# Patient Record
Sex: FEMALE | Race: WHITE | Employment: OTHER | URBAN - METROPOLITAN AREA
[De-identification: names, ages, dates, MRNs, and addresses within clinical notes are randomized per-mention and may not be internally consistent; named-entity substitution may affect disease eponyms.]

---

## 2017-03-20 ENCOUNTER — HOSPITAL ENCOUNTER (EMERGENCY)
Age: 52
Discharge: HOME | End: 2017-03-20
Attending: EMERGENCY MEDICINE
Payer: COMMERCIAL

## 2017-03-20 ENCOUNTER — APPOINTMENT (OUTPATIENT)
Dept: CT IMAGING | Age: 52
End: 2017-03-20
Attending: EMERGENCY MEDICINE
Payer: COMMERCIAL

## 2017-03-20 ENCOUNTER — APPOINTMENT (OUTPATIENT)
Dept: GENERAL RADIOLOGY | Age: 52
End: 2017-03-20
Attending: EMERGENCY MEDICINE
Payer: COMMERCIAL

## 2017-03-20 VITALS
RESPIRATION RATE: 18 BRPM | WEIGHT: 250 LBS | TEMPERATURE: 97 F | DIASTOLIC BLOOD PRESSURE: 70 MMHG | HEIGHT: 67 IN | HEART RATE: 93 BPM | OXYGEN SATURATION: 95 % | SYSTOLIC BLOOD PRESSURE: 121 MMHG | BODY MASS INDEX: 39.24 KG/M2

## 2017-03-20 DIAGNOSIS — J40 BRONCHITIS: ICD-10-CM

## 2017-03-20 DIAGNOSIS — J01.90 ACUTE SINUSITIS, RECURRENCE NOT SPECIFIED, UNSPECIFIED LOCATION: Primary | ICD-10-CM

## 2017-03-20 LAB
ALBUMIN SERPL-MCNC: 4 G/DL (ref 3.5–4.8)
ALP LIVER SERPL-CCNC: 132 U/L (ref 39–100)
ALT SERPL-CCNC: 74 U/L (ref 14–54)
AST SERPL-CCNC: 51 U/L (ref 15–41)
BASOPHILS # BLD AUTO: 0.18 X10(3) UL (ref 0–0.1)
BASOPHILS NFR BLD AUTO: 1.3 %
BILIRUB SERPL-MCNC: 0.4 MG/DL (ref 0.1–2)
BUN BLD-MCNC: 10 MG/DL (ref 8–20)
CALCIUM BLD-MCNC: 10.3 MG/DL (ref 8.3–10.3)
CHLORIDE: 101 MMOL/L (ref 101–111)
CO2: 26 MMOL/L (ref 22–32)
CREAT BLD-MCNC: 1.02 MG/DL (ref 0.55–1.02)
EOSINOPHIL # BLD AUTO: 0.75 X10(3) UL (ref 0–0.3)
EOSINOPHIL NFR BLD AUTO: 5.4 %
ERYTHROCYTE [DISTWIDTH] IN BLOOD BY AUTOMATED COUNT: 16.5 % (ref 11.5–16)
GLUCOSE BLD-MCNC: 234 MG/DL (ref 70–99)
HCT VFR BLD AUTO: 49.9 % (ref 34–50)
HGB BLD-MCNC: 15.5 G/DL (ref 12–16)
IMMATURE GRANULOCYTE COUNT: 0.05 X10(3) UL (ref 0–1)
IMMATURE GRANULOCYTE RATIO %: 0.4 %
LYMPHOCYTES # BLD AUTO: 5 X10(3) UL (ref 0.9–4)
LYMPHOCYTES NFR BLD AUTO: 35.8 %
M PROTEIN MFR SERPL ELPH: 8.1 G/DL (ref 6.1–8.3)
MCH RBC QN AUTO: 24.5 PG (ref 27–33.2)
MCHC RBC AUTO-ENTMCNC: 31.1 G/DL (ref 31–37)
MCV RBC AUTO: 78.8 FL (ref 81–100)
MONOCYTES # BLD AUTO: 0.58 X10(3) UL (ref 0.1–0.6)
MONOCYTES NFR BLD AUTO: 4.2 %
NEUTROPHIL ABS PRELIM: 7.41 X10 (3) UL (ref 1.3–6.7)
NEUTROPHILS # BLD AUTO: 7.41 X10(3) UL (ref 1.3–6.7)
NEUTROPHILS NFR BLD AUTO: 52.9 %
PLATELET # BLD AUTO: 406 10(3)UL (ref 150–450)
POTASSIUM SERPL-SCNC: 4 MMOL/L (ref 3.6–5.1)
RBC # BLD AUTO: 6.33 X10(6)UL (ref 3.8–5.1)
RED CELL DISTRIBUTION WIDTH-SD: 44.8 FL (ref 35.1–46.3)
SODIUM SERPL-SCNC: 138 MMOL/L (ref 136–144)
WBC # BLD AUTO: 14 X10(3) UL (ref 4–13)

## 2017-03-20 PROCEDURE — 99284 EMERGENCY DEPT VISIT MOD MDM: CPT

## 2017-03-20 PROCEDURE — 96375 TX/PRO/DX INJ NEW DRUG ADDON: CPT

## 2017-03-20 PROCEDURE — 94640 AIRWAY INHALATION TREATMENT: CPT

## 2017-03-20 PROCEDURE — 80053 COMPREHEN METABOLIC PANEL: CPT | Performed by: EMERGENCY MEDICINE

## 2017-03-20 PROCEDURE — 70450 CT HEAD/BRAIN W/O DYE: CPT

## 2017-03-20 PROCEDURE — 85025 COMPLETE CBC W/AUTO DIFF WBC: CPT | Performed by: EMERGENCY MEDICINE

## 2017-03-20 PROCEDURE — 71020 XR CHEST PA + LAT CHEST (CPT=71020): CPT

## 2017-03-20 PROCEDURE — 96361 HYDRATE IV INFUSION ADD-ON: CPT

## 2017-03-20 PROCEDURE — 96374 THER/PROPH/DIAG INJ IV PUSH: CPT

## 2017-03-20 RX ORDER — IPRATROPIUM BROMIDE AND ALBUTEROL SULFATE 2.5; .5 MG/3ML; MG/3ML
3 SOLUTION RESPIRATORY (INHALATION) ONCE
Status: COMPLETED | OUTPATIENT
Start: 2017-03-20 | End: 2017-03-20

## 2017-03-20 RX ORDER — SODIUM CHLORIDE 9 MG/ML
INJECTION, SOLUTION INTRAVENOUS ONCE
Status: COMPLETED | OUTPATIENT
Start: 2017-03-20 | End: 2017-03-20

## 2017-03-20 RX ORDER — MORPHINE SULFATE 2 MG/ML
2 INJECTION, SOLUTION INTRAMUSCULAR; INTRAVENOUS ONCE
Status: COMPLETED | OUTPATIENT
Start: 2017-03-20 | End: 2017-03-20

## 2017-03-20 RX ORDER — METOCLOPRAMIDE HYDROCHLORIDE 5 MG/ML
5 INJECTION INTRAMUSCULAR; INTRAVENOUS ONCE
Status: COMPLETED | OUTPATIENT
Start: 2017-03-20 | End: 2017-03-20

## 2017-03-20 RX ORDER — DIPHENHYDRAMINE HYDROCHLORIDE 50 MG/ML
25 INJECTION INTRAMUSCULAR; INTRAVENOUS ONCE
Status: COMPLETED | OUTPATIENT
Start: 2017-03-20 | End: 2017-03-20

## 2017-03-20 RX ORDER — ONDANSETRON 2 MG/ML
4 INJECTION INTRAMUSCULAR; INTRAVENOUS ONCE
Status: COMPLETED | OUTPATIENT
Start: 2017-03-20 | End: 2017-03-20

## 2017-03-20 RX ORDER — HYDROMORPHONE HYDROCHLORIDE 1 MG/ML
0.5 INJECTION, SOLUTION INTRAMUSCULAR; INTRAVENOUS; SUBCUTANEOUS ONCE
Status: DISCONTINUED | OUTPATIENT
Start: 2017-03-20 | End: 2017-03-20

## 2017-03-20 RX ORDER — CLARITHROMYCIN 500 MG/1
500 TABLET, COATED ORAL 2 TIMES DAILY
Qty: 20 TABLET | Refills: 0 | Status: SHIPPED | OUTPATIENT
Start: 2017-03-20 | End: 2017-03-30

## 2017-03-20 NOTE — ED PROVIDER NOTES
Patient Seen in: [facility] Emergency Department In [place]    History   Patient presents with:  Sinus Problem    Stated Complaint: sinus problems    HPI    Patient is a 32-year-old female who presents emergency room with a history of sinus pressure and h mention of complication, not stated as uncontrolled ([facility].)    • PONV (postoperative nausea and vomiting)    • Vertigo    • Asthma    • Esophageal reflux    • Pneumonia, organism unspecified 2014   • Migraines      perfumes   • Back problem    • Blood disord tablet by mouth every 6 (six) hours as needed. ergocalciferol 93604 UNITS Oral Cap,  Take 50,000 Units by mouth twice a week. escitalopram 20 MG Oral Tab,  Take 20 mg by mouth every morning.      SITagliptin Phosphate 100 MG Oral Tab,  Take 100 mg b HPI.    Physical Exam       ED Triage Vitals   BP 03/20/17 1604 150/87 mmHg   Pulse 03/20/17 1604 94   Resp 03/20/17 1604 22   Temp 03/20/17 1604 97.4 °F (36.3 °C)   Temp src 03/20/17 1604 Temporal   SpO2 03/20/17 1604 97 %   O2 Device 03/20/17 1604 None ( DIFFERENTIAL - Abnormal; Notable for the following:     WBC 14.0 (*)     RBC 6.33 (*)     MCV 78.8 (*)     MCH 24.5 (*)     RDW 16.5 (*)     Neutrophil Absolute Prelim 7.41 (*)     Neutrophil Absolute 7.41 (*)     Lymphocyte Absolute 5.00 (*)     Eosinophi sinusitis this time. Patient will be asked to encourage fluids and return to the ER if any other problems arise. Patient was discharged home with improvement in symptoms and no further new complaints.         Disposition and Plan     Clinical Impression:

## 2017-03-20 NOTE — ED INITIAL ASSESSMENT (HPI)
Pt c/o sinus pressure and headache for one month worse in the past 2 wks with tightness in chest/wheezing.

## 2017-05-01 RX ORDER — RIVAROXABAN 20 MG/1
TABLET, FILM COATED ORAL
Qty: 30 TABLET | Refills: 0 | Status: SHIPPED | OUTPATIENT
Start: 2017-05-01 | End: 2017-10-01

## 2017-07-09 ENCOUNTER — APPOINTMENT (OUTPATIENT)
Dept: GENERAL RADIOLOGY | Age: 52
End: 2017-07-09
Attending: EMERGENCY MEDICINE
Payer: COMMERCIAL

## 2017-07-09 ENCOUNTER — HOSPITAL ENCOUNTER (EMERGENCY)
Age: 52
Discharge: HOME | End: 2017-07-09
Attending: EMERGENCY MEDICINE
Payer: COMMERCIAL

## 2017-07-09 VITALS
TEMPERATURE: 98 F | HEIGHT: 67 IN | OXYGEN SATURATION: 94 % | DIASTOLIC BLOOD PRESSURE: 55 MMHG | HEART RATE: 84 BPM | RESPIRATION RATE: 20 BRPM | WEIGHT: 248 LBS | BODY MASS INDEX: 38.92 KG/M2 | SYSTOLIC BLOOD PRESSURE: 142 MMHG

## 2017-07-09 DIAGNOSIS — N39.0 URINARY TRACT INFECTION WITHOUT HEMATURIA, SITE UNSPECIFIED: ICD-10-CM

## 2017-07-09 DIAGNOSIS — J45.901 ASTHMA EXACERBATION: Primary | ICD-10-CM

## 2017-07-09 LAB
ALBUMIN SERPL-MCNC: 3.5 G/DL (ref 3.5–4.8)
ALP LIVER SERPL-CCNC: 103 U/L (ref 39–100)
ALT SERPL-CCNC: 64 U/L (ref 14–54)
AST SERPL-CCNC: 48 U/L (ref 15–41)
ATRIAL RATE: 80 BPM
BASOPHILS # BLD AUTO: 0.12 X10(3) UL (ref 0–0.1)
BASOPHILS NFR BLD AUTO: 1.2 %
BILIRUB SERPL-MCNC: 0.4 MG/DL (ref 0.1–2)
BILIRUB UR QL STRIP.AUTO: NEGATIVE
BUN BLD-MCNC: 9 MG/DL (ref 8–20)
CALCIUM BLD-MCNC: 9.2 MG/DL (ref 8.3–10.3)
CHLORIDE: 104 MMOL/L (ref 101–111)
CO2: 24 MMOL/L (ref 22–32)
COLOR UR AUTO: YELLOW
CREAT BLD-MCNC: 0.75 MG/DL (ref 0.55–1.02)
D-DIMER: 0.49 UG/ML FEU (ref 0–0.49)
EOSINOPHIL # BLD AUTO: 0.45 X10(3) UL (ref 0–0.3)
EOSINOPHIL NFR BLD AUTO: 4.5 %
ERYTHROCYTE [DISTWIDTH] IN BLOOD BY AUTOMATED COUNT: 15.6 % (ref 11.5–16)
GLUCOSE BLD-MCNC: 229 MG/DL (ref 70–99)
GLUCOSE UR STRIP.AUTO-MCNC: NEGATIVE MG/DL
HCT VFR BLD AUTO: 44.2 % (ref 34–50)
HGB BLD-MCNC: 14 G/DL (ref 12–16)
IMMATURE GRANULOCYTE COUNT: 0.06 X10(3) UL (ref 0–1)
IMMATURE GRANULOCYTE RATIO %: 0.6 %
KETONES UR STRIP.AUTO-MCNC: NEGATIVE MG/DL
LYMPHOCYTES # BLD AUTO: 3.7 X10(3) UL (ref 0.9–4)
LYMPHOCYTES NFR BLD AUTO: 37 %
M PROTEIN MFR SERPL ELPH: 7.2 G/DL (ref 6.1–8.3)
MCH RBC QN AUTO: 25.1 PG (ref 27–33.2)
MCHC RBC AUTO-ENTMCNC: 31.7 G/DL (ref 31–37)
MCV RBC AUTO: 79.4 FL (ref 81–100)
MONOCYTES # BLD AUTO: 0.4 X10(3) UL (ref 0.1–0.6)
MONOCYTES NFR BLD AUTO: 4 %
NEUTROPHIL ABS PRELIM: 5.26 X10 (3) UL (ref 1.3–6.7)
NEUTROPHILS # BLD AUTO: 5.26 X10(3) UL (ref 1.3–6.7)
NEUTROPHILS NFR BLD AUTO: 52.7 %
NITRITE UR QL STRIP.AUTO: NEGATIVE
P AXIS: 20 DEGREES
P-R INTERVAL: 140 MS
PH UR STRIP.AUTO: 5.5 [PH] (ref 4.5–8)
PLATELET # BLD AUTO: 355 10(3)UL (ref 150–450)
POTASSIUM SERPL-SCNC: 4.2 MMOL/L (ref 3.6–5.1)
PROT UR STRIP.AUTO-MCNC: NEGATIVE MG/DL
Q-T INTERVAL: 378 MS
QRS DURATION: 92 MS
QTC CALCULATION (BEZET): 435 MS
R AXIS: 45 DEGREES
RBC # BLD AUTO: 5.57 X10(6)UL (ref 3.8–5.1)
RED CELL DISTRIBUTION WIDTH-SD: 44.5 FL (ref 35.1–46.3)
SODIUM SERPL-SCNC: 135 MMOL/L (ref 136–144)
SP GR UR STRIP.AUTO: 1.02 (ref 1–1.03)
T AXIS: 34 DEGREES
TROPONIN: <0.046 NG/ML (ref ?–0.05)
TSI SER-ACNC: 0.79 MIU/ML (ref 0.35–5.5)
UROBILINOGEN UR STRIP.AUTO-MCNC: 0.2 MG/DL
VENTRICULAR RATE: 80 BPM
WBC # BLD AUTO: 10 X10(3) UL (ref 4–13)

## 2017-07-09 PROCEDURE — 96361 HYDRATE IV INFUSION ADD-ON: CPT

## 2017-07-09 PROCEDURE — 85378 FIBRIN DEGRADE SEMIQUANT: CPT | Performed by: EMERGENCY MEDICINE

## 2017-07-09 PROCEDURE — 80053 COMPREHEN METABOLIC PANEL: CPT | Performed by: EMERGENCY MEDICINE

## 2017-07-09 PROCEDURE — 85025 COMPLETE CBC W/AUTO DIFF WBC: CPT | Performed by: EMERGENCY MEDICINE

## 2017-07-09 PROCEDURE — 93005 ELECTROCARDIOGRAM TRACING: CPT

## 2017-07-09 PROCEDURE — 87086 URINE CULTURE/COLONY COUNT: CPT | Performed by: EMERGENCY MEDICINE

## 2017-07-09 PROCEDURE — 94640 AIRWAY INHALATION TREATMENT: CPT

## 2017-07-09 PROCEDURE — 93010 ELECTROCARDIOGRAM REPORT: CPT

## 2017-07-09 PROCEDURE — 99285 EMERGENCY DEPT VISIT HI MDM: CPT

## 2017-07-09 PROCEDURE — 71010 XR CHEST AP PORTABLE  (CPT=71010): CPT | Performed by: EMERGENCY MEDICINE

## 2017-07-09 PROCEDURE — 84484 ASSAY OF TROPONIN QUANT: CPT | Performed by: EMERGENCY MEDICINE

## 2017-07-09 PROCEDURE — 81001 URINALYSIS AUTO W/SCOPE: CPT | Performed by: EMERGENCY MEDICINE

## 2017-07-09 PROCEDURE — 96360 HYDRATION IV INFUSION INIT: CPT

## 2017-07-09 PROCEDURE — 84443 ASSAY THYROID STIM HORMONE: CPT | Performed by: EMERGENCY MEDICINE

## 2017-07-09 RX ORDER — IPRATROPIUM BROMIDE AND ALBUTEROL SULFATE 2.5; .5 MG/3ML; MG/3ML
3 SOLUTION RESPIRATORY (INHALATION) ONCE
Status: COMPLETED | OUTPATIENT
Start: 2017-07-09 | End: 2017-07-09

## 2017-07-09 RX ORDER — SULFAMETHOXAZOLE AND TRIMETHOPRIM 800; 160 MG/1; MG/1
1 TABLET ORAL 2 TIMES DAILY
Qty: 14 TABLET | Refills: 0 | Status: SHIPPED | OUTPATIENT
Start: 2017-07-09 | End: 2017-07-16

## 2017-07-09 NOTE — ED PROVIDER NOTES
Patient Seen in: [facility] Emergency Department In [place]    History   Patient presents with:  Fatigue (constitutional, neurologic)    Stated Complaint: fatigue, rib pain, weakness 2wks    HPI    This is a 30-year-old female complaining of fatigue and t PROCEDURE UNLISTED      Comment: cyst removed  04/10/2011: CHOLECYSTECTOMY      Comment: PERFORMED BY DR KRAFT-LAPAROSCOPIC  2012: COLONOSCOPY      Comment: polyps  2/23/2016: COLONOSCOPY N/A      Comment: Procedure: COLONOSCOPY;  Surgeon: Alicja Nayak, (TRULICITY) 4.75 HK/0.0YE Subcutaneous Solution Pen-injector,  Inject 0.75 mg into the skin every 7 days. Fluticasone Propionate 50 MCG/ACT Nasal Suspension,  2 sprays by Each Nare route 2 (two) times daily.    ergocalciferol 79445 UNITS Oral Cap,  Take 5 no acute distress HEENT exam within normal limits neck there is no lymphadenopathy JVD lungs there is diminished breath sounds with mild expiratory wheezes throughout cardiovascular exam shows regular rate and rhythm without murmurs abdomen soft nontender exclusively to validate the 95% negative predictive value  for venous thromboembolism when the D-Dimer is greater than 0.50 ug/mL (FEU). Proceed with caution from clinical presentation. CBC WITH DIFFERENTIAL WITH PLATELET    Narrative:      The follow 800-160 MG Oral Tab per tablet  Take 1 tablet by mouth 2 (two) times daily.   Qty: 14 tablet Refills: 0

## 2017-07-27 PROCEDURE — 82746 ASSAY OF FOLIC ACID SERUM: CPT | Performed by: OTHER

## 2017-07-27 PROCEDURE — 82607 VITAMIN B-12: CPT | Performed by: OTHER

## 2017-09-14 DIAGNOSIS — Z86.718 HISTORY OF DVT (DEEP VEIN THROMBOSIS): ICD-10-CM

## 2017-09-14 RX ORDER — RIVAROXABAN 20 MG/1
TABLET, FILM COATED ORAL
Qty: 30 TABLET | Refills: 0 | Status: SHIPPED | OUTPATIENT
Start: 2017-09-14 | End: 2017-10-01

## 2017-09-14 NOTE — TELEPHONE ENCOUNTER
Left message on patient voicemail for patient to call to schedule appointment with Dr. Dheeraj Knowles. Was due for appointment 8/2017. Will refill Xarelto for one month.

## 2017-09-28 ENCOUNTER — APPOINTMENT (OUTPATIENT)
Dept: GENERAL RADIOLOGY | Age: 52
End: 2017-09-28
Attending: EMERGENCY MEDICINE
Payer: COMMERCIAL

## 2017-09-28 ENCOUNTER — HOSPITAL ENCOUNTER (EMERGENCY)
Age: 52
Discharge: HOME | End: 2017-09-28
Attending: EMERGENCY MEDICINE
Payer: COMMERCIAL

## 2017-09-28 VITALS
HEIGHT: 68 IN | BODY MASS INDEX: 37.89 KG/M2 | TEMPERATURE: 98 F | WEIGHT: 250 LBS | RESPIRATION RATE: 16 BRPM | SYSTOLIC BLOOD PRESSURE: 147 MMHG | DIASTOLIC BLOOD PRESSURE: 66 MMHG | OXYGEN SATURATION: 94 % | HEART RATE: 94 BPM

## 2017-09-28 DIAGNOSIS — J45.901 ASTHMA EXACERBATION: Primary | ICD-10-CM

## 2017-09-28 DIAGNOSIS — J20.9 ACUTE BRONCHITIS, UNSPECIFIED ORGANISM: ICD-10-CM

## 2017-09-28 LAB
ALBUMIN SERPL-MCNC: 3.5 G/DL (ref 3.5–4.8)
ALP LIVER SERPL-CCNC: 113 U/L (ref 39–100)
ALT SERPL-CCNC: 76 U/L (ref 14–54)
AST SERPL-CCNC: 64 U/L (ref 15–41)
ATRIAL RATE: 89 BPM
BASOPHIL % MANUAL: 0 %
BASOPHIL ABSOLUTE MANUAL: 0 X10(3) UL (ref 0–0.1)
BILIRUB SERPL-MCNC: 0.4 MG/DL (ref 0.1–2)
BUN BLD-MCNC: 6 MG/DL (ref 8–20)
CALCIUM BLD-MCNC: 9.7 MG/DL (ref 8.3–10.3)
CHLORIDE: 104 MMOL/L (ref 101–111)
CO2: 23 MMOL/L (ref 22–32)
CREAT BLD-MCNC: 0.89 MG/DL (ref 0.55–1.02)
EOSINOPHIL % MANUAL: 8 %
EOSINOPHIL ABSOLUTE MANUAL: 0.86 X10(3) UL (ref 0–0.3)
ERYTHROCYTE [DISTWIDTH] IN BLOOD BY AUTOMATED COUNT: 14.6 % (ref 11.5–16)
GLUCOSE BLD-MCNC: 345 MG/DL (ref 70–99)
HCT VFR BLD AUTO: 44.5 % (ref 34–50)
HGB BLD-MCNC: 14.4 G/DL (ref 12–16)
LYMPHOCYTE % MANUAL: 51 %
LYMPHOCYTE ABSOLUTE MANUAL: 5.51 X10(3) UL (ref 0.9–4)
M PROTEIN MFR SERPL ELPH: 7.5 G/DL (ref 6.1–8.3)
MCH RBC QN AUTO: 25.8 PG (ref 27–33.2)
MCHC RBC AUTO-ENTMCNC: 32.4 G/DL (ref 31–37)
MCV RBC AUTO: 79.7 FL (ref 81–100)
MONOCYTE % MANUAL: 3 %
MONOCYTE ABSOLUTE MANUAL: 0.32 X10(3) UL (ref 0.1–0.6)
MORPHOLOGY: NORMAL
NEUTROPHIL ABS PRELIM: 4.35 X10 (3) UL (ref 1.3–6.7)
NEUTROPHIL ABSOLUTE MANUAL: 4.1 X10(3) UL (ref 1.3–6.7)
NEUTROPHILS % MANUAL: 38 %
P AXIS: 80 DEGREES
P-R INTERVAL: 134 MS
PLATELET # BLD AUTO: 335 10(3)UL (ref 150–450)
PLATELET MORPHOLOGY: NORMAL
POTASSIUM SERPL-SCNC: 4.3 MMOL/L (ref 3.6–5.1)
Q-T INTERVAL: 390 MS
QRS DURATION: 92 MS
QTC CALCULATION (BEZET): 474 MS
R AXIS: 36 DEGREES
RBC # BLD AUTO: 5.58 X10(6)UL (ref 3.8–5.1)
RED CELL DISTRIBUTION WIDTH-SD: 42.4 FL (ref 35.1–46.3)
SODIUM SERPL-SCNC: 135 MMOL/L (ref 136–144)
T AXIS: 26 DEGREES
TOTAL CELLS COUNTED: 100
VENTRICULAR RATE: 89 BPM
WBC # BLD AUTO: 10.8 X10(3) UL (ref 4–13)

## 2017-09-28 PROCEDURE — 85025 COMPLETE CBC W/AUTO DIFF WBC: CPT | Performed by: EMERGENCY MEDICINE

## 2017-09-28 PROCEDURE — 36415 COLL VENOUS BLD VENIPUNCTURE: CPT

## 2017-09-28 PROCEDURE — 85007 BL SMEAR W/DIFF WBC COUNT: CPT | Performed by: EMERGENCY MEDICINE

## 2017-09-28 PROCEDURE — 93005 ELECTROCARDIOGRAM TRACING: CPT

## 2017-09-28 PROCEDURE — 87081 CULTURE SCREEN ONLY: CPT | Performed by: EMERGENCY MEDICINE

## 2017-09-28 PROCEDURE — 93010 ELECTROCARDIOGRAM REPORT: CPT

## 2017-09-28 PROCEDURE — 96372 THER/PROPH/DIAG INJ SC/IM: CPT

## 2017-09-28 PROCEDURE — 99285 EMERGENCY DEPT VISIT HI MDM: CPT

## 2017-09-28 PROCEDURE — 71020 XR CHEST PA + LAT CHEST (CPT=71020): CPT | Performed by: EMERGENCY MEDICINE

## 2017-09-28 PROCEDURE — 85027 COMPLETE CBC AUTOMATED: CPT | Performed by: EMERGENCY MEDICINE

## 2017-09-28 PROCEDURE — 87430 STREP A AG IA: CPT | Performed by: EMERGENCY MEDICINE

## 2017-09-28 PROCEDURE — 94640 AIRWAY INHALATION TREATMENT: CPT

## 2017-09-28 PROCEDURE — 80053 COMPREHEN METABOLIC PANEL: CPT | Performed by: EMERGENCY MEDICINE

## 2017-09-28 RX ORDER — AZITHROMYCIN 250 MG/1
TABLET, FILM COATED ORAL
Qty: 1 PACKAGE | Refills: 0 | Status: SHIPPED | OUTPATIENT
Start: 2017-09-28 | End: 2017-10-01

## 2017-09-28 RX ORDER — IPRATROPIUM BROMIDE AND ALBUTEROL SULFATE 2.5; .5 MG/3ML; MG/3ML
3 SOLUTION RESPIRATORY (INHALATION) ONCE
Status: COMPLETED | OUTPATIENT
Start: 2017-09-28 | End: 2017-09-28

## 2017-09-28 RX ORDER — INSULIN ASPART 100 [IU]/ML
0.15 INJECTION, SOLUTION INTRAVENOUS; SUBCUTANEOUS ONCE
Status: COMPLETED | OUTPATIENT
Start: 2017-09-28 | End: 2017-09-28

## 2017-09-28 RX ORDER — PREDNISONE 20 MG/1
60 TABLET ORAL DAILY
Qty: 15 TABLET | Refills: 0 | Status: SHIPPED | OUTPATIENT
Start: 2017-09-28 | End: 2017-10-01

## 2017-09-28 NOTE — ED PROVIDER NOTES
Patient Seen in: THE [facility] Emergency Department In [place]    History   Patient presents with:  Dyspnea DEN SOB (respiratory)    Stated Complaint: asthma    HPI  Patient is a 28-year-old female who has a history of chronic asthma.   Patient states she is unspecified type diabetes mellitus without mention of complication, not stated as uncontrolled    • Unspecified essential hypertension    • Vertigo        Past Surgical History:  No date: BREAST SURGERY PROCEDURE UNLISTED      Comment: cyst removed  04/10/ Physical Exam  GENERAL: Patient resting comfortably on the cart in no acute distress. HEENT: Extraocular muscles intact, pupils equal round reactive to light and accommodation. Mouth mild erythema, neck supple, no meningismus.   LUNGS: Lungs mild EKG    Rate, intervals and axes as noted on EKG Report.   Rate: 89  Rhythm: Sinus Rhythm  Reading: Normal sinus rhythm, no acute changes  \  Chest x-ray no pneumonia          ============================================================  ED Course  -------

## 2017-09-29 ENCOUNTER — APPOINTMENT (OUTPATIENT)
Dept: GENERAL RADIOLOGY | Facility: HOSPITAL | Age: 52
DRG: 189 | End: 2017-09-29
Attending: EMERGENCY MEDICINE
Payer: COMMERCIAL

## 2017-09-29 ENCOUNTER — HOSPITAL ENCOUNTER (INPATIENT)
Facility: HOSPITAL | Age: 52
LOS: 2 days | Discharge: HOME | DRG: 189 | End: 2017-10-01
Attending: EMERGENCY MEDICINE | Admitting: HOSPITALIST
Payer: COMMERCIAL

## 2017-09-29 DIAGNOSIS — R09.02 HYPOXIA: ICD-10-CM

## 2017-09-29 DIAGNOSIS — R06.00 DYSPNEA, UNSPECIFIED TYPE: ICD-10-CM

## 2017-09-29 DIAGNOSIS — J45.901 ASTHMA EXACERBATION: Primary | ICD-10-CM

## 2017-09-29 PROBLEM — E87.20 METABOLIC ACIDOSIS: Status: ACTIVE | Noted: 2017-09-29

## 2017-09-29 PROBLEM — E87.2 METABOLIC ACIDOSIS: Status: ACTIVE | Noted: 2017-09-29

## 2017-09-29 PROBLEM — R73.9 HYPERGLYCEMIA: Status: ACTIVE | Noted: 2017-09-29

## 2017-09-29 PROCEDURE — 99223 1ST HOSP IP/OBS HIGH 75: CPT | Performed by: HOSPITALIST

## 2017-09-29 PROCEDURE — 71020 XR CHEST PA + LAT CHEST (CPT=71020): CPT | Performed by: EMERGENCY MEDICINE

## 2017-09-29 RX ORDER — METHYLPREDNISOLONE SODIUM SUCCINATE 125 MG/2ML
125 INJECTION, POWDER, LYOPHILIZED, FOR SOLUTION INTRAMUSCULAR; INTRAVENOUS ONCE
Status: COMPLETED | OUTPATIENT
Start: 2017-09-29 | End: 2017-09-29

## 2017-09-29 RX ORDER — LEVOFLOXACIN 5 MG/ML
750 INJECTION, SOLUTION INTRAVENOUS ONCE
Status: DISCONTINUED | OUTPATIENT
Start: 2017-09-29 | End: 2017-09-29

## 2017-09-29 RX ORDER — DEXTROSE MONOHYDRATE 25 G/50ML
50 INJECTION, SOLUTION INTRAVENOUS
Status: DISCONTINUED | OUTPATIENT
Start: 2017-09-29 | End: 2017-10-01

## 2017-09-29 RX ORDER — ONDANSETRON 2 MG/ML
8 INJECTION INTRAMUSCULAR; INTRAVENOUS EVERY 6 HOURS PRN
Status: DISCONTINUED | OUTPATIENT
Start: 2017-09-29 | End: 2017-10-01

## 2017-09-29 RX ORDER — ALBUTEROL SULFATE 2.5 MG/3ML
2.5 SOLUTION RESPIRATORY (INHALATION) EVERY 4 HOURS PRN
Status: DISCONTINUED | OUTPATIENT
Start: 2017-09-29 | End: 2017-10-01

## 2017-09-29 RX ORDER — TRAZODONE HYDROCHLORIDE 50 MG/1
50 TABLET ORAL NIGHTLY PRN
Status: DISCONTINUED | OUTPATIENT
Start: 2017-09-29 | End: 2017-10-01

## 2017-09-29 RX ORDER — PANTOPRAZOLE SODIUM 40 MG/1
40 TABLET, DELAYED RELEASE ORAL
Status: DISCONTINUED | OUTPATIENT
Start: 2017-09-29 | End: 2017-10-01

## 2017-09-29 RX ORDER — IPRATROPIUM BROMIDE AND ALBUTEROL SULFATE 2.5; .5 MG/3ML; MG/3ML
3 SOLUTION RESPIRATORY (INHALATION) ONCE
Status: COMPLETED | OUTPATIENT
Start: 2017-09-29 | End: 2017-09-29

## 2017-09-29 RX ORDER — ALPRAZOLAM 0.5 MG/1
0.5 TABLET ORAL 2 TIMES DAILY PRN
Status: DISCONTINUED | OUTPATIENT
Start: 2017-09-29 | End: 2017-10-01

## 2017-09-29 RX ORDER — LEVOFLOXACIN 5 MG/ML
750 INJECTION, SOLUTION INTRAVENOUS EVERY 24 HOURS
Status: DISCONTINUED | OUTPATIENT
Start: 2017-09-29 | End: 2017-09-30

## 2017-09-29 RX ORDER — ESCITALOPRAM OXALATE 20 MG/1
20 TABLET ORAL EVERY MORNING
Status: DISCONTINUED | OUTPATIENT
Start: 2017-09-29 | End: 2017-10-01

## 2017-09-29 RX ORDER — METHYLPREDNISOLONE SODIUM SUCCINATE 125 MG/2ML
60 INJECTION, POWDER, LYOPHILIZED, FOR SOLUTION INTRAMUSCULAR; INTRAVENOUS EVERY 8 HOURS
Status: DISCONTINUED | OUTPATIENT
Start: 2017-09-30 | End: 2017-09-30

## 2017-09-29 RX ORDER — ZONISAMIDE 100 MG/1
200 CAPSULE ORAL NIGHTLY
Status: DISCONTINUED | OUTPATIENT
Start: 2017-09-29 | End: 2017-10-01

## 2017-09-29 RX ORDER — SODIUM CHLORIDE 9 MG/ML
INJECTION, SOLUTION INTRAVENOUS CONTINUOUS
Status: ACTIVE | OUTPATIENT
Start: 2017-09-29 | End: 2017-09-29

## 2017-09-29 RX ORDER — SODIUM CHLORIDE 9 MG/ML
1000 INJECTION, SOLUTION INTRAVENOUS ONCE
Status: COMPLETED | OUTPATIENT
Start: 2017-09-29 | End: 2017-09-29

## 2017-09-29 RX ORDER — ALBUTEROL SULFATE 2.5 MG/3ML
2.5 SOLUTION RESPIRATORY (INHALATION)
Status: DISCONTINUED | OUTPATIENT
Start: 2017-09-29 | End: 2017-10-01

## 2017-09-29 RX ORDER — ONDANSETRON 2 MG/ML
4 INJECTION INTRAMUSCULAR; INTRAVENOUS EVERY 4 HOURS PRN
Status: DISCONTINUED | OUTPATIENT
Start: 2017-09-29 | End: 2017-09-29

## 2017-09-29 RX ORDER — ACETAMINOPHEN 325 MG/1
650 TABLET ORAL EVERY 6 HOURS PRN
Status: DISCONTINUED | OUTPATIENT
Start: 2017-09-29 | End: 2017-10-01

## 2017-09-29 RX ORDER — BUTALBITAL, ACETAMINOPHEN AND CAFFEINE 50; 325; 40 MG/1; MG/1; MG/1
1 TABLET ORAL EVERY 6 HOURS PRN
Status: DISCONTINUED | OUTPATIENT
Start: 2017-09-29 | End: 2017-10-01

## 2017-09-29 RX ORDER — MONTELUKAST SODIUM 10 MG/1
10 TABLET ORAL NIGHTLY
Status: DISCONTINUED | OUTPATIENT
Start: 2017-09-29 | End: 2017-10-01

## 2017-09-29 RX ORDER — ALBUTEROL SULFATE 2.5 MG/3ML
2.5 SOLUTION RESPIRATORY (INHALATION) EVERY 4 HOURS PRN
Status: DISCONTINUED | OUTPATIENT
Start: 2017-09-29 | End: 2017-09-29

## 2017-09-29 RX ORDER — CETIRIZINE HYDROCHLORIDE 10 MG/1
10 TABLET ORAL DAILY
Status: DISCONTINUED | OUTPATIENT
Start: 2017-09-29 | End: 2017-10-01

## 2017-09-29 NOTE — H&P
[facility]  History and Physical     Hilary Cedillo Patient Status:  Emergency    3/25/1965 MRN [id number]   Location [facility] [facility] Attending Vita Batista MD   Hosp Day # 0 PCP Mayo Pemberton MD     Chief Complaint Mook Covarrubias MD;  Location: [address] [place] [address] ENDOSCOPY  2-2011: COLPOSCOPY, CERVIX W/UPPER ADJACENT VAGINA; W/*      Comment: WNL  No date: OTHER SURGICAL HISTORY      Comment: sinus surgery  No date: OTHER SURGICAL HISTORY      Comment: CYST REMOVED ON LEFT WRIST 17 YRS AG 3 by mouth every morning for 5 days 2 by mouth every morning for 5 days 1 by mouth every morning for 5 days Disp: 65 tablet Rfl: 0   Dulaglutide (ULICITY) 3.46 QB/6.1LP Subcutaneous Solution Pen-injector Inject 0.75 mg into the skin every 7 days.  Disp: HPI.    Physical Exam:    /85   Pulse 83   Temp (!) 97.1 °F (36.2 °C) (Temporal)   Resp 20   Ht 5' 7\" (1.702 m)   Wt 250 lb (113.4 kg)   LMP 09/23/2004   SpO2 96%   BMI 39.16 kg/m² was 86-88% on room air; now 95% on 3L oxygen  General: No acute dist consult  2. DMII-insulin ordered-monitor accuchecks  3. Hx PE-resume xarelto  4. Asthma-resume inhalers  5. Metabolic acidosis-monitor  6.  transaminitis-likely fatty liver-monitor    Quality:  · DVT Prophylaxis: xarelto  · CODE status: full  · [place]: no

## 2017-09-29 NOTE — ED PROVIDER NOTES
Patient Seen in: [facility] Emergency Department    History   Patient presents with:  Dyspnea DEN SOB (respiratory)    Stated Complaint: asthma attack    HPI    Patient is a 42-year-old female with medical history as noted below including a history Esophageal reflux    • GERD    • HEADACHES    • History of Holter monitoring 11/4/2014    comletd monitoring   • HYPERTENSION    • HYPERTHYROIDISM    • IBS    • LGSIL (low grade squamous intraepithelial dysplasia)    • Migraines     perfumes   • OBE except as noted above. PSFH elements reviewed from today and agreed except as otherwise stated in HPI.     Physical Exam   ED Triage Vitals [09/29/17 1422]  BP: 159/98  Pulse: 80  Resp: 30  Temp: (!) 97.1 °F (36.2 °C)  Temp src: Temporal  SpO2: 92 %  O2 limits   PROTHROMBIN TIME (PT) - Abnormal; Notable for the following:     PT 14.9 (*)     INR 1.16 (*)     All other components within normal limits   CBC W/ DIFFERENTIAL - Abnormal; Notable for the following:     RBC 5.94 (*)     MCV 79.6 (*)     MCH 25. 6 mild improvement in symptoms but persistent wheeze here in the ER. Patient was given IV dose of steroids here in the emergency room. Patient has had a cough which is objective of some thick yellow sputum here in the ER.   Patient has persistent wheezing a

## 2017-09-29 NOTE — ED INITIAL ASSESSMENT (HPI)
Patient seen yesterday at [place] ED for asthma attack. Patient has been using home nebs without relief, difficulty speaking in full sentences. 91% on room air, unable to complete ADLs due to SOB.

## 2017-09-29 NOTE — PLAN OF CARE
NURSING ADMISSION NOTE      Patient admitted via stretcher  Oriented to room. Safety precautions initiated. Bed in low position. Call light in reach. Call to Dr Paola Gil for consult and orders.

## 2017-09-30 PROCEDURE — 99232 SBSQ HOSP IP/OBS MODERATE 35: CPT | Performed by: HOSPITALIST

## 2017-09-30 RX ORDER — METHYLPREDNISOLONE SODIUM SUCCINATE 125 MG/2ML
60 INJECTION, POWDER, LYOPHILIZED, FOR SOLUTION INTRAMUSCULAR; INTRAVENOUS EVERY 12 HOURS
Status: DISCONTINUED | OUTPATIENT
Start: 2017-09-30 | End: 2017-09-30

## 2017-09-30 RX ORDER — ECHINACEA PURPUREA EXTRACT 125 MG
1 TABLET ORAL
Status: DISCONTINUED | OUTPATIENT
Start: 2017-09-30 | End: 2017-10-01

## 2017-09-30 RX ORDER — METHYLPREDNISOLONE SODIUM SUCCINATE 40 MG/ML
40 INJECTION, POWDER, LYOPHILIZED, FOR SOLUTION INTRAMUSCULAR; INTRAVENOUS EVERY 12 HOURS
Status: DISCONTINUED | OUTPATIENT
Start: 2017-09-30 | End: 2017-10-01

## 2017-09-30 RX ORDER — FLUTICASONE PROPIONATE 50 MCG
1 SPRAY, SUSPENSION (ML) NASAL DAILY
Status: DISCONTINUED | OUTPATIENT
Start: 2017-09-30 | End: 2017-10-01

## 2017-09-30 RX ORDER — FAMOTIDINE 20 MG/1
20 TABLET ORAL 2 TIMES DAILY
Status: DISCONTINUED | OUTPATIENT
Start: 2017-09-30 | End: 2017-10-01

## 2017-09-30 NOTE — PLAN OF CARE
Received patient a/ox4. C/o of headache. O2 sats on RA dropped to 88% while sleeping. She was placed on 2L and o2 sats 94%. She was given fiorcet and and ice pack for her headache and reports relief.  She was updated on plan of care and verbalizes [place]

## 2017-09-30 NOTE — PROGRESS NOTES
[facility]  Progress Note     Guernsey Callum Patient Status:  Inpatient    3/25/1965 MRN [id number]   [facility] 5NW-A Attending Stella Geiger MD   Hosp Day # 1 PCP Dasha Herrera MD     Chief Complaint: SOB    S: Patient feel QAM   • cetirizine  10 mg Oral Daily   • MethylPREDNISolone Sodium Succ  60 mg Intravenous Q8H   • Insulin Aspart Pen  1-68 Units Subcutaneous TID CC   • Insulin Aspart Pen  4-20 Units Subcutaneous TID CC and HS   • levofloxacin  750 mg Intravenous Q24H

## 2017-09-30 NOTE — CONSULTS
[facility]  Report of Consultation    Ashishdelma Chamberlain Patient Status:  Inpatient    3/25/1965 MRN [id number]   [facility] 5NW-A Attending Kathy Radford MD   Hosp Day # 1 PCP Leandra Mcbride MD     Reason for Consultation:  [place] • DIABETES    • Diverticulosis of large intestine    • Esophageal reflux    • GERD    • HEADACHES    • History of Holter monitoring 11/4/2014    comletd monitoring   • HYPERTENSION    • HYPERTHYROIDISM    • IBS    • LGSIL (low grade squamous intraepithel tablet Rfl: 0    zonisamide 100 MG Oral Cap 1 po qhs x 2 weeks then 2 po after that Disp: 60 capsule Rfl: 6    Insulin Degludec (TRESIBA FLEXTOUCH SC) Inject into the skin.  Disp:  Rfl:  Taking   XARELTO 20 MG Oral Tab TAKE ONE TABLET BY MOUTH ONCE DAILY WI Sodium (SINGULAIR) 10 MG Oral Tab Take 10 mg by mouth nightly. Disp:  Rfl:  Taking   Pantoprazole Sodium (PROTONIX) 40 MG Oral Tab EC Take 40 mg by mouth 2 (two) times daily.  Disp:  Rfl:  Taking   Ranitidine HCl (ZANTAC) 150 MG Oral Tab Take 150 mg by mout noted.   Neck: Soft, supple neck; trachea midline, no adenopathy, no thyromegaly. No JVD. Lungs: Rare sense of rhonchi no focal rales   Chest wall: No tenderness or deformity.    Heart: Regular rate and rhythm with no murmurs noted   Abdomen: soft, non-te ([facility]     Asthma with status asthmaticus, unspecified asthma severity     History of pulmonary embolus (PE)     Type 2 diabetes mellitus without complication, without long-term current use of insulin (HCC)     Pulmonary congestion     Metabolic acidosis

## 2017-10-01 VITALS
OXYGEN SATURATION: 96 % | HEART RATE: 80 BPM | RESPIRATION RATE: 18 BRPM | HEIGHT: 67 IN | BODY MASS INDEX: 39.24 KG/M2 | TEMPERATURE: 98 F | WEIGHT: 250 LBS | SYSTOLIC BLOOD PRESSURE: 106 MMHG | DIASTOLIC BLOOD PRESSURE: 57 MMHG

## 2017-10-01 PROCEDURE — 99239 HOSP IP/OBS DSCHRG MGMT >30: CPT | Performed by: HOSPITALIST

## 2017-10-01 RX ORDER — MONTELUKAST SODIUM 10 MG/1
10 TABLET ORAL NIGHTLY
Qty: 90 TABLET | Refills: 1 | Status: SHIPPED | OUTPATIENT
Start: 2017-10-01 | End: 2017-10-23

## 2017-10-01 NOTE — PROGRESS NOTES
[facility]  Progress Note    Hilary Cedillo Patient Status:  Inpatient    3/25/1965 MRN [id number]   [facility] 5NW-A Attending Mingo James MD   Hosp Day # 2 PCP Mayo Pemberton MD     Subjective:  Hilary Cedillo is a(n) 50 Obesity     Cholelithiases     Lung nodule     Pneumonia     Status asthmaticus     Type 2 diabetes mellitus (HCC)     DVT (deep venous thrombosis) (HCC)     Bilateral knee pain     Patella-femoral syndrome, bilateral knee     Thrombosed external hemorrhoi

## 2017-10-01 NOTE — PROGRESS NOTES
NURSING DISCHARGE NOTE    Discharged Home via Wheelchair. Accompanied by Support staff  Belongings Taken by patient/family. VSS. Iv discontinued. Patient alert and oriented. Discharge instructions and prescriptions given to patient.  She verbalized

## 2017-10-01 NOTE — PROGRESS NOTES
[facility]  Progress Note     Leigh Ann Oas Patient Status:  Inpatient    3/25/1965 MRN [id number]   [facility] 5NW-A Attending Levy Mendez MD   Hosp Day # 2 PCP Erna Miller MD     Chief Complaint: SOB    S: Patient has Daily with dinner   • Pantoprazole Sodium  40 mg Oral BID AC   • Montelukast Sodium  10 mg Oral Nightly   • Fluticasone Furoate-Vilanterol  1 puff Inhalation Daily   • escitalopram  20 mg Oral QAM   • cetirizine  10 mg Oral Daily   • Insulin Aspart Pen  1-

## 2017-10-01 NOTE — PLAN OF CARE
Received patient a/ox4. No new complaints. O2 sats on RA maintained >92% while sleeping. States she feels much better. She was updated on plan of care and verbalizes understanding.     METABOLIC/FLUID AND ELECTROLYTES - ADULT    • Glucose maintained within

## 2017-10-02 NOTE — DISCHARGE SUMMARY
[facility]  DISCHARGE SUMMARY     Nitin Fung Patient Status:  Inpatient    3/25/1965 MRN [id number]   [facility] 5NW-A Attending No att. providers found   Hosp Day # 2 PCP Linda Lynch MD     Date of Admission: 2017 was discharged on increased dose of her home medications with instructions on adjusting dose for hyperglycemia.     Procedures during hospitalization:   • none    Incidental or significant findings and recommendations (brief descriptions):  • none    Lab/Te Take 1 tablet by mouth every 6 (six) hours as needed. Refills:  0     cetirizine 10 MG Tabs  Commonly known as:  ZYRTEC      Take 10 mg by mouth daily.    Refills:  0     ergocalciferol 33198 units Caps  Commonly known as:  DRISDOL/VITAMIN D2      Take 50 Follow-up appointment:   MD [address] [address] [place] [address] [phone]    In 1 week        Vital signs:  Temp:  [98.1 °F (36.7 °C)] 98.1 °F (36.7 °C)  Pulse:  [80] 80  Resp:  [18] 18  BP: (106)/(57) 106/57    Physical

## 2017-10-03 NOTE — PAYOR COMM NOTE
--------------  ADMISSION REVIEW     Payor: Felix Wisdom #:  [id number]  Authorization Number: [id number]    Admit date: 9/29/17  Admit time: 0481       Admitting Physician: Donna Bustos MD  Attending Physician:  No att. providers found  P yellow sputum. Patient is on Xarelto because of a history of blood clots in the past.[JZ.1]  Patient states she has been compliant with her Xarelto and has not missed any doses.   P[JZ.4]atient denies history of any other somatic complaints or discomfort a • Hypertension Mother    • Lipids Mother    • Abdul Sanford Mother    • Stroke Mother    • Abdul Sanford Sister      stroke,pe   • Other[other] [OTHER] Brother      kidney stones   • Colon Cancer Maternal Grandfather    • Colon Cancer Mater strength is 5 over 5 in all 4 extremities. There are no gross motor or sensory deficits appreciated. Cranial nerves II through XII are intact. Patient answering all questions appropriately.     ED Course[JZ.1]     Labs Reviewed   COMP METABOLIC PANEL (14) identical to when she has had to be admitted previously. Patient will be admitted to the hospital for further care at this time. Dr. Frankie Persaud notified from the emergency room. Patient admitted with no further new complaints. [JZ.2]    16:28  Dr. Fabiola Londono No current facility-administered medications on file prior to encounter.    Current Outpatient Prescriptions on File Prior to Encounter:  azithromycin (ZITHROMAX Z-ANNY) 250 MG Oral Tab 500 mg once followed by 250 mg daily x 4 days Disp: 1 Package Rfl: 0 every 4 (four) hours as needed for Wheezing or Shortness of Breath. Disp: 30 ampule Rfl: 0   Albuterol Sulfate HFA (PROAIR HFA) 108 (90 BASE) MCG/ACT Inhalation Aero Soln Inhale 2 puffs into the lungs every 6 (six) hours as needed for Wheezing.  Disp: 1 Inh Lab  09/28/17   1602  09/29/17   1434   GLU  345*  358*   BUN  6*  6*   CREATSERUM  0.89  0.84   CA  9.7  9.7   ALB  3.5  3.6   NA  135*  138   K  4.3  4.4   CL  104  105   CO2  23.0  21.0*   ALKPHO  113*  119*   AST  64*  53*   ALT  76*  69*   BILT  0.4 day for 4 days then 3 by mouth every morning for 5 days 2 by mouth every morning for 5 days 1 by mouth every morning for 5 days Disp: 65 tablet Rfl: 0   Dulaglutide (TRULICITY) 2.76 HI/8.9HV Subcutaneous Solution Pen-injector Inject 0.75 mg into the skin e

## 2017-10-03 NOTE — PAYOR COMM NOTE
--------------  DISCHARGE REVIEW    Payor: Patti Quiñones #:  [id number]  Authorization Number: [id number]    Admit date: 9/29/17  Admit time:  5527  Discharge Date: 10/1/2017  6:38 PM     Admitting Physician: Travis Mack MD  Attending Phys days, sob, wheeizng, productive \"play dough looking\" productive cough, chest tightness, and dyspnea.   Says she has asthma and has been on 60mg prednisone lately.       Brief Synopsis: Patient is a 61-year-old female admitted with acute hypoxic respirator nightly. Quantity:  90 tablet  Refills:  1     Rivaroxaban 20 MG Tabs  Commonly known as:  Karina Annelise  What changed:  Another medication with the same name was removed. Continue taking this medication, and follow the directions you see here.       Take 1 tab predniSONE 20 MG Tabs  Commonly known as:  DELTASONE        TRULICITY 9.73 SL/0.9CM Sopn  Generic drug:  Dulaglutide              Where to Get Your Medications      These medications were sent to [facility] [address], [address]

## 2017-10-23 ENCOUNTER — OFFICE VISIT (OUTPATIENT)
Dept: FAMILY MEDICINE CLINIC | Facility: CLINIC | Age: 52
End: 2017-10-23

## 2017-10-23 VITALS
RESPIRATION RATE: 12 BRPM | BODY MASS INDEX: 39.71 KG/M2 | HEART RATE: 93 BPM | SYSTOLIC BLOOD PRESSURE: 120 MMHG | HEIGHT: 67.25 IN | DIASTOLIC BLOOD PRESSURE: 70 MMHG | TEMPERATURE: 98 F | WEIGHT: 256 LBS | OXYGEN SATURATION: 100 %

## 2017-10-23 DIAGNOSIS — Z12.39 SCREENING FOR MALIGNANT NEOPLASM OF BREAST: ICD-10-CM

## 2017-10-23 DIAGNOSIS — Z13.89 SCREENING FOR DIABETIC PERIPHERAL NEUROPATHY: ICD-10-CM

## 2017-10-23 DIAGNOSIS — Z79.4 TYPE 2 DIABETES MELLITUS WITH COMPLICATION, WITH LONG-TERM CURRENT USE OF INSULIN (HCC): Primary | ICD-10-CM

## 2017-10-23 DIAGNOSIS — E11.8 TYPE 2 DIABETES MELLITUS WITH COMPLICATION, WITH LONG-TERM CURRENT USE OF INSULIN (HCC): Primary | ICD-10-CM

## 2017-10-23 PROCEDURE — 99214 OFFICE O/P EST MOD 30 MIN: CPT | Performed by: FAMILY MEDICINE

## 2017-10-23 RX ORDER — INSULIN DEGLUDEC 200 U/ML
40 INJECTION, SOLUTION SUBCUTANEOUS NIGHTLY
COMMUNITY
Start: 2017-10-10 | End: 2018-01-09

## 2017-10-23 NOTE — PROGRESS NOTES
HPI:   Tyson Vargas is a 50year old female that presents to establish care. She has hx of poorly controlled Dm2. She has tried several medications with previous PCP and glucose still in 200-300s range most mornings.   A1c 9.1 one week ago per pa 0.083% Inhalation Nebu Soln, Take 3 mL (2.5 mg total) by nebulization every 4 (four) hours as needed for Wheezing or Shortness of Breath., Disp: 30 ampule, Rfl: 0  •  alprazolam (XANAX) 0.5 MG Oral Tab, Take 0.5 mg by mouth 2 (two) times daily as needed. edema, no cyanosis, 2+ radial pulses bilaterally.   Bilateral barefoot skin diabetic exam is normal, visualized feet and the appearance is normal.  Bilateral monofilament/sensation of both feet is normal.  Pulsation pedal pulse exam of both lower legs/feet

## 2017-10-24 ENCOUNTER — TELEPHONE (OUTPATIENT)
Dept: FAMILY MEDICINE CLINIC | Facility: CLINIC | Age: 52
End: 2017-10-24

## 2017-11-01 ENCOUNTER — HOSPITAL ENCOUNTER (INPATIENT)
Facility: HOSPITAL | Age: 52
LOS: 2 days | Discharge: HOME | DRG: 247 | End: 2017-11-04
Attending: EMERGENCY MEDICINE | Admitting: STUDENT IN AN ORGANIZED HEALTH CARE EDUCATION/TRAINING PROGRAM
Payer: COMMERCIAL

## 2017-11-01 ENCOUNTER — TELEPHONE (OUTPATIENT)
Dept: HEMATOLOGY/ONCOLOGY | Facility: HOSPITAL | Age: 52
End: 2017-11-01

## 2017-11-01 ENCOUNTER — APPOINTMENT (OUTPATIENT)
Dept: CT IMAGING | Age: 52
DRG: 247 | End: 2017-11-01
Attending: EMERGENCY MEDICINE
Payer: COMMERCIAL

## 2017-11-01 DIAGNOSIS — I82.509 CHRONIC DEEP VEIN THROMBOSIS (DVT) OF LOWER EXTREMITY, UNSPECIFIED LATERALITY, UNSPECIFIED VEIN (HCC): ICD-10-CM

## 2017-11-01 DIAGNOSIS — R77.8 ELEVATED TROPONIN: ICD-10-CM

## 2017-11-01 DIAGNOSIS — R07.9 ACUTE CHEST PAIN: Primary | ICD-10-CM

## 2017-11-01 DIAGNOSIS — K92.0 HEMATEMESIS WITH NAUSEA: ICD-10-CM

## 2017-11-01 PROCEDURE — 74177 CT ABD & PELVIS W/CONTRAST: CPT | Performed by: EMERGENCY MEDICINE

## 2017-11-01 PROCEDURE — 71275 CT ANGIOGRAPHY CHEST: CPT | Performed by: EMERGENCY MEDICINE

## 2017-11-01 RX ORDER — MAGNESIUM HYDROXIDE/ALUMINUM HYDROXICE/SIMETHICONE 120; 1200; 1200 MG/30ML; MG/30ML; MG/30ML
30 SUSPENSION ORAL ONCE
Status: COMPLETED | OUTPATIENT
Start: 2017-11-01 | End: 2017-11-01

## 2017-11-01 RX ORDER — ONDANSETRON 2 MG/ML
4 INJECTION INTRAMUSCULAR; INTRAVENOUS ONCE
Status: COMPLETED | OUTPATIENT
Start: 2017-11-01 | End: 2017-11-01

## 2017-11-01 RX ORDER — HYDROMORPHONE HYDROCHLORIDE 1 MG/ML
1 INJECTION, SOLUTION INTRAMUSCULAR; INTRAVENOUS; SUBCUTANEOUS ONCE
Status: COMPLETED | OUTPATIENT
Start: 2017-11-01 | End: 2017-11-01

## 2017-11-01 RX ORDER — ASPIRIN 81 MG/1
162 TABLET, CHEWABLE ORAL ONCE
Status: COMPLETED | OUTPATIENT
Start: 2017-11-01 | End: 2017-11-01

## 2017-11-02 ENCOUNTER — APPOINTMENT (OUTPATIENT)
Dept: INTERVENTIONAL RADIOLOGY/VASCULAR | Facility: HOSPITAL | Age: 52
DRG: 247 | End: 2017-11-02
Attending: NURSE PRACTITIONER
Payer: COMMERCIAL

## 2017-11-02 ENCOUNTER — APPOINTMENT (OUTPATIENT)
Dept: ULTRASOUND IMAGING | Facility: HOSPITAL | Age: 52
DRG: 247 | End: 2017-11-02
Attending: STUDENT IN AN ORGANIZED HEALTH CARE EDUCATION/TRAINING PROGRAM
Payer: COMMERCIAL

## 2017-11-02 ENCOUNTER — APPOINTMENT (OUTPATIENT)
Dept: CV DIAGNOSTICS | Facility: HOSPITAL | Age: 52
DRG: 247 | End: 2017-11-02
Attending: STUDENT IN AN ORGANIZED HEALTH CARE EDUCATION/TRAINING PROGRAM
Payer: COMMERCIAL

## 2017-11-02 PROBLEM — I20.9 CARDIAC ANGINA: Status: ACTIVE | Noted: 2017-11-02

## 2017-11-02 PROBLEM — R77.8 ELEVATED TROPONIN: Status: ACTIVE | Noted: 2017-11-02

## 2017-11-02 PROBLEM — I20.9 CARDIAC ANGINA (HCC): Status: ACTIVE | Noted: 2017-11-02

## 2017-11-02 PROBLEM — K92.0 HEMATEMESIS WITH NAUSEA: Status: ACTIVE | Noted: 2017-11-02

## 2017-11-02 PROBLEM — R79.89 ELEVATED TROPONIN: Status: ACTIVE | Noted: 2017-11-02

## 2017-11-02 PROBLEM — R07.9 ACUTE CHEST PAIN: Status: ACTIVE | Noted: 2017-11-02

## 2017-11-02 PROCEDURE — 76700 US EXAM ABDOM COMPLETE: CPT | Performed by: STUDENT IN AN ORGANIZED HEALTH CARE EDUCATION/TRAINING PROGRAM

## 2017-11-02 PROCEDURE — 4A023N7 MEASUREMENT OF CARDIAC SAMPLING AND PRESSURE, LEFT HEART, PERCUTANEOUS APPROACH: ICD-10-PCS | Performed by: INTERNAL MEDICINE

## 2017-11-02 PROCEDURE — 99255 IP/OBS CONSLTJ NEW/EST HI 80: CPT | Performed by: SPECIALIST

## 2017-11-02 PROCEDURE — 93306 TTE W/DOPPLER COMPLETE: CPT | Performed by: STUDENT IN AN ORGANIZED HEALTH CARE EDUCATION/TRAINING PROGRAM

## 2017-11-02 PROCEDURE — B2111ZZ FLUOROSCOPY OF MULTIPLE CORONARY ARTERIES USING LOW OSMOLAR CONTRAST: ICD-10-PCS | Performed by: INTERNAL MEDICINE

## 2017-11-02 PROCEDURE — B240ZZ3 ULTRASONOGRAPHY OF SINGLE CORONARY ARTERY, INTRAVASCULAR: ICD-10-PCS | Performed by: INTERNAL MEDICINE

## 2017-11-02 PROCEDURE — 027034Z DILATION OF CORONARY ARTERY, ONE ARTERY WITH DRUG-ELUTING INTRALUMINAL DEVICE, PERCUTANEOUS APPROACH: ICD-10-PCS | Performed by: INTERNAL MEDICINE

## 2017-11-02 PROCEDURE — 99222 1ST HOSP IP/OBS MODERATE 55: CPT | Performed by: STUDENT IN AN ORGANIZED HEALTH CARE EDUCATION/TRAINING PROGRAM

## 2017-11-02 RX ORDER — ALPRAZOLAM 0.5 MG/1
0.5 TABLET ORAL 2 TIMES DAILY PRN
Status: DISCONTINUED | OUTPATIENT
Start: 2017-11-02 | End: 2017-11-04

## 2017-11-02 RX ORDER — KETOROLAC TROMETHAMINE 30 MG/ML
30 INJECTION, SOLUTION INTRAMUSCULAR; INTRAVENOUS EVERY 6 HOURS PRN
Status: DISCONTINUED | OUTPATIENT
Start: 2017-11-02 | End: 2017-11-02

## 2017-11-02 RX ORDER — HYDROMORPHONE HYDROCHLORIDE 1 MG/ML
1 INJECTION, SOLUTION INTRAMUSCULAR; INTRAVENOUS; SUBCUTANEOUS
Status: DISCONTINUED | OUTPATIENT
Start: 2017-11-02 | End: 2017-11-02

## 2017-11-02 RX ORDER — HYDROMORPHONE HYDROCHLORIDE 1 MG/ML
0.5 INJECTION, SOLUTION INTRAMUSCULAR; INTRAVENOUS; SUBCUTANEOUS
Status: DISCONTINUED | OUTPATIENT
Start: 2017-11-02 | End: 2017-11-02

## 2017-11-02 RX ORDER — ATORVASTATIN CALCIUM 80 MG/1
80 TABLET, FILM COATED ORAL NIGHTLY
Status: DISCONTINUED | OUTPATIENT
Start: 2017-11-02 | End: 2017-11-04

## 2017-11-02 RX ORDER — CETIRIZINE HYDROCHLORIDE 10 MG/1
10 TABLET ORAL DAILY
Status: DISCONTINUED | OUTPATIENT
Start: 2017-11-02 | End: 2017-11-04

## 2017-11-02 RX ORDER — METOCLOPRAMIDE HYDROCHLORIDE 5 MG/ML
10 INJECTION INTRAMUSCULAR; INTRAVENOUS EVERY 6 HOURS PRN
Status: DISCONTINUED | OUTPATIENT
Start: 2017-11-02 | End: 2017-11-03

## 2017-11-02 RX ORDER — ONDANSETRON 2 MG/ML
4 INJECTION INTRAMUSCULAR; INTRAVENOUS EVERY 6 HOURS PRN
Status: DISCONTINUED | OUTPATIENT
Start: 2017-11-02 | End: 2017-11-02

## 2017-11-02 RX ORDER — HEPARIN SODIUM 5000 [USP'U]/ML
INJECTION, SOLUTION INTRAVENOUS; SUBCUTANEOUS
Status: COMPLETED
Start: 2017-11-02 | End: 2017-11-02

## 2017-11-02 RX ORDER — METOCLOPRAMIDE 10 MG/1
10 TABLET ORAL EVERY 6 HOURS PRN
Status: DISCONTINUED | OUTPATIENT
Start: 2017-11-02 | End: 2017-11-03

## 2017-11-02 RX ORDER — SODIUM CHLORIDE 9 MG/ML
INJECTION, SOLUTION INTRAVENOUS CONTINUOUS
Status: DISCONTINUED | OUTPATIENT
Start: 2017-11-02 | End: 2017-11-04

## 2017-11-02 RX ORDER — MORPHINE SULFATE 4 MG/ML
2 INJECTION, SOLUTION INTRAMUSCULAR; INTRAVENOUS
Status: DISCONTINUED | OUTPATIENT
Start: 2017-11-02 | End: 2017-11-04

## 2017-11-02 RX ORDER — SUCRALFATE ORAL 1 G/10ML
1 SUSPENSION ORAL ONCE
Status: COMPLETED | OUTPATIENT
Start: 2017-11-02 | End: 2017-11-02

## 2017-11-02 RX ORDER — ZONISAMIDE 100 MG/1
200 CAPSULE ORAL NIGHTLY
Status: DISCONTINUED | OUTPATIENT
Start: 2017-11-02 | End: 2017-11-04

## 2017-11-02 RX ORDER — CARVEDILOL 6.25 MG/1
6.25 TABLET ORAL 2 TIMES DAILY WITH MEALS
Status: DISCONTINUED | OUTPATIENT
Start: 2017-11-02 | End: 2017-11-04

## 2017-11-02 RX ORDER — NITROGLYCERIN 0.4 MG/1
0.4 TABLET SUBLINGUAL EVERY 5 MIN PRN
Status: DISCONTINUED | OUTPATIENT
Start: 2017-11-02 | End: 2017-11-04

## 2017-11-02 RX ORDER — IBUPROFEN 200 MG
200 TABLET ORAL EVERY 4 HOURS PRN
Status: CANCELLED | OUTPATIENT
Start: 2017-11-02

## 2017-11-02 RX ORDER — FAMOTIDINE 10 MG/ML
20 INJECTION, SOLUTION INTRAVENOUS 2 TIMES DAILY
Status: DISCONTINUED | OUTPATIENT
Start: 2017-11-02 | End: 2017-11-04

## 2017-11-02 RX ORDER — ESCITALOPRAM OXALATE 20 MG/1
20 TABLET ORAL EVERY MORNING
Status: DISCONTINUED | OUTPATIENT
Start: 2017-11-02 | End: 2017-11-04

## 2017-11-02 RX ORDER — LIDOCAINE HYDROCHLORIDE 10 MG/ML
INJECTION, SOLUTION INFILTRATION; PERINEURAL
Status: COMPLETED
Start: 2017-11-02 | End: 2017-11-02

## 2017-11-02 RX ORDER — ASPIRIN 81 MG/1
81 TABLET, CHEWABLE ORAL DAILY
Status: DISCONTINUED | OUTPATIENT
Start: 2017-11-02 | End: 2017-11-02

## 2017-11-02 RX ORDER — LORAZEPAM 2 MG/ML
0.5 INJECTION INTRAMUSCULAR EVERY 6 HOURS PRN
Status: DISCONTINUED | OUTPATIENT
Start: 2017-11-02 | End: 2017-11-04

## 2017-11-02 RX ORDER — IBUPROFEN 400 MG/1
400 TABLET ORAL EVERY 4 HOURS PRN
Status: CANCELLED | OUTPATIENT
Start: 2017-11-02

## 2017-11-02 RX ORDER — ONDANSETRON 2 MG/ML
4 INJECTION INTRAMUSCULAR; INTRAVENOUS EVERY 6 HOURS PRN
Status: DISCONTINUED | OUTPATIENT
Start: 2017-11-02 | End: 2017-11-04

## 2017-11-02 RX ORDER — ONDANSETRON 2 MG/ML
4 INJECTION INTRAMUSCULAR; INTRAVENOUS ONCE
Status: COMPLETED | OUTPATIENT
Start: 2017-11-02 | End: 2017-11-02

## 2017-11-02 RX ORDER — ALBUTEROL SULFATE 2.5 MG/3ML
2.5 SOLUTION RESPIRATORY (INHALATION) EVERY 4 HOURS PRN
Status: DISCONTINUED | OUTPATIENT
Start: 2017-11-02 | End: 2017-11-04

## 2017-11-02 RX ORDER — MAGNESIUM HYDROXIDE/ALUMINUM HYDROXICE/SIMETHICONE 120; 1200; 1200 MG/30ML; MG/30ML; MG/30ML
30 SUSPENSION ORAL 4 TIMES DAILY PRN
Status: DISCONTINUED | OUTPATIENT
Start: 2017-11-02 | End: 2017-11-02

## 2017-11-02 RX ORDER — DEXTROSE AND SODIUM CHLORIDE 5; .45 G/100ML; G/100ML
INJECTION, SOLUTION INTRAVENOUS CONTINUOUS
Status: DISCONTINUED | OUTPATIENT
Start: 2017-11-02 | End: 2017-11-02

## 2017-11-02 RX ORDER — FLUTICASONE PROPIONATE 50 MCG
2 SPRAY, SUSPENSION (ML) NASAL 2 TIMES DAILY
Status: DISCONTINUED | OUTPATIENT
Start: 2017-11-02 | End: 2017-11-04

## 2017-11-02 RX ORDER — MORPHINE SULFATE 4 MG/ML
1 INJECTION, SOLUTION INTRAMUSCULAR; INTRAVENOUS
Status: DISCONTINUED | OUTPATIENT
Start: 2017-11-02 | End: 2017-11-04

## 2017-11-02 RX ORDER — SODIUM CHLORIDE 9 MG/ML
INJECTION, SOLUTION INTRAVENOUS CONTINUOUS
Status: ACTIVE | OUTPATIENT
Start: 2017-11-02 | End: 2017-11-02

## 2017-11-02 RX ORDER — BUTALBITAL, ACETAMINOPHEN AND CAFFEINE 50; 325; 40 MG/1; MG/1; MG/1
1 TABLET ORAL EVERY 6 HOURS PRN
Status: DISCONTINUED | OUTPATIENT
Start: 2017-11-02 | End: 2017-11-04

## 2017-11-02 RX ORDER — ASPIRIN 81 MG/1
81 TABLET ORAL DAILY
Status: DISCONTINUED | OUTPATIENT
Start: 2017-11-02 | End: 2017-11-02

## 2017-11-02 RX ORDER — NITROGLYCERIN 0.4 MG/1
0.4 TABLET SUBLINGUAL ONCE
Status: COMPLETED | OUTPATIENT
Start: 2017-11-02 | End: 2017-11-02

## 2017-11-02 RX ORDER — PANTOPRAZOLE SODIUM 40 MG/1
40 TABLET, DELAYED RELEASE ORAL
Status: CANCELLED | OUTPATIENT
Start: 2017-11-02

## 2017-11-02 RX ORDER — METOCLOPRAMIDE HYDROCHLORIDE 5 MG/ML
10 INJECTION INTRAMUSCULAR; INTRAVENOUS EVERY 6 HOURS PRN
Status: DISCONTINUED | OUTPATIENT
Start: 2017-11-02 | End: 2017-11-02

## 2017-11-02 RX ORDER — IBUPROFEN 600 MG/1
600 TABLET ORAL EVERY 4 HOURS PRN
Status: CANCELLED | OUTPATIENT
Start: 2017-11-02

## 2017-11-02 RX ORDER — ASPIRIN 81 MG/1
81 TABLET ORAL DAILY
Status: DISCONTINUED | OUTPATIENT
Start: 2017-11-02 | End: 2017-11-03

## 2017-11-02 RX ORDER — PANTOPRAZOLE SODIUM 40 MG/1
40 TABLET, DELAYED RELEASE ORAL 2 TIMES DAILY
Status: DISCONTINUED | OUTPATIENT
Start: 2017-11-02 | End: 2017-11-02

## 2017-11-02 RX ORDER — ACETAMINOPHEN 325 MG/1
650 TABLET ORAL EVERY 6 HOURS PRN
Status: DISCONTINUED | OUTPATIENT
Start: 2017-11-02 | End: 2017-11-04

## 2017-11-02 RX ORDER — MONTELUKAST SODIUM 10 MG/1
10 TABLET ORAL NIGHTLY
Status: DISCONTINUED | OUTPATIENT
Start: 2017-11-02 | End: 2017-11-04

## 2017-11-02 RX ORDER — MIDAZOLAM HYDROCHLORIDE 1 MG/ML
INJECTION INTRAMUSCULAR; INTRAVENOUS
Status: COMPLETED
Start: 2017-11-02 | End: 2017-11-02

## 2017-11-02 NOTE — ED PROVIDER NOTES
Patient Seen in: [facility] 8ne-a    History   Patient presents with:  Chest Pain Angina (cardiovascular)    Stated Complaint: cp    HPI    Patient presents with sharp midsternal chest pain waxing and waning throughout the day.   She notes the pain b PROCEDURE UNLISTED      Comment: cyst removed  04/10/2011: CHOLECYSTECTOMY      Comment: PERFORMED BY DR KRAFT-LAPAROSCOPIC  2012: COLONOSCOPY      Comment: polyps  2/23/2016: COLONOSCOPY N/A      Comment: Procedure: COLONOSCOPY;  Surgeon: Navi Iqbal, No scleral icterus. Oropharynx moist.  Neck: Supple without adenopathy. Carotid arteries 2+ and equal bilaterally without bruits  Lungs: Minimal expiratory wheeze.   No crepitations  Chest: Nontender  Heart: Apical pulse 88 and regular without murmur rub PANEL (8)   CBC WITH DIFFERENTIAL WITH PLATELET   SED RATE, WESTERGREN (AUTOMATED)   LIPID PANEL   TROPONIN I   TROPONIN I   RAINBOW DRAW BLUE   RAINBOW DRAW LAVENDER   RAINBOW DRAW LIGHT GREEN   RAINBOW DRAW GOLD     Initial EKG: Normal sinus rhythm with treatment. Disposition and Plan     Clinical Impression:  Acute chest pain  (primary encounter diagnosis)  Elevated troponin  Hematemesis with nausea    Disposition:  Admit    Follow-up:  No follow-up provider specified.     Medications Prescribed:

## 2017-11-02 NOTE — PLAN OF CARE
Diabetes/Glucose Control    • Glucose maintained within prescribed range Progressing        Patient/Family Goals    • Patient/Family Long Term Goal Progressing    • Patient/Family Short Term Goal Progressing              NURSING ADMISSION NOTE      Patient

## 2017-11-02 NOTE — CONSULTS
THE [facility] Report of Consultation      Patient Name: Gareth Govea   YOB: 1965  Medical Record Number: [id number]  Consulting Physician: Bhupendra Santillan M.D.    Referring Physician: Richard Barry MD    Date of Cons Past Surgical History:  No date: BREAST SURGERY PROCEDURE UNLISTED      Comment: cyst removed  04/10/2011: CHOLECYSTECTOMY      Comment: PERFORMED BY DR KRAFT-LAPAROSCOPIC  2012: COLONOSCOPY      Comment: polyps  2/23/2016: COLONOSCOPY N/A      Comment: Pr (BREO ELLIPTA) 200-25 MCG/INH inhaler 1 puff 1 puff Inhalation Daily   Fluticasone Propionate (FLONASE) 50 MCG/ACT nasal spray 2 spray 2 spray Each Nare BID   Montelukast Sodium (SINGULAIR) tab 10 mg 10 mg Oral Nightly   zonisamide (ZONEGRAN) cap 200 mg 20 dyspnea, cough, hemoptysis, pleuritic chest pain. Cardiovascular No anginal chest pain, palpitations, edema, orthopnea. Gastrointestinal As per HPI. Genitourinary No hematuria, dysuria, increased frequency, urgency, hesitancy, incontinence.   Musculoskel 42 degrees   -RAINBOW DRAW LAVENDER   Collection Time: 11/01/17 10:18 PM   Result Value Ref Range   Hold Lavender Auto Resulted    -RAINBOW DRAW LIGHT GREEN   Collection Time: 11/01/17 10:18 PM   Result Value Ref Range   Hold Lt Green Auto Resulted    -COM % 42.4 %   Monocyte % 4.8 %   Eosinophil % 3.9 %   Basophil % 1.1 %   Immature Granulocyte % 0.7 %   -SCAN SLIDE   Collection Time: 11/01/17 10:51 PM   Result Value Ref Range   Slide Review Slide reviewed,normal RBC and platelet morphology.     -PTT, ACTIVA 16.0 g/dL   HCT 44.1 34.0 - 50.0 %   .0 150.0 - 450.0 10(3)uL   MCV 78.2 (L) 81.0 - 100.0 fL   MCH 25.0 (L) 27.0 - 33.2 pg   MCHC 32.0 31.0 - 37.0 g/dL   RDW 14.6 11.5 - 16.0 %   RDW-SD 41.5 35.1 - 46.3 fL   Neutrophil Absolute Prelim 8.90 (H) 1.30 mm left lower lobe nodule image #90. PLEURA:  Normal.  MEDIASTINUM/FATUMA:  Hiatal hernia. CARDIAC:  Normal.  CHEST WALL:  Normal     ABDOMEN/PELVIS:  LIVER:  Normal in shape and contour. Hepatic steatosis. BILIARY:  Cholecystectomy clips are.   SPLEEN:  N Hematology/Oncology, [facility]

## 2017-11-02 NOTE — PROGRESS NOTES
[facility]  Progress Note     Zabrina Parsons Patient Status:  Inpatient    3/25/1965 MRN [id number]   [facility] 8NE-A Attending Minor Harden MD   [facility] Day # 0 PCP Ellis Plata DO     Chief Complaint: chest pain, coffee grou Montelukast Sodium  10 mg Oral Nightly   • zonisamide  200 mg Oral Nightly   • famoTIDine  20 mg Intravenous BID   • insulin detemir  20 Units Subcutaneous Nightly   • aspirin  81 mg Oral Daily       ASSESSMENT / PLAN:     1.  Chest pain with elevated tropo

## 2017-11-02 NOTE — CONSULTS
[facility]  [facility] Cardiology Consult Note    Leighann Aviles Patient Status:  Emergency    3/25/1965 MRN [id number]   Location [address] [place] [address] Attending Garrison Manuel MD   Hosp Day # 0 PCP Lady Plan, DO thrombosis ([facility]    • DEPRESSION    • Depression    • DIABETES    • Diverticulosis of large intestine    • Esophageal reflux    • GERD    • HEADACHES    • History of Holter monitoring 11/4/2014    comletd monitoring   • HYPERTENSION    • HYPERTHYROIDISM 0169    Physical Exam:     General: Alert and oriented x 3. No apparent distress. No respiratory or constitutional distress. HEENT: Normocephalic, anicteric sclera, neck supple. Neck: No JVD, carotids 2+, no bruits. Cardiac: Regular rate and rhythm.  S1,

## 2017-11-02 NOTE — CONSULTS
[facility]                       Gastroenterology [address] [facility] Gastroenterology    Adama Easton Patient Status:  Inpatient    3/25/1965 MRN [id number]   [facility] 8NE-A Attending Davis Lopez MD   [address] [facility] Day # 0 PCP Alie Mancini internal hemorrhoids. CTA C/CT a/p IV suggested no PE and no acute GI process; labs reveal elevated troponin levels and she is undergoing a cardiac evaluation.  She continues to have intermittent pain which improves with IV narcotics and has not experienced Sublingual Once   [COMPLETED] ondansetron HCl (ZOFRAN) injection 4 mg 4 mg Intravenous Once   acetaminophen (TYLENOL) tab 650 mg 650 mg Oral Q6H PRN   ondansetron HCl (ZOFRAN) injection 4 mg 4 mg Intravenous Q6H PRN   nitroGLYCERIN (NITROSTAT) SL tab 0.4 m [COMPLETED] HYDROmorphone HCl PF (DILAUDID) 1 MG/ML injection 1 mg 1 mg Intravenous Once   [COMPLETED] ondansetron HCl (ZOFRAN) injection 4 mg 4 mg Intravenous Once     Allergies:   Peanuts [Peanut Oil]    Hives    Comment:Hives, asthma attack  [facility] without wheezes; rubs, rhonchi, or rales  Abdomen: Obese, soft, diffuse tenderness most pronounced in the epigastrium with moderate palpation, non-distended with the presence of bowel sounds; No hepatosplenomegaly; no rebound or guarding;  No ascites is cli images were created to optimize visualization of vascular anatomy. Dose reduction   techniques were used.  Dose information is transmitted to the [facility] [employer] of Radiology) [address]) which includes the Dose Index Darcy Given stability, these are likely benign      Dictated by: Hannah Rene MD on 11/02/2017 at 0:01       Approved by: Hannah Rene MD      _____________________________________________________________    COLONOSCOPY with snare polypectomy and biopsy: exam, the pt has diffuse abdominal pain which is most pronounced at the epigastrium. Labs reveal stable Hgb of 14.1, BUN 17 with + trop levels 0.885. She is currently undergoing a cardiac evaluation with a possible cath this afternoon.       Recommendations some likely Bhaskar's erosions in the hiatus hernia, which may have induced the coffee ground emesis. Her hgb is stable with a normal BUN. She has had no further emesis here, and has been on high dose PPI.   Given the ongoing symptoms that she has even wi

## 2017-11-02 NOTE — PROGRESS NOTES
[facility]  Progress Note    Geovanna Ferguson Patient Status:  Inpatient    3/25/1965 MRN [id number]   [facility] 8NE-A Attending Janice Adamson MD   [address] Day # 0 PCP Dennys Robertson DO       Assessment:    · NSTEMI secondary to ulcer 11/01/2017   INR 0.98 11/01/2017    11/01/2017   ESRML 9 11/02/2017   CRP 1.65 11/02/2017   TROP 2.200 11/02/2017       Medications:    • cetirizine  10 mg Oral Daily   • escitalopram  20 mg Oral QAM   • Fluticasone Furoate-Vilanterol  1 puff Inhala

## 2017-11-02 NOTE — PLAN OF CARE
GASTROINTESTINAL - ADULT    • Minimal or absence of nausea and vomiting Not Progressing          CARDIOVASCULAR - ADULT    • Maintains optimal cardiac output and hemodynamic stability Progressing    • Absence of cardiac arrhythmias or at baseline Progressi

## 2017-11-02 NOTE — PROCEDURES
[address] [place]    PATIENT'S NAME: Tree Cabrales   ATTENDING PHYSICIAN: Mirtha Cox MD   OPERATING PHYSICIAN: Zainab Clark MD   PATIENT ACCOUNT#:   [de-identified]    LOCATION:  [address] [place] [address]  MEDICAL RECORD #:   [id number]       DATE OF BIRTH:  [birth year]2 ultrasound was performed. Intravascular ultrasound showed mild plaquing in the mid LAD. The proximal LAD had an ulcerated plaque with a heterogeneous core and evidence of thrombus.   IVUS catheter was removed, and the LAD was stented with a 3.5 x 18 [place]

## 2017-11-02 NOTE — DIETARY NOTE
Nutrition Short Note    Dietitian consult received per cardiac rehab/CHF protocol. Pt to be educated by cardiac rehab staff and encouraged to attend outpatient classes taught by MARGARET. MARGARET available PRN.     Jory Mei RD, LDN  Pager #[phone]

## 2017-11-02 NOTE — H&P
[facility]  History and Physical     Saima Manual Patient Status:  Emergency    3/25/1965 MRN [id number]   Location [address] [facility] Attending Eh Ramirez MD   Hosp Day # 0 PCP Stefano Todd, [facility] Comment: PERFORMED BY DR Mark Diaz  2012: COLONOSCOPY      Comment: polyps  2/23/2016: COLONOSCOPY N/A      Comment: Procedure: COLONOSCOPY;  Surgeon: Demetrio Ernst MD;  Location: [facility] ENDOSCOPY  2-2011: Jinny SU/JEFFREY Inhale 1 puff into the lungs daily. Disp: 1 each Rfl: 0   Fluticasone Propionate 50 MCG/ACT Nasal Suspension 2 sprays by Each Nare route 2 (two) times daily.  Disp:  Rfl:    Butalbital-APAP-Caffeine -40 MG Oral Tab Take 1 tablet by mouth every 6 (six) deformity. Abdomen: Soft, nontender, nondistended. Positive bowel sounds. No rebound, guarding or organomegaly. Neurologic: No focal neurological deficits. CNII-XII grossly intact. Musculoskeletal: Moves all extremities.   Extremities: No edema or cyano

## 2017-11-02 NOTE — ED INITIAL ASSESSMENT (HPI)
STS MID STERNAL CP SINCE THIS AM. STS PAIN RADIATES THROUGH TO HER BACK. STS SOB WITH ONSET. STS N/V X 2 DAYS.  STS NON COMPLIANT WITH XARELTO X 2 DAYS WITH HISTORY OF PE.

## 2017-11-02 NOTE — HISTORICAL OFFICE NOTE
Lona Reynoso  : 1965  ACCOUNT:  [id number]  [phone]  PCP: Dr. Drea Bernardo     TODAY'S DATE: 2015  DICTATED BY:  Lorina Feil Frommelt, A.P.NMauro]    CHIEF COMPLAINT: [Cardiac Clearance.]    HPI:  [On 2015, Jameson Felt after age 74 (Female)    SOCIAL HISTORY: SMOKING: Never used tobacco. denies smoking. CAFFEINE: 1 beverages daily. ALCOHOL: drinks socially. EXERCISE: no regular exercise. DIET: no special diet. MARITAL STATUS: . OCCUPATION: disabled.      ALLERGIES: PEx4 on Xarelto    PLAN:    [1.  Continue current medications. 2.  May proceed with EGD. 3.  Hold Xarelto as per Dr. Ulloa Favorite for 2 days prior to procedure. 4.  Call for any new or worsening symptoms.   5.  Return to see Dr. Keon Arriola in 3-6 months.]    PRE

## 2017-11-02 NOTE — ED NOTES
Report given to [employer].  Awaiting for [facility] to arrive to transfer patient to [facility].

## 2017-11-03 PROCEDURE — 99232 SBSQ HOSP IP/OBS MODERATE 35: CPT | Performed by: SPECIALIST

## 2017-11-03 PROCEDURE — 99232 SBSQ HOSP IP/OBS MODERATE 35: CPT | Performed by: INTERNAL MEDICINE

## 2017-11-03 RX ORDER — PANTOPRAZOLE SODIUM 40 MG/1
40 TABLET, DELAYED RELEASE ORAL
Status: DISCONTINUED | OUTPATIENT
Start: 2017-11-03 | End: 2017-11-04

## 2017-11-03 RX ORDER — LISINOPRIL 2.5 MG/1
2.5 TABLET ORAL DAILY
Status: DISCONTINUED | OUTPATIENT
Start: 2017-11-03 | End: 2017-11-04

## 2017-11-03 RX ORDER — METOCLOPRAMIDE HYDROCHLORIDE 5 MG/ML
10 INJECTION INTRAMUSCULAR; INTRAVENOUS
Status: DISCONTINUED | OUTPATIENT
Start: 2017-11-03 | End: 2017-11-03

## 2017-11-03 RX ORDER — METOCLOPRAMIDE 10 MG/1
10 TABLET ORAL
Status: DISCONTINUED | OUTPATIENT
Start: 2017-11-03 | End: 2017-11-04

## 2017-11-03 RX ORDER — METOCLOPRAMIDE HYDROCHLORIDE 5 MG/ML
10 INJECTION INTRAMUSCULAR; INTRAVENOUS EVERY 6 HOURS PRN
Status: DISCONTINUED | OUTPATIENT
Start: 2017-11-03 | End: 2017-11-04

## 2017-11-03 RX ORDER — POTASSIUM CHLORIDE 20 MEQ/1
40 TABLET, EXTENDED RELEASE ORAL ONCE
Status: COMPLETED | OUTPATIENT
Start: 2017-11-03 | End: 2017-11-03

## 2017-11-03 NOTE — PROGRESS NOTES
THE [facility] Hematology Oncology Group Progress Note      Patient Name: Esthela Zaman   YOB: 1965  Medical Record Number: [id number]  Attending Physician: Maria Luz Simmons.  Petr Spencer M.D.       SUBJECTIVE:  Vielka Liang is a(n) 50year old female w Rheumatoid Factor <10 <15 IU/mL   -POCT GLUCOSE   Collection Time: 11/02/17  9:08 PM   Result Value Ref Range   POC Glucose 220 (H) 65 - 99 mg/dL   -CBC, PLATELET; NO DIFFERENTIAL   Collection Time: 11/03/17  5:00 AM   Result Value Ref Range   WBC 13.0 4 Butalbital-APAP-Caffeine (FIORICET, ESGIC) per tab 1 tablet 1 tablet Oral Q6H PRN   escitalopram (LEXAPRO) tab 20 mg 20 mg Oral QAM   Fluticasone Furoate-Vilanterol (BREO ELLIPTA) 200-25 MCG/INH inhaler 1 puff 1 puff Inhalation Daily   Fluticasone Propio meals   atorvastatin (LIPITOR) tab 80 mg 80 mg Oral Nightly   ondansetron HCl (ZOFRAN) injection 4 mg 4 mg Intravenous Q6H PRN   LORazepam (ATIVAN) injection 0.5 mg 0.5 mg Intravenous Q6H PRN   [COMPLETED] Lidocaine Viscous (XYLOCAINE) 2 % mouth solution 1

## 2017-11-03 NOTE — PLAN OF CARE
Assumed care of patient around 1930, alert and oriented X4, no c/o CP/SOB, SpO2 97% on RA  Rhythm/HR: NSR 80-90s  Still with lot of nausea- vomiting.   0.9%NaCl gtt at 83ml/hr  NPO at midnight for possible EGD.     Will continue to monitor      CARDIOVASCUL

## 2017-11-03 NOTE — PAYOR COMM NOTE
--------------  ADMISSION REVIEW     Payor: Felipe Malloy #:  [id number]  Authorization Number: [id number]    Admit date: 11/2/17  Admit time: 6262       Admitting Physician: Nimco Mcgraw MD  Attending Physician:  Lisa Solitario MD  Primary Care P monitoring   • HYPERTENSION    • HYPERTHYROIDISM    • IBS    • LGSIL (low grade squamous intraepithelial dysplasia)    • Migraines     perfumes   • Osteoarthritis    • Pneumonia, organism unspecified(486) 2014   • PONV (postoperative nausea and vomi °F (36.5 °C) (Oral)   Resp 16   Ht 170.2 cm (5' 7\")   Wt 115.9 kg   LMP 09/23/2004   SpO2 94%   BMI 40.03 kg/m²      Physical Exam  Alert and cooperative, afebrile and nontoxic in appearance. Does appear to be uncomfortable.   Skin: Warm and dry without c sinus rhythm with ventricular rate 79 bpm.  Intervals and axes as noted on computer reading. No significant change from initial EKG. [BH.1]    Cta Chest + Ct Abd (w) + Ct Pel (w) Sh(cpt=71275/15346)  Result Date: 11/2/2017  CONCLUSION:  #1. No acute pulmo Physician    Filed:  11/2/2017  3:54 AM Date of Service:  11/2/2017  1:51 AM Status:  Addendum    :  Anil Dunn MD (Physician)    Related Notes:  Original Note by Anil Dunn MD (Physician) filed at 11/2/2017  3:49 AM         [address] Disp:  Rfl:    Fluticasone Furoate-Vilanterol 200-25 MCG/INH Inhalation Aerosol Powder, Breath Activated Inhale 1 puff into the lungs daily.  Disp: 1 each Rfl: 0   Fluticasone Propionate 50 MCG/ACT Nasal Suspension 2 sprays by Each Nare route 2 (two) times guarding or organomegaly. Neurologic: No focal neurological deficits. CNII-XII grossly intact. Musculoskeletal: Moves all extremities. Extremities: No edema or cyanosis. Integument: No rashes or lesions. Psychiatric: Appropriate mood and affect.     D Oral Joshua Juarez RN      famoTIDine (PEPCID) injection 20 mg     Date Action Dose Route User    11/3/2017 1055 Given 20 mg Intravenous Dianne Guadalupe RN    11/2/2017 2112 Given 20 mg Intravenous Randi Dior RN      Fluticasone Furoate-Vilanterol (B 11/3/2017 1041 Given 40 mEq Oral Feliciano Juarez, JHOAN      Rivaroxaban (XARELTO) tab 20 mg     Date Action Dose Route User    11/3/2017 1055 Given 20 mg Oral Feliciano Juarez RN      0.9%  NaCl infusion    83cc/h    Date Action Dose Route User    11/3/2017 12

## 2017-11-03 NOTE — PLAN OF CARE
Claims she feels better able to tolerate full liquids  States nausea is relieved   C/o slight headache, keeps lights off

## 2017-11-03 NOTE — PLAN OF CARE
Very nauseated , spat small amount of saliva noted  Seen by Dr Lena Goldman, may start liquid diet  Requested ativan IV for nausea

## 2017-11-03 NOTE — PROGRESS NOTES
[facility]  Progress Note     Vielka Liang Patient Status:  Inpatient    3/25/1965 MRN [id number]   [facility] 8NE-A Attending Humphrey Chamberlain MD   [facility] Day # 1 PCP Armen , DO     Chief Complaint: chest pain, coffee grou Oral QAM AC   • Potassium Chloride ER  40 mEq Oral Once   • cetirizine  10 mg Oral Daily   • escitalopram  20 mg Oral QAM   • Fluticasone Furoate-Vilanterol  1 puff Inhalation Daily   • Fluticasone Propionate  2 spray Each Nare BID   • Montelukast Sodium

## 2017-11-03 NOTE — CM/SW NOTE
Patient started on brilinta. Call placed to patient's [facility] [phone] to check availability and cost of medication.   After free 30 day supply with savings card, cost per month $25.oo with insurance

## 2017-11-03 NOTE — PROGRESS NOTES
[facility] Cardiology Progress Note    Subjective: very nauseated overnight, but now gradually improving. Some dry heaving this am. She thinks it is related to all the narcotics she required yesterday.      Objective:  /74 (BP Location: Right arm)   Puls

## 2017-11-03 NOTE — PROGRESS NOTES
Gastroenterology Progress Note  Patient Name: Evaristo Marquez  Chief Complaint: Chest pain, nausea/vomiting  S: The patient underwent cardiac cath yesterday, with PTCA and stenting of the LAD.   Since then, she reports that her chest pain resolved complet Argelia or Dr. Reeder Seeds in 4-6 weeks             D/C when ok with other services. We will sign-off. Thank you.

## 2017-11-04 VITALS
HEIGHT: 67 IN | DIASTOLIC BLOOD PRESSURE: 61 MMHG | SYSTOLIC BLOOD PRESSURE: 109 MMHG | WEIGHT: 255.63 LBS | OXYGEN SATURATION: 98 % | HEART RATE: 82 BPM | RESPIRATION RATE: 18 BRPM | BODY MASS INDEX: 40.12 KG/M2 | TEMPERATURE: 98 F

## 2017-11-04 PROCEDURE — 99239 HOSP IP/OBS DSCHRG MGMT >30: CPT | Performed by: INTERNAL MEDICINE

## 2017-11-04 RX ORDER — LISINOPRIL 2.5 MG/1
2.5 TABLET ORAL DAILY
Qty: 30 TABLET | Refills: 3 | Status: ON HOLD | OUTPATIENT
Start: 2017-11-05 | End: 2018-04-09 | Stop reason: CLARIF

## 2017-11-04 RX ORDER — POTASSIUM CHLORIDE 20 MEQ/1
40 TABLET, EXTENDED RELEASE ORAL ONCE
Status: COMPLETED | OUTPATIENT
Start: 2017-11-04 | End: 2017-11-04

## 2017-11-04 RX ORDER — CARVEDILOL 6.25 MG/1
6.25 TABLET ORAL 2 TIMES DAILY WITH MEALS
Qty: 60 TABLET | Refills: 3 | Status: ON HOLD | OUTPATIENT
Start: 2017-11-04 | End: 2017-12-28 | Stop reason: DRUGHIGH

## 2017-11-04 RX ORDER — ATORVASTATIN CALCIUM 80 MG/1
80 TABLET, FILM COATED ORAL NIGHTLY
Qty: 60 TABLET | Refills: 0 | Status: SHIPPED | OUTPATIENT
Start: 2017-11-04 | End: 2017-12-28

## 2017-11-04 RX ORDER — METOCLOPRAMIDE 10 MG/1
10 TABLET ORAL EVERY 6 HOURS PRN
Qty: 30 TABLET | Refills: 0 | Status: SHIPPED | OUTPATIENT
Start: 2017-11-04 | End: 2017-12-27

## 2017-11-04 RX ORDER — PANTOPRAZOLE SODIUM 40 MG/1
40 TABLET, DELAYED RELEASE ORAL
Qty: 30 TABLET | Refills: 0 | Status: ON HOLD | OUTPATIENT
Start: 2017-11-05 | End: 2017-11-10

## 2017-11-04 NOTE — DISCHARGE SUMMARY
[facility] [place] HOSPITALIST  DISCHARGE SUMMARY     Starr Gilbert Patient Status:  Inpatient    3/25/1965 MRN [id number]   [facility] 8NE-A Attending Diana Becerra MD   [facility] Day # 2 PCP Vivienne Mata DO     Date of Admission: 2017  Date o will be discharged from the medication reconciliation. She will have close outpatient follow-up with cardiology primary care doctor should see endocrine. Adam Billy  She was seen by hematology she was on Xarelto for chronic venous thromboembolism and will be continu as needed.    Quantity:  30 tablet  Refills:  0        CHANGE how you take these medications      Instructions Prescription details   Pantoprazole Sodium 40 MG Tbec  Commonly known as:  PROTONIX  Start taking on:  11/5/2017  What changed:  when to take this tablet (20 mg total) by mouth daily with food. Quantity:  30 tablet  Refills:  6     SITagliptin Phosphate 100 MG Tabs  Commonly known as:  JANUVIA      Take 100 mg by mouth daily.    Refills:  0     TRESIBA FLEXTOUCH SC      Inject 40 Units into the skin DISCHARGE INSTRUCTIONS: See electronic chart    Makayla Lyn MD 11/4/2017    Time spent:  > 30 minutes

## 2017-11-04 NOTE — PROGRESS NOTES
[facility]  Progress Note     Michaela Sesay Patient Status:  Inpatient    3/25/1965 MRN [id number]   [facility] 8NE-A Attending Dianne Hook MD   Psychiatric Day # 2 PCP Patrick Rose DO     Chief Complaint: chest pain, coffee grou Imaging: Imaging data reviewed in Epic.     Medications:   • Potassium Chloride ER  40 mEq Oral Once   • Pantoprazole Sodium  40 mg Oral QAM AC   • Metoclopramide HCl  10 mg Oral TID AC   • insulin detemir  30 Units Subcutaneous Nightly   • rivaroxaban

## 2017-11-04 NOTE — PROGRESS NOTES
[facility] Card[facility] Progress Note    Subjective:  Feeling much better.      Objective:  BP 95/46 (BP Location: Right arm)   Pulse 86   Temp 98.4 °F (36.9 °C) (Oral)   Resp 18   Ht 170.2 cm (5' 7\")   Wt 255 lb 9.6 oz (115.9 kg)   LMP 09/23/2004   SpO2 99%

## 2017-11-07 ENCOUNTER — PATIENT OUTREACH (OUTPATIENT)
Dept: CASE MANAGEMENT | Age: 52
End: 2017-11-07

## 2017-11-07 DIAGNOSIS — I21.4 NSTEMI (NON-ST ELEVATED MYOCARDIAL INFARCTION) (HCC): ICD-10-CM

## 2017-11-07 DIAGNOSIS — Z95.820 S/P ANGIOPLASTY WITH STENT: ICD-10-CM

## 2017-11-08 ENCOUNTER — APPOINTMENT (OUTPATIENT)
Dept: HEMATOLOGY/ONCOLOGY | Age: 52
End: 2017-11-08
Payer: MEDICARE

## 2017-11-09 ENCOUNTER — HOSPITAL ENCOUNTER (OUTPATIENT)
Facility: HOSPITAL | Age: 52
Setting detail: OBSERVATION
Discharge: HOME | End: 2017-11-10
Attending: EMERGENCY MEDICINE | Admitting: HOSPITALIST
Payer: COMMERCIAL

## 2017-11-09 ENCOUNTER — TELEPHONE (OUTPATIENT)
Dept: FAMILY MEDICINE CLINIC | Facility: CLINIC | Age: 52
End: 2017-11-09

## 2017-11-09 ENCOUNTER — APPOINTMENT (OUTPATIENT)
Dept: GENERAL RADIOLOGY | Facility: HOSPITAL | Age: 52
End: 2017-11-09
Attending: EMERGENCY MEDICINE
Payer: COMMERCIAL

## 2017-11-09 DIAGNOSIS — R07.9 ACUTE CHEST PAIN: Primary | ICD-10-CM

## 2017-11-09 PROCEDURE — 99220 INITIAL OBSERVATION CARE,LEVL III: CPT | Performed by: HOSPITALIST

## 2017-11-09 PROCEDURE — 71010 XR CHEST AP PORTABLE  (CPT=71010): CPT | Performed by: EMERGENCY MEDICINE

## 2017-11-09 RX ORDER — ACETAMINOPHEN 325 MG/1
650 TABLET ORAL EVERY 6 HOURS PRN
Status: DISCONTINUED | OUTPATIENT
Start: 2017-11-09 | End: 2017-11-10

## 2017-11-09 RX ORDER — CARVEDILOL 6.25 MG/1
6.25 TABLET ORAL 2 TIMES DAILY WITH MEALS
Status: DISCONTINUED | OUTPATIENT
Start: 2017-11-09 | End: 2017-11-10

## 2017-11-09 RX ORDER — PANTOPRAZOLE SODIUM 40 MG/1
40 TABLET, DELAYED RELEASE ORAL
Status: DISCONTINUED | OUTPATIENT
Start: 2017-11-10 | End: 2017-11-10

## 2017-11-09 RX ORDER — CETIRIZINE HYDROCHLORIDE 10 MG/1
10 TABLET ORAL DAILY
Status: DISCONTINUED | OUTPATIENT
Start: 2017-11-10 | End: 2017-11-10

## 2017-11-09 RX ORDER — ATORVASTATIN CALCIUM 80 MG/1
80 TABLET, FILM COATED ORAL NIGHTLY
Status: DISCONTINUED | OUTPATIENT
Start: 2017-11-09 | End: 2017-11-10

## 2017-11-09 RX ORDER — LISINOPRIL 2.5 MG/1
2.5 TABLET ORAL DAILY
Status: DISCONTINUED | OUTPATIENT
Start: 2017-11-10 | End: 2017-11-10

## 2017-11-09 RX ORDER — SODIUM CHLORIDE 9 MG/ML
125 INJECTION, SOLUTION INTRAVENOUS CONTINUOUS
Status: DISCONTINUED | OUTPATIENT
Start: 2017-11-09 | End: 2017-11-09

## 2017-11-09 RX ORDER — ALBUTEROL SULFATE 2.5 MG/3ML
2.5 SOLUTION RESPIRATORY (INHALATION) EVERY 4 HOURS PRN
Status: DISCONTINUED | OUTPATIENT
Start: 2017-11-09 | End: 2017-11-10

## 2017-11-09 RX ORDER — ALPRAZOLAM 0.5 MG/1
0.5 TABLET ORAL 2 TIMES DAILY PRN
Status: DISCONTINUED | OUTPATIENT
Start: 2017-11-09 | End: 2017-11-10

## 2017-11-09 RX ORDER — ASPIRIN 81 MG/1
324 TABLET, CHEWABLE ORAL ONCE
Status: COMPLETED | OUTPATIENT
Start: 2017-11-09 | End: 2017-11-09

## 2017-11-09 RX ORDER — ZONISAMIDE 100 MG/1
200 CAPSULE ORAL NIGHTLY
Status: DISCONTINUED | OUTPATIENT
Start: 2017-11-09 | End: 2017-11-10

## 2017-11-09 RX ORDER — DEXTROSE MONOHYDRATE 25 G/50ML
50 INJECTION, SOLUTION INTRAVENOUS
Status: DISCONTINUED | OUTPATIENT
Start: 2017-11-09 | End: 2017-11-10

## 2017-11-09 RX ORDER — ALBUTEROL SULFATE 90 UG/1
2 AEROSOL, METERED RESPIRATORY (INHALATION) EVERY 6 HOURS PRN
Status: DISCONTINUED | OUTPATIENT
Start: 2017-11-09 | End: 2017-11-10

## 2017-11-09 RX ORDER — ZONISAMIDE 100 MG/1
200 CAPSULE ORAL NIGHTLY
COMMUNITY
End: 2019-10-08

## 2017-11-09 RX ORDER — NITROGLYCERIN 0.4 MG/1
0.4 TABLET SUBLINGUAL EVERY 5 MIN PRN
Status: DISCONTINUED | OUTPATIENT
Start: 2017-11-09 | End: 2017-11-10

## 2017-11-09 RX ORDER — FLUTICASONE PROPIONATE 50 MCG
2 SPRAY, SUSPENSION (ML) NASAL 2 TIMES DAILY
Status: DISCONTINUED | OUTPATIENT
Start: 2017-11-09 | End: 2017-11-10

## 2017-11-09 RX ORDER — MONTELUKAST SODIUM 10 MG/1
10 TABLET ORAL NIGHTLY
Status: DISCONTINUED | OUTPATIENT
Start: 2017-11-09 | End: 2017-11-10

## 2017-11-09 RX ORDER — PANTOPRAZOLE SODIUM 40 MG/1
40 TABLET, DELAYED RELEASE ORAL
Status: DISCONTINUED | OUTPATIENT
Start: 2017-11-10 | End: 2017-11-09

## 2017-11-09 RX ORDER — NITROGLYCERIN 20 MG/100ML
5 INJECTION INTRAVENOUS CONTINUOUS
Status: DISCONTINUED | OUTPATIENT
Start: 2017-11-09 | End: 2017-11-09

## 2017-11-09 RX ORDER — BUTALBITAL, ACETAMINOPHEN AND CAFFEINE 50; 325; 40 MG/1; MG/1; MG/1
1 TABLET ORAL EVERY 4 HOURS PRN
COMMUNITY
End: 2017-12-27

## 2017-11-09 RX ORDER — METOCLOPRAMIDE 10 MG/1
10 TABLET ORAL EVERY 6 HOURS PRN
Status: DISCONTINUED | OUTPATIENT
Start: 2017-11-09 | End: 2017-11-10

## 2017-11-09 RX ORDER — FAMOTIDINE 20 MG/1
20 TABLET ORAL 2 TIMES DAILY
Status: DISCONTINUED | OUTPATIENT
Start: 2017-11-09 | End: 2017-11-10

## 2017-11-09 RX ORDER — RANITIDINE 150 MG/1
150 CAPSULE ORAL 2 TIMES DAILY PRN
Status: ON HOLD | COMMUNITY
End: 2017-11-10

## 2017-11-09 RX ORDER — ESCITALOPRAM OXALATE 20 MG/1
20 TABLET ORAL EVERY MORNING
Status: DISCONTINUED | OUTPATIENT
Start: 2017-11-10 | End: 2017-11-10

## 2017-11-09 NOTE — ED INITIAL ASSESSMENT (HPI)
Pt present to ed from home with c/o chest pressure and pain , pt sx present today, pt had recent stent to heart procedure done, PMD advised to visit ed for eval, pt also c/o odd feeling to l arm.

## 2017-11-09 NOTE — PROGRESS NOTES
Initial Post Discharge Follow Up   Discharge Date: 11/4/17  Contact Date: 11/7/2017    Consent Verification:  Assessment Completed With: Patient  HIPAA Verified?   Yes    Discharge Dx: AMI, S/P Percutaneous transluminal coronary angioplsty with drug-elut (40 mg total) by mouth every morning before breakfast. Disp: 30 tablet Rfl: 0   lisinopril 2.5 MG Oral Tab Take 1 tablet (2.5 mg total) by mouth daily.  Disp: 30 tablet Rfl: 3   carvedilol 6.25 MG Oral Tab Take 1 tablet (6.25 mg total) by mouth 2 (two) time • When you were leaving the hospital were any medication changes discussed with you? yes  • If you were prescribed a new medication:   o Was the new medication’s purpose explained? yes  o Was the new medication’s side effects discussed?  yes  o Do you h PUMP INDVIDUALIZED ASSESSMENT This is a one-on-one appointment with a certified diabetes educator.   An assessment will be performed and will identify the pump candidate's ability to: Perform label reading Identify portion sizes Use insulin to carbohydrate [facility] ([place] [address])    Check with Insurance for coverage                                      First visit 1 1/2 hours                                                                   Dress comforta breaking out in hives and a history of allergies/asthma, and comorbidities NCM instructed patient to go to the ER to be evaluated.  Message sent to MD's office    [x]  Discharge Summary, Discharge medications reviewed/discussed/and reconciled with patient,

## 2017-11-09 NOTE — TELEPHONE ENCOUNTER
Condition update: while on TCM call with patient who was discharged from [facility] on 11/4/17 S/P Percutaneous transluminal coronary angioplasty with drug-eluting stent x1 of the left anterior descending   Pt with complaints of still being  tired, ve

## 2017-11-09 NOTE — CONSULTS
[facility]  Report of Consultation    Kaci Anglin Patient Status:  Emergency    3/25/1965 MRN [id number]   Location [address] Attending [place] Malik, [address] [facility] Day # 0 PCP Fatoumata Woodard DO     Reason for [facility] relaxation     and increased filling pressure - grade 2 diastolic dysfunction.   2. Regional wall motion abnormality: Severe hypokinesis of the apical     anterior, apical inferior, apical septal, apical lateral, and apical     myocardium; hypokinesis of th CHOLECYSTECTOMY      Comment: PERFORMED BY DR Maria Alejandra Petersen  2012: COLONOSCOPY      Comment: polyps  2/23/2016: COLONOSCOPY N/A      Comment: Procedure: COLONOSCOPY;  Surgeon: Kip Mondragon MD;  Location: [facility] ENDOSCOPY  2-2011: Vi Alcazar Alert and oriented in no apparent distress. HEENT: No focal deficits. Neck: No JVD, carotids 2+ no bruits. Cardiac: Regular rate and rhythm, S1, S2 normal, no pathological murmur, rub or gallop. Lungs: Clear without wheezes, rales, rhonchi or dullness. 23:06.  EDWARD , XR CHEST PA + LAT CHEST (CPT=71020), 9/29/2017, 15:40. INDICATIONS:  CP  PATIENT STATED HISTORY: (As transcribed by Technologist)  Chest pain that started today. Shortness of breath. FINDINGS:  Patient is rotated.  Patient's chin obscur MEDIASTINUM/FATUMA:  Hiatal hernia. CARDIAC:  Normal. CHEST WALL:  Normal  ABDOMEN/PELVIS: LIVER:  Normal in shape and contour. Hepatic steatosis. BILIARY:  Cholecystectomy clips are. SPLEEN:  Normal.  No enlargement or focal lesion.  PANCREAS:  No elsa-pancr triggered by URTI a week ago - LAD was stented with a 3.5 x 18 [place] drug-eluting stent on 11/02/2017 - on Brilinta - I reviewed angio film and other coronaries were clean angiographically last week. LAD stent was successful with excellent result.   3. Rachel now.     Re-d/w pt, her daughter and ER  Pratibha Wallace / S

## 2017-11-09 NOTE — ED PROVIDER NOTES
Patient Seen in: [facility] Emergency Department    History   Patient presents with:  Chest Pain Angina (cardiovascular)    Stated Complaint: CP    HPI    This is a very pleasant 30-year-old female with past medical history of recent MI, obesity, di PULMONARY EMBOLISM    • Shortness of breath    • SINUSITIS     chronic   • Type II or unspecified type diabetes mellitus without mention of complication, not stated as uncontrolled    • Unspecified essential hypertension    • Vertigo    • Visual impairment reactive to light. Conjuctiva clear. Oropharynx is clear and moist.   Lungs: Clear to auscultation bilaterally with no rales, no retractions, and no wheezing. HEART:  Regular rate and rhythm. S1 and S2. No murmurs, no rubs or gallops.    ABDOMEN: Soft, no TROPONIN I   COMP METABOLIC PANEL (14)   CBC WITH DIFFERENTIAL WITH PLATELET   RAINBOW DRAW BLUE   RAINBOW DRAW LAVENDER   RAINBOW DRAW LIGHT GREEN   RAINBOW DRAW GOLD     EKG    Rate, intervals and axes as noted on EKG Report.   Rate:76  Rhythm: Sinus Rh came to bedside to evaluate patient. He requested a nitroglycerin drip be started which was initiated. He did not feel her symptoms were cardiac. May be GI in nature. GI prophylaxis was initiated.   She was admitted to cardiac telemetry for further obse

## 2017-11-10 VITALS
HEART RATE: 74 BPM | HEIGHT: 68 IN | DIASTOLIC BLOOD PRESSURE: 62 MMHG | BODY MASS INDEX: 38.29 KG/M2 | RESPIRATION RATE: 14 BRPM | OXYGEN SATURATION: 99 % | SYSTOLIC BLOOD PRESSURE: 132 MMHG | WEIGHT: 252.63 LBS | TEMPERATURE: 99 F

## 2017-11-10 PROBLEM — R07.9 ACUTE CHEST PAIN: Status: RESOLVED | Noted: 2017-11-02 | Resolved: 2017-11-10

## 2017-11-10 PROCEDURE — 99217 OBSERVATION CARE DISCHARGE: CPT | Performed by: HOSPITALIST

## 2017-11-10 RX ORDER — POTASSIUM CHLORIDE 20 MEQ/1
40 TABLET, EXTENDED RELEASE ORAL EVERY 4 HOURS
Status: COMPLETED | OUTPATIENT
Start: 2017-11-10 | End: 2017-11-10

## 2017-11-10 RX ORDER — RANITIDINE 150 MG/1
150 CAPSULE ORAL EVERY EVENING
Qty: 30 CAPSULE | Refills: 0 | Status: ON HOLD | OUTPATIENT
Start: 2017-11-10 | End: 2018-04-09 | Stop reason: CLARIF

## 2017-11-10 RX ORDER — PANTOPRAZOLE SODIUM 40 MG/1
40 TABLET, DELAYED RELEASE ORAL
Qty: 30 TABLET | Refills: 0 | Status: SHIPPED | OUTPATIENT
Start: 2017-11-10 | End: 2018-03-20

## 2017-11-10 NOTE — PROGRESS NOTES
[facility] Card[facility] Progress Note    Subjective:  Feels fine. Did not think this was her heart.      Objective:  /45 (BP Location: Left arm)   Pulse 66   Temp 98.7 °F (37.1 °C) (Oral)   Resp 18   Ht 172.7 cm (5' 8\")   Wt 252 lb 9.6 oz (114.6 kg)   LMP

## 2017-11-10 NOTE — PLAN OF CARE
NURSING ADMISSION NOTE      Patient admitted via Cart  Oriented to room. Safety precautions initiated. Bed in low position. Call light in reach. Pt received from ER 11/9 at 2000. Pt complaining of chest pressure x2 days.  Troponin 0.676  S/P LAD s

## 2017-11-10 NOTE — ED NOTES
Pt condition improving, pt denies pain or discomfort, pt alert and talking, cardiologist into eval and talk with pt.

## 2017-11-10 NOTE — PROGRESS NOTES
11/09/17 2001 11/09/17 2004 11/09/17 2006   Vital Signs   /68 141/75 135/83   BP Location Right arm Right arm Right arm   BP Method Automatic Automatic Automatic   Patient Position Lying Sitting Standing   Ortho BP Negative

## 2017-11-10 NOTE — H&P
[facility]  History and Physical     Elizabeth Velazquez Patient Status:  Emergency    3/25/1965 MRN [id number]   Location [address] [facility] Attending Leti Cook, [address] [facility] Day # 0 PCP Paige Pop DO     Chief Compl Comment: Procedure: COLONOSCOPY;  Surgeon: Kenneth Wilder MD;  Location: [facility] ENDOSCOPY  2-2011: COLPOSCOPY, CERVIX W/UPPER ADJACENT VAGINA; W/*      Comment: WNL  No date: OTHER SURGICAL HISTORY      Comment: sinus surgery  No date: OTHER mouth daily with food. Disp: 30 tablet Rfl: 6   TRESIBA FLEXTOUCH 200 UNIT/ML Subcutaneous Solution Pen-injector Inject 40 Units into the vein nightly.    Disp:  Rfl:    PROAIR  (90 Base) MCG/ACT Inhalation Aero Soln Inhale 2 puffs into the lungs justus sounds. No rebound, guarding or organomegaly. Musculoskeletal: Moves all extremities. Extremities: No edema or cyanosis. Integument: No rashes or lesions. Psychiatric: Appropriate mood and affect.       Diagnostic Data:      Labs:  Recent Labs   Lab  1

## 2017-11-10 NOTE — PAYOR COMM NOTE
--------------  ADMISSION REVIEW     Payor: [employer] #:  [id number]  Authorization Number: N/A    Admit date: N/A  Admit time: N/A       Admitting Physician: Tayo Hernandez MD  Attending Physician:  Tayo Hernandez MD  Primary Care Physic thrombosis ([facility] [address]    • DIABETES    • Disorder of liver     fatty liver   • Disorder of thyroid    • Diverticulosis of large intestine    • Esophageal reflux    • GERD    • HEADACHES    • Heart attack     11/1/2017   • High cholesterol    • History of Holter [11/09/17 1806][NS.2]    Current:[NS.1]/45 (BP Location: Right arm)   Pulse 66   Temp 98.4 °F (36.9 °C) (Oral)   Resp 16   Ht 172.7 cm (5' 8\")   Wt 113.4 kg   LMP 09/23/2004   SpO2 92%   BMI 38.01 kg/m²[NS.2]     Physical Exam[NS.1]  GENERAL: Awake, DIFFERENTIAL WITH PLATELET     EKG    Rate, intervals and axes as noted on EKG Report. Rate:[NS.1]76[NS.3]  Rhythm:[NS.1] Sinus Rhythm[NS.3]  Reading:[NS.1] T-wave inversion in the lateral leads new from previous EKG 11/2/17.   Also T-wave changes in anter initiated. He did not feel her symptoms were cardiac. May be GI in nature. GI prophylaxis was initiated. She was admitted to cardiac telemetry for further observation. Patient aware of plan for admission.   Patient case discussed with Yaakov Gaviria medications on file prior to encounter. Current Outpatient Prescriptions on File Prior to Encounter:  atorvastatin 80 MG Oral Tab Take 1 tablet (80 mg total) by mouth nightly.  Disp: 60 tablet Rfl: 0   Metoclopramide HCl 10 MG Oral Tab Take 1 tablet (10 m mg by mouth nightly. Disp:  Rfl:        Review of Systems:   A comprehensive 14 point review of systems was completed. Pertinent positives and negatives noted in the HPI.     Physical Exam:    /75   Pulse 73   Temp (!) 97 °F (36.1 °C) (Temporal) Levemir  Correctional scale  A1c added[PT.2]    Quality:  · DVT Prophylaxis:[PT.1] Xarelto[PT. 2]    Plan of care discussed with[PT.1] patient, family and ER team.[PT.2]    Aretha Velasquez MD  11/9/2017[PT. 1]    Reason for Consultation: Cardiology  Chest socorro Given 2 spray Each Nare Susann Carrel, RN      insulin detemir (LEVEMIR) 100 UNIT/ML flextouch 40 Units     Date Action Dose Route User    11/9/2017 2058 Given 40 Units Subcutaneous (Right Upper Arm) Susann Carrel, RN      lisinopril (PRINIVIL,ZESTRIL) t

## 2017-11-10 NOTE — PROGRESS NOTES
Feeling better this Am. No further symptoms. Labs improved. If able to ambulate w/o symptoms and cleared by cardiology ok for discharge today.

## 2017-11-13 ENCOUNTER — PATIENT OUTREACH (OUTPATIENT)
Dept: CASE MANAGEMENT | Age: 52
End: 2017-11-13

## 2017-11-13 DIAGNOSIS — I20.9 CARDIAC ANGINA (HCC): ICD-10-CM

## 2017-11-13 NOTE — PROGRESS NOTES
Initial Post Discharge Follow Up   Discharge Date: 11/10/17  Contact Date: 11/13/2017    Consent Verification:  Assessment Completed With: Patient  HIPAA Verified?   Yes    Discharge Dx:    Acute chest pain    General:   • How have you been since your [facility] every 6 (six) hours as needed. Disp: 30 tablet Rfl: 0   lisinopril 2.5 MG Oral Tab Take 1 tablet (2.5 mg total) by mouth daily. Disp: 30 tablet Rfl: 3   carvedilol 6.25 MG Oral Tab Take 1 tablet (6.25 mg total) by mouth 2 (two) times daily with meals.  Disp yes  o Was the new medication’s side effects discussed? yes  o Do you have any questions about your new medication?  No  • Did you  your discharge medications when you left the hospital? Yes  • May I go over your medications with you to make sure we This is a one-on-one appointment with a certified diabetes educator.   An assessment will be performed and will identify the pump candidate's ability to: Perform label reading Identify portion sizes Use insulin to carbohydrate ratio/correction factor [facility])    Dec 05, 2017  2:30 PM CST CARDIAC REHAB PHASE II INITIAL with [id number] [place] [facility] ([facility])    Check with Insurance for coverage reason as to why you cannot make your appointments? Yes, today she does,see above.      NOHEMI Reviewed upcoming Specialist Appt with patient     Yes        Interventions by NOHEMI: NOHEMI reviewed medications, discharge instructions, HF guidelines, S&S of infecti

## 2017-11-14 ENCOUNTER — MYAURORA ACCOUNT LINK (OUTPATIENT)
Dept: OTHER | Age: 52
End: 2017-11-14

## 2017-11-14 ENCOUNTER — PRIOR ORIGINAL RECORDS (OUTPATIENT)
Dept: OTHER | Age: 52
End: 2017-11-14

## 2017-11-14 NOTE — DISCHARGE PLANNING
[facility] HOSPITALIST  DISCHARGE SUMMARY     Codi Jalloh Patient Status:  Observation    3/25/1965 MRN [id number]   [facility] 8NE-A Attending No att. providers found   Hosp Day # 0 PCP Ronnie Lai DO     Date of Admission:  results pending at Discharge:   · none    Consultants:  • Cardiology     Discharge Medication List:     Discharge Medications      CHANGE how you take these medications      Instructions Prescription details   Pantoprazole Sodium 40 MG Tbec  Commonly known Thursday      Take 50,000 Units by mouth twice a week. Refills:  0     escitalopram 20 MG Tabs  Commonly known as:  LEXAPRO      Take 20 mg by mouth every morning.    Refills:  0     Fluticasone Furoate-Vilanterol 200-25 MCG/INH Aepb  Commonly known as: 02270  [phone]      keep your appointment next Tuesday with the nurse practitioner      Vital signs:       Physical Exam:    General: No acute distress. Respiratory: Clear to auscultation bilaterally. No wheezes. No rhonchi.   Cardiovascular: S1, S2.

## 2017-11-15 ENCOUNTER — NURSE ONLY (OUTPATIENT)
Dept: ENDOCRINOLOGY CLINIC | Facility: CLINIC | Age: 52
End: 2017-11-15

## 2017-11-15 ENCOUNTER — PRIOR ORIGINAL RECORDS (OUTPATIENT)
Dept: OTHER | Age: 52
End: 2017-11-15

## 2017-11-15 VITALS
SYSTOLIC BLOOD PRESSURE: 123 MMHG | WEIGHT: 250 LBS | BODY MASS INDEX: 37.89 KG/M2 | DIASTOLIC BLOOD PRESSURE: 74 MMHG | HEIGHT: 68 IN | HEART RATE: 77 BPM

## 2017-11-15 DIAGNOSIS — Z79.4 TYPE 2 DIABETES MELLITUS WITH HYPERGLYCEMIA, WITH LONG-TERM CURRENT USE OF INSULIN (HCC): Primary | ICD-10-CM

## 2017-11-15 DIAGNOSIS — E11.65 TYPE 2 DIABETES MELLITUS WITH HYPERGLYCEMIA, WITH LONG-TERM CURRENT USE OF INSULIN (HCC): Primary | ICD-10-CM

## 2017-11-15 DIAGNOSIS — Z86.718 HISTORY OF DVT (DEEP VEIN THROMBOSIS): ICD-10-CM

## 2017-11-15 PROCEDURE — G0108 DIAB MANAGE TRN  PER INDIV: HCPCS

## 2017-11-15 NOTE — PROGRESS NOTES
Aravind Eisenberg  : 3/25/1965 attended Step 1 Diabetic Education:    Date: 11/15/2017       /74   Pulse 77   Ht 68\"   Wt 250 lb   LMP 2004   BMI 38.01 kg/m²       HEMOGLOBIN A1C (%)   Date Value   2012 5.9 (A)   ----------  Hemoglo

## 2017-11-15 NOTE — DISCHARGE SUMMARY
[facility] HOSPITALIST  DISCHARGE SUMMARY            Alanna Zamora Patient Status:  Observation    3/25/1965 MRN [id number]   [facility] 8NE-A Attending No att. providers found   Hosp Day # 0 PCP Mckay Martins,       Date of Admission 10.1      Lab/Test results pending at Discharge:   · none     Consultants:  · Cardiology      Discharge Medication List:            Discharge Medications             CHANGE how you take these medications      Instructions Prescription details   Pantoprazol Take 50,000 Units by mouth twice a week. Refills:  0   escitalopram 20 MG Tabs  Commonly known as:  LEXAPRO    Take 20 mg by mouth every morning.  Refills:  0   Fluticasone Furoate-Vilanterol 200-25 MCG/INH Aepb  Commonly known as:  BREO ELLIPTA    Inhale 1 practitioner        Vital signs:     Physical Exam:    General: No acute distress. Respiratory: Clear to auscultation bilaterally. No wheezes. No rhonchi. Cardiovascular: S1, S2. Regular rate and rhythm. No murmurs, rubs or gallops.    Abdomen: Soft, non

## 2017-11-16 NOTE — TELEPHONE ENCOUNTER
SITagliptin Phosphate 100 MG Oral Tab  Take 1 tablet (100 mg total) by mouth daily.        Disp: Not specified Refills: 0    Class: Normal Start: 11/16/2017   For: History of DVT (deep vein thrombosis)  Documented:1 year ago  Diabetic Medication Protocol Fa

## 2017-11-17 ENCOUNTER — OFFICE VISIT (OUTPATIENT)
Dept: FAMILY MEDICINE CLINIC | Facility: CLINIC | Age: 52
End: 2017-11-17

## 2017-11-17 VITALS
WEIGHT: 248.13 LBS | DIASTOLIC BLOOD PRESSURE: 70 MMHG | HEART RATE: 72 BPM | HEIGHT: 68 IN | RESPIRATION RATE: 16 BRPM | BODY MASS INDEX: 37.61 KG/M2 | SYSTOLIC BLOOD PRESSURE: 112 MMHG

## 2017-11-17 DIAGNOSIS — I21.4 NSTEMI (NON-ST ELEVATED MYOCARDIAL INFARCTION) (HCC): ICD-10-CM

## 2017-11-17 DIAGNOSIS — E66.01 CLASS 2 SEVERE OBESITY DUE TO EXCESS CALORIES WITH SERIOUS COMORBIDITY AND BODY MASS INDEX (BMI) OF 37.0 TO 37.9 IN ADULT (HCC): ICD-10-CM

## 2017-11-17 DIAGNOSIS — Z79.4 TYPE 2 DIABETES MELLITUS WITH OTHER CIRCULATORY COMPLICATION, WITH LONG-TERM CURRENT USE OF INSULIN (HCC): ICD-10-CM

## 2017-11-17 DIAGNOSIS — E11.59 TYPE 2 DIABETES MELLITUS WITH OTHER CIRCULATORY COMPLICATION, WITH LONG-TERM CURRENT USE OF INSULIN (HCC): ICD-10-CM

## 2017-11-17 DIAGNOSIS — Z09 HOSPITAL DISCHARGE FOLLOW-UP: Primary | ICD-10-CM

## 2017-11-17 PROCEDURE — 99214 OFFICE O/P EST MOD 30 MIN: CPT | Performed by: FAMILY MEDICINE

## 2017-11-17 NOTE — PAYOR COMM NOTE
--------------  DISCHARGE REVIEW    Payor: Danielle Shama #:  [id number]  Authorization Number: [id number]    Admit date: 11/2/17  Admit time:  0157  Discharge Date: 11/4/2017  3:19 PM     Admitting Physician: David Hazel MD  Attending Physician: to her back. She denies any abdominal pain. Pt is having some nausea, vomiting - notes she had coffee ground emesis 2 days ago. Pt reports feeling overall very weak, fatigued. No melena, no FBPR.   Pt is on lifelong AG due to h/o VTE- missed 2 days due to i Percutaneous transluminal coronary angioplasty with drug-eluting stent x1 of the left anterior descending. [MP.3]    Incidental or significant findings and recommendations (brief descriptions):[MP.1]  • See above[MP.3]    Lab/Test results pending at [place] Take 50,000 Units by mouth twice a week. Refills:  0     Fluticasone Furoate-Vilanterol 200-25 MCG/INH Aepb  Commonly known as:  BREO ELLIPTA      Inhale 1 puff into the lungs daily.    Quantity:  1 each  Refills:  0     Fluticasone Propionate 50 MCG/A [78-89] 86  Resp:  [18] 18  BP: ()/(42-75) 95/46[MP.2]    Physical Exam:    General: No acute distress. Respiratory: Clear to auscultation bilaterally. No wheezes. No rhonchi. Cardiovascular: S1, S2. Regular rate and rhythm.  No murmurs, rubs or ga

## 2017-11-17 NOTE — PROGRESS NOTES
HPI:   Aravind Eisenberg is a 50year old female that presents for hospital follow after NSTEMI with FELTON placed in LAD. She was already on xarelto lifelong for recurrent DVT/PE. She was also placed on ticagrelor after her Mi.   She is on high intensity Rfl: 6  •  TRESIBA FLEXTOUCH 200 UNIT/ML Subcutaneous Solution Pen-injector, Inject 40 Units into the vein nightly.  , Disp: , Rfl:   •  PROAIR  (90 Base) MCG/ACT Inhalation Aero Soln, Inhale 2 puffs into the lungs every 6 (six) hours as needed for icterus, conjunctivae clear bilaterally, no eye discharge    Ears: External normal. TMs normal without erythema or effusion   Nose: patent, no nasal discharge    Throat:  No tonsillar erythema or exudate.      Mouth:  No oral lesions or ulcerations, good de

## 2017-11-20 ENCOUNTER — PRIOR ORIGINAL RECORDS (OUTPATIENT)
Dept: OTHER | Age: 52
End: 2017-11-20

## 2017-11-20 ENCOUNTER — TELEPHONE (OUTPATIENT)
Dept: FAMILY MEDICINE CLINIC | Facility: CLINIC | Age: 52
End: 2017-11-20

## 2017-11-20 NOTE — TELEPHONE ENCOUNTER
Patient has been notified ppw will be ready tomorrow and once done, I will fax it to the number provided and will notify patient. Patient verbalized understanding.

## 2017-11-28 ENCOUNTER — MYAURORA ACCOUNT LINK (OUTPATIENT)
Dept: OTHER | Age: 52
End: 2017-11-28

## 2017-11-28 ENCOUNTER — PRIOR ORIGINAL RECORDS (OUTPATIENT)
Dept: OTHER | Age: 52
End: 2017-11-28

## 2017-12-01 ENCOUNTER — HOSPITAL ENCOUNTER (OUTPATIENT)
Dept: CV DIAGNOSTICS | Facility: HOSPITAL | Age: 52
Discharge: HOME | End: 2017-12-01
Attending: INTERNAL MEDICINE
Payer: COMMERCIAL

## 2017-12-01 ENCOUNTER — TELEPHONE (OUTPATIENT)
Dept: FAMILY MEDICINE CLINIC | Facility: CLINIC | Age: 52
End: 2017-12-01

## 2017-12-01 DIAGNOSIS — Z51.89 ENCOUNTER FOR CARDIAC REHABILITATION: ICD-10-CM

## 2017-12-01 DIAGNOSIS — Z95.5 STENTED CORONARY ARTERY: ICD-10-CM

## 2017-12-01 PROCEDURE — 93017 CV STRESS TEST TRACING ONLY: CPT | Performed by: INTERNAL MEDICINE

## 2017-12-01 PROCEDURE — 93018 CV STRESS TEST I&R ONLY: CPT | Performed by: INTERNAL MEDICINE

## 2017-12-04 ENCOUNTER — TELEPHONE (OUTPATIENT)
Dept: FAMILY MEDICINE CLINIC | Facility: CLINIC | Age: 52
End: 2017-12-04

## 2017-12-05 ENCOUNTER — CARDPULM VISIT (OUTPATIENT)
Dept: CARDIAC REHAB | Facility: HOSPITAL | Age: 52
End: 2017-12-05
Attending: INTERNAL MEDICINE
Payer: COMMERCIAL

## 2017-12-05 VITALS
OXYGEN SATURATION: 97 % | WEIGHT: 242.5 LBS | HEART RATE: 76 BPM | HEIGHT: 67.72 IN | SYSTOLIC BLOOD PRESSURE: 106 MMHG | DIASTOLIC BLOOD PRESSURE: 70 MMHG | BODY MASS INDEX: 37.18 KG/M2

## 2017-12-05 PROCEDURE — 93798 PHYS/QHP OP CAR RHAB W/ECG: CPT

## 2017-12-08 ENCOUNTER — APPOINTMENT (OUTPATIENT)
Dept: CARDIAC REHAB | Facility: HOSPITAL | Age: 52
End: 2017-12-08
Attending: INTERNAL MEDICINE
Payer: COMMERCIAL

## 2017-12-13 ENCOUNTER — CARDPULM VISIT (OUTPATIENT)
Dept: CARDIAC REHAB | Facility: HOSPITAL | Age: 52
End: 2017-12-13
Attending: INTERNAL MEDICINE
Payer: COMMERCIAL

## 2017-12-13 PROCEDURE — 93798 PHYS/QHP OP CAR RHAB W/ECG: CPT

## 2017-12-15 ENCOUNTER — PRIOR ORIGINAL RECORDS (OUTPATIENT)
Dept: OTHER | Age: 52
End: 2017-12-15

## 2017-12-15 ENCOUNTER — CARDPULM VISIT (OUTPATIENT)
Dept: CARDIAC REHAB | Facility: HOSPITAL | Age: 52
End: 2017-12-15
Attending: INTERNAL MEDICINE
Payer: COMMERCIAL

## 2017-12-15 PROCEDURE — 93798 PHYS/QHP OP CAR RHAB W/ECG: CPT

## 2017-12-19 ENCOUNTER — CARDPULM VISIT (OUTPATIENT)
Dept: CARDIAC REHAB | Facility: HOSPITAL | Age: 52
End: 2017-12-19
Attending: INTERNAL MEDICINE
Payer: COMMERCIAL

## 2017-12-19 PROCEDURE — 93798 PHYS/QHP OP CAR RHAB W/ECG: CPT

## 2017-12-20 ENCOUNTER — CARDPULM VISIT (OUTPATIENT)
Dept: CARDIAC REHAB | Facility: HOSPITAL | Age: 52
End: 2017-12-20
Attending: INTERNAL MEDICINE
Payer: COMMERCIAL

## 2017-12-20 PROCEDURE — 93798 PHYS/QHP OP CAR RHAB W/ECG: CPT

## 2017-12-25 ENCOUNTER — APPOINTMENT (OUTPATIENT)
Dept: CARDIAC REHAB | Facility: HOSPITAL | Age: 52
End: 2017-12-25
Attending: INTERNAL MEDICINE
Payer: COMMERCIAL

## 2017-12-27 ENCOUNTER — PRIOR ORIGINAL RECORDS (OUTPATIENT)
Dept: OTHER | Age: 52
End: 2017-12-27

## 2017-12-27 ENCOUNTER — APPOINTMENT (OUTPATIENT)
Dept: CT IMAGING | Facility: HOSPITAL | Age: 52
End: 2017-12-27
Attending: EMERGENCY MEDICINE
Payer: COMMERCIAL

## 2017-12-27 ENCOUNTER — HOSPITAL ENCOUNTER (OUTPATIENT)
Facility: HOSPITAL | Age: 52
Setting detail: OBSERVATION
Discharge: HOME | End: 2017-12-28
Attending: EMERGENCY MEDICINE | Admitting: HOSPITALIST
Payer: COMMERCIAL

## 2017-12-27 ENCOUNTER — APPOINTMENT (OUTPATIENT)
Dept: GENERAL RADIOLOGY | Facility: HOSPITAL | Age: 52
End: 2017-12-27
Attending: EMERGENCY MEDICINE
Payer: COMMERCIAL

## 2017-12-27 DIAGNOSIS — R07.9 ACUTE CHEST PAIN: Primary | ICD-10-CM

## 2017-12-27 PROCEDURE — 99220 INITIAL OBSERVATION CARE,LEVL III: CPT | Performed by: HOSPITALIST

## 2017-12-27 PROCEDURE — 71275 CT ANGIOGRAPHY CHEST: CPT | Performed by: EMERGENCY MEDICINE

## 2017-12-27 PROCEDURE — 71020 XR CHEST PA + LAT CHEST (CPT=71020): CPT | Performed by: EMERGENCY MEDICINE

## 2017-12-27 RX ORDER — ATORVASTATIN CALCIUM 80 MG/1
80 TABLET, FILM COATED ORAL NIGHTLY
Status: DISCONTINUED | OUTPATIENT
Start: 2017-12-27 | End: 2017-12-28

## 2017-12-27 RX ORDER — LISINOPRIL 2.5 MG/1
2.5 TABLET ORAL DAILY
Status: DISCONTINUED | OUTPATIENT
Start: 2017-12-28 | End: 2017-12-28

## 2017-12-27 RX ORDER — FAMOTIDINE 20 MG/1
20 TABLET ORAL DAILY
Status: DISCONTINUED | OUTPATIENT
Start: 2017-12-27 | End: 2017-12-28

## 2017-12-27 RX ORDER — DEXTROSE MONOHYDRATE 25 G/50ML
50 INJECTION, SOLUTION INTRAVENOUS
Status: DISCONTINUED | OUTPATIENT
Start: 2017-12-27 | End: 2017-12-28

## 2017-12-27 RX ORDER — ASPIRIN 325 MG
325 TABLET ORAL DAILY
Status: DISCONTINUED | OUTPATIENT
Start: 2017-12-28 | End: 2017-12-28

## 2017-12-27 RX ORDER — MONTELUKAST SODIUM 10 MG/1
10 TABLET ORAL NIGHTLY
Status: DISCONTINUED | OUTPATIENT
Start: 2017-12-27 | End: 2017-12-28

## 2017-12-27 RX ORDER — ESCITALOPRAM OXALATE 20 MG/1
20 TABLET ORAL EVERY MORNING
Status: DISCONTINUED | OUTPATIENT
Start: 2017-12-28 | End: 2017-12-28

## 2017-12-27 RX ORDER — KETOROLAC TROMETHAMINE 30 MG/ML
15 INJECTION, SOLUTION INTRAMUSCULAR; INTRAVENOUS ONCE
Status: COMPLETED | OUTPATIENT
Start: 2017-12-27 | End: 2017-12-27

## 2017-12-27 RX ORDER — ALPRAZOLAM 0.5 MG/1
0.5 TABLET ORAL 2 TIMES DAILY PRN
Status: DISCONTINUED | OUTPATIENT
Start: 2017-12-27 | End: 2017-12-28

## 2017-12-27 RX ORDER — CETIRIZINE HYDROCHLORIDE 10 MG/1
10 TABLET ORAL DAILY
Status: DISCONTINUED | OUTPATIENT
Start: 2017-12-28 | End: 2017-12-28

## 2017-12-27 RX ORDER — ALBUTEROL SULFATE 2.5 MG/3ML
2.5 SOLUTION RESPIRATORY (INHALATION)
Status: DISCONTINUED | OUTPATIENT
Start: 2017-12-27 | End: 2017-12-28

## 2017-12-27 RX ORDER — NITROGLYCERIN 0.4 MG/1
0.4 TABLET SUBLINGUAL EVERY 5 MIN PRN
Status: DISCONTINUED | OUTPATIENT
Start: 2017-12-27 | End: 2017-12-28

## 2017-12-27 RX ORDER — FLUTICASONE PROPIONATE 50 MCG
2 SPRAY, SUSPENSION (ML) NASAL 2 TIMES DAILY
Status: DISCONTINUED | OUTPATIENT
Start: 2017-12-27 | End: 2017-12-28

## 2017-12-27 RX ORDER — ASPIRIN 81 MG/1
324 TABLET, CHEWABLE ORAL ONCE
Status: COMPLETED | OUTPATIENT
Start: 2017-12-27 | End: 2017-12-27

## 2017-12-27 RX ORDER — ACETAMINOPHEN 325 MG/1
650 TABLET ORAL EVERY 6 HOURS PRN
Status: DISCONTINUED | OUTPATIENT
Start: 2017-12-27 | End: 2017-12-28

## 2017-12-27 RX ORDER — ZONISAMIDE 100 MG/1
200 CAPSULE ORAL NIGHTLY
Status: DISCONTINUED | OUTPATIENT
Start: 2017-12-27 | End: 2017-12-28

## 2017-12-27 RX ORDER — CARVEDILOL 6.25 MG/1
6.25 TABLET ORAL 2 TIMES DAILY WITH MEALS
Status: DISCONTINUED | OUTPATIENT
Start: 2017-12-27 | End: 2017-12-28

## 2017-12-27 RX ORDER — PANTOPRAZOLE SODIUM 40 MG/1
40 TABLET, DELAYED RELEASE ORAL
Status: DISCONTINUED | OUTPATIENT
Start: 2017-12-28 | End: 2017-12-28

## 2017-12-27 RX ORDER — IPRATROPIUM BROMIDE AND ALBUTEROL SULFATE 2.5; .5 MG/3ML; MG/3ML
3 SOLUTION RESPIRATORY (INHALATION) ONCE
Status: COMPLETED | OUTPATIENT
Start: 2017-12-27 | End: 2017-12-27

## 2017-12-27 RX ORDER — METOCLOPRAMIDE 10 MG/1
10 TABLET ORAL EVERY 6 HOURS PRN
Status: DISCONTINUED | OUTPATIENT
Start: 2017-12-27 | End: 2017-12-28

## 2017-12-27 NOTE — ED PROVIDER NOTES
Patient Seen in: [facility] Emergency Department    History   Patient presents with:  Chest Pain Angina (cardiovascular)    Stated Complaint: chest pain    HPI    This is a 73-year-old female complaint of chest pain this patient has a history of ast (low grade squamous intraepithelial dysplasia)    • Migraines     perfumes   • OBESITY    • Obesity    • Osteoarthritis    • Pneumonia, organism unspecified(486) 2014   • PONV (postoperative nausea and vomiting)    • PULMONARY EMBOLISM    • Tatiana 09/23/2004   SpO2 98%   BMI 37.54 kg/m²         Physical Exam    The patient is alert oriented ×3 in no acute distress HEENT exam within normal limits neck is no lymphadenopathy JVD lungs slight expiratory wheeze in the upper fields somewhat diminished.   C GREEN   RAINBOW DRAW BLUE   RAINBOW DRAW GOLD   RAINBOW DRAW BLUE   RAINBOW DRAW GOLD   RAINBOW DRAW LAVENDER   RAINBOW DRAW LIGHT GREEN   RESPIRATORY PANEL FLU EXPANDED   RESPIRATORY PANEL FLU EXPANDED     EKG    Rate, intervals and axes as noted on EKG R

## 2017-12-27 NOTE — HISTORICAL OFFICE NOTE
[facility]  : 1965  ACCOUNT:  [id number]  [id number]  PCP:    TODAY'S DATE: 2017  DICTATED BY:  PREETHI Vidal]      CHIEF COMPLAINT: [Followup of Cardiomyopathy, ischemic, Followup of Pulmonary HTN and unspec.]    HPI:    [On hemoptysis. GI: denies melena, hematochezia. : no hematuria. INTEG: no new rashes, lesions. MS: no limiting arthritis. NEURO: no localized deficits. HEM/LYMPH: denies easy bruising. ALL: no new food or environmental allergies.       PAST HISTORY: asthma, failure medications as much as possible. The plan is to up titrate medications and repeat an echocardiogram in 3 months. 2.  Hyperlipidemia. She is found to have a LDL of 157 and discharged on atorvastatin 80 mg.   We will check a lipid panel in 2-3 mo 25MG      as directed                              11/24/14 ProAir HFA            108 (90   as directed                              11/24/14 Ranitidine HCl        150MG     1 po twice daily                         11/24/14 Singulair             10MG the stool. The patient denies any groin pain. No pain in her lower extremity and no lower back pain. Groin site is soft and nontender. No bruising or hematoma. No bruit. DECISION MAKIN. MI, non-ST elevation, with drug-eluting stent to the LAD. Tatiana Aguiar M.D. CARDIOLOGIST: Mack Rucker M.D.   Hospital: [facility]  Date: 11/04/2017    DISCHARGE SUMMARY    DISCHARGE DIAGNOSES:  1.  Non-ST elevation MI (troponin 2.2) status post PTCA/drug-eluting stent LAD at site of ulcerated plaque wit

## 2017-12-27 NOTE — H&P
[facility] H[facility]PITAL[facility]  History and Physical     [facility]:  [facility]    3/25/1965 MRN [id number]   Location [facility] [facility] Attending Virginia Jiang MD   Hosp Day # 0 PCP Jenny Olivarez DO     Chief Compl Past Surgical History: Past Surgical History:  No date: BREAST SURGERY PROCEDURE UNLISTED      Comment: cyst removed  No date: CATH DRUG ELUTING STENT  04/10/2011: CHOLECYSTECTOMY      Comment: PERFORMED BY DR Savannah Lee  2012: COLONOSCOPY every 4 (four) hours as needed for Pain or Headaches. Disp:  Rfl:    zonisamide 100 MG Oral Cap Take 200 mg by mouth nightly. Disp:  Rfl:    atorvastatin 80 MG Oral Tab Take 1 tablet (80 mg total) by mouth nightly.  Disp: 60 tablet Rfl: 0   Metoclopramide H Take 10 mg by mouth nightly. Disp:  Rfl:        Review of Systems:   A comprehensive 14 point review of systems was completed. Pertinent positives and negatives noted in the HPI.     Physical Exam:    /68   Pulse 74   Temp 98 °F (36.7 °C)   Resp 16 scale  3. Accuchecks  4. A1c  9. Laguerre's esophagus   1. PPI  10. Depression  1.  Celexa    Quality:  · DVT Prophylaxis: Xarelto    Plan of care discussed with patient, ER and cardiology team.    Tracy Osgood, MD  12/27/2017

## 2017-12-27 NOTE — CONSULTS
[facility]  Cardiology Consultation    Alanna Hadley Patient Status:  Emergency    3/25/1965 MRN [id number]   Location [address] [facility] Attending Josseline Mcmillan MD   [facility] Day # 0 PCP Mckay Martins DO     Reason for Con nausea and vomiting)    • PULMONARY EMBOLISM    • Shortness of breath    • SINUSITIS     chronic   • Type II or unspecified type diabetes mellitus without mention of complication, not stated as uncontrolled    • Unspecified essential hypertension    • Vert *Lisinopril           2.5MG     1 TABLET DAILY AS DIRECTED.              11/14/17 Escitalopram Oxalate  20MG      1 po daily                               11/14/17 Pantoprazole Sodium   40MG      1 po twice daily                         11/14/17 Xarelto murmur  Lungs: Clear without wheezes, rales, rhonchi or dullness. No wheezing or coughing after hyperventilation  Abdomen: Soft, non-tender. BS normal. No masses felt. Extremities: Without clubbing, cyanosis or edema. No cords. Homans neg bilat.  Pulses inpt.     Thank you for allowing me to participate in the care of your patient.     Severa Galla, md  [facility] cardiology

## 2017-12-28 ENCOUNTER — APPOINTMENT (OUTPATIENT)
Dept: CV DIAGNOSTICS | Facility: HOSPITAL | Age: 52
End: 2017-12-28
Attending: INTERNAL MEDICINE
Payer: COMMERCIAL

## 2017-12-28 VITALS
TEMPERATURE: 98 F | HEART RATE: 76 BPM | BODY MASS INDEX: 37.43 KG/M2 | SYSTOLIC BLOOD PRESSURE: 124 MMHG | OXYGEN SATURATION: 99 % | HEIGHT: 68 IN | DIASTOLIC BLOOD PRESSURE: 75 MMHG | RESPIRATION RATE: 18 BRPM | WEIGHT: 246.94 LBS

## 2017-12-28 PROCEDURE — 93306 TTE W/DOPPLER COMPLETE: CPT | Performed by: INTERNAL MEDICINE

## 2017-12-28 PROCEDURE — 99217 OBSERVATION CARE DISCHARGE: CPT | Performed by: HOSPITALIST

## 2017-12-28 RX ORDER — CARVEDILOL 12.5 MG/1
12.5 TABLET ORAL 2 TIMES DAILY WITH MEALS
COMMUNITY
End: 2018-03-20

## 2017-12-28 RX ORDER — AZITHROMYCIN 250 MG/1
250 TABLET, FILM COATED ORAL DAILY
Qty: 1 PACKAGE | Refills: 0 | Status: SHIPPED | OUTPATIENT
Start: 2017-12-28 | End: 2018-01-09

## 2017-12-28 RX ORDER — CARVEDILOL 12.5 MG/1
12.5 TABLET ORAL 2 TIMES DAILY WITH MEALS
Status: DISCONTINUED | OUTPATIENT
Start: 2017-12-28 | End: 2017-12-28

## 2017-12-28 RX ORDER — ALBUTEROL SULFATE 2.5 MG/3ML
2.5 SOLUTION RESPIRATORY (INHALATION) EVERY 6 HOURS PRN
Status: DISCONTINUED | OUTPATIENT
Start: 2017-12-28 | End: 2017-12-28

## 2017-12-28 RX ORDER — ALBUTEROL SULFATE 2.5 MG/3ML
SOLUTION RESPIRATORY (INHALATION)
Status: DISCONTINUED
Start: 2017-12-28 | End: 2017-12-28

## 2017-12-28 NOTE — PROGRESS NOTES
[facility] HOSPITALIST  Progress Note     Gela Garrett Patient Status:  Inpatient    3/25/1965 MRN [id number]   [facility] 8NE-A Attending Aaron Kinney MD   [facility] Day # 1 PCP Vivienne Mata DO     Chief Complaint: Chest pain    S: Faye Ruiz Daily   • Montelukast Sodium  10 mg Oral Nightly   • Pantoprazole Sodium  40 mg Oral BID AC   • famoTIDine  20 mg Oral Daily   • Rivaroxaban  20 mg Oral Daily with food   • ticagrelor  90 mg Oral BID   • insulin detemir  40 Units Subcutaneous Nightly   • z

## 2017-12-28 NOTE — PLAN OF CARE
Comments: Pt is A&OX4, VSS on RA, and maintaining NSR on telemetry. Admitted w/left chest pain radiating to left shoulder; recent stent to LAD in November (on Brilinta).   Troponin's negative, EKG's have no change per Dr. Rina Leonardo, 2D echo done this AM (aw range  - Assess barriers to adequate nutritional intake and initiate nutrition consult as needed  - Instruct patient on self management of diabetes   Outcome: Adequate for Discharge      Problem: Patient/Family Goals  Goal: Patient/Family Long Term Goal  P appropriate and evaluate response  - Consider cultural and social influences on pain and pain management  - Manage/alleviate anxiety  - Utilize distraction and/or relaxation techniques  - Monitor for opioid side effects  - Notify MD/LIP if interventions un

## 2017-12-28 NOTE — PROGRESS NOTES
[facility] Progress Note    Subjective:  Still with some mild pain, she thinks from bronchitis.  Has diffuse muscle achiness with statin, which has been a problem for her in the past.     Objective:  /57 (BP Location: Left arm)   Pulse 71   Te another as outpatient, or consider PSK9 inhibitor or both. · LE arterial doppler as outpatient  · Continue on xarelto managed by Dr. Johny Daley  · Continue coreg, Corrinne Farrier. · Has APN visit for tomorrow, follows with Dr. Shree Miranda long term.

## 2017-12-28 NOTE — PROGRESS NOTES
Tele & iv site removed (catheter intact). Discharge paperwork, discharge education, follow-up appointments needed, discharge med rec, new med (z-ariela), and all mediation side effects reviewed in-depth with pt. All questions answered.   Refused W/C escort t

## 2017-12-28 NOTE — PAYOR COMM NOTE
--------------  ADMISSION REVIEW     Payor: Raisa Dove #:  [id number]  Authorization Number: [id number]    Admit date: 12/27/17  Admit time: [address]: Tanya Armstrong DO  Attending Physician:  Anahy Hill MD swollen. [RH.3]           Review of Systems    Positive for stated complaint: chest pain  Other systems are as noted in HPI. Constitutional and vital signs reviewed. All other systems reviewed and negative except as noted above.     Physical Exam[RH.1] Eosinophil Absolute 0.52 (*)     Basophil Absolute 0.11 (*)     All other components within normal limits   TROPONIN I - Normal   CK CREATINE KINASE (NOT CREATININE) - Normal     EKG    Rate, intervals and axes as noted on EKG Report.   Rate:[RH.1] 84[RH.3] pain    History of Present Illness: Parrish Boland is a 50year old female with a history of CAD sp PCI, Essential hypertension, Dyslipidemia, Asthma, VTE on Xarelto, Diabetes mellitus, Laguerre's esophagus, Depression who presents with chest pain. [PT. Psychiatric: Appropriate mood and affect.     Diagnostic Data:      Labs:  Recent Labs   Lab  12/27/17   1543   WBC  10.6   HGB  14.1   MCV  78.4*   PLT  417.0       Recent Labs   Lab  12/27/17   1543   GLU  139*   BUN  9   CREATSERUM  0.70   CA  9.4   AL chewable tab 324 mg     Date Action Dose Route User    12/27/2017 1611 Given 324 mg Oral Avis Maharaj RN            carvedilol (COREG) tab 12.5 mg     Date Action Dose Route User    12/28/2017 1021 Given 12.5 mg Oral Ivania Cardozo RN      ca 12/28/2017 1021 Given 90 mg Oral Elex JHOAN Teran    12/27/2017 2105 Given 90 mg Oral EstonactocNimco RN      zonisamide [facility]) cap 200 mg     Date Action Dose Route User    12/27/2017 2105 Given 200 mg Oral Nilsa Hatchet, RN

## 2017-12-29 ENCOUNTER — PRIOR ORIGINAL RECORDS (OUTPATIENT)
Dept: OTHER | Age: 52
End: 2017-12-29

## 2017-12-29 ENCOUNTER — PATIENT OUTREACH (OUTPATIENT)
Dept: CASE MANAGEMENT | Age: 52
End: 2017-12-29

## 2017-12-29 ENCOUNTER — MYAURORA ACCOUNT LINK (OUTPATIENT)
Dept: OTHER | Age: 52
End: 2017-12-29

## 2017-12-29 ENCOUNTER — APPOINTMENT (OUTPATIENT)
Dept: CARDIAC REHAB | Facility: HOSPITAL | Age: 52
End: 2017-12-29
Attending: INTERNAL MEDICINE
Payer: COMMERCIAL

## 2017-12-29 DIAGNOSIS — R07.9 ACUTE CHEST PAIN: ICD-10-CM

## 2017-12-29 NOTE — PROGRESS NOTES
Initial Post Discharge Follow Up   Discharge Date: 12/28/17  Contact Date: 12/29/2017    Consent Verification:  Assessment Completed With: Patient  HIPAA Verified?   Yes    Discharge Dx:    Acute chest pain, Per patient she reports she was told she has b mg total) by mouth daily. Disp: 30 tablet Rfl: 3   Rivaroxaban (XARELTO) 20 MG Oral Tab Take 1 tablet (20 mg total) by mouth daily with food.  Disp: 30 tablet Rfl: 6   TRESIBA FLEXTOUCH 200 UNIT/ML Subcutaneous Solution Pen-injector Inject 40 Units into the from taking your medication as prescribed? No  Are you having any concerns with constipation? A little, but doing ok. Patient reports she is using her maintenance medications  and inhalers daily as directed.     Referrals/orders at D/C:  Home Health order [facility] Cardiopulmonary Rehabilitation [facility])    Jan 17, 2018  3:30 PM CST CARDIAC REHAB PHASE II with [address] THE [facility] Card[facility]ulmonary Rehabilitation [facility])    Irena Pollock REHAB PHASE II with [id number] [place] Cardiop[facility])    Feb 12, 2018  3:30 PM CST CARDIAC REHAB PHASE II with [id number] [place] [facility] Cardiopulmonary Rehabilitation  \[facility] [place] [facility]\""  [address]  [place] [address]  [address], [address] [facility], [address]

## 2018-01-01 ENCOUNTER — APPOINTMENT (OUTPATIENT)
Dept: CARDIAC REHAB | Facility: HOSPITAL | Age: 53
End: 2018-01-01
Attending: INTERNAL MEDICINE
Payer: COMMERCIAL

## 2018-01-03 ENCOUNTER — APPOINTMENT (OUTPATIENT)
Dept: CARDIAC REHAB | Facility: HOSPITAL | Age: 53
End: 2018-01-03
Attending: INTERNAL MEDICINE
Payer: COMMERCIAL

## 2018-01-04 NOTE — DISCHARGE SUMMARY
[facility] HOSPITALIST  DISCHARGE SUMMARY     Magdi Boudreaux Patient Status:  Observation    3/25/1965 MRN [id number]   [facility] 8NE-A Attending Susie Earing [address] Day # 0 PCP Alan Davis DO     Date of Admission:  was recommended. She was cleared for discharge per cardiology and started on empiric antibiotics. All questions/concerns were addressed and patient was discharged in stable condition.     Procedures during hospitalization:   • none    Incidental or signif Caps  Commonly known as:  DRISDOL/VITAMIN D2      Take 50,000 Units by mouth twice a week. Tuesdays, Thursdays   Refills:  0     escitalopram 20 MG Tabs  Commonly known as:  LEXAPRO      Take 20 mg by mouth every morning.    Refills:  0     Fluticasone Prop Jennifer Guzman [id number]  [phone]      has APN visit tomorrow, to see Dr. Migdalia tSeel in 2-3 weeks. Vital signs:       Physical Exam:    General: No acute distress. Respiratory: Clear to auscultation bilaterally. No wheezes. No rhonchi.   Cardiovascular: S1, S2. R

## 2018-01-05 ENCOUNTER — APPOINTMENT (OUTPATIENT)
Dept: CARDIAC REHAB | Facility: HOSPITAL | Age: 53
End: 2018-01-05
Attending: INTERNAL MEDICINE
Payer: COMMERCIAL

## 2018-01-08 ENCOUNTER — APPOINTMENT (OUTPATIENT)
Dept: CARDIAC REHAB | Facility: HOSPITAL | Age: 53
End: 2018-01-08
Attending: INTERNAL MEDICINE
Payer: COMMERCIAL

## 2018-01-09 ENCOUNTER — OFFICE VISIT (OUTPATIENT)
Dept: FAMILY MEDICINE CLINIC | Facility: CLINIC | Age: 53
End: 2018-01-09

## 2018-01-09 VITALS
RESPIRATION RATE: 18 BRPM | BODY MASS INDEX: 38.04 KG/M2 | SYSTOLIC BLOOD PRESSURE: 122 MMHG | TEMPERATURE: 98 F | DIASTOLIC BLOOD PRESSURE: 70 MMHG | WEIGHT: 251 LBS | HEART RATE: 100 BPM | OXYGEN SATURATION: 99 % | HEIGHT: 68 IN

## 2018-01-09 DIAGNOSIS — Z79.4 TYPE 2 DIABETES MELLITUS WITH COMPLICATION, WITH LONG-TERM CURRENT USE OF INSULIN (HCC): ICD-10-CM

## 2018-01-09 DIAGNOSIS — E11.8 TYPE 2 DIABETES MELLITUS WITH COMPLICATION, WITH LONG-TERM CURRENT USE OF INSULIN (HCC): ICD-10-CM

## 2018-01-09 DIAGNOSIS — I73.9 PAD (PERIPHERAL ARTERY DISEASE) (HCC): ICD-10-CM

## 2018-01-09 DIAGNOSIS — Z09 HOSPITAL DISCHARGE FOLLOW-UP: Primary | ICD-10-CM

## 2018-01-09 DIAGNOSIS — F41.9 ANXIETY: ICD-10-CM

## 2018-01-09 PROCEDURE — 99214 OFFICE O/P EST MOD 30 MIN: CPT | Performed by: FAMILY MEDICINE

## 2018-01-09 RX ORDER — INSULIN DEGLUDEC 200 U/ML
40 INJECTION, SOLUTION SUBCUTANEOUS NIGHTLY
Qty: 18 ML | Refills: 5 | Status: SHIPPED | OUTPATIENT
Start: 2018-01-09 | End: 2018-04-25

## 2018-01-09 RX ORDER — ALPRAZOLAM 0.5 MG/1
0.5 TABLET ORAL 2 TIMES DAILY PRN
Qty: 30 TABLET | Refills: 0 | Status: SHIPPED | OUTPATIENT
Start: 2018-01-09 | End: 2018-02-13

## 2018-01-09 RX ORDER — AZITHROMYCIN 250 MG/1
250 TABLET, FILM COATED ORAL DAILY
Qty: 1 PACKAGE | Refills: 0 | Status: SHIPPED | OUTPATIENT
Start: 2018-01-09 | End: 2018-03-20

## 2018-01-09 NOTE — PROGRESS NOTES
HPI:   Samuel Turner is a 50year old female that presents for hospital follow up. She was admitted 12/27-12/28 with left sided chest pain. She has history of CAD status post stent placement earlier this year.   She was already on anticoagulation fo (two) times daily before meals. , Disp: 30 tablet, Rfl: 0  •  RaNITidine HCl 150 MG Oral Cap, Take 1 capsule (150 mg total) by mouth every evening., Disp: 30 capsule, Rfl: 0  •  zonisamide 100 MG Oral Cap, Take 200 mg by mouth nightly., Disp: , Rfl:   •  li Vital signs reviewed. Appears stated age, well groomed. Physical Exam:  GEN:  Patient is alert, awake and oriented, well developed, well nourished, no apparent distress.   HEENT:     Head:  Normocephalic, atraumatic    Eyes: EOMI, PERRLA, no scleral icter PM

## 2018-01-10 ENCOUNTER — TELEPHONE (OUTPATIENT)
Dept: FAMILY MEDICINE CLINIC | Facility: CLINIC | Age: 53
End: 2018-01-10

## 2018-01-10 ENCOUNTER — APPOINTMENT (OUTPATIENT)
Dept: CARDIAC REHAB | Facility: HOSPITAL | Age: 53
End: 2018-01-10
Attending: INTERNAL MEDICINE
Payer: COMMERCIAL

## 2018-01-10 NOTE — TELEPHONE ENCOUNTER
Pharmacy needs to verify rx    azithromycin (ZITHROMAX Z-ANNY) 250 MG Oral Tab 1 Package 0 1/9/2018     Sig - Route:  Take 1 tablet (250 mg total) by mouth daily. - Oral    E-Prescribing Status: Receipt confirmed by pharmacy (1/9/2018  1:35 PM CST)    [employer]

## 2018-01-10 NOTE — TELEPHONE ENCOUNTER
I called [facility] to verify what is needed. The zithromax quantity says 1 and directions of 1 daily. I advised it is one package and follow package directions    .  Hospital discharge follow-up  - will give Milly Goods patient was supposed to fill at discharge but

## 2018-01-12 ENCOUNTER — APPOINTMENT (OUTPATIENT)
Dept: CARDIAC REHAB | Facility: HOSPITAL | Age: 53
End: 2018-01-12
Attending: INTERNAL MEDICINE
Payer: COMMERCIAL

## 2018-01-15 LAB
ALBUMIN: 3.3 G/DL
ALKALINE PHOSPHATATE(ALK PHOS): 147 IU/L
BILIRUBIN TOTAL: 0.5 MG/DL
BUN: 9 MG/DL
CALCIUM: 9.4 MG/DL
CHLORIDE: 106 MEQ/L
CHOLESTEROL, TOTAL: 209 MG/DL
CREATININE KINASE: 100 U/L
CREATININE, SERUM: 0.7 MG/DL
GLUCOSE: 139 MG/DL
HDL CHOLESTEROL: 31 MG/DL
HEMATOCRIT: 43.9 %
HEMOGLOBIN: 14.1 G/DL
LDL CHOLESTEROL: 119 MG/DL
PLATELETS: 417 K/UL
POTASSIUM, SERUM: 4.3 MEQ/L
PROBNP: 254 PG/ML
PROTEIN, TOTAL: 7.7 G/DL
RED BLOOD COUNT: 5.6 X 10-6/U
SGOT (AST): 37 IU/L
SGPT (ALT): 43 IU/L
SODIUM: 137 MEQ/L
TRIGLYCERIDES: 296 MG/DL
WHITE BLOOD COUNT: 10.6 X 10-3/U

## 2018-01-20 ENCOUNTER — APPOINTMENT (OUTPATIENT)
Dept: GENERAL RADIOLOGY | Facility: HOSPITAL | Age: 53
End: 2018-01-20
Attending: EMERGENCY MEDICINE
Payer: COMMERCIAL

## 2018-01-20 ENCOUNTER — HOSPITAL ENCOUNTER (EMERGENCY)
Facility: HOSPITAL | Age: 53
Discharge: HOME | End: 2018-01-20
Attending: EMERGENCY MEDICINE
Payer: COMMERCIAL

## 2018-01-20 ENCOUNTER — PRIOR ORIGINAL RECORDS (OUTPATIENT)
Dept: OTHER | Age: 53
End: 2018-01-20

## 2018-01-20 VITALS
HEART RATE: 82 BPM | HEIGHT: 67 IN | TEMPERATURE: 98 F | DIASTOLIC BLOOD PRESSURE: 86 MMHG | WEIGHT: 245 LBS | SYSTOLIC BLOOD PRESSURE: 144 MMHG | OXYGEN SATURATION: 94 % | BODY MASS INDEX: 38.45 KG/M2 | RESPIRATION RATE: 15 BRPM

## 2018-01-20 DIAGNOSIS — J45.41 MODERATE PERSISTENT ASTHMA WITH ACUTE EXACERBATION: Primary | ICD-10-CM

## 2018-01-20 LAB
BASOPHILS # BLD AUTO: 0.06 X10(3) UL (ref 0–0.1)
BASOPHILS NFR BLD AUTO: 0.6 %
BUN BLD-MCNC: 10 MG/DL (ref 8–20)
CALCIUM BLD-MCNC: 9 MG/DL (ref 8.3–10.3)
CHLORIDE: 106 MMOL/L (ref 101–111)
CO2: 21 MMOL/L (ref 22–32)
CREAT BLD-MCNC: 0.97 MG/DL (ref 0.55–1.02)
EOSINOPHIL # BLD AUTO: 0 X10(3) UL (ref 0–0.3)
EOSINOPHIL NFR BLD AUTO: 0 %
ERYTHROCYTE [DISTWIDTH] IN BLOOD BY AUTOMATED COUNT: 14.6 % (ref 11.5–16)
GLUCOSE BLD-MCNC: 364 MG/DL (ref 70–99)
HCT VFR BLD AUTO: 41.8 % (ref 34–50)
HGB BLD-MCNC: 13.6 G/DL (ref 12–16)
IMMATURE GRANULOCYTE COUNT: 0.15 X10(3) UL (ref 0–1)
IMMATURE GRANULOCYTE RATIO %: 1.4 %
LYMPHOCYTES # BLD AUTO: 1.9 X10(3) UL (ref 0.9–4)
LYMPHOCYTES NFR BLD AUTO: 17.7 %
MCH RBC QN AUTO: 25.3 PG (ref 27–33.2)
MCHC RBC AUTO-ENTMCNC: 32.5 G/DL (ref 31–37)
MCV RBC AUTO: 77.8 FL (ref 81–100)
MONOCYTES # BLD AUTO: 0.12 X10(3) UL (ref 0.1–0.6)
MONOCYTES NFR BLD AUTO: 1.1 %
NEUTROPHIL ABS PRELIM: 8.49 X10 (3) UL (ref 1.3–6.7)
NEUTROPHILS # BLD AUTO: 8.49 X10(3) UL (ref 1.3–6.7)
NEUTROPHILS NFR BLD AUTO: 79.2 %
PLATELET # BLD AUTO: 405 10(3)UL (ref 150–450)
POTASSIUM SERPL-SCNC: 4.2 MMOL/L (ref 3.6–5.1)
RBC # BLD AUTO: 5.37 X10(6)UL (ref 3.8–5.1)
RED CELL DISTRIBUTION WIDTH-SD: 40.4 FL (ref 35.1–46.3)
SODIUM SERPL-SCNC: 137 MMOL/L (ref 136–144)
TROPONIN: <0.046 NG/ML (ref ?–0.05)
WBC # BLD AUTO: 10.7 X10(3) UL (ref 4–13)

## 2018-01-20 PROCEDURE — 93005 ELECTROCARDIOGRAM TRACING: CPT

## 2018-01-20 PROCEDURE — 80048 BASIC METABOLIC PNL TOTAL CA: CPT | Performed by: EMERGENCY MEDICINE

## 2018-01-20 PROCEDURE — 85025 COMPLETE CBC W/AUTO DIFF WBC: CPT | Performed by: EMERGENCY MEDICINE

## 2018-01-20 PROCEDURE — 96374 THER/PROPH/DIAG INJ IV PUSH: CPT

## 2018-01-20 PROCEDURE — 94640 AIRWAY INHALATION TREATMENT: CPT

## 2018-01-20 PROCEDURE — 99285 EMERGENCY DEPT VISIT HI MDM: CPT

## 2018-01-20 PROCEDURE — 71046 X-RAY EXAM CHEST 2 VIEWS: CPT | Performed by: EMERGENCY MEDICINE

## 2018-01-20 PROCEDURE — 94645 CONT INHLJ TX EACH ADDL HOUR: CPT

## 2018-01-20 PROCEDURE — 93010 ELECTROCARDIOGRAM REPORT: CPT

## 2018-01-20 PROCEDURE — 84484 ASSAY OF TROPONIN QUANT: CPT | Performed by: EMERGENCY MEDICINE

## 2018-01-20 RX ORDER — METHYLPREDNISOLONE SODIUM SUCCINATE 125 MG/2ML
125 INJECTION, POWDER, LYOPHILIZED, FOR SOLUTION INTRAMUSCULAR; INTRAVENOUS ONCE
Status: COMPLETED | OUTPATIENT
Start: 2018-01-20 | End: 2018-01-20

## 2018-01-20 RX ORDER — PREDNISONE 20 MG/1
40 TABLET ORAL DAILY
Qty: 10 TABLET | Refills: 0 | Status: SHIPPED | OUTPATIENT
Start: 2018-01-20 | End: 2018-01-25

## 2018-01-20 RX ORDER — IPRATROPIUM BROMIDE AND ALBUTEROL SULFATE 2.5; .5 MG/3ML; MG/3ML
3 SOLUTION RESPIRATORY (INHALATION) ONCE
Status: COMPLETED | OUTPATIENT
Start: 2018-01-20 | End: 2018-01-20

## 2018-01-20 RX ORDER — ALBUTEROL SULFATE 2.5 MG/3ML
2.5 SOLUTION RESPIRATORY (INHALATION) EVERY 4 HOURS PRN
Qty: 30 AMPULE | Refills: 0 | Status: SHIPPED | OUTPATIENT
Start: 2018-01-20 | End: 2018-02-19

## 2018-01-20 RX ORDER — AZITHROMYCIN 250 MG/1
TABLET, FILM COATED ORAL
Qty: 1 PACKAGE | Refills: 0 | Status: SHIPPED | OUTPATIENT
Start: 2018-01-20 | End: 2018-01-25

## 2018-01-20 NOTE — ED INITIAL ASSESSMENT (HPI)
Pt presents to ER with SOB. Pt has a hs of asthma. Pt has given her self 3 treatments at home and steroids prior to arrival with no relief. Pt states that she has been unable to do her cardiac rehab this week due to not feeling well. No fever.  Noted bloody

## 2018-01-21 LAB
ATRIAL RATE: 85 BPM
P AXIS: 12 DEGREES
P-R INTERVAL: 152 MS
Q-T INTERVAL: 378 MS
QRS DURATION: 92 MS
QTC CALCULATION (BEZET): 449 MS
R AXIS: 28 DEGREES
T AXIS: 50 DEGREES
VENTRICULAR RATE: 85 BPM

## 2018-01-21 NOTE — ED PROVIDER NOTES
Patient Seen in: [facility] Emergency Department    History   Patient presents with:  Dyspnea DEN SOB (respiratory)    Stated Complaint: asthma attack    HPI    72-year-old female here concerned that she is having an asthma attack.   She has a long h PERFORMED BY DR Savannah Lee  2012: COLONOSCOPY      Comment: polyps  2/23/2016: COLONOSCOPY N/A      Comment: Procedure: COLONOSCOPY;  Surgeon: Sweetie Adorno MD;  Location: [facility] ENDOSCOPY  2-2011: COLPOSCOPY, CERVIX W/UPPER ADJACEN affect.  Thought content normal.       ED Course     Labs Reviewed   BASIC METABOLIC PANEL (8) - Abnormal; Notable for the following:        Result Value    Glucose 364 (*)     CO2 21.0 (*)     All other components within normal limits   CBC W/ DIFFERENTIAL well.    Disposition and Plan     Clinical Impression:  Moderate persistent asthma with acute exacerbation  (primary encounter diagnosis)    Disposition:  Discharge  1/20/2018  8:58 pm    Follow-up:  Tal Yarbrough DO  [address]

## 2018-01-31 ENCOUNTER — LAB ENCOUNTER (OUTPATIENT)
Dept: LAB | Facility: HOSPITAL | Age: 53
End: 2018-01-31
Attending: INTERNAL MEDICINE
Payer: COMMERCIAL

## 2018-01-31 DIAGNOSIS — IMO0002 ASTHMA, PERSISTENT: Primary | ICD-10-CM

## 2018-01-31 LAB
IMMUNOGLOBULIN A: 220 MG/DL (ref 70–312)
IMMUNOGLOBULIN E: 29.3 IU/ML (ref 3.6–114)
IMMUNOGLOBULIN G: 659 MG/DL (ref 791–1643)

## 2018-01-31 PROCEDURE — 82784 ASSAY IGA/IGD/IGG/IGM EACH: CPT

## 2018-01-31 PROCEDURE — 36415 COLL VENOUS BLD VENIPUNCTURE: CPT

## 2018-01-31 PROCEDURE — 82785 ASSAY OF IGE: CPT

## 2018-02-07 ENCOUNTER — OFFICE VISIT (OUTPATIENT)
Dept: HEMATOLOGY/ONCOLOGY | Age: 53
End: 2018-02-07
Attending: SPECIALIST
Payer: COMMERCIAL

## 2018-02-09 ENCOUNTER — APPOINTMENT (OUTPATIENT)
Dept: CARDIAC REHAB | Facility: HOSPITAL | Age: 53
End: 2018-02-09
Attending: INTERNAL MEDICINE
Payer: COMMERCIAL

## 2018-02-12 ENCOUNTER — APPOINTMENT (OUTPATIENT)
Dept: CARDIAC REHAB | Facility: HOSPITAL | Age: 53
End: 2018-02-12
Attending: INTERNAL MEDICINE
Payer: COMMERCIAL

## 2018-02-13 DIAGNOSIS — F41.9 ANXIETY: ICD-10-CM

## 2018-02-13 NOTE — TELEPHONE ENCOUNTER
Requesting alprazolam 0.5 mg  LOV: 01/09/18  RTC: 3 mos  Last Labs: n.a  Filled: 01/09/18#30 with 0 refills    No future appointments.      Dispensed Written Strength Form Quantity Refills Days Supply Provider Pharmacy   ALPRAZOLAM 02/01/2018 01/09/2018 0.5

## 2018-02-14 ENCOUNTER — APPOINTMENT (OUTPATIENT)
Dept: CARDIAC REHAB | Facility: HOSPITAL | Age: 53
End: 2018-02-14
Attending: INTERNAL MEDICINE
Payer: COMMERCIAL

## 2018-02-14 RX ORDER — ALPRAZOLAM 0.5 MG/1
TABLET ORAL
Qty: 30 TABLET | Refills: 0 | Status: SHIPPED
Start: 2018-02-14 | End: 2018-03-20

## 2018-02-14 NOTE — TELEPHONE ENCOUNTER
Will give refill of xanax, but perhaps we should discuss changing or increasing SSRI preventative meds if she is taking xanax BID consistently as this suggests her anxiety is not well controlled.    Also and patient should make appt for her ED follow up for

## 2018-02-15 ENCOUNTER — PRIOR ORIGINAL RECORDS (OUTPATIENT)
Dept: OTHER | Age: 53
End: 2018-02-15

## 2018-02-16 ENCOUNTER — APPOINTMENT (OUTPATIENT)
Dept: CARDIAC REHAB | Facility: HOSPITAL | Age: 53
End: 2018-02-16
Attending: INTERNAL MEDICINE
Payer: COMMERCIAL

## 2018-02-19 ENCOUNTER — APPOINTMENT (OUTPATIENT)
Dept: CARDIAC REHAB | Facility: HOSPITAL | Age: 53
End: 2018-02-19
Attending: INTERNAL MEDICINE
Payer: COMMERCIAL

## 2018-02-19 LAB
BUN: 10 MG/DL
CALCIUM: 9 MG/DL
CHLORIDE: 106 MEQ/L
CREATININE, SERUM: 0.97 MG/DL
GLUCOSE: 364 MG/DL
HEMATOCRIT: 41.8 %
HEMOGLOBIN: 13.6 G/DL
PLATELETS: 405 K/UL
POTASSIUM, SERUM: 4.2 MEQ/L
RED BLOOD COUNT: 5.37 X 10-6/U
SODIUM: 137 MEQ/L
WHITE BLOOD COUNT: 10.7 X 10-3/U

## 2018-02-21 ENCOUNTER — APPOINTMENT (OUTPATIENT)
Dept: CARDIAC REHAB | Facility: HOSPITAL | Age: 53
End: 2018-02-21
Attending: INTERNAL MEDICINE
Payer: COMMERCIAL

## 2018-02-22 RX ORDER — ATORVASTATIN CALCIUM 80 MG/1
80 TABLET, FILM COATED ORAL NIGHTLY
COMMUNITY
End: 2018-03-20 | Stop reason: SINTOL

## 2018-02-22 RX ORDER — ROSUVASTATIN CALCIUM 5 MG/1
5 TABLET, COATED ORAL
COMMUNITY

## 2018-02-22 RX ORDER — VALSARTAN 80 MG/1
80 TABLET ORAL 2 TIMES DAILY
COMMUNITY
End: 2018-08-20

## 2018-02-22 RX ORDER — EZETIMIBE 10 MG/1
10 TABLET ORAL NIGHTLY
COMMUNITY

## 2018-02-22 RX ORDER — TORSEMIDE 20 MG/1
20 TABLET ORAL EVERY MORNING
COMMUNITY

## 2018-02-22 RX ORDER — POTASSIUM CHLORIDE 750 MG/1
10 TABLET, EXTENDED RELEASE ORAL DAILY
COMMUNITY
End: 2019-10-08

## 2018-02-22 NOTE — PROGRESS NOTES
Received note from Dr. Julián Ramirez    Per Dr. Edilma Roca abstract new medication. -med list updated.

## 2018-02-23 ENCOUNTER — APPOINTMENT (OUTPATIENT)
Dept: CARDIAC REHAB | Facility: HOSPITAL | Age: 53
End: 2018-02-23
Attending: INTERNAL MEDICINE
Payer: COMMERCIAL

## 2018-02-26 ENCOUNTER — APPOINTMENT (OUTPATIENT)
Dept: CARDIAC REHAB | Facility: HOSPITAL | Age: 53
End: 2018-02-26
Attending: INTERNAL MEDICINE
Payer: COMMERCIAL

## 2018-02-26 ENCOUNTER — HOSPITAL ENCOUNTER (OUTPATIENT)
Dept: CARDIOLOGY CLINIC | Facility: HOSPITAL | Age: 53
Discharge: HOME | End: 2018-02-26
Attending: INTERNAL MEDICINE

## 2018-02-26 DIAGNOSIS — M79.606 LEG PAIN, CENTRAL, UNSPECIFIED LATERALITY: ICD-10-CM

## 2018-02-26 DIAGNOSIS — I73.9 PAD (PERIPHERAL ARTERY DISEASE) (HCC): ICD-10-CM

## 2018-02-28 ENCOUNTER — APPOINTMENT (OUTPATIENT)
Dept: CARDIAC REHAB | Facility: HOSPITAL | Age: 53
End: 2018-02-28
Attending: INTERNAL MEDICINE
Payer: COMMERCIAL

## 2018-03-02 ENCOUNTER — APPOINTMENT (OUTPATIENT)
Dept: CARDIAC REHAB | Facility: HOSPITAL | Age: 53
End: 2018-03-02
Attending: INTERNAL MEDICINE
Payer: COMMERCIAL

## 2018-03-05 ENCOUNTER — APPOINTMENT (OUTPATIENT)
Dept: CARDIAC REHAB | Facility: HOSPITAL | Age: 53
End: 2018-03-05
Attending: INTERNAL MEDICINE
Payer: COMMERCIAL

## 2018-03-07 ENCOUNTER — PRIOR ORIGINAL RECORDS (OUTPATIENT)
Dept: OTHER | Age: 53
End: 2018-03-07

## 2018-03-09 ENCOUNTER — PRIOR ORIGINAL RECORDS (OUTPATIENT)
Dept: OTHER | Age: 53
End: 2018-03-09

## 2018-03-13 LAB
BUN: 16 MG/DL
CALCIUM: 9 MG/DL
CHLORIDE: 98 MEQ/L
CREATININE, SERUM: 0.8 MG/DL
GLUCOSE: 323 MG/DL
POTASSIUM, SERUM: 4.5 MEQ/L
SGOT (AST): 19 IU/L
SGPT (ALT): 25 IU/L
SODIUM: 135 MEQ/L

## 2018-03-20 ENCOUNTER — OFFICE VISIT (OUTPATIENT)
Dept: FAMILY MEDICINE CLINIC | Facility: CLINIC | Age: 53
End: 2018-03-20

## 2018-03-20 VITALS
SYSTOLIC BLOOD PRESSURE: 110 MMHG | TEMPERATURE: 98 F | HEIGHT: 68 IN | BODY MASS INDEX: 38.57 KG/M2 | DIASTOLIC BLOOD PRESSURE: 70 MMHG | RESPIRATION RATE: 16 BRPM | WEIGHT: 254.5 LBS | HEART RATE: 80 BPM

## 2018-03-20 DIAGNOSIS — G43.709 CHRONIC MIGRAINE WITHOUT AURA WITHOUT STATUS MIGRAINOSUS, NOT INTRACTABLE: ICD-10-CM

## 2018-03-20 DIAGNOSIS — I50.20 SYSTOLIC HEART FAILURE, UNSPECIFIED HF CHRONICITY (HCC): ICD-10-CM

## 2018-03-20 DIAGNOSIS — J45.40 MODERATE PERSISTENT ASTHMA WITHOUT COMPLICATION: ICD-10-CM

## 2018-03-20 DIAGNOSIS — F41.9 ANXIETY: ICD-10-CM

## 2018-03-20 DIAGNOSIS — E11.9 TYPE 2 DIABETES MELLITUS WITHOUT COMPLICATION, WITH LONG-TERM CURRENT USE OF INSULIN (HCC): Primary | ICD-10-CM

## 2018-03-20 DIAGNOSIS — F33.1 MODERATE EPISODE OF RECURRENT MAJOR DEPRESSIVE DISORDER (HCC): ICD-10-CM

## 2018-03-20 DIAGNOSIS — K21.9 GASTROESOPHAGEAL REFLUX DISEASE, ESOPHAGITIS PRESENCE NOT SPECIFIED: ICD-10-CM

## 2018-03-20 DIAGNOSIS — B37.3 VAGINAL YEAST INFECTION: ICD-10-CM

## 2018-03-20 DIAGNOSIS — I21.4 NSTEMI (NON-ST ELEVATED MYOCARDIAL INFARCTION) (HCC): ICD-10-CM

## 2018-03-20 DIAGNOSIS — Z79.4 TYPE 2 DIABETES MELLITUS WITHOUT COMPLICATION, WITH LONG-TERM CURRENT USE OF INSULIN (HCC): Primary | ICD-10-CM

## 2018-03-20 PROBLEM — J45.901 ASTHMA EXACERBATION: Status: RESOLVED | Noted: 2017-09-29 | Resolved: 2018-03-20

## 2018-03-20 PROBLEM — R09.02 HYPOXIA: Status: RESOLVED | Noted: 2017-09-29 | Resolved: 2018-03-20

## 2018-03-20 PROBLEM — R07.9 ACUTE CHEST PAIN: Status: RESOLVED | Noted: 2017-12-27 | Resolved: 2018-03-20

## 2018-03-20 PROBLEM — R73.9 HYPERGLYCEMIA: Status: RESOLVED | Noted: 2017-09-29 | Resolved: 2018-03-20

## 2018-03-20 PROBLEM — R79.89 ELEVATED TROPONIN: Status: RESOLVED | Noted: 2017-11-02 | Resolved: 2018-03-20

## 2018-03-20 PROBLEM — J45.901 ASTHMA EXACERBATION (HCC): Status: RESOLVED | Noted: 2017-09-29 | Resolved: 2018-03-20

## 2018-03-20 PROBLEM — E87.2 METABOLIC ACIDOSIS: Status: RESOLVED | Noted: 2017-09-29 | Resolved: 2018-03-20

## 2018-03-20 PROBLEM — K22.70 BARRETT ESOPHAGUS: Status: ACTIVE | Noted: 2018-03-20

## 2018-03-20 PROBLEM — K92.0 HEMATEMESIS WITH NAUSEA: Status: RESOLVED | Noted: 2017-11-02 | Resolved: 2018-03-20

## 2018-03-20 PROBLEM — R06.00 DYSPNEA, UNSPECIFIED TYPE: Status: RESOLVED | Noted: 2017-09-29 | Resolved: 2018-03-20

## 2018-03-20 PROBLEM — E87.20 METABOLIC ACIDOSIS: Status: RESOLVED | Noted: 2017-09-29 | Resolved: 2018-03-20

## 2018-03-20 PROBLEM — R77.8 ELEVATED TROPONIN: Status: RESOLVED | Noted: 2017-11-02 | Resolved: 2018-03-20

## 2018-03-20 LAB
CARTRIDGE LOT#: 799 NUMERIC
HEMOGLOBIN A1C: 9.5 % (ref 4.3–5.6)

## 2018-03-20 PROCEDURE — 99215 OFFICE O/P EST HI 40 MIN: CPT | Performed by: FAMILY MEDICINE

## 2018-03-20 PROCEDURE — 83036 HEMOGLOBIN GLYCOSYLATED A1C: CPT | Performed by: FAMILY MEDICINE

## 2018-03-20 RX ORDER — ALPRAZOLAM 0.5 MG/1
0.5 TABLET ORAL 2 TIMES DAILY PRN
Qty: 45 TABLET | Refills: 2 | Status: SHIPPED | OUTPATIENT
Start: 2018-03-20 | End: 2018-06-01

## 2018-03-20 RX ORDER — SITAGLIPTIN 100 MG/1
1 TABLET, FILM COATED ORAL DAILY
COMMUNITY
Start: 2018-03-08 | End: 2018-04-30

## 2018-03-20 RX ORDER — FLUCONAZOLE 150 MG/1
150 TABLET ORAL ONCE
Qty: 2 TABLET | Refills: 0 | Status: SHIPPED | OUTPATIENT
Start: 2018-03-20 | End: 2019-01-03

## 2018-03-20 RX ORDER — PANTOPRAZOLE SODIUM 40 MG/1
40 TABLET, DELAYED RELEASE ORAL
Qty: 180 TABLET | Refills: 1 | Status: SHIPPED | OUTPATIENT
Start: 2018-03-20 | End: 2018-09-16

## 2018-03-20 RX ORDER — ESCITALOPRAM OXALATE 20 MG/1
20 TABLET ORAL EVERY MORNING
Qty: 90 TABLET | Refills: 1 | Status: SHIPPED | OUTPATIENT
Start: 2018-03-20 | End: 2018-05-15

## 2018-03-20 RX ORDER — CARVEDILOL 6.25 MG/1
6.25 TABLET ORAL 2 TIMES DAILY WITH MEALS
COMMUNITY
End: 2018-10-23

## 2018-03-20 RX ORDER — BUTALBITAL, ACETAMINOPHEN AND CAFFEINE 50; 325; 40 MG/1; MG/1; MG/1
1 TABLET ORAL EVERY 4 HOURS PRN
Qty: 30 TABLET | Refills: 1 | Status: SHIPPED | OUTPATIENT
Start: 2018-03-20 | End: 2018-11-04

## 2018-03-20 RX ORDER — BUTALBITAL, ACETAMINOPHEN AND CAFFEINE 50; 325; 40 MG/1; MG/1; MG/1
1 TABLET ORAL EVERY 4 HOURS PRN
COMMUNITY
End: 2018-03-20

## 2018-03-20 NOTE — PATIENT INSTRUCTIONS
Diabetes  Increase tresiba from 40 units to 50 units daily  Follow-up at diabetes clinic  Diflucan for vaginal yeast infection

## 2018-03-20 NOTE — PROGRESS NOTES
HPI:   Magdi Boudreaux is a 50year old female that presents for follow up. Patient has history of poorly controlled diabetes. A1c 8.1-->9.5 today. Fasting sugars in 200s at home. Currently on tresiba 40 units and januvia.   Per patient did not tole chronic sinusitis     Moderate episode of recurrent major depressive disorder (HCC)     Anxiety     Class 3 obesity with body mass index (BMI) of 40.0 to 44.9 in adult [facility])     History of recurrent deep vein thrombosis (DVT)     Patella-femoral syndrome, mg by mouth every morning.  , Disp: , Rfl:   •  valsartan 80 MG Oral Tab, Take 80 mg by mouth 2 (two) times daily. , Disp: , Rfl:   •  TRESIBA FLEXTOUCH 200 UNIT/ML Subcutaneous Solution Pen-injector, Inject 40 Units into the vein nightly.  (Patient taking d Comprehensive ROS negative unless noted in HPI    PHYSICAL EXAM:   /70   Pulse 80   Temp 98.2 °F (36.8 °C) (Oral)   Resp 16   Ht 68\"   Wt 254 lb 8 oz   LMP 09/23/2004   BMI 38.70 kg/m²  Estimated body mass index is 38.7 kg/m² as calculated from tablet; Refill: 0    3. Moderate episode of recurrent major depressive disorder (HCC)  - continue escitalopram 20 mg qd, xanax BID PRN  - consider increasing SSRI if xanax BID consistently     4. Anxiety  - ALPRAZolam 0.5 MG Oral Tab;  Take 1 tablet (0.5 mg current treatment plan discussed in detail. Questions and concerns addressed. Red flags to RTC or ED reviewed. Patient (or parent) agrees to plan.       Return in about 3 months (around 6/20/2018) for annual exam.    Patrick Rose,   3/20/2018  9:19 A

## 2018-03-23 ENCOUNTER — PRIOR ORIGINAL RECORDS (OUTPATIENT)
Dept: OTHER | Age: 53
End: 2018-03-23

## 2018-03-28 ENCOUNTER — LAB ENCOUNTER (OUTPATIENT)
Dept: LAB | Age: 53
End: 2018-03-28
Attending: INTERNAL MEDICINE
Payer: COMMERCIAL

## 2018-03-28 DIAGNOSIS — J45.41 MODERATE PERSISTENT ASTHMA WITH ACUTE EXACERBATION: ICD-10-CM

## 2018-03-28 LAB
ADENOVIRUS PCR:: NEGATIVE
B PERT DNA SPEC QL NAA+PROBE: NEGATIVE
C PNEUM DNA SPEC QL NAA+PROBE: NEGATIVE
CORONAVIRUS 229E PCR:: NEGATIVE
CORONAVIRUS HKU1 PCR:: NEGATIVE
CORONAVIRUS NL63 PCR:: NEGATIVE
CORONAVIRUS OC43 PCR:: NEGATIVE
FLUAV RNA SPEC QL NAA+PROBE: NEGATIVE
FLUBV RNA SPEC QL NAA+PROBE: NEGATIVE
METAPNEUMOVIRUS PCR:: NEGATIVE
MYCOPLASMA PNEUMONIA PCR:: NEGATIVE
PARAINFLUENZA 1 PCR:: NEGATIVE
PARAINFLUENZA 2 PCR:: NEGATIVE
PARAINFLUENZA 3 PCR:: NEGATIVE
PARAINFLUENZA 4 PCR:: NEGATIVE
RHINOVIRUS/ENTERO PCR:: NEGATIVE
RSV RNA SPEC QL NAA+PROBE: NEGATIVE

## 2018-03-28 PROCEDURE — 87486 CHLMYD PNEUM DNA AMP PROBE: CPT

## 2018-03-28 PROCEDURE — 87633 RESP VIRUS 12-25 TARGETS: CPT

## 2018-03-28 PROCEDURE — 87798 DETECT AGENT NOS DNA AMP: CPT

## 2018-03-28 PROCEDURE — 87999 UNLISTED MICROBIOLOGY PX: CPT

## 2018-03-28 PROCEDURE — 87581 M.PNEUMON DNA AMP PROBE: CPT

## 2018-04-09 ENCOUNTER — APPOINTMENT (OUTPATIENT)
Dept: CT IMAGING | Age: 53
DRG: 287 | End: 2018-04-09
Attending: EMERGENCY MEDICINE
Payer: COMMERCIAL

## 2018-04-09 ENCOUNTER — HOSPITAL ENCOUNTER (INPATIENT)
Facility: HOSPITAL | Age: 53
LOS: 3 days | Discharge: HOME HEALTH | DRG: 287 | End: 2018-04-12
Attending: EMERGENCY MEDICINE | Admitting: HOSPITALIST
Payer: COMMERCIAL

## 2018-04-09 ENCOUNTER — APPOINTMENT (OUTPATIENT)
Dept: GENERAL RADIOLOGY | Age: 53
DRG: 287 | End: 2018-04-09
Attending: EMERGENCY MEDICINE
Payer: COMMERCIAL

## 2018-04-09 DIAGNOSIS — R77.8 ELEVATED TROPONIN: ICD-10-CM

## 2018-04-09 DIAGNOSIS — E11.9 TYPE 2 DIABETES MELLITUS WITHOUT COMPLICATION, WITH LONG-TERM CURRENT USE OF INSULIN (HCC): ICD-10-CM

## 2018-04-09 DIAGNOSIS — R51.9 HEADACHE IN FRONT OF HEAD: ICD-10-CM

## 2018-04-09 DIAGNOSIS — Z79.4 TYPE 2 DIABETES MELLITUS WITHOUT COMPLICATION, WITH LONG-TERM CURRENT USE OF INSULIN (HCC): ICD-10-CM

## 2018-04-09 DIAGNOSIS — R07.9 ACUTE CHEST PAIN: Primary | ICD-10-CM

## 2018-04-09 PROBLEM — R79.89 ELEVATED TROPONIN: Status: ACTIVE | Noted: 2018-04-09

## 2018-04-09 PROCEDURE — 99223 1ST HOSP IP/OBS HIGH 75: CPT | Performed by: INTERNAL MEDICINE

## 2018-04-09 PROCEDURE — 71045 X-RAY EXAM CHEST 1 VIEW: CPT | Performed by: EMERGENCY MEDICINE

## 2018-04-09 PROCEDURE — 70450 CT HEAD/BRAIN W/O DYE: CPT | Performed by: EMERGENCY MEDICINE

## 2018-04-09 RX ORDER — CETIRIZINE HYDROCHLORIDE 10 MG/1
10 TABLET ORAL DAILY
Status: DISCONTINUED | OUTPATIENT
Start: 2018-04-10 | End: 2018-04-12

## 2018-04-09 RX ORDER — FAMOTIDINE 20 MG/1
20 TABLET ORAL 2 TIMES DAILY
Status: DISCONTINUED | OUTPATIENT
Start: 2018-04-09 | End: 2018-04-12

## 2018-04-09 RX ORDER — HEPARIN SODIUM AND DEXTROSE 10000; 5 [USP'U]/100ML; G/100ML
1000 INJECTION INTRAVENOUS ONCE
Status: COMPLETED | OUTPATIENT
Start: 2018-04-09 | End: 2018-04-10

## 2018-04-09 RX ORDER — ALBUTEROL SULFATE 2.5 MG/3ML
2.5 SOLUTION RESPIRATORY (INHALATION) EVERY 4 HOURS PRN
Status: DISCONTINUED | OUTPATIENT
Start: 2018-04-09 | End: 2018-04-12

## 2018-04-09 RX ORDER — HEPARIN SODIUM AND DEXTROSE 10000; 5 [USP'U]/100ML; G/100ML
INJECTION INTRAVENOUS CONTINUOUS
Status: DISCONTINUED | OUTPATIENT
Start: 2018-04-10 | End: 2018-04-10

## 2018-04-09 RX ORDER — SODIUM CHLORIDE 9 MG/ML
125 INJECTION, SOLUTION INTRAVENOUS CONTINUOUS
Status: DISCONTINUED | OUTPATIENT
Start: 2018-04-09 | End: 2018-04-09

## 2018-04-09 RX ORDER — ECHINACEA PURPUREA EXTRACT 125 MG
1 TABLET ORAL
Status: DISCONTINUED | OUTPATIENT
Start: 2018-04-09 | End: 2018-04-12

## 2018-04-09 RX ORDER — ASPIRIN 325 MG
325 TABLET ORAL DAILY
Status: DISCONTINUED | OUTPATIENT
Start: 2018-04-10 | End: 2018-04-10

## 2018-04-09 RX ORDER — ONDANSETRON 2 MG/ML
4 INJECTION INTRAMUSCULAR; INTRAVENOUS EVERY 6 HOURS PRN
Status: DISCONTINUED | OUTPATIENT
Start: 2018-04-09 | End: 2018-04-12

## 2018-04-09 RX ORDER — POTASSIUM CHLORIDE 750 MG/1
10 TABLET, EXTENDED RELEASE ORAL DAILY
Status: DISCONTINUED | OUTPATIENT
Start: 2018-04-09 | End: 2018-04-12

## 2018-04-09 RX ORDER — ALBUTEROL SULFATE 90 UG/1
2 AEROSOL, METERED RESPIRATORY (INHALATION) EVERY 6 HOURS PRN
Status: DISCONTINUED | OUTPATIENT
Start: 2018-04-09 | End: 2018-04-12

## 2018-04-09 RX ORDER — ESCITALOPRAM OXALATE 20 MG/1
20 TABLET ORAL EVERY MORNING
Status: DISCONTINUED | OUTPATIENT
Start: 2018-04-10 | End: 2018-04-12

## 2018-04-09 RX ORDER — RANITIDINE 150 MG/1
150 TABLET ORAL 2 TIMES DAILY
COMMUNITY
End: 2020-05-19

## 2018-04-09 RX ORDER — ASPIRIN 81 MG/1
324 TABLET, CHEWABLE ORAL ONCE
Status: COMPLETED | OUTPATIENT
Start: 2018-04-09 | End: 2018-04-09

## 2018-04-09 RX ORDER — HEPARIN SODIUM 5000 [USP'U]/ML
5000 INJECTION INTRAVENOUS; SUBCUTANEOUS ONCE
Status: COMPLETED | OUTPATIENT
Start: 2018-04-09 | End: 2018-04-09

## 2018-04-09 RX ORDER — METOCLOPRAMIDE HYDROCHLORIDE 5 MG/ML
5 INJECTION INTRAMUSCULAR; INTRAVENOUS ONCE
Status: COMPLETED | OUTPATIENT
Start: 2018-04-09 | End: 2018-04-09

## 2018-04-09 RX ORDER — MONTELUKAST SODIUM 10 MG/1
10 TABLET ORAL NIGHTLY
Status: DISCONTINUED | OUTPATIENT
Start: 2018-04-09 | End: 2018-04-12

## 2018-04-09 RX ORDER — TORSEMIDE 20 MG/1
20 TABLET ORAL EVERY MORNING
Status: DISCONTINUED | OUTPATIENT
Start: 2018-04-10 | End: 2018-04-12

## 2018-04-09 RX ORDER — EZETIMIBE 10 MG/1
10 TABLET ORAL NIGHTLY
Status: DISCONTINUED | OUTPATIENT
Start: 2018-04-09 | End: 2018-04-12

## 2018-04-09 RX ORDER — ROSUVASTATIN CALCIUM 5 MG/1
5 TABLET, COATED ORAL NIGHTLY
Status: DISCONTINUED | OUTPATIENT
Start: 2018-04-09 | End: 2018-04-12

## 2018-04-09 RX ORDER — FLUTICASONE PROPIONATE 50 MCG
2 SPRAY, SUSPENSION (ML) NASAL 2 TIMES DAILY
Status: DISCONTINUED | OUTPATIENT
Start: 2018-04-09 | End: 2018-04-12

## 2018-04-09 RX ORDER — LOSARTAN POTASSIUM 50 MG/1
50 TABLET ORAL DAILY
Status: DISCONTINUED | OUTPATIENT
Start: 2018-04-09 | End: 2018-04-12

## 2018-04-09 RX ORDER — AZITHROMYCIN 250 MG/1
500 TABLET, FILM COATED ORAL
Status: DISCONTINUED | OUTPATIENT
Start: 2018-04-10 | End: 2018-04-10

## 2018-04-09 RX ORDER — DIPHENHYDRAMINE HYDROCHLORIDE 50 MG/ML
50 INJECTION INTRAMUSCULAR; INTRAVENOUS ONCE
Status: COMPLETED | OUTPATIENT
Start: 2018-04-09 | End: 2018-04-09

## 2018-04-09 RX ORDER — PANTOPRAZOLE SODIUM 40 MG/1
40 TABLET, DELAYED RELEASE ORAL
Status: DISCONTINUED | OUTPATIENT
Start: 2018-04-10 | End: 2018-04-12

## 2018-04-09 RX ORDER — ZONISAMIDE 100 MG/1
200 CAPSULE ORAL NIGHTLY
Status: DISCONTINUED | OUTPATIENT
Start: 2018-04-09 | End: 2018-04-12

## 2018-04-09 RX ORDER — DEXTROSE MONOHYDRATE 25 G/50ML
50 INJECTION, SOLUTION INTRAVENOUS
Status: DISCONTINUED | OUTPATIENT
Start: 2018-04-09 | End: 2018-04-12

## 2018-04-09 RX ORDER — CARVEDILOL 6.25 MG/1
6.25 TABLET ORAL 2 TIMES DAILY WITH MEALS
Status: DISCONTINUED | OUTPATIENT
Start: 2018-04-10 | End: 2018-04-12

## 2018-04-09 RX ORDER — NITROGLYCERIN 0.4 MG/1
0.4 TABLET SUBLINGUAL EVERY 5 MIN PRN
Status: DISCONTINUED | OUTPATIENT
Start: 2018-04-09 | End: 2018-04-12

## 2018-04-09 RX ORDER — ALPRAZOLAM 0.5 MG/1
0.5 TABLET ORAL 2 TIMES DAILY PRN
Status: DISCONTINUED | OUTPATIENT
Start: 2018-04-09 | End: 2018-04-12

## 2018-04-09 NOTE — IMAGING NOTE
LOWER EXTREMITY ARTERIAL DUPLEX ULTRASOUND    Patient: Tabatha Myers          :                  ACCOUNT: [id number]  Patient Phone: [phone]              Date: 2018   Ordering Physician: Rolf Sellers M.D.         PCP:  Dr. Rox Castro

## 2018-04-09 NOTE — ED PROVIDER NOTES
Patient Seen in: [facility] In [place]    History   Patient presents with:  Chest Pain Angina (cardiovascular)  Nausea/Vomiting/Diarrhea (gastrointestinal)  Dyspnea DEN SOB (respiratory)    Stated Complaint: CHEST PAIN SOB VISION ZAPATA crampy upper abdominal and lower chest discomforts that have been constant and persistent since yesterday. Patient reports that she is persistently nauseous. She had vomiting last night without coffee ground or bloody emesis.   She has not had much to eat HISTORY      Comment: sinus surgery  No date: OTHER SURGICAL HISTORY      Comment: CYST REMOVED ON LEFT WRIST 17 YRS AGO  No date: SINUS SURGERY    No date: UPPER GI ENDOSCOPY,EXAM        Smoking status: Never Smoker (14) - Abnormal; Notable for the following:        Result Value    Glucose 224 (*)     Alkaline Phosphatase 144 (*)     All other components within normal limits   TROPONIN I - Abnormal; Notable for the following:     Troponin 2.600 (*)     All other compo Xarelto and intracranial bleed must be excluded although this this would seem unlikely. Patient's chest pain has been constant since yesterday, bilateral, and crampy across the lower chest bilaterally. This is atypical of acute coronary syndrome.   Finn Jordan encounter diagnosis)  Elevated troponin  Headache in front of head    Disposition:  Admit  4/9/2018  7:43 pm    Follow-up:  No follow-up provider specified.       Medications Prescribed:  Current Discharge Medication List        Present on Admission  Date R

## 2018-04-09 NOTE — ED INITIAL ASSESSMENT (HPI)
Pt states she is having vertigo like symptoms with nausea and vomiting as well as ear pain. Symptoms have been getting worse for days and today she decided to come in for evaluation.

## 2018-04-09 NOTE — HISTORICAL OFFICE NOTE
Julia Jaclyn  : 1965  ACCOUNT:  [id number]  [phone]  PCP:  dr Terrie beltran  pulmonologist: dr Octavia Johnston  TODAY'S DATE: 02/15/2018  DICTATED BY:  [Dr. Tiffany Croft      CHIEF COMPLAINT: [Followup of Cardiomyopathy, ischemic, Followup o OF SYSTEMS:    CONS: fatigue. EYES: denies significant visual changes. ENMT: denies difficulties with hearing, otherwise negative. CV: Denies chest pain, dizziness, palpitations. RESP: dyspnea on exertion. GI: denies melena, hematochezia. : no hematuria. is wheezing today on physical exam but is actually moving a reasonable amount of air with breathing.   While she does not have edema I thought that given her LV dysfunction we should give her a trial of torsemide to see if with losing some water weight may daily            11/14/17 *Atorvastatin Calcium 80MG      1 TABLET AT BEDTIME.                     11/14/17 *Brilinta             90MG      1 TABLET TWICE DAILY. 11/14/17 *Carvedilol           12. 5MG    1 TABLET TWICE DAILY AS DIRECTED.

## 2018-04-10 ENCOUNTER — APPOINTMENT (OUTPATIENT)
Dept: INTERVENTIONAL RADIOLOGY/VASCULAR | Facility: HOSPITAL | Age: 53
DRG: 287 | End: 2018-04-10
Attending: INTERNAL MEDICINE
Payer: COMMERCIAL

## 2018-04-10 ENCOUNTER — PRIOR ORIGINAL RECORDS (OUTPATIENT)
Dept: OTHER | Age: 53
End: 2018-04-10

## 2018-04-10 PROCEDURE — 99221 1ST HOSP IP/OBS SF/LOW 40: CPT | Performed by: CLINICAL NURSE SPECIALIST

## 2018-04-10 PROCEDURE — B2111ZZ FLUOROSCOPY OF MULTIPLE CORONARY ARTERIES USING LOW OSMOLAR CONTRAST: ICD-10-PCS | Performed by: INTERNAL MEDICINE

## 2018-04-10 PROCEDURE — 99233 SBSQ HOSP IP/OBS HIGH 50: CPT | Performed by: STUDENT IN AN ORGANIZED HEALTH CARE EDUCATION/TRAINING PROGRAM

## 2018-04-10 PROCEDURE — 4A023N7 MEASUREMENT OF CARDIAC SAMPLING AND PRESSURE, LEFT HEART, PERCUTANEOUS APPROACH: ICD-10-PCS | Performed by: INTERNAL MEDICINE

## 2018-04-10 RX ORDER — VERAPAMIL HYDROCHLORIDE 2.5 MG/ML
INJECTION, SOLUTION INTRAVENOUS
Status: COMPLETED
Start: 2018-04-10 | End: 2018-04-10

## 2018-04-10 RX ORDER — BUTALBITAL, ACETAMINOPHEN AND CAFFEINE 50; 325; 40 MG/1; MG/1; MG/1
2 TABLET ORAL EVERY 6 HOURS PRN
Status: DISCONTINUED | OUTPATIENT
Start: 2018-04-10 | End: 2018-04-12

## 2018-04-10 RX ORDER — MIDAZOLAM HYDROCHLORIDE 1 MG/ML
INJECTION INTRAMUSCULAR; INTRAVENOUS
Status: COMPLETED
Start: 2018-04-10 | End: 2018-04-10

## 2018-04-10 RX ORDER — POTASSIUM CHLORIDE 20 MEQ/1
40 TABLET, EXTENDED RELEASE ORAL EVERY 4 HOURS
Status: COMPLETED | OUTPATIENT
Start: 2018-04-10 | End: 2018-04-10

## 2018-04-10 RX ORDER — BUTALBITAL, ACETAMINOPHEN AND CAFFEINE 50; 325; 40 MG/1; MG/1; MG/1
1-2 TABLET ORAL EVERY 6 HOURS PRN
Status: DISCONTINUED | OUTPATIENT
Start: 2018-04-10 | End: 2018-04-10 | Stop reason: SDUPTHER

## 2018-04-10 RX ORDER — ACETAMINOPHEN 500 MG
1000 TABLET ORAL EVERY 6 HOURS PRN
Status: DISCONTINUED | OUTPATIENT
Start: 2018-04-10 | End: 2018-04-12

## 2018-04-10 RX ORDER — LIDOCAINE HYDROCHLORIDE 10 MG/ML
INJECTION, SOLUTION INFILTRATION; PERINEURAL
Status: COMPLETED
Start: 2018-04-10 | End: 2018-04-10

## 2018-04-10 RX ORDER — DIPHENHYDRAMINE HYDROCHLORIDE 50 MG/ML
12.5 INJECTION INTRAMUSCULAR; INTRAVENOUS EVERY 4 HOURS PRN
Status: DISCONTINUED | OUTPATIENT
Start: 2018-04-10 | End: 2018-04-12

## 2018-04-10 RX ORDER — BUTALBITAL, ACETAMINOPHEN AND CAFFEINE 50; 325; 40 MG/1; MG/1; MG/1
1 TABLET ORAL EVERY 6 HOURS PRN
Status: DISCONTINUED | OUTPATIENT
Start: 2018-04-10 | End: 2018-04-12

## 2018-04-10 RX ORDER — SODIUM CHLORIDE 9 MG/ML
INJECTION, SOLUTION INTRAVENOUS CONTINUOUS
Status: ACTIVE | OUTPATIENT
Start: 2018-04-10 | End: 2018-04-10

## 2018-04-10 RX ORDER — DIPHENHYDRAMINE HCL 25 MG
25 CAPSULE ORAL EVERY 4 HOURS PRN
Status: DISCONTINUED | OUTPATIENT
Start: 2018-04-10 | End: 2018-04-12

## 2018-04-10 RX ORDER — HEPARIN SODIUM 5000 [USP'U]/ML
INJECTION, SOLUTION INTRAVENOUS; SUBCUTANEOUS
Status: COMPLETED
Start: 2018-04-10 | End: 2018-04-10

## 2018-04-10 RX ORDER — HEPARIN SODIUM 5000 [USP'U]/ML
3000 INJECTION INTRAVENOUS; SUBCUTANEOUS ONCE
Status: COMPLETED | OUTPATIENT
Start: 2018-04-10 | End: 2018-04-10

## 2018-04-10 RX ORDER — KETOROLAC TROMETHAMINE 30 MG/ML
30 INJECTION, SOLUTION INTRAMUSCULAR; INTRAVENOUS EVERY 6 HOURS PRN
Status: DISCONTINUED | OUTPATIENT
Start: 2018-04-10 | End: 2018-04-10

## 2018-04-10 RX ORDER — MAGNESIUM OXIDE 400 MG (241.3 MG MAGNESIUM) TABLET
400 TABLET ONCE
Status: COMPLETED | OUTPATIENT
Start: 2018-04-10 | End: 2018-04-10

## 2018-04-10 NOTE — CONSULTS
[facility]  Diabetes Clinical Nurse Specialist Consult Note    Aravind Faina Patient Status:  Inpatient    3/25/1965 MRN [id number]   [facility] 2NE-A Attending Elda Kevin MD   [facility] Day # 1 PCP Royal Yi DO     Reason She has a complicated medical history, and after the MI in November, she has had 3 rounds of prednisone for asthma.  She states that her cardiologist, Dr. Rad Gray, said her cardiac meds may be triggering some of the pulmonary issues, and he is working w PCP  · [address], MSN, APN, ACNS-BC, BC-ADM  Clinical Nurse Specialist  Diabetes Educator  4/10/2018  2:09 PM

## 2018-04-10 NOTE — PLAN OF CARE
Pt a/ox4. PRN Tylenol and IV Benedryl given for sinus congestion and headache. RA. Lungs have expiratory wheezes. NSR. Xarelto on hold for angiogram today. NPO except with water and medication. IV Heparin per A-fib protocol.  Gtt increased to 1400 units/hr

## 2018-04-10 NOTE — CONSULTS
[facility]  Report of Consultation    Leigh Ann Oas Patient Status:  Inpatient    3/25/1965 MRN [id number]   [facility] 2NE-A Attending En Thomas MD   Hosp Day # 1 PCP Fatoumata Woodard DO     Reason for Consultation:  Cp Allergic rhinitis, cause unspecified 5/5/2008   • Anemia    • Anxiety and depression    • Laguerre's esophagus    • Colloid cyst of brain [facility])    • Coronary artery disease of native heart with stable angina pectoris ([facility]    • Diabetes mellitus ([facility]    • she has never smoked. She has never used smokeless tobacco. She reports that she does not drink alcohol or use drugs.     Allergies:    Peanuts [Peanut Oil]    Hives    Comment:Hives, asthma attack  Pcn [Penicillins]       Unknown    Comment:HAPPENED AS A C Losartan Potassium (COZAAR) tab 50 mg, 50 mg, Oral, Daily  •  carvedilol (COREG) tab 6.25 mg, 6.25 mg, Oral, BID with meals  •  ALPRAZolam (XANAX) tab 0.5 mg, 0.5 mg, Oral, BID PRN  •  Pantoprazole Sodium (PROTONIX) EC tab 40 mg, 40 mg, Oral, BID AC  •  es rhythm, normal S1S2, no murmur. Abdomen: soft, non-tender, non-distended, no masses, no guarding, no     rebound, positive BS. Extremity: no edema, no cyanosis   Skin: No rashes or lesions.    Neurological: Alert, interactive, no focal deficit    Vital 177*   CA 9.1  --   --  8.6  --   --    ALB 3.9  --   --   --   --   --    < > = values in this interval not displayed.     @MG@      Lab Results  Component Value Date   PT 18.3 (H) 03/05/2014   PT 16.5 (H) 03/04/2014   PT 15.4 (H) 03/03/2014   INR 0.98 11/

## 2018-04-10 NOTE — CONSULTS
[facility]  Report of Consultation    Robbie Mooney Patient Status:  Inpatient    3/25/1965 MRN [id number]   [facility] 2NE-A Attending Janna Coyle MD   Hosp Day # 0 PCP Yeison Zambrano DO     Reason for Consultation:  Thelma Verduzco elevated myocardial infarction) ([facility] 2017    FELTON in LAD   • Obesity    • Osteoarthritis    • Patella-femoral syndrome, bilateral knee 1/15/2015     Past Surgical History:  No date: BREAST SURGERY PROCEDURE UNLISTED      Comment: cyst removed  No date: CAT described above in HPI.   Asthma, history of pulmonary emboli, chronic treatment with Xarelto, diabetes, thrombotic proximal LAD unstable coronary syndrome in November    Physical Exam:  Blood pressure 120/71, pulse 78, temperature 98.2 °F (36.8 °C), temper in adult [facility])     History of recurrent deep vein thrombosis (DVT)     Patella-femoral syndrome, bilateral knee     History of pulmonary embolus (PE)     Type 2 diabetes mellitus without complication, with long-term current use of insulin (HCC)     RBC andrés

## 2018-04-10 NOTE — PROGRESS NOTES
[facility]  Progress Note    Loc Vasquez Patient Status:  Inpatient    3/25/1965 MRN [id number]   [facility] Cara [facility]   [facility] Day # 1 PCP Ellis Plata DO       Assessment:    · CAD wi • Montelukast Sodium  10 mg Oral Nightly   • Fluticasone Propionate  2 spray Each Nare BID   • Fluticasone Furoate-Vilanterol  1 puff Inhalation Daily   • zonisamide  200 mg Oral Nightly   • ticagrelor  90 mg Oral BID   • Rosuvastatin Calcium  5 mg Oral

## 2018-04-10 NOTE — PROGRESS NOTES
[facility]  Progress Note     Parrish Boland Patient Status:  Inpatient    3/25/1965 MRN [id number]   [facility] 2NE-A Attending Miguel Todd MD   Hosp Day # 1 PCP Chery Lr DO     Chief Complaint: headache     S: Soumya Fluticasone Furoate-Vilanterol  1 puff Inhalation Daily   • zonisamide  200 mg Oral Nightly   • ticagrelor  90 mg Oral BID   • Rosuvastatin Calcium  5 mg Oral Nightly   • ezetimibe  10 mg Oral Nightly   • Potassium Chloride ER  10 mEq Oral Daily   • torsem

## 2018-04-10 NOTE — PROCEDURES
[address] [place]    PATIENT'S NAME: Tegan Seda   ATTENDING PHYSICIAN: Praful Valerio.  Deonte Scott MD   OPERATING PHYSICIAN: Krista Stevens MD   PATIENT ACCOUNT#:   [id number]    LOCATION:  [address] [place] [address]  MEDICAL RECORD #:   [id number]       DATE OF BIRTH: [birth year]  t: 04/10/2018 17:33:46  Job [id number]  TD/

## 2018-04-10 NOTE — H&P
[facility]                                                               History & Physical         Eather Mode Patient Status:  Inpatient    3/25/1965 MRN [id number]   [facility] 2NE-A Attending Vicki Fontenot, [address] [place] [address] intraepithelial dysplasia) 10/2010    Pap neg 2014   • Migraines     perfumes   • Moderate persistent asthma without complication 3/49/4733   • DESOUZA (nonalcoholic steatohepatitis)    • NSTEMI (non-ST elevated myocardial infarction) ([facility] 2017    FELTON in LAD 6.25 MG Oral Tab Take 6.25 mg by mouth 2 (two) times daily with meals. Disp:  Rfl:  4/9/2018 at 0900   ALPRAZolam 0.5 MG Oral Tab Take 1 tablet (0.5 mg total) by mouth 2 (two) times daily as needed for Sleep.  Disp: 45 tablet Rfl: 2 Past Week at Unknown jose times daily. Disp:  Rfl:  4/9/2018 at 0900   cetirizine (ZYRTEC) 10 MG Oral Tab Take 10 mg by mouth daily. Disp:  Rfl:  4/9/2018 at 0900   Montelukast Sodium (SINGULAIR) 10 MG Oral Tab Take 10 mg by mouth nightly.  Disp:  Rfl:  4/8/2018 at 2100   Fluticason 0.89 04/09/2018   BUN 10 04/09/2018    04/09/2018   K 3.7 04/09/2018    04/09/2018   CO2 26.0 04/09/2018    04/09/2018   CA 9.1 04/09/2018   ALB 3.9 04/09/2018   ALKPHO 144 04/09/2018   BILT 0.4 04/09/2018   TP 8.1 04/09/2018   AST 29 04 insulin Levemir , NovoLog sliding scale with correction factor and carb counting. Hold home oral hypoglycemic Januvia. Follow Accu-Cheks closely  8.  History of pulmonary embolus-patient was on Xarelto at home for this which is currently on hold for cardi

## 2018-04-10 NOTE — HOME CARE LIAISON
Patient identified in discharge rounds as benefit from home health. I met with her, offered choice and she is agreeable to [facility]. She is on disability. We discussed home bound status. Appears overwhelmed.   Shared with me she is to hav

## 2018-04-10 NOTE — CM/SW NOTE
Interdisciplinary Rounds: 04/10/18  Admitted: 4/9/2018 LOS: 1  Disciplines in attendance: charge nurse, staff nurse, CM, [address] and discharge plan reviewed.     Discharge Plan:  Home w/spouse, will have [facility] liaison assess for hh needs/off

## 2018-04-10 NOTE — PAYOR COMM NOTE
--------------  ADMISSION REVIEW     Payor: Abena Holm #:  [id number]  Authorization Number: [id number]    Admit date: 4/9/18  Admit time: 2118       Admitting Physician: Maria Dolores Gr MD  Attending Physician:  Marie Matthews MD  Primary There was never any rash on the forehead but she does note what seemed like windburn or redness on both of her cheeks around the time that the headache started. She has a neurologist but has not yet had a chance to follow-up with her doctor.   The patient ABENA-LAPAROSCOPIC  2012: COLONOSCOPY      Comment: polyps  2/23/2016: COLONOSCOPY N/A      Comment: Procedure: COLONOSCOPY;  Surgeon: Jermain Myers MD;  Location: [facility] ENDOSCOPY  2-2011: COLPOSCOPY, CERVIX W/UPPER ADJACENT VAGINA; W/* and symmetric. Skin: Unremarkable. No skin change or skin rash in the area patient's headache. Neurologic:  Mental status as above. Cranial nerves as noted above. Patient moves all extremities with good strength.   Gait is normal       ED Course reliable to exclude acute coronary syndrome since her symptoms have been present since yesterday. Pulmonary embolism could also be considered but patient's symptoms are not clearly pleuritic and she is on Xarelto.     Patient treated with IV fluid, Reglan, 2NE-A Attending Kiana Polk MD   Hosp Day # 0 PCP Patrick Rose DO     Chief Complaint: Chest pain, headache    History of Present Illness:  Wilian Mejia is a 48year old female admitted with chest pain, headache.   Patient states her symptoms Stroke Mother    • Alvarado Castellano Sister      stroke,pe   • Alvarado Castellano Brother      kidney stones   • Colon Cancer Maternal Grandfather    • Colon Cancer Maternal Aunt       Reviewed    Social history:   reports that she has never smoked.  Chai Niño UNIT/ML Subcutaneous Solution Pen-injector Inject 40 Units into the vein nightly.  (Patient taking differently: Inject 50 Units into the vein nightly.  ) Disp: 18 mL Rfl: 5 Taking   ticagrelor (BRILINTA) 90 MG Oral Tab Take 90 mg by mouth 2 (two) times lisa %.  General: No acute distress. HEENT: Moist mucous membranes. EOM-I. PERRL; nasal congestion present  Neck: No lymphadenopathy. No JVD. No carotid bruits. Respiratory: Scattered wheezes bilateral  Cardiovascular: S1, S2.  Regular rate and rhythm.   No / PLAN:     1. Chest pain with elevated troponin with underlying CAD patient non ST elevation MI  1. seen by cardiology. 2.  Patient given aspirin. 3.  Cardiology plans cardiac catheterization tomorrow, will hold Xarelto for this.     4. Patient started has felt a little bit more dyspneic. Her troponin is elevated. Of note her troponin had been normal when she was here in December. TROP = 2.600, 2.810    Patient seen and examined.   Very nice 47-year-old female with multiple medical issues including m Units/hr Intravenous Luz Marina Conley RN    4/10/2018 0115 Hi-Risk Rate/Dose Change 1200 Units/hr Intravenous Halina Medina, JHOAN      DiphenhydrAMINE HCl (BENADRYL) injection 50 mg     Date Action Dose Route User    4/9/2018 1632 Given 50 mg Intravenous K 1959 Given 25 mg Oral Krishna Lay RN         Saline Nasal Spray (SALINE MIST) 1 spray     Date Action Dose Route User    4/10/2018 1729 Given 1 spray Each Nare Marcell Dalton RN      0.9%  NaCl infusion     Date Action Dose Route User    4/9/2018 16

## 2018-04-11 ENCOUNTER — APPOINTMENT (OUTPATIENT)
Dept: MRI IMAGING | Facility: HOSPITAL | Age: 53
DRG: 287 | End: 2018-04-11
Attending: INTERNAL MEDICINE
Payer: COMMERCIAL

## 2018-04-11 PROCEDURE — 70544 MR ANGIOGRAPHY HEAD W/O DYE: CPT | Performed by: INTERNAL MEDICINE

## 2018-04-11 PROCEDURE — 99232 SBSQ HOSP IP/OBS MODERATE 35: CPT | Performed by: INTERNAL MEDICINE

## 2018-04-11 RX ORDER — POTASSIUM CHLORIDE 20 MEQ/1
40 TABLET, EXTENDED RELEASE ORAL ONCE
Status: COMPLETED | OUTPATIENT
Start: 2018-04-11 | End: 2018-04-11

## 2018-04-11 RX ORDER — METOCLOPRAMIDE HYDROCHLORIDE 5 MG/ML
10 INJECTION INTRAMUSCULAR; INTRAVENOUS ONCE
Status: COMPLETED | OUTPATIENT
Start: 2018-04-11 | End: 2018-04-11

## 2018-04-11 RX ORDER — LORAZEPAM 2 MG/ML
0.5 INJECTION INTRAMUSCULAR ONCE
Status: COMPLETED | OUTPATIENT
Start: 2018-04-11 | End: 2018-04-11

## 2018-04-11 RX ORDER — MECLIZINE HCL 12.5 MG/1
12.5 TABLET ORAL 3 TIMES DAILY PRN
Status: DISCONTINUED | OUTPATIENT
Start: 2018-04-11 | End: 2018-04-12

## 2018-04-11 RX ORDER — POTASSIUM CHLORIDE 14.9 MG/ML
20 INJECTION INTRAVENOUS ONCE
Status: DISCONTINUED | OUTPATIENT
Start: 2018-04-11 | End: 2018-04-11

## 2018-04-11 RX ORDER — AZELASTINE 1 MG/ML
1 SPRAY, METERED NASAL 2 TIMES DAILY
Status: DISCONTINUED | OUTPATIENT
Start: 2018-04-11 | End: 2018-04-12

## 2018-04-11 NOTE — PROGRESS NOTES
[facility]  Progress Note    Robbie Mooney Patient Status:  Inpatient    3/25/1965 MRN [id number]   [facility] 2NE-A Attending Annabelle Arevalo MD   [address] Day # 2 PCP Yeison Zambrano, DO     Subjective:  Robbie Mooney is a(n) unspecified     Esophageal reflux     Other chronic sinusitis     Moderate episode of recurrent major depressive disorder (HCC)     Anxiety     Class 3 obesity with body mass index (BMI) of 40.0 to 44.9 in adult [facility])     History of recurrent deep vein thr

## 2018-04-11 NOTE — PLAN OF CARE
PT A/O, RT WRIST WITH BANDAIDE D/I, DRIED BLOOD, DENIES PAIN TO WRIST, NO N/T, SLIGHT EDEMA, 98% ON RA, LUNGS DIMINISHED, C/O OF SINUS CONGESTION, HEADACHE, USING OCEAN SPRAY, GIVEN TYLENOL, BENADRYL, SR, VOIDING IN BATHROOM, IVF INFUSING, LABS IN AM, INST

## 2018-04-11 NOTE — PAYOR COMM NOTE
--------------  CONTINUED STAY REVIEW    Payor: Larry Aaron #:  [id number]  Authorization Number: [id number]    Admit date: 4/9/18  Admit time: 2118    Admitting Physician: Patrecia Cooks, MD  Attending Physician:  MD Pawel Blank steroid-dependent on Xolair in the past with chronic urticaria and frequent admissions the last 12/16, history of recurrent thrombosis since at least 2002 maintained on Xarelto, history of chronic sinus infection, history of severe GERD with known hiatal h 9.1  8.6   ALB  3.9   --    NA  137  140   K  3.7  3.4*   CL  101  106   CO2  26.0  24.0   ALKPHO  144*   --    AST  29   --    ALT  35   --    BILT  0.4   --    TP  8.1   --          Estimated Creatinine Clearance: 86.3 mL/min (based on SCr of 0.75 mg/dL 4/11/2018 0813 Given 2 spray Each Narjoyce Beck RN    4/10/2018 2153 Given 2 spray Each NarIsaiah Edmond, RN      Insulin Aspart Pen (NOVOLOG) 100 UNIT/ML flexpen 1-68 Units     Date Action Dose Route User    4/11/2018 0913 Given 1 Units Subc Date Action Dose Route User    4/11/2018 0815 Given 40 mEq Oral Kateryna Lucia RN      Rivaroxaban [place]) tab 20 mg     Date Action Dose Route User    4/10/2018 1722 Given 20 mg Oral CaphuigrSol sherwood RN      Rosuvastatin Calcium (CRESTOR) tab 5 mg

## 2018-04-11 NOTE — PROGRESS NOTES
[facility]  Progress Note    Williisrael Pae Patient Status:  Inpatient    3/25/1965 MRN [id number]   [facility] 2NE-A Attending Isai Mike MD   Hosp Day # 2 PCP Thomas Flannery DO       Assessment:    · CAD  · Abnormal tropon Topical Once   • cetirizine  10 mg Oral Daily   • Montelukast Sodium  10 mg Oral Nightly   • Fluticasone Propionate  2 spray Each Nare BID   • Fluticasone Furoate-Vilanterol  1 puff Inhalation Daily   • zonisamide  200 mg Oral Nightly   • ticagrelor  90 mg

## 2018-04-11 NOTE — PROGRESS NOTES
[facility]  Progress Note     Christian Garcia Patient Status:  Inpatient    3/25/1965 MRN [id number]   [facility] 2NE-A Attending Odilon Piña MD   Hosp Day # 2 PCP Cheryl Goodwin DO     Chief Complaint: headache     S: Soumya hours. Recent Labs   Lab  04/09/18   1906  04/10/18   0019  04/10/18   0631   TROP  2.810*  1.970*  1.380*            Imaging: Imaging data reviewed in Epic.     Medications:   • valproate  500 mg Intravenous Q8H    Followed by   • [START ON 4/12/2018] v

## 2018-04-12 VITALS
WEIGHT: 247.56 LBS | TEMPERATURE: 98 F | DIASTOLIC BLOOD PRESSURE: 64 MMHG | HEART RATE: 73 BPM | BODY MASS INDEX: 38.85 KG/M2 | RESPIRATION RATE: 20 BRPM | OXYGEN SATURATION: 96 % | HEIGHT: 67 IN | SYSTOLIC BLOOD PRESSURE: 130 MMHG

## 2018-04-12 PROCEDURE — 99239 HOSP IP/OBS DSCHRG MGMT >30: CPT | Performed by: INTERNAL MEDICINE

## 2018-04-12 NOTE — DISCHARGE SUMMARY
[facility] [place] [facility]  DISCHARGE SUMMARY     Nitin Antonieta Patient Status:  Inpatient    3/25/1965 MRN [id number]   [facility] 2NE-A Attending No att. providers found   Hosp Day # 3 PCP Stefano Todd, DO     Date of Admission: 2018 associated nausea. Patient states she vomited last night without coffee-ground or blood in emesis. Patient states she has history of asthma. Complains of associated dizziness. Denies any focal weakness or numbness. Denies abdominal pain.   Denies any d Refills:  0     Butalbital-APAP-Caffeine -40 MG Tabs  Commonly known as:  FIORICET, ESGIC      Take 1 tablet by mouth every 4 (four) hours as needed for Pain.    Quantity:  30 tablet  Refills:  1     carvedilol 6.25 MG Tabs  Commonly known as:  COREG MG Tabs  Commonly known as:  CRESTOR      Take 5 mg by mouth. Monday, Wednesday and Friday   Refills:  0     torsemide 20 MG Tabs  Commonly known as:  DEMADEX      Take 20 mg by mouth every morning.    Refills:  0     valsartan 80 MG Tabs  Commonly known as

## 2018-04-12 NOTE — PROGRESS NOTES
IV and tele discontinued. Pt. Received discharge instructions and follow-up information. Questions addressed and answered. Pt. Transported by wheelchair with tech to d/c transportation.

## 2018-04-12 NOTE — CM/SW NOTE
Patient discussed in rounds, plan d/c today. Page to [facility] to notify of d/c. Call out to [facility] to confirm delivery of neb today to patient's room.     General Lopez RN,   Phone [phone]  Pager [phone]

## 2018-04-12 NOTE — PROGRESS NOTES
[facility]  Progress Note    Wilian Roberto Patient Status:  Inpatient    3/25/1965 MRN [id number]   [facility] 2NE-A Attending Elie Durand MD   [facility] Day # 3 PCP Patrick Rose DO     Subjective:  Wilian Roberto is a(n) of recurrent major depressive disorder (HCC)     Anxiety     Class 3 obesity with body mass index (BMI) of 40.0 to 44.9 in adult [facility])     History of recurrent deep vein thrombosis (DVT)     Patella-femoral syndrome, bilateral knee     History of pulmonary

## 2018-04-12 NOTE — PLAN OF CARE
Patient/Family Goals    • Patient/Family Short Term Goal Completed          CARDIOVASCULAR - ADULT    • Absence of cardiac arrhythmias or at baseline Progressing        METABOLIC/FLUID AND ELECTROLYTES - ADULT    • Glucose maintained within prescribed rang

## 2018-04-13 ENCOUNTER — PATIENT OUTREACH (OUTPATIENT)
Dept: CASE MANAGEMENT | Age: 53
End: 2018-04-13

## 2018-04-13 DIAGNOSIS — R07.9 ACUTE CHEST PAIN: ICD-10-CM

## 2018-04-13 DIAGNOSIS — R51.9 HEADACHE IN FRONT OF HEAD: ICD-10-CM

## 2018-04-13 DIAGNOSIS — J45.41 MODERATE PERSISTENT ASTHMA WITH ACUTE EXACERBATION: ICD-10-CM

## 2018-04-16 ENCOUNTER — TELEPHONE (OUTPATIENT)
Dept: FAMILY MEDICINE CLINIC | Facility: CLINIC | Age: 53
End: 2018-04-16

## 2018-04-16 RX ORDER — CODEINE/BUTALBITAL/ASA/CAFFEIN 30-50-325
1 CAPSULE ORAL EVERY 4 HOURS PRN
Qty: 30 CAPSULE | Refills: 0 | Status: CANCELLED
Start: 2018-04-16

## 2018-04-16 RX ORDER — SUMATRIPTAN 50 MG/1
50 TABLET, FILM COATED ORAL EVERY 2 HOUR PRN
Qty: 9 TABLET | Refills: 0 | Status: SHIPPED | OUTPATIENT
Start: 2018-04-16 | End: 2018-04-16

## 2018-04-16 RX ORDER — SUMATRIPTAN 50 MG/1
50 TABLET, FILM COATED ORAL EVERY 2 HOUR PRN
Qty: 9 TABLET | Refills: 0 | Status: SHIPPED | OUTPATIENT
Start: 2018-04-16 | End: 2018-10-23

## 2018-04-16 NOTE — TELEPHONE ENCOUNTER
Spoke to pt and informed of MD recommendations, pt is requesting Sumatriptan to be sent to [facility] on [address] Medication sent to correct pharmacy. PT verbalized understanding of MD recommendations.

## 2018-04-16 NOTE — TELEPHONE ENCOUNTER
Is she taking her zonisamide regularly? Can try sumatriptan as she has taken that in the past.  Would avoid fiornal as she is on 2 blood thinners already. If not improving, would go to ED as this is what she was just admitted for.     Duran Elmore, DO

## 2018-04-16 NOTE — TELEPHONE ENCOUNTER
Patient has history of migraines. Is scheduled for hospital f/u 4/19/18. Has fioricet to use for migraines - taking up to 3 a day without relief. Current pain is rated at 7  Yesterday her pain was 8. The pain does not go away.   She does have aura with i

## 2018-04-16 NOTE — PROGRESS NOTES
Initial Post Discharge Follow Up   Discharge Date: 4/12/18  Contact Date: 4/13/2018    Consent Verification:  Assessment Completed With: Patient  HIPAA Verified?   Yes    Discharge Dx:    Acute chest pain, NSTEMI, S/P cath-Nonobstructive CAD  HX: Migrain hospital stay? yes, but due to the migraine lying down in a dark room.   • Were you given a specific diet to follow at discharge?   no, diabetic, low sodium diet      Medications:     Current Outpatient Prescriptions:  RaNITidine HCl 150 MG Oral Tab Take 15 lungs every 6 (six) hours as needed for Wheezing. Disp:  Rfl:    Fluticasone Furoate-Vilanterol 200-25 MCG/INH Inhalation Aerosol Powder, Breath Activated Inhale 1 puff into the lungs daily.  Disp: 1 each Rfl: 0   Fluticasone Propionate 50 MCG/ACT Nasal S swollen? no      Any signs of infection?     no  Any bleeding from the site?  no          Needs post D/C:   Now that you are home, are there any needs or concerns you need addressed before your next visit with your PCP?  (DME, meds, disease concerns, Etc):

## 2018-04-16 NOTE — TELEPHONE ENCOUNTER
Patient discharged home from [facility] on 4/12/18 and has a TCM HFU on 4/19/18. Patient reports she has had a Migraine since yesterday. Patient would like to know if there is anything else she can take for the migraines, Fioricet is not helping.

## 2018-04-18 ENCOUNTER — PRIOR ORIGINAL RECORDS (OUTPATIENT)
Dept: OTHER | Age: 53
End: 2018-04-18

## 2018-04-23 ENCOUNTER — OFFICE VISIT (OUTPATIENT)
Dept: FAMILY MEDICINE CLINIC | Facility: CLINIC | Age: 53
End: 2018-04-23

## 2018-04-23 VITALS
DIASTOLIC BLOOD PRESSURE: 78 MMHG | RESPIRATION RATE: 16 BRPM | HEIGHT: 68 IN | SYSTOLIC BLOOD PRESSURE: 124 MMHG | BODY MASS INDEX: 38.19 KG/M2 | WEIGHT: 252 LBS | HEART RATE: 76 BPM

## 2018-04-23 DIAGNOSIS — J45.41 MODERATE PERSISTENT ASTHMA WITH ACUTE EXACERBATION: ICD-10-CM

## 2018-04-23 DIAGNOSIS — E11.9 TYPE 2 DIABETES MELLITUS WITHOUT COMPLICATION, WITH LONG-TERM CURRENT USE OF INSULIN (HCC): ICD-10-CM

## 2018-04-23 DIAGNOSIS — Z09 HOSPITAL DISCHARGE FOLLOW-UP: Primary | ICD-10-CM

## 2018-04-23 DIAGNOSIS — R77.8 ELEVATED TROPONIN: ICD-10-CM

## 2018-04-23 DIAGNOSIS — Z79.4 TYPE 2 DIABETES MELLITUS WITHOUT COMPLICATION, WITH LONG-TERM CURRENT USE OF INSULIN (HCC): ICD-10-CM

## 2018-04-23 DIAGNOSIS — IMO0001 CLASS 3 OBESITY WITH SERIOUS COMORBIDITY AND BODY MASS INDEX (BMI) OF 40.0 TO 44.9 IN ADULT, UNSPECIFIED OBESITY TYPE: ICD-10-CM

## 2018-04-23 DIAGNOSIS — R07.9 ACUTE CHEST PAIN: ICD-10-CM

## 2018-04-23 PROCEDURE — 99495 TRANSJ CARE MGMT MOD F2F 14D: CPT | Performed by: PHYSICIAN ASSISTANT

## 2018-04-23 PROCEDURE — 1111F DSCHRG MED/CURRENT MED MERGE: CPT | Performed by: PHYSICIAN ASSISTANT

## 2018-04-23 RX ORDER — PREDNISONE 10 MG/1
1 TABLET ORAL AS NEEDED
COMMUNITY
Start: 2018-03-28 | End: 2018-04-25

## 2018-04-23 NOTE — PATIENT INSTRUCTIONS
Thank you for choosing Catia Lopez PA-C at [place] [id number]  To Do: Alyse Kanner  1. Pt to follow-up with pulmonologist and neurologist  2. Pt to follow-up with diabetes center [facility]  3.  Referral for [facility]    • Please signup for MY C that the insurance company approved your testing, please call Central Scheduling at [phone]  Please allow our office 5 business days to contact you regarding any testing results.     Refill policies:   Allow 3 business days for refills; controlled subs

## 2018-04-23 NOTE — PROGRESS NOTES
HPI:    Kit Daly is a 48year old female here today for hospital follow up.    She was discharged from Inpatient hospital, [facility] to Home   Admission Date: 4/9/18   Discharge Date: 4/12/18  Hospital Discharge Diagnoses (since 3/24/2018 heart attack  She states she is feeling back to her baseline as far as her breathing goes  Pt has home health coming to her house to check vitals and see how she is doing    Pt has been on and off prednisone for years secondary to her lung problems.   She s times daily. zonisamide 100 MG Oral Cap Take 200 mg by mouth nightly. Rivaroxaban (XARELTO) 20 MG Oral Tab Take 1 tablet (20 mg total) by mouth daily with food.    PROAIR  (90 Base) MCG/ACT Inhalation Aero Soln Inhale 2 puffs into the lungs justus breast surgery procedure unlisted; cholecystectomy (04/10/2011); colposcopy, cervix w/upper adjacent vagina; w/endocervical curettage (2-2011); colonoscopy (2012); other surgical history; other surgical history; upper gi endoscopy,exam; colonoscopy (N/A, 2 She has wheezes. She has no rales. + bilateral diffuse wheezing lung fields   Abdominal: Soft. Bowel sounds are normal. There is no tenderness. Lymphadenopathy:     She has no cervical adenopathy.    Neurological: She is alert and oriented to person, pl moderate  · Risk of Significant Complications, Morbidity, and/or Mortality: moderate    Overall Risk:   moderate    Patient seen within 14 days from date of discharge.      Juancho Webster PA-C, 4/23/2018

## 2018-04-24 ENCOUNTER — TELEPHONE (OUTPATIENT)
Dept: FAMILY MEDICINE CLINIC | Facility: CLINIC | Age: 53
End: 2018-04-24

## 2018-04-24 ENCOUNTER — NURSE ONLY (OUTPATIENT)
Dept: ENDOCRINOLOGY CLINIC | Facility: CLINIC | Age: 53
End: 2018-04-24

## 2018-04-24 VITALS
BODY MASS INDEX: 38.19 KG/M2 | DIASTOLIC BLOOD PRESSURE: 72 MMHG | HEART RATE: 72 BPM | SYSTOLIC BLOOD PRESSURE: 124 MMHG | WEIGHT: 252 LBS | HEIGHT: 68 IN

## 2018-04-24 DIAGNOSIS — Z79.4 TYPE 2 DIABETES MELLITUS WITHOUT COMPLICATION, WITH LONG-TERM CURRENT USE OF INSULIN (HCC): Primary | ICD-10-CM

## 2018-04-24 DIAGNOSIS — E11.9 TYPE 2 DIABETES MELLITUS WITHOUT COMPLICATION, WITH LONG-TERM CURRENT USE OF INSULIN (HCC): Primary | ICD-10-CM

## 2018-04-24 PROCEDURE — 95250 CONT GLUC MNTR PHYS/QHP EQP: CPT

## 2018-04-24 PROCEDURE — G0108 DIAB MANAGE TRN  PER INDIV: HCPCS

## 2018-04-24 RX ORDER — GLIPIZIDE 5 MG/1
5 TABLET ORAL
Qty: 30 TABLET | Refills: 3 | Status: SHIPPED | OUTPATIENT
Start: 2018-04-24 | End: 2018-04-25

## 2018-04-24 NOTE — TELEPHONE ENCOUNTER
Requesting PT order, Ranjith Akers will fax over order for MD to sign. Reports that pt is unsteady, and has balance issues.

## 2018-04-24 NOTE — PROGRESS NOTES
Romana ROSE 3/25/1965 was seen for Diagnostic Continuous Glucose Sensor Initial Evaluation and follow up after discharge from hospitalization:    Date: 2018    Start time: 12:30pm End time: 1:30      Dexcom 4  Sensor Lot: [id number]  [employer] time.    [facility], RN, CDE

## 2018-04-24 NOTE — TELEPHONE ENCOUNTER
Michele Ugalde from [facility] health calling to add on PT eval orders

## 2018-04-25 ENCOUNTER — OFFICE VISIT (OUTPATIENT)
Dept: INTERNAL MEDICINE CLINIC | Facility: CLINIC | Age: 53
End: 2018-04-25

## 2018-04-25 ENCOUNTER — TELEPHONE (OUTPATIENT)
Dept: INTERNAL MEDICINE CLINIC | Facility: CLINIC | Age: 53
End: 2018-04-25

## 2018-04-25 VITALS
DIASTOLIC BLOOD PRESSURE: 78 MMHG | WEIGHT: 251 LBS | RESPIRATION RATE: 18 BRPM | SYSTOLIC BLOOD PRESSURE: 122 MMHG | HEIGHT: 67 IN | BODY MASS INDEX: 39.39 KG/M2 | HEART RATE: 78 BPM

## 2018-04-25 DIAGNOSIS — Z86.711 HISTORY OF PULMONARY EMBOLUS (PE): ICD-10-CM

## 2018-04-25 DIAGNOSIS — Z87.59 HISTORY OF MISCARRIAGE: ICD-10-CM

## 2018-04-25 DIAGNOSIS — I25.118 CORONARY ARTERY DISEASE OF NATIVE ARTERY OF NATIVE HEART WITH STABLE ANGINA PECTORIS (HCC): ICD-10-CM

## 2018-04-25 DIAGNOSIS — E11.9 TYPE 2 DIABETES MELLITUS WITHOUT COMPLICATION, WITH LONG-TERM CURRENT USE OF INSULIN (HCC): ICD-10-CM

## 2018-04-25 DIAGNOSIS — Z79.4 TYPE 2 DIABETES MELLITUS WITHOUT COMPLICATION, WITH LONG-TERM CURRENT USE OF INSULIN (HCC): ICD-10-CM

## 2018-04-25 DIAGNOSIS — IMO0001 CLASS 3 OBESITY WITH SERIOUS COMORBIDITY AND BODY MASS INDEX (BMI) OF 40.0 TO 44.9 IN ADULT, UNSPECIFIED OBESITY TYPE: ICD-10-CM

## 2018-04-25 DIAGNOSIS — Z51.81 THERAPEUTIC DRUG MONITORING: Primary | ICD-10-CM

## 2018-04-25 DIAGNOSIS — J45.40 MODERATE PERSISTENT ASTHMA WITHOUT COMPLICATION: ICD-10-CM

## 2018-04-25 PROCEDURE — 99214 OFFICE O/P EST MOD 30 MIN: CPT | Performed by: INTERNAL MEDICINE

## 2018-04-25 NOTE — PATIENT INSTRUCTIONS
Plan:  Continue with medications:   Go to the lab for blood work   Follow up with me in 1 month  Schedule follow up appointments: David Mackay (dietitian) & Brenda Grover (behavorial psychologist)    Please try to work on the following dietary changes:  1.  D

## 2018-04-25 NOTE — PROGRESS NOTES
Patient teaching on Victoza performed. Patient demonstrated back, all questions were answered and patient verbalized understanding.  OUSMANE

## 2018-04-25 NOTE — TELEPHONE ENCOUNTER
Spoke with Dr. Wendi Marie who states that patient has a sample of the Victoza and will be using the 0.6mg dose with the sample. Once she picks up the prescription of victoza, she will be increasing to the 1.2mg dose.      Called pharmacy and informed them of the

## 2018-04-25 NOTE — TELEPHONE ENCOUNTER
[address] is calling to get a clarification on the dose for the Liraglutide (VICTOZA) 18 MG/3ML Subcutaneous Solution Pen-injector.  The pharmacist also stated that the prescription is not covered by the insurance and would need a prior authorization

## 2018-04-25 NOTE — PROGRESS NOTES
HISTORY OF PRESENT ILLNESS  Patient presents with:  Weight Problem: Tried Contrave 1 year ago       Daniel Ace is a 48year old female new to our office today for initiation of medical weight loss program.  Patient presents today with c/o excess w shortness of breath with exertion, no apnea   CARDIOVASCULAR: denies chest pain on exertion , denies palpitations or pedal edema  GI: denies abdominal pain.   No N/V/D/C  MUSCULOSKELETAL: + joint pain   NEURO: denies headaches   PSYCH: + change in behavior 04/10/2018   TRIG 565 (H) 04/10/2018   HDL 28 (L) 04/10/2018   LDL  04/10/2018   Comment:   Unable to calculate LDL due to Triglycerides >400.0 mg/dL       VLDL  04/10/2018   Comment:   Unable to calculate VLDL due to Triglycerides >400 mg/dL   [facility] Rfl:    ticagrelor (BRILINTA) 90 MG Oral Tab Take 90 mg by mouth 2 (two) times daily. Disp:  Rfl:    zonisamide 100 MG Oral Cap Take 200 mg by mouth nightly.  Disp:  Rfl:    Rivaroxaban (XARELTO) 20 MG Oral Tab Take 1 tablet (20 mg total) by mouth daily w native artery of native heart with stable angina pectoris (HCC)    Moderate persistent asthma without complication    Other orders  -     Cancel: Liraglutide -Weight Management (SAXENDA) 18 MG/3ML Subcutaneous Solution Pen-injector; Inject into the skin. in 1 month  Schedule follow up appointments: Kamille Barrios (dietitian) & Gagan Flower (behavorial psychologist)    Please try to work on the following dietary changes:  1. Drink lots of water and cut down on soda/juice consumption if soda/juice drinker  2.  E

## 2018-04-25 NOTE — TELEPHONE ENCOUNTER
Please start PA on Victoza.     PA#: [phone]  ID#  [id number]  Group ID#  [id number]    Use diagnosis:  Class 3 obesity with serious comorbidity and body mass index (BMI) of 40.0 to 44.9 in adult, unspecified obesity type (HCC) - E66.9, Z68.41    Type 2

## 2018-04-27 ENCOUNTER — TELEPHONE (OUTPATIENT)
Dept: FAMILY MEDICINE CLINIC | Facility: CLINIC | Age: 53
End: 2018-04-27

## 2018-04-27 NOTE — TELEPHONE ENCOUNTER
Yes, we can write a note, I don't what it needs to say, but I am ok with a note. See how long her  has been off. She had an MRI in November and has been sick on and off since then, and was just recently discharged from hospital again.

## 2018-04-27 NOTE — TELEPHONE ENCOUNTER
I spoke with patient and pended a note for you to review and approve. She will print off my chart when you approve it. Thank you!

## 2018-04-27 NOTE — TELEPHONE ENCOUNTER
Pt states she needs a note for the gas company. They are going to shut their gas off and they dont have the money to pay for it.  She state her  had to take time off while she was in the hospital and that's why they cannot pay for it

## 2018-04-30 ENCOUNTER — OFFICE VISIT (OUTPATIENT)
Dept: ENDOCRINOLOGY CLINIC | Facility: CLINIC | Age: 53
End: 2018-04-30

## 2018-04-30 ENCOUNTER — TELEPHONE (OUTPATIENT)
Dept: INTERNAL MEDICINE CLINIC | Facility: CLINIC | Age: 53
End: 2018-04-30

## 2018-04-30 VITALS
RESPIRATION RATE: 18 BRPM | DIASTOLIC BLOOD PRESSURE: 70 MMHG | SYSTOLIC BLOOD PRESSURE: 98 MMHG | HEIGHT: 67 IN | HEART RATE: 92 BPM | TEMPERATURE: 98 F | WEIGHT: 251 LBS | BODY MASS INDEX: 39.39 KG/M2

## 2018-04-30 DIAGNOSIS — E11.65 UNCONTROLLED TYPE 2 DIABETES MELLITUS WITH OTHER SPECIFIED COMPLICATION, WITH LONG-TERM CURRENT USE OF INSULIN: Primary | ICD-10-CM

## 2018-04-30 DIAGNOSIS — E11.69 UNCONTROLLED TYPE 2 DIABETES MELLITUS WITH OTHER SPECIFIED COMPLICATION, WITH LONG-TERM CURRENT USE OF INSULIN: Primary | ICD-10-CM

## 2018-04-30 DIAGNOSIS — Z79.4 UNCONTROLLED TYPE 2 DIABETES MELLITUS WITH OTHER SPECIFIED COMPLICATION, WITH LONG-TERM CURRENT USE OF INSULIN: Primary | ICD-10-CM

## 2018-04-30 PROCEDURE — 99214 OFFICE O/P EST MOD 30 MIN: CPT | Performed by: NURSE PRACTITIONER

## 2018-04-30 PROCEDURE — 95251 CONT GLUC MNTR ANALYSIS I&R: CPT | Performed by: NURSE PRACTITIONER

## 2018-04-30 RX ORDER — LANCETS 33 GAUGE
1 EACH MISCELLANEOUS 3 TIMES DAILY
Qty: 1 BOX | Refills: 0 | Status: SHIPPED | OUTPATIENT
Start: 2018-04-30 | End: 2018-04-30

## 2018-04-30 RX ORDER — LANCETS 33 GAUGE
1 EACH MISCELLANEOUS 4 TIMES DAILY
Qty: 1 BOX | Refills: 0 | Status: SHIPPED | OUTPATIENT
Start: 2018-04-30 | End: 2019-04-30

## 2018-04-30 RX ORDER — BLOOD SUGAR DIAGNOSTIC
STRIP MISCELLANEOUS
Qty: 400 STRIP | Refills: 3 | Status: SHIPPED | OUTPATIENT
Start: 2018-04-30 | End: 2021-09-01

## 2018-04-30 NOTE — PROGRESS NOTES
Diabetes Education:    Soren Beasley on how to prime Victoza pen when first used and how to prime insulin pen with 2 units each time of use. She has not been doing this, but agreed to begin.    She is having nausea, so will stay at the 0.6 mg dose o

## 2018-04-30 NOTE — TELEPHONE ENCOUNTER
SC sent in today rx for test strips and lancets-strips say to test 4x per day and lancets says to test 3x per day-call back to confirm which one it is please

## 2018-05-01 ENCOUNTER — PATIENT MESSAGE (OUTPATIENT)
Dept: ENDOCRINOLOGY CLINIC | Facility: CLINIC | Age: 53
End: 2018-05-01

## 2018-05-01 ENCOUNTER — PATIENT MESSAGE (OUTPATIENT)
Dept: INTERNAL MEDICINE CLINIC | Facility: CLINIC | Age: 53
End: 2018-05-01

## 2018-05-01 NOTE — TELEPHONE ENCOUNTER
From: Jinny Vallejo  To: Julia Moulton MD  Sent: 5/1/2018 10:59 AM CDT  Subject: Prescription Question    Here is the information for the Victoza Prescription    Benefit provided by [facility]     RxBin:  [id number]  RxGrp:  [id number]   ID No.  [id number]

## 2018-05-01 NOTE — TELEPHONE ENCOUNTER
Spoke with patient and informed her that our office needs most recent Prescription Program Coverage information (ie BIN, PCN, Group #'s). Patient states that she will find it and send it via [address] [place] [address] [address].

## 2018-05-01 NOTE — TELEPHONE ENCOUNTER
Here is the information for the Victoza Prescription    Benefit provided by [facility]    RxBin:  [id number]  RxGrp:  [id number]   ID No.  [id number]  Name:  Jackie Adame     Also You can call  [phone]  and give them my JN  [id number]

## 2018-05-02 ENCOUNTER — NURSE ONLY (OUTPATIENT)
Dept: INTERNAL MEDICINE CLINIC | Facility: CLINIC | Age: 53
End: 2018-05-02

## 2018-05-02 NOTE — PROGRESS NOTES
Patient sent [employer] message last night indicating that she was having difficulty with Dexcom placement. She has already gone through 2 devices and still having difficulty. Patient was asked to come in today for demonstration and return demonstration.

## 2018-05-02 NOTE — TELEPHONE ENCOUNTER
PA started over the phone: Vanesa Do not covered by insurance, can not do a PA. Pt needs to try trulicity, byetta, or bydureon.

## 2018-05-03 ENCOUNTER — TELEPHONE (OUTPATIENT)
Dept: INTERNAL MEDICINE CLINIC | Facility: CLINIC | Age: 53
End: 2018-05-03

## 2018-05-03 NOTE — TELEPHONE ENCOUNTER
Maulik Zamora  Rx #: [id number]    PA Approved for Victoza. [address] contacted spoke with Page to fill and contact pt when Rx is ready for .

## 2018-05-04 NOTE — TELEPHONE ENCOUNTER
Patient called back, says that she spoke with Araceli Anne who informed patient that Yue Deems was approved and patient can come  the medication. I apologized to patient as our office never received any information that Yue Deems was approved.

## 2018-05-04 NOTE — TELEPHONE ENCOUNTER
Patient states that she has been on trulicity in the past.   Stopped because it was not effective. It was prescribed in 2017 by a different provider. Patient will have the pharmacy fax over a record of the prescription so we can file an appeals.     Adrian

## 2018-05-07 ENCOUNTER — PRIOR ORIGINAL RECORDS (OUTPATIENT)
Dept: OTHER | Age: 53
End: 2018-05-07

## 2018-05-08 ENCOUNTER — PRIOR ORIGINAL RECORDS (OUTPATIENT)
Dept: OTHER | Age: 53
End: 2018-05-08

## 2018-05-08 DIAGNOSIS — F41.9 ANXIETY: ICD-10-CM

## 2018-05-08 RX ORDER — ESCITALOPRAM OXALATE 20 MG/1
20 TABLET ORAL EVERY MORNING
Qty: 90 TABLET | Refills: 0 | OUTPATIENT
Start: 2018-05-08 | End: 2018-11-04

## 2018-05-08 NOTE — TELEPHONE ENCOUNTER
Requesting: Escitalopram 20mg to [facility]  LOV: 4/23/18 with Wm DENTON  RTC: 1 month  Last Relevant Labs: 4/12/18 - [facility], CMP  Filled: 3/20/18 #90 with 1 refills    Future Appointments  Date Time Provider Praful Hi   5/14/2018 3:00 PM [facility]

## 2018-05-09 ENCOUNTER — HOSPITAL ENCOUNTER (EMERGENCY)
Age: 53
Discharge: HOME | End: 2018-05-09
Attending: EMERGENCY MEDICINE
Payer: COMMERCIAL

## 2018-05-09 ENCOUNTER — APPOINTMENT (OUTPATIENT)
Dept: CT IMAGING | Age: 53
End: 2018-05-09
Attending: EMERGENCY MEDICINE
Payer: COMMERCIAL

## 2018-05-09 VITALS
OXYGEN SATURATION: 94 % | RESPIRATION RATE: 16 BRPM | HEART RATE: 78 BPM | TEMPERATURE: 98 F | BODY MASS INDEX: 39.24 KG/M2 | DIASTOLIC BLOOD PRESSURE: 56 MMHG | SYSTOLIC BLOOD PRESSURE: 115 MMHG | HEIGHT: 67 IN | WEIGHT: 250 LBS

## 2018-05-09 DIAGNOSIS — N39.0 URINARY TRACT INFECTION WITHOUT HEMATURIA, SITE UNSPECIFIED: ICD-10-CM

## 2018-05-09 DIAGNOSIS — K46.9 ABDOMINAL HERNIA WITHOUT OBSTRUCTION AND WITHOUT GANGRENE, RECURRENCE NOT SPECIFIED, UNSPECIFIED HERNIA TYPE: Primary | ICD-10-CM

## 2018-05-09 PROCEDURE — 80053 COMPREHEN METABOLIC PANEL: CPT | Performed by: EMERGENCY MEDICINE

## 2018-05-09 PROCEDURE — 87086 URINE CULTURE/COLONY COUNT: CPT | Performed by: EMERGENCY MEDICINE

## 2018-05-09 PROCEDURE — 74177 CT ABD & PELVIS W/CONTRAST: CPT | Performed by: EMERGENCY MEDICINE

## 2018-05-09 PROCEDURE — 96360 HYDRATION IV INFUSION INIT: CPT

## 2018-05-09 PROCEDURE — 96361 HYDRATE IV INFUSION ADD-ON: CPT

## 2018-05-09 PROCEDURE — 83690 ASSAY OF LIPASE: CPT | Performed by: EMERGENCY MEDICINE

## 2018-05-09 PROCEDURE — 81001 URINALYSIS AUTO W/SCOPE: CPT | Performed by: EMERGENCY MEDICINE

## 2018-05-09 PROCEDURE — 85025 COMPLETE CBC W/AUTO DIFF WBC: CPT | Performed by: EMERGENCY MEDICINE

## 2018-05-09 PROCEDURE — 99285 EMERGENCY DEPT VISIT HI MDM: CPT

## 2018-05-09 PROCEDURE — 99284 EMERGENCY DEPT VISIT MOD MDM: CPT

## 2018-05-09 RX ORDER — TRAMADOL HYDROCHLORIDE 50 MG/1
50 TABLET ORAL EVERY 4 HOURS PRN
Qty: 10 TABLET | Refills: 0 | Status: SHIPPED | OUTPATIENT
Start: 2018-05-09 | End: 2018-05-16

## 2018-05-09 RX ORDER — SODIUM CHLORIDE 9 MG/ML
125 INJECTION, SOLUTION INTRAVENOUS CONTINUOUS
Status: DISCONTINUED | OUTPATIENT
Start: 2018-05-09 | End: 2018-05-09

## 2018-05-09 RX ORDER — SULFAMETHOXAZOLE AND TRIMETHOPRIM 800; 160 MG/1; MG/1
1 TABLET ORAL 2 TIMES DAILY
Qty: 10 TABLET | Refills: 0 | Status: SHIPPED | OUTPATIENT
Start: 2018-05-09 | End: 2018-05-14

## 2018-05-09 NOTE — ED INITIAL ASSESSMENT (HPI)
Started on Monday with abdom pain while physical therapy was at my house and I was doing leg lifts, \"I have a bulge to my left lower abdom that happened during the PT\", stinging pain all the time, but sharp if I roll over in bed or move\", denies nausea/

## 2018-05-09 NOTE — ED PROVIDER NOTES
[facility] [place]    History   Patient presents with:  Abdomen/Flank Pain (GI/)    Stated Complaint: abd pain    HPI    This is a 53-year-old female who around here with complaints of abdominal pain.   The patien Patella-femoral syndrome, bilateral knee 1/15/2015       Past Surgical History:  No date: BREAST SURGERY PROCEDURE UNLISTED      Comment: cyst removed  No date: CATH DRUG ELUTING STENT  04/10/2011: CHOLECYSTECTOMY      Comment: PERFORMED BY DR Veronica Levi one area right above the umbilicus I do not see a discernible swelling in that area but when I feel in subcutaneously she does have a hard nodule, it does not feel typical of an umbilical hernia because it is small less than 1 cm in size. .  There is no apple DRAW BLUE   URINE CULTURE, ROUTINE       ED Course as of May 09 1810  ------------------------------------------------------------      MDM         The patient was placed on monitors, IV was started, blood was drawn.    Head (ltm=[id number])    Result Date: 4 liver is noted. BILIARY:  Sequelae of cholecystectomy is noted. PANCREAS:  No lesion, fluid collection, ductal dilatation, or atrophy. SPLEEN:  No enlargement or focal lesion. KIDNEYS:  Angiomyolipoma in the mid right kidney measures 11 mm.   Renal collec days if she is to have any increasing pain or fevers to return here. I recommend clear liquids and slowly advance her diet.   Disposition and Plan     Clinical Impression:  Abdominal hernia without obstruction and without gangrene, recurrence not specified

## 2018-05-14 ENCOUNTER — TELEPHONE (OUTPATIENT)
Dept: INTERNAL MEDICINE CLINIC | Facility: CLINIC | Age: 53
End: 2018-05-14

## 2018-05-14 NOTE — TELEPHONE ENCOUNTER
Front- Not holding test results for upcoming appt. Please continue outreach to reschedule. Thank you.

## 2018-05-15 ENCOUNTER — PATIENT MESSAGE (OUTPATIENT)
Dept: FAMILY MEDICINE CLINIC | Facility: CLINIC | Age: 53
End: 2018-05-15

## 2018-05-15 DIAGNOSIS — F41.9 ANXIETY: ICD-10-CM

## 2018-05-15 RX ORDER — ESCITALOPRAM OXALATE 20 MG/1
20 TABLET ORAL EVERY MORNING
Qty: 90 TABLET | Refills: 0 | Status: SHIPPED | OUTPATIENT
Start: 2018-05-15 | End: 2018-08-16

## 2018-05-15 NOTE — TELEPHONE ENCOUNTER
Pt has appt Future Appointments  Date Time Provider Praful Sussy                 6/4/2018 1:00 PM Giuliana Lubin NP [facility]     Is this ok or does she need to see indira before?

## 2018-05-15 NOTE — TELEPHONE ENCOUNTER
From: Israel Crowley  To: Pedrito Bledsoe DO  Sent: 5/15/2018 10:16 AM CDT  Subject: Prescription Question    [facility] has been trying to get approval for escitalipram and it was denied.  I am switching to mail order as it is a lot less expens

## 2018-05-17 ENCOUNTER — OFFICE VISIT (OUTPATIENT)
Dept: SURGERY | Facility: CLINIC | Age: 53
End: 2018-05-17

## 2018-05-17 VITALS
DIASTOLIC BLOOD PRESSURE: 83 MMHG | WEIGHT: 250 LBS | BODY MASS INDEX: 37.89 KG/M2 | RESPIRATION RATE: 18 BRPM | TEMPERATURE: 99 F | HEART RATE: 86 BPM | HEIGHT: 68 IN | SYSTOLIC BLOOD PRESSURE: 130 MMHG

## 2018-05-17 DIAGNOSIS — K43.0 IRREDUCIBLE VENTRAL INCISIONAL HERNIA: Primary | ICD-10-CM

## 2018-05-17 DIAGNOSIS — IMO0001 CLASS 3 OBESITY WITH SERIOUS COMORBIDITY AND BODY MASS INDEX (BMI) OF 40.0 TO 44.9 IN ADULT, UNSPECIFIED OBESITY TYPE: ICD-10-CM

## 2018-05-17 DIAGNOSIS — I21.4 NSTEMI (NON-ST ELEVATED MYOCARDIAL INFARCTION) (HCC): ICD-10-CM

## 2018-05-17 DIAGNOSIS — E11.65 UNCONTROLLED TYPE 2 DIABETES MELLITUS WITH OTHER SPECIFIED COMPLICATION, WITH LONG-TERM CURRENT USE OF INSULIN: ICD-10-CM

## 2018-05-17 DIAGNOSIS — E11.69 UNCONTROLLED TYPE 2 DIABETES MELLITUS WITH OTHER SPECIFIED COMPLICATION, WITH LONG-TERM CURRENT USE OF INSULIN: ICD-10-CM

## 2018-05-17 DIAGNOSIS — Z79.4 UNCONTROLLED TYPE 2 DIABETES MELLITUS WITH OTHER SPECIFIED COMPLICATION, WITH LONG-TERM CURRENT USE OF INSULIN: ICD-10-CM

## 2018-05-17 DIAGNOSIS — Z86.711 HISTORY OF PULMONARY EMBOLUS (PE): ICD-10-CM

## 2018-05-17 DIAGNOSIS — Z86.718 HISTORY OF RECURRENT DEEP VEIN THROMBOSIS (DVT): ICD-10-CM

## 2018-05-17 DIAGNOSIS — I25.118 CORONARY ARTERY DISEASE OF NATIVE ARTERY OF NATIVE HEART WITH STABLE ANGINA PECTORIS (HCC): ICD-10-CM

## 2018-05-17 PROCEDURE — 99245 OFF/OP CONSLTJ NEW/EST HI 55: CPT | Performed by: COLON & RECTAL SURGERY

## 2018-05-17 NOTE — H&P
New Patient Visit Note       Active Problems      1. Irreducible ventral incisional hernia    2. NSTEMI (non-ST elevated myocardial infarction) ([facility] [address]    3.  Uncontrolled type 2 diabetes mellitus with other specified complication, with long-term current use lobules. One is at the umbilicus, the others just above the umbilicus. There are no loops of bowel in the umbilical region ventral incisional hernia. It is at the site of a previous laparoscopic cholecystectomy incision.     The patient states that she g infiltration of the liver is noted. BILIARY:  Sequelae of cholecystectomy is noted. PANCREAS:  No lesion, fluid collection, ductal dilatation, or atrophy. SPLEEN:  No enlargement or focal lesion.     KIDNEYS:  Angiomyolipoma in the mid right kidney sheba Colonoscopy yes 2016   Barium Enema yes 1998   CT Abdomen yes 5/9/18       Allergies  Starlet Esters is allergic to peanuts [peanut oil] and pcn [penicillins].     Past Medical / Surgical / Social / Family History    The past medical and past surgical history social history have been reviewed by me today.     Family History   Problem Relation Age of Onset   • Heart Disorder Father    • Hypertension Mother    • Lipids Mother    • Royden Man Mother    • Stroke Mother    • Terrien Man Sister exceed 200 mg in 24 hours Disp: 9 tablet Rfl: 0   RaNITidine HCl 150 MG Oral Tab Take 150 mg by mouth 2 (two) times daily. Disp:  Rfl:    carvedilol 6.25 MG Oral Tab Take 6.25 mg by mouth 2 (two) times daily with meals.  Disp:  Rfl:    Pantoprazole Sodium 4 mouth 2 (two) times daily as needed for Sleep. Disp: 45 tablet Rfl: 2   Albuterol Sulfate (VENTOLIN) (2.5 MG/3ML) 0.083% Inhalation Nebu Soln Take 3 mL (2.5 mg total) by nebulization every 4 (four) hours as needed for Wheezing or Shortness of Breath.  Disp: tender at the umbilicus. She has a nonreducible remnant of her ventral incisional hernia near previous laparoscopic incision near the umbilicus. Liver is not palpable. There are no inguinal hernias present. The spleen is not palpable.   Bowel sounds hav cough.  She has no difficulty urinating. She is not constipated. She has had nausea with this recent episode of incarceration but no vomiting. She did have a previous repair of this hernia at the time of her laparoscopic cholecystectomy.     The hernia either the abdominal wall or the abdominal cavity. There is no evidence of ascites. This patient has an incarcerated ventral incisional hernia measuring approximately 3.5 cm in greatest dimension. It is very slightly tender.     In the best of worlds,

## 2018-05-21 ENCOUNTER — OFFICE VISIT (OUTPATIENT)
Dept: FAMILY MEDICINE CLINIC | Facility: CLINIC | Age: 53
End: 2018-05-21

## 2018-05-21 VITALS
SYSTOLIC BLOOD PRESSURE: 120 MMHG | RESPIRATION RATE: 16 BRPM | OXYGEN SATURATION: 98 % | TEMPERATURE: 98 F | WEIGHT: 246.13 LBS | HEIGHT: 66.25 IN | HEART RATE: 98 BPM | DIASTOLIC BLOOD PRESSURE: 70 MMHG | BODY MASS INDEX: 39.55 KG/M2

## 2018-05-21 DIAGNOSIS — K42.9 PERIUMBILICAL HERNIA: Primary | ICD-10-CM

## 2018-05-21 LAB
BUN: 14 MG/DL
CALCIUM: 8.6 MG/DL
CHLORIDE: 106 MEQ/L
CHOLESTEROL, TOTAL: 168 MG/DL
CREATININE, SERUM: 0.75 MG/DL
GLUCOSE: 209 MG/DL
HDL CHOLESTEROL: 28 MG/DL
POTASSIUM, SERUM: 3.4 MEQ/L
SODIUM: 140 MEQ/L
TRIGLYCERIDES: 565 MG/DL

## 2018-05-21 PROCEDURE — 99213 OFFICE O/P EST LOW 20 MIN: CPT | Performed by: FAMILY MEDICINE

## 2018-05-21 NOTE — PATIENT INSTRUCTIONS
What Is a Hernia? A hernia is when an organ or tissue pushes through a weak area in the belly (abdominal) wall. This weak area may be present at birth. Or it may be caused by abdominal strain over time.  If not treated, a hernia can get worse with time Call your healthcare provider right away if the swelling around your hernia becomes larger, firmer, or more painful. These may be signs that your intestines are stuck in the abdominal wall. This is an emergency.  The hernia must be repaired right away to av

## 2018-05-21 NOTE — PROGRESS NOTES
HPI:   Zana Ortiz is a 48year old female that presents for emergency room follow-up. She had abdominal pain and was diagnosed with a periumbilical hernia. She was seen outpatient by surgery Dr. Johnny Kearns, who recommends hernia repair.   Patient is apply Misc, 1 lancet by Finger stick route 4 (four) times daily. , Disp: 1 Box, Rfl: 0  •  Insulin Degludec (TRESIBA FLEXTOUCH) 200 UNIT/ML Subcutaneous Solution Pen-injector, Inject 50 Units into the skin daily. , Disp: , Rfl:   •  Liraglutide (VICTOZA) 18 by mouth daily with food. , Disp: 30 tablet, Rfl: 6  •  PROAIR  (90 Base) MCG/ACT Inhalation Aero Soln, Inhale 2 puffs into the lungs every 6 (six) hours as needed for Wheezing.  , Disp: , Rfl:   •  Fluticasone Furoate-Vilanterol 200-25 MCG/INH Inhal hepatosplenomegaly. +reducible periumbilical hernia  EXTREMITIES:  No edema, no cyanosis, 2+ radial pulses b/l. NEURO:  Grossly normal     ASSESSMENT AND PLAN:      1.  Periumbilical hernia  - given patient is not yet 1 year out from recent MI and DM2 is

## 2018-05-22 ENCOUNTER — PATIENT MESSAGE (OUTPATIENT)
Dept: FAMILY MEDICINE CLINIC | Facility: CLINIC | Age: 53
End: 2018-05-22

## 2018-05-22 NOTE — TELEPHONE ENCOUNTER
From: Gela Garrett  To: Arabella Meyer DO  Sent: 5/22/2018 10:41 AM CDT  Subject: Other    I need a note for the [facility] to keep my electric and water on. They will not give me an extension until tomorrow when I get paid.  I have asthma

## 2018-05-22 NOTE — TELEPHONE ENCOUNTER
Are you okay with witting letter so that patients electric/water will not be turned off? Pended in communications if okay.

## 2018-05-25 ENCOUNTER — PRIOR ORIGINAL RECORDS (OUTPATIENT)
Dept: OTHER | Age: 53
End: 2018-05-25

## 2018-05-25 PROBLEM — K43.0 IRREDUCIBLE VENTRAL INCISIONAL HERNIA: Status: ACTIVE | Noted: 2018-05-25

## 2018-05-25 NOTE — PATIENT INSTRUCTIONS
I am seeing this patient at the request the primary care service, the cardiology service, as well as emergency department. This patient was in the emergency department with very severe epigastric abdominal pain.   She has a history of previous laparoscop clot formation. The patient herself states that she had a myocardial infarction on November 2, 2017. She has been put on Xarelto and Brilinta by the hematologist.  She has been instructed not to get off of these medications.     Clinical examination rev immediately. Strangulated bowel he has somewhere between 2 and 6 hours to be fully reduced to avoid an ischemic event. My next visit with this patient will be on June 11, 2018.   I have asked her to see the hematologist, and cardiologist, prior to that

## 2018-06-01 ENCOUNTER — MED REC SCAN ONLY (OUTPATIENT)
Dept: FAMILY MEDICINE CLINIC | Facility: CLINIC | Age: 53
End: 2018-06-01

## 2018-06-01 DIAGNOSIS — F41.9 ANXIETY: ICD-10-CM

## 2018-06-01 NOTE — TELEPHONE ENCOUNTER
Requesting Alprazolam   LOV: 5/21/18  RTC: 3 month   Last Relevant Labs: n/a   Filled: 3/20/18 #45 with 2 refills    Future Appointments  Date Time Provider Praful Hi   6/4/2018 1:00 PM Elsi Krishnan NP [facility]   6/11/2018 4:30

## 2018-06-04 RX ORDER — ALPRAZOLAM 0.5 MG/1
TABLET ORAL
Qty: 45 TABLET | Refills: 2 | Status: SHIPPED
Start: 2018-06-04 | End: 2018-09-10

## 2018-06-07 ENCOUNTER — TELEPHONE (OUTPATIENT)
Dept: SURGERY | Facility: CLINIC | Age: 53
End: 2018-06-07

## 2018-06-07 ENCOUNTER — PRIOR ORIGINAL RECORDS (OUTPATIENT)
Dept: OTHER | Age: 53
End: 2018-06-07

## 2018-06-18 ENCOUNTER — PATIENT MESSAGE (OUTPATIENT)
Dept: FAMILY MEDICINE CLINIC | Facility: CLINIC | Age: 53
End: 2018-06-18

## 2018-06-18 NOTE — TELEPHONE ENCOUNTER
From: Aleene Krabbe  To: Juel Frankel, DO  Sent: 6/18/2018 10:19 AM CDT  Subject: Other    Letter to the [address] [place] [address] to keep electric and water on.   I received the letter to keep my utilities on, however they still turned me off, and I had

## 2018-07-05 ENCOUNTER — PRIOR ORIGINAL RECORDS (OUTPATIENT)
Dept: OTHER | Age: 53
End: 2018-07-05

## 2018-07-05 LAB — LDL CHOLESTEROL: 85 MG/DL

## 2018-07-09 NOTE — TELEPHONE ENCOUNTER
Requesting:    Pending Prescriptions Disp Refills    Liraglutide (VICTOZA) 18 MG/3ML Subcutaneous Solution Pen-injector 3 pen 0     Sig: Inject 1.2 mg into the skin daily.        LOV: 4/25/18  RTC: 1 month    Filled: 4/25/18 # 3 with 0 refills    No future

## 2018-07-10 NOTE — TELEPHONE ENCOUNTER
appt made 7/11/18. Refill repended to [facility] in [facility].

## 2018-07-11 ENCOUNTER — TELEPHONE (OUTPATIENT)
Dept: INTERNAL MEDICINE CLINIC | Facility: CLINIC | Age: 53
End: 2018-07-11

## 2018-07-12 ENCOUNTER — TELEPHONE (OUTPATIENT)
Dept: INTERNAL MEDICINE CLINIC | Facility: CLINIC | Age: 53
End: 2018-07-12

## 2018-07-12 NOTE — TELEPHONE ENCOUNTER
Pt called left vmail at 911 am  states she is unable to keep appt - asthma is bad and will be going to the ER

## 2018-07-23 ENCOUNTER — PRIOR ORIGINAL RECORDS (OUTPATIENT)
Dept: OTHER | Age: 53
End: 2018-07-23

## 2018-08-16 DIAGNOSIS — F41.9 ANXIETY: ICD-10-CM

## 2018-08-17 RX ORDER — ESCITALOPRAM OXALATE 20 MG/1
20 TABLET ORAL EVERY MORNING
Qty: 30 TABLET | Refills: 0 | Status: SHIPPED | OUTPATIENT
Start: 2018-08-17 | End: 2018-09-10

## 2018-08-17 NOTE — TELEPHONE ENCOUNTER
Requesting Escitalopram  LOV: 5/21/18  RTC: 3 months  Last Relevant Labs: n/a  Filled: 5/15/18 #90 with 0 refills    No future appointments.    was to be seen in August for annual physical    #30 sent to [facility] listed in profile and my chart sent to [facility]pe

## 2018-08-20 ENCOUNTER — OFFICE VISIT (OUTPATIENT)
Dept: INTERNAL MEDICINE CLINIC | Facility: CLINIC | Age: 53
End: 2018-08-20
Payer: COMMERCIAL

## 2018-08-20 VITALS
WEIGHT: 250 LBS | BODY MASS INDEX: 39.24 KG/M2 | SYSTOLIC BLOOD PRESSURE: 122 MMHG | HEIGHT: 67 IN | RESPIRATION RATE: 16 BRPM | HEART RATE: 88 BPM | DIASTOLIC BLOOD PRESSURE: 78 MMHG

## 2018-08-20 DIAGNOSIS — Z79.4 UNCONTROLLED TYPE 2 DIABETES MELLITUS WITH OTHER SPECIFIED COMPLICATION, WITH LONG-TERM CURRENT USE OF INSULIN: ICD-10-CM

## 2018-08-20 DIAGNOSIS — Z51.81 THERAPEUTIC DRUG MONITORING: Primary | ICD-10-CM

## 2018-08-20 DIAGNOSIS — E11.69 UNCONTROLLED TYPE 2 DIABETES MELLITUS WITH OTHER SPECIFIED COMPLICATION, WITH LONG-TERM CURRENT USE OF INSULIN: ICD-10-CM

## 2018-08-20 DIAGNOSIS — E11.65 UNCONTROLLED TYPE 2 DIABETES MELLITUS WITH OTHER SPECIFIED COMPLICATION, WITH LONG-TERM CURRENT USE OF INSULIN: ICD-10-CM

## 2018-08-20 DIAGNOSIS — IMO0001 CLASS 3 OBESITY WITH SERIOUS COMORBIDITY AND BODY MASS INDEX (BMI) OF 40.0 TO 44.9 IN ADULT, UNSPECIFIED OBESITY TYPE: ICD-10-CM

## 2018-08-20 PROCEDURE — 99214 OFFICE O/P EST MOD 30 MIN: CPT | Performed by: INTERNAL MEDICINE

## 2018-08-20 RX ORDER — IRBESARTAN 75 MG/1
75 TABLET ORAL 2 TIMES DAILY
COMMUNITY
End: 2020-05-19

## 2018-08-20 RX ORDER — BLOOD-GLUCOSE SENSOR
EACH MISCELLANEOUS
COMMUNITY
Start: 2018-05-31 | End: 2019-07-02

## 2018-08-21 NOTE — PROGRESS NOTES
HISTORY OF PRESENT ILLNESS  Patient presents with:  Weight Check: down 1 lb      Parrish Boland is a 48year old female here for follow up in medical weight loss program.     Denies chest pain, shortness of breath, dizziness, blurred vision, headache, 05/09/2018   TP 7.5 05/09/2018   ALB 3.6 05/09/2018   AGRATIO 1.7 11/19/2012    05/09/2018   K 3.9 05/09/2018    05/09/2018   CO2 24.0 05/09/2018       Lab Results  Component Value Date    (H) 04/10/2018   A1C 10.4 (H) 04/10/2018       L HCl 150 MG Oral Tab Take 150 mg by mouth 2 (two) times daily. Disp:  Rfl:    carvedilol 6.25 MG Oral Tab Take 6.25 mg by mouth 2 (two) times daily with meals.  Disp:  Rfl:    Pantoprazole Sodium 40 MG Oral Tab EC Take 1 tablet (40 mg total) by mouth 2 (two) No current facility-administered medications on file prior to visit.      ASSESSMENT  Analyzed weight data:       Diagnoses and all orders for this visit:    Therapeutic drug monitoring    Class 3 obesity with serious comorbidity and body mass index (BM

## 2018-09-10 ENCOUNTER — PRIOR ORIGINAL RECORDS (OUTPATIENT)
Dept: OTHER | Age: 53
End: 2018-09-10

## 2018-09-10 ENCOUNTER — OFFICE VISIT (OUTPATIENT)
Dept: FAMILY MEDICINE CLINIC | Facility: CLINIC | Age: 53
End: 2018-09-10
Payer: COMMERCIAL

## 2018-09-10 ENCOUNTER — LAB ENCOUNTER (OUTPATIENT)
Dept: LAB | Age: 53
End: 2018-09-10
Attending: INTERNAL MEDICINE
Payer: COMMERCIAL

## 2018-09-10 VITALS
BODY MASS INDEX: 40.34 KG/M2 | SYSTOLIC BLOOD PRESSURE: 100 MMHG | RESPIRATION RATE: 16 BRPM | DIASTOLIC BLOOD PRESSURE: 70 MMHG | HEIGHT: 66.25 IN | HEART RATE: 80 BPM | WEIGHT: 251 LBS | TEMPERATURE: 98 F

## 2018-09-10 DIAGNOSIS — J02.9 PHARYNGITIS, UNSPECIFIED ETIOLOGY: ICD-10-CM

## 2018-09-10 DIAGNOSIS — Z00.00 ANNUAL PHYSICAL EXAM: Primary | ICD-10-CM

## 2018-09-10 DIAGNOSIS — F33.1 MODERATE EPISODE OF RECURRENT MAJOR DEPRESSIVE DISORDER (HCC): ICD-10-CM

## 2018-09-10 DIAGNOSIS — Z12.39 BREAST CANCER SCREENING: ICD-10-CM

## 2018-09-10 DIAGNOSIS — Z79.4 UNCONTROLLED TYPE 2 DIABETES MELLITUS WITH OTHER SPECIFIED COMPLICATION, WITH LONG-TERM CURRENT USE OF INSULIN: ICD-10-CM

## 2018-09-10 DIAGNOSIS — F41.9 ANXIETY: ICD-10-CM

## 2018-09-10 DIAGNOSIS — Z23 NEEDS FLU SHOT: ICD-10-CM

## 2018-09-10 DIAGNOSIS — E11.65 UNCONTROLLED TYPE 2 DIABETES MELLITUS WITH OTHER SPECIFIED COMPLICATION, WITH LONG-TERM CURRENT USE OF INSULIN: ICD-10-CM

## 2018-09-10 DIAGNOSIS — Z86.711 HISTORY OF PULMONARY EMBOLUS (PE): ICD-10-CM

## 2018-09-10 DIAGNOSIS — Z87.59 HISTORY OF MISCARRIAGE: ICD-10-CM

## 2018-09-10 DIAGNOSIS — IMO0001 CLASS 3 OBESITY WITH SERIOUS COMORBIDITY AND BODY MASS INDEX (BMI) OF 40.0 TO 44.9 IN ADULT, UNSPECIFIED OBESITY TYPE: ICD-10-CM

## 2018-09-10 DIAGNOSIS — J45.41 MODERATE PERSISTENT ASTHMA WITH ACUTE EXACERBATION: ICD-10-CM

## 2018-09-10 DIAGNOSIS — E11.69 UNCONTROLLED TYPE 2 DIABETES MELLITUS WITH OTHER SPECIFIED COMPLICATION, WITH LONG-TERM CURRENT USE OF INSULIN: ICD-10-CM

## 2018-09-10 DIAGNOSIS — Z00.00 ANNUAL PHYSICAL EXAM: ICD-10-CM

## 2018-09-10 PROBLEM — R07.9 ACUTE CHEST PAIN: Status: RESOLVED | Noted: 2018-04-09 | Resolved: 2018-09-10

## 2018-09-10 PROBLEM — Z87.42 HISTORY OF ABNORMAL CERVICAL PAP SMEAR: Status: ACTIVE | Noted: 2018-09-10

## 2018-09-10 LAB
ALBUMIN SERPL-MCNC: 3.4 G/DL (ref 3.5–4.8)
ALBUMIN/GLOB SERPL: 0.9 {RATIO} (ref 1–2)
ALP LIVER SERPL-CCNC: 112 U/L (ref 41–108)
ALT SERPL-CCNC: 33 U/L (ref 14–54)
ANION GAP SERPL CALC-SCNC: 9 MMOL/L (ref 0–18)
AST SERPL-CCNC: 20 U/L (ref 15–41)
BASOPHILS # BLD AUTO: 0.15 X10(3) UL (ref 0–0.1)
BASOPHILS NFR BLD AUTO: 1.1 %
BILIRUB SERPL-MCNC: 0.3 MG/DL (ref 0.1–2)
BUN BLD-MCNC: 10 MG/DL (ref 8–20)
BUN/CREAT SERPL: 14.5 (ref 10–20)
CALCIUM BLD-MCNC: 8.6 MG/DL (ref 8.3–10.3)
CHLORIDE SERPL-SCNC: 106 MMOL/L (ref 101–111)
CHOLEST SMN-MCNC: 161 MG/DL (ref ?–200)
CO2 SERPL-SCNC: 24 MMOL/L (ref 22–32)
CONTROL LINE PRESENT WITH A CLEAR BACKGROUND (YES/NO): YES YES/NO
CREAT BLD-MCNC: 0.69 MG/DL (ref 0.55–1.02)
EOSINOPHIL # BLD AUTO: 0.67 X10(3) UL (ref 0–0.3)
EOSINOPHIL NFR BLD AUTO: 4.9 %
ERYTHROCYTE [DISTWIDTH] IN BLOOD BY AUTOMATED COUNT: 16.7 % (ref 11.5–16)
EST. AVERAGE GLUCOSE BLD GHB EST-MCNC: 163 MG/DL (ref 68–126)
GLOBULIN PLAS-MCNC: 4 G/DL (ref 2.5–4)
GLUCOSE BLD-MCNC: 109 MG/DL (ref 70–99)
HAV AB SERPL IA-ACNC: 538 PG/ML (ref 193–986)
HBA1C MFR BLD HPLC: 7.3 % (ref ?–5.7)
HCT VFR BLD AUTO: 45.7 % (ref 34–50)
HDLC SERPL-MCNC: 42 MG/DL (ref 40–59)
HGB BLD-MCNC: 13.4 G/DL (ref 12–16)
IMMATURE GRANULOCYTE COUNT: 0.08 X10(3) UL (ref 0–1)
IMMATURE GRANULOCYTE RATIO %: 0.6 %
LDLC SERPL CALC-MCNC: 76 MG/DL (ref ?–100)
LYMPHOCYTES # BLD AUTO: 4.95 X10(3) UL (ref 0.9–4)
LYMPHOCYTES NFR BLD AUTO: 36.1 %
M PROTEIN MFR SERPL ELPH: 7.4 G/DL (ref 6.1–8.3)
MCH RBC QN AUTO: 24.1 PG (ref 27–33.2)
MCHC RBC AUTO-ENTMCNC: 29.3 G/DL (ref 31–37)
MCV RBC AUTO: 82.2 FL (ref 81–100)
MONOCYTES # BLD AUTO: 0.64 X10(3) UL (ref 0.1–1)
MONOCYTES NFR BLD AUTO: 4.7 %
NEUTROPHIL ABS PRELIM: 7.21 X10 (3) UL (ref 1.3–6.7)
NEUTROPHILS # BLD AUTO: 7.21 X10(3) UL (ref 1.3–6.7)
NEUTROPHILS NFR BLD AUTO: 52.6 %
NONHDLC SERPL-MCNC: 119 MG/DL (ref ?–130)
OSMOLALITY SERPL CALC.SUM OF ELEC: 288 MOSM/KG (ref 275–295)
PLATELET # BLD AUTO: 442 10(3)UL (ref 150–450)
POTASSIUM SERPL-SCNC: 4 MMOL/L (ref 3.6–5.1)
RBC # BLD AUTO: 5.56 X10(6)UL (ref 3.8–5.1)
RED CELL DISTRIBUTION WIDTH-SD: 49.9 FL (ref 35.1–46.3)
SODIUM SERPL-SCNC: 139 MMOL/L (ref 136–144)
TRIGL SERPL-MCNC: 216 MG/DL (ref 30–149)
TSI SER-ACNC: 0.86 MIU/ML (ref 0.35–5.5)
VIT D+METAB SERPL-MCNC: 10.2 NG/ML (ref 30–100)
VLDLC SERPL CALC-MCNC: 43 MG/DL (ref 0–30)
WBC # BLD AUTO: 13.7 X10(3) UL (ref 4–13)

## 2018-09-10 PROCEDURE — 82306 VITAMIN D 25 HYDROXY: CPT | Performed by: INTERNAL MEDICINE

## 2018-09-10 PROCEDURE — 80050 GENERAL HEALTH PANEL: CPT | Performed by: FAMILY MEDICINE

## 2018-09-10 PROCEDURE — 90471 IMMUNIZATION ADMIN: CPT | Performed by: FAMILY MEDICINE

## 2018-09-10 PROCEDURE — 90686 IIV4 VACC NO PRSV 0.5 ML IM: CPT | Performed by: FAMILY MEDICINE

## 2018-09-10 PROCEDURE — 99213 OFFICE O/P EST LOW 20 MIN: CPT | Performed by: FAMILY MEDICINE

## 2018-09-10 PROCEDURE — 87880 STREP A ASSAY W/OPTIC: CPT | Performed by: FAMILY MEDICINE

## 2018-09-10 PROCEDURE — 83036 HEMOGLOBIN GLYCOSYLATED A1C: CPT | Performed by: FAMILY MEDICINE

## 2018-09-10 PROCEDURE — 99396 PREV VISIT EST AGE 40-64: CPT | Performed by: FAMILY MEDICINE

## 2018-09-10 PROCEDURE — 82607 VITAMIN B-12: CPT | Performed by: INTERNAL MEDICINE

## 2018-09-10 PROCEDURE — 80061 LIPID PANEL: CPT | Performed by: FAMILY MEDICINE

## 2018-09-10 PROCEDURE — 82397 CHEMILUMINESCENT ASSAY: CPT | Performed by: INTERNAL MEDICINE

## 2018-09-10 PROCEDURE — 36415 COLL VENOUS BLD VENIPUNCTURE: CPT | Performed by: FAMILY MEDICINE

## 2018-09-10 RX ORDER — AZITHROMYCIN 250 MG/1
TABLET, FILM COATED ORAL
Qty: 6 TABLET | Refills: 0 | Status: SHIPPED | OUTPATIENT
Start: 2018-09-10 | End: 2018-10-23

## 2018-09-10 RX ORDER — ALPRAZOLAM 0.5 MG/1
TABLET ORAL
Qty: 45 TABLET | Refills: 2 | Status: SHIPPED | OUTPATIENT
Start: 2018-09-10 | End: 2018-12-10

## 2018-09-10 RX ORDER — PREDNISONE 20 MG/1
TABLET ORAL
Qty: 14 TABLET | Refills: 0 | Status: SHIPPED | OUTPATIENT
Start: 2018-09-10 | End: 2018-09-22

## 2018-09-10 RX ORDER — ESCITALOPRAM OXALATE 20 MG/1
20 TABLET ORAL EVERY MORNING
Qty: 90 TABLET | Refills: 1 | Status: SHIPPED | OUTPATIENT
Start: 2018-09-10 | End: 2019-05-02

## 2018-09-10 NOTE — PROGRESS NOTES
Marie Solano is a 48year old female that presents for annual physical exam.     Due for pap. Hx of abnormal pap in 2010 (LSIL) s/p negative colpo and subsequent normal in 2014. Follows with Gyn Dr. Ladi Nava.   She has never had mammogram.  UTD on co Moderate persistent asthma without complication      Laguerre esophagus            EGD 2/2016      HFrEF (heart failure with reduced ejection fraction) ([facility] [address]            Echo 2017: EF 22-09%, grade 1 diastolic            dysfunction                   Migrain into the skin daily. , Disp: 3 pen, Rfl: 1  •  Irbesartan 75 MG Oral Tab, Take 75 mg by mouth 2 (two) times daily. , Disp: , Rfl:   •  Insulin Pen Needle (BD PEN NEEDLE ABDIRIZAK U/F) 32G X 4 MM Does not apply Misc, Inject 1 pen into the skin 2 (two) times daily. Rivaroxaban (XARELTO) 20 MG Oral Tab, Take 1 tablet (20 mg total) by mouth daily with food. , Disp: 30 tablet, Rfl: 6  •  PROAIR  (90 Base) MCG/ACT Inhalation Aero Soln, Inhale 2 puffs into the lungs every 6 (six) hours as needed for Wheezing.  , Dis Osteoarthritis    • Patella-femoral syndrome, bilateral knee 1/15/2015     Past Surgical History:   Procedure Laterality Date   • Breast surgery procedure unlisted      cyst removed   • Cath drug eluting stent     • Cholecystectomy  04/10/2011    PERFORMED or ST  LUNGS: + shortness of breath with exertion  CARDIOVASCULAR: denies chest pain on exertion  GI: denies abdominal pain, +heartburn  : denies dysuria, vaginal discharge or itching, periods irregular   MUSCULOSKELETAL: denies back pain  NEURO: denies other vaccines, flu shot given today  - CBC WITH DIFFERENTIAL WITH PLATELET; Future  - COMP METABOLIC PANEL (14); Future  - TSH W REFLEX TO FREE T4; Future  - LIPID PANEL; Future    2. Breast cancer screening  - YARITZA SCREENING BILAT (CPT=77067);  Future    3 NEEDED  Dispense: 45 tablet; Refill: 2    8. Needs flu shot  - FLULAVAL INFLUENZA VACCINE QUAD PRESERVATIVE FREE 0.5 ML      Risks, benefits, and alternatives of current treatment plan discussed in detail. Questions and concerns addressed.  Red flags to RT

## 2018-09-10 NOTE — PATIENT INSTRUCTIONS
Thank you for allowing me to participate in your care today. I will contact you with any results from today's visit. Lab results are typically available in 2-3 days for blood tests, and 3-5 days for any cultures or Paps.    Please let me know if you hav familiar with the potential benefits and risks of breast cancer screening with mammograms.      Cervical cancer All women in this age group, except women who have had a complete hysterectomy Pap test every 3 years or Pap test with human papillomavirus (HPV your healthcare provider 2 doses given at least 6 months apart   Hepatitis B Women at increased risk for infection – talk with your healthcare provider 3 doses over 6 months; second dose should be given 1 month after the first dose; the third dose should b at risk for cardiovascular health problems such as stroke When your risk is known   Use of tobacco and the health effects it can cause All women in this age group Every exam   [address]  Date Last Reviewed: 1/26/2016  © [id number] [employer] medicine to loosen mucus or reduce coughing  · Bedrest or reduced activity  · Increased fluids to loosen mucus and replace lost fluids from sweating  Make sure you check with your healthcare provider or pharmacist before taking any over-the-counter medicin

## 2018-09-11 RX ORDER — ERGOCALCIFEROL 1.25 MG/1
50000 CAPSULE ORAL WEEKLY
Qty: 4 CAPSULE | Refills: 2 | Status: SHIPPED | OUTPATIENT
Start: 2018-09-11 | End: 2018-10-11

## 2018-09-12 LAB
ALBUMIN: 3.4 G/DL
ALKALINE PHOSPHATATE(ALK PHOS): 112 IU/L
BILIRUBIN TOTAL: 0.3 MG/DL
BUN: 10 MG/DL
CALCIUM: 8.6 MG/DL
CHLORIDE: 106 MEQ/L
CHOLESTEROL, TOTAL: 161 MG/DL
CREATININE, SERUM: 0.69 MG/DL
GLOBULIN: 4 G/DL
GLUCOSE: 109 MG/DL
HDL CHOLESTEROL: 42 MG/DL
LDL CHOLESTEROL: 76 MG/DL
POTASSIUM, SERUM: 4 MEQ/L
PROTEIN, TOTAL: 7.4 G/DL
SGOT (AST): 20 IU/L
SGPT (ALT): 33 IU/L
SODIUM: 139 MEQ/L
TRIGLYCERIDES: 216 MG/DL

## 2018-09-13 LAB — LEPTIN: 17.8 NG/ML

## 2018-09-17 DIAGNOSIS — E11.69 UNCONTROLLED TYPE 2 DIABETES MELLITUS WITH OTHER SPECIFIED COMPLICATION, WITH LONG-TERM CURRENT USE OF INSULIN: Primary | ICD-10-CM

## 2018-09-17 DIAGNOSIS — Z79.4 UNCONTROLLED TYPE 2 DIABETES MELLITUS WITH OTHER SPECIFIED COMPLICATION, WITH LONG-TERM CURRENT USE OF INSULIN: Primary | ICD-10-CM

## 2018-09-17 DIAGNOSIS — D72.829 LEUKOCYTOSIS, UNSPECIFIED TYPE: ICD-10-CM

## 2018-09-17 DIAGNOSIS — E11.65 UNCONTROLLED TYPE 2 DIABETES MELLITUS WITH OTHER SPECIFIED COMPLICATION, WITH LONG-TERM CURRENT USE OF INSULIN: Primary | ICD-10-CM

## 2018-09-19 ENCOUNTER — PRIOR ORIGINAL RECORDS (OUTPATIENT)
Dept: OTHER | Age: 53
End: 2018-09-19

## 2018-10-01 ENCOUNTER — MED REC SCAN ONLY (OUTPATIENT)
Dept: FAMILY MEDICINE CLINIC | Facility: CLINIC | Age: 53
End: 2018-10-01

## 2018-10-17 ENCOUNTER — HOSPITAL ENCOUNTER (EMERGENCY)
Age: 53
Discharge: HOME | End: 2018-10-17
Attending: EMERGENCY MEDICINE
Payer: COMMERCIAL

## 2018-10-17 ENCOUNTER — PRIOR ORIGINAL RECORDS (OUTPATIENT)
Dept: OTHER | Age: 53
End: 2018-10-17

## 2018-10-17 ENCOUNTER — APPOINTMENT (OUTPATIENT)
Dept: GENERAL RADIOLOGY | Age: 53
End: 2018-10-17
Attending: EMERGENCY MEDICINE
Payer: COMMERCIAL

## 2018-10-17 VITALS
WEIGHT: 215 LBS | BODY MASS INDEX: 32.58 KG/M2 | RESPIRATION RATE: 15 BRPM | HEART RATE: 90 BPM | SYSTOLIC BLOOD PRESSURE: 156 MMHG | DIASTOLIC BLOOD PRESSURE: 79 MMHG | HEIGHT: 68 IN | TEMPERATURE: 99 F | OXYGEN SATURATION: 95 %

## 2018-10-17 DIAGNOSIS — R00.2 PALPITATIONS: ICD-10-CM

## 2018-10-17 DIAGNOSIS — R11.2 NAUSEA AND VOMITING IN ADULT: Primary | ICD-10-CM

## 2018-10-17 PROCEDURE — 84484 ASSAY OF TROPONIN QUANT: CPT | Performed by: EMERGENCY MEDICINE

## 2018-10-17 PROCEDURE — 96374 THER/PROPH/DIAG INJ IV PUSH: CPT

## 2018-10-17 PROCEDURE — 93010 ELECTROCARDIOGRAM REPORT: CPT

## 2018-10-17 PROCEDURE — 85007 BL SMEAR W/DIFF WBC COUNT: CPT | Performed by: EMERGENCY MEDICINE

## 2018-10-17 PROCEDURE — 80053 COMPREHEN METABOLIC PANEL: CPT | Performed by: EMERGENCY MEDICINE

## 2018-10-17 PROCEDURE — 85025 COMPLETE CBC W/AUTO DIFF WBC: CPT | Performed by: EMERGENCY MEDICINE

## 2018-10-17 PROCEDURE — 96361 HYDRATE IV INFUSION ADD-ON: CPT

## 2018-10-17 PROCEDURE — 99285 EMERGENCY DEPT VISIT HI MDM: CPT

## 2018-10-17 PROCEDURE — 71045 X-RAY EXAM CHEST 1 VIEW: CPT | Performed by: EMERGENCY MEDICINE

## 2018-10-17 PROCEDURE — 96375 TX/PRO/DX INJ NEW DRUG ADDON: CPT

## 2018-10-17 PROCEDURE — 93005 ELECTROCARDIOGRAM TRACING: CPT

## 2018-10-17 PROCEDURE — 85027 COMPLETE CBC AUTOMATED: CPT | Performed by: EMERGENCY MEDICINE

## 2018-10-17 RX ORDER — KETOROLAC TROMETHAMINE 30 MG/ML
15 INJECTION, SOLUTION INTRAMUSCULAR; INTRAVENOUS ONCE
Status: COMPLETED | OUTPATIENT
Start: 2018-10-17 | End: 2018-10-17

## 2018-10-17 RX ORDER — SODIUM CHLORIDE 9 MG/ML
125 INJECTION, SOLUTION INTRAVENOUS CONTINUOUS
Status: DISCONTINUED | OUTPATIENT
Start: 2018-10-17 | End: 2018-10-17

## 2018-10-17 RX ORDER — ONDANSETRON 4 MG/1
4 TABLET, ORALLY DISINTEGRATING ORAL EVERY 4 HOURS PRN
Qty: 10 TABLET | Refills: 0 | Status: SHIPPED | OUTPATIENT
Start: 2018-10-17 | End: 2018-10-23

## 2018-10-17 RX ORDER — ONDANSETRON 2 MG/ML
4 INJECTION INTRAMUSCULAR; INTRAVENOUS ONCE
Status: COMPLETED | OUTPATIENT
Start: 2018-10-17 | End: 2018-10-17

## 2018-10-17 NOTE — ED PROVIDER NOTES
Patient Seen in: [facility] [place]    History   Patient presents with:  Nausea/Vomiting/Diarrhea (gastrointestinal)    Stated Complaint: vomiting, palpatations    HPI    Patient is a pleasant 65-year-old female, presenting for mio Felipe Esqueda MD;  Location: [facility] ENDOSCOPY   • COLONOSCOPY N/A 2/23/2016    Performed by Matthew Rivera MD at [facility] ENDOSCOPY   • COLPOSCOPY, CERVIX W/UPPER ADJACENT VAGINA; W/ENDOCERVICAL CURETTAGE  2-2011    WNL   • ESOPHAGOGASTRODUODENOSCOPY (EGD) N/A 2/23/2 oriented x3. Cranial nerves are grossly intact. There is no gross motor or sensory deficits identified.       ED Course     Labs Reviewed   COMP METABOLIC PANEL (14) - Abnormal; Notable for the following components:       Result Value    Glucose 105 (*) vomiting and palpitations for last 3 days. FINDINGS: Cardiac silhouette and pulmonary vasculature are unremarkable. No consolidation, pleural effusion or pneumothorax. IMPRESSION: Unremarkable portable chest radiograph.     Dictated by: Jerilyn Hernandez hours as needed for Nausea. , Print Script, Disp-10 tablet, R-0

## 2018-10-18 ENCOUNTER — TELEPHONE (OUTPATIENT)
Dept: FAMILY MEDICINE CLINIC | Facility: CLINIC | Age: 53
End: 2018-10-18

## 2018-10-18 NOTE — TELEPHONE ENCOUNTER
Patient was seen in ER and recommended a 3 day f/u appt for vomiting and palpitations.  I called hp# and l/m vc mail with our ph# to call and schedule f/u if needed

## 2018-10-23 ENCOUNTER — OFFICE VISIT (OUTPATIENT)
Dept: FAMILY MEDICINE CLINIC | Facility: CLINIC | Age: 53
End: 2018-10-23
Payer: COMMERCIAL

## 2018-10-23 VITALS
SYSTOLIC BLOOD PRESSURE: 120 MMHG | WEIGHT: 253 LBS | RESPIRATION RATE: 16 BRPM | TEMPERATURE: 98 F | HEART RATE: 72 BPM | DIASTOLIC BLOOD PRESSURE: 80 MMHG | HEIGHT: 66.25 IN | BODY MASS INDEX: 40.66 KG/M2

## 2018-10-23 DIAGNOSIS — E55.9 VITAMIN D DEFICIENCY: ICD-10-CM

## 2018-10-23 DIAGNOSIS — F33.1 MODERATE EPISODE OF RECURRENT MAJOR DEPRESSIVE DISORDER (HCC): ICD-10-CM

## 2018-10-23 DIAGNOSIS — R11.2 NON-INTRACTABLE VOMITING WITH NAUSEA, UNSPECIFIED VOMITING TYPE: Primary | ICD-10-CM

## 2018-10-23 DIAGNOSIS — G43.709 CHRONIC MIGRAINE WITHOUT AURA WITHOUT STATUS MIGRAINOSUS, NOT INTRACTABLE: ICD-10-CM

## 2018-10-23 DIAGNOSIS — E53.8 B12 DEFICIENCY: ICD-10-CM

## 2018-10-23 PROCEDURE — 99214 OFFICE O/P EST MOD 30 MIN: CPT | Performed by: FAMILY MEDICINE

## 2018-10-23 PROCEDURE — 96372 THER/PROPH/DIAG INJ SC/IM: CPT | Performed by: FAMILY MEDICINE

## 2018-10-23 RX ORDER — SUMATRIPTAN 50 MG/1
50 TABLET, FILM COATED ORAL EVERY 2 HOUR PRN
Qty: 9 TABLET | Refills: 1 | Status: SHIPPED | OUTPATIENT
Start: 2018-10-23 | End: 2018-11-20

## 2018-10-23 RX ORDER — METOPROLOL SUCCINATE 25 MG/1
25 TABLET, EXTENDED RELEASE ORAL 2 TIMES DAILY
Status: ON HOLD | COMMUNITY
End: 2019-12-27

## 2018-10-23 RX ADMIN — CYANOCOBALAMIN 1000 MCG: 1000 INJECTION INTRAMUSCULAR; SUBCUTANEOUS at 16:55:00

## 2018-10-23 NOTE — PROGRESS NOTES
HPI:   Ulises Lara is a 48year old female that presents for ER follow-up on 10/17. Patient went in for headaches, vomiting/nausea and  palpitations. She received zofran and toradol at the ER which helped significantly. CXR, EKG and labs negative. anticoagulation       Type II diabetes mellitus, uncontrolled (HCC)      Patella-femoral syndrome, bilateral knee      History of recurrent deep vein thrombosis (DVT)            Negative hypercoagulable work up, on lifelong            anticoagulation Pen-injector, Inject 50 Units into the skin daily. , Disp: , Rfl:   •  Insulin Pen Needle (NOVOFINE PLUS) 32G X 4 MM Does not apply Misc, Use to inject Victoza daily. , Disp: 1 Box, Rfl: 0  •  RaNITidine HCl 150 MG Oral Tab, Take 150 mg by mouth 2 (two) time Tab, Take 10 mg by mouth nightly., Disp: , Rfl:     REVIEW OF SYSTEMS:     Comprehensive ROS negative unless noted in HPI    PHYSICAL EXAM:   /80   Pulse 72   Temp 98.3 °F (36.8 °C) (Oral)   Resp 16   Ht 66.25\"   Wt 253 lb   LMP 09/23/2004   BMI 40. once.     5. Chronic migraine without aura without status migrainosus, not intractable  - SUMAtriptan Succinate 50 MG Oral Tab; Take 1 tablet (50 mg total) by mouth every 2 (two) hours as needed for Migraine.  Do not exceed 200 mg in 24 hours  Dispense: 9 t

## 2018-10-23 NOTE — PATIENT INSTRUCTIONS
Fiber 35 g / day   -Benefiber powder mixed in with large glass of water (or any liquid).   It is tasteless.  -Fiber gummies  -Fiber One cereal (Honey Oat)     Recent studies show adding fiber to diet can cause 5-6 lb weight loss over 12 weeks

## 2018-10-24 RX ORDER — CYANOCOBALAMIN 1000 UG/ML
1000 INJECTION INTRAMUSCULAR; SUBCUTANEOUS ONCE
Status: COMPLETED | OUTPATIENT
Start: 2018-10-24 | End: 2018-10-23

## 2018-10-29 PROBLEM — E53.8 B12 DEFICIENCY: Status: ACTIVE | Noted: 2018-10-29

## 2018-11-01 ENCOUNTER — PRIOR ORIGINAL RECORDS (OUTPATIENT)
Dept: OTHER | Age: 53
End: 2018-11-01

## 2018-11-01 ENCOUNTER — MED REC SCAN ONLY (OUTPATIENT)
Dept: FAMILY MEDICINE CLINIC | Facility: CLINIC | Age: 53
End: 2018-11-01

## 2018-11-04 ENCOUNTER — HOSPITAL ENCOUNTER (EMERGENCY)
Age: 53
Discharge: HOME | End: 2018-11-04
Payer: MEDICARE

## 2018-11-04 ENCOUNTER — HOSPITAL ENCOUNTER (EMERGENCY)
Facility: HOSPITAL | Age: 53
Discharge: HOME | End: 2018-11-04
Attending: EMERGENCY MEDICINE
Payer: COMMERCIAL

## 2018-11-04 ENCOUNTER — APPOINTMENT (OUTPATIENT)
Dept: CT IMAGING | Facility: HOSPITAL | Age: 53
End: 2018-11-04
Attending: EMERGENCY MEDICINE
Payer: COMMERCIAL

## 2018-11-04 ENCOUNTER — APPOINTMENT (OUTPATIENT)
Dept: GENERAL RADIOLOGY | Facility: HOSPITAL | Age: 53
End: 2018-11-04
Attending: EMERGENCY MEDICINE
Payer: COMMERCIAL

## 2018-11-04 VITALS
TEMPERATURE: 98 F | BODY MASS INDEX: 37.89 KG/M2 | HEIGHT: 68 IN | SYSTOLIC BLOOD PRESSURE: 111 MMHG | HEART RATE: 108 BPM | OXYGEN SATURATION: 96 % | RESPIRATION RATE: 17 BRPM | DIASTOLIC BLOOD PRESSURE: 69 MMHG | WEIGHT: 250 LBS

## 2018-11-04 DIAGNOSIS — B37.2 CANDIDA INFECTION OF FLEXURAL SKIN: ICD-10-CM

## 2018-11-04 DIAGNOSIS — J45.41 MODERATE PERSISTENT ASTHMA WITH EXACERBATION: Primary | ICD-10-CM

## 2018-11-04 PROBLEM — R73.9 HYPERGLYCEMIA: Status: ACTIVE | Noted: 2018-11-04

## 2018-11-04 PROBLEM — D72.829 LEUKOCYTOSIS: Status: ACTIVE | Noted: 2018-11-04

## 2018-11-04 PROBLEM — E87.3 RESPIRATORY ALKALOSIS: Status: ACTIVE | Noted: 2018-11-04

## 2018-11-04 PROBLEM — E87.3 METABOLIC ALKALOSIS: Status: ACTIVE | Noted: 2018-11-04

## 2018-11-04 PROBLEM — E87.2 METABOLIC ACIDOSIS: Status: ACTIVE | Noted: 2018-11-04

## 2018-11-04 PROBLEM — E87.20 METABOLIC ACIDOSIS: Status: ACTIVE | Noted: 2018-11-04

## 2018-11-04 PROCEDURE — 99285 EMERGENCY DEPT VISIT HI MDM: CPT

## 2018-11-04 PROCEDURE — 87040 BLOOD CULTURE FOR BACTERIA: CPT | Performed by: EMERGENCY MEDICINE

## 2018-11-04 PROCEDURE — 94644 CONT INHLJ TX 1ST HOUR: CPT

## 2018-11-04 PROCEDURE — 94664 DEMO&/EVAL PT USE INHALER: CPT

## 2018-11-04 PROCEDURE — 93005 ELECTROCARDIOGRAM TRACING: CPT

## 2018-11-04 PROCEDURE — 84484 ASSAY OF TROPONIN QUANT: CPT | Performed by: EMERGENCY MEDICINE

## 2018-11-04 PROCEDURE — 71275 CT ANGIOGRAPHY CHEST: CPT | Performed by: EMERGENCY MEDICINE

## 2018-11-04 PROCEDURE — 83880 ASSAY OF NATRIURETIC PEPTIDE: CPT | Performed by: EMERGENCY MEDICINE

## 2018-11-04 PROCEDURE — 80053 COMPREHEN METABOLIC PANEL: CPT | Performed by: EMERGENCY MEDICINE

## 2018-11-04 PROCEDURE — 36600 WITHDRAWAL OF ARTERIAL BLOOD: CPT | Performed by: EMERGENCY MEDICINE

## 2018-11-04 PROCEDURE — 83050 HGB METHEMOGLOBIN QUAN: CPT | Performed by: EMERGENCY MEDICINE

## 2018-11-04 PROCEDURE — 83605 ASSAY OF LACTIC ACID: CPT | Performed by: EMERGENCY MEDICINE

## 2018-11-04 PROCEDURE — 82803 BLOOD GASES ANY COMBINATION: CPT | Performed by: EMERGENCY MEDICINE

## 2018-11-04 PROCEDURE — 93010 ELECTROCARDIOGRAM REPORT: CPT

## 2018-11-04 PROCEDURE — 96374 THER/PROPH/DIAG INJ IV PUSH: CPT

## 2018-11-04 PROCEDURE — 94645 CONT INHLJ TX EACH ADDL HOUR: CPT

## 2018-11-04 PROCEDURE — 36415 COLL VENOUS BLD VENIPUNCTURE: CPT

## 2018-11-04 PROCEDURE — 85025 COMPLETE CBC W/AUTO DIFF WBC: CPT | Performed by: EMERGENCY MEDICINE

## 2018-11-04 PROCEDURE — 71045 X-RAY EXAM CHEST 1 VIEW: CPT | Performed by: EMERGENCY MEDICINE

## 2018-11-04 PROCEDURE — 82375 ASSAY CARBOXYHB QUANT: CPT | Performed by: EMERGENCY MEDICINE

## 2018-11-04 PROCEDURE — 85018 HEMOGLOBIN: CPT | Performed by: EMERGENCY MEDICINE

## 2018-11-04 PROCEDURE — 96361 HYDRATE IV INFUSION ADD-ON: CPT

## 2018-11-04 RX ORDER — PREDNISONE 10 MG/1
TABLET ORAL
Qty: 30 TABLET | Refills: 0 | Status: SHIPPED | OUTPATIENT
Start: 2018-11-04 | End: 2018-11-16

## 2018-11-04 RX ORDER — NYSTATIN 100000 U/G
1 OINTMENT TOPICAL 3 TIMES DAILY
Qty: 30 G | Refills: 0 | Status: SHIPPED | OUTPATIENT
Start: 2018-11-04 | End: 2019-10-08

## 2018-11-04 RX ORDER — METHYLPREDNISOLONE SODIUM SUCCINATE 125 MG/2ML
125 INJECTION, POWDER, LYOPHILIZED, FOR SOLUTION INTRAMUSCULAR; INTRAVENOUS ONCE
Status: COMPLETED | OUTPATIENT
Start: 2018-11-04 | End: 2018-11-04

## 2018-11-04 NOTE — ED INITIAL ASSESSMENT (HPI)
Pt states woke at 0200 with DEN and SOB. Pt also notes retaining fluids in bilateral legs. Pt with labored breathing while at rest or speaking. Pt states  \"I just don't feel good. \"

## 2018-11-05 ENCOUNTER — TELEPHONE (OUTPATIENT)
Dept: FAMILY MEDICINE CLINIC | Facility: CLINIC | Age: 53
End: 2018-11-05

## 2018-11-05 NOTE — TELEPHONE ENCOUNTER
Spoke to the patient for ED outreach, patient is doing better, was told to follow up with asthma physician. She is overdue for a physical, will call when things settle down.

## 2018-11-05 NOTE — ED PROVIDER NOTES
Patient Seen in: [facility] Emergency Department    History   Patient presents with:  Dyspnea DEN SOB (respiratory)    Stated Complaint: Shortness of breath -     HPI    62-year-old with a history of hypertension, diabetes, high cholesterol, PE on X (nonalcoholic steatohepatitis)    • NSTEMI (non-ST elevated myocardial infarction) ([facility] [address] 2017    FELTON in LAD   • Obesity    • Osteoarthritis    • Patella-femoral syndrome, bilateral knee 1/15/2015       Past Surgical History:   Procedure Laterality Date   • Mucous members are moist.   Cardiovascular: Regular rate and rhythm, normal S1-S2. Respiratory: Lungs are clear to auscultation bilaterally. Abdomen: Soft, nontender, nondistended. Back: No CVA tenderness. Extremities: No pitting edema.   No unilatera BLUE   RAINBOW DRAW LAVENDER   RAINBOW DRAW LIGHT GREEN   RAINBOW DRAW GOLD   BLOOD CULTURE   BLOOD CULTURE     EKG    Rate, intervals and axes as noted on EKG Report. Rate: 99  Rhythm: Sinus Rhythm  Reading: Low voltage QRS.          Chest x-ray: No acute

## 2018-11-08 LAB
ALBUMIN: 3.5 G/DL
ALKALINE PHOSPHATATE(ALK PHOS): 114 IU/L
BILIRUBIN TOTAL: 0.3 MG/DL
BUN: 6 MG/DL
CALCIUM: 9.2 MG/DL
CHLORIDE: 105 MEQ/L
CREATININE, SERUM: 0.6 MG/DL
GLOBULIN: 3.9 G/DL
GLUCOSE: 105 MG/DL
HEMATOCRIT: 43.9 %
HEMOGLOBIN: 13.7 G/DL
PLATELETS: 397 K/UL
POTASSIUM, SERUM: 3.9 MEQ/L
PROTEIN, TOTAL: 7.4 G/DL
RED BLOOD COUNT: 5.56 X 10-6/U
SGOT (AST): 24 IU/L
SGPT (ALT): 34 IU/L
SODIUM: 139 MEQ/L
WHITE BLOOD COUNT: 13.6 X 10-3/U

## 2018-11-19 DIAGNOSIS — G43.709 CHRONIC MIGRAINE WITHOUT AURA WITHOUT STATUS MIGRAINOSUS, NOT INTRACTABLE: ICD-10-CM

## 2018-11-20 RX ORDER — SUMATRIPTAN 50 MG/1
50 TABLET, FILM COATED ORAL EVERY 2 HOUR PRN
Qty: 9 TABLET | Refills: 1 | Status: SHIPPED | OUTPATIENT
Start: 2018-11-20 | End: 2018-12-11

## 2018-11-20 NOTE — TELEPHONE ENCOUNTER
Requesting SUMAtriptan Succinate 50 MG  LOV: 10/23/18  RTC: 3 mos  Last Labs: 11/4/18  Filled: 10/23/18#9with 1 refills    Future Appointments   Date Time Provider Praful Hi   11/29/2018 12:45 PM [facility] CARD ECHO RM 2 ECARD ECHO [facility] AT [facility]   12/10/201

## 2018-11-29 ENCOUNTER — MYAURORA ACCOUNT LINK (OUTPATIENT)
Dept: OTHER | Age: 53
End: 2018-11-29

## 2018-11-29 ENCOUNTER — HOSPITAL ENCOUNTER (OUTPATIENT)
Dept: CV DIAGNOSTICS | Facility: HOSPITAL | Age: 53
Discharge: HOME | End: 2018-11-29
Attending: INTERNAL MEDICINE

## 2018-11-29 DIAGNOSIS — I25.5 CARDIOMYOPATHY, ISCHEMIC: ICD-10-CM

## 2018-12-04 ENCOUNTER — PATIENT MESSAGE (OUTPATIENT)
Dept: FAMILY MEDICINE CLINIC | Facility: CLINIC | Age: 53
End: 2018-12-04

## 2018-12-04 NOTE — TELEPHONE ENCOUNTER
Requesting Letter for Com Ed  LOV: 10/23/18  RTC: 3 months      Future Appointments   Date Time Provider Praful Hi   12/10/2018  2:40 PM Fatimah Acuña MD [facility] [address]     I have printed form for you to sign so she will not lose electricity.   Cecilio Mcclure

## 2018-12-04 NOTE — TELEPHONE ENCOUNTER
From: Eather Mode  To: Doretha Edwards DO  Sent: 12/4/2018 1:45 PM CST  Subject: Non-Urgent Medical Question    Hi,   I need a letter for ComEd to avoid disconnection. [email or web address]

## 2018-12-10 ENCOUNTER — PRIOR ORIGINAL RECORDS (OUTPATIENT)
Dept: OTHER | Age: 53
End: 2018-12-10

## 2018-12-10 DIAGNOSIS — F33.1 MODERATE EPISODE OF RECURRENT MAJOR DEPRESSIVE DISORDER (HCC): ICD-10-CM

## 2018-12-10 DIAGNOSIS — F41.9 ANXIETY: ICD-10-CM

## 2018-12-10 DIAGNOSIS — G43.709 CHRONIC MIGRAINE WITHOUT AURA WITHOUT STATUS MIGRAINOSUS, NOT INTRACTABLE: ICD-10-CM

## 2018-12-11 RX ORDER — ALPRAZOLAM 0.5 MG/1
TABLET ORAL
Qty: 45 TABLET | Refills: 2 | Status: SHIPPED
Start: 2018-12-11 | End: 2019-07-02

## 2018-12-11 RX ORDER — SUMATRIPTAN 50 MG/1
TABLET, FILM COATED ORAL
Qty: 9 TABLET | Refills: 2 | Status: SHIPPED | OUTPATIENT
Start: 2018-12-11 | End: 2019-07-22

## 2018-12-11 RX ORDER — ERGOCALCIFEROL 1.25 MG/1
CAPSULE ORAL
Qty: 4 CAPSULE | Refills: 2 | OUTPATIENT
Start: 2018-12-11

## 2018-12-11 NOTE — TELEPHONE ENCOUNTER
Requesting   SUMAtriptan Succinate 50 MG and ALPRAZolam 0.5 MG  LOV: 10/23/18  RTC: 3 mos  Last Labs: 11/4/18  Filled:   SUMAtriptan Succinate 50 MG  ALPRAZolam 0.5 MG    No future appointments.     [place]  Dispensed Written Strength Quantity Refills Days S

## 2018-12-11 NOTE — TELEPHONE ENCOUNTER
Requesting   Requested Prescriptions     Pending Prescriptions Disp Refills   • ERGOCALCIFEROL 05487 units Oral Cap [Pharmacy Med Name: VITAMIN D2 (ERGO)50,000 IU CAP] 4 capsule 2     Sig: TAKE 1 CAPSULE BY MOUTH ONCE A WEEK     LOV: 8/20/18  RTC: 4 weeks

## 2018-12-18 DIAGNOSIS — F41.9 ANXIETY: ICD-10-CM

## 2018-12-18 DIAGNOSIS — F33.1 MODERATE EPISODE OF RECURRENT MAJOR DEPRESSIVE DISORDER (HCC): ICD-10-CM

## 2018-12-18 RX ORDER — ERGOCALCIFEROL 1.25 MG/1
50000 CAPSULE ORAL WEEKLY
Qty: 4 CAPSULE | Refills: 2 | OUTPATIENT
Start: 2018-12-18 | End: 2019-01-17

## 2018-12-18 RX ORDER — ALPRAZOLAM 0.5 MG/1
0.5 TABLET ORAL DAILY
Qty: 45 TABLET | Refills: 2
Start: 2018-12-18

## 2018-12-18 NOTE — TELEPHONE ENCOUNTER
Requesting  Alprazolam and Vit D   LOV: 10/23/18  RTC: 3 mos  Last Labs: Vit D 9/10/18  Filled: 12/11/18 #45 with 2 refills alprazolam  Denied with note that rx was faxed already   No future appointments.     [place]   Dispensed Written Strength Quantity Ref

## 2018-12-18 NOTE — TELEPHONE ENCOUNTER
Recd refill request from [address] in [facility] for Vitamin D2 54012 capsules. Unable to escribe due to original prescriber. Also recd request for refill on Alprazolam 0.5 mg tablets for quantity of 45.

## 2018-12-27 ENCOUNTER — MYAURORA ACCOUNT LINK (OUTPATIENT)
Dept: OTHER | Age: 53
End: 2018-12-27

## 2018-12-27 ENCOUNTER — PRIOR ORIGINAL RECORDS (OUTPATIENT)
Dept: OTHER | Age: 53
End: 2018-12-27

## 2019-01-01 ENCOUNTER — EXTERNAL RECORD (OUTPATIENT)
Dept: HEALTH INFORMATION MANAGEMENT | Facility: OTHER | Age: 54
End: 2019-01-01

## 2019-01-03 DIAGNOSIS — B37.3 VAGINAL YEAST INFECTION: ICD-10-CM

## 2019-01-03 RX ORDER — PANTOPRAZOLE SODIUM 40 MG/1
TABLET, DELAYED RELEASE ORAL
COMMUNITY
Start: 2018-11-16 | End: 2019-02-21

## 2019-01-03 RX ORDER — FLUCONAZOLE 150 MG/1
150 TABLET ORAL ONCE
Qty: 2 TABLET | Refills: 0 | Status: SHIPPED | OUTPATIENT
Start: 2019-01-03 | End: 2019-01-03

## 2019-01-03 NOTE — TELEPHONE ENCOUNTER
Regarding: Non-Urgent Medical Question  Contact: [phone]  ----- Message from [address] [place] [address] [address] Generic sent at 1/3/2019  1:22 PM CST -----    I have a nasty yeast infection, was hoping I can get a prescription for fluconazole to [facility] in [place].     K

## 2019-01-03 NOTE — TELEPHONE ENCOUNTER
Patient states she has a yeast infection, white creamy discharge, No recent antibiotic. No foul smell  Has had them in the past and takes Fluconazole 150 mg.

## 2019-01-11 ENCOUNTER — TELEPHONE (OUTPATIENT)
Dept: FAMILY MEDICINE CLINIC | Facility: CLINIC | Age: 54
End: 2019-01-11

## 2019-01-11 NOTE — TELEPHONE ENCOUNTER
DO Claudia Agarwal, RN   Caller: Unspecified (Today, 11:11 AM)             I don't see any calls that she has asked for [facility] to refill without a visit. Ramesh Dear probably would give her a refill.  If not, I can give it to her, but she shoul

## 2019-01-11 NOTE — TELEPHONE ENCOUNTER
Dr. Mickey Dasilva- Pt's sugar is running high last night 400. Taken again today 200. Pt was seen at weight loss clinic and has not been able to get back into get rx filled for Victoza which was helping control levels. Pt is asking for new rx for Victoza.

## 2019-01-11 NOTE — TELEPHONE ENCOUNTER
PT states she has been seeing Dr Win Rebolledo and was receiving Victoza and this was helping her keep her Sugar levels under control and states she has not been able to get in to see Dr Win Rebolledo for a refill.  She thinks Victoza, was helping keep her sugars under contr

## 2019-01-14 ENCOUNTER — OFFICE VISIT (OUTPATIENT)
Dept: FAMILY MEDICINE CLINIC | Facility: CLINIC | Age: 54
End: 2019-01-14
Payer: COMMERCIAL

## 2019-01-14 ENCOUNTER — TELEPHONE (OUTPATIENT)
Dept: FAMILY MEDICINE CLINIC | Facility: CLINIC | Age: 54
End: 2019-01-14

## 2019-01-14 VITALS
HEART RATE: 85 BPM | RESPIRATION RATE: 20 BRPM | BODY MASS INDEX: 41.68 KG/M2 | WEIGHT: 259.38 LBS | HEIGHT: 66.25 IN | DIASTOLIC BLOOD PRESSURE: 82 MMHG | SYSTOLIC BLOOD PRESSURE: 126 MMHG | OXYGEN SATURATION: 98 % | TEMPERATURE: 98 F

## 2019-01-14 DIAGNOSIS — E11.65 UNCONTROLLED TYPE 2 DIABETES MELLITUS WITH HYPERGLYCEMIA (HCC): Primary | ICD-10-CM

## 2019-01-14 DIAGNOSIS — B37.9 YEAST INFECTION: ICD-10-CM

## 2019-01-14 PROCEDURE — 99214 OFFICE O/P EST MOD 30 MIN: CPT | Performed by: FAMILY MEDICINE

## 2019-01-14 RX ORDER — BLOOD-GLUCOSE,RECEIVER,CONT
1 EACH MISCELLANEOUS CONTINUOUS
Qty: 1 DEVICE | Refills: 1 | Status: SHIPPED | OUTPATIENT
Start: 2019-01-14 | End: 2021-07-21 | Stop reason: CLARIF

## 2019-01-14 RX ORDER — BLOOD-GLUCOSE SENSOR
EACH MISCELLANEOUS
Qty: 3 EACH | Refills: 0 | Status: CANCELLED | OUTPATIENT
Start: 2019-01-14

## 2019-01-14 RX ORDER — PREDNISONE 10 MG/1
40 TABLET ORAL DAILY
COMMUNITY
End: 2019-10-08

## 2019-01-14 RX ORDER — BLOOD-GLUCOSE SENSOR
EACH MISCELLANEOUS
Refills: 0 | Status: CANCELLED | OUTPATIENT
Start: 2019-01-14

## 2019-01-14 RX ORDER — FLUCONAZOLE 150 MG/1
TABLET ORAL
Qty: 2 TABLET | Refills: 0 | Status: SHIPPED | OUTPATIENT
Start: 2019-01-14 | End: 2019-07-02 | Stop reason: ALTCHOICE

## 2019-01-14 RX ORDER — BLOOD-GLUCOSE SENSOR
1 EACH MISCELLANEOUS CONTINUOUS
Qty: 3 EACH | Refills: 1 | Status: SHIPPED | OUTPATIENT
Start: 2019-01-14 | End: 2019-07-02

## 2019-01-14 RX ORDER — BLOOD-GLUCOSE TRANSMITTER
EACH MISCELLANEOUS
Qty: 1 EACH | Refills: 3 | Status: SHIPPED | OUTPATIENT
Start: 2019-01-14 | End: 2021-07-21 | Stop reason: CLARIF

## 2019-01-14 NOTE — PATIENT INSTRUCTIONS
Labs  Schedule Mammogram Central Scheduling [phone]      Healthy Meals for Diabetes     A healthcare provider will help you develop a meal plan that fits your needs. Ask your healthcare team to help you make a meal plan that fits your needs.  You Eating protein that is low in fat can help you control your weight. It also helps keep your heart healthy.  Low-fat protein foods include:  · Fish  · Plant proteins, such as dry beans and peas, nuts, and soy products like tofu and soymilk  · Lean meat with Snacks with less than 5 grams of carbohydrates  · 1 piece of string cheese  · 3 celery sticks plus 1 tablespoon of peanut butter  · 5 cherry tomatoes plus 1 tablespoon of ranch dressing  · 1 hard-boiled egg  · 1/4 cup of fresh blueberries  ·  5 baby carrot Servings and portions. What’s the difference? These terms can be very confusing. But learning to measure serving sizes can help you figure out how many carbohydrates (“carbs”) and other foods you eat each day.  They are also powerful tools for managing your If your weight is a concern, reducing your portions can help. You can eat more than one serving of a food at once. But to keep from eating too much at one meal, learn how to manage your portions. A portion is the amount of each type of food on your plate.

## 2019-01-14 NOTE — TELEPHONE ENCOUNTER
Spencer spoke with Dr. Jamal Way not too long ago and would like to let our office know that they have the [employer] [facility]  and transmitter but will need a new script for the sensors. Spencer says one box is supplied for 3 days.

## 2019-01-14 NOTE — PROGRESS NOTES
HPI:   Autumn Miller is a 48year old female that presents for follow up DM2. Last a1c 7.3 on tresiba 50 u qd and victoza.   Lenny White worked well for her and that's when her sugars have been at their best.  She also felt the continuous glucose mo Negative hypercoagulable work up, on lifelong            anticoagulation       Type II diabetes mellitus, uncontrolled (HCC)      Patella-femoral syndrome, bilateral knee      History of recurrent deep vein thrombosis (DVT)            Negative hyp physical for further refills, Disp: 90 tablet, Rfl: 1  •  Irbesartan 75 MG Oral Tab, Take 75 mg by mouth 2 (two) times daily. , Disp: , Rfl:   •  Insulin Pen Needle (BD PEN NEEDLE ABDIRIZAK U/F) 32G X 4 MM Does not apply Misc, Inject 1 pen into the skin 2 (two) ergocalciferol 88536 UNITS Oral Cap, Take 50,000 Units by mouth twice a week.  Tuesdays, Thursdays , Disp: , Rfl:   •  Albuterol Sulfate (VENTOLIN) (2.5 MG/3ML) 0.083% Inhalation Nebu Soln, Take 3 mL (2.5 mg total) by nebulization every 4 (four) hours as ne sounds normal in all 4 quadrants, no masses, no hepatosplenomegaly. EXTREMITIES:  No edema, no cyanosis, 2+ radial pulses b/l. NEURO:  Grossly normal     ASSESSMENT AND PLAN:      1.  Uncontrolled type 2 diabetes mellitus with hyperglycemia ([facility] [address]  - John Herrera

## 2019-01-15 NOTE — TELEPHONE ENCOUNTER
Requesting: Tresiba   LOV: 1/14/19  RTC: 3 months  Last Relevant Labs: 9/10/18 A1C: 7.3      No future appointments.     -medication pended for approval

## 2019-01-21 ENCOUNTER — PRIOR ORIGINAL RECORDS (OUTPATIENT)
Dept: OTHER | Age: 54
End: 2019-01-21

## 2019-01-21 NOTE — TELEPHONE ENCOUNTER
PT wants rx sent to local pharmacy   RX sent to local pharmacy   Insulin Degludec (TRESIBA FLEXTOUCH) 200 UNIT/ML Subcutaneous Solution Pen-injector 10 pen 1 1/15/2019     Sig - Route: Inject 50 Units into the skin daily. - Subcutaneous    Sent to pharmacy

## 2019-01-22 ENCOUNTER — TELEPHONE (OUTPATIENT)
Dept: FAMILY MEDICINE CLINIC | Facility: CLINIC | Age: 54
End: 2019-01-22

## 2019-01-22 ENCOUNTER — PRIOR ORIGINAL RECORDS (OUTPATIENT)
Dept: OTHER | Age: 54
End: 2019-01-22

## 2019-01-22 NOTE — TELEPHONE ENCOUNTER
Refill for [facility] was supposed to go to the [facility], not to mail order, please re-direct.

## 2019-01-28 ENCOUNTER — PRIOR ORIGINAL RECORDS (OUTPATIENT)
Dept: OTHER | Age: 54
End: 2019-01-28

## 2019-02-21 ENCOUNTER — PRIOR ORIGINAL RECORDS (OUTPATIENT)
Dept: OTHER | Age: 54
End: 2019-02-21

## 2019-02-21 RX ORDER — PANTOPRAZOLE SODIUM 40 MG/1
40 TABLET, DELAYED RELEASE ORAL
Qty: 90 TABLET | Refills: 1 | Status: SHIPPED | OUTPATIENT
Start: 2019-02-21 | End: 2019-04-30

## 2019-02-21 NOTE — TELEPHONE ENCOUNTER
Pantoprazole Sodium 40 mg tab    Last OV relevant to medication: 10/23/2018  Last refill date:11/16/2018  n/a #/refills: n/a  When pt was asked to return for OV: 3 months for medication management - pt was seen 01/14/2019 for Diabetes  Upcoming appt/reason

## 2019-02-22 ENCOUNTER — PRIOR ORIGINAL RECORDS (OUTPATIENT)
Dept: OTHER | Age: 54
End: 2019-02-22

## 2019-02-28 VITALS
WEIGHT: 256 LBS | DIASTOLIC BLOOD PRESSURE: 70 MMHG | BODY MASS INDEX: 40.18 KG/M2 | SYSTOLIC BLOOD PRESSURE: 124 MMHG | HEIGHT: 67 IN | HEART RATE: 105 BPM

## 2019-02-28 VITALS
HEART RATE: 99 BPM | DIASTOLIC BLOOD PRESSURE: 78 MMHG | SYSTOLIC BLOOD PRESSURE: 120 MMHG | BODY MASS INDEX: 39.24 KG/M2 | WEIGHT: 250 LBS | HEIGHT: 67 IN

## 2019-02-28 VITALS
WEIGHT: 251 LBS | HEIGHT: 67 IN | BODY MASS INDEX: 39.39 KG/M2 | DIASTOLIC BLOOD PRESSURE: 72 MMHG | HEART RATE: 89 BPM | SYSTOLIC BLOOD PRESSURE: 124 MMHG

## 2019-02-28 VITALS
SYSTOLIC BLOOD PRESSURE: 102 MMHG | HEART RATE: 95 BPM | BODY MASS INDEX: 39.24 KG/M2 | WEIGHT: 250 LBS | HEIGHT: 67 IN | DIASTOLIC BLOOD PRESSURE: 56 MMHG

## 2019-02-28 VITALS
SYSTOLIC BLOOD PRESSURE: 102 MMHG | DIASTOLIC BLOOD PRESSURE: 70 MMHG | HEIGHT: 67 IN | HEART RATE: 80 BPM | WEIGHT: 245 LBS | BODY MASS INDEX: 38.45 KG/M2

## 2019-02-28 VITALS — WEIGHT: 262 LBS | DIASTOLIC BLOOD PRESSURE: 70 MMHG | SYSTOLIC BLOOD PRESSURE: 120 MMHG | HEART RATE: 80 BPM

## 2019-02-28 VITALS
SYSTOLIC BLOOD PRESSURE: 118 MMHG | WEIGHT: 248 LBS | HEART RATE: 84 BPM | DIASTOLIC BLOOD PRESSURE: 72 MMHG | HEIGHT: 67 IN | BODY MASS INDEX: 38.92 KG/M2

## 2019-02-28 VITALS
BODY MASS INDEX: 37.44 KG/M2 | HEIGHT: 68 IN | SYSTOLIC BLOOD PRESSURE: 100 MMHG | HEART RATE: 74 BPM | WEIGHT: 247 LBS | DIASTOLIC BLOOD PRESSURE: 78 MMHG

## 2019-03-18 RX ORDER — ESCITALOPRAM OXALATE 20 MG/1
TABLET ORAL
COMMUNITY
Start: 2017-11-14

## 2019-03-18 RX ORDER — TORSEMIDE 20 MG/1
TABLET ORAL
COMMUNITY
Start: 2018-05-18

## 2019-03-18 RX ORDER — MONTELUKAST SODIUM 10 MG/1
TABLET ORAL
COMMUNITY
Start: 2014-11-24

## 2019-03-18 RX ORDER — LOSARTAN POTASSIUM 100 MG/1
TABLET ORAL
COMMUNITY
End: 2019-04-30 | Stop reason: SDUPTHER

## 2019-03-18 RX ORDER — FLUTICASONE PROPIONATE 50 MCG
SPRAY, SUSPENSION (ML) NASAL
COMMUNITY
Start: 2014-11-24

## 2019-03-18 RX ORDER — EZETIMIBE 10 MG/1
1 TABLET ORAL DAILY
COMMUNITY
Start: 2018-05-18 | End: 2019-07-15 | Stop reason: SDUPTHER

## 2019-03-18 RX ORDER — ROSUVASTATIN CALCIUM 5 MG/1
TABLET, COATED ORAL
COMMUNITY
Start: 2018-05-18 | End: 2019-07-15 | Stop reason: SDUPTHER

## 2019-03-18 RX ORDER — ALBUTEROL SULFATE 90 UG/1
AEROSOL, METERED RESPIRATORY (INHALATION)
COMMUNITY

## 2019-03-18 RX ORDER — POTASSIUM CHLORIDE 750 MG/1
1 TABLET, FILM COATED, EXTENDED RELEASE ORAL DAILY
COMMUNITY
Start: 2018-05-18

## 2019-03-18 RX ORDER — ALBUTEROL SULFATE 2.5 MG/3ML
SOLUTION RESPIRATORY (INHALATION)
COMMUNITY
Start: 2014-11-24 | End: 2019-04-04 | Stop reason: SDUPTHER

## 2019-03-18 RX ORDER — METOPROLOL SUCCINATE 25 MG/1
1 TABLET, EXTENDED RELEASE ORAL 2 TIMES DAILY
COMMUNITY
Start: 2018-05-08 | End: 2019-08-15 | Stop reason: DRUGHIGH

## 2019-03-18 RX ORDER — ALPRAZOLAM 0.5 MG/1
1 TABLET ORAL 2 TIMES DAILY
COMMUNITY
Start: 2018-02-15

## 2019-03-18 RX ORDER — RANITIDINE 150 MG/1
TABLET ORAL
COMMUNITY
End: 2020-03-11 | Stop reason: ALTCHOICE

## 2019-03-18 RX ORDER — CLOPIDOGREL BISULFATE 75 MG/1
TABLET ORAL
COMMUNITY
Start: 2018-12-27 | End: 2019-04-30 | Stop reason: SDUPTHER

## 2019-04-04 ENCOUNTER — OFFICE VISIT (OUTPATIENT)
Dept: CARDIOLOGY | Age: 54
End: 2019-04-04

## 2019-04-04 VITALS
HEART RATE: 129 BPM | DIASTOLIC BLOOD PRESSURE: 74 MMHG | SYSTOLIC BLOOD PRESSURE: 110 MMHG | HEIGHT: 68 IN | WEIGHT: 262 LBS | BODY MASS INDEX: 39.71 KG/M2

## 2019-04-04 DIAGNOSIS — I25.5 CARDIOMYOPATHY, ISCHEMIC: ICD-10-CM

## 2019-04-04 DIAGNOSIS — I25.10 CORONARY ARTERY DISEASE INVOLVING NATIVE CORONARY ARTERY OF NATIVE HEART WITHOUT ANGINA PECTORIS: Primary | ICD-10-CM

## 2019-04-04 DIAGNOSIS — Z95.5 PRESENCE OF DRUG COATED STENT IN LAD CORONARY ARTERY: ICD-10-CM

## 2019-04-04 DIAGNOSIS — E78.2 HYPERLIPIDEMIA, MIXED: ICD-10-CM

## 2019-04-04 PROCEDURE — 93000 ELECTROCARDIOGRAM COMPLETE: CPT | Performed by: INTERNAL MEDICINE

## 2019-04-04 PROCEDURE — 99214 OFFICE O/P EST MOD 30 MIN: CPT | Performed by: INTERNAL MEDICINE

## 2019-04-04 RX ORDER — INSULIN DEGLUDEC 100 U/ML
INJECTION, SOLUTION SUBCUTANEOUS
COMMUNITY

## 2019-04-04 RX ORDER — METOPROLOL SUCCINATE 25 MG/1
25 TABLET, EXTENDED RELEASE ORAL DAILY
COMMUNITY
End: 2019-07-19 | Stop reason: SDUPTHER

## 2019-04-09 DIAGNOSIS — K21.9 GASTROESOPHAGEAL REFLUX DISEASE, ESOPHAGITIS PRESENCE NOT SPECIFIED: ICD-10-CM

## 2019-04-09 RX ORDER — PANTOPRAZOLE SODIUM 40 MG/1
TABLET, DELAYED RELEASE ORAL
Qty: 42 TABLET | Refills: 8 | OUTPATIENT
Start: 2019-04-09

## 2019-04-30 NOTE — TELEPHONE ENCOUNTER
RX sent on 2/21/19 for a 6 month supply to [facility]. NGRAIN message sent to patient if she received the [facility] from [facility] or if he would like me to cancel and send to [facility].

## 2019-05-01 RX ORDER — PANTOPRAZOLE SODIUM 40 MG/1
40 TABLET, DELAYED RELEASE ORAL
Qty: 30 TABLET | Refills: 0 | Status: SHIPPED | OUTPATIENT
Start: 2019-05-01 | End: 2019-05-23

## 2019-05-01 RX ORDER — LOSARTAN POTASSIUM 100 MG/1
100 TABLET ORAL DAILY
Qty: 90 TABLET | Refills: 1 | Status: SHIPPED | OUTPATIENT
Start: 2019-05-01 | End: 2019-10-07 | Stop reason: SDUPTHER

## 2019-05-01 RX ORDER — CLOPIDOGREL BISULFATE 75 MG/1
75 TABLET ORAL DAILY
Qty: 90 TABLET | Refills: 1 | Status: SHIPPED | OUTPATIENT
Start: 2019-05-01 | End: 2019-10-07 | Stop reason: SDUPTHER

## 2019-05-01 NOTE — TELEPHONE ENCOUNTER
Patient states she did not receive the mail order Rx sent but should receive it on 5/6, she is completed out of medication and is requesting RX to be sent to [facility] in [place]. Ok per Dr. Merribeth Favre to send medication to [facility] in [facility].   Patient notified

## 2019-05-02 DIAGNOSIS — F33.1 MODERATE EPISODE OF RECURRENT MAJOR DEPRESSIVE DISORDER (HCC): ICD-10-CM

## 2019-05-02 DIAGNOSIS — F41.9 ANXIETY: ICD-10-CM

## 2019-05-02 DIAGNOSIS — E11.65 UNCONTROLLED TYPE 2 DIABETES MELLITUS WITH HYPERGLYCEMIA (HCC): ICD-10-CM

## 2019-05-02 RX ORDER — ESCITALOPRAM OXALATE 20 MG/1
20 TABLET ORAL EVERY MORNING
Qty: 90 TABLET | Refills: 1 | Status: SHIPPED | OUTPATIENT
Start: 2019-05-02 | End: 2019-10-07

## 2019-05-02 NOTE — TELEPHONE ENCOUNTER
[facility] requesting clarification on the direction for medication Escitalopram 20mg, Victoza pen injector, and Pantoprazole 40mg. Victoza and Escitalopram pended as these were sent to [facility].   Pantoprazole was sent on 2/21/19 for a 6 mo

## 2019-05-15 RX ORDER — RIVAROXABAN 20 MG/1
TABLET, FILM COATED ORAL
Qty: 90 TABLET | Refills: 1 | Status: SHIPPED | OUTPATIENT
Start: 2019-05-15 | End: 2019-10-31 | Stop reason: SDUPTHER

## 2019-05-23 RX ORDER — PANTOPRAZOLE SODIUM 40 MG/1
40 TABLET, DELAYED RELEASE ORAL
Qty: 90 TABLET | Refills: 1 | Status: SHIPPED | OUTPATIENT
Start: 2019-05-23 | End: 2020-01-30

## 2019-05-23 NOTE — TELEPHONE ENCOUNTER
Requesting Pantoprazole Sodium 40 MG Oral Tab EC  LOV: 1/14/19  RTC: 3 mos  Last Labs: 11/4/18  Filled: 5/1/19#30 with 0 refills    Future Appointments   Date Time Provider Praful Hi   5/23/2019  3:00 PM Roya Hull DO [facility] [address] [facility] [address]

## 2019-05-28 RX ORDER — PANTOPRAZOLE SODIUM 40 MG/1
TABLET, DELAYED RELEASE ORAL
Qty: 30 TABLET | Refills: 0 | OUTPATIENT
Start: 2019-05-28

## 2019-06-05 ENCOUNTER — TELEPHONE (OUTPATIENT)
Dept: FAMILY MEDICINE CLINIC | Facility: CLINIC | Age: 54
End: 2019-06-05

## 2019-06-05 NOTE — TELEPHONE ENCOUNTER
Called [facility] for a copy of the most recent eye exam for the patient. They wanted a release form which was not obtained at the time of the visit.  They said if the patient came into their office they would release a copy of the exam to the link

## 2019-07-02 ENCOUNTER — OFFICE VISIT (OUTPATIENT)
Dept: FAMILY MEDICINE CLINIC | Facility: CLINIC | Age: 54
End: 2019-07-02
Payer: COMMERCIAL

## 2019-07-02 VITALS
OXYGEN SATURATION: 99 % | RESPIRATION RATE: 16 BRPM | HEIGHT: 66.25 IN | BODY MASS INDEX: 41.14 KG/M2 | SYSTOLIC BLOOD PRESSURE: 124 MMHG | HEART RATE: 80 BPM | DIASTOLIC BLOOD PRESSURE: 74 MMHG | WEIGHT: 256 LBS | TEMPERATURE: 98 F

## 2019-07-02 DIAGNOSIS — F33.1 MODERATE EPISODE OF RECURRENT MAJOR DEPRESSIVE DISORDER (HCC): ICD-10-CM

## 2019-07-02 DIAGNOSIS — J01.10 ACUTE NON-RECURRENT FRONTAL SINUSITIS: Primary | ICD-10-CM

## 2019-07-02 DIAGNOSIS — F41.9 ANXIETY: ICD-10-CM

## 2019-07-02 DIAGNOSIS — J45.41 MODERATE PERSISTENT ASTHMA WITH ACUTE EXACERBATION: ICD-10-CM

## 2019-07-02 PROCEDURE — 99214 OFFICE O/P EST MOD 30 MIN: CPT | Performed by: FAMILY MEDICINE

## 2019-07-02 PROCEDURE — 94640 AIRWAY INHALATION TREATMENT: CPT | Performed by: FAMILY MEDICINE

## 2019-07-02 RX ORDER — IPRATROPIUM BROMIDE AND ALBUTEROL SULFATE 2.5; .5 MG/3ML; MG/3ML
3 SOLUTION RESPIRATORY (INHALATION) ONCE
Status: COMPLETED | OUTPATIENT
Start: 2019-07-02 | End: 2019-07-02

## 2019-07-02 RX ORDER — METHYLPREDNISOLONE 4 MG/1
TABLET ORAL
Qty: 1 KIT | Refills: 0 | Status: SHIPPED | OUTPATIENT
Start: 2019-07-02 | End: 2019-07-02

## 2019-07-02 RX ORDER — ALPRAZOLAM 0.5 MG/1
0.5 TABLET ORAL 2 TIMES DAILY PRN
Qty: 45 TABLET | Refills: 2 | Status: SHIPPED | OUTPATIENT
Start: 2019-07-02 | End: 2019-10-10

## 2019-07-02 RX ORDER — AZITHROMYCIN 250 MG/1
TABLET, FILM COATED ORAL
Qty: 6 TABLET | Refills: 0 | Status: SHIPPED | OUTPATIENT
Start: 2019-07-02 | End: 2019-10-08

## 2019-07-02 RX ORDER — PREDNISONE 50 MG/1
50 TABLET ORAL DAILY
Qty: 5 TABLET | Refills: 0 | Status: SHIPPED | OUTPATIENT
Start: 2019-07-02 | End: 2019-07-07

## 2019-07-02 RX ADMIN — IPRATROPIUM BROMIDE AND ALBUTEROL SULFATE 3 ML: 2.5; .5 SOLUTION RESPIRATORY (INHALATION) at 15:00:00

## 2019-07-02 NOTE — PATIENT INSTRUCTIONS
Discharge Instructions for Asthma  You have been diagnosed with an asthma attack.  With the help of your healthcare provider, you can keep your asthma under control and have less emergency department visits and stays in the hospital.    Managing asthma  · · Don't use carpets or cloth-covered furniture in your home. If this is not possible, keep pets out of rooms with these items. · Have someone bathe your pets every week. And brush them often. If you smoke, do your best to quit.   · Enroll in a stop-smokin © 6398-4315 [employer] [address]. [address] [facility], [facility] [place]. All rights reserved. This information is not intended as a substitute for professional medical care. Always follow your healthcare professional's instructions.

## 2019-07-02 NOTE — PROGRESS NOTES
CHIEF COMPLAINT:   Patient presents with:  Sinus Problem: For a couple weeks she has sinus pressure and nasal congestion      HPI:   Robbie Mooney is a 48year old female who presents for cold symptoms for  2  weeks.  Symptoms have progressed into sin Continuous Blood Gluc  (DEXCOM G6 ) Does not apply Device 1 Device by Does not apply route continuous.  Disp: 1 Device Rfl: 1   Continuous Blood Gluc Transmit (DEXCOM G6 TRANSMITTER) Does not apply Misc Use as directed Disp: 1 each Rfl: 3 Fluticasone Furoate-Vilanterol 200-25 MCG/INH Inhalation Aerosol Powder, Breath Activated Inhale 1 puff into the lungs daily. Disp: 1 each Rfl: 0   Fluticasone Propionate 50 MCG/ACT Nasal Suspension 2 sprays by Each Nare route 2 (two) times daily.  Disp:  R • CATH DRUG ELUTING STENT     • CHOLECYSTECTOMY  04/10/2011    PERFORMED BY DR Goran Christine   • COLONOSCOPY  2012    polyps   • COLONOSCOPY N/A 2/23/2016    Performed by Austen Coppola MD at [address], CERVIX W/UPPER ADJACENT VA THROAT: oral mucosa pink, moist. No visible dental caries. Posterior pharynx is slightly erythematous. NECK: supple, non-tender  LUNGS: Breathing is slightly labored. Lungs with wheezes in all lung fields.    CARDIO: RRR without murmur  EXTREMITIES: no cya Signed Prescriptions Disp Refills   • azithromycin 250 MG Oral Tab 6 tablet 0     Sig: Take two tablets by mouth today, then one daily. • predniSONE 50 MG Oral Tab 5 tablet 0     Sig: Take 1 tablet (50 mg total) by mouth daily for 5 days.    • ALPRAZolam · Don’t sleep or lie on cloth-covered cushions or furniture. · Ask someone else to vacuum and dust your house. · If you do vacuum and dust yourself, wear a dust mask. You can buy them from the [employer] store.   · Use a vacuum with a double-layered bag or Call 911 right away if you have:  · Severe wheezing  · Shortness of breath that is not relieved by your quick-relief medicine  · Trouble walking or talking because of shortness of breath  · Blue lips or fingernails  · If you monitor symptoms with a peak fl

## 2019-07-11 NOTE — PROGRESS NOTES
Received DM eye exam dated 5/14/19 from Dr. Briana Bonds. Exam shows diabetic retinopathy. Report abstracted.

## 2019-07-16 RX ORDER — EZETIMIBE 10 MG/1
TABLET ORAL
Qty: 90 TABLET | Refills: 1 | Status: SHIPPED | OUTPATIENT
Start: 2019-07-16 | End: 2020-01-23 | Stop reason: SDUPTHER

## 2019-07-16 RX ORDER — ROSUVASTATIN CALCIUM 5 MG/1
TABLET, COATED ORAL
Qty: 36 TABLET | Refills: 1 | Status: SHIPPED | OUTPATIENT
Start: 2019-07-16 | End: 2020-01-23 | Stop reason: SDUPTHER

## 2019-07-18 ENCOUNTER — TELEPHONE (OUTPATIENT)
Dept: CARDIOLOGY | Age: 54
End: 2019-07-18

## 2019-07-19 RX ORDER — METOPROLOL SUCCINATE 25 MG/1
25 TABLET, EXTENDED RELEASE ORAL DAILY
Qty: 90 TABLET | Refills: 3 | Status: SHIPPED | OUTPATIENT
Start: 2019-07-19 | End: 2020-07-28

## 2019-07-22 ENCOUNTER — TELEPHONE (OUTPATIENT)
Dept: FAMILY MEDICINE CLINIC | Facility: CLINIC | Age: 54
End: 2019-07-22

## 2019-07-22 DIAGNOSIS — G43.709 CHRONIC MIGRAINE WITHOUT AURA WITHOUT STATUS MIGRAINOSUS, NOT INTRACTABLE: ICD-10-CM

## 2019-07-22 RX ORDER — SUMATRIPTAN 50 MG/1
TABLET, FILM COATED ORAL
Qty: 9 TABLET | Refills: 3 | Status: SHIPPED | OUTPATIENT
Start: 2019-07-22 | End: 2020-05-19

## 2019-07-22 RX ORDER — PANTOPRAZOLE SODIUM 40 MG/1
40 TABLET, DELAYED RELEASE ORAL
Qty: 90 TABLET | Refills: 1 | Status: CANCELLED | OUTPATIENT
Start: 2019-07-22

## 2019-07-22 NOTE — TELEPHONE ENCOUNTER
Requesting Pantoprazole Sodium 40 MG, SUMAtriptan Succinate 50 MG    LOV: 1/14/19  RTC: 3 months  Last Relevant Labs: 11/4/18  Filled: 12/11/18 # 9 with 2 refills  5/23/19 # 90 with 1 refill    No future appointments.

## 2019-07-22 NOTE — TELEPHONE ENCOUNTER
Refill sent for sumatriptan. Pantoprazole refilled for 6 months in May, pt still has refills.      Vivienne Mata, DO  Family Medicine

## 2019-07-22 NOTE — TELEPHONE ENCOUNTER
(1) Pantoprazole Sodium 40 MG Oral Tab EC  (2) SUMATRIPTAN SUCCINATE 50 MG Oral Tab    Janice De Luna - [phone] --- Ref # [id number] --- Verbal Authorization Needed. .. unable to fax/escribe at this time.     [facility]

## 2019-07-23 NOTE — TELEPHONE ENCOUNTER
Per Kelin People at [facility] states they never received rx for Pantoprazole. Verbal RX given. Disp Refills Start End    Pantoprazole Sodium 40 MG Oral Tab EC 90 tablet 1 5/23/2019     Sig - Route:  Take 1 tablet (40 mg total) by mouth every morning

## 2019-07-30 ENCOUNTER — TELEPHONE (OUTPATIENT)
Dept: CARDIOLOGY | Age: 54
End: 2019-07-30

## 2019-08-14 ENCOUNTER — TELEPHONE (OUTPATIENT)
Dept: FAMILY MEDICINE CLINIC | Facility: CLINIC | Age: 54
End: 2019-08-14

## 2019-08-14 RX ORDER — FLUCONAZOLE 150 MG/1
150 TABLET ORAL ONCE
Qty: 1 TABLET | Refills: 0 | Status: SHIPPED | OUTPATIENT
Start: 2019-08-14 | End: 2019-10-31

## 2019-08-14 NOTE — TELEPHONE ENCOUNTER
Received PA request from [facility] for Tresiba medication. PA was submitted to [facility] and they state medication does NOT need a PA and its covered under her insurance.     [facility] was notified via fax re: the response to [facility]

## 2019-08-15 ENCOUNTER — OFFICE VISIT (OUTPATIENT)
Dept: CARDIOLOGY | Age: 54
End: 2019-08-15

## 2019-08-15 VITALS
HEART RATE: 64 BPM | WEIGHT: 255 LBS | SYSTOLIC BLOOD PRESSURE: 120 MMHG | DIASTOLIC BLOOD PRESSURE: 82 MMHG | BODY MASS INDEX: 38.65 KG/M2 | HEIGHT: 68 IN

## 2019-08-15 DIAGNOSIS — Z95.5 PRESENCE OF DRUG COATED STENT IN LAD CORONARY ARTERY: ICD-10-CM

## 2019-08-15 DIAGNOSIS — I25.5 CARDIOMYOPATHY, ISCHEMIC: ICD-10-CM

## 2019-08-15 DIAGNOSIS — R07.2 PRECORDIAL PAIN: ICD-10-CM

## 2019-08-15 DIAGNOSIS — I25.10 CORONARY ARTERY DISEASE INVOLVING NATIVE CORONARY ARTERY OF NATIVE HEART WITHOUT ANGINA PECTORIS: ICD-10-CM

## 2019-08-15 DIAGNOSIS — I10 ESSENTIAL HYPERTENSION: Primary | ICD-10-CM

## 2019-08-15 DIAGNOSIS — E78.2 HYPERLIPIDEMIA, MIXED: ICD-10-CM

## 2019-08-15 DIAGNOSIS — Z82.49 FAMILY HISTORY OF EARLY CAD: ICD-10-CM

## 2019-08-15 PROCEDURE — 3079F DIAST BP 80-89 MM HG: CPT | Performed by: INTERNAL MEDICINE

## 2019-08-15 PROCEDURE — 99214 OFFICE O/P EST MOD 30 MIN: CPT | Performed by: INTERNAL MEDICINE

## 2019-08-15 PROCEDURE — 3074F SYST BP LT 130 MM HG: CPT | Performed by: INTERNAL MEDICINE

## 2019-08-21 ENCOUNTER — HOSPITAL ENCOUNTER (OUTPATIENT)
Dept: LAB | Facility: HOSPITAL | Age: 54
Discharge: HOME | End: 2019-08-21
Attending: INTERNAL MEDICINE
Payer: COMMERCIAL

## 2019-08-21 ENCOUNTER — HOSPITAL ENCOUNTER (OUTPATIENT)
Dept: CV DIAGNOSTICS | Facility: HOSPITAL | Age: 54
Discharge: HOME | End: 2019-08-21
Attending: INTERNAL MEDICINE
Payer: COMMERCIAL

## 2019-08-21 DIAGNOSIS — I25.10 CAD (CORONARY ARTERY DISEASE): ICD-10-CM

## 2019-08-21 DIAGNOSIS — R07.2 PERICARDIAL PAIN: ICD-10-CM

## 2019-08-21 DIAGNOSIS — Z95.5 PRESENCE OF DRUG COATED STENT IN LAD CORONARY ARTERY: ICD-10-CM

## 2019-08-21 LAB
ALBUMIN SERPL-MCNC: 3.6 G/DL (ref 3.4–5)
ALBUMIN/GLOB SERPL: 1 {RATIO} (ref 1–2)
ALP LIVER SERPL-CCNC: 112 U/L (ref 41–108)
ALT SERPL-CCNC: 35 U/L (ref 13–56)
ANION GAP SERPL CALC-SCNC: 8 MMOL/L (ref 0–18)
AST SERPL-CCNC: 20 U/L (ref 15–37)
BILIRUB SERPL-MCNC: 0.4 MG/DL (ref 0.1–2)
BUN BLD-MCNC: 10 MG/DL (ref 7–18)
BUN/CREAT SERPL: 13 (ref 10–20)
CALCIUM BLD-MCNC: 8.7 MG/DL (ref 8.5–10.1)
CHLORIDE SERPL-SCNC: 109 MMOL/L (ref 98–112)
CHOLEST SMN-MCNC: 154 MG/DL (ref ?–200)
CO2 SERPL-SCNC: 24 MMOL/L (ref 21–32)
CREAT BLD-MCNC: 0.77 MG/DL (ref 0.55–1.02)
GLOBULIN PLAS-MCNC: 3.7 G/DL (ref 2.8–4.4)
GLUCOSE BLD-MCNC: 171 MG/DL (ref 70–99)
HDLC SERPL-MCNC: 38 MG/DL (ref 40–59)
LDLC SERPL CALC-MCNC: 86 MG/DL (ref ?–100)
M PROTEIN MFR SERPL ELPH: 7.3 G/DL (ref 6.4–8.2)
NONHDLC SERPL-MCNC: 116 MG/DL (ref ?–130)
OSMOLALITY SERPL CALC.SUM OF ELEC: 295 MOSM/KG (ref 275–295)
POTASSIUM SERPL-SCNC: 4.2 MMOL/L (ref 3.5–5.1)
SODIUM SERPL-SCNC: 141 MMOL/L (ref 136–145)
TRIGL SERPL-MCNC: 148 MG/DL (ref 30–149)
VLDLC SERPL CALC-MCNC: 30 MG/DL (ref 0–30)

## 2019-08-21 PROCEDURE — 80053 COMPREHEN METABOLIC PANEL: CPT | Performed by: INTERNAL MEDICINE

## 2019-08-21 PROCEDURE — 80061 LIPID PANEL: CPT | Performed by: INTERNAL MEDICINE

## 2019-08-21 PROCEDURE — 36415 COLL VENOUS BLD VENIPUNCTURE: CPT | Performed by: INTERNAL MEDICINE

## 2019-08-21 PROCEDURE — 78452 HT MUSCLE IMAGE SPECT MULT: CPT | Performed by: INTERNAL MEDICINE

## 2019-08-21 PROCEDURE — 93018 CV STRESS TEST I&R ONLY: CPT | Performed by: INTERNAL MEDICINE

## 2019-08-21 PROCEDURE — 93017 CV STRESS TEST TRACING ONLY: CPT | Performed by: INTERNAL MEDICINE

## 2019-08-30 ENCOUNTER — TELEPHONE (OUTPATIENT)
Dept: CARDIOLOGY | Age: 54
End: 2019-08-30

## 2019-09-12 ENCOUNTER — TELEPHONE (OUTPATIENT)
Dept: CARDIOLOGY | Age: 54
End: 2019-09-12

## 2019-09-16 DIAGNOSIS — E11.65 UNCONTROLLED TYPE 2 DIABETES MELLITUS WITH HYPERGLYCEMIA (HCC): ICD-10-CM

## 2019-09-16 NOTE — TELEPHONE ENCOUNTER
[facility] is requesting a refill on Victoza. This medication was sent on 5/2/19 for #3 with 3 refills sent to [facility]. Spoke with patient, she states she did NOT request a refill from [facility].

## 2019-10-07 DIAGNOSIS — F33.1 MODERATE EPISODE OF RECURRENT MAJOR DEPRESSIVE DISORDER (HCC): ICD-10-CM

## 2019-10-07 DIAGNOSIS — F41.9 ANXIETY: ICD-10-CM

## 2019-10-07 RX ORDER — CLOPIDOGREL BISULFATE 75 MG/1
TABLET ORAL
Qty: 90 TABLET | Refills: 2 | Status: SHIPPED | OUTPATIENT
Start: 2019-10-07 | End: 2020-07-28

## 2019-10-07 RX ORDER — LOSARTAN POTASSIUM 100 MG/1
TABLET ORAL
Qty: 90 TABLET | Refills: 2 | Status: SHIPPED | OUTPATIENT
Start: 2019-10-07 | End: 2020-07-28

## 2019-10-08 ENCOUNTER — OFFICE VISIT (OUTPATIENT)
Dept: FAMILY MEDICINE CLINIC | Facility: CLINIC | Age: 54
End: 2019-10-08
Payer: COMMERCIAL

## 2019-10-08 VITALS
DIASTOLIC BLOOD PRESSURE: 70 MMHG | WEIGHT: 259.25 LBS | HEIGHT: 66.25 IN | TEMPERATURE: 98 F | BODY MASS INDEX: 41.66 KG/M2 | OXYGEN SATURATION: 98 % | RESPIRATION RATE: 16 BRPM | HEART RATE: 91 BPM | SYSTOLIC BLOOD PRESSURE: 120 MMHG

## 2019-10-08 DIAGNOSIS — E11.65 UNCONTROLLED TYPE 2 DIABETES MELLITUS WITH HYPERGLYCEMIA (HCC): ICD-10-CM

## 2019-10-08 DIAGNOSIS — Z12.4 PAP SMEAR FOR CERVICAL CANCER SCREENING: ICD-10-CM

## 2019-10-08 DIAGNOSIS — Z11.51 ENCOUNTER FOR SCREENING FOR HUMAN PAPILLOMAVIRUS (HPV): ICD-10-CM

## 2019-10-08 DIAGNOSIS — Z12.39 BREAST CANCER SCREENING: ICD-10-CM

## 2019-10-08 DIAGNOSIS — Z23 NEED FOR VACCINATION: ICD-10-CM

## 2019-10-08 DIAGNOSIS — Z00.00 ANNUAL PHYSICAL EXAM: Primary | ICD-10-CM

## 2019-10-08 PROCEDURE — 99396 PREV VISIT EST AGE 40-64: CPT | Performed by: FAMILY MEDICINE

## 2019-10-08 PROCEDURE — 88175 CYTOPATH C/V AUTO FLUID REDO: CPT | Performed by: FAMILY MEDICINE

## 2019-10-08 PROCEDURE — 87624 HPV HI-RISK TYP POOLED RSLT: CPT | Performed by: FAMILY MEDICINE

## 2019-10-08 PROCEDURE — 99213 OFFICE O/P EST LOW 20 MIN: CPT | Performed by: FAMILY MEDICINE

## 2019-10-08 NOTE — PROGRESS NOTES
Israel Crowley is a 48year old female that presents for annual physical exam.     Last Pap: 9/23/14 - DUE  Hx of abnormal pap: yes  STI testing desired: No  Mammogram: DUE  Colonoscopy: 2/23/16  PHQ2: 0  Vaccines: getting over a cold, wants to come b Negative hypercoagulable work up, on lifelong            anticoagulation       Type II diabetes mellitus, uncontrolled (HCC)      Patella-femoral syndrome, bilateral knee      History of recurrent deep vein thrombosis (DVT)            Negative hypercoagula Glucose Blood (ONETOUCH VERIO) In Vitro Strip, Test 4 times daily, Disp: 400 strip, Rfl: 3  •  Insulin Pen Needle (NOVOFINE PLUS) 32G X 4 MM Does not apply Misc, Use to inject Victoza daily. , Disp: 1 Box, Rfl: 0  •  RaNITidine HCl 150 MG Oral Tab, Take 150 angina pectoris ([facility] [address]    • Diabetes mellitus ([facility] [address]    • Diverticulosis of large intestine    • Essential hypertension    • Gastroparesis    • GERD    • Hematemesis with nausea 11/2/2017 2017, No EGD, Hgb stable   • High cholesterol    • History of pulmo Occupation: homemaker    Social Needs      Financial resource strain: Not on file      Food insecurity:        Worry: Not on file        Inability: Not on file      Transportation needs:        Medical: Not on file        Non-medical: Not on file    Tobac Wt 259 lb 4 oz (117.6 kg)   LMP 09/23/2004   SpO2 98%   BMI 41.53 kg/m²  Estimated body mass index is 41.53 kg/m² as calculated from the following:    Height as of this encounter: 66.25\". Weight as of this encounter: 259 lb 4 oz (117.6 kg).    GENERAL: cancer screening  - THINPREP PAP SMEAR B; Future    6.  Uncontrolled type 2 diabetes mellitus with hyperglycemia (HCC)  - a1c 9.1, pt stopped victoza due to cost, will resend to local pharmacy, may be cheaper, she will call if any issues  - POCT HEMOGLOBIN

## 2019-10-08 NOTE — PATIENT INSTRUCTIONS
Thank you for allowing me to participate in your care today. I will contact you with any results from today's visit. Lab results are typically available in 2-3 days for blood tests, and 3-5 days for any cultures or Paps.    Please let me know if you hav familiar with the potential benefits and risks of breast cancer screening with mammograms.      Cervical cancer All women in this age group, except women who have had a complete hysterectomy Pap test every 3 years or Pap test with human papillomavirus (HPV your healthcare provider 2 doses given at least 6 months apart   Hepatitis B Women at increased risk for infection – talk with your healthcare provider 3 doses over 6 months; second dose should be given 1 month after the first dose; the third dose should b at risk for cardiovascular health problems such as stroke When your risk is known   Use of tobacco and the health effects it can cause All women in this age group Every exam   [address]  Date Last Reviewed: 1/26/2016  © 5374-6505 [employer]

## 2019-10-10 DIAGNOSIS — F33.1 MODERATE EPISODE OF RECURRENT MAJOR DEPRESSIVE DISORDER (HCC): ICD-10-CM

## 2019-10-10 DIAGNOSIS — F41.9 ANXIETY: ICD-10-CM

## 2019-10-10 RX ORDER — ALPRAZOLAM 0.5 MG/1
TABLET ORAL
Qty: 45 TABLET | Refills: 2 | Status: ON HOLD | OUTPATIENT
Start: 2019-10-10 | End: 2019-12-27

## 2019-10-10 NOTE — TELEPHONE ENCOUNTER
Name from pharmacy: ALPRAZOLAM 0.5 MG TABLET 0.5 TAB         Will file in chart as: ALPRAZOLAM 0.5 MG Oral Tab    Sig: TAKE (1) TABLET TWICE A DAY AS NEEDED FOR SLEEP    Disp:  45 tablet    Refills:  2    Start: 10/10/2019    Class: Normal    For: Anxiety

## 2019-10-10 NOTE — TELEPHONE ENCOUNTER
Requesting: Escitalopram 20mg  LOV: 10/8/19  RTC: 4 weeks  Last Relevant Labs: CMP 8/21/19  Filled: 5/2/19 #90 with 1 refills    No future appointments. -medication pended for review.

## 2019-10-11 RX ORDER — ESCITALOPRAM OXALATE 20 MG/1
TABLET ORAL
Qty: 90 TABLET | Refills: 3 | Status: SHIPPED | OUTPATIENT
Start: 2019-10-11 | End: 2020-10-28

## 2019-10-31 RX ORDER — RIVAROXABAN 20 MG/1
TABLET, FILM COATED ORAL
Qty: 90 TABLET | Refills: 1 | Status: SHIPPED | OUTPATIENT
Start: 2019-10-31 | End: 2020-04-09

## 2019-11-05 ENCOUNTER — DOCUMENTATION (OUTPATIENT)
Dept: CARDIOLOGY | Age: 54
End: 2019-11-05

## 2019-11-25 ENCOUNTER — LAB ENCOUNTER (OUTPATIENT)
Dept: LAB | Age: 54
End: 2019-11-25
Attending: ALLERGY & IMMUNOLOGY
Payer: COMMERCIAL

## 2019-11-25 ENCOUNTER — HOSPITAL ENCOUNTER (OUTPATIENT)
Dept: GENERAL RADIOLOGY | Age: 54
Discharge: HOME | End: 2019-11-25
Attending: ALLERGY & IMMUNOLOGY
Payer: COMMERCIAL

## 2019-11-25 DIAGNOSIS — L50.1 IDIOPATHIC URTICARIA: ICD-10-CM

## 2019-11-25 DIAGNOSIS — J45.50 SEVERE PERSISTENT ASTHMA: Primary | ICD-10-CM

## 2019-11-25 DIAGNOSIS — J45.909 ASTHMA: ICD-10-CM

## 2019-11-25 DIAGNOSIS — J33.8 OTHER POLYP OF SINUS: ICD-10-CM

## 2019-11-25 DIAGNOSIS — J30.9 ALLERGIC RHINITIS DUE TO ALLERGEN: ICD-10-CM

## 2019-11-25 DIAGNOSIS — J30.9 ALLERGIC RHINITIS: ICD-10-CM

## 2019-11-25 DIAGNOSIS — J33.8 POLYP OF NASAL SINUS: ICD-10-CM

## 2019-11-25 PROCEDURE — 86003 ALLG SPEC IGE CRUDE XTRC EA: CPT

## 2019-11-25 PROCEDURE — 85025 COMPLETE CBC W/AUTO DIFF WBC: CPT

## 2019-11-25 PROCEDURE — 36415 COLL VENOUS BLD VENIPUNCTURE: CPT

## 2019-11-25 PROCEDURE — 70220 X-RAY EXAM OF SINUSES: CPT | Performed by: ALLERGY & IMMUNOLOGY

## 2019-11-25 PROCEDURE — 82785 ASSAY OF IGE: CPT

## 2019-12-17 ENCOUNTER — DOCUMENTATION (OUTPATIENT)
Dept: CARDIOLOGY | Age: 54
End: 2019-12-17

## 2019-12-20 DIAGNOSIS — F41.9 ANXIETY: ICD-10-CM

## 2019-12-20 DIAGNOSIS — F33.1 MODERATE EPISODE OF RECURRENT MAJOR DEPRESSIVE DISORDER (HCC): ICD-10-CM

## 2019-12-26 ENCOUNTER — APPOINTMENT (OUTPATIENT)
Dept: CT IMAGING | Age: 54
End: 2019-12-26
Attending: EMERGENCY MEDICINE
Payer: COMMERCIAL

## 2019-12-26 ENCOUNTER — HOSPITAL ENCOUNTER (OUTPATIENT)
Facility: HOSPITAL | Age: 54
Setting detail: OBSERVATION
Discharge: HOME | End: 2019-12-27
Attending: EMERGENCY MEDICINE | Admitting: HOSPITALIST
Payer: COMMERCIAL

## 2019-12-26 DIAGNOSIS — R51.9 ACUTE NONINTRACTABLE HEADACHE, UNSPECIFIED HEADACHE TYPE: ICD-10-CM

## 2019-12-26 DIAGNOSIS — R53.83 FATIGUE, UNSPECIFIED TYPE: ICD-10-CM

## 2019-12-26 DIAGNOSIS — J45.21 EXACERBATION OF INTERMITTENT ASTHMA, UNSPECIFIED ASTHMA SEVERITY: ICD-10-CM

## 2019-12-26 DIAGNOSIS — F41.9 ANXIETY: ICD-10-CM

## 2019-12-26 DIAGNOSIS — R06.00 DYSPNEA ON EXERTION: Primary | ICD-10-CM

## 2019-12-26 DIAGNOSIS — R07.81 PLEURITIC CHEST PAIN: ICD-10-CM

## 2019-12-26 DIAGNOSIS — F33.1 MODERATE EPISODE OF RECURRENT MAJOR DEPRESSIVE DISORDER (HCC): ICD-10-CM

## 2019-12-26 PROBLEM — R06.09 DYSPNEA ON EXERTION: Status: ACTIVE | Noted: 2019-12-26

## 2019-12-26 LAB
ADENOVIRUS PCR:: NEGATIVE
ALBUMIN SERPL-MCNC: 3.4 G/DL (ref 3.4–5)
ALBUMIN/GLOB SERPL: 0.9 {RATIO} (ref 1–2)
ALP LIVER SERPL-CCNC: 103 U/L (ref 41–108)
ALT SERPL-CCNC: 48 U/L (ref 13–56)
ANION GAP SERPL CALC-SCNC: 8 MMOL/L (ref 0–18)
APTT PPP: 26.8 SECONDS (ref 25.4–36.1)
AST SERPL-CCNC: 29 U/L (ref 15–37)
ATRIAL RATE: 73 BPM
B PERT DNA SPEC QL NAA+PROBE: NEGATIVE
BASOPHILS # BLD AUTO: 0.15 X10(3) UL (ref 0–0.2)
BASOPHILS NFR BLD AUTO: 1.3 %
BILIRUB SERPL-MCNC: 0.3 MG/DL (ref 0.1–2)
BUN BLD-MCNC: 7 MG/DL (ref 7–18)
BUN/CREAT SERPL: 9 (ref 10–20)
C PNEUM DNA SPEC QL NAA+PROBE: NEGATIVE
CALCIUM BLD-MCNC: 8.6 MG/DL (ref 8.5–10.1)
CHLORIDE SERPL-SCNC: 107 MMOL/L (ref 98–112)
CO2 SERPL-SCNC: 24 MMOL/L (ref 21–32)
CORONAVIRUS 229E PCR:: NEGATIVE
CORONAVIRUS HKU1 PCR:: NEGATIVE
CORONAVIRUS NL63 PCR:: NEGATIVE
CORONAVIRUS OC43 PCR:: NEGATIVE
CREAT BLD-MCNC: 0.78 MG/DL (ref 0.55–1.02)
DEPRECATED RDW RBC AUTO: 43.1 FL (ref 35.1–46.3)
EOSINOPHIL # BLD AUTO: 0.5 X10(3) UL (ref 0–0.7)
EOSINOPHIL NFR BLD AUTO: 4.5 %
ERYTHROCYTE [DISTWIDTH] IN BLOOD BY AUTOMATED COUNT: 14.7 % (ref 11–15)
FLUAV RNA SPEC QL NAA+PROBE: NEGATIVE
FLUBV RNA SPEC QL NAA+PROBE: NEGATIVE
GLOBULIN PLAS-MCNC: 3.8 G/DL (ref 2.8–4.4)
GLUCOSE BLD-MCNC: 245 MG/DL (ref 70–99)
GLUCOSE BLD-MCNC: 289 MG/DL (ref 70–99)
GLUCOSE BLD-MCNC: 290 MG/DL (ref 70–99)
HCT VFR BLD AUTO: 44.1 % (ref 35–48)
HGB BLD-MCNC: 13.5 G/DL (ref 12–16)
IMM GRANULOCYTES # BLD AUTO: 0.06 X10(3) UL (ref 0–1)
IMM GRANULOCYTES NFR BLD: 0.5 %
INR BLD: 1.05 (ref 0.9–1.1)
LYMPHOCYTES # BLD AUTO: 3.28 X10(3) UL (ref 1–4)
LYMPHOCYTES NFR BLD AUTO: 29.4 %
M PROTEIN MFR SERPL ELPH: 7.2 G/DL (ref 6.4–8.2)
MCH RBC QN AUTO: 24.7 PG (ref 26–34)
MCHC RBC AUTO-ENTMCNC: 30.6 G/DL (ref 31–37)
MCV RBC AUTO: 80.6 FL (ref 80–100)
METAPNEUMOVIRUS PCR:: NEGATIVE
MONOCYTES # BLD AUTO: 0.39 X10(3) UL (ref 0.1–1)
MONOCYTES NFR BLD AUTO: 3.5 %
MYCOPLASMA PNEUMONIA PCR:: NEGATIVE
NEUTROPHILS # BLD AUTO: 6.77 X10 (3) UL (ref 1.5–7.7)
NEUTROPHILS # BLD AUTO: 6.77 X10(3) UL (ref 1.5–7.7)
NEUTROPHILS NFR BLD AUTO: 60.8 %
OSMOLALITY SERPL CALC.SUM OF ELEC: 294 MOSM/KG (ref 275–295)
P AXIS: 31 DEGREES
P-R INTERVAL: 152 MS
PARAINFLUENZA 1 PCR:: NEGATIVE
PARAINFLUENZA 2 PCR:: NEGATIVE
PARAINFLUENZA 3 PCR:: NEGATIVE
PARAINFLUENZA 4 PCR:: NEGATIVE
PLATELET # BLD AUTO: 344 10(3)UL (ref 150–450)
POCT INFLUENZA A: NEGATIVE
POCT INFLUENZA B: NEGATIVE
POTASSIUM SERPL-SCNC: 4.4 MMOL/L (ref 3.5–5.1)
PSA SERPL DL<=0.01 NG/ML-MCNC: 13.9 SECONDS (ref 12.2–14.4)
Q-T INTERVAL: 384 MS
QRS DURATION: 94 MS
QTC CALCULATION (BEZET): 423 MS
R AXIS: 47 DEGREES
RBC # BLD AUTO: 5.47 X10(6)UL (ref 3.8–5.3)
RHINOVIRUS/ENTERO PCR:: NEGATIVE
RSV RNA SPEC QL NAA+PROBE: NEGATIVE
SODIUM SERPL-SCNC: 139 MMOL/L (ref 136–145)
T AXIS: 50 DEGREES
TROPONIN I SERPL-MCNC: <0.045 NG/ML (ref ?–0.04)
VENTRICULAR RATE: 73 BPM
WBC # BLD AUTO: 11.2 X10(3) UL (ref 4–11)

## 2019-12-26 PROCEDURE — 71275 CT ANGIOGRAPHY CHEST: CPT | Performed by: EMERGENCY MEDICINE

## 2019-12-26 PROCEDURE — 70450 CT HEAD/BRAIN W/O DYE: CPT | Performed by: EMERGENCY MEDICINE

## 2019-12-26 PROCEDURE — 99223 1ST HOSP IP/OBS HIGH 75: CPT | Performed by: HOSPITALIST

## 2019-12-26 RX ORDER — ROSUVASTATIN CALCIUM 5 MG/1
5 TABLET, COATED ORAL
Status: DISCONTINUED | OUTPATIENT
Start: 2019-12-27 | End: 2019-12-27

## 2019-12-26 RX ORDER — EZETIMIBE 10 MG/1
10 TABLET ORAL NIGHTLY
Status: DISCONTINUED | OUTPATIENT
Start: 2019-12-26 | End: 2019-12-27

## 2019-12-26 RX ORDER — METHYLPREDNISOLONE SODIUM SUCCINATE 125 MG/2ML
60 INJECTION, POWDER, LYOPHILIZED, FOR SOLUTION INTRAMUSCULAR; INTRAVENOUS EVERY 8 HOURS
Status: DISCONTINUED | OUTPATIENT
Start: 2019-12-26 | End: 2019-12-27

## 2019-12-26 RX ORDER — IPRATROPIUM BROMIDE AND ALBUTEROL SULFATE 2.5; .5 MG/3ML; MG/3ML
3 SOLUTION RESPIRATORY (INHALATION) ONCE
Status: COMPLETED | OUTPATIENT
Start: 2019-12-26 | End: 2019-12-26

## 2019-12-26 RX ORDER — ALPRAZOLAM 0.25 MG/1
0.25 TABLET ORAL NIGHTLY PRN
Status: DISCONTINUED | OUTPATIENT
Start: 2019-12-26 | End: 2019-12-27

## 2019-12-26 RX ORDER — SODIUM CHLORIDE 9 MG/ML
INJECTION, SOLUTION INTRAVENOUS CONTINUOUS
Status: ACTIVE | OUTPATIENT
Start: 2019-12-26 | End: 2019-12-26

## 2019-12-26 RX ORDER — IPRATROPIUM BROMIDE AND ALBUTEROL SULFATE 2.5; .5 MG/3ML; MG/3ML
3 SOLUTION RESPIRATORY (INHALATION)
Status: DISCONTINUED | OUTPATIENT
Start: 2019-12-26 | End: 2019-12-27

## 2019-12-26 RX ORDER — SUMATRIPTAN 25 MG/1
25 TABLET, FILM COATED ORAL EVERY 2 HOUR PRN
Status: DISCONTINUED | OUTPATIENT
Start: 2019-12-26 | End: 2019-12-27

## 2019-12-26 RX ORDER — ACETAMINOPHEN 325 MG/1
650 TABLET ORAL EVERY 6 HOURS PRN
Status: DISCONTINUED | OUTPATIENT
Start: 2019-12-26 | End: 2019-12-27

## 2019-12-26 RX ORDER — ONDANSETRON 2 MG/ML
4 INJECTION INTRAMUSCULAR; INTRAVENOUS EVERY 4 HOURS PRN
Status: DISCONTINUED | OUTPATIENT
Start: 2019-12-26 | End: 2019-12-27

## 2019-12-26 RX ORDER — MONTELUKAST SODIUM 10 MG/1
10 TABLET ORAL NIGHTLY
Status: DISCONTINUED | OUTPATIENT
Start: 2019-12-26 | End: 2019-12-27

## 2019-12-26 RX ORDER — PANTOPRAZOLE SODIUM 40 MG/1
40 TABLET, DELAYED RELEASE ORAL EVERY EVENING
Status: DISCONTINUED | OUTPATIENT
Start: 2019-12-26 | End: 2019-12-27

## 2019-12-26 RX ORDER — FAMOTIDINE 20 MG/1
20 TABLET ORAL 2 TIMES DAILY
Status: DISCONTINUED | OUTPATIENT
Start: 2019-12-26 | End: 2019-12-27

## 2019-12-26 RX ORDER — FLUTICASONE PROPIONATE 50 MCG
2 SPRAY, SUSPENSION (ML) NASAL 2 TIMES DAILY
Status: DISCONTINUED | OUTPATIENT
Start: 2019-12-26 | End: 2019-12-27

## 2019-12-26 RX ORDER — CIMETIDINE 400 MG/1
400 TABLET, FILM COATED ORAL 2 TIMES DAILY
COMMUNITY
Start: 2019-12-10 | End: 2020-05-19

## 2019-12-26 RX ORDER — ROSUVASTATIN CALCIUM 5 MG/1
5 TABLET, COATED ORAL NIGHTLY
Status: DISCONTINUED | OUTPATIENT
Start: 2019-12-26 | End: 2019-12-26

## 2019-12-26 RX ORDER — ESCITALOPRAM OXALATE 20 MG/1
20 TABLET ORAL EVERY MORNING
Status: DISCONTINUED | OUTPATIENT
Start: 2019-12-27 | End: 2019-12-27

## 2019-12-26 RX ORDER — CETIRIZINE HYDROCHLORIDE 10 MG/1
10 TABLET ORAL NIGHTLY
Status: DISCONTINUED | OUTPATIENT
Start: 2019-12-26 | End: 2019-12-27

## 2019-12-26 RX ORDER — METHYLPREDNISOLONE SODIUM SUCCINATE 125 MG/2ML
125 INJECTION, POWDER, LYOPHILIZED, FOR SOLUTION INTRAMUSCULAR; INTRAVENOUS ONCE
Status: COMPLETED | OUTPATIENT
Start: 2019-12-26 | End: 2019-12-26

## 2019-12-26 RX ORDER — ASPIRIN 81 MG/1
324 TABLET, CHEWABLE ORAL ONCE
Status: COMPLETED | OUTPATIENT
Start: 2019-12-26 | End: 2019-12-26

## 2019-12-26 RX ORDER — FAMOTIDINE 20 MG/1
20 TABLET ORAL 2 TIMES DAILY
Status: DISCONTINUED | OUTPATIENT
Start: 2019-12-26 | End: 2019-12-26

## 2019-12-26 RX ORDER — LOSARTAN POTASSIUM 25 MG/1
25 TABLET ORAL DAILY
Status: DISCONTINUED | OUTPATIENT
Start: 2019-12-26 | End: 2019-12-27

## 2019-12-26 RX ORDER — DEXTROSE MONOHYDRATE 25 G/50ML
50 INJECTION, SOLUTION INTRAVENOUS
Status: DISCONTINUED | OUTPATIENT
Start: 2019-12-26 | End: 2019-12-27

## 2019-12-26 RX ORDER — IPRATROPIUM BROMIDE AND ALBUTEROL SULFATE 2.5; .5 MG/3ML; MG/3ML
SOLUTION RESPIRATORY (INHALATION)
Status: COMPLETED
Start: 2019-12-26 | End: 2019-12-26

## 2019-12-26 RX ORDER — ACETAMINOPHEN 500 MG
1000 TABLET ORAL ONCE
Status: COMPLETED | OUTPATIENT
Start: 2019-12-26 | End: 2019-12-26

## 2019-12-26 RX ORDER — SODIUM CHLORIDE 9 MG/ML
INJECTION, SOLUTION INTRAVENOUS ONCE
Status: DISCONTINUED | OUTPATIENT
Start: 2019-12-26 | End: 2019-12-26

## 2019-12-26 RX ORDER — ACETAMINOPHEN 500 MG
TABLET ORAL
Status: COMPLETED
Start: 2019-12-26 | End: 2019-12-26

## 2019-12-26 RX ORDER — METOPROLOL SUCCINATE 25 MG/1
25 TABLET, EXTENDED RELEASE ORAL 2 TIMES DAILY
Status: DISCONTINUED | OUTPATIENT
Start: 2019-12-26 | End: 2019-12-27

## 2019-12-26 NOTE — TELEPHONE ENCOUNTER
Requesting alprazolam 0.5mg  LOV: 10/8/19  RTC: 4 wks  Last Labs: n/a  Filled: 10/10/19 #45 with 2 refills    No future appointments.    Dispensed Written Strength Quantity Refills Days Supply Provider Pharmacy   ALPRAZOLAM 11/21/2019 10/10/2019 0.5MG 45 ta

## 2019-12-26 NOTE — ED PROVIDER NOTES
Patient Seen in: [facility] In [place]      History   Patient presents with:  Dyspnea DEN SOB    Stated Complaint: diff breathing    HPI    The patient is a 65-year-old female who presents emergency room with a history of multiple com steatohepatitis)    • NSTEMI (non-ST elevated myocardial infarction) ([facility] [address] 2017    FELTON in LAD   • Obesity    • Osteoarthritis    • Patella-femoral syndrome, bilateral knee 1/15/2015              Past Surgical History:   Procedure Laterality Date   • BREAST is no JVD. No meningeal signs or nuchal rigidity appreciated. No stridor. LUNGS: Clear with some prolonged expiratory phase appreciated. There are no crackles appreciated. There is good equal air entry bilaterally. HEART: Regular rate and rhythm.  Normal Final result                 Please view results for these tests on the individual orders. RAINBOW DRAW BLUE   RAINBOW DRAW LAVENDER   RAINBOW DRAW LIGHT GREEN   RAINBOW DRAW GOLD     EKG    Rate, intervals and axes as noted on EKG Report.   Rate: 73  Rhy In light of multiple risk factors for other disease and history as noted above the patient will be admitted to the hospital for further care. Patient's case discussed Dr. Dena Barkley. Will admit at this time.   Admission disposition: 12/26/2019  2:27 PM

## 2019-12-27 VITALS
OXYGEN SATURATION: 98 % | RESPIRATION RATE: 16 BRPM | TEMPERATURE: 98 F | WEIGHT: 261.94 LBS | DIASTOLIC BLOOD PRESSURE: 82 MMHG | SYSTOLIC BLOOD PRESSURE: 140 MMHG | BODY MASS INDEX: 42 KG/M2 | HEART RATE: 57 BPM

## 2019-12-27 LAB
GLUCOSE BLD-MCNC: 317 MG/DL (ref 70–99)
GLUCOSE BLD-MCNC: 322 MG/DL (ref 70–99)

## 2019-12-27 PROCEDURE — 99239 HOSP IP/OBS DSCHRG MGMT >30: CPT | Performed by: HOSPITALIST

## 2019-12-27 RX ORDER — METOPROLOL SUCCINATE 25 MG/1
25 TABLET, EXTENDED RELEASE ORAL 2 TIMES DAILY
Refills: 0 | Status: SHIPPED | COMMUNITY
Start: 2019-12-27

## 2019-12-27 RX ORDER — ALPRAZOLAM 0.5 MG/1
TABLET ORAL NIGHTLY PRN
Qty: 45 TABLET | Refills: 2 | Status: SHIPPED | COMMUNITY
Start: 2019-12-27 | End: 2019-12-27

## 2019-12-27 RX ORDER — ALPRAZOLAM 0.5 MG/1
TABLET ORAL
Qty: 45 TABLET | Refills: 2 | Status: SHIPPED | OUTPATIENT
Start: 2019-12-27 | End: 2019-12-27

## 2019-12-27 RX ORDER — PREDNISONE 20 MG/1
40 TABLET ORAL DAILY
Qty: 8 TABLET | Refills: 0 | Status: SHIPPED | OUTPATIENT
Start: 2019-12-27 | End: 2019-12-31

## 2019-12-27 RX ORDER — IPRATROPIUM BROMIDE AND ALBUTEROL SULFATE 2.5; .5 MG/3ML; MG/3ML
3 SOLUTION RESPIRATORY (INHALATION)
Status: DISCONTINUED | OUTPATIENT
Start: 2019-12-27 | End: 2019-12-27

## 2019-12-27 RX ORDER — ALPRAZOLAM 0.5 MG/1
0.5 TABLET ORAL NIGHTLY PRN
Qty: 45 TABLET | Refills: 2 | Status: SHIPPED | COMMUNITY
Start: 2019-12-27 | End: 2020-04-10

## 2019-12-27 NOTE — PLAN OF CARE
Assumed care of pt at [address]. Alert and oriented x4. On RA with SpO2 at 96%, lungs clear/diminished with crackles in the LLL.   NSR/ST per tele. Pt denies chest pain, SOB, palpitations, and pain. VSS  Voids. Last BM 12/26  Pt is up ad romulo.  POC updated wi indicated  - Manage/alleviate anxiety  - Monitor for signs/symptoms of CO2 retention  Outcome: Progressing

## 2019-12-27 NOTE — PAYOR COMM NOTE
--------------  CONTINUED STAY REVIEW    Payor: Nickie Dance #:  [id number]  Authorization Number: [id number]    Admit date: 12/26/19  Admit time: 2423    Admitting Physician: Jese Poole MD  Attending Physician:  Patrick Velazquez MD  [facility] hx  5. HTN  6. DM2  7.  GERD     Dispo:    ct head    No significant changes in the brain parenchyma since prior examination.  Interval development of a moderate to large retention cyst maxillary sinus and mucoperiosteal thickening the rises venous sinus is spray     Date Action Dose Route User    12/27/2019 6344 Given 2 spray Each Nare Gordy Mckee RN    12/26/2019 1955 Given 2 spray Each Nare Lola Wolff RN      Insulin Aspart Pen (NOVOLOG) 100 UNIT/ML flexpen 1-68 Units     Date Action Dose 12/26/2019 1956 Given 60 mg Intravenous Robel Oleary RN      Metoprolol Succinate ER (Toprol XL) 24 hr tab 25 mg     Date Action Dose Route User    12/27/2019 0843 Given 25 mg Oral Gretel Galindo RN    12/26/2019 1957 Given 25 mg Oral Shayla Morales,

## 2019-12-27 NOTE — H&P
[facility]  History and Physical     Gela Garrett Patient Status:  Inpatient    3/25/1965 MRN [id number]   [facility] 8NE-A Attending Adina Day MD   Hosp Day # 0 PCP Vivienne Mata DO     Chief Complaint: dyspnea Date   • BREAST SURGERY PROCEDURE UNLISTED      cyst removed   • CATH DRUG ELUTING STENT     • CHOLECYSTECTOMY  04/10/2011    PERFORMED BY DR KRAFT-LAPAROSCOPIC   • COLONOSCOPY  2012    polyps   • COLONOSCOPY N/A 2/23/2016    Performed by Jasvir Gomez (40 mg total) by mouth every morning before breakfast., Disp: 90 tablet, Rfl: 1  Metoprolol Succinate ER 25 MG Oral Tablet 24 Hr, Take 25 mg by mouth 2 (two) times daily. , Disp: , Rfl:   Tiotropium Bromide Monohydrate 18 MCG Inhalation Cap, Inhale into the TRANSMITTER) Does not apply Misc, Use as directed, Disp: 1 each, Rfl: 3  Insulin Pen Needle (NOVOFINE PLUS) 32G X 4 MM Does not apply Misc, Use to inject Victoza daily. , Disp: 1 Box, Rfl: 0  RaNITidine HCl 150 MG Oral Tab, Take 150 mg by mouth 2 (two) time mg/dL). Recent Labs   Lab 12/26/19  1217   PTP 13.9   INR 1.05       Recent Labs   Lab 12/26/19  1217   TROP <0.045       Imaging: Imaging data reviewed in Epic. ASSESSMENT / PLAN:     1. Acute Asthma flare  1. Steroids, IV  2. Nebs  3. RVP  4.  CTA

## 2019-12-27 NOTE — CONSULTS
Pharmacy note: Duplicate Proton Pump Inhibitor with Histamine 2 blocker. Angel Saunders is a 48year old female who has been prescribed both Famotidine and Protonix.   Pepcid was discontinued per P&T approved protocol for duplicate therapy in adult

## 2019-12-27 NOTE — CM/SW NOTE
[facility] The patient was identified in rounds to have no dc planning needs and is not homebound.       Uri Kuo, HITESHW

## 2019-12-27 NOTE — PAYOR COMM NOTE
--------------  ADMISSION REVIEW     Payor: Rachelle Singleton #:  [id number]  Authorization Number: [id number]    Admit date: 12/26/19  Admit time: 7344       Admitting Physician: Josephine Portillo MD  Attending Physician:  Cande Grimes MD  Prim 128/61   Pulse 85   Temp 98.3 °F (36.8 °C) (Temporal)   Resp 16   Wt 117.6 kg   LMP 09/23/2004   SpO2 96%   BMI 41.53 kg/m²          Physical Exam  GENERAL: Well-developed, well-nourished female sitting up breathing easily in no apparent distress.   Patient Narrative: This assay is a rapid molecular in vitro test utilizing nucleic acid amplification of influenza A and B viral RNA. CBC WITH DIFFERENTIAL WITH PLATELET    Narrative:      The following orders were created for panel order CBC WITH DIFFERENTIA Interval development of a moderate to large retention cyst maxillary sinus and mucoperiosteal thickening the rises venous sinus is noted.   Dictated by: Christelle King MD on 12/26/2019 at 14:11     Approved by: Christelle King MD on 12/26/2019 at 14: headache, unspecified headache type  Pleuritic chest pain    Disposition:  Admit  12/26/2019  2:27 pm      Present on Admission  Date Reviewed: 10/8/2019          ICD-10-CM Noted POA    Dyspnea on exertion R06.09 12/26/2019 Unknown                   Signed High cholesterol    • History of pulmonary embolus (PE) 12/1/2016   • History of recurrent deep vein thrombosis (DVT) 12/4/2014   • Hypertension    • IBS    • LGSIL (low grade squamous intraepithelial dysplasia) 10/2010    Pap neg 2014   • Migraines     pe mg total) by mouth daily with food. , Disp: 30 tablet, Rfl: 6  PROAIR  (90 Base) MCG/ACT Inhalation Aero Soln, Inhale 2 puffs into the lungs every 6 (six) hours as needed for Wheezing.  , Disp: , Rfl:   Fluticasone Furoate-Vilanterol 200-25 MCG/INH I 13.4 oz (116.5 kg)   LMP 09/23/2004   SpO2 96%   BMI 41.14 kg/m²    General: No acute distress. Alert and oriented x 3. HEENT: Normocephalic atraumatic. Moist mucous membranes. EOM-I. PERRLA. Anicteric. Neck: No lymphadenopathy. No JVD.  No carotid bruits JHOAN Hogue tab 0.25 mg     Date Action Dose Route User    12/26/2019 2127 Given 0.25 mg Oral Neptali Bahena RN      aspirin chewable tab 324 mg     Date Action Dose Route User    12/26/2019 1215 Given 324 mg Oral vEens Manning RN 12/26/2019 1434 Given 3 mL (none) Reji Alas      ipratropium-albuterol (DUONEB) nebulizer solution 3 mL     Date Action Dose Route User    12/26/2019 2234 Given 3 mL Nebulization Carlos Mutters    12/26/2019 1845 Given 3 mL Nebulization ALEC Jean 12/26/19 1743 — 102 18 173/102Abnormal  97 % — None (Room air) —    12/26/19 1741 — 102 18 153/87 96 % — None (Room air) —    12/26/19 1739 — 90 18 153/77 98 % — None (Room air) —    12/26/19 1736 98.1 °F (36.7 °C) — — — — 256 lb 13.4 oz — — J   1

## 2019-12-27 NOTE — PLAN OF CARE
Patient is alert and oriented x4. Patient asking about plan of care. Patient updated on plan of care. Denies questions or concerns at this time. Patient verbalized understanding of education. Plan is IV solu-medrol and possible discharge today.  Frequent ro Manage/alleviate anxiety  - Monitor for signs/symptoms of CO2 retention  Outcome: Progressing

## 2019-12-27 NOTE — PROGRESS NOTES
[facility]  Progress Note     Saundra Allen Patient Status:  Inpatient    3/25/1965 MRN [id number]   [facility] 8NE-A Attending Fahad Lora MD   [facility] Day # 1 PCP Thomas Flannery DO     Chief Complaint: dyspnea, wheezing examination.  Interval development of a moderate to large retention cyst maxillary sinus and mucoperiosteal thickening the rises venous sinus is noted.   IV steroids  Change to po and wean- follows w/ [facility]- starting new  Med to help w/ eosinophils and

## 2019-12-27 NOTE — PROGRESS NOTES
Received patient from [place] [facility] via ambulance around 1730. Oriented to room, orthos completed, call light return demonstration, needs met. Patient A&O x4, family at bedside.  SPO2 maintained on RA, received Nebs and Solumedrol in ER, mild expiratory whe

## 2019-12-28 NOTE — DISCHARGE SUMMARY
[facility] [place] [facility]PITAL[facility]  DISCHARGE SUMMARY     Gela Garrett Patient Status:  Inpatient    3/25/1965 MRN [id number]   [facility] 8NE-A Attending No att. providers found   Hosp Day # 1 PCP Vivienne Mata DO     Date of Admission: 22 had PNA in the past and came to the ED due to concerns about this. Brief Synopsis:   Patient presented with increasing shortness of breath. Had CT of the head for headaches that has since resolved. CTA was negative for PE.   Was started on IV steroi (four) hours as needed for Wheezing or Shortness of Breath. Quantity:  30 ampule  Refills:  0     PROAIR  (90 Base) MCG/ACT Aers  Generic drug:  Albuterol Sulfate HFA      Inhale 2 puffs into the lungs every 6 (six) hours as needed for Wheezing. Succinate ER 25 MG Tb24  Commonly known as: Toprol XL      Take 1 tablet (25 mg total) by mouth 2 (two) times daily. Refills:  0     Montelukast Sodium 10 MG Tabs  Commonly known as:  SINGULAIR      Take 10 mg by mouth nightly.    Refills:  0     ONETOUC 140/82    Physical Exam:    General: No acute distress. Respiratory: Clear to auscultation bilaterally. No wheezes. No rhonchi. Cardiovascular: S1, S2. Regular rate and rhythm. No murmurs, rubs or gallops. Abdomen: Soft, nontender, nondistended.   Posi

## 2019-12-30 ENCOUNTER — PATIENT OUTREACH (OUTPATIENT)
Dept: CASE MANAGEMENT | Age: 54
End: 2019-12-30

## 2019-12-30 DIAGNOSIS — R51.9 ACUTE NONINTRACTABLE HEADACHE, UNSPECIFIED HEADACHE TYPE: ICD-10-CM

## 2019-12-30 DIAGNOSIS — R53.83 FATIGUE, UNSPECIFIED TYPE: ICD-10-CM

## 2019-12-30 DIAGNOSIS — J45.21 EXACERBATION OF INTERMITTENT ASTHMA, UNSPECIFIED ASTHMA SEVERITY: ICD-10-CM

## 2019-12-30 DIAGNOSIS — R06.00 DYSPNEA ON EXERTION: ICD-10-CM

## 2019-12-30 DIAGNOSIS — Z02.9 ENCOUNTERS FOR UNSPECIFIED ADMINISTRATIVE PURPOSE: ICD-10-CM

## 2019-12-30 PROCEDURE — 1111F DSCHRG MED/CURRENT MED MERGE: CPT

## 2019-12-30 NOTE — PAYOR COMM NOTE
--------------  DISCHARGE REVIEW    Payor: Rachelle Singleton #:  [id number]  Authorization Number: [id number]    Admit date: 12/26/19  Admit time:  4377  Discharge Date: 12/27/2019  3:27 PM     Admitting Physician: Josephine Portillo MD  Attending P

## 2019-12-31 ENCOUNTER — TELEPHONE (OUTPATIENT)
Dept: CARDIOLOGY | Age: 54
End: 2019-12-31

## 2019-12-31 NOTE — PROGRESS NOTES
Initial Post Discharge Follow Up   Discharge Date: 12/27/19  Contact Date: 12/30/2019    Consent Verification:  Assessment Completed With: Patient  HIPAA Verified? Yes    Discharge Dx:     1. Acute asthma exacerbation  2. Acute migraine  3.  CAD status 1 tablet (25 mg total) by mouth 2 (two) times daily. 0   • cimetidine 400 MG Oral Tab Take 400 mg by mouth 2 (two) times daily.      • ESCITALOPRAM 20 MG Oral Tab TAKE 1 TABLET EVERY MORNING (PATIENT NEEDS PHYSICAL FOR FURTHER REFILLS) 90 tablet 3   • Insu Inhalation Aerosol Powder, Breath Activated Inhale 1 puff into the lungs daily. 1 each 0   • Fluticasone Propionate 50 MCG/ACT Nasal Suspension 2 sprays by Each Nare route 2 (two) times daily.      • ergocalciferol 06115 UNITS Oral Cap Take 50,000 Units by No     NCM Reviewed upcoming Specialist Appt with patient     Yes, patient states she is going to call to schedule a HFU with Dr. Ji Allison.     Interventions by NCM:  NCM reviewed medications, discharge instructions, S&S of infection/blood clots, patient instr

## 2019-12-31 NOTE — PAYOR COMM NOTE
--------------  WE ARE STILL AWAITING YOUR DETERMINATION ON THIS INPT ADMISSION.     PLEASE FAX INPT DAYS AUTHORIZED ASAP -[phone]    [facility] YOU    DISCHARGE REVIEW    Payor: Edil Tolbert #:  [id number]  Authorization Number: [id number]

## 2020-01-01 ENCOUNTER — EXTERNAL RECORD (OUTPATIENT)
Dept: HEALTH INFORMATION MANAGEMENT | Facility: OTHER | Age: 55
End: 2020-01-01

## 2020-01-23 ENCOUNTER — PATIENT MESSAGE (OUTPATIENT)
Dept: FAMILY MEDICINE CLINIC | Facility: CLINIC | Age: 55
End: 2020-01-23

## 2020-01-23 RX ORDER — FLUCONAZOLE 150 MG/1
150 TABLET ORAL ONCE
Qty: 1 TABLET | Refills: 0 | Status: SHIPPED | OUTPATIENT
Start: 2020-01-23 | End: 2020-01-23

## 2020-01-23 NOTE — TELEPHONE ENCOUNTER
See attached e-mails. APPROVE/DENY set up RX.     10/8/19 PE Dr. Darden Ground uncontrolled DM2/PAP    10/31/19 Last Diflucan RX.

## 2020-01-23 NOTE — TELEPHONE ENCOUNTER
----- Message from Kenton Rodriguez sent at 1/23/2020 12:32 PM CST -----  Regarding: RE: Non-Urgent Medical Question  Contact: [phone]  Symptoms are the usual, insane itching and discomfort.   Just miserable.  ----- Message -----  From: Dany Rodrigues

## 2020-01-24 RX ORDER — EZETIMIBE 10 MG/1
TABLET ORAL
Qty: 90 TABLET | Refills: 4 | Status: SHIPPED | OUTPATIENT
Start: 2020-01-24 | End: 2021-01-28

## 2020-01-24 RX ORDER — ROSUVASTATIN CALCIUM 5 MG/1
TABLET, COATED ORAL
Qty: 36 TABLET | Refills: 4 | Status: SHIPPED | OUTPATIENT
Start: 2020-01-24 | End: 2021-01-28

## 2020-01-30 RX ORDER — PANTOPRAZOLE SODIUM 40 MG/1
40 TABLET, DELAYED RELEASE ORAL
Qty: 90 TABLET | Refills: 1 | Status: SHIPPED | OUTPATIENT
Start: 2020-01-30 | End: 2020-05-07

## 2020-01-30 NOTE — TELEPHONE ENCOUNTER
Pantoprazole Sodium 40 MG Oral Tab EC               Sig: Take 1 tablet (40 mg total) by mouth every morning before breakfast.    Disp:  90 tablet    Refills:  1    Start: 1/30/2020    Class: Normal    Last ordered: 8 months ago by Ellis Plata DO
Unknown if ever smoked

## 2020-01-31 DIAGNOSIS — E11.65 UNCONTROLLED TYPE 2 DIABETES MELLITUS WITH HYPERGLYCEMIA (HCC): ICD-10-CM

## 2020-02-03 NOTE — TELEPHONE ENCOUNTER
Name from pharmacy: Jaren Soler 3-ANNY 18 MG/3 ML PE 18 SOPN          Will file in chart as: VICTOZA 18 MG/3ML Subcutaneous Solution Pen-injector         Sig: INJECT 1.8 MG INTO THE SKIN DAILY.     Disp:  9 mL    Refills:  5    Start: 1/31/2020    Class: Normal

## 2020-02-12 ENCOUNTER — DOCUMENTATION (OUTPATIENT)
Dept: CARDIOLOGY | Age: 55
End: 2020-02-12

## 2020-02-13 ENCOUNTER — HOSPITAL ENCOUNTER (EMERGENCY)
Age: 55
Discharge: HOME | End: 2020-02-13
Attending: EMERGENCY MEDICINE
Payer: COMMERCIAL

## 2020-02-13 ENCOUNTER — APPOINTMENT (OUTPATIENT)
Dept: CARDIOLOGY | Age: 55
End: 2020-02-13

## 2020-02-13 VITALS
OXYGEN SATURATION: 97 % | DIASTOLIC BLOOD PRESSURE: 67 MMHG | WEIGHT: 250 LBS | HEIGHT: 68 IN | HEART RATE: 67 BPM | SYSTOLIC BLOOD PRESSURE: 120 MMHG | RESPIRATION RATE: 16 BRPM | BODY MASS INDEX: 37.89 KG/M2 | TEMPERATURE: 97 F

## 2020-02-13 DIAGNOSIS — B34.9 VIRAL SYNDROME: Primary | ICD-10-CM

## 2020-02-13 LAB
ALBUMIN SERPL-MCNC: 3.6 G/DL (ref 3.4–5)
ALBUMIN/GLOB SERPL: 0.9 {RATIO} (ref 1–2)
ALP LIVER SERPL-CCNC: 120 U/L (ref 41–108)
ALT SERPL-CCNC: 44 U/L (ref 13–56)
ANION GAP SERPL CALC-SCNC: 6 MMOL/L (ref 0–18)
AST SERPL-CCNC: 41 U/L (ref 15–37)
BASOPHILS # BLD AUTO: 0.15 X10(3) UL (ref 0–0.2)
BASOPHILS NFR BLD AUTO: 1.4 %
BILIRUB SERPL-MCNC: 0.3 MG/DL (ref 0.1–2)
BUN BLD-MCNC: 8 MG/DL (ref 7–18)
BUN/CREAT SERPL: 9.9 (ref 10–20)
CALCIUM BLD-MCNC: 9.3 MG/DL (ref 8.5–10.1)
CHLORIDE SERPL-SCNC: 107 MMOL/L (ref 98–112)
CO2 SERPL-SCNC: 25 MMOL/L (ref 21–32)
CREAT BLD-MCNC: 0.81 MG/DL (ref 0.55–1.02)
DEPRECATED RDW RBC AUTO: 43.5 FL (ref 35.1–46.3)
EOSINOPHIL # BLD AUTO: 0.39 X10(3) UL (ref 0–0.7)
EOSINOPHIL NFR BLD AUTO: 3.7 %
ERYTHROCYTE [DISTWIDTH] IN BLOOD BY AUTOMATED COUNT: 14.8 % (ref 11–15)
GLOBULIN PLAS-MCNC: 3.9 G/DL (ref 2.8–4.4)
GLUCOSE BLD-MCNC: 164 MG/DL (ref 70–99)
HCT VFR BLD AUTO: 45.7 % (ref 35–48)
HGB BLD-MCNC: 13.9 G/DL (ref 12–16)
IMM GRANULOCYTES # BLD AUTO: 0.04 X10(3) UL (ref 0–1)
IMM GRANULOCYTES NFR BLD: 0.4 %
LYMPHOCYTES # BLD AUTO: 4.32 X10(3) UL (ref 1–4)
LYMPHOCYTES NFR BLD AUTO: 41.5 %
M PROTEIN MFR SERPL ELPH: 7.5 G/DL (ref 6.4–8.2)
MCH RBC QN AUTO: 24.6 PG (ref 26–34)
MCHC RBC AUTO-ENTMCNC: 30.4 G/DL (ref 31–37)
MCV RBC AUTO: 80.9 FL (ref 80–100)
MONOCYTES # BLD AUTO: 0.52 X10(3) UL (ref 0.1–1)
MONOCYTES NFR BLD AUTO: 5 %
NEUTROPHILS # BLD AUTO: 5 X10 (3) UL (ref 1.5–7.7)
NEUTROPHILS # BLD AUTO: 5 X10(3) UL (ref 1.5–7.7)
NEUTROPHILS NFR BLD AUTO: 48 %
OSMOLALITY SERPL CALC.SUM OF ELEC: 288 MOSM/KG (ref 275–295)
PLATELET # BLD AUTO: 352 10(3)UL (ref 150–450)
POCT INFLUENZA A: NEGATIVE
POCT INFLUENZA B: NEGATIVE
POTASSIUM SERPL-SCNC: 4.2 MMOL/L (ref 3.5–5.1)
RBC # BLD AUTO: 5.65 X10(6)UL (ref 3.8–5.3)
SODIUM SERPL-SCNC: 138 MMOL/L (ref 136–145)
WBC # BLD AUTO: 10.4 X10(3) UL (ref 4–11)

## 2020-02-13 PROCEDURE — 85025 COMPLETE CBC W/AUTO DIFF WBC: CPT | Performed by: PHYSICIAN ASSISTANT

## 2020-02-13 PROCEDURE — 96361 HYDRATE IV INFUSION ADD-ON: CPT

## 2020-02-13 PROCEDURE — 80053 COMPREHEN METABOLIC PANEL: CPT | Performed by: PHYSICIAN ASSISTANT

## 2020-02-13 PROCEDURE — 96375 TX/PRO/DX INJ NEW DRUG ADDON: CPT

## 2020-02-13 PROCEDURE — 96374 THER/PROPH/DIAG INJ IV PUSH: CPT

## 2020-02-13 PROCEDURE — 99284 EMERGENCY DEPT VISIT MOD MDM: CPT

## 2020-02-13 PROCEDURE — 87502 INFLUENZA DNA AMP PROBE: CPT | Performed by: PHYSICIAN ASSISTANT

## 2020-02-13 RX ORDER — DIPHENHYDRAMINE HYDROCHLORIDE 50 MG/ML
25 INJECTION INTRAMUSCULAR; INTRAVENOUS ONCE
Status: COMPLETED | OUTPATIENT
Start: 2020-02-13 | End: 2020-02-13

## 2020-02-13 RX ORDER — KETOROLAC TROMETHAMINE 30 MG/ML
30 INJECTION, SOLUTION INTRAMUSCULAR; INTRAVENOUS ONCE
Status: COMPLETED | OUTPATIENT
Start: 2020-02-13 | End: 2020-02-13

## 2020-02-13 RX ORDER — METHYLPREDNISOLONE SODIUM SUCCINATE 125 MG/2ML
125 INJECTION, POWDER, LYOPHILIZED, FOR SOLUTION INTRAMUSCULAR; INTRAVENOUS ONCE
Status: COMPLETED | OUTPATIENT
Start: 2020-02-13 | End: 2020-02-13

## 2020-02-13 RX ORDER — LIRAGLUTIDE 6 MG/ML
INJECTION SUBCUTANEOUS
Qty: 9 ML | Refills: 5 | OUTPATIENT
Start: 2020-02-13

## 2020-02-13 RX ORDER — METOCLOPRAMIDE HYDROCHLORIDE 5 MG/ML
10 INJECTION INTRAMUSCULAR; INTRAVENOUS ONCE
Status: COMPLETED | OUTPATIENT
Start: 2020-02-13 | End: 2020-02-13

## 2020-02-13 NOTE — TELEPHONE ENCOUNTER
Spoke with patient and states she never stopped the medication. She states she never requested a refill since she just picked up 3 pk recently. RX denied, last refill given 10/8/19 for #3pens with 3 refills.

## 2020-02-13 NOTE — ED PROVIDER NOTES
Patient Seen in: THE [facility] [place]      History   Patient presents with:  Headache  Cough/URI    Stated Complaint: headache, sore throat    HPI    Patient is a pleasant 53-year-old female.   Medical history of migraine type headach ABENA-LAPAROSCOPIC   • COLONOSCOPY  2012    polyps   • COLONOSCOPY N/A 2/23/2016    Performed by Floridalma Keys MD at [facility] ENDOSCOPY   • COLPOSCOPY, CERVIX W/UPPER ADJACENT VAGINA; W/ENDOCERVICAL CURETTAGE  2-2011    WNL   • ESOPHAGOGASTRODUODENOSCOPY (E sign of infection. Neuro: Cranial nerves intact, Normal Gait.     ED Course     Labs Reviewed   COMP METABOLIC PANEL (14) - Abnormal; Notable for the following components:       Result Value    Glucose 164 (*)     BUN/CREA Ratio 9.9 (*)     AST 41 (*)

## 2020-02-13 NOTE — ED PROVIDER NOTES
62-year-old female who was seen by me as well as by the physician assistant who has a known history of migraines who is also had upper respiratory symptoms.   She states that she has been trying her Imitrex and typically this works but she had \"maxed out\"

## 2020-02-25 ENCOUNTER — DOCUMENTATION (OUTPATIENT)
Dept: CARDIOLOGY | Age: 55
End: 2020-02-25

## 2020-03-09 DIAGNOSIS — E11.65 UNCONTROLLED TYPE 2 DIABETES MELLITUS WITH HYPERGLYCEMIA (HCC): ICD-10-CM

## 2020-03-10 DIAGNOSIS — E11.65 UNCONTROLLED TYPE 2 DIABETES MELLITUS WITH HYPERGLYCEMIA (HCC): ICD-10-CM

## 2020-03-10 NOTE — TELEPHONE ENCOUNTER
Requesting:  Tresiba  LOV: 10/8/19  RTC: 4 weeks for medication management. Last Relevant Labs: 10/8/19 - A1C: 9.1  Filled: 10/8/19 #10 pen with 1 refills    No future appointments. -medication pended for review.

## 2020-03-11 ENCOUNTER — OFFICE VISIT (OUTPATIENT)
Dept: ENDOCRINOLOGY | Age: 55
End: 2020-03-11

## 2020-03-11 VITALS
BODY MASS INDEX: 40.01 KG/M2 | DIASTOLIC BLOOD PRESSURE: 80 MMHG | WEIGHT: 264 LBS | SYSTOLIC BLOOD PRESSURE: 130 MMHG | HEIGHT: 68 IN | HEART RATE: 88 BPM

## 2020-03-11 DIAGNOSIS — E09.9 STEROID-INDUCED DIABETES (CMD): Primary | ICD-10-CM

## 2020-03-11 DIAGNOSIS — T38.0X5A STEROID-INDUCED DIABETES (CMD): Primary | ICD-10-CM

## 2020-03-11 DIAGNOSIS — E78.2 HYPERLIPIDEMIA, MIXED: ICD-10-CM

## 2020-03-11 DIAGNOSIS — Z92.241 HISTORY OF STEROID THERAPY: ICD-10-CM

## 2020-03-11 PROCEDURE — 3075F SYST BP GE 130 - 139MM HG: CPT | Performed by: INTERNAL MEDICINE

## 2020-03-11 PROCEDURE — 3079F DIAST BP 80-89 MM HG: CPT | Performed by: INTERNAL MEDICINE

## 2020-03-11 PROCEDURE — 99204 OFFICE O/P NEW MOD 45 MIN: CPT | Performed by: INTERNAL MEDICINE

## 2020-03-11 RX ORDER — INSULIN DEGLUDEC 200 U/ML
INJECTION, SOLUTION SUBCUTANEOUS
Qty: 9 ML | Refills: 4 | OUTPATIENT
Start: 2020-03-11

## 2020-03-11 RX ORDER — PROCHLORPERAZINE 25 MG/1
1 SUPPOSITORY RECTAL SEE ADMIN INSTRUCTIONS
Qty: 1 EACH | Refills: 12 | Status: SHIPPED | OUTPATIENT
Start: 2020-03-11 | End: 2020-03-12 | Stop reason: CLARIF

## 2020-03-11 RX ORDER — LIRAGLUTIDE 6 MG/ML
INJECTION SUBCUTANEOUS
Qty: 9 ML | Refills: 5 | OUTPATIENT
Start: 2020-03-11

## 2020-03-11 RX ORDER — PROCHLORPERAZINE 25 MG/1
1 SUPPOSITORY RECTAL SEE ADMIN INSTRUCTIONS
Qty: 1 EACH | Refills: 3 | Status: SHIPPED | OUTPATIENT
Start: 2020-03-11 | End: 2020-03-12 | Stop reason: CLARIF

## 2020-03-11 NOTE — TELEPHONE ENCOUNTER
Pt overdue for appt. Rx denied. Please call her to schedule and we can possibly send small refills for her to get her to next appt if she will run out before then.        Neetu Cavazos, DO  Family Medicine

## 2020-03-12 ENCOUNTER — OFFICE VISIT (OUTPATIENT)
Dept: CARDIOLOGY | Age: 55
End: 2020-03-12

## 2020-03-12 VITALS
DIASTOLIC BLOOD PRESSURE: 62 MMHG | HEIGHT: 68 IN | SYSTOLIC BLOOD PRESSURE: 110 MMHG | HEART RATE: 64 BPM | BODY MASS INDEX: 40.01 KG/M2 | WEIGHT: 264 LBS

## 2020-03-12 DIAGNOSIS — Z95.5 PRESENCE OF DRUG COATED STENT IN LAD CORONARY ARTERY: ICD-10-CM

## 2020-03-12 DIAGNOSIS — Z82.49 FAMILY HISTORY OF EARLY CAD: ICD-10-CM

## 2020-03-12 DIAGNOSIS — I25.5 CARDIOMYOPATHY, ISCHEMIC: ICD-10-CM

## 2020-03-12 DIAGNOSIS — I25.10 CORONARY ARTERY DISEASE INVOLVING NATIVE CORONARY ARTERY OF NATIVE HEART WITHOUT ANGINA PECTORIS: Primary | ICD-10-CM

## 2020-03-12 DIAGNOSIS — E78.2 HYPERLIPIDEMIA, MIXED: ICD-10-CM

## 2020-03-12 DIAGNOSIS — I10 ESSENTIAL HYPERTENSION: ICD-10-CM

## 2020-03-12 PROCEDURE — 99214 OFFICE O/P EST MOD 30 MIN: CPT | Performed by: INTERNAL MEDICINE

## 2020-03-12 PROCEDURE — 3078F DIAST BP <80 MM HG: CPT | Performed by: INTERNAL MEDICINE

## 2020-03-12 PROCEDURE — 3074F SYST BP LT 130 MM HG: CPT | Performed by: INTERNAL MEDICINE

## 2020-03-12 RX ORDER — COLESEVELAM 180 1/1
1875 TABLET ORAL 2 TIMES DAILY WITH MEALS
Qty: 180 TABLET | Refills: 11 | Status: SHIPPED | OUTPATIENT
Start: 2020-03-12 | End: 2020-10-29

## 2020-03-12 ASSESSMENT — PATIENT HEALTH QUESTIONNAIRE - PHQ9
2. FEELING DOWN, DEPRESSED OR HOPELESS: NOT AT ALL
2. FEELING DOWN, DEPRESSED OR HOPELESS: NOT AT ALL
1. LITTLE INTEREST OR PLEASURE IN DOING THINGS: NOT AT ALL
SUM OF ALL RESPONSES TO PHQ9 QUESTIONS 1 AND 2: 0
1. LITTLE INTEREST OR PLEASURE IN DOING THINGS: NOT AT ALL
SUM OF ALL RESPONSES TO PHQ9 QUESTIONS 1 AND 2: 0
SUM OF ALL RESPONSES TO PHQ9 QUESTIONS 1 AND 2: 0

## 2020-03-13 ENCOUNTER — TELEPHONE (OUTPATIENT)
Dept: FAMILY MEDICINE CLINIC | Facility: CLINIC | Age: 55
End: 2020-03-13

## 2020-03-13 NOTE — TELEPHONE ENCOUNTER
Pt calling in regards to feeling sick, sore throats,headaches, fatigue,body aches, screening has been done , pt stated she has not travelled, but has been in doctors appointment  where the office has been crowded , please call pt

## 2020-03-13 NOTE — TELEPHONE ENCOUNTER
Spoke to pt, c/o symptoms starting 3/11/2020  +nasal congestion  +sore throat  +body aches/chills  +dry cough  +PND  +sinus pressure  Pt also c/o diarrhea  Pt has not tried anything over the counter due to all of her prescriptions    Pt c/o SOB but pt has

## 2020-03-30 DIAGNOSIS — E11.65 UNCONTROLLED TYPE 2 DIABETES MELLITUS WITH HYPERGLYCEMIA (HCC): ICD-10-CM

## 2020-03-30 NOTE — TELEPHONE ENCOUNTER
Insulin Degludec (TRESIBA FLEXTOUCH) 200 UNIT/ML Subcutaneous Solution Pen-injector               Sig: Inject 50 Units into the skin daily.     Disp:  10 pen    Refills:  1    Start: 3/30/2020    Class: Normal    Non-formulary For: Uncontrolled type 2 diabe

## 2020-04-09 DIAGNOSIS — F41.9 ANXIETY: ICD-10-CM

## 2020-04-09 DIAGNOSIS — F33.1 MODERATE EPISODE OF RECURRENT MAJOR DEPRESSIVE DISORDER (HCC): ICD-10-CM

## 2020-04-09 RX ORDER — RIVAROXABAN 20 MG/1
TABLET, FILM COATED ORAL
Qty: 30 TABLET | Refills: 5 | Status: SHIPPED | OUTPATIENT
Start: 2020-04-09 | End: 2020-10-19

## 2020-04-09 NOTE — TELEPHONE ENCOUNTER
Name from pharmacy: ALPRAZOLAM 0.5 MG TABLET 0.5 TAB          Will file in chart as: ALPRAZOLAM 0.5 MG Oral Tab         Sig: TAKE (1) TABLET TWICE A DAY AS NEEDED FOR SLEEP    Disp:  45 tablet    Refills:  5    Start: 4/9/2020    Class: Normal    Non-formu

## 2020-04-10 RX ORDER — ALPRAZOLAM 0.5 MG/1
TABLET ORAL
Qty: 45 TABLET | Refills: 0 | Status: SHIPPED | OUTPATIENT
Start: 2020-04-10 | End: 2020-05-18

## 2020-04-28 ENCOUNTER — TELEPHONE (OUTPATIENT)
Dept: CARDIOLOGY | Age: 55
End: 2020-04-28

## 2020-04-28 DIAGNOSIS — I25.10 CORONARY ARTERY DISEASE INVOLVING NATIVE CORONARY ARTERY OF NATIVE HEART WITHOUT ANGINA PECTORIS: Primary | ICD-10-CM

## 2020-04-28 DIAGNOSIS — R07.2 PRECORDIAL PAIN: ICD-10-CM

## 2020-05-07 RX ORDER — PROCHLORPERAZINE 25 MG/1
1 SUPPOSITORY RECTAL
COMMUNITY
Start: 2020-03-12 | End: 2020-05-07 | Stop reason: SDUPTHER

## 2020-05-07 RX ORDER — PROCHLORPERAZINE 25 MG/1
1 SUPPOSITORY RECTAL SEE ADMIN INSTRUCTIONS
Qty: 3 EACH | Refills: 4 | Status: SHIPPED | OUTPATIENT
Start: 2020-05-07

## 2020-05-07 RX ORDER — PANTOPRAZOLE SODIUM 40 MG/1
40 TABLET, DELAYED RELEASE ORAL
Qty: 90 TABLET | Refills: 0 | Status: SHIPPED | OUTPATIENT
Start: 2020-05-07 | End: 2020-07-28

## 2020-05-07 NOTE — TELEPHONE ENCOUNTER
Requesting Pantoprazole Sodium 40 MG   LOV: 10/8/19  RTC:   Last Relevant Labs: 2/13/20  Filled: 1/30/20  # 90 with 1 refills    No future appointments. Refilled on 1/30/20 # 90 with 1 refill- would like this to go to mail order.      Due to the recent C

## 2020-05-11 ENCOUNTER — APPOINTMENT (OUTPATIENT)
Dept: GENERAL RADIOLOGY | Facility: HOSPITAL | Age: 55
End: 2020-05-11
Payer: COMMERCIAL

## 2020-05-11 ENCOUNTER — HOSPITAL ENCOUNTER (EMERGENCY)
Facility: HOSPITAL | Age: 55
Discharge: HOME | End: 2020-05-11
Attending: EMERGENCY MEDICINE
Payer: COMMERCIAL

## 2020-05-11 ENCOUNTER — APPOINTMENT (OUTPATIENT)
Dept: CT IMAGING | Facility: HOSPITAL | Age: 55
End: 2020-05-11
Attending: EMERGENCY MEDICINE
Payer: COMMERCIAL

## 2020-05-11 VITALS
TEMPERATURE: 98 F | BODY MASS INDEX: 22.73 KG/M2 | SYSTOLIC BLOOD PRESSURE: 132 MMHG | HEART RATE: 67 BPM | DIASTOLIC BLOOD PRESSURE: 82 MMHG | WEIGHT: 150 LBS | HEIGHT: 68 IN | OXYGEN SATURATION: 98 % | RESPIRATION RATE: 17 BRPM

## 2020-05-11 DIAGNOSIS — R06.02 SHORTNESS OF BREATH: Primary | ICD-10-CM

## 2020-05-11 PROCEDURE — 99285 EMERGENCY DEPT VISIT HI MDM: CPT

## 2020-05-11 PROCEDURE — 36415 COLL VENOUS BLD VENIPUNCTURE: CPT

## 2020-05-11 PROCEDURE — 80053 COMPREHEN METABOLIC PANEL: CPT | Performed by: EMERGENCY MEDICINE

## 2020-05-11 PROCEDURE — 93005 ELECTROCARDIOGRAM TRACING: CPT

## 2020-05-11 PROCEDURE — 71045 X-RAY EXAM CHEST 1 VIEW: CPT | Performed by: EMERGENCY MEDICINE

## 2020-05-11 PROCEDURE — 84484 ASSAY OF TROPONIN QUANT: CPT | Performed by: EMERGENCY MEDICINE

## 2020-05-11 PROCEDURE — 85025 COMPLETE CBC W/AUTO DIFF WBC: CPT | Performed by: EMERGENCY MEDICINE

## 2020-05-11 PROCEDURE — 93010 ELECTROCARDIOGRAM REPORT: CPT

## 2020-05-11 PROCEDURE — 71275 CT ANGIOGRAPHY CHEST: CPT | Performed by: EMERGENCY MEDICINE

## 2020-05-11 RX ORDER — ASPIRIN 81 MG/1
324 TABLET, CHEWABLE ORAL ONCE
Status: COMPLETED | OUTPATIENT
Start: 2020-05-11 | End: 2020-05-11

## 2020-05-11 NOTE — ED INITIAL ASSESSMENT (HPI)
PT TO ED FROM HOME WITH C/O CHEST PAIN, GENERALIZED WEAKNESS, SOB, LOW GRADE FEVERS, MATT EXPOSURE TO COVID

## 2020-05-11 NOTE — ED PROVIDER NOTES
Patient Seen in: [facility] Emergency Department      History   Patient presents with:  Chest Pain Angina    Stated Complaint:     HPI    61-year-old female presents for evaluation of fever, chest pain or shortness of breath.   Patient describes anne-marie • CATH DRUG ELUTING STENT     • CHOLECYSTECTOMY  04/10/2011    PERFORMED BY DR Anjel Stewart   • COLONOSCOPY  2012    polyps   • COLONOSCOPY N/A 2/23/2016    Performed by Matthew Rivera MD at [address] [place] [address] ENDOSCOPY   • COLPOSCOPY, CERVIX W/UPPER ADJACENT Alkaline Phosphatase 119 (*)     A/G Ratio 0.9 (*)     All other components within normal limits   CBC W/ DIFFERENTIAL - Abnormal; Notable for the following components:    RBC 5.45 (*)     MCH 25.3 (*)     RDW 15.7 (*)     All other components within devaughn breath. CONTRAST USED:  100cc of Omnipaque 350  FINDINGS:  LUNGS:  Scattered atelectasis and/or scarring. Biapical pleural parenchymal scarring. Scattered benign appearing subpleural nodularity.   On image 108, there is a 5 mm nodule within the superior changes. Troponin is negative. CT scan shows no acute PE. Coronavirus testing is negative. Patient was afebrile and well-appearing here. Discussed with Dr Terry Castillo. Patient can be discharged to follow-up closely with office.   She does understand that

## 2020-05-15 DIAGNOSIS — F41.9 ANXIETY: ICD-10-CM

## 2020-05-15 DIAGNOSIS — E11.65 UNCONTROLLED TYPE 2 DIABETES MELLITUS WITH HYPERGLYCEMIA (HCC): ICD-10-CM

## 2020-05-15 DIAGNOSIS — F33.1 MODERATE EPISODE OF RECURRENT MAJOR DEPRESSIVE DISORDER (HCC): ICD-10-CM

## 2020-05-15 RX ORDER — ALPRAZOLAM 0.5 MG/1
0.5 TABLET ORAL 2 TIMES DAILY PRN
Qty: 45 TABLET | Refills: 0 | Status: CANCELLED | OUTPATIENT
Start: 2020-05-15

## 2020-05-18 ENCOUNTER — TELEPHONE (OUTPATIENT)
Dept: FAMILY MEDICINE CLINIC | Facility: CLINIC | Age: 55
End: 2020-05-18

## 2020-05-18 DIAGNOSIS — F41.9 ANXIETY: ICD-10-CM

## 2020-05-18 DIAGNOSIS — F33.1 MODERATE EPISODE OF RECURRENT MAJOR DEPRESSIVE DISORDER (HCC): ICD-10-CM

## 2020-05-18 RX ORDER — ALPRAZOLAM 0.5 MG/1
0.5 TABLET ORAL 2 TIMES DAILY PRN
Qty: 45 TABLET | Refills: 0 | Status: SHIPPED | OUTPATIENT
Start: 2020-05-18 | End: 2020-06-29

## 2020-05-18 NOTE — TELEPHONE ENCOUNTER
Future Appointments   Date Time Provider [place]   5/18/2020  2:30 PM Faby Willingham DO [facility] [address]   5/22/2020 11:00 AM EH CARD STRESS ECHO RM 1 ECARD ECHO [facility] AT [facility]     Patient verbally consents to a virtual/telephone check-in serv

## 2020-05-18 NOTE — TELEPHONE ENCOUNTER
Please have patient gets labs and schedule phone appt for the next day.       Cole Pascal, DO  Family Medicine

## 2020-05-19 ENCOUNTER — OFFICE VISIT (OUTPATIENT)
Dept: FAMILY MEDICINE CLINIC | Facility: CLINIC | Age: 55
End: 2020-05-19
Payer: COMMERCIAL

## 2020-05-19 VITALS
BODY MASS INDEX: 39.71 KG/M2 | RESPIRATION RATE: 16 BRPM | DIASTOLIC BLOOD PRESSURE: 78 MMHG | HEART RATE: 93 BPM | WEIGHT: 262 LBS | HEIGHT: 68 IN | TEMPERATURE: 98 F | SYSTOLIC BLOOD PRESSURE: 130 MMHG

## 2020-05-19 DIAGNOSIS — N63.0 BREAST NODULE: ICD-10-CM

## 2020-05-19 DIAGNOSIS — R07.9 CHEST PAIN, UNSPECIFIED TYPE: Primary | ICD-10-CM

## 2020-05-19 DIAGNOSIS — J01.10 ACUTE FRONTAL SINUSITIS, RECURRENCE NOT SPECIFIED: ICD-10-CM

## 2020-05-19 DIAGNOSIS — G43.709 CHRONIC MIGRAINE WITHOUT AURA WITHOUT STATUS MIGRAINOSUS, NOT INTRACTABLE: ICD-10-CM

## 2020-05-19 DIAGNOSIS — E11.65 UNCONTROLLED TYPE 2 DIABETES MELLITUS WITH HYPERGLYCEMIA (HCC): ICD-10-CM

## 2020-05-19 PROCEDURE — 3078F DIAST BP <80 MM HG: CPT | Performed by: FAMILY MEDICINE

## 2020-05-19 PROCEDURE — 99214 OFFICE O/P EST MOD 30 MIN: CPT | Performed by: FAMILY MEDICINE

## 2020-05-19 PROCEDURE — 3008F BODY MASS INDEX DOCD: CPT | Performed by: FAMILY MEDICINE

## 2020-05-19 PROCEDURE — 3075F SYST BP GE 130 - 139MM HG: CPT | Performed by: FAMILY MEDICINE

## 2020-05-19 RX ORDER — AZITHROMYCIN 250 MG/1
TABLET, FILM COATED ORAL
Qty: 6 TABLET | Refills: 0 | Status: SHIPPED | OUTPATIENT
Start: 2020-05-19 | End: 2020-05-24

## 2020-05-19 RX ORDER — DUPILUMAB 300 MG/2ML
300 INJECTION, SOLUTION SUBCUTANEOUS
COMMUNITY
Start: 2020-05-05

## 2020-05-19 RX ORDER — ALPRAZOLAM 0.5 MG/1
TABLET ORAL
Qty: 45 TABLET | Refills: 4 | OUTPATIENT
Start: 2020-05-19

## 2020-05-19 RX ORDER — COLESEVELAM HYDROCHLORIDE 625 MG/1
3 TABLET, FILM COATED ORAL 2 TIMES DAILY
COMMUNITY
Start: 2020-04-20 | End: 2020-10-19

## 2020-05-19 RX ORDER — LOSARTAN POTASSIUM 100 MG/1
1 TABLET ORAL DAILY
COMMUNITY
Start: 2020-03-31

## 2020-05-19 RX ORDER — CLOPIDOGREL BISULFATE 75 MG/1
1 TABLET ORAL DAILY
COMMUNITY
Start: 2020-03-31

## 2020-05-19 RX ORDER — SUMATRIPTAN 50 MG/1
TABLET, FILM COATED ORAL
Qty: 9 TABLET | Refills: 3 | Status: SHIPPED | OUTPATIENT
Start: 2020-05-19 | End: 2021-08-04

## 2020-05-19 NOTE — PROGRESS NOTES
HPI:    Patient ID: Marisol Lau is a 50year old female.     HPI  Mrs. Keanu Chung is a pleasant 51 y/o F with past medical history of diabetes mellitus, hypertension, hyperlipidemia, coronary artery disease status, NSTEMI, history of PE and DVT, Asthma Negative for abdominal pain, constipation, diarrhea, nausea and vomiting. Genitourinary: Negative for difficulty urinating. Neurological: Negative for dizziness and weakness.             Current Outpatient Medications   Medication Sig Dispense Refill Test 4 times daily 400 strip 3   • Insulin Pen Needle (NOVOFINE PLUS) 32G X 4 MM Does not apply Misc Use to inject Victoza daily. 1 Box 0   • Rosuvastatin Calcium 5 MG Oral Tab Take 5 mg by mouth.  Monday, Wednesday and Friday     • ezetimibe 10 MG Oral Tab rhythm and normal heart sounds. No murmur heard. Pulmonary/Chest: Effort normal and breath sounds normal. No respiratory distress. She has no wheezes. She exhibits no tenderness. Breast exam deferred   Abdominal: Soft.  Bowel sounds are normal. She exh ADDL S BILAT ([id number])       [id number]

## 2020-05-19 NOTE — PATIENT INSTRUCTIONS
Thank you for choosing Carina Sandy MD at [place] [address]  To Do: Christian Sing  1. Please take meds as directed. [facility] is located in [address]. Monday, Tuesday & Friday – 8 a.m. to 4 p.m.   Wednesday, Thursday – 7 a.m. to 3 outweigh those potential risks and we strive to make you healthier and to improve your quality of life.     Referrals, and Radiology Information:    If your insurance requires a referral to a specialist, please allow 5 business days to process your referral marks the upper boundary of the breast tissue. The sternum (breastbone) can be felt beneath the skin. Chest muscles help move your arm. Di last reviewed this educational content on 10/1/2017  © 1955-2358 [employer] [address].  [address] too cold. All of these things can put more stress on your body and your heart.   · You may have unstable angina if angina starts occurring more often, lasts longer, happens even when you are resting, sleeping or doing little physical activity, or causes mor

## 2020-05-19 NOTE — TELEPHONE ENCOUNTER
Requested Prescriptions     Pending Prescriptions Disp Refills   • TRESIBA FLEXTOUCH 200 UNIT/ML Subcutaneous Solution Pen-injector [Pharmacy Med Name: Maurizio Balderrama 200 UNITS 200 SOPN] 10 mL 4     Sig: INJECT 50 UNITS INTO THE SKIN DAILY.      Refused

## 2020-05-20 RX ORDER — INSULIN DEGLUDEC 200 U/ML
INJECTION, SOLUTION SUBCUTANEOUS
Qty: 10 ML | Refills: 4 | OUTPATIENT
Start: 2020-05-20

## 2020-05-22 NOTE — TELEPHONE ENCOUNTER
PA for Kristen Irwin was approved through [facility]     CaseId:[id number];Status:Approved; Review Type:Prior Auth; Coverage Start Date:04/18/2020; Coverage End Date:05/19/2022

## 2020-05-28 ENCOUNTER — PATIENT MESSAGE (OUTPATIENT)
Dept: FAMILY MEDICINE CLINIC | Facility: CLINIC | Age: 55
End: 2020-05-28

## 2020-05-28 ENCOUNTER — TELEPHONE (OUTPATIENT)
Dept: FAMILY MEDICINE CLINIC | Facility: CLINIC | Age: 55
End: 2020-05-28

## 2020-05-28 NOTE — TELEPHONE ENCOUNTER
From: Israel Crowley  To:  Isha Cheng DO  Sent: 5/28/2020 11:54 AM CDT  Subject: Prescription Question    I have a yeast infection, I would like to know if I can get a prescription called in for fluconazole to my pharmacy,  The symptoms are itchi

## 2020-05-29 ENCOUNTER — HOSPITAL ENCOUNTER (OUTPATIENT)
Dept: CV DIAGNOSTICS | Facility: HOSPITAL | Age: 55
Discharge: HOME | End: 2020-05-29
Attending: INTERNAL MEDICINE
Payer: COMMERCIAL

## 2020-05-29 ENCOUNTER — VIRTUAL PHONE E/M (OUTPATIENT)
Dept: FAMILY MEDICINE CLINIC | Facility: CLINIC | Age: 55
End: 2020-05-29
Payer: COMMERCIAL

## 2020-05-29 DIAGNOSIS — R07.2 PRECORDIAL PAIN: ICD-10-CM

## 2020-05-29 DIAGNOSIS — I25.10 CORONARY ARTERY DISEASE INVOLVING NATIVE CORONARY ARTERY OF NATIVE HEART WITHOUT ANGINA PECTORIS: ICD-10-CM

## 2020-05-29 DIAGNOSIS — E11.65 UNCONTROLLED TYPE 2 DIABETES MELLITUS WITH HYPERGLYCEMIA (HCC): ICD-10-CM

## 2020-05-29 DIAGNOSIS — B37.3 VAGINAL YEAST INFECTION: Primary | ICD-10-CM

## 2020-05-29 PROCEDURE — 93350 STRESS TTE ONLY: CPT | Performed by: INTERNAL MEDICINE

## 2020-05-29 PROCEDURE — 99213 OFFICE O/P EST LOW 20 MIN: CPT | Performed by: FAMILY MEDICINE

## 2020-05-29 PROCEDURE — 93018 CV STRESS TEST I&R ONLY: CPT | Performed by: INTERNAL MEDICINE

## 2020-05-29 PROCEDURE — 93017 CV STRESS TEST TRACING ONLY: CPT | Performed by: INTERNAL MEDICINE

## 2020-05-29 RX ORDER — DOBUTAMINE HYDROCHLORIDE 200 MG/100ML
INJECTION INTRAVENOUS
Status: COMPLETED
Start: 2020-05-29 | End: 2020-05-29

## 2020-05-29 RX ORDER — FLUCONAZOLE 150 MG/1
TABLET ORAL
Qty: 2 TABLET | Refills: 0 | Status: SHIPPED | OUTPATIENT
Start: 2020-05-29 | End: 2020-10-19 | Stop reason: ALTCHOICE

## 2020-05-29 RX ADMIN — DOBUTAMINE HYDROCHLORIDE 1 MCG/KG/MIN: 200 INJECTION INTRAVENOUS at 14:00:00

## 2020-05-29 NOTE — PROGRESS NOTES
Virtual/Telephone Check-In    Alanna Zamora verbally consents to a Virtual/Telephone Check-In service on 05/29/20.     Patient understands and accepts financial responsibility for any deductible, co-insurance and/or co-pays associated with this servic RBC microcytosis      History of pulmonary embolus (PE)            Negative hypercoagulable work up, on lifelong            anticoagulation       Type II diabetes mellitus, uncontrolled ([address]      Patella-femoral syndrome, bilateral knee      History of re by mouth every morning before breakfast., Disp: 90 tablet, Rfl: 0  •  Metoprolol Succinate ER 25 MG Oral Tablet 24 Hr, Take 1 tablet (25 mg total) by mouth 2 (two) times daily. , Disp: , Rfl: 0  •  ESCITALOPRAM 20 MG Oral Tab, TAKE 1 TABLET EVERY MORNING (P Oral Tab, Take 10 mg by mouth daily. , Disp: , Rfl:   •  Montelukast Sodium (SINGULAIR) 10 MG Oral Tab, Take 10 mg by mouth nightly., Disp: , Rfl:     REVIEW OF SYSTEMS:     Comprehensive ROS negative unless noted in HPI    PHYSICAL EXAM:     GEN:  Patient

## 2020-05-29 NOTE — PROGRESS NOTES
Pt here today for a dobutamine stress echo. BP dropped towards the end of the dobutamine stress, 90/50, , patient c/o nausea. Peak pictures obtained, IVF given, 0.9NS  ml bolus given.   Patient BP improved to 120/68; denied nausea, stated \"f

## 2020-06-03 ENCOUNTER — E-ADVICE (OUTPATIENT)
Dept: CARDIOLOGY | Age: 55
End: 2020-06-03

## 2020-06-10 ENCOUNTER — TELEPHONE (OUTPATIENT)
Dept: FAMILY MEDICINE CLINIC | Facility: CLINIC | Age: 55
End: 2020-06-10

## 2020-06-10 DIAGNOSIS — N63.0 LUMP OR MASS IN BREAST: Primary | ICD-10-CM

## 2020-06-10 NOTE — TELEPHONE ENCOUNTER
Dr.Tomacruz- Romo with Mammography is calling to request order be changed to Diagnostic Bilateral with the diagnosis N63.0    Please call with any questions  [phone]

## 2020-06-18 ENCOUNTER — TELEPHONE (OUTPATIENT)
Dept: FAMILY MEDICINE CLINIC | Facility: CLINIC | Age: 55
End: 2020-06-18

## 2020-06-18 NOTE — TELEPHONE ENCOUNTER
Pt calling in regards to having problems scheduling her mammogram for 2 weeks now, would like to speak to a nurse/doctor for advise, please call pt

## 2020-06-18 NOTE — TELEPHONE ENCOUNTER
Spoke to pt, states she had 2 orders in her chart for diagnostic mammogram and one for additional views.  States she is scheduled for diagnostic but asking for me to cancel additional views order because the Mammogram department seemed confused and she does

## 2020-06-23 DIAGNOSIS — F33.1 MODERATE EPISODE OF RECURRENT MAJOR DEPRESSIVE DISORDER (HCC): ICD-10-CM

## 2020-06-23 DIAGNOSIS — F41.9 ANXIETY: ICD-10-CM

## 2020-06-23 RX ORDER — ALPRAZOLAM 0.5 MG/1
0.5 TABLET ORAL 2 TIMES DAILY PRN
Qty: 45 TABLET | Refills: 2 | OUTPATIENT
Start: 2020-06-23

## 2020-06-23 NOTE — TELEPHONE ENCOUNTER
Medication(s) to Refill:   Requested Prescriptions     Pending Prescriptions Disp Refills   • ALPRAZOLAM 0.5 MG Oral Tab [Pharmacy Med Name: ALPRAZOLAM 0.5 MG TABLET 0.5 TAB] 45 tablet 2     Sig: TAKE 1 TABLET (0.5 MG TOTAL) BY MOUTH 2 (TWO) TIMES DAILY AS

## 2020-06-24 NOTE — TELEPHONE ENCOUNTER
Patient calling wondering why her Alprazolam was denied. She said she had a virtual appointment on 5/29/20. Do you want her to make another appt?  Or can you give her #45 and than have her schedule since you do not have opening for awhile

## 2020-06-26 NOTE — TELEPHONE ENCOUNTER
I spoke with pt, states that she has tried twice now to get labs done but it has been over a 2 hr wait and she has to fast, she is also getting her Cortisol level checked by Dr. Seng Parry so she has to go first thing in the morning.  Pt states she tried to make

## 2020-06-29 ENCOUNTER — TELEPHONE (OUTPATIENT)
Dept: CARDIOLOGY | Age: 55
End: 2020-06-29

## 2020-06-29 ENCOUNTER — PATIENT MESSAGE (OUTPATIENT)
Dept: FAMILY MEDICINE CLINIC | Facility: CLINIC | Age: 55
End: 2020-06-29

## 2020-06-29 ENCOUNTER — HOSPITAL ENCOUNTER (OUTPATIENT)
Dept: ULTRASOUND IMAGING | Age: 55
Discharge: HOME | End: 2020-06-29
Attending: FAMILY MEDICINE
Payer: COMMERCIAL

## 2020-06-29 ENCOUNTER — HOSPITAL ENCOUNTER (OUTPATIENT)
Dept: MAMMOGRAPHY | Age: 55
Discharge: HOME | End: 2020-06-29
Attending: FAMILY MEDICINE
Payer: COMMERCIAL

## 2020-06-29 ENCOUNTER — APPOINTMENT (OUTPATIENT)
Dept: LAB | Age: 55
End: 2020-06-29
Attending: FAMILY MEDICINE
Payer: COMMERCIAL

## 2020-06-29 DIAGNOSIS — R92.8 ABNORMAL MAMMOGRAM: Primary | ICD-10-CM

## 2020-06-29 DIAGNOSIS — F33.1 MODERATE EPISODE OF RECURRENT MAJOR DEPRESSIVE DISORDER (HCC): ICD-10-CM

## 2020-06-29 DIAGNOSIS — N63.0 LUMP OR MASS IN BREAST: ICD-10-CM

## 2020-06-29 DIAGNOSIS — E11.65 UNCONTROLLED TYPE 2 DIABETES MELLITUS WITH HYPERGLYCEMIA (HCC): ICD-10-CM

## 2020-06-29 DIAGNOSIS — F41.9 ANXIETY: ICD-10-CM

## 2020-06-29 LAB
CHOLEST SMN-MCNC: 214 MG/DL (ref ?–200)
CREAT UR-SCNC: 121 MG/DL
EST. AVERAGE GLUCOSE BLD GHB EST-MCNC: 212 MG/DL (ref 68–126)
HBA1C MFR BLD HPLC: 9 % (ref ?–5.7)
HDLC SERPL-MCNC: 37 MG/DL (ref 40–59)
LDLC SERPL CALC-MCNC: 104 MG/DL (ref ?–100)
MICROALBUMIN UR-MCNC: 3.78 MG/DL
MICROALBUMIN/CREAT 24H UR-RTO: 31.2 UG/MG (ref ?–30)
NONHDLC SERPL-MCNC: 177 MG/DL (ref ?–130)
PATIENT FASTING Y/N/NP: YES
TRIGL SERPL-MCNC: 364 MG/DL (ref 30–149)
VLDLC SERPL CALC-MCNC: 73 MG/DL (ref 0–30)

## 2020-06-29 PROCEDURE — 82570 ASSAY OF URINE CREATININE: CPT

## 2020-06-29 PROCEDURE — 76642 ULTRASOUND BREAST LIMITED: CPT | Performed by: FAMILY MEDICINE

## 2020-06-29 PROCEDURE — 82043 UR ALBUMIN QUANTITATIVE: CPT

## 2020-06-29 PROCEDURE — 36415 COLL VENOUS BLD VENIPUNCTURE: CPT

## 2020-06-29 PROCEDURE — 77062 BREAST TOMOSYNTHESIS BI: CPT | Performed by: FAMILY MEDICINE

## 2020-06-29 PROCEDURE — 83036 HEMOGLOBIN GLYCOSYLATED A1C: CPT

## 2020-06-29 PROCEDURE — 77066 DX MAMMO INCL CAD BI: CPT | Performed by: FAMILY MEDICINE

## 2020-06-29 PROCEDURE — 80061 LIPID PANEL: CPT

## 2020-06-29 RX ORDER — ALPRAZOLAM 0.5 MG/1
0.5 TABLET ORAL 2 TIMES DAILY PRN
Qty: 45 TABLET | Refills: 1 | Status: SHIPPED | OUTPATIENT
Start: 2020-06-29 | End: 2020-08-27

## 2020-06-29 NOTE — TELEPHONE ENCOUNTER
From: Christina Godinez  To: Tatiana Arndt DO  Sent: 6/29/2020 2:55 PM CDT  Subject: Prescription Question    Can someone please call in a refill for Alprazolam? My Pharmacy closes at 5. I have done the tests that Dr. Apryl Flor said I needed to do.  I had

## 2020-06-29 NOTE — TELEPHONE ENCOUNTER
Pt had her labs done this morning and will be having her mammo done today as well.  She will need the refill on the Alprazolam.

## 2020-07-02 NOTE — TELEPHONE ENCOUNTER
Rx sent 6/29/2020    ALPRAZolam 0.5 MG Oral Tab 45 tablet 1 6/29/2020     Sig - Route: Take 1 tablet (0.5 mg total) by mouth 2 (two) times daily as needed.  - Oral    Sent to pharmacy as: ALPRAZolam 0.5 MG Oral Tablet Mahendra Harman    E-Prescribing Status: Martinez

## 2020-07-09 ENCOUNTER — HOSPITAL ENCOUNTER (OUTPATIENT)
Age: 55
Discharge: HOME | End: 2020-07-09
Payer: COMMERCIAL

## 2020-07-09 ENCOUNTER — TELEPHONE (OUTPATIENT)
Dept: FAMILY MEDICINE CLINIC | Facility: CLINIC | Age: 55
End: 2020-07-09

## 2020-07-09 VITALS
OXYGEN SATURATION: 96 % | HEART RATE: 71 BPM | RESPIRATION RATE: 20 BRPM | SYSTOLIC BLOOD PRESSURE: 129 MMHG | TEMPERATURE: 98 F | DIASTOLIC BLOOD PRESSURE: 79 MMHG

## 2020-07-09 DIAGNOSIS — S00.411A ABRASION OF RIGHT EAR CANAL, INITIAL ENCOUNTER: Primary | ICD-10-CM

## 2020-07-09 PROCEDURE — 99203 OFFICE O/P NEW LOW 30 MIN: CPT | Performed by: PHYSICIAN ASSISTANT

## 2020-07-09 NOTE — ED PROVIDER NOTES
Patient Seen in: [id number] [facility]      History   Patient presents with:  Ear Problem    Stated Complaint: blood coming from ear/no pain    HPI    Pleasant 53-year-old female.   Awoke this morning and noted some blood on the pillowcase and t surgery   • OTHER SURGICAL HISTORY      CYST REMOVED ON LEFT WRIST 16 YRS AGO   • SINUS SURGERY       • UPPER GI ENDOSCOPY,EXAM                  Family history reviewed with patient/caregiver and is not pertinent to presenting problem.     Social History

## 2020-07-28 RX ORDER — LOSARTAN POTASSIUM 100 MG/1
TABLET ORAL
Qty: 90 TABLET | Refills: 3 | Status: SHIPPED | OUTPATIENT
Start: 2020-07-28

## 2020-07-28 RX ORDER — METOPROLOL SUCCINATE 25 MG/1
TABLET, EXTENDED RELEASE ORAL
Qty: 90 TABLET | Refills: 3 | Status: SHIPPED | OUTPATIENT
Start: 2020-07-28

## 2020-07-28 RX ORDER — CLOPIDOGREL BISULFATE 75 MG/1
TABLET ORAL
Qty: 90 TABLET | Refills: 3 | Status: SHIPPED | OUTPATIENT
Start: 2020-07-28

## 2020-07-28 NOTE — TELEPHONE ENCOUNTER
Requesting PANTOPRAZOLE SODIUM 40 MG   LOV: 5/19/20  RTC:   Last Relevant Labs: 6/29/20  Filled: 5/7/20  # 90 with 0 refills    No future appointments.

## 2020-07-29 RX ORDER — PANTOPRAZOLE SODIUM 40 MG/1
TABLET, DELAYED RELEASE ORAL
Qty: 90 TABLET | Refills: 1 | Status: SHIPPED | OUTPATIENT
Start: 2020-07-29 | End: 2021-02-09

## 2020-08-05 ENCOUNTER — PATIENT MESSAGE (OUTPATIENT)
Dept: FAMILY MEDICINE CLINIC | Facility: CLINIC | Age: 55
End: 2020-08-05

## 2020-08-06 RX ORDER — FLUCONAZOLE 150 MG/1
150 TABLET ORAL ONCE
Qty: 1 TABLET | Refills: 0 | Status: SHIPPED | OUTPATIENT
Start: 2020-08-06 | End: 2020-08-06

## 2020-08-06 NOTE — TELEPHONE ENCOUNTER
From: Aleene Krabbe  To: Violetta Moore DO  Sent: 8/5/2020 8:49 PM CDT  Subject: Non-Urgent Medical Question    I have a yeast infection and would like to know if I can get a prescription for Fluconazole .

## 2020-08-10 ENCOUNTER — TELEPHONE (OUTPATIENT)
Dept: FAMILY MEDICINE CLINIC | Facility: CLINIC | Age: 55
End: 2020-08-10

## 2020-08-10 NOTE — TELEPHONE ENCOUNTER
Spoke with pt, informed Rx for Fluconazole sent to pharmacy on 8/6/2020. Pt will contact pharmacy for .

## 2020-08-10 NOTE — TELEPHONE ENCOUNTER
Patient calling, has possible yeast infection. Started Friday, feels it is worse now.  Asking for prescription to be sent to [facility]

## 2020-08-27 DIAGNOSIS — F41.9 ANXIETY: ICD-10-CM

## 2020-08-27 DIAGNOSIS — F33.1 MODERATE EPISODE OF RECURRENT MAJOR DEPRESSIVE DISORDER (HCC): ICD-10-CM

## 2020-08-27 RX ORDER — ALPRAZOLAM 0.5 MG/1
0.5 TABLET ORAL 2 TIMES DAILY PRN
Qty: 45 TABLET | Refills: 1 | Status: SHIPPED | OUTPATIENT
Start: 2020-08-27 | End: 2020-10-28

## 2020-08-27 NOTE — TELEPHONE ENCOUNTER
Medication(s) to Refill:   Requested Prescriptions     Pending Prescriptions Disp Refills   • ALPRAZOLAM 0.5 MG Oral Tab [Pharmacy Med Name: ALPRAZOLAM 0.5 MG TABLET 0.5 TAB] 45 tablet 1     Sig: TAKE 1 TABLET (0.5 MG TOTAL) BY MOUTH 2 (TWO) TIMES TA

## 2020-09-26 ENCOUNTER — HOSPITAL ENCOUNTER (EMERGENCY)
Facility: HOSPITAL | Age: 55
Discharge: HOME | End: 2020-09-26
Attending: EMERGENCY MEDICINE
Payer: COMMERCIAL

## 2020-09-26 ENCOUNTER — APPOINTMENT (OUTPATIENT)
Dept: CT IMAGING | Facility: HOSPITAL | Age: 55
End: 2020-09-26
Attending: EMERGENCY MEDICINE
Payer: COMMERCIAL

## 2020-09-26 VITALS
OXYGEN SATURATION: 97 % | WEIGHT: 250 LBS | HEART RATE: 70 BPM | HEIGHT: 67 IN | BODY MASS INDEX: 39.24 KG/M2 | RESPIRATION RATE: 18 BRPM | DIASTOLIC BLOOD PRESSURE: 88 MMHG | TEMPERATURE: 99 F | SYSTOLIC BLOOD PRESSURE: 156 MMHG

## 2020-09-26 DIAGNOSIS — R19.7 DIARRHEA, UNSPECIFIED TYPE: ICD-10-CM

## 2020-09-26 DIAGNOSIS — R51.9 NONINTRACTABLE HEADACHE, UNSPECIFIED CHRONICITY PATTERN, UNSPECIFIED HEADACHE TYPE: ICD-10-CM

## 2020-09-26 DIAGNOSIS — R10.9 ABDOMINAL PAIN, ACUTE: Primary | ICD-10-CM

## 2020-09-26 LAB
ALBUMIN SERPL-MCNC: 3.6 G/DL (ref 3.4–5)
ALBUMIN/GLOB SERPL: 1 {RATIO} (ref 1–2)
ALP LIVER SERPL-CCNC: 99 U/L
ALT SERPL-CCNC: 66 U/L
ANION GAP SERPL CALC-SCNC: 7 MMOL/L (ref 0–18)
AST SERPL-CCNC: 46 U/L (ref 15–37)
BASOPHILS # BLD AUTO: 0.09 X10(3) UL (ref 0–0.2)
BASOPHILS NFR BLD AUTO: 0.9 %
BILIRUB SERPL-MCNC: 0.4 MG/DL (ref 0.1–2)
BUN BLD-MCNC: 10 MG/DL (ref 7–18)
BUN/CREAT SERPL: 13.2 (ref 10–20)
CALCIUM BLD-MCNC: 9.3 MG/DL (ref 8.5–10.1)
CHLORIDE SERPL-SCNC: 106 MMOL/L (ref 98–112)
CO2 SERPL-SCNC: 25 MMOL/L (ref 21–32)
CREAT BLD-MCNC: 0.76 MG/DL
DEPRECATED RDW RBC AUTO: 43.9 FL (ref 35.1–46.3)
EOSINOPHIL # BLD AUTO: 0.33 X10(3) UL (ref 0–0.7)
EOSINOPHIL NFR BLD AUTO: 3.4 %
ERYTHROCYTE [DISTWIDTH] IN BLOOD BY AUTOMATED COUNT: 15.5 % (ref 11–15)
GLOBULIN PLAS-MCNC: 3.6 G/DL (ref 2.8–4.4)
GLUCOSE BLD-MCNC: 184 MG/DL (ref 70–99)
HCT VFR BLD AUTO: 43.7 %
HGB BLD-MCNC: 13.3 G/DL
IMM GRANULOCYTES # BLD AUTO: 0.04 X10(3) UL (ref 0–1)
IMM GRANULOCYTES NFR BLD: 0.4 %
LYMPHOCYTES # BLD AUTO: 3.41 X10(3) UL (ref 1–4)
LYMPHOCYTES NFR BLD AUTO: 35.1 %
M PROTEIN MFR SERPL ELPH: 7.2 G/DL (ref 6.4–8.2)
MCH RBC QN AUTO: 24.4 PG (ref 26–34)
MCHC RBC AUTO-ENTMCNC: 30.4 G/DL (ref 31–37)
MCV RBC AUTO: 80 FL
MONOCYTES # BLD AUTO: 0.46 X10(3) UL (ref 0.1–1)
MONOCYTES NFR BLD AUTO: 4.7 %
NEUTROPHILS # BLD AUTO: 5.38 X10 (3) UL (ref 1.5–7.7)
NEUTROPHILS # BLD AUTO: 5.38 X10(3) UL (ref 1.5–7.7)
NEUTROPHILS NFR BLD AUTO: 55.5 %
OSMOLALITY SERPL CALC.SUM OF ELEC: 290 MOSM/KG (ref 275–295)
PLATELET # BLD AUTO: 351 10(3)UL (ref 150–450)
POTASSIUM SERPL-SCNC: 3.9 MMOL/L (ref 3.5–5.1)
RBC # BLD AUTO: 5.46 X10(6)UL
SODIUM SERPL-SCNC: 138 MMOL/L (ref 136–145)
WBC # BLD AUTO: 9.7 X10(3) UL (ref 4–11)

## 2020-09-26 PROCEDURE — 80053 COMPREHEN METABOLIC PANEL: CPT | Performed by: EMERGENCY MEDICINE

## 2020-09-26 PROCEDURE — 99284 EMERGENCY DEPT VISIT MOD MDM: CPT

## 2020-09-26 PROCEDURE — 99285 EMERGENCY DEPT VISIT HI MDM: CPT

## 2020-09-26 PROCEDURE — 70450 CT HEAD/BRAIN W/O DYE: CPT | Performed by: EMERGENCY MEDICINE

## 2020-09-26 PROCEDURE — 74177 CT ABD & PELVIS W/CONTRAST: CPT | Performed by: EMERGENCY MEDICINE

## 2020-09-26 PROCEDURE — 85025 COMPLETE CBC W/AUTO DIFF WBC: CPT | Performed by: EMERGENCY MEDICINE

## 2020-09-26 PROCEDURE — 96361 HYDRATE IV INFUSION ADD-ON: CPT

## 2020-09-26 PROCEDURE — 96374 THER/PROPH/DIAG INJ IV PUSH: CPT

## 2020-09-26 RX ORDER — ONDANSETRON 2 MG/ML
4 INJECTION INTRAMUSCULAR; INTRAVENOUS ONCE
Status: COMPLETED | OUTPATIENT
Start: 2020-09-26 | End: 2020-09-26

## 2020-09-26 RX ORDER — DICYCLOMINE HCL 20 MG
20 TABLET ORAL 4 TIMES DAILY PRN
Qty: 15 TABLET | Refills: 0 | Status: SHIPPED | OUTPATIENT
Start: 2020-09-26 | End: 2020-10-19

## 2020-09-26 RX ORDER — MECLIZINE HYDROCHLORIDE 25 MG/1
25 TABLET ORAL ONCE
Status: COMPLETED | OUTPATIENT
Start: 2020-09-26 | End: 2020-09-26

## 2020-09-26 RX ORDER — ONDANSETRON 4 MG/1
4 TABLET, ORALLY DISINTEGRATING ORAL EVERY 4 HOURS PRN
Qty: 10 TABLET | Refills: 0 | Status: SHIPPED | OUTPATIENT
Start: 2020-09-26 | End: 2020-10-03

## 2020-09-26 RX ORDER — SODIUM CHLORIDE 9 MG/ML
1000 INJECTION, SOLUTION INTRAVENOUS ONCE
Status: COMPLETED | OUTPATIENT
Start: 2020-09-26 | End: 2020-09-26

## 2020-09-26 NOTE — ED PROVIDER NOTES
Patient Seen in: [facility] Emergency Department      History   Patient presents with:  Nausea/Vomiting/Diarrhea  Abdomen/Flank Pain    Stated Complaint: nvd abd pain    HPI    Patient is a 53-year-old female presents emergency room with a history o • DESOUZA (nonalcoholic steatohepatitis)    • NSTEMI (non-ST elevated myocardial infarction) ([facility] [address] 2017    FELTON in LAD   • Obesity    • Osteoarthritis    • Patella-femoral syndrome, bilateral knee 1/15/2015   • Pulmonary embolism [facility])               Past Lisandro moist.  NECK: There is no focal tenderness to palpation appreciated. There is no JVD. No meningeal signs or nuchal rigidity appreciated. No stridor. LUNGS: Clear to auscultation bilaterally with no wheeze. There is good equal air entry bilaterally.   HEART PCR   C. DIFFICILE(TOXIGENIC)PCR   2019 NOVEL CORONAVIRUS SARS-COV-2 BY PCR([facility])          Ct Brain Or Head ([id number])    Result Date: 9/26/2020  CONCLUSION:  No acute intracranial hemorrhage.   No significant change when compared to most recent noncontrast CT that has been sick at this time. Patient feels better after IV fluid and wants to go home does not wish to be admitted to the hospital at this time and wants to go home at this time.   Patient will need to collect a stool specimen home to return to the lab

## 2020-09-29 LAB — SARS-COV-2 BY PCR: NOT DETECTED

## 2020-10-13 DIAGNOSIS — E11.65 UNCONTROLLED TYPE 2 DIABETES MELLITUS WITH HYPERGLYCEMIA (HCC): ICD-10-CM

## 2020-10-14 NOTE — TELEPHONE ENCOUNTER
Name from pharmacy: Holly Covarrubias 200 UNITS 200 Solution Pen-injector         Will file in chart as: TRESIBA FLEXTOUCH 200 UNIT/ML Subcutaneous Solution Pen-injector    Sig: INJECT 50 UNITS INTO THE SKIN DAILY.     Disp:  9 mL    Refills:  6    Start: DUTCH Brantley

## 2020-10-15 NOTE — TELEPHONE ENCOUNTER
Future Appointments   Date Time Provider Praful Hi   10/19/2020  2:30 PM MADAI Barbosa SGINP E[facility] SUB GI   10/28/2020  2:30 PM Roya Hull DO EMG [address]

## 2020-10-16 RX ORDER — INSULIN DEGLUDEC 200 U/ML
INJECTION, SOLUTION SUBCUTANEOUS
Qty: 9 ML | Refills: 6 | Status: SHIPPED | OUTPATIENT
Start: 2020-10-16 | End: 2021-02-02

## 2020-10-16 NOTE — TELEPHONE ENCOUNTER
Name from pharmacy: Evan Hernandez 200 UNITS 200 Solution Pen-injector          Will file in chart as: TRESIBA FLEXTOUCH 200 UNIT/ML Subcutaneous Solution Pen-injector    Sig: INJECT 50 UNITS INTO THE SKIN DAILY.     Disp:  9 mL    Refills:  6    Start:

## 2020-10-19 ENCOUNTER — TELEMEDICINE (OUTPATIENT)
Dept: TELEHEALTH | Age: 55
End: 2020-10-19

## 2020-10-19 ENCOUNTER — PATIENT MESSAGE (OUTPATIENT)
Dept: FAMILY MEDICINE CLINIC | Facility: CLINIC | Age: 55
End: 2020-10-19

## 2020-10-19 DIAGNOSIS — B37.3 VAGINAL CANDIDIASIS: Primary | ICD-10-CM

## 2020-10-19 PROBLEM — K44.9 HIATAL HERNIA: Status: ACTIVE | Noted: 2020-10-19

## 2020-10-19 PROBLEM — E09.9 STEROID-INDUCED DIABETES  (HCC): Status: ACTIVE | Noted: 2020-03-11

## 2020-10-19 PROBLEM — I10 ESSENTIAL HYPERTENSION: Status: ACTIVE | Noted: 2019-08-15

## 2020-10-19 PROBLEM — T38.0X5A STEROID-INDUCED DIABETES  (HCC): Status: ACTIVE | Noted: 2020-03-11

## 2020-10-19 PROBLEM — E78.2 HYPERLIPIDEMIA, MIXED: Status: ACTIVE | Noted: 2018-07-05

## 2020-10-19 PROBLEM — Z87.42 HISTORY OF ABNORMAL CERVICAL PAP SMEAR: Status: RESOLVED | Noted: 2018-09-10 | Resolved: 2020-10-19

## 2020-10-19 PROBLEM — Z95.5 PRESENCE OF DRUG COATED STENT IN LAD CORONARY ARTERY: Status: ACTIVE | Noted: 2017-11-14

## 2020-10-19 PROBLEM — Z92.241 HISTORY OF STEROID THERAPY: Status: ACTIVE | Noted: 2020-03-11

## 2020-10-19 PROBLEM — I25.5 CARDIOMYOPATHY, ISCHEMIC: Status: ACTIVE | Noted: 2017-11-14

## 2020-10-19 PROBLEM — T38.0X5A STEROID-INDUCED DIABETES: Status: ACTIVE | Noted: 2020-03-11

## 2020-10-19 PROBLEM — E09.9 STEROID-INDUCED DIABETES (HCC): Status: ACTIVE | Noted: 2020-03-11

## 2020-10-19 PROBLEM — E09.9 STEROID-INDUCED DIABETES: Status: ACTIVE | Noted: 2020-03-11

## 2020-10-19 PROBLEM — T38.0X5A STEROID-INDUCED DIABETES (HCC): Status: ACTIVE | Noted: 2020-03-11

## 2020-10-19 PROCEDURE — 99213 OFFICE O/P EST LOW 20 MIN: CPT | Performed by: NURSE PRACTITIONER

## 2020-10-19 RX ORDER — RIVAROXABAN 20 MG/1
TABLET, FILM COATED ORAL
Qty: 90 TABLET | Refills: 0 | Status: SHIPPED | OUTPATIENT
Start: 2020-10-19 | End: 2021-01-29 | Stop reason: SDUPTHER

## 2020-10-19 RX ORDER — FLUCONAZOLE 150 MG/1
150 TABLET ORAL ONCE
Qty: 1 TABLET | Refills: 0 | Status: SHIPPED | OUTPATIENT
Start: 2020-10-19 | End: 2020-10-19

## 2020-10-19 NOTE — PROGRESS NOTES
Jinny Vallejo is a 50year old female. HPI:   Patient presents with: Other: vaginal yeast infection    Encounter was conducted by video visit. Sanket Maloney is a 51 y/o female who states she has a vaginal yeast infection.  She has had symptoms for • Continuous Blood Gluc  (DEXCOM G6 ) Does not apply Device 1 Device by Does not apply route continuous.  1 Device 1   • Continuous Blood Gluc Transmit (DEXCOM G6 TRANSMITTER) Does not apply Misc Use as directed 1 each 3   • Tiotropium Bromi • History of pulmonary embolus (PE) 12/1/2016   • History of recurrent deep vein thrombosis (DVT) 12/4/2014   • Hypertension    • IBS    • LGSIL (low grade squamous intraepithelial dysplasia) 10/2010    Pap neg 2014   • Migraines     perfumes   • Moderate Yeast infection occurs when yeast in the vagina increase and attacks the vaginal tissues. Yeast is a type of fungus. These infections are often caused by a type of yeast called Candida albicans. Other species of yeast can also cause infections.  Factors scott The patient indicates understanding of these issues and agrees to the plan.

## 2020-10-19 NOTE — TELEPHONE ENCOUNTER
From: Samuel Turner  To: Francy Carlson DO  Sent: 10/19/2020 1:19 PM CDT  Subject: Prescription Question    I have a yeast infection, itching like crazy. Can I get a prescriptions for DIFLUCAN? It has been driving me nuts all weekend.  Symptoms: itc

## 2020-10-25 DIAGNOSIS — F33.1 MODERATE EPISODE OF RECURRENT MAJOR DEPRESSIVE DISORDER (HCC): ICD-10-CM

## 2020-10-25 DIAGNOSIS — F41.9 ANXIETY: ICD-10-CM

## 2020-10-27 RX ORDER — ESCITALOPRAM OXALATE 20 MG/1
TABLET ORAL
Qty: 90 TABLET | Refills: 3 | OUTPATIENT
Start: 2020-10-27

## 2020-10-28 ENCOUNTER — OFFICE VISIT (OUTPATIENT)
Dept: FAMILY MEDICINE CLINIC | Facility: CLINIC | Age: 55
End: 2020-10-28
Payer: COMMERCIAL

## 2020-10-28 VITALS
SYSTOLIC BLOOD PRESSURE: 138 MMHG | TEMPERATURE: 98 F | HEART RATE: 88 BPM | HEIGHT: 67.5 IN | BODY MASS INDEX: 41.46 KG/M2 | RESPIRATION RATE: 16 BRPM | DIASTOLIC BLOOD PRESSURE: 82 MMHG | WEIGHT: 267.25 LBS

## 2020-10-28 DIAGNOSIS — Z13.31 NEGATIVE DEPRESSION SCREENING: ICD-10-CM

## 2020-10-28 DIAGNOSIS — Z23 NEED FOR VACCINATION: ICD-10-CM

## 2020-10-28 DIAGNOSIS — F33.1 MODERATE EPISODE OF RECURRENT MAJOR DEPRESSIVE DISORDER (HCC): ICD-10-CM

## 2020-10-28 DIAGNOSIS — E11.65 UNCONTROLLED TYPE 2 DIABETES MELLITUS WITH HYPERGLYCEMIA (HCC): ICD-10-CM

## 2020-10-28 DIAGNOSIS — Z00.00 ANNUAL PHYSICAL EXAM: Primary | ICD-10-CM

## 2020-10-28 DIAGNOSIS — J45.40 MODERATE PERSISTENT ASTHMA WITHOUT COMPLICATION: ICD-10-CM

## 2020-10-28 DIAGNOSIS — F41.9 ANXIETY: ICD-10-CM

## 2020-10-28 PROCEDURE — 90471 IMMUNIZATION ADMIN: CPT | Performed by: FAMILY MEDICINE

## 2020-10-28 PROCEDURE — 3079F DIAST BP 80-89 MM HG: CPT | Performed by: FAMILY MEDICINE

## 2020-10-28 PROCEDURE — 99214 OFFICE O/P EST MOD 30 MIN: CPT | Performed by: FAMILY MEDICINE

## 2020-10-28 PROCEDURE — 99396 PREV VISIT EST AGE 40-64: CPT | Performed by: FAMILY MEDICINE

## 2020-10-28 PROCEDURE — 3008F BODY MASS INDEX DOCD: CPT | Performed by: FAMILY MEDICINE

## 2020-10-28 PROCEDURE — 3075F SYST BP GE 130 - 139MM HG: CPT | Performed by: FAMILY MEDICINE

## 2020-10-28 PROCEDURE — 90686 IIV4 VACC NO PRSV 0.5 ML IM: CPT | Performed by: FAMILY MEDICINE

## 2020-10-28 RX ORDER — ESCITALOPRAM OXALATE 20 MG/1
20 TABLET ORAL EVERY MORNING
Qty: 90 TABLET | Refills: 1 | Status: SHIPPED | OUTPATIENT
Start: 2020-10-28 | End: 2021-09-08

## 2020-10-28 RX ORDER — FLUCONAZOLE 150 MG/1
150 TABLET ORAL ONCE AS NEEDED
Qty: 1 TABLET | Refills: 5 | Status: SHIPPED | OUTPATIENT
Start: 2020-10-28 | End: 2020-10-28

## 2020-10-28 RX ORDER — PANTOPRAZOLE SODIUM 40 MG/1
TABLET, DELAYED RELEASE ORAL
COMMUNITY
Start: 2020-07-29

## 2020-10-28 RX ORDER — SUMATRIPTAN 50 MG/1
TABLET, FILM COATED ORAL
COMMUNITY
Start: 2020-05-19

## 2020-10-28 RX ORDER — ALPRAZOLAM 0.5 MG/1
TABLET ORAL
Qty: 45 TABLET | Refills: 5 | Status: SHIPPED | OUTPATIENT
Start: 2020-10-28 | End: 2021-02-02

## 2020-10-28 NOTE — PROGRESS NOTES
Leigh Ann Simmons is a 50year old female that presents for annual physical exam.     Patient presents with:  Physical: PHQ2: 0, CSSR: Pos  Immunization/Injection: flu vaccine today  Diabetes: has not had DM eye yet      Last Pap: Pap Smear,5 Years due o ischemic      Presence of drug coated stent in LAD coronary artery            PTCA/drug-eluting stent LAD Noeber 2017      Coronary artery disease of native heart with stable angina pectoris ([address]      NSTEMI (non-ST elevated myocardial infarction) ([facility] [address] MG Oral Tab, Take 1 tablet by mouth daily. , Disp: , Rfl:   •  losartan 100 MG Oral Tab, Take 1 tablet by mouth daily. , Disp: , Rfl:   •  SUMAtriptan Succinate 50 MG Oral Tab, TAKE 1 TABLET BY MOUTH EVERY 2 HOURS AS NEEDED FOR  MIGRAINE.   DO  NOT  EXCEED  2 75068 UNITS Oral Cap, Take 50,000 Units by mouth twice a week.  Tuesdays, Thursdays , Disp: , Rfl:     Past Medical History:   Diagnosis Date   • Allergic rhinitis, cause unspecified 5/5/2008   • Anemia    • Anxiety and depression    • Laguerre's esophagus Stroke Mother    • Other (Other[other]) Sister         stroke,pe   • Other (Other[other]) Brother         kidney stones   • Colon Cancer Maternal Grandfather    • Colon Cancer Maternal Aunt        Social History    Socioeconomic History      Marital status exertion  GI: denies abdominal pain, denies heartburn  : denies dysuria, vaginal discharge or itching, periods regular   MUSCULOSKELETAL: denies back pain  NEURO: denies headaches  PSYCHE: denies depression or anxiety  HEMATOLOGIC: denies hx of anemia  E eye exam  - continue to work on healthy diet and regular exercise  - follows with Endo  - diflucan refilled     4. Need for vaccination  - IMMUNIZATION ADMINISTRATION  - FLULAVAL INFLUENZA VACCINE QUAD PRESERVATIVE FREE 0.5 ML    5.  Anxiety  - ALPRAZolam 0

## 2020-10-29 ENCOUNTER — V-VISIT (OUTPATIENT)
Dept: CARDIOLOGY | Age: 55
End: 2020-10-29

## 2020-10-29 VITALS
WEIGHT: 267 LBS | BODY MASS INDEX: 40.47 KG/M2 | HEIGHT: 68 IN | DIASTOLIC BLOOD PRESSURE: 83 MMHG | SYSTOLIC BLOOD PRESSURE: 138 MMHG

## 2020-10-29 DIAGNOSIS — I25.10 CORONARY ARTERY DISEASE INVOLVING NATIVE CORONARY ARTERY OF NATIVE HEART WITHOUT ANGINA PECTORIS: Primary | ICD-10-CM

## 2020-10-29 DIAGNOSIS — I25.5 CARDIOMYOPATHY, ISCHEMIC: ICD-10-CM

## 2020-10-29 DIAGNOSIS — R53.83 FATIGUE, UNSPECIFIED TYPE: ICD-10-CM

## 2020-10-29 DIAGNOSIS — Z95.5 PRESENCE OF DRUG COATED STENT IN LAD CORONARY ARTERY: ICD-10-CM

## 2020-10-29 DIAGNOSIS — Z92.241 HISTORY OF STEROID THERAPY: ICD-10-CM

## 2020-10-29 DIAGNOSIS — E78.2 HYPERLIPIDEMIA, MIXED: ICD-10-CM

## 2020-10-29 DIAGNOSIS — I10 ESSENTIAL HYPERTENSION: ICD-10-CM

## 2020-10-29 DIAGNOSIS — Z82.49 FAMILY HISTORY OF EARLY CAD: ICD-10-CM

## 2020-10-29 DIAGNOSIS — R09.89 CAROTID BRUIT, UNSPECIFIED LATERALITY: ICD-10-CM

## 2020-10-29 PROCEDURE — 3079F DIAST BP 80-89 MM HG: CPT | Performed by: INTERNAL MEDICINE

## 2020-10-29 PROCEDURE — 99215 OFFICE O/P EST HI 40 MIN: CPT | Performed by: INTERNAL MEDICINE

## 2020-10-29 PROCEDURE — 3075F SYST BP GE 130 - 139MM HG: CPT | Performed by: INTERNAL MEDICINE

## 2020-10-29 RX ORDER — CETIRIZINE HYDROCHLORIDE 10 MG/1
10 TABLET ORAL DAILY
COMMUNITY

## 2020-10-29 SDOH — HEALTH STABILITY: PHYSICAL HEALTH: ON AVERAGE, HOW MANY MINUTES DO YOU ENGAGE IN EXERCISE AT THIS LEVEL?: 0 MIN

## 2020-10-29 SDOH — HEALTH STABILITY: PHYSICAL HEALTH: ON AVERAGE, HOW MANY DAYS PER WEEK DO YOU ENGAGE IN MODERATE TO STRENUOUS EXERCISE (LIKE A BRISK WALK)?: 0 DAYS

## 2020-10-29 ASSESSMENT — ENCOUNTER SYMPTOMS
FEVER: 0
WEIGHT GAIN: 0
ALLERGIC/IMMUNOLOGIC COMMENTS: NO NEW FOOD ALLERGIES
COUGH: 0
WEIGHT LOSS: 0
CHILLS: 0
BRUISES/BLEEDS EASILY: 0
HEMOPTYSIS: 0
SUSPICIOUS LESIONS: 0
HEMATOCHEZIA: 0

## 2020-10-29 ASSESSMENT — PATIENT HEALTH QUESTIONNAIRE - PHQ9
CLINICAL INTERPRETATION OF PHQ2 SCORE: NO FURTHER SCREENING NEEDED
2. FEELING DOWN, DEPRESSED OR HOPELESS: NOT AT ALL
SUM OF ALL RESPONSES TO PHQ9 QUESTIONS 1 AND 2: 0
1. LITTLE INTEREST OR PLEASURE IN DOING THINGS: NOT AT ALL
CLINICAL INTERPRETATION OF PHQ9 SCORE: NO FURTHER SCREENING NEEDED
SUM OF ALL RESPONSES TO PHQ9 QUESTIONS 1 AND 2: 0

## 2020-11-02 NOTE — PATIENT INSTRUCTIONS
Thank you for allowing me to participate in your care today. I will contact you with any results from today's visit. Lab results are typically available in 2-3 days for blood tests, and 3-5 days for any cultures or Paps.    Please let me know if you hav familiar with the potential benefits and risks of breast cancer screening with mammograms.      Cervical cancer All women in this age group, except women who have had a complete hysterectomy Pap test every 3 years or Pap test with human papillomavirus (HPV your healthcare provider 2 doses given at least 6 months apart   Hepatitis B Women at increased risk for infection – talk with your healthcare provider 3 doses over 6 months; second dose should be given 1 month after the first dose; the third dose should b at risk for cardiovascular health problems such as stroke When your risk is known   Use of tobacco and the health effects it can cause All women in this age group Every exam   [address]  Date Last Reviewed: 1/26/2016  © 1316-5002 [employer]

## 2020-11-10 NOTE — TELEPHONE ENCOUNTER
Date of Service: 11/09/2020    [facility]  [address] [place] [address]    CHIEF COMPLAINT:  1.  History of severe proximal esophageal stricture, limiting the patient's ability to swallow with subsequent failure to thrive and protein calorie malnutrition, which is gradually improving.  2.  History of bullous pemphigus, on chronic Prednisone with extension of his pemphigus to the oropharynx causing the aforementioned severe stomatitis with subsequent referral to Dr. Catherine Rodriguez for ENT opinion and the use of Decadron rinses with improvement.  3.  Pseudogout.  4.  Prediabetes.  5.  Peripheral vascular disease.  6.  History of major depression.  7.  Bipolar disorder with suicide attempt in the past.  8.  History of abdominal aortic aneurysm, was treated with stent grafting by Dr. Levar Buck Jr. in 2012.    HISTORY OF PRESENT ILLNESS:  The patient is a complicated 77-year-old male seen today for routine monthly followup visit at [facility] in [place].  He is quite wound up about the recent Presidential election and seems to want to spend the majority of his time talking about that today.  He states his swallow continues to slowly improve.  Unfortunately, this has not been reflected much in terms of weight gain.  He is compliant with his medicines.  He is wondering about recent labs that were done.    MEDICATIONS:  List is reviewed and updated in the medical record.  He is on:    Colchicine 0.6 mg twice a day.    He continues on Dexamethasone solution swish and spit 3 times a day.  Duloxetine.  Nexium.  Prednisone 10 mg once a day.    PHYSICAL EXAMINATION:  GENERAL:  The patient is alert and conversive.  VITAL SIGNS:  Blood pressure of 138/70, pulse 82 and regular, respirations are 16, temperature 97.3.  The patient weighs 123.2 pounds this past month.  O2 saturation 96%, body mass index 19.29.  NECK:  Supple, no masses or thyromegaly.  CHEST:  Fields are clear.  No  She needs to get her labs done. She has very poorly controlled dm2, last a1c 9.1 in Oct.  Please get labs for further refills.       Arianna Martinez, DO  Family Medicine rales, rhonchi, rubs or wheezes.  HEART:  Shows a regular rate and rhythm, no murmur, gallop or rub.  ABDOMEN:  Soft, good bowel sounds.  No localized mass or tenderness.  EXTREMITIES:  No edema, cyanosis, pallor, or clubbing.    SKIN:  The skin exam does not show any active lesions at the time of my visit.    MEDICAL DECISION MAKING:  Stable exam with stable chronic medical problems.    PLAN:    His lab work from 10/27 is reviewed with him.  This included a CBC and a chemistry panel.  Will continue to follow.  Recheck again in 1 month's time.      Dictated By: Az Montejo MD  Signing Provider: MD CHRISTIANO Somers/tatyana ([id number])  DD: 11/09/2020 15:45:19 TD: 11/10/2020 08:59:49    Copy Sent To:     cc: [facility]

## 2020-11-10 NOTE — TELEPHONE ENCOUNTER
Checked with [employer], it has been sent to scan. No copy kept in office. Called patient back to discuss. She has a copy of the form and she will have her  drop off a copy or she will fax so we can complete area that was not initially completed.        Wi
Pt called states her paper work was not completed #11 is missing and disability dates missing from paper work
Received copy of form but it is blank. Contacted patient to ask for contact information for company so that we can reach out and request a copy of the completed form so that we can just fill out the area's that are missing.  Combined insurance company of am
Yes

## 2020-12-07 ENCOUNTER — APPOINTMENT (OUTPATIENT)
Dept: LAB | Age: 55
End: 2020-12-07
Attending: ALLERGY & IMMUNOLOGY
Payer: COMMERCIAL

## 2020-12-07 DIAGNOSIS — R51.9 ACUTE NONINTRACTABLE HEADACHE, UNSPECIFIED HEADACHE TYPE: ICD-10-CM

## 2020-12-07 DIAGNOSIS — R50.9 FEVER, UNSPECIFIED FEVER CAUSE: ICD-10-CM

## 2021-01-01 ENCOUNTER — EXTERNAL RECORD (OUTPATIENT)
Dept: HEALTH INFORMATION MANAGEMENT | Facility: OTHER | Age: 56
End: 2021-01-01

## 2021-01-03 ENCOUNTER — TELEMEDICINE (OUTPATIENT)
Dept: TELEHEALTH | Age: 56
End: 2021-01-03

## 2021-01-03 DIAGNOSIS — B37.3 VAGINAL YEAST INFECTION: Primary | ICD-10-CM

## 2021-01-03 PROCEDURE — 99212 OFFICE O/P EST SF 10 MIN: CPT | Performed by: NURSE PRACTITIONER

## 2021-01-03 RX ORDER — FLUCONAZOLE 150 MG/1
150 TABLET ORAL ONCE
Qty: 1 TABLET | Refills: 0 | Status: SHIPPED | OUTPATIENT
Start: 2021-01-03 | End: 2021-01-03

## 2021-01-03 NOTE — PROGRESS NOTES
Beata Nolen is a 50year old female. HPI:   Patient presents with: Other: vaginal yeast infection    Encounter was conducted by video visit. Encounter lasted approximately 5 minutes. Patient reports vaginal itching that started today.  States sh • Tiotropium Bromide Monohydrate 18 MCG Inhalation Cap Inhale into the lungs daily.      • Glucose Blood (ONETOUCH VERIO) In Vitro Strip Test 4 times daily 400 strip 3   • Insulin Pen Needle (NOVOFINE PLUS) 32G X 4 MM Does not apply Misc Use to inject Victo • Moderate persistent asthma without complication 0/52/4221   • DESOUZA (nonalcoholic steatohepatitis)    • NSTEMI (non-ST elevated myocardial infarction) ([facility] [address] 2017    FELTON in LAD   • Obesity    • Osteoarthritis    • Patella-femoral syndrome, bilateral knee 1 · Swelling, redness of vulva  · Pain during sex    Treating yeast infection  Yeast infection is treated with a vaginal antifungal cream. In some cases, antifungal pills are prescribed instead.  During treatment:   · Finish all of your medicine, even if your

## 2021-01-05 ENCOUNTER — APPOINTMENT (OUTPATIENT)
Dept: GENERAL RADIOLOGY | Age: 56
End: 2021-01-05
Attending: EMERGENCY MEDICINE
Payer: COMMERCIAL

## 2021-01-05 ENCOUNTER — HOSPITAL ENCOUNTER (EMERGENCY)
Age: 56
Discharge: HOME | End: 2021-01-05
Attending: EMERGENCY MEDICINE
Payer: COMMERCIAL

## 2021-01-05 VITALS
HEART RATE: 88 BPM | HEIGHT: 68 IN | SYSTOLIC BLOOD PRESSURE: 113 MMHG | DIASTOLIC BLOOD PRESSURE: 66 MMHG | RESPIRATION RATE: 18 BRPM | TEMPERATURE: 98 F | BODY MASS INDEX: 39.4 KG/M2 | OXYGEN SATURATION: 97 % | WEIGHT: 260 LBS

## 2021-01-05 DIAGNOSIS — J01.40 ACUTE PANSINUSITIS, RECURRENCE NOT SPECIFIED: ICD-10-CM

## 2021-01-05 DIAGNOSIS — J45.41 MODERATE PERSISTENT ASTHMA WITH EXACERBATION: Primary | ICD-10-CM

## 2021-01-05 DIAGNOSIS — E87.70 HYPERVOLEMIA, UNSPECIFIED HYPERVOLEMIA TYPE: ICD-10-CM

## 2021-01-05 LAB
ALBUMIN SERPL-MCNC: 3.7 G/DL (ref 3.4–5)
ALBUMIN/GLOB SERPL: 0.9 {RATIO} (ref 1–2)
ALP LIVER SERPL-CCNC: 119 U/L
ALT SERPL-CCNC: 49 U/L
ANION GAP SERPL CALC-SCNC: 8 MMOL/L (ref 0–18)
AST SERPL-CCNC: 27 U/L (ref 15–37)
ATRIAL RATE: 87 BPM
BASOPHILS # BLD AUTO: 0.1 X10(3) UL (ref 0–0.2)
BASOPHILS NFR BLD AUTO: 0.9 %
BILIRUB SERPL-MCNC: 0.5 MG/DL (ref 0.1–2)
BUN BLD-MCNC: 10 MG/DL (ref 7–18)
BUN/CREAT SERPL: 9.8 (ref 10–20)
CALCIUM BLD-MCNC: 9.8 MG/DL (ref 8.5–10.1)
CHLORIDE SERPL-SCNC: 100 MMOL/L (ref 98–112)
CO2 SERPL-SCNC: 25 MMOL/L (ref 21–32)
CREAT BLD-MCNC: 1.02 MG/DL
D-DIMER: 0.47 UG/ML FEU (ref ?–0.53)
DEPRECATED RDW RBC AUTO: 42.1 FL (ref 35.1–46.3)
EOSINOPHIL # BLD AUTO: 0.5 X10(3) UL (ref 0–0.7)
EOSINOPHIL NFR BLD AUTO: 4.4 %
ERYTHROCYTE [DISTWIDTH] IN BLOOD BY AUTOMATED COUNT: 15.2 % (ref 11–15)
GLOBULIN PLAS-MCNC: 4 G/DL (ref 2.8–4.4)
GLUCOSE BLD-MCNC: 278 MG/DL (ref 70–99)
GLUCOSE BLD-MCNC: 297 MG/DL (ref 70–99)
GLUCOSE BLD-MCNC: 331 MG/DL (ref 70–99)
HCT VFR BLD AUTO: 44 %
HGB BLD-MCNC: 14 G/DL
IMM GRANULOCYTES # BLD AUTO: 0.06 X10(3) UL (ref 0–1)
IMM GRANULOCYTES NFR BLD: 0.5 %
LYMPHOCYTES # BLD AUTO: 4.13 X10(3) UL (ref 1–4)
LYMPHOCYTES NFR BLD AUTO: 36.2 %
M PROTEIN MFR SERPL ELPH: 7.7 G/DL (ref 6.4–8.2)
MCH RBC QN AUTO: 24.6 PG (ref 26–34)
MCHC RBC AUTO-ENTMCNC: 31.8 G/DL (ref 31–37)
MCV RBC AUTO: 77.2 FL
MONOCYTES # BLD AUTO: 0.51 X10(3) UL (ref 0.1–1)
MONOCYTES NFR BLD AUTO: 4.5 %
NEUTROPHILS # BLD AUTO: 6.1 X10 (3) UL (ref 1.5–7.7)
NEUTROPHILS # BLD AUTO: 6.1 X10(3) UL (ref 1.5–7.7)
NEUTROPHILS NFR BLD AUTO: 53.5 %
NT-PROBNP SERPL-MCNC: 37 PG/ML (ref ?–125)
OSMOLALITY SERPL CALC.SUM OF ELEC: 288 MOSM/KG (ref 275–295)
P AXIS: 16 DEGREES
P-R INTERVAL: 144 MS
PLATELET # BLD AUTO: 378 10(3)UL (ref 150–450)
POTASSIUM SERPL-SCNC: 4.4 MMOL/L (ref 3.5–5.1)
Q-T INTERVAL: 360 MS
QRS DURATION: 88 MS
QTC CALCULATION (BEZET): 433 MS
R AXIS: 57 DEGREES
RBC # BLD AUTO: 5.7 X10(6)UL
SARS-COV-2 RNA RESP QL NAA+PROBE: NOT DETECTED
SODIUM SERPL-SCNC: 133 MMOL/L (ref 136–145)
T AXIS: 37 DEGREES
TROPONIN I SERPL-MCNC: <0.045 NG/ML (ref ?–0.04)
TROPONIN I SERPL-MCNC: <0.045 NG/ML (ref ?–0.04)
VENTRICULAR RATE: 87 BPM
WBC # BLD AUTO: 11.4 X10(3) UL (ref 4–11)

## 2021-01-05 PROCEDURE — 93010 ELECTROCARDIOGRAM REPORT: CPT

## 2021-01-05 PROCEDURE — 71045 X-RAY EXAM CHEST 1 VIEW: CPT | Performed by: EMERGENCY MEDICINE

## 2021-01-05 PROCEDURE — 99285 EMERGENCY DEPT VISIT HI MDM: CPT

## 2021-01-05 PROCEDURE — 84484 ASSAY OF TROPONIN QUANT: CPT | Performed by: EMERGENCY MEDICINE

## 2021-01-05 PROCEDURE — 83880 ASSAY OF NATRIURETIC PEPTIDE: CPT | Performed by: EMERGENCY MEDICINE

## 2021-01-05 PROCEDURE — 93005 ELECTROCARDIOGRAM TRACING: CPT

## 2021-01-05 PROCEDURE — 85379 FIBRIN DEGRADATION QUANT: CPT | Performed by: EMERGENCY MEDICINE

## 2021-01-05 PROCEDURE — 80053 COMPREHEN METABOLIC PANEL: CPT | Performed by: EMERGENCY MEDICINE

## 2021-01-05 PROCEDURE — 85025 COMPLETE CBC W/AUTO DIFF WBC: CPT | Performed by: EMERGENCY MEDICINE

## 2021-01-05 PROCEDURE — 82962 GLUCOSE BLOOD TEST: CPT

## 2021-01-05 PROCEDURE — 96375 TX/PRO/DX INJ NEW DRUG ADDON: CPT

## 2021-01-05 PROCEDURE — 96374 THER/PROPH/DIAG INJ IV PUSH: CPT

## 2021-01-05 RX ORDER — PREDNISONE 20 MG/1
40 TABLET ORAL DAILY
Qty: 10 TABLET | Refills: 0 | Status: SHIPPED | OUTPATIENT
Start: 2021-01-05 | End: 2021-01-10

## 2021-01-05 RX ORDER — FUROSEMIDE 10 MG/ML
20 INJECTION INTRAMUSCULAR; INTRAVENOUS ONCE
Status: COMPLETED | OUTPATIENT
Start: 2021-01-05 | End: 2021-01-05

## 2021-01-05 RX ORDER — METHYLPREDNISOLONE SODIUM SUCCINATE 125 MG/2ML
125 INJECTION, POWDER, LYOPHILIZED, FOR SOLUTION INTRAMUSCULAR; INTRAVENOUS ONCE
Status: COMPLETED | OUTPATIENT
Start: 2021-01-05 | End: 2021-01-05

## 2021-01-05 RX ORDER — ALBUTEROL SULFATE 90 UG/1
4 AEROSOL, METERED RESPIRATORY (INHALATION) ONCE
Status: COMPLETED | OUTPATIENT
Start: 2021-01-05 | End: 2021-01-05

## 2021-01-05 RX ORDER — METOCLOPRAMIDE HYDROCHLORIDE 5 MG/ML
10 INJECTION INTRAMUSCULAR; INTRAVENOUS ONCE
Status: COMPLETED | OUTPATIENT
Start: 2021-01-05 | End: 2021-01-05

## 2021-01-05 RX ORDER — ALBUTEROL SULFATE 2.5 MG/3ML
2.5 SOLUTION RESPIRATORY (INHALATION) EVERY 4 HOURS PRN
Qty: 30 AMPULE | Refills: 0 | Status: SHIPPED | OUTPATIENT
Start: 2021-01-05 | End: 2021-02-04

## 2021-01-05 RX ORDER — INSULIN ASPART 100 [IU]/ML
10 INJECTION, SOLUTION INTRAVENOUS; SUBCUTANEOUS ONCE
Status: COMPLETED | OUTPATIENT
Start: 2021-01-05 | End: 2021-01-05

## 2021-01-05 RX ORDER — ALBUTEROL SULFATE 90 UG/1
2 AEROSOL, METERED RESPIRATORY (INHALATION) EVERY 4 HOURS PRN
Qty: 1 INHALER | Refills: 0 | Status: SHIPPED | OUTPATIENT
Start: 2021-01-05 | End: 2021-02-04

## 2021-01-05 RX ORDER — AZITHROMYCIN 250 MG/1
TABLET, FILM COATED ORAL
Qty: 1 PACKAGE | Refills: 0 | Status: SHIPPED | OUTPATIENT
Start: 2021-01-05 | End: 2021-01-10

## 2021-01-05 RX ORDER — DIPHENHYDRAMINE HYDROCHLORIDE 50 MG/ML
25 INJECTION INTRAMUSCULAR; INTRAVENOUS ONCE
Status: COMPLETED | OUTPATIENT
Start: 2021-01-05 | End: 2021-01-05

## 2021-01-05 RX ORDER — ALBUTEROL SULFATE 90 UG/1
2 AEROSOL, METERED RESPIRATORY (INHALATION) ONCE
Status: DISCONTINUED | OUTPATIENT
Start: 2021-01-05 | End: 2021-01-05

## 2021-01-05 NOTE — ED INITIAL ASSESSMENT (HPI)
51 y/o female to ED with c/o of difficulty breathing. Patient reports she has been having some DEN for a couple of days, reports she has been tested for COVID a few times, all negative. Patient reports she hasn't been taking her blood thinners, hx of PEs.

## 2021-01-06 NOTE — ED PROVIDER NOTES
Patient Seen in: [facility] Emergency Department In [place]      History   Patient presents with:  Difficulty Breathing    Stated Complaint: difficulty of breathing x few days. h/o asthma.     HPI/Subjective:   HPI    53-year-old female history of ischemic Osteoarthritis    • Patella-femoral syndrome, bilateral knee 1/15/2015   • Pulmonary embolism [facility])               Past Surgical History:   Procedure Laterality Date   • BREAST SURGERY PROCEDURE UNLISTED      cyst removed   • CATH DRUG ELUTING STENT     • C Rate and Rhythm: Normal rate and regular rhythm. Pulses: Normal pulses. Heart sounds: Normal heart sounds. Pulmonary:      Effort: Tachypnea present.       Comments: Bilateral wheezing worse on the right, 89% room air when lays flat although she Abnormality         Status                     ---------                               -----------         ------                     CBC W/ DIFFERENTIAL[[id number]]          Abnormal            Final result                 Please v with wheezing on exam    D-dimer negative, troponin negative x2, EKG no new ischemia, BnP negative-however this may not be elevated due to her obesity  Wheezing resolved after 4 puffs of albuterol, Solu-Medrol given    Likely URI related asthma exacerbatio daily for 5 days. , Normal, Disp-10 tablet, R-0    azithromycin (ZITHROMAX Z-ANNY) 250 MG Oral Tab  500 mg once followed by 250 mg daily x 4 days, Normal, Disp-1 Package, R-0    !! - Potential duplicate medications found. Please discuss with provider.

## 2021-01-22 DIAGNOSIS — E11.65 UNCONTROLLED TYPE 2 DIABETES MELLITUS WITH HYPERGLYCEMIA (HCC): ICD-10-CM

## 2021-01-22 NOTE — TELEPHONE ENCOUNTER
Requested Prescriptions     Liraglutide (VICTOZA) 18 MG/3ML Subcutaneous Solution Pen-injector         Sig: Inject 1.8 mg into the skin daily.     Disp:  3 pen    Refills:  3    Start: 1/22/2021    Class: Normal    Non-formulary For: Uncontrolled type 2 shai

## 2021-01-23 RX ORDER — LIRAGLUTIDE 6 MG/ML
1.8 INJECTION SUBCUTANEOUS DAILY
Qty: 3 PEN | Refills: 1 | Status: SHIPPED | OUTPATIENT
Start: 2021-01-23 | End: 2021-07-15

## 2021-01-28 RX ORDER — EZETIMIBE 10 MG/1
TABLET ORAL
Qty: 90 TABLET | Refills: 1 | Status: SHIPPED | OUTPATIENT
Start: 2021-01-28

## 2021-01-28 RX ORDER — ROSUVASTATIN CALCIUM 5 MG/1
TABLET, COATED ORAL
Qty: 36 TABLET | Refills: 1 | Status: SHIPPED | OUTPATIENT
Start: 2021-01-28

## 2021-02-02 ENCOUNTER — V-VISIT (OUTPATIENT)
Dept: CARDIOLOGY | Age: 56
End: 2021-02-02

## 2021-02-02 VITALS
WEIGHT: 250 LBS | BODY MASS INDEX: 37.89 KG/M2 | DIASTOLIC BLOOD PRESSURE: 80 MMHG | HEART RATE: 75 BPM | SYSTOLIC BLOOD PRESSURE: 119 MMHG | HEIGHT: 68 IN

## 2021-02-02 DIAGNOSIS — E78.2 HYPERLIPIDEMIA, MIXED: ICD-10-CM

## 2021-02-02 DIAGNOSIS — Z95.5 PRESENCE OF DRUG COATED STENT IN LAD CORONARY ARTERY: ICD-10-CM

## 2021-02-02 DIAGNOSIS — I25.5 CARDIOMYOPATHY, ISCHEMIC: ICD-10-CM

## 2021-02-02 DIAGNOSIS — Z82.49 FAMILY HISTORY OF EARLY CAD: Primary | ICD-10-CM

## 2021-02-02 DIAGNOSIS — E11.9 TYPE 2 DIABETES MELLITUS WITHOUT COMPLICATION, WITHOUT LONG-TERM CURRENT USE OF INSULIN (CMD): ICD-10-CM

## 2021-02-02 DIAGNOSIS — I25.10 CORONARY ARTERY DISEASE INVOLVING NATIVE CORONARY ARTERY OF NATIVE HEART WITHOUT ANGINA PECTORIS: ICD-10-CM

## 2021-02-02 DIAGNOSIS — I10 ESSENTIAL HYPERTENSION: ICD-10-CM

## 2021-02-02 DIAGNOSIS — T38.0X5D STEROID-INDUCED DIABETES MELLITUS, SUBSEQUENT ENCOUNTER (CMD): ICD-10-CM

## 2021-02-02 DIAGNOSIS — R09.89 BRUIT OF LEFT CAROTID ARTERY: ICD-10-CM

## 2021-02-02 DIAGNOSIS — E09.9 STEROID-INDUCED DIABETES MELLITUS, SUBSEQUENT ENCOUNTER (CMD): ICD-10-CM

## 2021-02-02 PROCEDURE — 3079F DIAST BP 80-89 MM HG: CPT | Performed by: INTERNAL MEDICINE

## 2021-02-02 PROCEDURE — 99214 OFFICE O/P EST MOD 30 MIN: CPT | Performed by: INTERNAL MEDICINE

## 2021-02-02 PROCEDURE — 3074F SYST BP LT 130 MM HG: CPT | Performed by: INTERNAL MEDICINE

## 2021-02-02 SDOH — HEALTH STABILITY: PHYSICAL HEALTH: ON AVERAGE, HOW MANY MINUTES DO YOU ENGAGE IN EXERCISE AT THIS LEVEL?: 0 MIN

## 2021-02-02 SDOH — HEALTH STABILITY: PHYSICAL HEALTH: ON AVERAGE, HOW MANY DAYS PER WEEK DO YOU ENGAGE IN MODERATE TO STRENUOUS EXERCISE (LIKE A BRISK WALK)?: 0 DAYS

## 2021-02-02 ASSESSMENT — PATIENT HEALTH QUESTIONNAIRE - PHQ9
1. LITTLE INTEREST OR PLEASURE IN DOING THINGS: NOT AT ALL
CLINICAL INTERPRETATION OF PHQ9 SCORE: NO FURTHER SCREENING NEEDED
CLINICAL INTERPRETATION OF PHQ2 SCORE: NO FURTHER SCREENING NEEDED
SUM OF ALL RESPONSES TO PHQ9 QUESTIONS 1 AND 2: 0
SUM OF ALL RESPONSES TO PHQ9 QUESTIONS 1 AND 2: 0
2. FEELING DOWN, DEPRESSED OR HOPELESS: NOT AT ALL

## 2021-02-02 ASSESSMENT — ENCOUNTER SYMPTOMS
HEMOPTYSIS: 0
HEMATOCHEZIA: 0
BRUISES/BLEEDS EASILY: 0
CHILLS: 0
FEVER: 0
SUSPICIOUS LESIONS: 0
WEIGHT LOSS: 0
COUGH: 0
ALLERGIC/IMMUNOLOGIC COMMENTS: NO NEW FOOD ALLERGIES
WEIGHT GAIN: 0

## 2021-02-02 NOTE — PROGRESS NOTES
Video Visit    Loc Vasquez verbally consents to a Video Visit service on 02/02/21. Patient understands and accepts financial responsibility for any deductible, co-insurance and/or co-pays associated with this service.     Duration of the service: Cardiomyopathy, ischemic      Presence of drug coated stent in LAD coronary artery            PTCA/drug-eluting stent LAD Noeber 2017      Coronary artery disease of native heart with stable angina pectoris (HCC)      NSTEMI (non-ST elevated myocardial inf Breath., Disp: 30 ampule, Rfl: 0  •  escitalopram 20 MG Oral Tab, Take 1 tablet (20 mg total) by mouth every morning., Disp: 90 tablet, Rfl: 1  •  PANTOPRAZOLE SODIUM 40 MG Oral Tab EC, TAKE 1 TABLET EVERY MORNING BEFORE BREAKFAST, Disp: 90 tablet, Rfl: 1 for Wheezing.  , Disp: , Rfl:   •  Fluticasone Propionate 50 MCG/ACT Nasal Suspension, 2 sprays by Each Nare route 2 (two) times daily. , Disp: , Rfl:   •  ergocalciferol 90502 UNITS Oral Cap, Take 50,000 Units by mouth twice a week.  Tuesdays, Thursdays , D pain  - cyclobenzaprine 10 MG Oral Tab; Take 1 tablet (10 mg total) by mouth 2 (two) times daily as needed for Muscle spasms. Dispense: 30 tablet;  Refill: 1  - recommend PT if not improving     Risks, benefits, and alternatives of current treatment plan d

## 2021-02-09 RX ORDER — PANTOPRAZOLE SODIUM 40 MG/1
TABLET, DELAYED RELEASE ORAL
Qty: 90 TABLET | Refills: 3 | Status: SHIPPED | OUTPATIENT
Start: 2021-02-09 | End: 2021-11-10

## 2021-02-09 NOTE — TELEPHONE ENCOUNTER
Requested Prescriptions     Name from pharmacy: PANTOPRAZOLE SODIUM DR TABS 40MG         Will file in chart as: PANTOPRAZOLE SODIUM 40 MG Oral Tab EC    Sig: TAKE 1 TABLET EVERY MORNING BEFORE BREAKFAST    Disp:  90 tablet    Refills:  3    Start: 2/9/2021

## 2021-03-05 ENCOUNTER — TELEPHONE (OUTPATIENT)
Dept: ENDOCRINOLOGY CLINIC | Facility: CLINIC | Age: 56
End: 2021-03-05

## 2021-03-05 NOTE — TELEPHONE ENCOUNTER
Diabetes Education: no answer/answering machine - sent pt Cmxtwentyt message to call and schedule DB education.

## 2021-03-17 DIAGNOSIS — Z23 NEED FOR VACCINATION: ICD-10-CM

## 2021-04-08 ENCOUNTER — TELEMEDICINE (OUTPATIENT)
Dept: FAMILY MEDICINE CLINIC | Facility: CLINIC | Age: 56
End: 2021-04-08

## 2021-04-08 ENCOUNTER — DOCUMENTATION (OUTPATIENT)
Dept: CARDIOLOGY | Age: 56
End: 2021-04-08

## 2021-04-08 DIAGNOSIS — R05.9 COUGH: Primary | ICD-10-CM

## 2021-04-08 DIAGNOSIS — R09.81 HEAD CONGESTION: ICD-10-CM

## 2021-04-08 PROCEDURE — 99213 OFFICE O/P EST LOW 20 MIN: CPT | Performed by: PHYSICIAN ASSISTANT

## 2021-04-08 NOTE — PATIENT INSTRUCTIONS
Patient Isolation Advice:  • Those with mild symptoms*, are presumed to have COVID unless they test negative and have been cleared by their physician and / or an alternate diagnosis to explain their symptoms has been established.   • Those with mild symptom runny nose, diarrhea, conjunctivitis, headache, loss of taste or smell, skin rash or discoloration of fingers or toes. **Severe symptoms include: Difficulty breathing or shortness of breath, chest pain or pressure.     A high risk close contact exposure for 28 days. (Do not exceed 2,000 mg per day.)  4. Iron – Ferrous Sulfate: 325 mg daily for 28 days. (You may add Colace 100 mg daily as needed for constipation.)  5. Aspirin: 325 mg daily for 28 days with food.   This recommendation should be avoided if yo using these items, they should be washed thoroughly with soap and water. Clean your hands often  Wash your hands often with soap and water for at least 20 seconds.  If soap and water are not available, clean your hands with an alcohol-based hand sanitize facemask before emergency medical services personnel arrive. Discontinuing home isolation  Patients with confirmed COVID-19 should remain under home isolation precautions until the risk of secondary transmission to others is thought to be low.  The decis patients   No high risk medical conditions as defined by the CDC: [employer]it. html)   Consider these 3 interventions as a bridge until they are able to obtain a covid vacci

## 2021-04-08 NOTE — PROGRESS NOTES
This visit is conducted using Telemedicine with live, interactive video and audio. Patient has been referred to the [facility] website at [email or web address] to review the yearly Consent to Treat document.     Patient understands and accepts financial res DUPIXENT 300 MG/2ML Subcutaneous Solution Prefilled Syringe, Inject 300 mg into the skin., Disp: , Rfl:   Clopidogrel Bisulfate 75 MG Oral Tab, Take 1 tablet by mouth daily. , Disp: , Rfl:   losartan 100 MG Oral Tab, Take 1 tablet by mouth daily. , Disp: , Rfl: 0  cetirizine (ZYRTEC) 10 MG Oral Tab, Take 10 mg by mouth daily. , Disp: , Rfl:   Montelukast Sodium (SINGULAIR) 10 MG Oral Tab, Take 10 mg by mouth nightly., Disp: , Rfl:     No current facility-administered medications on file prior to visit.       R Patient/Caregiver Education: There are no barriers to learning. Medical education done. Outcome: Patient verbalizes understanding.     Problem List:  Patient Active Problem List:     Allergic rhinitis, cause unspecified     Esophageal reflux     Other chron

## 2021-04-09 ENCOUNTER — LAB ENCOUNTER (OUTPATIENT)
Dept: LAB | Age: 56
End: 2021-04-09
Attending: PHYSICIAN ASSISTANT
Payer: COMMERCIAL

## 2021-04-09 DIAGNOSIS — R05.9 COUGH: ICD-10-CM

## 2021-04-09 DIAGNOSIS — R09.81 HEAD CONGESTION: ICD-10-CM

## 2021-04-14 ENCOUNTER — TELEPHONE (OUTPATIENT)
Dept: FAMILY MEDICINE CLINIC | Facility: CLINIC | Age: 56
End: 2021-04-14

## 2021-04-14 ENCOUNTER — HOSPITAL ENCOUNTER (EMERGENCY)
Facility: HOSPITAL | Age: 56
Discharge: HOME | End: 2021-04-14
Attending: EMERGENCY MEDICINE
Payer: COMMERCIAL

## 2021-04-14 ENCOUNTER — APPOINTMENT (OUTPATIENT)
Dept: GENERAL RADIOLOGY | Facility: HOSPITAL | Age: 56
End: 2021-04-14
Attending: EMERGENCY MEDICINE
Payer: COMMERCIAL

## 2021-04-14 ENCOUNTER — APPOINTMENT (OUTPATIENT)
Dept: CT IMAGING | Facility: HOSPITAL | Age: 56
End: 2021-04-14
Attending: EMERGENCY MEDICINE
Payer: COMMERCIAL

## 2021-04-14 VITALS
WEIGHT: 257.94 LBS | OXYGEN SATURATION: 95 % | BODY MASS INDEX: 39 KG/M2 | RESPIRATION RATE: 15 BRPM | HEART RATE: 87 BPM | DIASTOLIC BLOOD PRESSURE: 74 MMHG | SYSTOLIC BLOOD PRESSURE: 139 MMHG | TEMPERATURE: 98 F

## 2021-04-14 DIAGNOSIS — R10.9 ABDOMINAL PAIN OF UNKNOWN ETIOLOGY: ICD-10-CM

## 2021-04-14 DIAGNOSIS — R07.9 ACUTE CHEST PAIN: Primary | ICD-10-CM

## 2021-04-14 DIAGNOSIS — N93.9 VAGINAL BLEEDING: ICD-10-CM

## 2021-04-14 PROCEDURE — 93010 ELECTROCARDIOGRAM REPORT: CPT

## 2021-04-14 PROCEDURE — 71045 X-RAY EXAM CHEST 1 VIEW: CPT | Performed by: EMERGENCY MEDICINE

## 2021-04-14 PROCEDURE — 99284 EMERGENCY DEPT VISIT MOD MDM: CPT

## 2021-04-14 PROCEDURE — 80053 COMPREHEN METABOLIC PANEL: CPT | Performed by: EMERGENCY MEDICINE

## 2021-04-14 PROCEDURE — 85025 COMPLETE CBC W/AUTO DIFF WBC: CPT | Performed by: EMERGENCY MEDICINE

## 2021-04-14 PROCEDURE — 83880 ASSAY OF NATRIURETIC PEPTIDE: CPT | Performed by: EMERGENCY MEDICINE

## 2021-04-14 PROCEDURE — 93005 ELECTROCARDIOGRAM TRACING: CPT

## 2021-04-14 PROCEDURE — 84484 ASSAY OF TROPONIN QUANT: CPT | Performed by: EMERGENCY MEDICINE

## 2021-04-14 PROCEDURE — 74177 CT ABD & PELVIS W/CONTRAST: CPT | Performed by: EMERGENCY MEDICINE

## 2021-04-14 PROCEDURE — 81001 URINALYSIS AUTO W/SCOPE: CPT | Performed by: EMERGENCY MEDICINE

## 2021-04-14 PROCEDURE — 99285 EMERGENCY DEPT VISIT HI MDM: CPT

## 2021-04-14 PROCEDURE — 36415 COLL VENOUS BLD VENIPUNCTURE: CPT

## 2021-04-14 NOTE — ED PROVIDER NOTES
Patient Seen in: [facility] Emergency Department      History   Patient presents with:  Difficulty Breathing  Bleeding    Stated Complaint: DEN fatigue. Negative COVID swab. Hematuria.      HPI/Subjective:   HPI    Patient is a 70-year-old woman with DESOUZA (nonalcoholic steatohepatitis)    • NSTEMI (non-ST elevated myocardial infarction) ([facility] [address] 2017    FELTON in LAD   • Obesity    • Osteoarthritis    • Patella-femoral syndrome, bilateral knee 1/15/2015   • Pulmonary embolism ([facility] [address]               Past Surgica scleral icterus. Conjunctiva/sclera: Conjunctivae normal.   Cardiovascular:      Rate and Rhythm: Normal rate. Pulmonary:      Effort: Pulmonary effort is normal. No respiratory distress. Breath sounds: Wheezing present.       Comments: Scattered -----------         ------                     CBC W/ DIFFERENTIAL[[id number]]          Abnormal            Final result                 Please view results for these tests on the individual orders.      EKG    Rate, intervals and axes as no containing hernia. PELVIC ORGANS:  Normal for age. BONES:  Mild degenerative changes in the lower lumbar facets.                                =====    CONCLUSION:  1 cm and 5 mm angiomyolipoma involving the right kidney.   5     mm sub cm low-atte tech notes reviewed and data confirmed.    -Tracing on cardiac monitor and pulse oximetry was reviewed by myself. -The cardiac monitor revealed normal sinus rhythm as interpreted by me.  The cardiac monitor was ordered to monitor the patient for dysrhythm evaluation of vaginal bleeding          Medications Prescribed:  Discharge Medication List as of 4/14/2021  2:20 PM

## 2021-04-14 NOTE — TELEPHONE ENCOUNTER
Patient states she is past menopause, had a blood clot this morning when she used the washroom, please advise.

## 2021-04-15 ENCOUNTER — TELEPHONE (OUTPATIENT)
Dept: FAMILY MEDICINE CLINIC | Facility: CLINIC | Age: 56
End: 2021-04-15

## 2021-04-21 ENCOUNTER — OFFICE VISIT (OUTPATIENT)
Dept: FAMILY MEDICINE CLINIC | Facility: CLINIC | Age: 56
End: 2021-04-21
Payer: COMMERCIAL

## 2021-04-21 VITALS
BODY MASS INDEX: 38.25 KG/M2 | HEIGHT: 68 IN | WEIGHT: 252.38 LBS | SYSTOLIC BLOOD PRESSURE: 134 MMHG | OXYGEN SATURATION: 94 % | RESPIRATION RATE: 20 BRPM | HEART RATE: 96 BPM | TEMPERATURE: 97 F | DIASTOLIC BLOOD PRESSURE: 70 MMHG

## 2021-04-21 DIAGNOSIS — N85.00 ENDOMETRIAL HYPERPLASIA: ICD-10-CM

## 2021-04-21 DIAGNOSIS — R63.4 UNINTENTIONAL WEIGHT LOSS: ICD-10-CM

## 2021-04-21 DIAGNOSIS — N95.0 POSTMENOPAUSAL BLEEDING: ICD-10-CM

## 2021-04-21 DIAGNOSIS — E11.65 UNCONTROLLED TYPE 2 DIABETES MELLITUS WITH HYPERGLYCEMIA (HCC): Primary | ICD-10-CM

## 2021-04-21 PROBLEM — R07.81 PLEURITIC CHEST PAIN: Status: RESOLVED | Noted: 2019-12-26 | Resolved: 2021-04-21

## 2021-04-21 PROBLEM — E87.20 METABOLIC ACIDOSIS: Status: RESOLVED | Noted: 2018-11-04 | Resolved: 2021-04-21

## 2021-04-21 PROBLEM — J45.21 EXACERBATION OF INTERMITTENT ASTHMA, UNSPECIFIED ASTHMA SEVERITY: Status: RESOLVED | Noted: 2019-12-26 | Resolved: 2021-04-21

## 2021-04-21 PROBLEM — E87.2 METABOLIC ACIDOSIS: Status: RESOLVED | Noted: 2018-11-04 | Resolved: 2021-04-21

## 2021-04-21 PROBLEM — R51.9 HEADACHE IN FRONT OF HEAD: Status: RESOLVED | Noted: 2018-04-09 | Resolved: 2021-04-21

## 2021-04-21 PROBLEM — R77.8 ELEVATED TROPONIN: Status: RESOLVED | Noted: 2018-04-09 | Resolved: 2021-04-21

## 2021-04-21 PROBLEM — R06.00 DYSPNEA ON EXERTION: Status: RESOLVED | Noted: 2019-12-26 | Resolved: 2021-04-21

## 2021-04-21 PROBLEM — E87.3 METABOLIC ALKALOSIS: Status: RESOLVED | Noted: 2018-11-04 | Resolved: 2021-04-21

## 2021-04-21 PROBLEM — R79.89 ELEVATED TROPONIN: Status: RESOLVED | Noted: 2018-04-09 | Resolved: 2021-04-21

## 2021-04-21 PROBLEM — J45.21 EXACERBATION OF INTERMITTENT ASTHMA, UNSPECIFIED ASTHMA SEVERITY (HCC): Status: RESOLVED | Noted: 2019-12-26 | Resolved: 2021-04-21

## 2021-04-21 PROBLEM — R73.9 HYPERGLYCEMIA: Status: RESOLVED | Noted: 2018-11-04 | Resolved: 2021-04-21

## 2021-04-21 PROBLEM — E87.3 RESPIRATORY ALKALOSIS: Status: RESOLVED | Noted: 2018-11-04 | Resolved: 2021-04-21

## 2021-04-21 PROBLEM — D72.829 LEUKOCYTOSIS: Status: RESOLVED | Noted: 2018-11-04 | Resolved: 2021-04-21

## 2021-04-21 PROBLEM — R51.9 ACUTE NONINTRACTABLE HEADACHE, UNSPECIFIED HEADACHE TYPE: Status: RESOLVED | Noted: 2019-12-26 | Resolved: 2021-04-21

## 2021-04-21 PROBLEM — R06.09 DYSPNEA ON EXERTION: Status: RESOLVED | Noted: 2019-12-26 | Resolved: 2021-04-21

## 2021-04-21 PROCEDURE — 3078F DIAST BP <80 MM HG: CPT | Performed by: FAMILY MEDICINE

## 2021-04-21 PROCEDURE — 99214 OFFICE O/P EST MOD 30 MIN: CPT | Performed by: FAMILY MEDICINE

## 2021-04-21 PROCEDURE — 3075F SYST BP GE 130 - 139MM HG: CPT | Performed by: FAMILY MEDICINE

## 2021-04-21 PROCEDURE — 3008F BODY MASS INDEX DOCD: CPT | Performed by: FAMILY MEDICINE

## 2021-04-21 NOTE — PROGRESS NOTES
HPI:   Samuel Turner is a 49year old female. Patient presents with:  ER F/U: from 4/14 - CT scan done, US done - would like to discuss    ER f/u for postmenopausal bleeding, new issue.   Notable endometrial thickening on pelvic us in ER, has GYN f work up, on lifelong            anticoagulation       Family history of early CAD      Class 3 obesity with body mass index (BMI) of 40.0 to 44.9 in adult      Anxiety      Moderate episode of recurrent major depressive disorder (HCC)      Allergic rhiniti Continuous Blood Gluc Transmit (DEXCOM G6 TRANSMITTER) Does not apply Misc, Use as directed, Disp: 1 each, Rfl: 3  •  Tiotropium Bromide Monohydrate 18 MCG Inhalation Cap, Inhale into the lungs daily. , Disp: , Rfl:   •  Glucose Blood (ONETOUCH VERIO) In Vi age, well groomed.   Physical Exam:  GEN:  Patient is alert, awake and oriented, well developed, well nourished, no apparent distress, obese  HEENT:     Head:  Normocephalic, atraumatic    Eyes: EOMI, PERRLA, no scleral icterus, conjunctivae clear, no eye d

## 2021-04-22 ENCOUNTER — APPOINTMENT (OUTPATIENT)
Dept: CARDIOLOGY | Age: 56
End: 2021-04-22

## 2021-04-30 ENCOUNTER — HOSPITAL ENCOUNTER (OUTPATIENT)
Dept: MRI IMAGING | Facility: HOSPITAL | Age: 56
Discharge: HOME | End: 2021-04-30
Attending: FAMILY MEDICINE
Payer: COMMERCIAL

## 2021-04-30 DIAGNOSIS — R63.4 UNINTENTIONAL WEIGHT LOSS: ICD-10-CM

## 2021-04-30 DIAGNOSIS — N85.00 ENDOMETRIAL HYPERPLASIA: ICD-10-CM

## 2021-04-30 DIAGNOSIS — N95.0 POSTMENOPAUSAL BLEEDING: ICD-10-CM

## 2021-04-30 PROCEDURE — 72197 MRI PELVIS W/O & W/DYE: CPT | Performed by: FAMILY MEDICINE

## 2021-04-30 PROCEDURE — 74183 MRI ABD W/O CNTR FLWD CNTR: CPT | Performed by: FAMILY MEDICINE

## 2021-04-30 PROCEDURE — A9575 INJ GADOTERATE MEGLUMI 0.1ML: HCPCS | Performed by: FAMILY MEDICINE

## 2021-05-02 PROBLEM — K76.0 NAFLD (NONALCOHOLIC FATTY LIVER DISEASE): Status: ACTIVE | Noted: 2021-05-02

## 2021-05-02 PROBLEM — R16.0 HEPATOMEGALY: Status: ACTIVE | Noted: 2021-05-02

## 2021-05-02 PROBLEM — N95.0 POSTMENOPAUSAL BLEEDING: Status: ACTIVE | Noted: 2021-05-02

## 2021-05-03 PROCEDURE — 88305 TISSUE EXAM BY PATHOLOGIST: CPT | Performed by: OBSTETRICS & GYNECOLOGY

## 2021-06-21 ENCOUNTER — APPOINTMENT (OUTPATIENT)
Dept: GENERAL RADIOLOGY | Age: 56
End: 2021-06-21
Attending: EMERGENCY MEDICINE
Payer: COMMERCIAL

## 2021-06-21 ENCOUNTER — HOSPITAL ENCOUNTER (EMERGENCY)
Age: 56
Discharge: HOME | End: 2021-06-21
Attending: EMERGENCY MEDICINE
Payer: COMMERCIAL

## 2021-06-21 VITALS
DIASTOLIC BLOOD PRESSURE: 73 MMHG | SYSTOLIC BLOOD PRESSURE: 144 MMHG | WEIGHT: 249 LBS | RESPIRATION RATE: 22 BRPM | HEIGHT: 68 IN | BODY MASS INDEX: 37.74 KG/M2 | TEMPERATURE: 98 F | HEART RATE: 90 BPM | OXYGEN SATURATION: 96 %

## 2021-06-21 DIAGNOSIS — R73.9 ELEVATED BLOOD SUGAR: ICD-10-CM

## 2021-06-21 DIAGNOSIS — J45.41 MODERATE PERSISTENT ASTHMA WITH EXACERBATION: Primary | ICD-10-CM

## 2021-06-21 PROCEDURE — 99284 EMERGENCY DEPT VISIT MOD MDM: CPT

## 2021-06-21 PROCEDURE — 84484 ASSAY OF TROPONIN QUANT: CPT | Performed by: NURSE PRACTITIONER

## 2021-06-21 PROCEDURE — 85379 FIBRIN DEGRADATION QUANT: CPT | Performed by: NURSE PRACTITIONER

## 2021-06-21 PROCEDURE — 93010 ELECTROCARDIOGRAM REPORT: CPT

## 2021-06-21 PROCEDURE — 80053 COMPREHEN METABOLIC PANEL: CPT | Performed by: NURSE PRACTITIONER

## 2021-06-21 PROCEDURE — 94640 AIRWAY INHALATION TREATMENT: CPT

## 2021-06-21 PROCEDURE — 85025 COMPLETE CBC W/AUTO DIFF WBC: CPT | Performed by: NURSE PRACTITIONER

## 2021-06-21 PROCEDURE — 93005 ELECTROCARDIOGRAM TRACING: CPT

## 2021-06-21 PROCEDURE — 99285 EMERGENCY DEPT VISIT HI MDM: CPT

## 2021-06-21 PROCEDURE — 36415 COLL VENOUS BLD VENIPUNCTURE: CPT

## 2021-06-21 PROCEDURE — 71045 X-RAY EXAM CHEST 1 VIEW: CPT | Performed by: EMERGENCY MEDICINE

## 2021-06-21 RX ORDER — IPRATROPIUM BROMIDE AND ALBUTEROL SULFATE 2.5; .5 MG/3ML; MG/3ML
3 SOLUTION RESPIRATORY (INHALATION) ONCE
Status: COMPLETED | OUTPATIENT
Start: 2021-06-21 | End: 2021-06-21

## 2021-06-21 RX ORDER — PREDNISONE 20 MG/1
40 TABLET ORAL DAILY
Qty: 10 TABLET | Refills: 0 | Status: SHIPPED | OUTPATIENT
Start: 2021-06-21 | End: 2021-06-26

## 2021-06-21 NOTE — ED PROVIDER NOTES
72-year-old female who has an extensive history of different respiratory complaints that include asthma, congestive heart failure and pulmonary embolism who also has a known history of coronary artery disease as well as reflux presents with shortness of br

## 2021-06-21 NOTE — ED PROVIDER NOTES
Patient Seen in: [facility] Emergency Department In [place]      History   Patient presents with:  Difficulty Breathing    Stated Complaint: short of breath for three days    HPI/Subjective:   HPI  Patient is a 16-year-old female past medical history of I 2017, No EGD, Hgb stable   • High cholesterol    • History of pulmonary embolus (PE) 12/1/2016   • History of recurrent deep vein thrombosis (DVT) 12/4/2014   • Hypertension    • IBS    • LGSIL (low grade squamous intraepithelial dysplasia) 10/2010    Pap 8\")   Wt 112.9 kg   LMP 09/23/2004   SpO2 96%   BMI 37.86 kg/m²         Physical Exam  Vitals and nursing note reviewed. Constitutional:       General: She is not in acute distress. Appearance: Normal appearance. She is well-developed.  She is not il and oriented to person, place, and time.    Psychiatric:         Mood and Affect: Mood normal.         Behavior: Behavior normal.                 ED Course     Labs Reviewed   COMP METABOLIC PANEL (14) - Abnormal; Notable for the following components: breath for three days  PATIENT STATED HISTORY: (As transcribed by Technologist)  Patient states she has had shortness of breath for 3 days. History of asthma. Sinus problems. FINDINGS:  Patient is rotated. Cardiomediastinal silhouette stable.   No focal List    START taking these medications    predniSONE 20 MG Oral Tab  Take 2 tablets (40 mg total) by mouth daily for 5 days.   Qty: 10 tablet Refills: 0

## 2021-06-22 ENCOUNTER — TELEPHONE (OUTPATIENT)
Dept: FAMILY MEDICINE CLINIC | Facility: CLINIC | Age: 56
End: 2021-06-22

## 2021-06-22 NOTE — TELEPHONE ENCOUNTER
[facility] for patient to call in regards to recent ER visit and to call office at [phone] if an appointment is needed.

## 2021-07-07 RX ORDER — LOSARTAN POTASSIUM 100 MG/1
TABLET ORAL
Qty: 90 TABLET | Refills: 3 | OUTPATIENT
Start: 2021-07-07

## 2021-07-07 RX ORDER — METOPROLOL SUCCINATE 25 MG/1
TABLET, EXTENDED RELEASE ORAL
Qty: 90 TABLET | Refills: 3 | OUTPATIENT
Start: 2021-07-07

## 2021-07-07 RX ORDER — EZETIMIBE 10 MG/1
TABLET ORAL
Qty: 90 TABLET | Refills: 3 | OUTPATIENT
Start: 2021-07-07

## 2021-07-07 RX ORDER — CLOPIDOGREL BISULFATE 75 MG/1
TABLET ORAL
Qty: 90 TABLET | Refills: 3 | OUTPATIENT
Start: 2021-07-07

## 2021-07-07 RX ORDER — ROSUVASTATIN CALCIUM 5 MG/1
TABLET, COATED ORAL
Qty: 36 TABLET | Refills: 3 | OUTPATIENT
Start: 2021-07-07

## 2021-07-13 ENCOUNTER — TELEPHONE (OUTPATIENT)
Dept: CARDIOLOGY | Age: 56
End: 2021-07-13

## 2021-07-14 ENCOUNTER — TELEPHONE (OUTPATIENT)
Dept: CARDIOLOGY | Age: 56
End: 2021-07-14

## 2021-07-15 ENCOUNTER — TELEPHONE (OUTPATIENT)
Dept: FAMILY MEDICINE CLINIC | Facility: CLINIC | Age: 56
End: 2021-07-15

## 2021-07-15 ENCOUNTER — TELEPHONE (OUTPATIENT)
Dept: CARDIOLOGY | Age: 56
End: 2021-07-15

## 2021-07-15 DIAGNOSIS — E11.65 UNCONTROLLED TYPE 2 DIABETES MELLITUS WITH HYPERGLYCEMIA (HCC): ICD-10-CM

## 2021-07-15 RX ORDER — INSULIN DEGLUDEC 200 U/ML
50 INJECTION, SOLUTION SUBCUTANEOUS DAILY
Qty: 9 ML | Refills: 6 | Status: CANCELLED | OUTPATIENT
Start: 2021-07-15

## 2021-07-15 RX ORDER — LIRAGLUTIDE 6 MG/ML
1.8 INJECTION SUBCUTANEOUS DAILY
Refills: 0 | Status: CANCELLED | OUTPATIENT
Start: 2021-07-15

## 2021-07-15 RX ORDER — LIRAGLUTIDE 6 MG/ML
1.8 INJECTION SUBCUTANEOUS DAILY
Qty: 27 ML | Refills: 1 | Status: SHIPPED | OUTPATIENT
Start: 2021-07-15 | End: 2021-08-03

## 2021-07-15 NOTE — TELEPHONE ENCOUNTER
Requested Prescriptions     Pending Prescriptions Disp Refills   • Liraglutide (VICTOZA) 18 MG/3ML Subcutaneous Solution Pen-injector  0     Sig: Inject 1.8 mg into the skin daily.        LOV: 4/21/21 for ER f/up  RTC: 2-4 weeks for DM2  Last Relevant Labs:

## 2021-07-15 NOTE — TELEPHONE ENCOUNTER
Pharmacy calling, requesting prescription for Tresiba flextouch. Requesting rx to go to mail order.  Reference # on [id number]

## 2021-07-20 ENCOUNTER — NURSE ONLY (OUTPATIENT)
Dept: ENDOCRINOLOGY CLINIC | Facility: CLINIC | Age: 56
End: 2021-07-20
Payer: COMMERCIAL

## 2021-07-20 VITALS — BODY MASS INDEX: 37 KG/M2 | WEIGHT: 245.81 LBS

## 2021-07-20 DIAGNOSIS — E11.65 UNCONTROLLED TYPE 2 DIABETES MELLITUS WITH HYPERGLYCEMIA (HCC): ICD-10-CM

## 2021-07-20 PROCEDURE — G0108 DIAB MANAGE TRN  PER INDIV: HCPCS | Performed by: DIETITIAN, REGISTERED

## 2021-07-20 NOTE — PROGRESS NOTES
Robbie Mooney  : 3/25/1965 was seen for Diabetic Education Follow up:    Date: 2021  Referring Provider:Dr. Izabel Lehman   Start time: 1pm End time: 2:00pm    Assessment:     Assessment: Wt 245 lb 12.8 oz   LMP 2004   BMI 37.37 kg/m² diabetes reviewed. Recommendations:      1. Follow recommended meal plan. 2. Test BG fasting and 2 hours post meals 2-3 times/day. 3. Bring glucose logs to all provider visits for review. 4. Continue current doses of medications   5.  Encouraged Rachel Rodriguez

## 2021-07-21 DIAGNOSIS — E11.65 UNCONTROLLED TYPE 2 DIABETES MELLITUS WITH HYPERGLYCEMIA (HCC): Primary | ICD-10-CM

## 2021-07-21 RX ORDER — BLOOD-GLUCOSE TRANSMITTER
EACH MISCELLANEOUS
Qty: 1 EACH | Refills: 3 | OUTPATIENT
Start: 2021-07-21 | End: 2021-07-25

## 2021-07-21 RX ORDER — BLOOD-GLUCOSE,RECEIVER,CONT
EACH MISCELLANEOUS
Qty: 1 EACH | Refills: 0 | OUTPATIENT
Start: 2021-07-21 | End: 2021-07-21

## 2021-07-21 RX ORDER — BLOOD-GLUCOSE SENSOR
EACH MISCELLANEOUS
Qty: 9 EACH | Refills: 3 | OUTPATIENT
Start: 2021-07-21 | End: 2021-07-25

## 2021-07-21 RX ORDER — BLOOD SUGAR DIAGNOSTIC
STRIP MISCELLANEOUS
Qty: 300 STRIP | Refills: 3 | Status: SHIPPED | OUTPATIENT
Start: 2021-07-21 | End: 2022-07-21

## 2021-07-21 NOTE — TELEPHONE ENCOUNTER
Pt. Interested in pursuing the Dexcom G6 CGM. A new prescription is required. I have pended the prescriptions for your signature if agreeable w/plan.

## 2021-07-25 RX ORDER — BLOOD-GLUCOSE SENSOR
EACH MISCELLANEOUS
Qty: 9 EACH | Refills: 3 | Status: SHIPPED | OUTPATIENT
Start: 2021-07-25 | End: 2021-09-01

## 2021-07-25 RX ORDER — BLOOD-GLUCOSE,RECEIVER,CONT
EACH MISCELLANEOUS
Qty: 1 EACH | Refills: 0 | Status: SHIPPED | OUTPATIENT
Start: 2021-07-25 | End: 2021-09-01

## 2021-07-25 RX ORDER — BLOOD-GLUCOSE TRANSMITTER
EACH MISCELLANEOUS
Qty: 1 EACH | Refills: 3 | Status: SHIPPED | OUTPATIENT
Start: 2021-07-25 | End: 2021-09-01

## 2021-08-02 ENCOUNTER — NURSE ONLY (OUTPATIENT)
Dept: ENDOCRINOLOGY CLINIC | Facility: CLINIC | Age: 56
End: 2021-08-02
Payer: COMMERCIAL

## 2021-08-02 ENCOUNTER — TELEPHONE (OUTPATIENT)
Dept: ENDOCRINOLOGY CLINIC | Facility: CLINIC | Age: 56
End: 2021-08-02

## 2021-08-02 DIAGNOSIS — E11.65 UNCONTROLLED TYPE 2 DIABETES MELLITUS WITH HYPERGLYCEMIA (HCC): Primary | ICD-10-CM

## 2021-08-02 DIAGNOSIS — E11.65 UNCONTROLLED TYPE 2 DIABETES MELLITUS WITH HYPERGLYCEMIA (HCC): ICD-10-CM

## 2021-08-02 PROCEDURE — G0108 DIAB MANAGE TRN  PER INDIV: HCPCS | Performed by: DIETITIAN, REGISTERED

## 2021-08-02 RX ORDER — INSULIN DEGLUDEC 200 U/ML
50 INJECTION, SOLUTION SUBCUTANEOUS DAILY
Qty: 9 ML | Refills: 6 | Status: CANCELLED | OUTPATIENT
Start: 2021-08-02

## 2021-08-02 NOTE — TELEPHONE ENCOUNTER
Pt. has been taking Victoza 1.8 mg daily without impprovent in blood sugar levels. Contacted her [address] is covered on her plan.  I have pended a prescription for your signature    Also has been out of [facility] for 1 week;please send refill

## 2021-08-02 NOTE — TELEPHONE ENCOUNTER
Pt. Was seen today for follow-up & her BG readings remain elevated. She has however been out of her Tresiba insulin for 1 week.  Readings are as follows:  Date Breakfast   Lunch    Dinner   Bedtime Comments    Pre Insulin Units Post Pre Insulin Units Post P

## 2021-08-02 NOTE — PROGRESS NOTES
Zana Ortiz : 3/25/1965 was seen for Diabetic Education Follow up:    Date: 21 Referring Provider: Dr. Debbi Balderrama   Start time: 1pm End time: 1:30pm    Assessment:     Diagnosis: Uncontrolled Type 2    Reason for visit: Elevated A1C  Due to s meals 2-3 times/day. 3. Bring glucose logsmeter to all provider visits for review. 4. Continue current medications   5. Will contact PCP office regarding prescription problem w/Tresiba insulin    5.  Encouraged  to call diabetes center with any question

## 2021-08-03 DIAGNOSIS — F33.1 MODERATE EPISODE OF RECURRENT MAJOR DEPRESSIVE DISORDER (HCC): ICD-10-CM

## 2021-08-03 DIAGNOSIS — F41.9 ANXIETY: ICD-10-CM

## 2021-08-03 RX ORDER — SEMAGLUTIDE 1.34 MG/ML
INJECTION, SOLUTION SUBCUTANEOUS
Qty: 1.5 EACH | Refills: 2 | Status: SHIPPED | OUTPATIENT
Start: 2021-08-30 | End: 2021-09-01 | Stop reason: ALTCHOICE

## 2021-08-03 RX ORDER — INSULIN DEGLUDEC 200 U/ML
50 INJECTION, SOLUTION SUBCUTANEOUS DAILY
Qty: 9 ML | Refills: 6 | Status: SHIPPED | OUTPATIENT
Start: 2021-08-03 | End: 2021-09-01

## 2021-08-03 RX ORDER — ALPRAZOLAM 0.5 MG/1
TABLET ORAL
Qty: 45 TABLET | Refills: 1 | Status: SHIPPED | OUTPATIENT
Start: 2021-08-03 | End: 2021-10-20

## 2021-08-03 RX ORDER — PEN NEEDLE, DIABETIC 30 GX3/16"
1 NEEDLE, DISPOSABLE MISCELLANEOUS
Qty: 6 EACH | Refills: 0 | Status: SHIPPED | OUTPATIENT
Start: 2021-08-03 | End: 2021-09-01

## 2021-08-03 NOTE — TELEPHONE ENCOUNTER
Requesting Alprazolam 0.5mg  LOV: 4/21/21  RTC: 2-4 weeks for DM  Last Relevant Labs: 6/26/2020  Filled: 2/2/21 #45 with 5 refills    Future Appointments   Date Time Provider Praful Hi   8/11/2021 12:00 PM Marcia Stokes RN, CDE EMGDIABCTSPL EM

## 2021-08-03 NOTE — ADDENDUM NOTE
Addended by: Rashad Alaniz on: 8/3/2021 08:11 AM     Modules accepted: Orders
Ears: no ear pain and no hearing problems.Nose: no nasal congestion and no nasal drainage.Mouth/Throat: no dysphagia, no hoarseness and no throat pain.Neck: no lumps, no pain, no stiffness and no swollen glands.

## 2021-08-04 DIAGNOSIS — G43.709 CHRONIC MIGRAINE WITHOUT AURA WITHOUT STATUS MIGRAINOSUS, NOT INTRACTABLE: ICD-10-CM

## 2021-08-04 NOTE — TELEPHONE ENCOUNTER
Requesting SUMAtriptan Succinate 50 MG  LOV: 4/21/2021 w/Dr. Anil Feliciano for annual  RTC: [id number] diabetic clinic  Last Relevant Labs: A1c 9.5%  Filled: 5/19/2020 #9 tab with 3 refills    Future Appointments   Date Time Provider Praful Hi   8/11/2021 1

## 2021-08-05 RX ORDER — SUMATRIPTAN 50 MG/1
TABLET, FILM COATED ORAL
Qty: 9 TABLET | Refills: 3 | Status: SHIPPED | OUTPATIENT
Start: 2021-08-05

## 2021-08-17 ENCOUNTER — APPOINTMENT (OUTPATIENT)
Dept: CARDIOLOGY | Age: 56
End: 2021-08-17

## 2021-08-30 DIAGNOSIS — E11.65 UNCONTROLLED TYPE 2 DIABETES MELLITUS WITH HYPERGLYCEMIA (HCC): Primary | ICD-10-CM

## 2021-08-30 DIAGNOSIS — E78.2 HYPERLIPIDEMIA, MIXED: ICD-10-CM

## 2021-09-01 ENCOUNTER — OFFICE VISIT (OUTPATIENT)
Dept: ENDOCRINOLOGY CLINIC | Facility: CLINIC | Age: 56
End: 2021-09-01
Payer: COMMERCIAL

## 2021-09-01 VITALS
SYSTOLIC BLOOD PRESSURE: 110 MMHG | HEART RATE: 91 BPM | DIASTOLIC BLOOD PRESSURE: 70 MMHG | HEIGHT: 68 IN | WEIGHT: 244 LBS | BODY MASS INDEX: 36.98 KG/M2 | RESPIRATION RATE: 16 BRPM

## 2021-09-01 DIAGNOSIS — E78.2 HYPERLIPIDEMIA, MIXED: ICD-10-CM

## 2021-09-01 DIAGNOSIS — E66.01 CLASS 2 SEVERE OBESITY DUE TO EXCESS CALORIES WITH SERIOUS COMORBIDITY AND BODY MASS INDEX (BMI) OF 37.0 TO 37.9 IN ADULT (HCC): ICD-10-CM

## 2021-09-01 DIAGNOSIS — I10 ESSENTIAL HYPERTENSION: ICD-10-CM

## 2021-09-01 DIAGNOSIS — E11.65 TYPE 2 DIABETES MELLITUS WITH HYPERGLYCEMIA, WITH LONG-TERM CURRENT USE OF INSULIN (HCC): Primary | ICD-10-CM

## 2021-09-01 DIAGNOSIS — Z79.4 TYPE 2 DIABETES MELLITUS WITH HYPERGLYCEMIA, WITH LONG-TERM CURRENT USE OF INSULIN (HCC): Primary | ICD-10-CM

## 2021-09-01 LAB
CARTRIDGE LOT#: 815 NUMERIC
GLUCOSE BLOOD: 321
HEMOGLOBIN A1C: 11.6 % (ref 4.3–5.6)

## 2021-09-01 PROCEDURE — 99215 OFFICE O/P EST HI 40 MIN: CPT | Performed by: NURSE PRACTITIONER

## 2021-09-01 PROCEDURE — 3074F SYST BP LT 130 MM HG: CPT | Performed by: NURSE PRACTITIONER

## 2021-09-01 PROCEDURE — 82947 ASSAY GLUCOSE BLOOD QUANT: CPT | Performed by: NURSE PRACTITIONER

## 2021-09-01 PROCEDURE — 3008F BODY MASS INDEX DOCD: CPT | Performed by: NURSE PRACTITIONER

## 2021-09-01 PROCEDURE — 3078F DIAST BP <80 MM HG: CPT | Performed by: NURSE PRACTITIONER

## 2021-09-01 PROCEDURE — 83036 HEMOGLOBIN GLYCOSYLATED A1C: CPT | Performed by: NURSE PRACTITIONER

## 2021-09-01 RX ORDER — BLOOD-GLUCOSE TRANSMITTER
EACH MISCELLANEOUS
Qty: 1 EACH | Refills: 0 | Status: SHIPPED | OUTPATIENT
Start: 2021-09-01 | End: 2021-12-10

## 2021-09-01 RX ORDER — BLOOD-GLUCOSE SENSOR
EACH MISCELLANEOUS
Qty: 9 EACH | Refills: 3 | Status: SHIPPED | OUTPATIENT
Start: 2021-09-01

## 2021-09-01 RX ORDER — PEN NEEDLE, DIABETIC 32GX 5/32"
NEEDLE, DISPOSABLE MISCELLANEOUS
Qty: 400 EACH | Refills: 1 | COMMUNITY
Start: 2021-09-01

## 2021-09-01 RX ORDER — INSULIN DEGLUDEC 200 U/ML
70 INJECTION, SOLUTION SUBCUTANEOUS DAILY
Qty: 12 ML | Refills: 1 | COMMUNITY
Start: 2021-09-01 | End: 2021-12-01

## 2021-09-01 RX ORDER — INSULIN LISPRO 100 [IU]/ML
INJECTION, SOLUTION INTRAVENOUS; SUBCUTANEOUS
Qty: 15 ML | Refills: 1 | Status: SHIPPED | OUTPATIENT
Start: 2021-09-01 | End: 2021-10-06

## 2021-09-01 RX ORDER — AZELASTINE 1 MG/ML
SPRAY, METERED NASAL
COMMUNITY
Start: 2021-06-15

## 2021-09-01 NOTE — PROGRESS NOTES
HPI:   Daniel Kendrick is a 49year old female who presents for initial visit with provider for the management of her diabetes. Patient did not bring glucometer or glucose readings to appointment for review. POC in office glucose 321 mg/dl.     Patient depression, Laguerre's esophagus, Colloid cyst of brain ([address] Coronary artery disease of native heart with stable angina pectoris ([facility] [address] Diabetes ([facility] [address] Diabetes mellitus ([address] Diverticulosis of large intestine, Essential hypertension, Gastroparesis, GERD Blood (ONETOUCH VERIO) In Vitro Strip, Check 3 times daily  BREO ELLIPTA 200-25 MCG/INH Inhalation Aerosol Powder, Breath Activated,   PANTOPRAZOLE SODIUM 40 MG Oral Tab EC, TAKE 1 TABLET EVERY MORNING BEFORE BREAKFAST  escitalopram 20 MG Oral Tab, Take 1 Does not apply Misc, Replace sensor every 10 days (Patient not taking: Reported on 9/1/2021 )  [DISCONTINUED] Insulin Pen Needle (NOVOFINE PLUS) 32G X 4 MM Does not apply Misc, Use to inject Victoza daily.     No current facility-administered medications on 244 lb (110.7 kg). Physical Exam  Vitals reviewed. Constitutional:       Appearance: She is obese. Pulmonary:      Effort: Pulmonary effort is normal.   Neurological:      Mental Status: She is alert and oriented to person, place, and time.    Psychia streaming. Hypoglycemia S&S, Rx, and when to call APRN/CDE reviewed using Rule of 15's. Stressed need to call if 2 readings below 80 mg/dl in 1 week for medication adjustment.         As for her hypertension, Blood Pressure is well controlled, stable, no s Continuous Blood Gluc Transmit (DEXCOM G6 TRANSMITTER) Does not apply Misc; Use as directed    Essential hypertension    Hyperlipidemia, mixed    Class 2 severe obesity due to excess calories with serious comorbidity and body mass index (BMI) of 37.0 to 37

## 2021-09-01 NOTE — PATIENT INSTRUCTIONS
We are here to support you with Diabetes but please remember that you still need your primary care doctor for your routine health maintenance. Your current A1C: 11.6%  This test provides us with your average blood sugar for the past 3 months.    The main medication adjustment. Blood sugar targets:  Before breakfast:   (preferably < 110)  2 hours After meals: less than 180 (preferably less than 150)   Call for persistent blood sugars < 75 or > 200.    Blood sugars greater than 200 are not acceptabl

## 2021-09-03 DIAGNOSIS — F41.9 ANXIETY: ICD-10-CM

## 2021-09-03 DIAGNOSIS — F33.1 MODERATE EPISODE OF RECURRENT MAJOR DEPRESSIVE DISORDER (HCC): ICD-10-CM

## 2021-09-03 RX ORDER — ESCITALOPRAM OXALATE 20 MG/1
TABLET ORAL
Qty: 159 TABLET | Refills: 0 | Status: CANCELLED | OUTPATIENT
Start: 2021-09-03

## 2021-09-07 NOTE — TELEPHONE ENCOUNTER
Requested Prescriptions     Name from pharmacy: ESCITALOPRAM 20MG TABLETS         Will file in chart as: ESCITALOPRAM 20 MG Oral Tab    Sig: TAKE 1 TABLET(20 MG) BY MOUTH EVERY MORNING    Disp:  159 tablet    Refills:  0 (Pharmacy requested: Not specified)

## 2021-09-08 RX ORDER — ESCITALOPRAM OXALATE 20 MG/1
TABLET ORAL
Qty: 90 TABLET | Refills: 1 | Status: SHIPPED | OUTPATIENT
Start: 2021-09-08

## 2021-09-28 NOTE — TELEPHONE ENCOUNTER
Pt called in stating she has 1 tablet left of farxiga 5 mg. Sample was given at last appt (daughter may have thrown out remainder on accident). Per patient, she can  for zero co-pay. Tolerating well.     Future Appointments   Date Time Provider Nora Jenkins

## 2021-10-06 ENCOUNTER — OFFICE VISIT (OUTPATIENT)
Dept: ENDOCRINOLOGY CLINIC | Facility: CLINIC | Age: 56
End: 2021-10-06
Payer: COMMERCIAL

## 2021-10-06 VITALS
WEIGHT: 244 LBS | DIASTOLIC BLOOD PRESSURE: 70 MMHG | HEIGHT: 68 IN | HEART RATE: 85 BPM | SYSTOLIC BLOOD PRESSURE: 120 MMHG | BODY MASS INDEX: 36.98 KG/M2 | RESPIRATION RATE: 16 BRPM

## 2021-10-06 DIAGNOSIS — Z79.4 TYPE 2 DIABETES MELLITUS WITH HYPERGLYCEMIA, WITH LONG-TERM CURRENT USE OF INSULIN (HCC): Primary | ICD-10-CM

## 2021-10-06 DIAGNOSIS — E11.65 TYPE 2 DIABETES MELLITUS WITH HYPERGLYCEMIA, WITH LONG-TERM CURRENT USE OF INSULIN (HCC): Primary | ICD-10-CM

## 2021-10-06 DIAGNOSIS — E66.01 CLASS 2 SEVERE OBESITY DUE TO EXCESS CALORIES WITH SERIOUS COMORBIDITY AND BODY MASS INDEX (BMI) OF 37.0 TO 37.9 IN ADULT (HCC): ICD-10-CM

## 2021-10-06 DIAGNOSIS — I10 ESSENTIAL HYPERTENSION: ICD-10-CM

## 2021-10-06 DIAGNOSIS — E78.2 HYPERLIPIDEMIA, MIXED: ICD-10-CM

## 2021-10-06 PROCEDURE — 99214 OFFICE O/P EST MOD 30 MIN: CPT | Performed by: NURSE PRACTITIONER

## 2021-10-06 PROCEDURE — 95251 CONT GLUC MNTR ANALYSIS I&R: CPT | Performed by: NURSE PRACTITIONER

## 2021-10-06 PROCEDURE — 3078F DIAST BP <80 MM HG: CPT | Performed by: NURSE PRACTITIONER

## 2021-10-06 PROCEDURE — 3074F SYST BP LT 130 MM HG: CPT | Performed by: NURSE PRACTITIONER

## 2021-10-06 PROCEDURE — 3008F BODY MASS INDEX DOCD: CPT | Performed by: NURSE PRACTITIONER

## 2021-10-06 RX ORDER — INSULIN LISPRO 100 [IU]/ML
INJECTION, SOLUTION INTRAVENOUS; SUBCUTANEOUS
Qty: 15 ML | Refills: 1 | COMMUNITY
Start: 2021-10-06

## 2021-10-06 NOTE — PROGRESS NOTES
HPI:   Saundra Allen is a 49year old female who presents for follow up for the management of her diabetes.      Patient presents with:  Diabetes: follow up dexcom    Patient is using personal continuous glucose monitoring or would be testing BGs at l Encounters:  10/06/21 : 244 lb (110.7 kg)  09/01/21 : 244 lb (110.7 kg)  07/20/21 : 245 lb 12.8 oz (111.5 kg)    BP Readings from Last 3 Encounters:  10/06/21 : 120/70  09/01/21 : 110/70  06/21/21 : 144/73         Past History:   She  has a past medical hi 301-350 mg/dl = 8 units, > 350 mg/dl = 10 units w/breakfast and lunch and 2 units + sliding scale w/dinner TDD = 30 units  ESCITALOPRAM 20 MG Oral Tab, TAKE 1 TABLET(20 MG) BY MOUTH EVERY MORNING  Azelastine HCl 0.1 % Nasal Solution,   Insulin Degludec (TR daily.  ergocalciferol 23855 UNITS Oral Cap, Take 50,000 Units by mouth twice a week.  Tuesdays, Thursdays   Albuterol Sulfate (VENTOLIN) (2.5 MG/3ML) 0.083% Inhalation Nebu Soln, Take 3 mL (2.5 mg total) by nebulization every 4 (four) hours as needed for W 06/29/2020 11:22 AM        Lab results reviewed with patient.     REVIEW OF SYSTEMS:   GENERAL HEALTH: feels well otherwise  SKIN: denies any unusual skin lesions or rashes  RESPIRATORY: denies shortness of breath with exertion  CARDIOVASCULAR: denies chest weight by increased dietary compliance and exercise, see ophthalmology soon, check feet daily and will check labs as ordered. Patient to increase Tresiba to 64 units injection daily, increase Farxiga to 10mg po daily.   Continue Humalog sliding scale: 15 Vaccine(1) due on 10/01/2021  When is pneumonia vaccine due? No recommendations at this time    The patient indicates understanding of these issues and agrees to the plan. Refills addressed at time of office visit.     Diagnoses and all orders for this vis

## 2021-10-18 DIAGNOSIS — F41.9 ANXIETY: ICD-10-CM

## 2021-10-18 DIAGNOSIS — F33.1 MODERATE EPISODE OF RECURRENT MAJOR DEPRESSIVE DISORDER (HCC): ICD-10-CM

## 2021-10-18 NOTE — TELEPHONE ENCOUNTER
Requesting Alprazolam 0.5mg  LOV: 4/21/21  RTC:   Last Relevant Labs: 6/29/2020  Filled: 8/3/21 #45 with 1 refills    Future Appointments   Date Time Provider Praful Hi   12/1/2021  1:15 PM TENZIN Moreno EMGDIABCTRYK EMG DIAB YRK     Rx pended

## 2021-10-20 RX ORDER — ALPRAZOLAM 0.5 MG/1
TABLET ORAL
Qty: 45 TABLET | Refills: 2 | Status: SHIPPED | OUTPATIENT
Start: 2021-10-20

## 2021-11-10 RX ORDER — PANTOPRAZOLE SODIUM 40 MG/1
TABLET, DELAYED RELEASE ORAL
Qty: 90 TABLET | Refills: 2 | Status: SHIPPED | OUTPATIENT
Start: 2021-11-10

## 2021-12-01 ENCOUNTER — OFFICE VISIT (OUTPATIENT)
Dept: ENDOCRINOLOGY CLINIC | Facility: CLINIC | Age: 56
End: 2021-12-01
Payer: COMMERCIAL

## 2021-12-01 VITALS
SYSTOLIC BLOOD PRESSURE: 112 MMHG | BODY MASS INDEX: 37.44 KG/M2 | HEART RATE: 89 BPM | HEIGHT: 68 IN | RESPIRATION RATE: 18 BRPM | WEIGHT: 247 LBS | DIASTOLIC BLOOD PRESSURE: 70 MMHG

## 2021-12-01 DIAGNOSIS — Z79.4 TYPE 2 DIABETES MELLITUS WITH HYPERGLYCEMIA, WITH LONG-TERM CURRENT USE OF INSULIN (HCC): Primary | ICD-10-CM

## 2021-12-01 DIAGNOSIS — I10 ESSENTIAL HYPERTENSION: ICD-10-CM

## 2021-12-01 DIAGNOSIS — E78.2 HYPERLIPIDEMIA, MIXED: ICD-10-CM

## 2021-12-01 DIAGNOSIS — E11.65 TYPE 2 DIABETES MELLITUS WITH HYPERGLYCEMIA, WITH LONG-TERM CURRENT USE OF INSULIN (HCC): Primary | ICD-10-CM

## 2021-12-01 DIAGNOSIS — E66.01 CLASS 2 SEVERE OBESITY DUE TO EXCESS CALORIES WITH SERIOUS COMORBIDITY AND BODY MASS INDEX (BMI) OF 37.0 TO 37.9 IN ADULT (HCC): ICD-10-CM

## 2021-12-01 PROCEDURE — 3008F BODY MASS INDEX DOCD: CPT | Performed by: NURSE PRACTITIONER

## 2021-12-01 PROCEDURE — 83036 HEMOGLOBIN GLYCOSYLATED A1C: CPT | Performed by: NURSE PRACTITIONER

## 2021-12-01 PROCEDURE — 3074F SYST BP LT 130 MM HG: CPT | Performed by: NURSE PRACTITIONER

## 2021-12-01 PROCEDURE — 95251 CONT GLUC MNTR ANALYSIS I&R: CPT | Performed by: NURSE PRACTITIONER

## 2021-12-01 PROCEDURE — 99214 OFFICE O/P EST MOD 30 MIN: CPT | Performed by: NURSE PRACTITIONER

## 2021-12-01 PROCEDURE — 3078F DIAST BP <80 MM HG: CPT | Performed by: NURSE PRACTITIONER

## 2021-12-01 RX ORDER — INSULIN DEGLUDEC 200 U/ML
70 INJECTION, SOLUTION SUBCUTANEOUS DAILY
Qty: 36 ML | Refills: 1 | Status: SHIPPED | OUTPATIENT
Start: 2021-12-01 | End: 2022-02-02

## 2021-12-01 NOTE — PATIENT INSTRUCTIONS
We are here to support you with Diabetes but please remember that you still need your primary care doctor for your routine health maintenance. Your current A1C: 8.0%  This test provides us with your average blood sugar for the past 3 months.    The main g targets:  Before breakfast:   (preferably < 110)  2 hours After meals: less than 180 (preferably less than 150)   Call for persistent blood sugars < 75 or > 200.    Blood sugars greater than 200 are not acceptable to reach your goal of improving diabe

## 2021-12-01 NOTE — PROGRESS NOTES
HPI:   Parrish Boland is a 49year old female who presents for follow up for the management of her diabetes. Patient did not have labs done as ordered.     Patient presents with:  Diabetes: follow up dexcom     Patient is using personal continuous glu Shelley Zuniga  110/70         Past History:   She  has a past medical history of Allergic rhinitis, cause unspecified (5/5/2008), Anemia, Anxiety and depression, Laguerre's esophagus, Colloid cyst of brain ([facility] [address] Coronary artery disease of native heart with stable angin 10 units w/breakfast and lunch and 2 units + sliding scale w/dinner TDD = 45 units   ESCITALOPRAM 20 MG Oral Tab, TAKE 1 TABLET(20 MG) BY MOUTH EVERY MORNING  Azelastine HCl 0.1 % Nasal Solution,   Continuous Blood Gluc Sensor (DEXCOM G6 SENSOR) Does not a mouth daily. Montelukast Sodium (SINGULAIR) 10 MG Oral Tab, Take 10 mg by mouth nightly. [DISCONTINUED] dapagliflozin (FARXIGA) 10 MG Oral Tab, Take 1 tablet (10 mg total) by mouth daily.   [DISCONTINUED] Insulin Degludec (TRESIBA FLEXTOUCH) 200 UNIT/ML S calculated from the following:    Height as of this encounter: 5' 8\" (1.727 m). Weight as of this encounter: 247 lb (112 kg). Physical Exam  Vitals reviewed. Pulmonary:      Effort: Pulmonary effort is normal.   Neurological:      Mental Status:  Sh medications, reviewed diet, exercise and weight control, and labs as ordered     As for her cholesterol, Lipids are no significant medication side effects noted, poorly controlled, needs further observation, needs improvement, needs to follow diet more reg

## 2021-12-10 DIAGNOSIS — E11.65 TYPE 2 DIABETES MELLITUS WITH HYPERGLYCEMIA, WITH LONG-TERM CURRENT USE OF INSULIN (HCC): ICD-10-CM

## 2021-12-10 DIAGNOSIS — Z79.4 TYPE 2 DIABETES MELLITUS WITH HYPERGLYCEMIA, WITH LONG-TERM CURRENT USE OF INSULIN (HCC): ICD-10-CM

## 2021-12-10 RX ORDER — BLOOD-GLUCOSE TRANSMITTER
EACH MISCELLANEOUS
Qty: 1 EACH | Refills: 3 | Status: SHIPPED | OUTPATIENT
Start: 2021-12-10

## 2022-01-12 ENCOUNTER — TELEMEDICINE (OUTPATIENT)
Dept: FAMILY MEDICINE CLINIC | Facility: CLINIC | Age: 57
End: 2022-01-12
Payer: COMMERCIAL

## 2022-01-12 DIAGNOSIS — I25.118 CORONARY ARTERY DISEASE OF NATIVE ARTERY OF NATIVE HEART WITH STABLE ANGINA PECTORIS (HCC): ICD-10-CM

## 2022-01-12 DIAGNOSIS — J06.9 VIRAL UPPER RESPIRATORY TRACT INFECTION: Primary | ICD-10-CM

## 2022-01-12 DIAGNOSIS — B37.3 YEAST INFECTION OF THE VAGINA: ICD-10-CM

## 2022-01-12 PROCEDURE — 99214 OFFICE O/P EST MOD 30 MIN: CPT | Performed by: FAMILY MEDICINE

## 2022-01-12 RX ORDER — FLUCONAZOLE 150 MG/1
150 TABLET ORAL DAILY PRN
Qty: 1 TABLET | Refills: 5 | Status: SHIPPED | OUTPATIENT
Start: 2022-01-12

## 2022-01-12 NOTE — PROGRESS NOTES
Video Visit    Erick Velazquez verbally consents to a Video Visit service on 01/12/22. Patient understands and accepts financial responsibility for any deductible, co-insurance and/or co-pays associated with this service.     Duration of the service: [facility])      NSTEMI (non-ST elevated myocardial infarction) ([facility] [address]            2017, [place] to LAD      Gastroparesis      Cardiac angina (HCC)      RBC microcytosis      History of pulmonary embolus (PE)            Negative hypercoagulable work up, on lifelong 251-300 mg/dl = 6 units, 301-350 mg/dl = 8 units, > 350 mg/dl = 10 units w/breakfast and lunch and 2 units + sliding scale w/dinner TDD = 45 units , Disp: 15 mL, Rfl: 1  •  ESCITALOPRAM 20 MG Oral Tab, TAKE 1 TABLET(20 MG) BY MOUTH EVERY MORNING, Disp: 90 mouth twice a week.  Tuesdays, Thursdays , Disp: , Rfl:   •  Albuterol Sulfate (VENTOLIN) (2.5 MG/3ML) 0.083% Inhalation Nebu Soln, Take 3 mL (2.5 mg total) by nebulization every 4 (four) hours as needed for Wheezing or Shortness of Breath., Disp: 30 ampule completed using two-way, real-time interactive video communication.  This has been done in good jaun to provide continuity of care in the best interest of the provider-patient relationship, due to the ongoing public health crisis/national emergency and bec

## 2022-01-17 ENCOUNTER — LAB ENCOUNTER (OUTPATIENT)
Dept: LAB | Age: 57
End: 2022-01-17
Attending: FAMILY MEDICINE
Payer: COMMERCIAL

## 2022-01-17 ENCOUNTER — LAB ENCOUNTER (OUTPATIENT)
Dept: LAB | Age: 57
End: 2022-01-17
Attending: NURSE PRACTITIONER
Payer: COMMERCIAL

## 2022-01-17 DIAGNOSIS — E78.2 HYPERLIPIDEMIA, MIXED: ICD-10-CM

## 2022-01-17 DIAGNOSIS — J06.9 VIRAL UPPER RESPIRATORY TRACT INFECTION: ICD-10-CM

## 2022-01-17 DIAGNOSIS — E11.65 UNCONTROLLED TYPE 2 DIABETES MELLITUS WITH HYPERGLYCEMIA (HCC): ICD-10-CM

## 2022-01-17 LAB
ALBUMIN SERPL-MCNC: 3.5 G/DL (ref 3.4–5)
ALBUMIN/GLOB SERPL: 1.1 {RATIO} (ref 1–2)
ALP LIVER SERPL-CCNC: 111 U/L
ALT SERPL-CCNC: 19 U/L
ANION GAP SERPL CALC-SCNC: 6 MMOL/L (ref 0–18)
AST SERPL-CCNC: 13 U/L (ref 15–37)
BILIRUB SERPL-MCNC: 0.7 MG/DL (ref 0.1–2)
BUN BLD-MCNC: 11 MG/DL (ref 7–18)
CALCIUM BLD-MCNC: 9.5 MG/DL (ref 8.5–10.1)
CHLORIDE SERPL-SCNC: 107 MMOL/L (ref 98–112)
CHOLEST SERPL-MCNC: 161 MG/DL (ref ?–200)
CO2 SERPL-SCNC: 28 MMOL/L (ref 21–32)
CREAT BLD-MCNC: 0.76 MG/DL
EST. AVERAGE GLUCOSE BLD GHB EST-MCNC: 229 MG/DL (ref 68–126)
FASTING PATIENT LIPID ANSWER: YES
FASTING STATUS PATIENT QL REPORTED: YES
GLOBULIN PLAS-MCNC: 3.2 G/DL (ref 2.8–4.4)
GLUCOSE BLD-MCNC: 172 MG/DL (ref 70–99)
HBA1C MFR BLD: 9.6 % (ref ?–5.7)
HDLC SERPL-MCNC: 38 MG/DL (ref 40–59)
LDLC SERPL CALC-MCNC: 92 MG/DL (ref ?–100)
NONHDLC SERPL-MCNC: 123 MG/DL (ref ?–130)
OSMOLALITY SERPL CALC.SUM OF ELEC: 295 MOSM/KG (ref 275–295)
POTASSIUM SERPL-SCNC: 4.6 MMOL/L (ref 3.5–5.1)
PROT SERPL-MCNC: 6.7 G/DL (ref 6.4–8.2)
SODIUM SERPL-SCNC: 141 MMOL/L (ref 136–145)
TRIGL SERPL-MCNC: 179 MG/DL (ref 30–149)
VLDLC SERPL CALC-MCNC: 29 MG/DL (ref 0–30)

## 2022-01-17 PROCEDURE — 80053 COMPREHEN METABOLIC PANEL: CPT

## 2022-01-17 PROCEDURE — 3046F HEMOGLOBIN A1C LEVEL >9.0%: CPT | Performed by: FAMILY MEDICINE

## 2022-01-17 PROCEDURE — 83036 HEMOGLOBIN GLYCOSYLATED A1C: CPT

## 2022-01-17 PROCEDURE — 80061 LIPID PANEL: CPT

## 2022-01-17 PROCEDURE — 36415 COLL VENOUS BLD VENIPUNCTURE: CPT

## 2022-01-20 LAB — SARS-COV-2 RNA RESP QL NAA+PROBE: NOT DETECTED

## 2022-01-21 ENCOUNTER — PATIENT MESSAGE (OUTPATIENT)
Dept: ENDOCRINOLOGY CLINIC | Facility: CLINIC | Age: 57
End: 2022-01-21

## 2022-01-21 DIAGNOSIS — E11.65 TYPE 2 DIABETES MELLITUS WITH HYPERGLYCEMIA, WITH LONG-TERM CURRENT USE OF INSULIN (HCC): ICD-10-CM

## 2022-01-21 DIAGNOSIS — Z79.4 TYPE 2 DIABETES MELLITUS WITH HYPERGLYCEMIA, WITH LONG-TERM CURRENT USE OF INSULIN (HCC): ICD-10-CM

## 2022-01-21 NOTE — TELEPHONE ENCOUNTER
LOV: 12/01/2021  Future Appointments   Date Time Provider Praful Hi   2/2/2022  1:00 PM TENZIN Griffiths EMGDIABCTRYK EMG DIAB YRK     A1C: 9.6

## 2022-01-21 NOTE — TELEPHONE ENCOUNTER
From: Edgar Malik  To: TENZIN Christianson  Sent: 1/21/2022 12:46 PM CST  Subject: Ltben Ruiz just gave me 4 pills and needs a refill for Farxiga.  If someone could call in a refill to the [facility] [address] in [facility], I wo

## 2022-02-01 ENCOUNTER — EXTERNAL RECORD (OUTPATIENT)
Dept: HEALTH INFORMATION MANAGEMENT | Facility: OTHER | Age: 57
End: 2022-02-01

## 2022-02-02 ENCOUNTER — ORDER TRANSCRIPTION (OUTPATIENT)
Dept: ADMINISTRATIVE | Facility: HOSPITAL | Age: 57
End: 2022-02-02

## 2022-02-02 ENCOUNTER — TELEMEDICINE (OUTPATIENT)
Dept: ENDOCRINOLOGY CLINIC | Facility: CLINIC | Age: 57
End: 2022-02-02

## 2022-02-02 DIAGNOSIS — E11.65 TYPE 2 DIABETES MELLITUS WITH HYPERGLYCEMIA, WITH LONG-TERM CURRENT USE OF INSULIN (HCC): Primary | ICD-10-CM

## 2022-02-02 DIAGNOSIS — R80.9 MICROALBUMINURIA: ICD-10-CM

## 2022-02-02 DIAGNOSIS — Z79.4 TYPE 2 DIABETES MELLITUS WITH HYPERGLYCEMIA, WITH LONG-TERM CURRENT USE OF INSULIN (HCC): Primary | ICD-10-CM

## 2022-02-02 PROCEDURE — 99214 OFFICE O/P EST MOD 30 MIN: CPT | Performed by: NURSE PRACTITIONER

## 2022-02-02 RX ORDER — INSULIN DEGLUDEC 200 U/ML
80 INJECTION, SOLUTION SUBCUTANEOUS DAILY
Qty: 36 ML | Refills: 1 | Status: SHIPPED | OUTPATIENT
Start: 2022-02-02 | End: 2022-02-25

## 2022-02-03 ENCOUNTER — LAB ENCOUNTER (OUTPATIENT)
Dept: LAB | Age: 57
End: 2022-02-03
Attending: HOSPITALIST
Payer: COMMERCIAL

## 2022-02-03 ENCOUNTER — HOSPITAL ENCOUNTER (OUTPATIENT)
Dept: GENERAL RADIOLOGY | Age: 57
Discharge: HOME | End: 2022-02-03
Attending: HOSPITALIST
Payer: COMMERCIAL

## 2022-02-03 ENCOUNTER — HOSPITAL ENCOUNTER (OUTPATIENT)
Dept: CV DIAGNOSTICS | Age: 57
Discharge: HOME | End: 2022-02-03
Attending: HOSPITALIST
Payer: COMMERCIAL

## 2022-02-03 DIAGNOSIS — I27.20 PULMONARY HTN (HCC): ICD-10-CM

## 2022-02-03 DIAGNOSIS — J45.50 SEVERE PERSISTENT ASTHMA: Primary | ICD-10-CM

## 2022-02-03 PROCEDURE — 36415 COLL VENOUS BLD VENIPUNCTURE: CPT

## 2022-02-03 PROCEDURE — 86036 ANCA SCREEN EACH ANTIBODY: CPT

## 2022-02-03 PROCEDURE — 71046 X-RAY EXAM CHEST 2 VIEWS: CPT | Performed by: HOSPITALIST

## 2022-02-03 PROCEDURE — 93306 TTE W/DOPPLER COMPLETE: CPT | Performed by: HOSPITALIST

## 2022-02-03 PROCEDURE — 83516 IMMUNOASSAY NONANTIBODY: CPT

## 2022-02-07 LAB
MYELOPEROX ANTIBODIES, IGG: 0 AU/ML
SERINE PROTEASE 3, IGG: 1 AU/ML

## 2022-02-08 ENCOUNTER — APPOINTMENT (OUTPATIENT)
Dept: GENERAL RADIOLOGY | Facility: HOSPITAL | Age: 57
End: 2022-02-08
Attending: EMERGENCY MEDICINE
Payer: COMMERCIAL

## 2022-02-08 ENCOUNTER — HOSPITAL ENCOUNTER (OUTPATIENT)
Facility: HOSPITAL | Age: 57
Setting detail: OBSERVATION
Discharge: HOME | End: 2022-02-09
Attending: EMERGENCY MEDICINE | Admitting: INTERNAL MEDICINE
Payer: COMMERCIAL

## 2022-02-08 DIAGNOSIS — R06.00 DYSPNEA ON EXERTION: Primary | ICD-10-CM

## 2022-02-08 DIAGNOSIS — R09.02 HYPOXIA: ICD-10-CM

## 2022-02-08 DIAGNOSIS — J98.01 BRONCHOSPASM: ICD-10-CM

## 2022-02-08 PROBLEM — J45.901 EXACERBATION OF ASTHMA: Status: ACTIVE | Noted: 2017-09-29

## 2022-02-08 PROBLEM — R06.09 DYSPNEA ON EXERTION: Status: ACTIVE | Noted: 2022-02-08

## 2022-02-08 PROBLEM — J45.901 EXACERBATION OF ASTHMA (HCC): Status: ACTIVE | Noted: 2017-09-29

## 2022-02-08 LAB
ADENOVIRUS PCR:: NOT DETECTED
ALBUMIN SERPL-MCNC: 3.7 G/DL (ref 3.4–5)
ALBUMIN/GLOB SERPL: 0.8 {RATIO} (ref 1–2)
ALP LIVER SERPL-CCNC: 116 U/L
ALT SERPL-CCNC: 28 U/L
ANION GAP SERPL CALC-SCNC: 7 MMOL/L (ref 0–18)
APTT PPP: 29 SECONDS (ref 23.3–35.6)
AST SERPL-CCNC: 18 U/L (ref 15–37)
ATRIAL RATE: 79 BPM
B PARAPERT DNA SPEC QL NAA+PROBE: NOT DETECTED
B PERT DNA SPEC QL NAA+PROBE: NOT DETECTED
BASOPHILS # BLD AUTO: 0.06 X10(3) UL (ref 0–0.2)
BASOPHILS NFR BLD AUTO: 0.5 %
BILIRUB SERPL-MCNC: 0.4 MG/DL (ref 0.1–2)
BUN BLD-MCNC: 13 MG/DL (ref 7–18)
C PNEUM DNA SPEC QL NAA+PROBE: NOT DETECTED
CALCIUM BLD-MCNC: 10 MG/DL (ref 8.5–10.1)
CHLORIDE SERPL-SCNC: 109 MMOL/L (ref 98–112)
CO2 SERPL-SCNC: 22 MMOL/L (ref 21–32)
CORONAVIRUS 229E PCR:: NOT DETECTED
CORONAVIRUS HKU1 PCR:: NOT DETECTED
CORONAVIRUS NL63 PCR:: NOT DETECTED
CORONAVIRUS OC43 PCR:: NOT DETECTED
CREAT BLD-MCNC: 0.8 MG/DL
D DIMER PPP FEU-MCNC: 0.33 UG/ML FEU (ref ?–0.54)
ERYTHROCYTE [DISTWIDTH] IN BLOOD BY AUTOMATED COUNT: 19.2 %
FLUAV RNA SPEC QL NAA+PROBE: NOT DETECTED
FLUBV RNA SPEC QL NAA+PROBE: NOT DETECTED
GLOBULIN PLAS-MCNC: 4.6 G/DL (ref 2.8–4.4)
GLUCOSE BLD-MCNC: 173 MG/DL (ref 70–99)
GLUCOSE BLD-MCNC: 180 MG/DL (ref 70–99)
GLUCOSE BLD-MCNC: 203 MG/DL (ref 70–99)
HCT VFR BLD AUTO: 49.4 %
HGB BLD-MCNC: 14.9 G/DL
IMM GRANULOCYTES # BLD AUTO: 0.13 X10(3) UL (ref 0–1)
IMM GRANULOCYTES NFR BLD: 1 %
INR BLD: 1.36 (ref 0.8–1.2)
LYMPHOCYTES # BLD AUTO: 1.73 X10(3) UL (ref 1–4)
LYMPHOCYTES NFR BLD AUTO: 13.7 %
MCH RBC QN AUTO: 22.9 PG (ref 26–34)
MCHC RBC AUTO-ENTMCNC: 30.2 G/DL (ref 31–37)
MCV RBC AUTO: 76 FL
METAPNEUMOVIRUS PCR:: NOT DETECTED
MONOCYTES # BLD AUTO: 0.14 X10(3) UL (ref 0.1–1)
MONOCYTES NFR BLD AUTO: 1.1 %
MYCOPLASMA PNEUMONIA PCR:: NOT DETECTED
NEUTROPHILS # BLD AUTO: 10.59 X10 (3) UL (ref 1.5–7.7)
NEUTROPHILS # BLD AUTO: 10.59 X10(3) UL (ref 1.5–7.7)
NEUTROPHILS NFR BLD AUTO: 83.7 %
NT-PROBNP SERPL-MCNC: 239 PG/ML (ref ?–125)
OSMOLALITY SERPL CALC.SUM OF ELEC: 292 MOSM/KG (ref 275–295)
P AXIS: 28 DEGREES
P-R INTERVAL: 146 MS
PARAINFLUENZA 1 PCR:: NOT DETECTED
PARAINFLUENZA 2 PCR:: NOT DETECTED
PARAINFLUENZA 3 PCR:: NOT DETECTED
PARAINFLUENZA 4 PCR:: NOT DETECTED
PLATELET # BLD AUTO: 403 10(3)UL (ref 150–450)
POTASSIUM SERPL-SCNC: 4.3 MMOL/L (ref 3.5–5.1)
PROCALCITONIN SERPL-MCNC: <0.05 NG/ML (ref ?–0.16)
PROT SERPL-MCNC: 8.3 G/DL (ref 6.4–8.2)
PROTHROMBIN TIME: 16.8 SECONDS (ref 11.6–14.8)
Q-T INTERVAL: 402 MS
QRS DURATION: 94 MS
QTC CALCULATION (BEZET): 460 MS
R AXIS: 53 DEGREES
RBC # BLD AUTO: 6.5 X10(6)UL
RHINOVIRUS/ENTERO PCR:: NOT DETECTED
RSV RNA SPEC QL NAA+PROBE: NOT DETECTED
SARS-COV-2 RNA NPH QL NAA+NON-PROBE: NOT DETECTED
SARS-COV-2 RNA RESP QL NAA+PROBE: NOT DETECTED
SODIUM SERPL-SCNC: 138 MMOL/L (ref 136–145)
T AXIS: 35 DEGREES
TROPONIN I HIGH SENSITIVITY: 4 NG/L
VENTRICULAR RATE: 79 BPM
WBC # BLD AUTO: 12.7 X10(3) UL (ref 4–11)

## 2022-02-08 PROCEDURE — 71045 X-RAY EXAM CHEST 1 VIEW: CPT | Performed by: EMERGENCY MEDICINE

## 2022-02-08 PROCEDURE — 99220 INITIAL OBSERVATION CARE,LEVL III: CPT | Performed by: INTERNAL MEDICINE

## 2022-02-08 RX ORDER — NICOTINE POLACRILEX 4 MG
15 LOZENGE BUCCAL
Status: DISCONTINUED | OUTPATIENT
Start: 2022-02-08 | End: 2022-02-09

## 2022-02-08 RX ORDER — ONDANSETRON 2 MG/ML
4 INJECTION INTRAMUSCULAR; INTRAVENOUS ONCE
Status: COMPLETED | OUTPATIENT
Start: 2022-02-08 | End: 2022-02-08

## 2022-02-08 RX ORDER — ACETAMINOPHEN 325 MG/1
650 TABLET ORAL EVERY 6 HOURS PRN
Status: DISCONTINUED | OUTPATIENT
Start: 2022-02-08 | End: 2022-02-09

## 2022-02-08 RX ORDER — ALPRAZOLAM 0.5 MG/1
1 TABLET ORAL DAILY PRN
Status: DISCONTINUED | OUTPATIENT
Start: 2022-02-08 | End: 2022-02-09

## 2022-02-08 RX ORDER — EZETIMIBE 10 MG/1
10 TABLET ORAL NIGHTLY
Status: DISCONTINUED | OUTPATIENT
Start: 2022-02-08 | End: 2022-02-09

## 2022-02-08 RX ORDER — LEVOFLOXACIN 750 MG/1
750 TABLET ORAL DAILY
Status: DISCONTINUED | OUTPATIENT
Start: 2022-02-08 | End: 2022-02-09

## 2022-02-08 RX ORDER — IPRATROPIUM BROMIDE AND ALBUTEROL SULFATE 2.5; .5 MG/3ML; MG/3ML
3 SOLUTION RESPIRATORY (INHALATION) EVERY 6 HOURS
Status: DISCONTINUED | OUTPATIENT
Start: 2022-02-08 | End: 2022-02-08

## 2022-02-08 RX ORDER — LOSARTAN POTASSIUM 100 MG/1
100 TABLET ORAL NIGHTLY
Status: DISCONTINUED | OUTPATIENT
Start: 2022-02-08 | End: 2022-02-09

## 2022-02-08 RX ORDER — NICOTINE POLACRILEX 4 MG
30 LOZENGE BUCCAL
Status: DISCONTINUED | OUTPATIENT
Start: 2022-02-08 | End: 2022-02-09

## 2022-02-08 RX ORDER — PANTOPRAZOLE SODIUM 40 MG/1
40 TABLET, DELAYED RELEASE ORAL NIGHTLY
Status: DISCONTINUED | OUTPATIENT
Start: 2022-02-08 | End: 2022-02-09

## 2022-02-08 RX ORDER — DEXTROSE MONOHYDRATE 25 G/50ML
50 INJECTION, SOLUTION INTRAVENOUS
Status: DISCONTINUED | OUTPATIENT
Start: 2022-02-08 | End: 2022-02-09

## 2022-02-08 RX ORDER — METOPROLOL SUCCINATE 25 MG/1
25 TABLET, EXTENDED RELEASE ORAL DAILY
COMMUNITY

## 2022-02-08 RX ORDER — MORPHINE SULFATE 4 MG/ML
4 INJECTION, SOLUTION INTRAMUSCULAR; INTRAVENOUS ONCE
Status: COMPLETED | OUTPATIENT
Start: 2022-02-08 | End: 2022-02-08

## 2022-02-08 RX ORDER — ALPRAZOLAM 0.5 MG/1
TABLET ORAL
Status: DISCONTINUED | OUTPATIENT
Start: 2022-02-08 | End: 2022-02-08 | Stop reason: SDUPTHER

## 2022-02-08 RX ORDER — ACETAMINOPHEN 500 MG
1000 TABLET ORAL ONCE
Status: COMPLETED | OUTPATIENT
Start: 2022-02-08 | End: 2022-02-08

## 2022-02-08 RX ORDER — ROSUVASTATIN CALCIUM 5 MG/1
5 TABLET, COATED ORAL NIGHTLY
Status: DISCONTINUED | OUTPATIENT
Start: 2022-02-08 | End: 2022-02-08

## 2022-02-08 RX ORDER — METHYLPREDNISOLONE SODIUM SUCCINATE 125 MG/2ML
125 INJECTION, POWDER, LYOPHILIZED, FOR SOLUTION INTRAMUSCULAR; INTRAVENOUS ONCE
Status: COMPLETED | OUTPATIENT
Start: 2022-02-08 | End: 2022-02-08

## 2022-02-08 RX ORDER — ALPRAZOLAM 0.5 MG/1
0.5 TABLET ORAL DAILY PRN
Status: DISCONTINUED | OUTPATIENT
Start: 2022-02-08 | End: 2022-02-09

## 2022-02-08 RX ORDER — PROCHLORPERAZINE EDISYLATE 5 MG/ML
5 INJECTION INTRAMUSCULAR; INTRAVENOUS EVERY 8 HOURS PRN
Status: DISCONTINUED | OUTPATIENT
Start: 2022-02-08 | End: 2022-02-09

## 2022-02-08 RX ORDER — IPRATROPIUM BROMIDE AND ALBUTEROL SULFATE 2.5; .5 MG/3ML; MG/3ML
3 SOLUTION RESPIRATORY (INHALATION)
Status: DISCONTINUED | OUTPATIENT
Start: 2022-02-08 | End: 2022-02-09

## 2022-02-08 RX ORDER — METHYLPREDNISOLONE SODIUM SUCCINATE 125 MG/2ML
60 INJECTION, POWDER, LYOPHILIZED, FOR SOLUTION INTRAMUSCULAR; INTRAVENOUS EVERY 8 HOURS
Status: DISCONTINUED | OUTPATIENT
Start: 2022-02-08 | End: 2022-02-09

## 2022-02-08 RX ORDER — METOPROLOL SUCCINATE 25 MG/1
25 TABLET, EXTENDED RELEASE ORAL NIGHTLY
Status: DISCONTINUED | OUTPATIENT
Start: 2022-02-08 | End: 2022-02-09

## 2022-02-08 RX ORDER — ESCITALOPRAM OXALATE 20 MG/1
20 TABLET ORAL EVERY MORNING
Status: DISCONTINUED | OUTPATIENT
Start: 2022-02-09 | End: 2022-02-09

## 2022-02-08 RX ORDER — ONDANSETRON 2 MG/ML
4 INJECTION INTRAMUSCULAR; INTRAVENOUS EVERY 4 HOURS PRN
Status: DISCONTINUED | OUTPATIENT
Start: 2022-02-08 | End: 2022-02-08

## 2022-02-08 RX ORDER — METHYLPREDNISOLONE SODIUM SUCCINATE 40 MG/ML
40 INJECTION, POWDER, LYOPHILIZED, FOR SOLUTION INTRAMUSCULAR; INTRAVENOUS EVERY 8 HOURS
Status: DISCONTINUED | OUTPATIENT
Start: 2022-02-08 | End: 2022-02-08

## 2022-02-08 RX ORDER — METOPROLOL SUCCINATE 25 MG/1
25 TABLET, EXTENDED RELEASE ORAL 2 TIMES DAILY
Status: DISCONTINUED | OUTPATIENT
Start: 2022-02-08 | End: 2022-02-08

## 2022-02-08 RX ORDER — IPRATROPIUM BROMIDE AND ALBUTEROL SULFATE 2.5; .5 MG/3ML; MG/3ML
3 SOLUTION RESPIRATORY (INHALATION) ONCE
Status: COMPLETED | OUTPATIENT
Start: 2022-02-08 | End: 2022-02-08

## 2022-02-08 RX ORDER — ONDANSETRON 2 MG/ML
4 INJECTION INTRAMUSCULAR; INTRAVENOUS EVERY 6 HOURS PRN
Status: DISCONTINUED | OUTPATIENT
Start: 2022-02-08 | End: 2022-02-09

## 2022-02-08 RX ORDER — CLOPIDOGREL BISULFATE 75 MG/1
75 TABLET ORAL DAILY
Status: DISCONTINUED | OUTPATIENT
Start: 2022-02-08 | End: 2022-02-09

## 2022-02-08 RX ORDER — CETIRIZINE HYDROCHLORIDE 10 MG/1
10 TABLET ORAL NIGHTLY
Status: DISCONTINUED | OUTPATIENT
Start: 2022-02-08 | End: 2022-02-09

## 2022-02-08 RX ORDER — MONTELUKAST SODIUM 10 MG/1
10 TABLET ORAL NIGHTLY
Status: DISCONTINUED | OUTPATIENT
Start: 2022-02-08 | End: 2022-02-09

## 2022-02-08 RX ORDER — ROSUVASTATIN CALCIUM 5 MG/1
5 TABLET, COATED ORAL
Status: DISCONTINUED | OUTPATIENT
Start: 2022-02-09 | End: 2022-02-09

## 2022-02-08 RX ORDER — SODIUM CHLORIDE 9 MG/ML
INJECTION, SOLUTION INTRAVENOUS CONTINUOUS
Status: ACTIVE | OUTPATIENT
Start: 2022-02-08 | End: 2022-02-08

## 2022-02-08 NOTE — ED QUICK NOTES
Orders for admission, patient is aware of plan and ready to go upstairs.  Any questions, please call ED RN guy at extension [phone]    Patient Covid vaccination status: Unvaccinated     COVID Test Ordered in ED: Rapid SARS-CoV-2 by PCR    COVID Suspicion at Admission: Low clinical suspicion for COVID    Running Infusions:  None    Mental Status/LOC at time of transport: A&ox4    Other pertinent information:   CIWA score: N/A   NIH score:  N/A

## 2022-02-09 ENCOUNTER — APPOINTMENT (OUTPATIENT)
Dept: CT IMAGING | Facility: HOSPITAL | Age: 57
End: 2022-02-09
Attending: INTERNAL MEDICINE
Payer: COMMERCIAL

## 2022-02-09 VITALS
BODY MASS INDEX: 38.09 KG/M2 | DIASTOLIC BLOOD PRESSURE: 55 MMHG | SYSTOLIC BLOOD PRESSURE: 127 MMHG | HEIGHT: 68 IN | RESPIRATION RATE: 16 BRPM | HEART RATE: 90 BPM | TEMPERATURE: 98 F | WEIGHT: 251.31 LBS | OXYGEN SATURATION: 98 %

## 2022-02-09 LAB
ANION GAP SERPL CALC-SCNC: 9 MMOL/L (ref 0–18)
BASOPHILS # BLD AUTO: 0.02 X10(3) UL (ref 0–0.2)
BASOPHILS NFR BLD AUTO: 0.2 %
BUN BLD-MCNC: 17 MG/DL (ref 7–18)
CALCIUM BLD-MCNC: 9.1 MG/DL (ref 8.5–10.1)
CHLORIDE SERPL-SCNC: 109 MMOL/L (ref 98–112)
CO2 SERPL-SCNC: 20 MMOL/L (ref 21–32)
CREAT BLD-MCNC: 0.61 MG/DL
EOSINOPHIL # BLD AUTO: 0 X10(3) UL (ref 0–0.7)
ERYTHROCYTE [DISTWIDTH] IN BLOOD BY AUTOMATED COUNT: 19.5 %
GLUCOSE BLD-MCNC: 212 MG/DL (ref 70–99)
GLUCOSE BLD-MCNC: 226 MG/DL (ref 70–99)
GLUCOSE BLD-MCNC: 261 MG/DL (ref 70–99)
HCT VFR BLD AUTO: 43.3 %
HGB BLD-MCNC: 13.4 G/DL
IMM GRANULOCYTES # BLD AUTO: 0.14 X10(3) UL (ref 0–1)
IMM GRANULOCYTES NFR BLD: 1.4 %
LYMPHOCYTES # BLD AUTO: 1.32 X10(3) UL (ref 1–4)
LYMPHOCYTES NFR BLD AUTO: 13.3 %
MCH RBC QN AUTO: 23.4 PG (ref 26–34)
MCHC RBC AUTO-ENTMCNC: 30.9 G/DL (ref 31–37)
MCV RBC AUTO: 75.6 FL
MONOCYTES # BLD AUTO: 0.05 X10(3) UL (ref 0.1–1)
MONOCYTES NFR BLD AUTO: 0.5 %
NEUTROPHILS # BLD AUTO: 8.39 X10 (3) UL (ref 1.5–7.7)
NEUTROPHILS # BLD AUTO: 8.39 X10(3) UL (ref 1.5–7.7)
NEUTROPHILS NFR BLD AUTO: 84.6 %
OSMOLALITY SERPL CALC.SUM OF ELEC: 295 MOSM/KG (ref 275–295)
PLATELET # BLD AUTO: 368 10(3)UL (ref 150–450)
POTASSIUM SERPL-SCNC: 4.2 MMOL/L (ref 3.5–5.1)
RBC # BLD AUTO: 5.73 X10(6)UL
SODIUM SERPL-SCNC: 138 MMOL/L (ref 136–145)
WBC # BLD AUTO: 9.9 X10(3) UL (ref 4–11)

## 2022-02-09 PROCEDURE — 99217 OBSERVATION CARE DISCHARGE: CPT | Performed by: HOSPITALIST

## 2022-02-09 PROCEDURE — 70486 CT MAXILLOFACIAL W/O DYE: CPT | Performed by: INTERNAL MEDICINE

## 2022-02-09 RX ORDER — PREDNISONE 10 MG/1
TABLET ORAL
Qty: 35 TABLET | Refills: 0 | Status: SHIPPED | OUTPATIENT
Start: 2022-02-09 | End: 2022-04-04

## 2022-02-09 RX ORDER — METHYLPREDNISOLONE SODIUM SUCCINATE 40 MG/ML
40 INJECTION, POWDER, LYOPHILIZED, FOR SOLUTION INTRAMUSCULAR; INTRAVENOUS EVERY 8 HOURS
Status: DISCONTINUED | OUTPATIENT
Start: 2022-02-09 | End: 2022-02-09

## 2022-02-09 RX ORDER — AZELASTINE 1 MG/ML
1 SPRAY, METERED NASAL 2 TIMES DAILY
Status: DISCONTINUED | OUTPATIENT
Start: 2022-02-09 | End: 2022-02-09

## 2022-02-09 RX ORDER — FLUTICASONE PROPIONATE 50 MCG
2 SPRAY, SUSPENSION (ML) NASAL 2 TIMES DAILY
Status: DISCONTINUED | OUTPATIENT
Start: 2022-02-09 | End: 2022-02-09

## 2022-02-09 RX ORDER — LEVOFLOXACIN 750 MG/1
750 TABLET ORAL DAILY
Qty: 5 TABLET | Refills: 0 | Status: SHIPPED | OUTPATIENT
Start: 2022-02-10 | End: 2022-02-15

## 2022-02-10 ENCOUNTER — PATIENT OUTREACH (OUTPATIENT)
Dept: CASE MANAGEMENT | Age: 57
End: 2022-02-10

## 2022-02-10 NOTE — PROGRESS NOTES
NURSING DISCHARGE NOTE    Discharged Home via Wheelchair. Accompanied by Support staff  Belongings Taken by patient/family. Patient discharged to home. Discharge instructions given to and understood by patient. Appointments reviewed. All questions answered. Patient left unit with all belongings and in no signs or symptoms of distress.

## 2022-02-11 ENCOUNTER — TELEMEDICINE (OUTPATIENT)
Dept: ENDOCRINOLOGY CLINIC | Facility: CLINIC | Age: 57
End: 2022-02-11

## 2022-02-11 DIAGNOSIS — Z79.4 TYPE 2 DIABETES MELLITUS WITH HYPERGLYCEMIA, WITH LONG-TERM CURRENT USE OF INSULIN (HCC): Primary | ICD-10-CM

## 2022-02-11 DIAGNOSIS — E11.65 TYPE 2 DIABETES MELLITUS WITH HYPERGLYCEMIA, WITH LONG-TERM CURRENT USE OF INSULIN (HCC): Primary | ICD-10-CM

## 2022-02-11 PROCEDURE — 99213 OFFICE O/P EST LOW 20 MIN: CPT | Performed by: NURSE PRACTITIONER

## 2022-02-16 NOTE — TELEPHONE ENCOUNTER
Requesting Alprazolam 0.5mg  LOV: 1/12/22  RTC: prn  Last Relevant Labs:   Filled: 10/20/21 #45 with 2 refills    Future Appointments   Date Time Provider Praful Hi   2/25/2022 10:45 AM Kathie Martell APN EMGDIABCTSPL EMG DIAB PLF     Rx pended and routed for approval/denial

## 2022-02-17 RX ORDER — ALPRAZOLAM 0.5 MG/1
TABLET ORAL
Qty: 45 TABLET | Refills: 2 | Status: SHIPPED | OUTPATIENT
Start: 2022-02-17 | End: 2022-04-04

## 2022-02-25 ENCOUNTER — TELEMEDICINE (OUTPATIENT)
Dept: ENDOCRINOLOGY CLINIC | Facility: CLINIC | Age: 57
End: 2022-02-25
Payer: COMMERCIAL

## 2022-02-25 DIAGNOSIS — Z79.4 TYPE 2 DIABETES MELLITUS WITH HYPERGLYCEMIA, WITH LONG-TERM CURRENT USE OF INSULIN (HCC): ICD-10-CM

## 2022-02-25 DIAGNOSIS — E11.65 TYPE 2 DIABETES MELLITUS WITH HYPERGLYCEMIA, WITH LONG-TERM CURRENT USE OF INSULIN (HCC): ICD-10-CM

## 2022-02-25 PROCEDURE — 99213 OFFICE O/P EST LOW 20 MIN: CPT | Performed by: NURSE PRACTITIONER

## 2022-02-25 RX ORDER — INSULIN DEGLUDEC 200 U/ML
106 INJECTION, SOLUTION SUBCUTANEOUS DAILY
Qty: 36 ML | Refills: 1 | COMMUNITY
Start: 2022-02-25

## 2022-03-14 ENCOUNTER — LAB ENCOUNTER (OUTPATIENT)
Dept: LAB | Age: 57
End: 2022-03-14
Attending: HOSPITALIST
Payer: COMMERCIAL

## 2022-03-14 DIAGNOSIS — K76.6 PORTOPULMONARY HYPERTENSION (HCC): ICD-10-CM

## 2022-03-14 DIAGNOSIS — D72.10 EOSINOPHILIA: Primary | ICD-10-CM

## 2022-03-14 DIAGNOSIS — I27.20 PORTOPULMONARY HYPERTENSION (HCC): ICD-10-CM

## 2022-03-14 DIAGNOSIS — R07.9 CHEST PAIN, UNSPECIFIED: ICD-10-CM

## 2022-03-14 LAB
ALBUMIN SERPL-MCNC: 3.5 G/DL (ref 3.4–5)
ALBUMIN/GLOB SERPL: 1.2 {RATIO} (ref 1–2)
ALP LIVER SERPL-CCNC: 95 U/L
ALT SERPL-CCNC: 24 U/L
ANION GAP SERPL CALC-SCNC: 6 MMOL/L (ref 0–18)
AST SERPL-CCNC: 13 U/L (ref 15–37)
BASOPHILS # BLD AUTO: 0.15 X10(3) UL (ref 0–0.2)
BILIRUB SERPL-MCNC: 0.4 MG/DL (ref 0.1–2)
BILIRUB UR QL STRIP.AUTO: NEGATIVE
BUN BLD-MCNC: 16 MG/DL (ref 7–18)
CALCIUM BLD-MCNC: 9.6 MG/DL (ref 8.5–10.1)
CHLORIDE SERPL-SCNC: 108 MMOL/L (ref 98–112)
CK SERPL-CCNC: 69 U/L
COLOR UR AUTO: YELLOW
CREAT BLD-MCNC: 0.73 MG/DL
CRP SERPL-MCNC: 2.04 MG/DL (ref ?–0.3)
EOSINOPHIL # BLD AUTO: 0.6 X10(3) UL (ref 0–0.7)
EOSINOPHIL NFR BLD AUTO: 5.6 %
ERYTHROCYTE [DISTWIDTH] IN BLOOD BY AUTOMATED COUNT: 19.5 %
ERYTHROCYTE [SEDIMENTATION RATE] IN BLOOD: 11 MM/HR
FASTING STATUS PATIENT QL REPORTED: YES
GLOBULIN PLAS-MCNC: 3 G/DL (ref 2.8–4.4)
GLUCOSE BLD-MCNC: 158 MG/DL (ref 70–99)
GLUCOSE UR STRIP.AUTO-MCNC: >=500 MG/DL
HCT VFR BLD AUTO: 45.1 %
HGB BLD-MCNC: 13.9 G/DL
IMM GRANULOCYTES # BLD AUTO: 0.05 X10(3) UL (ref 0–1)
IMM GRANULOCYTES NFR BLD: 0.5 %
KETONES UR STRIP.AUTO-MCNC: NEGATIVE MG/DL
LEUKOCYTE ESTERASE UR QL STRIP.AUTO: NEGATIVE
LYMPHOCYTES # BLD AUTO: 3.67 X10(3) UL (ref 1–4)
LYMPHOCYTES NFR BLD AUTO: 34 %
MCH RBC QN AUTO: 23.9 PG (ref 26–34)
MCHC RBC AUTO-ENTMCNC: 30.8 G/DL (ref 31–37)
MCV RBC AUTO: 77.6 FL
MONOCYTES # BLD AUTO: 0.61 X10(3) UL (ref 0.1–1)
MONOCYTES NFR BLD AUTO: 5.6 %
NEUTROPHILS # BLD AUTO: 5.73 X10 (3) UL (ref 1.5–7.7)
NEUTROPHILS # BLD AUTO: 5.73 X10(3) UL (ref 1.5–7.7)
NITRITE UR QL STRIP.AUTO: NEGATIVE
OSMOLALITY SERPL CALC.SUM OF ELEC: 290 MOSM/KG (ref 275–295)
PH UR STRIP.AUTO: 5 [PH] (ref 5–8)
PLATELET # BLD AUTO: 409 10(3)UL (ref 150–450)
POTASSIUM SERPL-SCNC: 4.5 MMOL/L (ref 3.5–5.1)
PROT SERPL-MCNC: 6.5 G/DL (ref 6.4–8.2)
PROT UR STRIP.AUTO-MCNC: NEGATIVE MG/DL
RBC # BLD AUTO: 5.81 X10(6)UL
RBC UR QL AUTO: NEGATIVE
SP GR UR STRIP.AUTO: 1.02 (ref 1–1.03)
T3FREE SERPL-MCNC: 2.93 PG/ML (ref 2.4–4.2)
T4 FREE SERPL-MCNC: 1 NG/DL (ref 0.8–1.7)
TSI SER-ACNC: 1.18 MIU/ML (ref 0.36–3.74)
UROBILINOGEN UR STRIP.AUTO-MCNC: <2 MG/DL
WBC # BLD AUTO: 10.8 X10(3) UL (ref 4–11)

## 2022-03-14 PROCEDURE — 84481 FREE ASSAY (FT-3): CPT

## 2022-03-14 PROCEDURE — 85652 RBC SED RATE AUTOMATED: CPT

## 2022-03-14 PROCEDURE — 36415 COLL VENOUS BLD VENIPUNCTURE: CPT

## 2022-03-14 PROCEDURE — 84439 ASSAY OF FREE THYROXINE: CPT

## 2022-03-14 PROCEDURE — 82550 ASSAY OF CK (CPK): CPT

## 2022-03-14 PROCEDURE — 81003 URINALYSIS AUTO W/O SCOPE: CPT

## 2022-03-14 PROCEDURE — 85025 COMPLETE CBC W/AUTO DIFF WBC: CPT

## 2022-03-14 PROCEDURE — 80053 COMPREHEN METABOLIC PANEL: CPT

## 2022-03-14 PROCEDURE — 84443 ASSAY THYROID STIM HORMONE: CPT

## 2022-03-14 PROCEDURE — 86140 C-REACTIVE PROTEIN: CPT

## 2022-03-23 ENCOUNTER — HOSPITAL ENCOUNTER (OUTPATIENT)
Dept: PERIOP | Facility: HOSPITAL | Age: 57
Discharge: HOME | End: 2022-03-23
Attending: HOSPITALIST
Payer: COMMERCIAL

## 2022-03-23 ENCOUNTER — APPOINTMENT (OUTPATIENT)
Dept: CT IMAGING | Facility: HOSPITAL | Age: 57
End: 2022-03-23
Attending: HOSPITALIST
Payer: COMMERCIAL

## 2022-04-04 ENCOUNTER — OFFICE VISIT (OUTPATIENT)
Dept: FAMILY MEDICINE CLINIC | Facility: CLINIC | Age: 57
End: 2022-04-04
Payer: COMMERCIAL

## 2022-04-04 VITALS
RESPIRATION RATE: 14 BRPM | OXYGEN SATURATION: 98 % | HEIGHT: 68 IN | BODY MASS INDEX: 39.1 KG/M2 | TEMPERATURE: 97 F | HEART RATE: 79 BPM | DIASTOLIC BLOOD PRESSURE: 72 MMHG | WEIGHT: 258 LBS | SYSTOLIC BLOOD PRESSURE: 112 MMHG

## 2022-04-04 DIAGNOSIS — Z12.31 SCREENING MAMMOGRAM FOR BREAST CANCER: ICD-10-CM

## 2022-04-04 DIAGNOSIS — I25.118 CORONARY ARTERY DISEASE OF NATIVE ARTERY OF NATIVE HEART WITH STABLE ANGINA PECTORIS (HCC): ICD-10-CM

## 2022-04-04 DIAGNOSIS — E11.65 UNCONTROLLED TYPE 2 DIABETES MELLITUS WITH HYPERGLYCEMIA (HCC): ICD-10-CM

## 2022-04-04 DIAGNOSIS — F33.1 MODERATE EPISODE OF RECURRENT MAJOR DEPRESSIVE DISORDER (HCC): ICD-10-CM

## 2022-04-04 DIAGNOSIS — G89.29 CHRONIC BILATERAL LOW BACK PAIN WITHOUT SCIATICA: ICD-10-CM

## 2022-04-04 DIAGNOSIS — Z00.00 ANNUAL PHYSICAL EXAM: Primary | ICD-10-CM

## 2022-04-04 DIAGNOSIS — J45.40 MODERATE PERSISTENT ASTHMA WITHOUT COMPLICATION: ICD-10-CM

## 2022-04-04 DIAGNOSIS — F41.9 ANXIETY: ICD-10-CM

## 2022-04-04 DIAGNOSIS — M54.50 CHRONIC BILATERAL LOW BACK PAIN WITHOUT SCIATICA: ICD-10-CM

## 2022-04-04 DIAGNOSIS — Z13.31 NEGATIVE DEPRESSION SCREENING: ICD-10-CM

## 2022-04-04 DIAGNOSIS — B37.3 YEAST INFECTION OF THE VAGINA: ICD-10-CM

## 2022-04-04 PROCEDURE — 3078F DIAST BP <80 MM HG: CPT | Performed by: FAMILY MEDICINE

## 2022-04-04 PROCEDURE — 99214 OFFICE O/P EST MOD 30 MIN: CPT | Performed by: FAMILY MEDICINE

## 2022-04-04 PROCEDURE — 3008F BODY MASS INDEX DOCD: CPT | Performed by: FAMILY MEDICINE

## 2022-04-04 PROCEDURE — 99396 PREV VISIT EST AGE 40-64: CPT | Performed by: FAMILY MEDICINE

## 2022-04-04 PROCEDURE — 3074F SYST BP LT 130 MM HG: CPT | Performed by: FAMILY MEDICINE

## 2022-04-04 RX ORDER — ALBUTEROL SULFATE 2.5 MG/3ML
2.5 SOLUTION RESPIRATORY (INHALATION) EVERY 4 HOURS PRN
Qty: 90 EACH | Refills: 0 | Status: CANCELLED | OUTPATIENT
Start: 2022-04-04

## 2022-04-04 RX ORDER — ALPRAZOLAM 0.5 MG/1
TABLET ORAL
Qty: 45 TABLET | Refills: 5 | Status: SHIPPED | OUTPATIENT
Start: 2022-04-04

## 2022-04-04 RX ORDER — FLUCONAZOLE 150 MG/1
150 TABLET ORAL DAILY PRN
Qty: 1 TABLET | Refills: 5 | Status: SHIPPED | OUTPATIENT
Start: 2022-04-04

## 2022-04-04 RX ORDER — ESCITALOPRAM OXALATE 20 MG/1
20 TABLET ORAL EVERY MORNING
Qty: 90 TABLET | Refills: 3 | Status: SHIPPED | OUTPATIENT
Start: 2022-04-04

## 2022-04-04 RX ORDER — CYCLOBENZAPRINE HCL 10 MG
10 TABLET ORAL NIGHTLY PRN
Qty: 30 TABLET | Refills: 1 | Status: SHIPPED | OUTPATIENT
Start: 2022-04-04

## 2022-04-04 RX ORDER — ALBUTEROL SULFATE 90 UG/1
2 AEROSOL, METERED RESPIRATORY (INHALATION) EVERY 6 HOURS PRN
Qty: 6.7 G | Refills: 0 | Status: SHIPPED | OUTPATIENT
Start: 2022-04-04

## 2022-04-05 ENCOUNTER — PATIENT MESSAGE (OUTPATIENT)
Dept: ENDOCRINOLOGY CLINIC | Facility: CLINIC | Age: 57
End: 2022-04-05

## 2022-04-06 ENCOUNTER — OFFICE VISIT (OUTPATIENT)
Dept: ENDOCRINOLOGY CLINIC | Facility: CLINIC | Age: 57
End: 2022-04-06
Payer: COMMERCIAL

## 2022-04-06 VITALS
WEIGHT: 257 LBS | HEIGHT: 68 IN | DIASTOLIC BLOOD PRESSURE: 78 MMHG | SYSTOLIC BLOOD PRESSURE: 122 MMHG | HEART RATE: 92 BPM | BODY MASS INDEX: 38.95 KG/M2 | RESPIRATION RATE: 18 BRPM

## 2022-04-06 DIAGNOSIS — I10 ESSENTIAL HYPERTENSION: ICD-10-CM

## 2022-04-06 DIAGNOSIS — E11.65 TYPE 2 DIABETES MELLITUS WITH HYPERGLYCEMIA, WITH LONG-TERM CURRENT USE OF INSULIN (HCC): Primary | ICD-10-CM

## 2022-04-06 DIAGNOSIS — Z79.4 TYPE 2 DIABETES MELLITUS WITH HYPERGLYCEMIA, WITH LONG-TERM CURRENT USE OF INSULIN (HCC): Primary | ICD-10-CM

## 2022-04-06 DIAGNOSIS — R80.9 MICROALBUMINURIA: ICD-10-CM

## 2022-04-06 DIAGNOSIS — E78.2 HYPERLIPIDEMIA, MIXED: ICD-10-CM

## 2022-04-06 DIAGNOSIS — E66.01 CLASS 2 SEVERE OBESITY DUE TO EXCESS CALORIES WITH SERIOUS COMORBIDITY AND BODY MASS INDEX (BMI) OF 39.0 TO 39.9 IN ADULT (HCC): ICD-10-CM

## 2022-04-06 LAB
CARTRIDGE LOT#: 874 NUMERIC
CREAT UR-SCNC: 71.8 MG/DL
HEMOGLOBIN A1C: 8.4 % (ref 4.3–5.6)
MICROALBUMIN UR-MCNC: <0.5 MG/DL

## 2022-04-06 PROCEDURE — 3052F HG A1C>EQUAL 8.0%<EQUAL 9.0%: CPT | Performed by: FAMILY MEDICINE

## 2022-04-06 PROCEDURE — 99214 OFFICE O/P EST MOD 30 MIN: CPT | Performed by: NURSE PRACTITIONER

## 2022-04-06 PROCEDURE — 3061F NEG MICROALBUMINURIA REV: CPT | Performed by: FAMILY MEDICINE

## 2022-04-06 PROCEDURE — 82043 UR ALBUMIN QUANTITATIVE: CPT | Performed by: NURSE PRACTITIONER

## 2022-04-06 PROCEDURE — 82570 ASSAY OF URINE CREATININE: CPT | Performed by: NURSE PRACTITIONER

## 2022-04-06 PROCEDURE — 83036 HEMOGLOBIN GLYCOSYLATED A1C: CPT | Performed by: NURSE PRACTITIONER

## 2022-04-06 PROCEDURE — 3008F BODY MASS INDEX DOCD: CPT | Performed by: NURSE PRACTITIONER

## 2022-04-06 PROCEDURE — 95251 CONT GLUC MNTR ANALYSIS I&R: CPT | Performed by: NURSE PRACTITIONER

## 2022-04-06 PROCEDURE — 3074F SYST BP LT 130 MM HG: CPT | Performed by: NURSE PRACTITIONER

## 2022-04-06 PROCEDURE — 3078F DIAST BP <80 MM HG: CPT | Performed by: NURSE PRACTITIONER

## 2022-04-06 NOTE — TELEPHONE ENCOUNTER
Patient should be rescheduled to be seen in office, A1c and foot exam due. If patient is having concerns can also do video visit today to discuss but will need patient to be scheduled to be seen in office as well.     Christi AJ, BC-ADM, [facility]

## 2022-04-06 NOTE — TELEPHONE ENCOUNTER
From: Erick Velazquez  To: TENZIN Viramontes  Sent: 4/5/2022 6:44 PM CDT  Subject: Appt March 6     Can we change the appt to a video visit? I am not feeling well, headache, sore throat.

## 2022-04-07 ENCOUNTER — PATIENT MESSAGE (OUTPATIENT)
Dept: ENDOCRINOLOGY CLINIC | Facility: CLINIC | Age: 57
End: 2022-04-07

## 2022-04-07 ENCOUNTER — PATIENT MESSAGE (OUTPATIENT)
Dept: INTERNAL MEDICINE CLINIC | Facility: CLINIC | Age: 57
End: 2022-04-07

## 2022-04-07 RX ORDER — INSULIN DEGLUDEC 200 U/ML
122 INJECTION, SOLUTION SUBCUTANEOUS DAILY
Qty: 18 ML | Refills: 1 | Status: SHIPPED | OUTPATIENT
Start: 2022-04-07

## 2022-04-07 NOTE — TELEPHONE ENCOUNTER
From: David Tarango  To: Vanessa Batista PA-C  Sent: 4/7/2022 11:24 AM CDT  Subject: Harmony Jesica    The pharmacy only gave me 2 Tresiba pens because they refilled and old Prescription from Dec, not the latest from February. They WILL not give me a third pen, even though it was their mistake. They need you to call in a new Prescription with the newest dose for me to get my third pen. [id number] [facility] [phone]. If you could please call and give them new prescription for Harmony Jesica I would appreciate it.    Thanks,  Shahbaz Gracy

## 2022-04-07 NOTE — TELEPHONE ENCOUNTER
From: Efrain Glaser  To: TENZIN Brown  Sent: 4/7/2022 11:33 AM CDT  Subject: Melissa Torres    The pharmacy only gave me 2 Tresiba pens because they refilled and old Prescription from Dec, not the latest from February. They WILL not give me a third pen, even though it was their mistake. They need you to call in a new Prescription with the newest dose for me to get my third pen. [facility] [phone]. If you could please call and give them new prescription for Melissa Torres I would appreciate it.    Thanks,  Rashida Rodríguez

## 2022-04-07 NOTE — TELEPHONE ENCOUNTER
Contacted pharmacy, states old prescription is on file and the insurance will only cover 21 days at a time. Resent yesterdays prescription with increased dose. Per 21 day prescription patient should be receiving 15ml or 5 pens of Tresiba U200. Pharmacist did confirm that they will contact patient to provide her with the additional 5 pens to fulfill the new prescription. Patient contacted to inform her of the status of the prescription.     Zulema Orellana APRN, BC-ADM, [facility]

## 2022-04-08 ENCOUNTER — HOSPITAL ENCOUNTER (OUTPATIENT)
Dept: PERIOP | Facility: HOSPITAL | Age: 57
Discharge: HOME | End: 2022-04-08
Attending: HOSPITALIST
Payer: COMMERCIAL

## 2022-04-08 ENCOUNTER — HOSPITAL ENCOUNTER (OUTPATIENT)
Dept: CT IMAGING | Facility: HOSPITAL | Age: 57
Discharge: HOME | End: 2022-04-08
Attending: HOSPITALIST
Payer: COMMERCIAL

## 2022-04-08 DIAGNOSIS — I26.99 PULMONARY EMBOLISM (HCC): ICD-10-CM

## 2022-04-08 PROCEDURE — 71260 CT THORAX DX C+: CPT | Performed by: HOSPITALIST

## 2022-04-08 PROCEDURE — 76937 US GUIDE VASCULAR ACCESS: CPT

## 2022-04-08 PROCEDURE — 36410 VNPNXR 3YR/> PHY/QHP DX/THER: CPT

## 2022-04-08 RX ORDER — IOHEXOL 350 MG/ML
100 INJECTION, SOLUTION INTRAVENOUS
Status: DISCONTINUED | OUTPATIENT
Start: 2022-04-08 | End: 2022-04-10

## 2022-04-08 RX ORDER — IOHEXOL 350 MG/ML
100 INJECTION, SOLUTION INTRAVENOUS
Status: COMPLETED | OUTPATIENT
Start: 2022-04-08 | End: 2022-04-08

## 2022-04-08 RX ADMIN — IOHEXOL 100 ML: 350 INJECTION, SOLUTION INTRAVENOUS at 12:06:00

## 2022-06-03 ENCOUNTER — TELEMEDICINE (OUTPATIENT)
Dept: ENDOCRINOLOGY CLINIC | Facility: CLINIC | Age: 57
End: 2022-06-03

## 2022-06-03 DIAGNOSIS — E11.65 TYPE 2 DIABETES MELLITUS WITH HYPERGLYCEMIA, WITH LONG-TERM CURRENT USE OF INSULIN (HCC): Primary | ICD-10-CM

## 2022-06-03 DIAGNOSIS — Z79.4 TYPE 2 DIABETES MELLITUS WITH HYPERGLYCEMIA, WITH LONG-TERM CURRENT USE OF INSULIN (HCC): Primary | ICD-10-CM

## 2022-06-03 RX ORDER — PEN NEEDLE, DIABETIC 32GX 5/32"
1 NEEDLE, DISPOSABLE MISCELLANEOUS 4 TIMES DAILY
Qty: 400 EACH | Refills: 1 | Status: SHIPPED | OUTPATIENT
Start: 2022-06-03 | End: 2023-06-03

## 2022-06-06 ENCOUNTER — NURSE ONLY (OUTPATIENT)
Dept: ENDOCRINOLOGY CLINIC | Facility: CLINIC | Age: 57
End: 2022-06-06
Payer: COMMERCIAL

## 2022-06-06 VITALS — BODY MASS INDEX: 39 KG/M2 | WEIGHT: 257 LBS

## 2022-06-06 DIAGNOSIS — E11.65 POORLY CONTROLLED DIABETES MELLITUS (HCC): Primary | ICD-10-CM

## 2022-06-06 NOTE — PROGRESS NOTES
Shanique Barr  [id number] was seen for Intro to Pump Education:    Date: 6/6/2022  Referring Provider: TENZIN Reyna  Start time: 11am End time: 12pm    Assessment:DM Dx:  Date - 2008    Type: 2    DIABETES MEDICATIONS:  Type  Dose  How Taken  Farxiga  10 mg   1 tab daily  Tresiba  122 units daily    Lispro  12 units  Tid before meals     +scale    SMBG:  Dexcom   Average BG - 228   Standard Deviation -  61mg/dL  Coefficient of Variation: 26.6%  Hypoglycemia - 0% doesn't experience often due to elevated blood sugar  Hyperglycemia - 43% high>180-250 mg/dL;33% >250 mg/dL  Motivation/Ability - Pt. Is willing to make changes to get better control of blood sugar;she says she can carb count, just hasn't put in effort  Barriers - none seen; pump w/tubing would be difficult around her dogs. SELF MANAGEMENT SKILLS:  What does pt. expect insulin pump to do for their Diabetes Management?  - she will be able to bolus for meals ; has trouble remembering her insulin pen. This is making it difficult for her to get postprandial control  How will the pump fit into lifestyle?  - Pt. already wears a Dexcom so new Omnipod 5  is integrated   Safety concerns? - requires controller/smart phone in order to bolus     EDUCATION:  Discussed the following education points critical to safe initiation of insulin pump therapy:     - Anticipated benefits of insulin pump therapy: better absorption of insulin, able to choose sites not used in past  - Basics of insulin pump therapy: Basal rates, Bolus dosing for food & corrections-IOB  - Nutrition review: Carb Counting : has appt.  W/RD,CDCES ; can also use presets  - Insulin used in insulin pump: type & rationale  - BG Target Ranges: can set multiple target ranges  - Hypoglycemia (Rule of 15): pt. not currently experiencing lows;possibility increases with improved control  - Hyperglycemia/ Diabetic Ketoacidosis prevention guidelines: Pod continues to deliver basal insulin; twice in doubt take it out  - Role of Continuous Glucose Monitoring: Dexcom integrated w/pod sending real time data     PLAN:  - Filled out paperwork & faxed to start process for insurance approval  - Follow-up w/RD,ZAID for carb-counting      Patient verbalized understanding and has no further questions at this time.     Sabiha Hernandez, RN, CDE

## 2022-06-08 ENCOUNTER — DIABETIC EDUCATION (OUTPATIENT)
Dept: ENDOCRINOLOGY CLINIC | Facility: CLINIC | Age: 57
End: 2022-06-08
Payer: COMMERCIAL

## 2022-06-08 DIAGNOSIS — Z79.4 TYPE 2 DIABETES MELLITUS WITH HYPERGLYCEMIA, WITH LONG-TERM CURRENT USE OF INSULIN (HCC): Primary | ICD-10-CM

## 2022-06-08 DIAGNOSIS — E11.65 TYPE 2 DIABETES MELLITUS WITH HYPERGLYCEMIA, WITH LONG-TERM CURRENT USE OF INSULIN (HCC): Primary | ICD-10-CM

## 2022-06-08 PROCEDURE — 97802 MEDICAL NUTRITION INDIV IN: CPT | Performed by: DIETITIAN, REGISTERED

## 2022-06-15 ENCOUNTER — TELEPHONE (OUTPATIENT)
Dept: ENDOCRINOLOGY CLINIC | Facility: CLINIC | Age: 57
End: 2022-06-15

## 2022-06-15 NOTE — TELEPHONE ENCOUNTER
Received CMN for omnipod, prepped and emailed to Tammi Chavarria to print for Kathie's review/signature.

## 2022-06-17 ENCOUNTER — PATIENT MESSAGE (OUTPATIENT)
Dept: ENDOCRINOLOGY CLINIC | Facility: CLINIC | Age: 57
End: 2022-06-17

## 2022-06-17 ENCOUNTER — TELEMEDICINE (OUTPATIENT)
Dept: ENDOCRINOLOGY CLINIC | Facility: CLINIC | Age: 57
End: 2022-06-17

## 2022-06-17 DIAGNOSIS — E11.65 TYPE 2 DIABETES MELLITUS WITH HYPERGLYCEMIA, WITH LONG-TERM CURRENT USE OF INSULIN (HCC): Primary | ICD-10-CM

## 2022-06-17 DIAGNOSIS — Z79.4 TYPE 2 DIABETES MELLITUS WITH HYPERGLYCEMIA, WITH LONG-TERM CURRENT USE OF INSULIN (HCC): Primary | ICD-10-CM

## 2022-06-17 PROCEDURE — 95251 CONT GLUC MNTR ANALYSIS I&R: CPT | Performed by: NURSE PRACTITIONER

## 2022-06-17 PROCEDURE — 99213 OFFICE O/P EST LOW 20 MIN: CPT | Performed by: NURSE PRACTITIONER

## 2022-06-17 RX ORDER — INSULIN PMP CART,AUT,G6/7,CNTR
1 EACH SUBCUTANEOUS AS NEEDED
Qty: 1 KIT | Refills: 0 | Status: SHIPPED | OUTPATIENT
Start: 2022-06-17

## 2022-06-17 RX ORDER — INSULIN DEGLUDEC 200 U/ML
66 INJECTION, SOLUTION SUBCUTANEOUS DAILY
Qty: 18 ML | Refills: 1 | COMMUNITY
Start: 2022-06-17

## 2022-06-17 RX ORDER — INSULIN PMP CART,AUT,G6/7,CNTR
1 EACH SUBCUTANEOUS DAILY
Qty: 90 EACH | Refills: 1 | Status: SHIPPED | OUTPATIENT
Start: 2022-06-17

## 2022-06-17 NOTE — TELEPHONE ENCOUNTER
From: Tatyana Sheridan  To: TENZIN Tobin  Sent: 6/17/2022 2:12 PM CDT  Subject: Omni Pod 5    According to Sweetie Cordova at [facility], he needs a prescription for the Omnipod 5 Intro kit and pods sent to [facility]. Being so new there is no other way to confirm coverage without a prescription.      Thanks,  Ave Julio

## 2022-06-20 NOTE — TELEPHONE ENCOUNTER
Brian Johnson,omnipod rep called in to check where omnipod script was sent. Confirmed to [facility], he will reach out to them for a copay price for patient.

## 2022-07-05 DIAGNOSIS — Z79.4 TYPE 2 DIABETES MELLITUS WITH HYPERGLYCEMIA, WITH LONG-TERM CURRENT USE OF INSULIN (HCC): ICD-10-CM

## 2022-07-05 DIAGNOSIS — E11.65 TYPE 2 DIABETES MELLITUS WITH HYPERGLYCEMIA, WITH LONG-TERM CURRENT USE OF INSULIN (HCC): ICD-10-CM

## 2022-07-06 RX ORDER — INSULIN LISPRO 100 [IU]/ML
INJECTION, SOLUTION INTRAVENOUS; SUBCUTANEOUS
Qty: 15 ML | Refills: 1 | Status: SHIPPED | OUTPATIENT
Start: 2022-07-06

## 2022-07-12 ENCOUNTER — TELEPHONE (OUTPATIENT)
Dept: FAMILY MEDICINE CLINIC | Facility: CLINIC | Age: 57
End: 2022-07-12

## 2022-07-12 NOTE — TELEPHONE ENCOUNTER
Recd medical records request from [facility], Dr. Imelda Jones, [place] [address], [place], [address] [place] [address], with phone [phone] and fax [phone], for last office visit note and recent labs/images. Faxed office visit notes from 4/4/22, no labs or images available.

## 2022-08-17 ENCOUNTER — PATIENT MESSAGE (OUTPATIENT)
Dept: ENDOCRINOLOGY CLINIC | Facility: CLINIC | Age: 57
End: 2022-08-17

## 2022-08-23 ENCOUNTER — OFFICE VISIT (OUTPATIENT)
Dept: FAMILY MEDICINE CLINIC | Facility: CLINIC | Age: 57
End: 2022-08-23
Payer: COMMERCIAL

## 2022-08-23 VITALS
SYSTOLIC BLOOD PRESSURE: 132 MMHG | HEIGHT: 68 IN | TEMPERATURE: 97 F | RESPIRATION RATE: 16 BRPM | BODY MASS INDEX: 39.76 KG/M2 | OXYGEN SATURATION: 98 % | DIASTOLIC BLOOD PRESSURE: 70 MMHG | HEART RATE: 80 BPM | WEIGHT: 262.38 LBS

## 2022-08-23 DIAGNOSIS — E11.65 UNCONTROLLED TYPE 2 DIABETES MELLITUS WITH HYPERGLYCEMIA (HCC): Primary | ICD-10-CM

## 2022-08-23 DIAGNOSIS — K21.9 GASTROESOPHAGEAL REFLUX DISEASE, UNSPECIFIED WHETHER ESOPHAGITIS PRESENT: ICD-10-CM

## 2022-08-23 DIAGNOSIS — F41.9 ANXIETY: ICD-10-CM

## 2022-08-23 DIAGNOSIS — B37.3 YEAST INFECTION OF THE VAGINA: ICD-10-CM

## 2022-08-23 DIAGNOSIS — F33.1 MODERATE EPISODE OF RECURRENT MAJOR DEPRESSIVE DISORDER (HCC): ICD-10-CM

## 2022-08-23 LAB
AMB EXT HGBA1C: 7.1 %
HEMOGLOBIN A1C: 7.1 % (ref 4.3–5.6)

## 2022-08-23 PROCEDURE — 3008F BODY MASS INDEX DOCD: CPT | Performed by: FAMILY MEDICINE

## 2022-08-23 PROCEDURE — 99214 OFFICE O/P EST MOD 30 MIN: CPT | Performed by: FAMILY MEDICINE

## 2022-08-23 PROCEDURE — 3075F SYST BP GE 130 - 139MM HG: CPT | Performed by: FAMILY MEDICINE

## 2022-08-23 PROCEDURE — 3051F HG A1C>EQUAL 7.0%<8.0%: CPT | Performed by: FAMILY MEDICINE

## 2022-08-23 PROCEDURE — 3078F DIAST BP <80 MM HG: CPT | Performed by: FAMILY MEDICINE

## 2022-08-23 RX ORDER — BACLOFEN 10 MG/1
10 TABLET ORAL 2 TIMES DAILY
COMMUNITY
Start: 2022-07-28

## 2022-08-23 RX ORDER — FLUCONAZOLE 150 MG/1
150 TABLET ORAL DAILY PRN
Qty: 1 TABLET | Refills: 5 | Status: SHIPPED | OUTPATIENT
Start: 2022-08-23

## 2022-08-23 RX ORDER — NAPROXEN 500 MG/1
500 TABLET ORAL
COMMUNITY
Start: 2022-07-28

## 2022-08-23 RX ORDER — ALPRAZOLAM 0.5 MG/1
TABLET ORAL
Qty: 45 TABLET | Refills: 5 | Status: SHIPPED | OUTPATIENT
Start: 2022-08-23

## 2022-08-23 RX ORDER — PANTOPRAZOLE SODIUM 40 MG/1
40 TABLET, DELAYED RELEASE ORAL
Qty: 90 TABLET | Refills: 1 | Status: SHIPPED | OUTPATIENT
Start: 2022-08-23

## 2022-08-29 DIAGNOSIS — E11.65 TYPE 2 DIABETES MELLITUS WITH HYPERGLYCEMIA, WITH LONG-TERM CURRENT USE OF INSULIN (HCC): Primary | ICD-10-CM

## 2022-08-29 DIAGNOSIS — Z79.4 TYPE 2 DIABETES MELLITUS WITH HYPERGLYCEMIA, WITH LONG-TERM CURRENT USE OF INSULIN (HCC): Primary | ICD-10-CM

## 2022-08-31 ENCOUNTER — TELEPHONE (OUTPATIENT)
Dept: ENDOCRINOLOGY CLINIC | Facility: CLINIC | Age: 57
End: 2022-08-31

## 2022-09-01 ENCOUNTER — TELEMEDICINE (OUTPATIENT)
Dept: ENDOCRINOLOGY CLINIC | Facility: CLINIC | Age: 57
End: 2022-09-01

## 2022-09-01 DIAGNOSIS — E11.65 TYPE 2 DIABETES MELLITUS WITH HYPERGLYCEMIA, WITH LONG-TERM CURRENT USE OF INSULIN (HCC): Primary | ICD-10-CM

## 2022-09-01 DIAGNOSIS — Z79.4 TYPE 2 DIABETES MELLITUS WITH HYPERGLYCEMIA, WITH LONG-TERM CURRENT USE OF INSULIN (HCC): Primary | ICD-10-CM

## 2022-09-01 DIAGNOSIS — R80.9 MICROALBUMINURIA: ICD-10-CM

## 2022-09-01 PROBLEM — M25.50 PAIN IN JOINT: Status: ACTIVE | Noted: 2022-07-31

## 2022-09-01 PROBLEM — M22.2X1 PATELLOFEMORAL PAIN SYNDROME OF BOTH KNEES: Status: ACTIVE | Noted: 2022-07-31

## 2022-09-01 PROBLEM — M22.2X2 PATELLOFEMORAL PAIN SYNDROME OF BOTH KNEES: Status: ACTIVE | Noted: 2022-07-31

## 2022-09-01 NOTE — TELEPHONE ENCOUNTER
Noted, will discuss options further during 9/1/2022 virtual visit.     Christi AJ, BC-ADM, [facility]

## 2022-10-03 DIAGNOSIS — E11.65 TYPE 2 DIABETES MELLITUS WITH HYPERGLYCEMIA, WITH LONG-TERM CURRENT USE OF INSULIN (HCC): ICD-10-CM

## 2022-10-03 DIAGNOSIS — Z79.4 TYPE 2 DIABETES MELLITUS WITH HYPERGLYCEMIA, WITH LONG-TERM CURRENT USE OF INSULIN (HCC): ICD-10-CM

## 2022-10-03 NOTE — TELEPHONE ENCOUNTER
Received refill request via fax from [facility]    LOV: 09/01/2022  Future Appointments   Date Time Provider Praful Hi   10/11/2022 12:30 PM Vikki Ramirez MD EEMG Pulm EMG [place]   10/20/2022  2:00 PM MADAI Payton SGINP ECC SUB GI   12/7/2022  3:00 PM TENZIN Curry EMGDIABCTRYK EMG DIAB YRK     Last A1c value was 7.1% done 8/23/2022.

## 2022-10-04 RX ORDER — INSULIN DEGLUDEC 200 U/ML
70 INJECTION, SOLUTION SUBCUTANEOUS DAILY
Qty: 18 ML | Refills: 1 | Status: SHIPPED | OUTPATIENT
Start: 2022-10-04

## 2022-10-11 ENCOUNTER — OFFICE VISIT (OUTPATIENT)
Facility: CLINIC | Age: 57
End: 2022-10-11
Payer: COMMERCIAL

## 2022-10-11 VITALS
SYSTOLIC BLOOD PRESSURE: 130 MMHG | OXYGEN SATURATION: 97 % | BODY MASS INDEX: 40.56 KG/M2 | DIASTOLIC BLOOD PRESSURE: 80 MMHG | RESPIRATION RATE: 16 BRPM | HEIGHT: 68 IN | HEART RATE: 86 BPM | WEIGHT: 267.63 LBS

## 2022-10-11 DIAGNOSIS — G47.33 OSA (OBSTRUCTIVE SLEEP APNEA): Primary | ICD-10-CM

## 2022-10-11 PROCEDURE — 3075F SYST BP GE 130 - 139MM HG: CPT | Performed by: INTERNAL MEDICINE

## 2022-10-11 PROCEDURE — 99214 OFFICE O/P EST MOD 30 MIN: CPT | Performed by: INTERNAL MEDICINE

## 2022-10-11 PROCEDURE — 3008F BODY MASS INDEX DOCD: CPT | Performed by: INTERNAL MEDICINE

## 2022-10-11 PROCEDURE — 3079F DIAST BP 80-89 MM HG: CPT | Performed by: INTERNAL MEDICINE

## 2022-10-17 ENCOUNTER — TELEPHONE (OUTPATIENT)
Dept: FAMILY MEDICINE CLINIC | Facility: CLINIC | Age: 57
End: 2022-10-17

## 2022-10-17 DIAGNOSIS — K21.9 GASTROESOPHAGEAL REFLUX DISEASE, UNSPECIFIED WHETHER ESOPHAGITIS PRESENT: ICD-10-CM

## 2022-10-17 RX ORDER — PANTOPRAZOLE SODIUM 40 MG/1
40 TABLET, DELAYED RELEASE ORAL
Qty: 90 TABLET | Refills: 0 | Status: SHIPPED | OUTPATIENT
Start: 2022-10-17

## 2022-10-17 NOTE — TELEPHONE ENCOUNTER
Pantoprazole Rx sent to [facility] 8/23/22 #90 with 1 RF. Pantoprazole sent to [facility] #90 no refills.

## 2022-10-17 NOTE — TELEPHONE ENCOUNTER
Krishna Bowden U. [address]. incoming fax request for medication from [address]. Pantoprazole Sodium DR Tabs 40mg Quantity 90      [facility]  Order placed in MA folder.

## 2022-11-14 DIAGNOSIS — F41.9 ANXIETY: ICD-10-CM

## 2022-11-14 DIAGNOSIS — F33.1 MODERATE EPISODE OF RECURRENT MAJOR DEPRESSIVE DISORDER (HCC): ICD-10-CM

## 2022-11-14 NOTE — TELEPHONE ENCOUNTER
Requesting Alprazolam 0.5mg  LOV: 8/23/22  RTC: 6 months  Last Relevant Labs: 1/17/22  Filled: 8/23/22 #45 with 5 refills    Future Appointments   Date Time Provider Praful Sussy   12/7/2022  3:15 PM TENZIN Archibald EMGDIABCTRYK EMG DIAB [address]   2/6/2023 10:30 AM Faby Daily,  EMG [address] EM[address]     Rx pended and routed for approval/denial

## 2022-11-15 RX ORDER — ALPRAZOLAM 0.5 MG/1
TABLET ORAL
Qty: 45 TABLET | Refills: 1 | Status: SHIPPED | OUTPATIENT
Start: 2022-11-15

## 2022-12-05 ENCOUNTER — TELEPHONE (OUTPATIENT)
Facility: CLINIC | Age: 57
End: 2022-12-05

## 2022-12-05 RX ORDER — PREDNISONE 10 MG/1
TABLET ORAL
Qty: 39 TABLET | Refills: 0 | Status: SHIPPED | OUTPATIENT
Start: 2022-12-05

## 2022-12-05 NOTE — TELEPHONE ENCOUNTER
Call to pt states she started with symptoms of nasal congestion 2 wks ago, and has gone to her sinuses. Gradually gotten worse over the weeks. States she is coughing up yellow phlegm. Pt states fatigue is bad, having to take daily naps. Pt denies fever, chills, body aches. Pt asking for Prednisone. Advised pt to take at home covid test and call back with results.

## 2022-12-05 NOTE — TELEPHONE ENCOUNTER
Pt calling stating she has not been feeling well for the past 2 weeks. Feels \"run down\", headache, sob. Asking for prednisone.

## 2022-12-05 NOTE — TELEPHONE ENCOUNTER
Per TZ ok for prednisone taper    Call to pt, informed of prednisone ordered to [place] on [address]. Pt verbalized understanding.

## 2022-12-07 ENCOUNTER — TELEPHONE (OUTPATIENT)
Dept: ENDOCRINOLOGY CLINIC | Facility: CLINIC | Age: 57
End: 2022-12-07

## 2022-12-07 ENCOUNTER — OFFICE VISIT (OUTPATIENT)
Dept: ENDOCRINOLOGY CLINIC | Facility: CLINIC | Age: 57
End: 2022-12-07
Payer: COMMERCIAL

## 2022-12-07 VITALS
HEIGHT: 68 IN | RESPIRATION RATE: 18 BRPM | WEIGHT: 266 LBS | DIASTOLIC BLOOD PRESSURE: 76 MMHG | HEART RATE: 84 BPM | BODY MASS INDEX: 40.32 KG/M2 | SYSTOLIC BLOOD PRESSURE: 118 MMHG

## 2022-12-07 DIAGNOSIS — I10 ESSENTIAL HYPERTENSION: ICD-10-CM

## 2022-12-07 DIAGNOSIS — E11.65 TYPE 2 DIABETES MELLITUS WITH HYPERGLYCEMIA, WITH LONG-TERM CURRENT USE OF INSULIN (HCC): Primary | ICD-10-CM

## 2022-12-07 DIAGNOSIS — E66.01 CLASS 3 SEVERE OBESITY DUE TO EXCESS CALORIES WITH SERIOUS COMORBIDITY AND BODY MASS INDEX (BMI) OF 40.0 TO 44.9 IN ADULT (HCC): ICD-10-CM

## 2022-12-07 DIAGNOSIS — E78.2 HYPERLIPIDEMIA, MIXED: ICD-10-CM

## 2022-12-07 DIAGNOSIS — Z79.4 TYPE 2 DIABETES MELLITUS WITH HYPERGLYCEMIA, WITH LONG-TERM CURRENT USE OF INSULIN (HCC): Primary | ICD-10-CM

## 2022-12-07 DIAGNOSIS — B37.31 YEAST INFECTION OF THE VAGINA: ICD-10-CM

## 2022-12-07 LAB
CARTRIDGE LOT#: 532 NUMERIC
HEMOGLOBIN A1C: 7.5 % (ref 4.3–5.6)

## 2022-12-07 PROCEDURE — 99214 OFFICE O/P EST MOD 30 MIN: CPT | Performed by: NURSE PRACTITIONER

## 2022-12-07 PROCEDURE — 3074F SYST BP LT 130 MM HG: CPT | Performed by: NURSE PRACTITIONER

## 2022-12-07 PROCEDURE — 95251 CONT GLUC MNTR ANALYSIS I&R: CPT | Performed by: NURSE PRACTITIONER

## 2022-12-07 PROCEDURE — 3051F HG A1C>EQUAL 7.0%<8.0%: CPT | Performed by: FAMILY MEDICINE

## 2022-12-07 PROCEDURE — 3078F DIAST BP <80 MM HG: CPT | Performed by: NURSE PRACTITIONER

## 2022-12-07 PROCEDURE — 83036 HEMOGLOBIN GLYCOSYLATED A1C: CPT | Performed by: NURSE PRACTITIONER

## 2022-12-07 PROCEDURE — 3008F BODY MASS INDEX DOCD: CPT | Performed by: NURSE PRACTITIONER

## 2022-12-07 RX ORDER — METOPROLOL SUCCINATE AND HYDROCHLOROTHIAZIDE 25; 12.5 MG/1; MG/1
TABLET ORAL
COMMUNITY

## 2022-12-07 RX ORDER — FLUCONAZOLE 150 MG/1
150 TABLET ORAL ONCE
Qty: 1 TABLET | Refills: 1 | Status: SHIPPED | OUTPATIENT
Start: 2022-12-07 | End: 2022-12-12

## 2022-12-07 RX ORDER — BLOOD-GLUCOSE TRANSMITTER
EACH MISCELLANEOUS
COMMUNITY
Start: 2022-11-11

## 2022-12-07 RX ORDER — INSULIN LISPRO 100 [IU]/ML
INJECTION, SOLUTION INTRAVENOUS; SUBCUTANEOUS
Qty: 60 ML | Refills: 1 | Status: SHIPPED | OUTPATIENT
Start: 2022-12-07

## 2022-12-07 RX ORDER — INSULIN DEGLUDEC 200 U/ML
78 INJECTION, SOLUTION SUBCUTANEOUS DAILY
Qty: 36 ML | Refills: 1 | Status: SHIPPED | OUTPATIENT
Start: 2022-12-07

## 2022-12-07 NOTE — PATIENT INSTRUCTIONS
We are here to support you with Diabetes but please remember that you still need your primary care doctor for your routine health maintenance. Your current A1C: 7.5%  This test provides us with your average blood sugar for the past 3 months. The main goal of diabetes treatment is to keep your sugar from going too high. We measure your overall blood sugar trends with a Hemoglobin A1C test. (also called an A1C)  For most people the target is less than 7.0% but sometimes we make exceptions based on age, health history and other factors. Keeping an A1C less than 7% helps prevents diabetes related health problems. If your A1C goes too high, then we need to talk about changing your current diabetes treatment. MEDICATIONS:   It is important to take all of your medications as prescribed. Please call me if you cannot get the prescriptions filled or are having issues with refills. Also, please call me if you have any issues with medication questions, side effects, dosing questions or problems with your blood sugar trends BEFORE CHANGING OR STOPPING ANY MEDICATIONS. Increase Tresiba U200 to 78 units injection daily  Increase Humalog 12 units + sliding scale: 150-200 mg/dl = 2 units, 201-250 mg/dl = 4 units, 251-300mg/dl = 6 units, 301-350 mg/dl = 8 units, > 350 mg/dl = 10 units 3x/day with meals  Discontinue Farxiga    Blood sugar testing:   Always bring your glucose meter or blood sugar logbook to every appointment here at the diabetes center. This allows me to safely make adjustments to your diabetes plan. In order for me to determine any patterns in your blood sugars, you will need to continue to use personal Dexcom or test your blood sugar 4 times daily   It would be best to change up the times of day that you are testing your sugar. Always test before breakfast (fasting) and then alternate testing blood sugar 2 hours after your meals.      Blood sugar targets:  Before breakfast:   (preferably < 110)  2 hours After meals: less than 180 (preferably less than 150)   Call for persistent blood sugars < 75 or > 200. Blood sugars greater than 200 are not acceptable to reach your goal of improving diabetes      Health Maintenance:   1. LABS: It is important to monitor your kidney function (blood and urine protein levels) , liver function tests and cholesterol levels when you have diabetes. 2. FOOT EXAMS:  daily foot inspections for foot wounds or skin changes are important for foot care. Any unusual changes should be reported immediately. 3. EYE EXAMS: Checking your eyes for diabetes changes is important and you should have a dilated eye exam done by an eye doctor EVERY year since these changes occur in the BACK of the eye and not visible by you. Please let me know if you need provider list for eye doctor. Lifestyle Therapy:    1. NUTRITION: Maintain optimal weight, calorie restriction if overweight, plant-based diet    2. PHYSICAL ACTIVITY: 150 minutes per week (30 minutes a day 5 days a week) of moderate exertion such as walking, stair climbing. Include strength training 2-3 times per week. Increase as tolerated. 3. SLEEP: Try and get 7-8 hours of sleep per night    4. BEHAVIOR:  Tobacco cessation and alcohol in moderation      Reminders:  Due for dilated eye exam    Elliot AJ, BC-ADM, [facility]  [address], [address], [address]  [address] [place] [address], 45 [facility], [address]  [address]  Keke Reis, [address]  Diabetes Services at [address] [facility] [address]

## 2022-12-12 ENCOUNTER — OFFICE VISIT (OUTPATIENT)
Dept: FAMILY MEDICINE CLINIC | Facility: CLINIC | Age: 57
End: 2022-12-12
Payer: COMMERCIAL

## 2022-12-12 VITALS
HEART RATE: 81 BPM | BODY MASS INDEX: 40.77 KG/M2 | DIASTOLIC BLOOD PRESSURE: 82 MMHG | SYSTOLIC BLOOD PRESSURE: 102 MMHG | RESPIRATION RATE: 14 BRPM | TEMPERATURE: 97 F | WEIGHT: 269 LBS | OXYGEN SATURATION: 96 % | HEIGHT: 68 IN

## 2022-12-12 DIAGNOSIS — J32.0 CHRONIC MAXILLARY SINUSITIS: ICD-10-CM

## 2022-12-12 DIAGNOSIS — Z86.711 HISTORY OF PULMONARY EMBOLUS (PE): ICD-10-CM

## 2022-12-12 DIAGNOSIS — Z23 NEED FOR VACCINATION: ICD-10-CM

## 2022-12-12 DIAGNOSIS — B37.31 YEAST INFECTION OF THE VAGINA: ICD-10-CM

## 2022-12-12 DIAGNOSIS — F41.9 ANXIETY: ICD-10-CM

## 2022-12-12 DIAGNOSIS — Z86.718 HISTORY OF RECURRENT DEEP VEIN THROMBOSIS (DVT): Primary | ICD-10-CM

## 2022-12-12 DIAGNOSIS — I25.118 CORONARY ARTERY DISEASE OF NATIVE ARTERY OF NATIVE HEART WITH STABLE ANGINA PECTORIS (HCC): ICD-10-CM

## 2022-12-12 DIAGNOSIS — L71.9 ROSACEA: ICD-10-CM

## 2022-12-12 DIAGNOSIS — Z23 FLU VACCINE NEED: ICD-10-CM

## 2022-12-12 DIAGNOSIS — F33.1 MODERATE EPISODE OF RECURRENT MAJOR DEPRESSIVE DISORDER (HCC): ICD-10-CM

## 2022-12-12 DIAGNOSIS — J30.89 ALLERGIC RHINITIS DUE TO OTHER ALLERGIC TRIGGER, UNSPECIFIED SEASONALITY: ICD-10-CM

## 2022-12-12 PROBLEM — R09.02 HYPOXIA: Status: RESOLVED | Noted: 2022-02-08 | Resolved: 2022-12-12

## 2022-12-12 PROBLEM — M30.1 CHURG-STRAUSS SYNDROME  (HCC): Status: ACTIVE | Noted: 2022-09-13

## 2022-12-12 PROBLEM — D72.18 CHURG-STRAUSS SYNDROME  (HCC): Status: ACTIVE | Noted: 2022-09-13

## 2022-12-12 PROBLEM — M30.1 CHURG-STRAUSS SYNDROME: Status: ACTIVE | Noted: 2022-09-13

## 2022-12-12 PROBLEM — R53.83 FATIGUE, UNSPECIFIED TYPE: Status: RESOLVED | Noted: 2019-12-26 | Resolved: 2022-12-12

## 2022-12-12 PROBLEM — M25.50 PAIN IN JOINT: Status: RESOLVED | Noted: 2022-07-31 | Resolved: 2022-12-12

## 2022-12-12 PROBLEM — M22.2X2 PATELLOFEMORAL PAIN SYNDROME OF BOTH KNEES: Status: RESOLVED | Noted: 2022-07-31 | Resolved: 2022-12-12

## 2022-12-12 PROBLEM — I25.10 CAD (CORONARY ARTERY DISEASE), NATIVE CORONARY ARTERY: Status: ACTIVE | Noted: 2019-04-04

## 2022-12-12 PROBLEM — J45.901 EXACERBATION OF ASTHMA (HCC): Status: RESOLVED | Noted: 2017-09-29 | Resolved: 2022-12-12

## 2022-12-12 PROBLEM — M22.2X1 PATELLOFEMORAL PAIN SYNDROME OF BOTH KNEES: Status: RESOLVED | Noted: 2022-07-31 | Resolved: 2022-12-12

## 2022-12-12 PROBLEM — M30.1 CHURG-STRAUSS SYNDROME (HCC): Status: ACTIVE | Noted: 2022-09-13

## 2022-12-12 PROBLEM — R06.09 DYSPNEA ON EXERTION: Status: RESOLVED | Noted: 2022-02-08 | Resolved: 2022-12-12

## 2022-12-12 PROBLEM — J45.901 EXACERBATION OF ASTHMA: Status: RESOLVED | Noted: 2017-09-29 | Resolved: 2022-12-12

## 2022-12-12 PROBLEM — D72.18 CHURG-STRAUSS SYNDROME: Status: ACTIVE | Noted: 2022-09-13

## 2022-12-12 PROBLEM — J98.01 BRONCHOSPASM: Status: RESOLVED | Noted: 2022-02-08 | Resolved: 2022-12-12

## 2022-12-12 PROBLEM — D72.18 CHURG-STRAUSS SYNDROME (HCC): Status: ACTIVE | Noted: 2022-09-13

## 2022-12-12 PROCEDURE — 3079F DIAST BP 80-89 MM HG: CPT | Performed by: FAMILY MEDICINE

## 2022-12-12 PROCEDURE — 99214 OFFICE O/P EST MOD 30 MIN: CPT | Performed by: FAMILY MEDICINE

## 2022-12-12 PROCEDURE — 3008F BODY MASS INDEX DOCD: CPT | Performed by: FAMILY MEDICINE

## 2022-12-12 PROCEDURE — 3074F SYST BP LT 130 MM HG: CPT | Performed by: FAMILY MEDICINE

## 2022-12-12 RX ORDER — FLUCONAZOLE 150 MG/1
150 TABLET ORAL ONCE
Qty: 1 TABLET | Refills: 1 | Status: SHIPPED | OUTPATIENT
Start: 2022-12-12 | End: 2022-12-12

## 2022-12-12 RX ORDER — ESCITALOPRAM OXALATE 20 MG/1
20 TABLET ORAL EVERY MORNING
Qty: 90 TABLET | Refills: 3 | Status: SHIPPED | OUTPATIENT
Start: 2022-12-12

## 2022-12-12 RX ORDER — AZELAIC ACID 0.15 G/G
1 GEL TOPICAL 2 TIMES DAILY
Qty: 50 G | Refills: 0 | Status: SHIPPED | OUTPATIENT
Start: 2022-12-12 | End: 2023-01-11

## 2022-12-12 RX ORDER — ALPRAZOLAM 0.5 MG/1
TABLET ORAL
Qty: 45 TABLET | Refills: 1 | Status: SHIPPED | OUTPATIENT
Start: 2022-12-12

## 2022-12-14 ENCOUNTER — PATIENT MESSAGE (OUTPATIENT)
Facility: CLINIC | Age: 57
End: 2022-12-14

## 2022-12-14 NOTE — TELEPHONE ENCOUNTER
From: Jase Blandon  To: Wendy Curry MD  Sent: 2022 2:18 PM CST  Subject: FMLA Paperwork    Hi,   we need [facility] paperwork renewed for my  so he can take off work when I am sick and need help. The Paperwork is for Jase Blandon  1965. The [place] is for Lucina Cardona (My ) for his employer. Who can I email this to for Dr Quyen Negron to fill out? Thanks!   Jase Blandon

## 2022-12-14 NOTE — TELEPHONE ENCOUNTER
From: Les Talbot  Sent: 12/14/2022 2:47 PM CST  To: [facility] Pulmonary Clinical Staff  Subject: Paulo Grayson    So just like I set a message to you? Not sure how to send it to Johnny. Can I send an attachment via my chart?

## 2022-12-14 NOTE — TELEPHONE ENCOUNTER
Send [employer] message to pt advising her to send [facility] paperwork to [facility] via [address]

## 2022-12-15 ENCOUNTER — PATIENT MESSAGE (OUTPATIENT)
Facility: CLINIC | Age: 57
End: 2022-12-15

## 2023-01-02 ENCOUNTER — PATIENT MESSAGE (OUTPATIENT)
Facility: CLINIC | Age: 58
End: 2023-01-02

## 2023-01-10 NOTE — TELEPHONE ENCOUNTER
Call placed to pt and instructed her to call office to discuss symptoms. Pt returned call on 1-11-22 and addressed in another encounter.

## 2023-01-10 NOTE — TELEPHONE ENCOUNTER
From: Jose Tee  Sent: 1/9/2023 4:44 PM CST  To: [facility] Pulmonary Clinical Staff  Subject: FMLA Paperwork    Hi, yes I meant call office to leave a message. I am very congested in sinuses and lungs, very wheezey since Saturday. Very tired and feeling drained.

## 2023-01-11 ENCOUNTER — TELEPHONE (OUTPATIENT)
Facility: CLINIC | Age: 58
End: 2023-01-11

## 2023-01-11 ENCOUNTER — LAB ENCOUNTER (OUTPATIENT)
Dept: LAB | Age: 58
End: 2023-01-11
Attending: INTERNAL MEDICINE
Payer: COMMERCIAL

## 2023-01-11 DIAGNOSIS — B34.9 VIRAL ILLNESS: Primary | ICD-10-CM

## 2023-01-11 DIAGNOSIS — B34.9 VIRAL ILLNESS: ICD-10-CM

## 2023-01-11 PROCEDURE — 87637 SARSCOV2&INF A&B&RSV AMP PRB: CPT

## 2023-01-11 RX ORDER — PREDNISONE 10 MG/1
TABLET ORAL
Qty: 39 TABLET | Refills: 0 | Status: SHIPPED | OUTPATIENT
Start: 2023-01-11

## 2023-01-11 NOTE — TELEPHONE ENCOUNTER
Pt at a large  on Saturday. On , pt started feeling ill with \"the sniffles. \"  Performed a home covid test and it was negative. C/o sob, wheezing, sore throat and fatigue. Had a low grade temp yesterday but normal today. Headache across forehead. Blowing out thick mucus from nose which is thick and clear to bloody. Using rescue inhaler 10-15 times per day. Also using Breo daily. Does not know where her peak flow meter is. Wants to prevent hospitalization. Per Dr. Coreen Brown, pt to have nasal swab for viral panel (RSV, COVID, Flu). Also sending in Rx for Prednisone taper. Pt instructed to call late tomorrow for results of  nasal swab.

## 2023-01-12 LAB
FLUAV + FLUBV RNA SPEC NAA+PROBE: NOT DETECTED
FLUAV + FLUBV RNA SPEC NAA+PROBE: NOT DETECTED
RSV RNA SPEC NAA+PROBE: NOT DETECTED
SARS-COV-2 RNA RESP QL NAA+PROBE: NOT DETECTED

## 2023-01-13 ENCOUNTER — VIRTUAL PHONE E/M (OUTPATIENT)
Dept: ENDOCRINOLOGY CLINIC | Facility: CLINIC | Age: 58
End: 2023-01-13
Payer: COMMERCIAL

## 2023-01-13 DIAGNOSIS — E11.65 TYPE 2 DIABETES MELLITUS WITH HYPERGLYCEMIA, WITH LONG-TERM CURRENT USE OF INSULIN (HCC): Primary | ICD-10-CM

## 2023-01-13 DIAGNOSIS — Z79.4 TYPE 2 DIABETES MELLITUS WITH HYPERGLYCEMIA, WITH LONG-TERM CURRENT USE OF INSULIN (HCC): Primary | ICD-10-CM

## 2023-01-13 PROCEDURE — 99442 PHONE E/M BY PHYS 11-20 MIN: CPT | Performed by: NURSE PRACTITIONER

## 2023-01-13 PROCEDURE — 95251 CONT GLUC MNTR ANALYSIS I&R: CPT | Performed by: NURSE PRACTITIONER

## 2023-01-13 RX ORDER — INSULIN DEGLUDEC 200 U/ML
90 INJECTION, SOLUTION SUBCUTANEOUS DAILY
Qty: 45 ML | Refills: 1 | COMMUNITY
Start: 2023-01-13

## 2023-01-19 DIAGNOSIS — K21.9 GASTROESOPHAGEAL REFLUX DISEASE, UNSPECIFIED WHETHER ESOPHAGITIS PRESENT: ICD-10-CM

## 2023-01-19 RX ORDER — PANTOPRAZOLE SODIUM 40 MG/1
TABLET, DELAYED RELEASE ORAL
Qty: 90 TABLET | Refills: 3 | Status: SHIPPED | OUTPATIENT
Start: 2023-01-19

## 2023-01-29 NOTE — TELEPHONE ENCOUNTER
Please call the OB office to schedule follow up in the next few days. Please get outpatient ultrasound as ordered by the [facility] team.     Take your medication as indicated and prescribed. Make sure you get the antibiotic prescription filled and take the antibiotics until finished. Drink plenty of water while taking the antibiotics. Avoid drinking alcohol or drinks that have caffeine in it. For pain use acetaminophen (Tylenol) or ibuprofen (Motrin / Advil), unless prescribed medications that have acetaminophen or ibuprofen (or similar medications) in it. PLEASE RETURN TO THE EMERGENCY DEPARTMENT IMMEDIATELY for worsening symptoms, ESPECIALLY continued vaginal bleeding or soaking a pad within 1 hour, inability to urinate, worsening of blood in your urine, or if you develop any concerning symptoms such as: high fever not relieved by acetaminophen (Tylenol) and/or ibuprofen (Motrin / Advil), chills, shortness of breath, chest pain, feeling of your heart fluttering or racing, persistent nausea and/or vomiting, vomiting up blood, blood in your stool, loss of consciousness, numbness, weakness or tingling in the arms or legs or change in color of the extremities, changes in mental status, persistent headache, blurry vision, loss of bladder / bowel. Take your medication as indicated, if you are given an antibiotic then make sure you get the prescription filled and take the antibiotics until finished. Drink plenty of water while taking the antibiotics. Do not drink alcohol while taking the medication metronidazole (Flagyl). Avoid drinking alcohol or drinks that have caffeine in it while taking antibiotics. For pain use ibuprofen (Motrin / Advil) or acetaminophen (Tylenol), unless prescribed medications that have acetaminophen in it. You can take over the counter acetaminophen tablets (1 - 2 tablets of the 500-mg strength every 6 hours) or ibuprofen tablets (2 tablets every 4 hours).     PLEASE RETURN TO THE Pt calling about ppw that is supposed to be ready by today EMERGENCY DEPARTMENT IMMEDIATELY for worsening symptoms, develop vaginal bleeding or discharge, inability to urinate, or if you develop any concerning symptoms such as: high fever not relieved by acetaminophen (Tylenol) and/or ibuprofen (Motrin / Advil), chills, shortness of breath, chest pain, feeling of your heart fluttering or racing, persistent nausea and/or vomiting, numbness, weakness or tingling in the arms or legs or change in color of the extremities, changes in mental status, persistent headache, blurry vision, unable to follow up with your physician, or other any other care or concern.

## 2023-01-30 ENCOUNTER — OFFICE VISIT (OUTPATIENT)
Facility: CLINIC | Age: 58
End: 2023-01-30
Payer: COMMERCIAL

## 2023-01-30 VITALS
DIASTOLIC BLOOD PRESSURE: 80 MMHG | SYSTOLIC BLOOD PRESSURE: 130 MMHG | BODY MASS INDEX: 41.68 KG/M2 | RESPIRATION RATE: 16 BRPM | WEIGHT: 275 LBS | OXYGEN SATURATION: 96 % | HEIGHT: 68 IN | HEART RATE: 95 BPM

## 2023-01-30 DIAGNOSIS — J32.8 OTHER CHRONIC SINUSITIS: Primary | ICD-10-CM

## 2023-01-30 PROCEDURE — 3075F SYST BP GE 130 - 139MM HG: CPT | Performed by: INTERNAL MEDICINE

## 2023-01-30 PROCEDURE — 3008F BODY MASS INDEX DOCD: CPT | Performed by: INTERNAL MEDICINE

## 2023-01-30 PROCEDURE — 99214 OFFICE O/P EST MOD 30 MIN: CPT | Performed by: INTERNAL MEDICINE

## 2023-01-30 PROCEDURE — 3079F DIAST BP 80-89 MM HG: CPT | Performed by: INTERNAL MEDICINE

## 2023-01-30 RX ORDER — TIOTROPIUM BROMIDE INHALATION SPRAY 3.12 UG/1
2 SPRAY, METERED RESPIRATORY (INHALATION) DAILY
Qty: 1 EACH | Refills: 5 | Status: SHIPPED | OUTPATIENT
Start: 2023-01-30

## 2023-01-30 NOTE — PATIENT INSTRUCTIONS
Plan:  -to get sinus CT   - resume spiriva daily - continue Breo-- daily   To see ENT and Dr Akiko Parsons    - to begin zpack   - prednisone- 35mg a day for 3 days then 30mg a day for 3 days then 20mg a day for 5 days then 10mg a day for 7 days   To begin diflucan   See me in 2- months     Lavetta Bumpers, MD  Pulmonary Medicine  1/30/2023

## 2023-03-01 ENCOUNTER — PATIENT MESSAGE (OUTPATIENT)
Facility: CLINIC | Age: 58
End: 2023-03-01

## 2023-03-01 RX ORDER — AZITHROMYCIN 250 MG/1
TABLET, FILM COATED ORAL
Qty: 6 TABLET | Refills: 0 | Status: SHIPPED | OUTPATIENT
Start: 2023-03-01

## 2023-03-01 RX ORDER — PREDNISONE 10 MG/1
TABLET ORAL
Qty: 36 TABLET | Refills: 0 | Status: SHIPPED | OUTPATIENT
Start: 2023-03-01

## 2023-03-01 NOTE — TELEPHONE ENCOUNTER
From: Radha Winters  To: Eric Grande MD  Sent: 3/1/2023 11:52 AM CST  Subject: Antibiotics    Dr Nisreen Arevalo was going to send a prescription to the pharmacy for some antibiotics the last time I was seen. I am still the same as last time I saw here, still nasty congested, headaches and nonstop sleeping, the only difference is I am blowing some nasty bloody mucus. COVID test was negative today. [facility] said they did not receive prescription; however, this has happened with the before.

## 2023-03-03 DIAGNOSIS — F41.9 ANXIETY: ICD-10-CM

## 2023-03-03 DIAGNOSIS — F33.1 MODERATE EPISODE OF RECURRENT MAJOR DEPRESSIVE DISORDER (HCC): ICD-10-CM

## 2023-03-06 RX ORDER — ALPRAZOLAM 0.5 MG/1
TABLET ORAL
Qty: 45 TABLET | Refills: 0 | Status: SHIPPED | OUTPATIENT
Start: 2023-03-06

## 2023-03-06 NOTE — TELEPHONE ENCOUNTER
Requesting Alprazolam 0.5mg  LOV: 12/12/22  RTC: not noted  Last Relevant Labs: 12/12/22  Filled: 12/12/22 #45 with 1 refills    Future Appointments   Date Time Provider Praful Hi   3/6/2023  2:40 PM YK YARITZA RM1 Yu Garcia   3/11/2023  2:00 PM OS CT RM 1 OS CT [place]   3/17/2023  1:15 PM TENZIN Archibald EMGDIABCTSPL EMG DIAB PLF   3/31/2023 10:45 AM Anup Stanford MD [facility]   4/5/2023  8:15 PM SCHEDULE BY DATE SLP Radha Narayanan   5/1/2023 12:30 PM Bony Narayan MD EEMG Pulm EM[facility] [place]   8/9/2023  1:30 PM Lisa, Cy Latch, DO EMGRHEUMHBSN EMG [place]     Rx pended and routed for approval/denial

## 2023-03-11 ENCOUNTER — HOSPITAL ENCOUNTER (OUTPATIENT)
Dept: CT IMAGING | Age: 58
Discharge: HOME | End: 2023-03-11
Attending: INTERNAL MEDICINE
Payer: COMMERCIAL

## 2023-03-11 DIAGNOSIS — J32.8 OTHER CHRONIC SINUSITIS: ICD-10-CM

## 2023-03-11 PROCEDURE — 70486 CT MAXILLOFACIAL W/O DYE: CPT | Performed by: INTERNAL MEDICINE

## 2023-03-13 ENCOUNTER — PATIENT MESSAGE (OUTPATIENT)
Dept: ENDOCRINOLOGY CLINIC | Facility: CLINIC | Age: 58
End: 2023-03-13

## 2023-03-13 DIAGNOSIS — Z79.4 TYPE 2 DIABETES MELLITUS WITH HYPERGLYCEMIA, WITH LONG-TERM CURRENT USE OF INSULIN (HCC): Primary | ICD-10-CM

## 2023-03-13 DIAGNOSIS — E11.65 TYPE 2 DIABETES MELLITUS WITH HYPERGLYCEMIA, WITH LONG-TERM CURRENT USE OF INSULIN (HCC): Primary | ICD-10-CM

## 2023-03-13 NOTE — TELEPHONE ENCOUNTER
From: David Tarango  To: TENZIN Melvin  Sent: 3/13/2023 1:15 PM CDT  Subject: Phone Visit    Quoc Kaur, should we reschedule March 17 visit? I have not been wearing my Dexcom because I have been having imaging tests that I cannot have the Dexcom on. I have been miserably sick with sinus issues and asthma; I am still taking prednisone. I also still have not had my labs done. My asthma has been so bad, it is hard to do anything.

## 2023-03-20 ENCOUNTER — TELEPHONE (OUTPATIENT)
Dept: FAMILY MEDICINE CLINIC | Facility: CLINIC | Age: 58
End: 2023-03-20

## 2023-03-20 DIAGNOSIS — F41.9 ANXIETY: ICD-10-CM

## 2023-03-20 DIAGNOSIS — F33.1 MODERATE EPISODE OF RECURRENT MAJOR DEPRESSIVE DISORDER (HCC): ICD-10-CM

## 2023-03-20 RX ORDER — ESCITALOPRAM OXALATE 20 MG/1
20 TABLET ORAL EVERY MORNING
Qty: 90 TABLET | Refills: 3 | OUTPATIENT
Start: 2023-03-20

## 2023-03-20 NOTE — TELEPHONE ENCOUNTER
Fax received from [facility] requesting r/f on Escitalopram 20 mg tabs. Future Appointments   Date Time Provider Praful Hi   3/21/2023 11:20 AM YK YARITZA RM1 Lashell Dolan Carol   3/29/2023  2:15 PM TENZIN Mak EMGDIABCTSPL EMG DIAB PLF   3/31/2023 10:45 AM Fanta Herrera MD [facility]   4/5/2023  8:15 PM SCHEDULE BY DATE NICOLE Gates   5/1/2023 12:30 PM Chapito Blandon MD EEM[facility] Pulm EM[facility] [place]   8/9/2023  1:30 PM Maria E Gonzalez DO EMGRHEUMHBSN EMG [place]     LOV: 12/12/22  Last r/f: 12/12/22 # [address] r/fs    Pt was given year supply to [address] in dec 2022.

## 2023-03-21 ENCOUNTER — HOSPITAL ENCOUNTER (OUTPATIENT)
Dept: MAMMOGRAPHY | Age: 58
Discharge: HOME | End: 2023-03-21
Attending: FAMILY MEDICINE
Payer: COMMERCIAL

## 2023-03-21 DIAGNOSIS — Z12.31 SCREENING MAMMOGRAM FOR BREAST CANCER: ICD-10-CM

## 2023-03-21 PROCEDURE — 77067 SCR MAMMO BI INCL CAD: CPT | Performed by: FAMILY MEDICINE

## 2023-03-21 PROCEDURE — 77063 BREAST TOMOSYNTHESIS BI: CPT | Performed by: FAMILY MEDICINE

## 2023-03-31 ENCOUNTER — OFFICE VISIT (OUTPATIENT)
Facility: LOCATION | Age: 58
End: 2023-03-31
Payer: COMMERCIAL

## 2023-03-31 ENCOUNTER — PATIENT MESSAGE (OUTPATIENT)
Facility: CLINIC | Age: 58
End: 2023-03-31

## 2023-03-31 DIAGNOSIS — J32.4 CHRONIC PANSINUSITIS: Primary | ICD-10-CM

## 2023-03-31 DIAGNOSIS — D72.18 CHURG-STRAUSS SYNDROME (HCC): ICD-10-CM

## 2023-03-31 DIAGNOSIS — M30.1 CHURG-STRAUSS SYNDROME (HCC): ICD-10-CM

## 2023-03-31 PROCEDURE — 31575 DIAGNOSTIC LARYNGOSCOPY: CPT | Performed by: OTOLARYNGOLOGY

## 2023-03-31 PROCEDURE — 99205 OFFICE O/P NEW HI 60 MIN: CPT | Performed by: OTOLARYNGOLOGY

## 2023-03-31 NOTE — TELEPHONE ENCOUNTER
From: Efrain Glaser  To: Desmond Cruz MD  Sent: 3/31/2023 1:01 PM CDT  Subject: Dr Sydnee Spatz    I was seen by Dr Sydnee Spatz today who says I need sinus surgery, however, wants me to be off of blood thinners for weeks to do so. I do not feel comfortable with this, nor do I feel comfortable doing this outpatient. Every surgery I have has been inpatient due to all my other health issues. Even for Gallbladder surgery I was hospitalized, and Dr Hilda Andino insisted on a cava filter. Dr Sydnee Spatz wanted me to check with you to see if you thought it was okay to stop blood thinners.

## 2023-04-13 DIAGNOSIS — F33.1 MODERATE EPISODE OF RECURRENT MAJOR DEPRESSIVE DISORDER (HCC): ICD-10-CM

## 2023-04-13 DIAGNOSIS — F41.9 ANXIETY: ICD-10-CM

## 2023-04-13 NOTE — TELEPHONE ENCOUNTER
Requesting Alprazolam 0.5mg  LOV: 12/12/22  RTC: not noted  Last Relevant Labs: 1/17/22  Filled: 3/6/23 #45 with 0 refills    Future Appointments   Date Time Provider [facility] [place]   4/17/2023  3:30 PM TENZIN Dallas EMGDIABCTSPL EMG DIAB PLF   5/1/2023 12:30 PM Rome Castellanos MD E[facility] Pulm [facility] [place]   8/9/2023  1:30 PM Maria E Gonzalez DO EMGRHEUMHBSN EMG [place]     Rx pended and routed for approval/denial

## 2023-04-17 ENCOUNTER — VIRTUAL PHONE E/M (OUTPATIENT)
Dept: ENDOCRINOLOGY CLINIC | Facility: CLINIC | Age: 58
End: 2023-04-17
Payer: COMMERCIAL

## 2023-04-17 DIAGNOSIS — E11.65 TYPE 2 DIABETES MELLITUS WITH HYPERGLYCEMIA, WITH LONG-TERM CURRENT USE OF INSULIN (HCC): Primary | ICD-10-CM

## 2023-04-17 DIAGNOSIS — Z79.4 TYPE 2 DIABETES MELLITUS WITH HYPERGLYCEMIA, WITH LONG-TERM CURRENT USE OF INSULIN (HCC): Primary | ICD-10-CM

## 2023-04-17 RX ORDER — INSULIN GLARGINE 300 U/ML
INJECTION, SOLUTION SUBCUTANEOUS
Qty: 12 ML | Refills: 3 | Status: SHIPPED | OUTPATIENT
Start: 2023-04-17

## 2023-04-17 RX ORDER — INSULIN LISPRO 100 [IU]/ML
INJECTION, SOLUTION INTRAVENOUS; SUBCUTANEOUS
Qty: 60 ML | Refills: 1 | Status: SHIPPED | OUTPATIENT
Start: 2023-04-17

## 2023-04-17 NOTE — PATIENT INSTRUCTIONS
Increase Toujeo to 96 units injection daily - split into 2 doses of 48 units    Increase Humalog to 16 units + sliding scale: 150-200 = 2 units, 201-250 = 4 units, 251-300 = 6 units,  301-350 = 8 units,  > 351 = 10 units 3x/day with meals    Avoid using abdomen for insulin injections - rotate between upper outer thighs, upper buttocks, lower back and back of arms    Contact office if 2 readings less than 80 mg/dl in one week    Have fasting labs done

## 2023-04-18 RX ORDER — ALPRAZOLAM 0.5 MG/1
TABLET ORAL
Qty: 45 TABLET | Refills: 0 | Status: SHIPPED | OUTPATIENT
Start: 2023-04-18

## 2023-04-19 ENCOUNTER — MED REC SCAN ONLY (OUTPATIENT)
Facility: CLINIC | Age: 58
End: 2023-04-19

## 2023-04-28 DIAGNOSIS — Z86.711 HISTORY OF PULMONARY EMBOLUS (PE): ICD-10-CM

## 2023-05-01 ENCOUNTER — OFFICE VISIT (OUTPATIENT)
Facility: CLINIC | Age: 58
End: 2023-05-01
Payer: COMMERCIAL

## 2023-05-01 VITALS
HEART RATE: 89 BPM | OXYGEN SATURATION: 97 % | RESPIRATION RATE: 16 BRPM | DIASTOLIC BLOOD PRESSURE: 78 MMHG | SYSTOLIC BLOOD PRESSURE: 126 MMHG

## 2023-05-01 DIAGNOSIS — G47.33 OSA (OBSTRUCTIVE SLEEP APNEA): ICD-10-CM

## 2023-05-01 DIAGNOSIS — J45.902 PERSISTENT ASTHMA WITH STATUS ASTHMATICUS, UNSPECIFIED ASTHMA SEVERITY: Primary | ICD-10-CM

## 2023-05-01 PROCEDURE — 99214 OFFICE O/P EST MOD 30 MIN: CPT | Performed by: INTERNAL MEDICINE

## 2023-05-01 PROCEDURE — 3078F DIAST BP <80 MM HG: CPT | Performed by: INTERNAL MEDICINE

## 2023-05-01 PROCEDURE — 3074F SYST BP LT 130 MM HG: CPT | Performed by: INTERNAL MEDICINE

## 2023-05-01 NOTE — PATIENT INSTRUCTIONS
Plan:  -to get sleep study   - to get PFTS now  - to get [place] records - call with contact - [phone]  - to prednisone  Same inhalers     See me in 2- months     Melvina Cassidy MD  Pulmonary Medicine  5/1/23

## 2023-05-02 ENCOUNTER — TELEPHONE (OUTPATIENT)
Facility: CLINIC | Age: 58
End: 2023-05-02

## 2023-05-02 DIAGNOSIS — J45.40 MODERATE PERSISTENT ASTHMA WITHOUT COMPLICATION: ICD-10-CM

## 2023-05-02 RX ORDER — ALBUTEROL SULFATE 90 UG/1
2 AEROSOL, METERED RESPIRATORY (INHALATION)
Qty: 6.7 G | Refills: 5 | Status: SHIPPED | OUTPATIENT
Start: 2023-05-02

## 2023-05-02 NOTE — TELEPHONE ENCOUNTER
Pt saw Dr. Thu Castellano yesterday and forgot to request refill of albuterol inhaler. Pt completely out and needs it right away as she is sob. Rx sent.

## 2023-05-03 RX ORDER — RIVAROXABAN 20 MG/1
TABLET, FILM COATED ORAL
Qty: 90 TABLET | Refills: 2 | Status: SHIPPED | OUTPATIENT
Start: 2023-05-03

## 2023-06-05 ENCOUNTER — PATIENT MESSAGE (OUTPATIENT)
Dept: FAMILY MEDICINE CLINIC | Facility: CLINIC | Age: 58
End: 2023-06-05

## 2023-06-05 DIAGNOSIS — F33.1 MODERATE EPISODE OF RECURRENT MAJOR DEPRESSIVE DISORDER (HCC): ICD-10-CM

## 2023-06-05 DIAGNOSIS — F41.9 ANXIETY: ICD-10-CM

## 2023-06-06 NOTE — TELEPHONE ENCOUNTER
From: Rogelio Tariq  To: Kole Taveras,   Sent: 6/5/2023 10:52 AM CDT  Subject: alprazolam    Hi,   [facility]ah[facility]a said they sent refill request and have not received a response. Can you please send in a refill for Alprazolam? I appreciate it! Thanks!   Reed Gilliland [de-identified] : 32 weeks   infant.Maternal hx of   preeclampsia, HELLP syndrome ,RA-  poorly controlled .Fetal  dx of enlarged multicystic L kidney.    Mother  given   betamethasone  x2\par  Apgars 9/9 [de-identified] : Breech presentation, L dysplastic kidney,   hydronephrosis

## 2023-06-07 ENCOUNTER — OFFICE VISIT (OUTPATIENT)
Dept: SLEEP CENTER | Age: 58
End: 2023-06-07
Attending: INTERNAL MEDICINE
Payer: COMMERCIAL

## 2023-06-07 DIAGNOSIS — G47.33 OSA (OBSTRUCTIVE SLEEP APNEA): ICD-10-CM

## 2023-06-07 PROCEDURE — 95810 POLYSOM 6/> YRS 4/> PARAM: CPT

## 2023-06-08 RX ORDER — ALPRAZOLAM 0.5 MG/1
TABLET ORAL DAILY PRN
Qty: 45 TABLET | Refills: 0 | Status: SHIPPED | OUTPATIENT
Start: 2023-06-08

## 2023-06-08 NOTE — TELEPHONE ENCOUNTER
Requesting Alprazolam 0.5mg  LOV: 12/12/22  RTC: not noted  Last Relevant Labs:   Filled: 4/18/23 #45 with 0 refills    Future Appointments   Date Time Provider Praful [place]   6/16/2023  7:45 AM [facility] MR RM2 (1.5T WIDE) EH MRI [place]   6/19/2023  6:00 AM [facility] IVS CATH EH IVS [place]   7/3/2023  1:00 PM Reza Celeste MD EEM[facility] Pulm EM[facility] [place]   8/9/2023  1:30 PM Lacie Gonzalez, DO EMGRHEUMHBSN EMG [place]     Per [place] , last dispensed 4/18/23

## 2023-06-08 NOTE — H&P
Previous films reviewed    Discussed procedure with patient.  Reviewed R/B/A - including no intervention - appears to have a good understanding    Hgb A1c 11%    Will proceed with diagnostic cath as requested

## 2023-06-14 ENCOUNTER — LAB ENCOUNTER (OUTPATIENT)
Dept: LAB | Age: 58
End: 2023-06-14
Attending: NURSE PRACTITIONER
Payer: COMMERCIAL

## 2023-06-14 DIAGNOSIS — R06.02 SHORTNESS OF BREATH: ICD-10-CM

## 2023-06-14 DIAGNOSIS — Z86.718 PERSONAL HISTORY OF VENOUS THROMBOSIS AND EMBOLISM: ICD-10-CM

## 2023-06-14 DIAGNOSIS — E11.65 TYPE 2 DIABETES MELLITUS WITH HYPERGLYCEMIA, WITH LONG-TERM CURRENT USE OF INSULIN (HCC): ICD-10-CM

## 2023-06-14 DIAGNOSIS — I10 ESSENTIAL HYPERTENSION, MALIGNANT: ICD-10-CM

## 2023-06-14 DIAGNOSIS — R06.00 DYSPNEA, PAROXYSMAL NOCTURNAL: Primary | ICD-10-CM

## 2023-06-14 DIAGNOSIS — Z79.4 TYPE 2 DIABETES MELLITUS WITH HYPERGLYCEMIA, WITH LONG-TERM CURRENT USE OF INSULIN (HCC): ICD-10-CM

## 2023-06-14 LAB
ALBUMIN SERPL-MCNC: 3.4 G/DL (ref 3.4–5)
ALBUMIN/GLOB SERPL: 0.9 {RATIO} (ref 1–2)
ALP LIVER SERPL-CCNC: 98 U/L
ALT SERPL-CCNC: 39 U/L
ANION GAP SERPL CALC-SCNC: 8 MMOL/L (ref 0–18)
AST SERPL-CCNC: 32 U/L (ref 15–37)
BASOPHILS # BLD AUTO: 0.11 X10(3) UL (ref 0–0.2)
BASOPHILS NFR BLD AUTO: 1.2 %
BILIRUB SERPL-MCNC: 0.4 MG/DL (ref 0.1–2)
BUN BLD-MCNC: 8 MG/DL (ref 7–18)
CALCIUM BLD-MCNC: 9.3 MG/DL (ref 8.5–10.1)
CHLORIDE SERPL-SCNC: 105 MMOL/L (ref 98–112)
CHOLEST SERPL-MCNC: 171 MG/DL (ref ?–200)
CO2 SERPL-SCNC: 23 MMOL/L (ref 21–32)
CREAT BLD-MCNC: 0.81 MG/DL
EOSINOPHIL # BLD AUTO: 0.38 X10(3) UL (ref 0–0.7)
EOSINOPHIL NFR BLD AUTO: 4 %
ERYTHROCYTE [DISTWIDTH] IN BLOOD BY AUTOMATED COUNT: 15.9 %
EST. AVERAGE GLUCOSE BLD GHB EST-MCNC: 269 MG/DL (ref 68–126)
FASTING PATIENT LIPID ANSWER: YES
FASTING STATUS PATIENT QL REPORTED: YES
GFR SERPLBLD BASED ON 1.73 SQ M-ARVRAT: 85 ML/MIN/1.73M2 (ref 60–?)
GLOBULIN PLAS-MCNC: 3.8 G/DL (ref 2.8–4.4)
GLUCOSE BLD-MCNC: 251 MG/DL (ref 70–99)
HBA1C MFR BLD: 11 % (ref ?–5.7)
HCT VFR BLD AUTO: 47.3 %
HDLC SERPL-MCNC: 37 MG/DL (ref 40–59)
HGB BLD-MCNC: 14.3 G/DL
IMM GRANULOCYTES # BLD AUTO: 0.03 X10(3) UL (ref 0–1)
IMM GRANULOCYTES NFR BLD: 0.3 %
LDLC SERPL CALC-MCNC: 101 MG/DL (ref ?–100)
LYMPHOCYTES # BLD AUTO: 3.57 X10(3) UL (ref 1–4)
LYMPHOCYTES NFR BLD AUTO: 37.3 %
MCH RBC QN AUTO: 23.9 PG (ref 26–34)
MCHC RBC AUTO-ENTMCNC: 30.2 G/DL (ref 31–37)
MCV RBC AUTO: 79.1 FL
MONOCYTES # BLD AUTO: 0.45 X10(3) UL (ref 0.1–1)
MONOCYTES NFR BLD AUTO: 4.7 %
NEUTROPHILS # BLD AUTO: 5.02 X10 (3) UL (ref 1.5–7.7)
NEUTROPHILS # BLD AUTO: 5.02 X10(3) UL (ref 1.5–7.7)
NEUTROPHILS NFR BLD AUTO: 52.5 %
NONHDLC SERPL-MCNC: 134 MG/DL (ref ?–130)
NT-PROBNP SERPL-MCNC: 53 PG/ML (ref ?–125)
OSMOLALITY SERPL CALC.SUM OF ELEC: 289 MOSM/KG (ref 275–295)
PLATELET # BLD AUTO: 384 10(3)UL (ref 150–450)
POTASSIUM SERPL-SCNC: 4.5 MMOL/L (ref 3.5–5.1)
PROT SERPL-MCNC: 7.2 G/DL (ref 6.4–8.2)
RBC # BLD AUTO: 5.98 X10(6)UL
SODIUM SERPL-SCNC: 136 MMOL/L (ref 136–145)
TRIGL SERPL-MCNC: 191 MG/DL (ref 30–149)
TSI SER-ACNC: 1.04 MIU/ML (ref 0.36–3.74)
VIT D+METAB SERPL-MCNC: 14.4 NG/ML (ref 30–100)
VLDLC SERPL CALC-MCNC: 32 MG/DL (ref 0–30)
WBC # BLD AUTO: 9.6 X10(3) UL (ref 4–11)

## 2023-06-14 PROCEDURE — 80061 LIPID PANEL: CPT

## 2023-06-14 PROCEDURE — 85025 COMPLETE CBC W/AUTO DIFF WBC: CPT

## 2023-06-14 PROCEDURE — 84443 ASSAY THYROID STIM HORMONE: CPT

## 2023-06-14 PROCEDURE — 82306 VITAMIN D 25 HYDROXY: CPT

## 2023-06-14 PROCEDURE — 83036 HEMOGLOBIN GLYCOSYLATED A1C: CPT

## 2023-06-14 PROCEDURE — 80053 COMPREHEN METABOLIC PANEL: CPT

## 2023-06-14 PROCEDURE — 36415 COLL VENOUS BLD VENIPUNCTURE: CPT

## 2023-06-14 PROCEDURE — 3046F HEMOGLOBIN A1C LEVEL >9.0%: CPT | Performed by: STUDENT IN AN ORGANIZED HEALTH CARE EDUCATION/TRAINING PROGRAM

## 2023-06-14 PROCEDURE — 83880 ASSAY OF NATRIURETIC PEPTIDE: CPT

## 2023-06-16 ENCOUNTER — TELEPHONE (OUTPATIENT)
Dept: ENDOCRINOLOGY CLINIC | Facility: CLINIC | Age: 58
End: 2023-06-16

## 2023-06-16 ENCOUNTER — MED REC SCAN ONLY (OUTPATIENT)
Facility: CLINIC | Age: 58
End: 2023-06-16

## 2023-06-19 ENCOUNTER — HOSPITAL ENCOUNTER (OUTPATIENT)
Dept: INTERVENTIONAL RADIOLOGY/VASCULAR | Facility: HOSPITAL | Age: 58
Discharge: HOME | End: 2023-06-19
Attending: INTERNAL MEDICINE | Admitting: INTERNAL MEDICINE
Payer: COMMERCIAL

## 2023-06-19 VITALS
BODY MASS INDEX: 40.81 KG/M2 | OXYGEN SATURATION: 95 % | SYSTOLIC BLOOD PRESSURE: 135 MMHG | HEART RATE: 88 BPM | HEIGHT: 67 IN | RESPIRATION RATE: 11 BRPM | DIASTOLIC BLOOD PRESSURE: 72 MMHG | WEIGHT: 260 LBS

## 2023-06-19 DIAGNOSIS — R94.39 ABNORMAL STRESS TEST: ICD-10-CM

## 2023-06-19 DIAGNOSIS — I25.10 CAD (CORONARY ARTERY DISEASE): ICD-10-CM

## 2023-06-19 LAB — GLUCOSE BLD-MCNC: 269 MG/DL (ref 70–99)

## 2023-06-19 PROCEDURE — 99153 MOD SED SAME PHYS/QHP EA: CPT | Performed by: INTERNAL MEDICINE

## 2023-06-19 PROCEDURE — 82962 GLUCOSE BLOOD TEST: CPT

## 2023-06-19 PROCEDURE — 93454 CORONARY ARTERY ANGIO S&I: CPT | Performed by: INTERNAL MEDICINE

## 2023-06-19 PROCEDURE — B2111ZZ FLUOROSCOPY OF MULTIPLE CORONARY ARTERIES USING LOW OSMOLAR CONTRAST: ICD-10-PCS | Performed by: INTERNAL MEDICINE

## 2023-06-19 PROCEDURE — 99152 MOD SED SAME PHYS/QHP 5/>YRS: CPT | Performed by: INTERNAL MEDICINE

## 2023-06-19 RX ORDER — TRAMADOL HYDROCHLORIDE 50 MG/1
100 TABLET ORAL EVERY 6 HOURS PRN
Status: DISCONTINUED | OUTPATIENT
Start: 2023-06-19 | End: 2023-06-19

## 2023-06-19 RX ORDER — VERAPAMIL HYDROCHLORIDE 2.5 MG/ML
INJECTION, SOLUTION INTRAVENOUS
Status: COMPLETED
Start: 2023-06-19 | End: 2023-06-19

## 2023-06-19 RX ORDER — ACETAMINOPHEN 325 MG/1
650 TABLET ORAL EVERY 4 HOURS PRN
Status: DISCONTINUED | OUTPATIENT
Start: 2023-06-19 | End: 2023-06-19

## 2023-06-19 RX ORDER — SODIUM CHLORIDE 9 MG/ML
INJECTION, SOLUTION INTRAVENOUS
Status: DISCONTINUED | OUTPATIENT
Start: 2023-06-20 | End: 2023-06-19 | Stop reason: HOSPADM

## 2023-06-19 RX ORDER — HEPARIN SODIUM 5000 [USP'U]/ML
INJECTION, SOLUTION INTRAVENOUS; SUBCUTANEOUS
Status: COMPLETED
Start: 2023-06-19 | End: 2023-06-19

## 2023-06-19 RX ORDER — NITROGLYCERIN 20 MG/100ML
INJECTION INTRAVENOUS
Status: COMPLETED
Start: 2023-06-19 | End: 2023-06-19

## 2023-06-19 RX ORDER — ACETAMINOPHEN 325 MG/1
TABLET ORAL
Status: COMPLETED
Start: 2023-06-19 | End: 2023-06-19

## 2023-06-19 RX ORDER — SUMATRIPTAN 50 MG/1
50 TABLET, FILM COATED ORAL EVERY 2 HOUR PRN
Status: DISCONTINUED | OUTPATIENT
Start: 2023-06-19 | End: 2023-06-19

## 2023-06-19 RX ORDER — LIDOCAINE HYDROCHLORIDE 10 MG/ML
INJECTION, SOLUTION EPIDURAL; INFILTRATION; INTRACAUDAL; PERINEURAL
Status: COMPLETED
Start: 2023-06-19 | End: 2023-06-19

## 2023-06-19 RX ORDER — SODIUM CHLORIDE 9 MG/ML
INJECTION, SOLUTION INTRAVENOUS CONTINUOUS
Status: DISCONTINUED | OUTPATIENT
Start: 2023-06-19 | End: 2023-06-19

## 2023-06-19 RX ORDER — TRAMADOL HYDROCHLORIDE 50 MG/1
50 TABLET ORAL EVERY 6 HOURS PRN
Status: DISCONTINUED | OUTPATIENT
Start: 2023-06-19 | End: 2023-06-19

## 2023-06-19 RX ORDER — MIDAZOLAM HYDROCHLORIDE 1 MG/ML
INJECTION INTRAMUSCULAR; INTRAVENOUS
Status: COMPLETED
Start: 2023-06-19 | End: 2023-06-19

## 2023-06-19 RX ADMIN — ACETAMINOPHEN 650 MG: 325 TABLET ORAL at 15:00:00

## 2023-06-19 NOTE — PROGRESS NOTES
Prelim angio    Stent widely patent - coronaries widely patent    TR band right RA    Follow up with Dr. Juana Aggarwal in our office    Really needs to get DM under control

## 2023-06-19 NOTE — PLAN OF CARE
Patient had C today with Dr. Sheyla Amador. Right wrist access site with TR band in place. Site is CDI. Patient denies any numbness or tingling to right hand/fingers. Patient has good O2 pleth on right hand. VSS. Patient denies any pain. Patient's daughters @ bedside. Dr. Sheyla Amador @ bedside post procedure. Air intermittently released from TR band until all air removed. TR band removed. Site is soft, CDI, +1 radial pulse. Occlusive dressing applied. Patient completed recovery time. Discharge instructions reviewed. IV D/C'd. Patient discharged to [facility] by wheelchair with belongings. Patient's daughter is .

## 2023-06-19 NOTE — PROCEDURES
[address]    PATIENT'S NAME: Marcelo Joe   ATTENDING PHYSICIAN: Kasie Hunter. Jojo Morrow M.D. OPERATING PHYSICIAN: Nikki Andrew M.D. PATIENT ACCOUNT#:   [de-identified]    LOCATION:  [address]  MEDICAL RECORD #:   [id number]       YOB: 1965  ADMISSION DATE:       06/19/2023      OPERATION DATE:  06/19/2023    CARDIAC PROCEDURE TRANSCRIPTION      CARDIAC CATHETERIZATION    PREOPERATIVE DIAGNOSIS:    POSTOPERATIVE DIAGNOSIS:    PROCEDURE PERFORMED:  Left heart catheterization, coronary arteriography only. HISTORY:  The patient is a 53-year-old woman with a history of a prior anterior wall infarction. She had acute thrombosis of the left anterior descending coronary artery, which was stented. She is now referred for repeat angiography due to exertional dyspnea and an abnormal nuclear study. SEDATION:  I personally supervised the intravenous administration of conscious sedation throughout this case in the form of Versed and fentanyl. Patient was under continuous hemodynamic, electrocardiographic, and respiratory monitoring. Sedation time was 1402 to 1444. This was a total of 42 minutes. PROCEDURE:  After obtaining informed consent, the patient was brought to the cardiac catheterization laboratory, and, using ultrasound guidance, a 5/6 Slender sheath was placed in the right radial artery. Standard Ligia catheters were used for angiography. It was actually very difficult to selectively engage the right coronary artery. We ultimately used a JR4 catheter, but a Anjel catheter and an AL1 were also utilized in attempt to selectively find the vessel. None of our catheters appeared to easily identify the right coronary cusp. At the end of the procedure, hemostasis was achieved by placing a TR band over the right radial artery.     CORONARY ARTERIOGRAPHY:  The coronary circulation was codominant, and no significant vascular calcification was seen fluoroscopically. The left main coronary artery had no angiographic evidence of significant obstructive atherosclerosis. The left anterior descending coronary artery had an easily visible stent in the proximal portion. The stent was widely patent. The left circumflex coronary artery was angiographically normal with a suggestion of mild intimal thickening in the distal vessel. The right coronary artery was a small caliber codominant vessel. There was no angiographic evidence of significant obstructive atherosclerosis. SUMMARY:  In summary, the patient is a 28-year-old woman who, on cardiac catheterization today, was demonstrated to have minimal nonobstructive coronary disease angiographically. Her previously-placed stent in the left anterior descending coronary artery was widely patent. The patient will continue medical management and follow up with my partner, Dr. Paul Hernandez, in our office.       Dictated By Дмитрий Escobar M.D.  d: 06/19/2023 16:55:05  t: 06/19/2023 17:34:13  Job [id number]  WS/

## 2023-06-21 DIAGNOSIS — Z79.4 TYPE 2 DIABETES MELLITUS WITH HYPERGLYCEMIA, WITH LONG-TERM CURRENT USE OF INSULIN (HCC): ICD-10-CM

## 2023-06-21 DIAGNOSIS — E11.65 TYPE 2 DIABETES MELLITUS WITH HYPERGLYCEMIA, WITH LONG-TERM CURRENT USE OF INSULIN (HCC): ICD-10-CM

## 2023-06-21 RX ORDER — PROCHLORPERAZINE 25 MG/1
SUPPOSITORY RECTAL
Qty: 9 EACH | Refills: 3 | Status: SHIPPED | OUTPATIENT
Start: 2023-06-21

## 2023-06-21 NOTE — TELEPHONE ENCOUNTER
LOV: 04/17/2023  Future Appointments   Date Time Provider Praful Hi   6/22/2023  9:10 AM Treasure James DO EEM[facility] Pulm [facility] [place]   7/3/2023  1:00 PM Deepika Chambers MD [facility] Pulm [facility] [place]   8/9/2023  1:30 PM Emily Gonzalez DO EMGRHEUMHBSN EMG [place]

## 2023-06-22 ENCOUNTER — SLEEP STUDY (OUTPATIENT)
Facility: CLINIC | Age: 58
End: 2023-06-22
Payer: COMMERCIAL

## 2023-06-22 DIAGNOSIS — G47.33 OBSTRUCTIVE SLEEP APNEA SYNDROME: Primary | ICD-10-CM

## 2023-06-22 PROCEDURE — 95810 POLYSOM 6/> YRS 4/> PARAM: CPT | Performed by: OTHER

## 2023-06-23 DIAGNOSIS — Z79.4 TYPE 2 DIABETES MELLITUS WITH HYPERGLYCEMIA, WITH LONG-TERM CURRENT USE OF INSULIN (HCC): Primary | ICD-10-CM

## 2023-06-23 DIAGNOSIS — E11.65 TYPE 2 DIABETES MELLITUS WITH HYPERGLYCEMIA, WITH LONG-TERM CURRENT USE OF INSULIN (HCC): Primary | ICD-10-CM

## 2023-06-23 RX ORDER — INSULIN PMP CART,AUT,G6/7,CNTR
1 EACH SUBCUTANEOUS ONCE
Qty: 1 KIT | Refills: 0 | Status: SHIPPED | OUTPATIENT
Start: 2023-06-23 | End: 2023-06-23

## 2023-06-23 RX ORDER — PROCHLORPERAZINE 25 MG/1
1 SUPPOSITORY RECTAL
Qty: 1 EACH | Refills: 1 | Status: SHIPPED | OUTPATIENT
Start: 2023-06-23

## 2023-06-23 RX ORDER — INSULIN PMP CART,AUT,G6/7,CNTR
1 EACH SUBCUTANEOUS
Qty: 45 EACH | Refills: 1 | Status: SHIPPED | OUTPATIENT
Start: 2023-06-23

## 2023-06-27 ENCOUNTER — OFFICE VISIT (OUTPATIENT)
Facility: CLINIC | Age: 58
End: 2023-06-27
Payer: COMMERCIAL

## 2023-06-27 VITALS — DIASTOLIC BLOOD PRESSURE: 86 MMHG | OXYGEN SATURATION: 97 % | HEART RATE: 95 BPM | SYSTOLIC BLOOD PRESSURE: 134 MMHG

## 2023-06-27 DIAGNOSIS — E11.65 TYPE 2 DIABETES MELLITUS WITH HYPERGLYCEMIA, WITH LONG-TERM CURRENT USE OF INSULIN (HCC): Primary | ICD-10-CM

## 2023-06-27 DIAGNOSIS — Z79.4 TYPE 2 DIABETES MELLITUS WITH HYPERGLYCEMIA, WITH LONG-TERM CURRENT USE OF INSULIN (HCC): Primary | ICD-10-CM

## 2023-06-27 PROCEDURE — 3079F DIAST BP 80-89 MM HG: CPT | Performed by: STUDENT IN AN ORGANIZED HEALTH CARE EDUCATION/TRAINING PROGRAM

## 2023-06-27 PROCEDURE — 99205 OFFICE O/P NEW HI 60 MIN: CPT | Performed by: STUDENT IN AN ORGANIZED HEALTH CARE EDUCATION/TRAINING PROGRAM

## 2023-06-27 PROCEDURE — 3075F SYST BP GE 130 - 139MM HG: CPT | Performed by: STUDENT IN AN ORGANIZED HEALTH CARE EDUCATION/TRAINING PROGRAM

## 2023-06-27 RX ORDER — GLUCAGON INJECTION, SOLUTION 1 MG/.2ML
1 INJECTION, SOLUTION SUBCUTANEOUS AS NEEDED
Qty: 2 ML | Refills: 1 | Status: SHIPPED | OUTPATIENT
Start: 2023-06-27

## 2023-06-28 ENCOUNTER — TELEPHONE (OUTPATIENT)
Facility: CLINIC | Age: 58
End: 2023-06-28

## 2023-06-28 NOTE — TELEPHONE ENCOUNTER
Overdue test notification #1- CXR ordered 05/01/22. Pt has not completed. [facility] sent to pt.

## 2023-07-03 ENCOUNTER — OFFICE VISIT (OUTPATIENT)
Facility: CLINIC | Age: 58
End: 2023-07-03
Payer: COMMERCIAL

## 2023-07-03 ENCOUNTER — TELEPHONE (OUTPATIENT)
Facility: CLINIC | Age: 58
End: 2023-07-03

## 2023-07-03 VITALS
RESPIRATION RATE: 16 BRPM | WEIGHT: 263 LBS | DIASTOLIC BLOOD PRESSURE: 74 MMHG | OXYGEN SATURATION: 97 % | HEART RATE: 88 BPM | SYSTOLIC BLOOD PRESSURE: 126 MMHG | HEIGHT: 67 IN | BODY MASS INDEX: 41.28 KG/M2

## 2023-07-03 DIAGNOSIS — G47.33 OSA (OBSTRUCTIVE SLEEP APNEA): Primary | ICD-10-CM

## 2023-07-03 DIAGNOSIS — J45.40 MODERATE PERSISTENT ASTHMA WITHOUT COMPLICATION: Primary | ICD-10-CM

## 2023-07-03 DIAGNOSIS — Z79.4 TYPE 2 DIABETES MELLITUS WITH HYPERGLYCEMIA, WITH LONG-TERM CURRENT USE OF INSULIN (HCC): ICD-10-CM

## 2023-07-03 DIAGNOSIS — I10 ESSENTIAL HYPERTENSION: ICD-10-CM

## 2023-07-03 DIAGNOSIS — E11.65 TYPE 2 DIABETES MELLITUS WITH HYPERGLYCEMIA, WITH LONG-TERM CURRENT USE OF INSULIN (HCC): ICD-10-CM

## 2023-07-03 DIAGNOSIS — I50.20 HFREF (HEART FAILURE WITH REDUCED EJECTION FRACTION) (HCC): ICD-10-CM

## 2023-07-03 PROCEDURE — 3008F BODY MASS INDEX DOCD: CPT | Performed by: INTERNAL MEDICINE

## 2023-07-03 PROCEDURE — 99214 OFFICE O/P EST MOD 30 MIN: CPT | Performed by: INTERNAL MEDICINE

## 2023-07-03 PROCEDURE — 3078F DIAST BP <80 MM HG: CPT | Performed by: INTERNAL MEDICINE

## 2023-07-03 PROCEDURE — 3074F SYST BP LT 130 MM HG: CPT | Performed by: INTERNAL MEDICINE

## 2023-07-03 RX ORDER — AMLODIPINE BESYLATE 2.5 MG/1
TABLET ORAL
COMMUNITY
Start: 2023-06-26

## 2023-07-17 ENCOUNTER — TELEPHONE (OUTPATIENT)
Dept: ENDOCRINOLOGY CLINIC | Facility: CLINIC | Age: 58
End: 2023-07-17

## 2023-07-17 NOTE — TELEPHONE ENCOUNTER
Patient contacted office today stating she is ready for OP5 training. Contacted Jessica with [employer] to contact patient to arrange training. Sent OP5 pump setting form to [facility] to complete for training.

## 2023-07-18 NOTE — TELEPHONE ENCOUNTER
LM for pt to call office to schedule 2 week pump f/u with CDE. Pt is seeing Endo and DM currently. Pt should keep f/u with Elva Baxter on 8/3/23 since she does not see Endo again until October. After the August appt pt can continue following  with Endo.

## 2023-07-18 NOTE — TELEPHONE ENCOUNTER
Daily Note     Today's date: 2021  Patient name: Robert Manning  : 1949  MRN: [id number]  Referring provider: Carol Fink, *  Dx:   Encounter Diagnosis     ICD-10-CM    1  Class 3 severe obesity due to excess calories with serious comorbidity in adult, unspecified BMI ([facility] [address] )  E66 01    2  Weakness generalized  R53 1    3  Physical deconditioning  R53 81                   Subjective: Patient states she only has pain in her low back (4/10) today  She is doing her exercises regularly  Objective: See treatment diary below    Patient's home exercise program updated to include additional exercises  Handout issued and explained with demonstration  Patient accepts exercises  Assessment: Tolerated treatment fair+  Patient would benefit from continued PT for stretching, balance, and strengthening  Patient leads stepping changes with her posture/ shoulders  Patient educated to correct  She was able to add exercises to her program with little difficulty and no increase of pain  Patient seemed to understand all education given during session  Patient felt no increase of pain when she left department  "It's the best I have felt after therapy "    Plan: Continue per plan of care  Progress treatment as tolerated  Precautions: NONE  RE: 6/3    Specialty Daily Treatment Diary     Manual                                                 Ther Ex    TM walk        Total gym  * 15x x10 x15   Prone on elbows  Do first 10x 10x  x10 discomfort hold   Prone press ups  *2x5 then second 10x x10 p! hold   Stand lumbar extensions 3x10       Stand UBE ALT 90/70  *100/70 x 2 mins 6 min alt 100/70 x6 min 100/ 70 x8 min   SB FWD flex and side bend   5x 20" hold stretching 5x 20" hold stretching 5x 20" hold stretching   Leg press        T-spine extensions        Nu -step S= 7  *L3 x 8 mins L3 8 min   L 3 x10 min L3 x10 min s=8   Neuro Re-Ed        Alt step taps XL Pt is scheduled for pump training on Friday 7/21/23 at 12;30pm in the NP office with Antony Ruby. *4" x15 4" 15x ea 4" x15 ea 1H 4" x15 ea    Foam Balance feet together EO                       EC        bridges  *x10 15x x15 x15   Clam shells  *x15 15x 3" hold x15 x15   Tandem Stand EO 2x:20       MTP/LTP/ Antirotations  *   *MTP/LTP  Red x10 ea           Fitter Board front/side w/stop  *  * x10 ea x15 ea   Seated T-ball marches        Gait Training        Heel-toe amb  *counter 10ft x 8  Cabinet 15ft x2 Cabinet 15ft x4   Hurdles Amb OBO  *   *25ft x2 FWD just over   Shop walk  *  *25ft x2 25ft x2 ea   6 minute walk with cues 9 laps 3 rests no AD       sidestepping  * // 3 laps 25ft 25ft x2                   Ther Activity        Chair Squats x7 no H Low mat x10 On TG due to increased knee pain Chair x5 Chair x5   Up/down steps SOS                        Chair squats with step taps        Step ups fwd/lat  *4" x10 B/L 15x ea 4" step 4" x10 B/L ea fwd/lat 4" x10 B/L ea fwd/lat       Modalities

## 2023-07-20 NOTE — TELEPHONE ENCOUNTER
OmniPod 5 Insulin pump orders completed and signed by provider.     OmniPod 5 Insulin Pump  Basal rates:   Max basal: 2.5 units/hour     12MN: 1.75 units/hour     Bolus settings:   Max bolus: 20 units     Carbohydrate ratio:   12MN: 1u:5g    Insulin sensitivity:  12MN: 1u:20 mg/dl    BG target:  12MN: 110 mg/dl    Active insulin: 3 hours

## 2023-07-21 ENCOUNTER — NURSE ONLY (OUTPATIENT)
Dept: ENDOCRINOLOGY CLINIC | Facility: CLINIC | Age: 58
End: 2023-07-21
Payer: COMMERCIAL

## 2023-08-03 ENCOUNTER — OFFICE VISIT (OUTPATIENT)
Dept: ENDOCRINOLOGY CLINIC | Facility: CLINIC | Age: 58
End: 2023-08-03
Payer: COMMERCIAL

## 2023-08-03 VITALS
WEIGHT: 265 LBS | DIASTOLIC BLOOD PRESSURE: 70 MMHG | RESPIRATION RATE: 20 BRPM | SYSTOLIC BLOOD PRESSURE: 130 MMHG | OXYGEN SATURATION: 97 % | BODY MASS INDEX: 42 KG/M2 | HEART RATE: 95 BPM

## 2023-08-03 DIAGNOSIS — E78.2 HYPERLIPIDEMIA, MIXED: ICD-10-CM

## 2023-08-03 DIAGNOSIS — Z79.4 TYPE 2 DIABETES MELLITUS WITH HYPERGLYCEMIA, WITH LONG-TERM CURRENT USE OF INSULIN (HCC): Primary | ICD-10-CM

## 2023-08-03 DIAGNOSIS — E66.01 CLASS 3 SEVERE OBESITY DUE TO EXCESS CALORIES WITH SERIOUS COMORBIDITY AND BODY MASS INDEX (BMI) OF 40.0 TO 44.9 IN ADULT (HCC): ICD-10-CM

## 2023-08-03 DIAGNOSIS — Z96.41 INSULIN PUMP IN PLACE: ICD-10-CM

## 2023-08-03 DIAGNOSIS — E11.65 TYPE 2 DIABETES MELLITUS WITH HYPERGLYCEMIA, WITH LONG-TERM CURRENT USE OF INSULIN (HCC): Primary | ICD-10-CM

## 2023-08-03 DIAGNOSIS — I10 ESSENTIAL HYPERTENSION: ICD-10-CM

## 2023-08-03 PROCEDURE — 3078F DIAST BP <80 MM HG: CPT | Performed by: NURSE PRACTITIONER

## 2023-08-03 PROCEDURE — 99214 OFFICE O/P EST MOD 30 MIN: CPT | Performed by: NURSE PRACTITIONER

## 2023-08-03 PROCEDURE — 95251 CONT GLUC MNTR ANALYSIS I&R: CPT | Performed by: NURSE PRACTITIONER

## 2023-08-03 PROCEDURE — 3075F SYST BP GE 130 - 139MM HG: CPT | Performed by: NURSE PRACTITIONER

## 2023-08-03 RX ORDER — GLUCAGON INJECTION, SOLUTION 1 MG/.2ML
1 INJECTION, SOLUTION SUBCUTANEOUS ONCE AS NEEDED
Qty: 0.2 ML | Refills: 1 | Status: SHIPPED | OUTPATIENT
Start: 2023-08-03 | End: 2023-08-03

## 2023-08-03 RX ORDER — INSULIN GLARGINE 300 U/ML
INJECTION, SOLUTION SUBCUTANEOUS
Qty: 12 ML | Refills: 3 | COMMUNITY
Start: 2023-08-03

## 2023-08-09 ENCOUNTER — OFFICE VISIT (OUTPATIENT)
Dept: RHEUMATOLOGY | Facility: CLINIC | Age: 58
End: 2023-08-09
Payer: COMMERCIAL

## 2023-08-09 VITALS
DIASTOLIC BLOOD PRESSURE: 86 MMHG | HEIGHT: 67 IN | WEIGHT: 265 LBS | TEMPERATURE: 98 F | BODY MASS INDEX: 41.59 KG/M2 | RESPIRATION RATE: 16 BRPM | SYSTOLIC BLOOD PRESSURE: 138 MMHG | OXYGEN SATURATION: 96 % | HEART RATE: 82 BPM

## 2023-08-09 DIAGNOSIS — M54.9 MID BACK PAIN: ICD-10-CM

## 2023-08-09 DIAGNOSIS — M54.41 CHRONIC MIDLINE LOW BACK PAIN WITH BILATERAL SCIATICA: ICD-10-CM

## 2023-08-09 DIAGNOSIS — G89.29 CHRONIC MIDLINE LOW BACK PAIN WITH BILATERAL SCIATICA: ICD-10-CM

## 2023-08-09 DIAGNOSIS — M53.3 CHRONIC SI JOINT PAIN: ICD-10-CM

## 2023-08-09 DIAGNOSIS — M25.50 POLYARTHRALGIA: Primary | ICD-10-CM

## 2023-08-09 DIAGNOSIS — R21 SKIN RASH: ICD-10-CM

## 2023-08-09 DIAGNOSIS — M54.42 CHRONIC MIDLINE LOW BACK PAIN WITH BILATERAL SCIATICA: ICD-10-CM

## 2023-08-09 DIAGNOSIS — M54.2 NECK PAIN: ICD-10-CM

## 2023-08-09 DIAGNOSIS — G89.29 CHRONIC SI JOINT PAIN: ICD-10-CM

## 2023-08-09 DIAGNOSIS — M79.7 FIBROMYALGIA: ICD-10-CM

## 2023-08-09 DIAGNOSIS — Z13.820 SCREENING FOR OSTEOPOROSIS: ICD-10-CM

## 2023-08-09 DIAGNOSIS — Z78.0 ASYMPTOMATIC MENOPAUSE: ICD-10-CM

## 2023-08-09 DIAGNOSIS — Z86.718 HISTORY OF BLOOD CLOTS: ICD-10-CM

## 2023-08-09 DIAGNOSIS — M79.2 NEUROPATHIC PAIN: ICD-10-CM

## 2023-08-09 PROCEDURE — 3008F BODY MASS INDEX DOCD: CPT | Performed by: INTERNAL MEDICINE

## 2023-08-09 PROCEDURE — 99205 OFFICE O/P NEW HI 60 MIN: CPT | Performed by: INTERNAL MEDICINE

## 2023-08-09 PROCEDURE — 3079F DIAST BP 80-89 MM HG: CPT | Performed by: INTERNAL MEDICINE

## 2023-08-09 PROCEDURE — 3075F SYST BP GE 130 - 139MM HG: CPT | Performed by: INTERNAL MEDICINE

## 2023-08-09 RX ORDER — ROSUVASTATIN CALCIUM 5 MG/1
5 TABLET, COATED ORAL NIGHTLY
COMMUNITY
Start: 2023-07-03 | End: 2023-08-10

## 2023-08-10 ENCOUNTER — OFFICE VISIT (OUTPATIENT)
Dept: FAMILY MEDICINE CLINIC | Facility: CLINIC | Age: 58
End: 2023-08-10
Payer: COMMERCIAL

## 2023-08-10 DIAGNOSIS — F33.1 MODERATE EPISODE OF RECURRENT MAJOR DEPRESSIVE DISORDER (HCC): ICD-10-CM

## 2023-08-10 DIAGNOSIS — E11.65 TYPE 2 DIABETES MELLITUS WITH HYPERGLYCEMIA, WITH LONG-TERM CURRENT USE OF INSULIN (HCC): Primary | ICD-10-CM

## 2023-08-10 DIAGNOSIS — F41.9 ANXIETY: ICD-10-CM

## 2023-08-10 DIAGNOSIS — Z79.4 TYPE 2 DIABETES MELLITUS WITH HYPERGLYCEMIA, WITH LONG-TERM CURRENT USE OF INSULIN (HCC): Primary | ICD-10-CM

## 2023-08-10 PROCEDURE — 99213 OFFICE O/P EST LOW 20 MIN: CPT | Performed by: FAMILY MEDICINE

## 2023-08-10 RX ORDER — ALPRAZOLAM 0.5 MG/1
0.5 TABLET ORAL DAILY PRN
Qty: 90 TABLET | Refills: 1 | Status: SHIPPED | OUTPATIENT
Start: 2023-08-10 | End: 2024-02-06

## 2023-08-10 NOTE — PROGRESS NOTES
[facility] note faxed to [address] at [phone]

## 2023-08-10 NOTE — PROGRESS NOTES
Note to patient: The [facility] makes medical notes like these available to patients in the interest of transparency. However, be advised this is a medical document. It is intended as peer to peer communication. It is written in medical language and may contain abbreviations or verbiage that are unfamiliar. It may appear blunt or direct. Medical documents are intended to carry relevant information, facts as evident, and the clinical opinion of the practitioner. Patient presents with:  Medication Follow-Up      HPI:  65 y/o F with hx of T2DM- on insulin pump, Churgg marc, asthma, CAD- FELTON LAD , CHF, HLD, HTN, anxiety presenting today for refills on her xanax. She has hx of recurrent DVT/PE and is on [facility] with xarelto. Pt following DM clinic for uncontrolled DM- on insulin pump w/ dexcom. She has appt with Rheum today for chronic joint pains, fibromyalgia,     Pt has diabetic eye exam scheduled. Last A1c value was 11% done 6/14/2023. There eis a fmhx of hypercoagulability. Review of Systems   Constitutional: Negative for fever, chills and fatigue. No distress. Denies shortness of breath and wheezing. Cardiovascular: Negative for chest pain, palpitations and leg swelling.    Gastrointestinal: Negative for nausea, vomiting, abdominal pain     Patient Active Problem List:     Allergic rhinitis     Esophageal reflux     Other chronic sinusitis     Moderate episode of recurrent major depressive disorder (HCC)     Anxiety     Class 3 severe obesity due to excess calories with serious comorbidity and body mass index (BMI) of 40.0 to 44.9 in adult [facility])     History of recurrent deep vein thrombosis (DVT)     History of pulmonary embolus (PE)     Diabetes mellitus ([facility]     Cardiac angina (HCC)     CAD (coronary artery disease), native coronary artery     NSTEMI (non-ST elevated myocardial infarction) ([facility]     Gastroparesis     Moderate persistent asthma without complication Migraines     Laguerre esophagus     HFrEF (heart failure with reduced ejection fraction) (HCC)     Irreducible ventral incisional hernia     B12 deficiency     Cardiomyopathy, ischemic     Colloid cyst of brain ([address]     Essential hypertension     Family history of early CAD     Hiatal hernia     History of steroid therapy     Hyperlipidemia, mixed     Presence of drug coated stent in LAD coronary artery     Steroid-induced diabetes      NAFLD (nonalcoholic fatty liver disease)     Hepatomegaly     Postmenopausal bleeding     Churg-Shania syndrome       Past Medical History:   Diagnosis Date    Allergic rhinitis, cause unspecified 05/05/2008    Anemia     Anxiety and depression     Asthma     Laguerre's esophagus     Churg-Shania syndrome  2022    Colloid cyst of brain (HCC)     Congestive heart disease ([address]     Coronary artery disease of native heart with stable angina pectoris ([address]     Diabetes ([address]     Diabetes mellitus ([address]     Diverticulosis of large intestine     Essential hypertension     Gastroparesis     GERD     Hematemesis with nausea 11/02/2017 2017, No EGD, Hgb stable    High cholesterol     History of pulmonary embolus (PE) 12/01/2016    History of recurrent deep vein thrombosis (DVT) 12/04/2014    Hypertension     IBS     LGSIL (low grade squamous intraepithelial dysplasia) 10/2010    Pap neg 2014    Migraines     perfumes    Moderate persistent asthma without complication 72/28/9616    DESOUZA (nonalcoholic steatohepatitis)     NSTEMI (non-ST elevated myocardial infarction) ([address] 2017    FELTON in LAD    Obesity     Osteoarthritis     Patella-femoral syndrome, bilateral knee 01/15/2015    Pneumonia due to organism     Pulmonary embolism [facility])      Past Surgical History:   Procedure Laterality Date    BREAST SURGERY PROCEDURE UNLISTED      cyst removed    CATH DRUG ELUTING STENT      CHOLECYSTECTOMY  04/10/2011    PERFORMED BY DR KRAFT-LAPAROSCOPIC    COLONOSCOPY  2012    polyps    COLONOSCOPY N/A 2/23/2016    Procedure: COLONOSCOPY;  Surgeon: Amy Mejia MD;  Location: [facility] ENDOSCOPY    COLPOSCOPY, CERVIX W/UPPER ADJACENT VAGINA; W/ENDOCERVICAL CURETTAGE  2-2011    WNL    CYST ASPIRATION LEFT  2000    OTHER SURGICAL HISTORY      sinus surgery    OTHER SURGICAL HISTORY      CYST REMOVED ON LEFT WRIST 17 YRS AGO    SINUS SURGERY        UPPER GI ENDOSCOPY,EXAM       Family History   Problem Relation Age of Onset    Heart Disorder Father     Hypertension Mother     Lipids Mother     Stroke Mother     Anemia Mother     Dementia Mother     Heart Disorder Mother     Other (Other) Mother     Hypertension Sister     Obesity Sister     Stroke Sister     Other (Other) Sister         stroke,pe    Other (Other) Brother         kidney stones    Colon Cancer Maternal Grandfather     Colon Cancer Maternal Aunt     Anemia Daughter     Asthma Daughter     Depression Daughter     Asthma Daughter      Social History     Socioeconomic History    Marital status:     Number of children: 3   Occupational History    Occupation: homemaker   Tobacco Use    Smoking status: Never     Passive exposure: Current    Smokeless tobacco: Never   Vaping Use    Vaping Use: Never used   Substance and Sexual Activity    Alcohol use: No    Drug use: No    Sexual activity: Never     Partners: Male     Birth control/protection: I.U.D. Current Outpatient Medications   Medication Sig Dispense Refill    ALPRAZolam 0.5 MG Oral Tab Take 1 tablet (0.5 mg total) by mouth daily as needed. 90 tablet 1    amLODIPine 2.5 MG Oral Tab amLODIPine 2.5 mg tablet, [RxNorm: 929812]      Continuous Blood Gluc Transmit (DEXCOM G6 TRANSMITTER) Does not apply Misc 1 each by Other route every 3 (three) months. 1 each 1    Insulin Disposable Pump (OMNIPOD 5 G6 POD, GEN 5,) Does not apply Misc 1 each every other day.  45 each 1    DEXCOM G6 SENSOR Does not apply Misc APPLY TO SKIN EVERY 10 DAYS 9 each 3    Mepolizumab (NUCALA SC) Inject into the skin every 30 (thirty) days. XARELTO 20 MG Oral Tab TAKE 1 TABLET AT BEDTIME 90 tablet 2    HUMALOG KWIKPEN 100 UNIT/ML Subcutaneous Solution Pen-injector Inject 3x/day as instructed using sliding scale for up to TDD = 70 units 60 mL 1    Tiotropium Bromide Monohydrate (SPIRIVA RESPIMAT) 2.5 MCG/ACT Inhalation Aero Soln Inhale 2 puffs into the lungs daily. 1 each 5    PANTOPRAZOLE 40 MG Oral Tab EC TAKE 1 TABLET BEFORE BREAKFAST 90 tablet 3    escitalopram 20 MG Oral Tab Take 1 tablet (20 mg total) by mouth every morning. 90 tablet 3    Metoprolol-HCTZ ER 25-12.5 MG Oral Tablet 24 Hr metoprolol succ 25 mg-hydrochlorothiazide 12.5 mg tablet,ext. rel 24 hr   25mg 1/day      Azelastine HCl 0.1 % Nasal Solution       SUMAtriptan Succinate 50 MG Oral Tab TAKE 1 TABLET BY MOUTH EVERY 2 HOURS AS NEEDED FOR  MIGRAINE. DO  NOT  EXCEED  200MG  IN  24  HOURS 9 tablet 3    BREO ELLIPTA 200-25 MCG/INH Inhalation Aerosol Powder, Breath Activated       Clopidogrel Bisulfate 75 MG Oral Tab Take 1 tablet (75 mg total) by mouth daily. nightly      losartan 100 MG Oral Tab Take 1 tablet (100 mg total) by mouth daily. ezetimibe 10 MG Oral Tab Take 1 tablet (10 mg total) by mouth nightly. torsemide 20 MG Oral Tab Take 1 tablet (20 mg total) by mouth every morning. Fluticasone Propionate 50 MCG/ACT Nasal Suspension 2 sprays by Each Nare route 2 (two) times daily. cetirizine (ZYRTEC) 10 MG Oral Tab Take 1 tablet (10 mg total) by mouth daily. Montelukast Sodium (SINGULAIR) 10 MG Oral Tab Take 1 tablet (10 mg total) by mouth nightly. glucagon (GVOKE HYPOPEN 1-PACK) 1 MG/0.2ML Subcutaneous SUBQ injection Inject 0.2 mL (1 mg total) into the skin once as needed for Low blood glucose. 0.2 mL 1    Insulin Disposable Pump (OMNIPOD 5 G6 INTRO, GEN 5,) Does not apply Kit 1 each one time for 1 dose.  1 kit 0       Allergies    Peanut-Containing D*    HIVES, SHORTNESS OF BREATH    Comment:Hives, asthma attack  Pcn [Penicillins] UNKNOWN    Comment:HAPPENED AS A CHILD    Health Maintenance  Immunizations:    Immunization History  Administered            Date(s) Administered    Covid-19 Vaccine Zyrra (J&J) 0.5ml                          05/23/2021      FLULAVAL 6 months & older 0.5 ml Prefilled syringe (03201)                          09/10/2018  12/27/2019  10/28/2020      FLUZONE 6 months and older PFS 0.5 ml ([id number])                          12/27/2019  10/28/2020      Fluvirin, 3 Years & >, Im                          10/16/2017      Influenza             10/12/2009  01/12/2010  10/13/2010                            10/25/2011  11/19/2012  01/01/2022      Influenza Virus Vaccine, H1N1                          01/12/2010      Pneumococcal (Prevnar 13)                          01/01/2018      Pneumovax 23          10/23/2013  01/01/2018    Pended                  Date(s) Pended    FLULAVAL 6 months & older 0.5 ml Prefilled syringe ([id number])                          12/12/2022      TDAP                  12/12/2022    Deferred                Date(s) Deferred    FLULAVAL 6 months & older 0.5 ml Prefilled syringe ([id number])                          10/01/2017  10/08/2019  02/09/2022        Physical Exam  LMP 09/23/2004   Constitutional: Oriented to person, place, and time. No distress. HEENT:  Normocephalic and atraumatic. Cardiovascular: Normal rate, regular rhythm and intact distal pulses. No murmur, rubs or gallops. Pulmonary/Chest: Effort normal and breath sounds normal. No respiratory distress. No wheezes   Neurological: grossly normal   Skin: Skin is warm and dry. Psychiatric: Normal mood and affect. A/P:    Anxiety  Moderate episode of recurrent major depressive disorder (hcc)  Type 2 diabetes mellitus with hyperglycemia, with long-term current use of insulin (hcc)  (primary encounter diagnosis)    1. Anxiety  Refilled xanax   - ALPRAZolam 0.5 MG Oral Tab; Take 1 tablet (0.5 mg total) by mouth daily as needed.   Dispense: 90 tablet; Refill: 1    2. Moderate episode of recurrent major depressive disorder (HCC)  - ALPRAZolam 0.5 MG Oral Tab; Take 1 tablet (0.5 mg total) by mouth daily as needed. Dispense: 90 tablet; Refill: 1    3. Type 2 diabetes mellitus with hyperglycemia, with long-term current use of insulin ([facility]  Managed with DM clinic. No orders of the defined types were placed in this encounter. Meds & Refills for this Visit:  Requested Prescriptions     Signed Prescriptions Disp Refills    ALPRAZolam 0.5 MG Oral Tab 90 tablet 1     Sig: Take 1 tablet (0.5 mg total) by mouth daily as needed. Imaging & Consults:  None      No follow-ups on file. There are no Patient Instructions on file for this visit. All questions were answered and the patient understands the plan. Junior Tony DO        This note was prepared using Dragon Medical voice recognition dictation software. As a result errors may occur. When identified these errors have been corrected. While every attempt is made to correct errors during dictation discrepancies may still exist.      Note to patient: The [facility] makes medical notes like these available to patients in the interest of transparency. However, be advised this is a medical document. It is intended as peer to peer communication. It is written in medical language and may contain abbreviations or verbiage that are unfamiliar. It may appear blunt or direct. Medical documents are intended to carry relevant information, facts as evident, and the clinical opinion of the practitioner. This note was prepared using [employer] voice recognition dictation software. As a result errors may occur. When identified these errors have been corrected.  While every attempt is made to correct errors during dictation discrepancies may still exist.

## 2023-08-16 ENCOUNTER — PATIENT MESSAGE (OUTPATIENT)
Facility: CLINIC | Age: 58
End: 2023-08-16

## 2023-08-17 RX ORDER — MONTELUKAST SODIUM 10 MG/1
10 TABLET ORAL NIGHTLY
Qty: 90 TABLET | Refills: 0 | Status: SHIPPED | OUTPATIENT
Start: 2023-08-17 | End: 2023-08-17

## 2023-08-17 RX ORDER — MONTELUKAST SODIUM 10 MG/1
10 TABLET ORAL NIGHTLY
Qty: 90 TABLET | Refills: 0 | Status: SHIPPED | OUTPATIENT
Start: 2023-08-17

## 2023-08-17 NOTE — TELEPHONE ENCOUNTER
Last OV with Dr. Lizette Funk on 7-3-23. Instructed to follow up in 2 months and appt scheduled for 9-5-23. Refill sent.

## 2023-08-17 NOTE — TELEPHONE ENCOUNTER
From: Juana Marroquin  To: Eagle Girard MD  Sent: 8/16/2023 4:24 PM CDT  Subject: Eli Broderick,   I am almost out of [facility], can you please call in a refill? Thanks!   Mario Oliveira

## 2023-08-18 ENCOUNTER — HOSPITAL ENCOUNTER (OUTPATIENT)
Dept: GENERAL RADIOLOGY | Age: 58
Discharge: HOME | End: 2023-08-18
Attending: INTERNAL MEDICINE
Payer: COMMERCIAL

## 2023-08-18 ENCOUNTER — PATIENT MESSAGE (OUTPATIENT)
Dept: ENDOCRINOLOGY CLINIC | Facility: CLINIC | Age: 58
End: 2023-08-18

## 2023-08-18 DIAGNOSIS — Z79.4 TYPE 2 DIABETES MELLITUS WITH HYPERGLYCEMIA, WITH LONG-TERM CURRENT USE OF INSULIN (HCC): Primary | ICD-10-CM

## 2023-08-18 DIAGNOSIS — M25.50 POLYARTHRALGIA: ICD-10-CM

## 2023-08-18 DIAGNOSIS — M54.2 NECK PAIN: ICD-10-CM

## 2023-08-18 DIAGNOSIS — E11.65 TYPE 2 DIABETES MELLITUS WITH HYPERGLYCEMIA, WITH LONG-TERM CURRENT USE OF INSULIN (HCC): Primary | ICD-10-CM

## 2023-08-18 DIAGNOSIS — Z96.41 INSULIN PUMP IN PLACE: ICD-10-CM

## 2023-08-18 DIAGNOSIS — M79.2 NEUROPATHIC PAIN: ICD-10-CM

## 2023-08-18 DIAGNOSIS — R21 SKIN RASH: ICD-10-CM

## 2023-08-18 PROCEDURE — 72040 X-RAY EXAM NECK SPINE 2-3 VW: CPT | Performed by: INTERNAL MEDICINE

## 2023-08-18 NOTE — TELEPHONE ENCOUNTER
From: Mark Washington  To: TENZIN Rankin  Sent: 8/18/2023 11:13 AM CDT  Subject: Insulin     I am on my last box of Humalog, before I refill, I was wondering if I can get vials instead of the pen. It is a pain trying to draw insulin from the pen for the Omnipod.

## 2023-08-19 ENCOUNTER — LAB ENCOUNTER (OUTPATIENT)
Dept: LAB | Age: 58
End: 2023-08-19
Attending: INTERNAL MEDICINE
Payer: COMMERCIAL

## 2023-08-19 ENCOUNTER — HOSPITAL ENCOUNTER (OUTPATIENT)
Dept: GENERAL RADIOLOGY | Age: 58
Discharge: HOME | End: 2023-08-19
Attending: INTERNAL MEDICINE
Payer: COMMERCIAL

## 2023-08-19 DIAGNOSIS — M54.9 MID BACK PAIN: ICD-10-CM

## 2023-08-19 DIAGNOSIS — M79.2 NEUROPATHIC PAIN: ICD-10-CM

## 2023-08-19 DIAGNOSIS — M54.41 CHRONIC MIDLINE LOW BACK PAIN WITH BILATERAL SCIATICA: ICD-10-CM

## 2023-08-19 DIAGNOSIS — M54.42 CHRONIC MIDLINE LOW BACK PAIN WITH BILATERAL SCIATICA: ICD-10-CM

## 2023-08-19 DIAGNOSIS — M25.50 POLYARTHRALGIA: ICD-10-CM

## 2023-08-19 DIAGNOSIS — M53.3 CHRONIC SI JOINT PAIN: ICD-10-CM

## 2023-08-19 DIAGNOSIS — R21 SKIN RASH: ICD-10-CM

## 2023-08-19 DIAGNOSIS — G89.29 CHRONIC SI JOINT PAIN: ICD-10-CM

## 2023-08-19 DIAGNOSIS — Z86.718 HISTORY OF BLOOD CLOTS: ICD-10-CM

## 2023-08-19 DIAGNOSIS — G89.29 CHRONIC MIDLINE LOW BACK PAIN WITH BILATERAL SCIATICA: ICD-10-CM

## 2023-08-19 LAB
C3 SERPL-MCNC: 179 MG/DL (ref 90–180)
C4 SERPL-MCNC: 43 MG/DL (ref 10–40)
CRP SERPL-MCNC: 1.99 MG/DL (ref ?–0.3)
ERYTHROCYTE [SEDIMENTATION RATE] IN BLOOD: 46 MM/HR

## 2023-08-19 PROCEDURE — 72110 X-RAY EXAM L-2 SPINE 4/>VWS: CPT | Performed by: INTERNAL MEDICINE

## 2023-08-19 PROCEDURE — 86146 BETA-2 GLYCOPROTEIN ANTIBODY: CPT

## 2023-08-19 PROCEDURE — 85732 THROMBOPLASTIN TIME PARTIAL: CPT

## 2023-08-19 PROCEDURE — 86160 COMPLEMENT ANTIGEN: CPT

## 2023-08-19 PROCEDURE — 85705 THROMBOPLASTIN INHIBITION: CPT

## 2023-08-19 PROCEDURE — 86140 C-REACTIVE PROTEIN: CPT

## 2023-08-19 PROCEDURE — 86038 ANTINUCLEAR ANTIBODIES: CPT

## 2023-08-19 PROCEDURE — 85652 RBC SED RATE AUTOMATED: CPT

## 2023-08-19 PROCEDURE — 85610 PROTHROMBIN TIME: CPT

## 2023-08-19 PROCEDURE — 86147 CARDIOLIPIN ANTIBODY EA IG: CPT

## 2023-08-19 PROCEDURE — 86037 ANCA TITER EACH ANTIBODY: CPT

## 2023-08-19 PROCEDURE — 72202 X-RAY EXAM SI JOINTS 3/> VWS: CPT | Performed by: INTERNAL MEDICINE

## 2023-08-19 PROCEDURE — 72072 X-RAY EXAM THORAC SPINE 3VWS: CPT | Performed by: INTERNAL MEDICINE

## 2023-08-19 PROCEDURE — 83516 IMMUNOASSAY NONANTIBODY: CPT

## 2023-08-19 PROCEDURE — 85613 RUSSELL VIPER VENOM DILUTED: CPT

## 2023-08-19 PROCEDURE — 36415 COLL VENOUS BLD VENIPUNCTURE: CPT

## 2023-08-20 ENCOUNTER — HOSPITAL ENCOUNTER (OUTPATIENT)
Dept: BONE DENSITY | Age: 58
Discharge: HOME | End: 2023-08-20
Attending: INTERNAL MEDICINE
Payer: COMMERCIAL

## 2023-08-20 ENCOUNTER — HOSPITAL ENCOUNTER (OUTPATIENT)
Dept: BONE DENSITY | Age: 58
End: 2023-08-20
Attending: INTERNAL MEDICINE
Payer: COMMERCIAL

## 2023-08-20 DIAGNOSIS — Z78.0 ASYMPTOMATIC MENOPAUSE: ICD-10-CM

## 2023-08-20 DIAGNOSIS — Z13.820 SCREENING FOR OSTEOPOROSIS: ICD-10-CM

## 2023-08-20 PROCEDURE — 77080 DXA BONE DENSITY AXIAL: CPT | Performed by: INTERNAL MEDICINE

## 2023-08-21 LAB — NUCLEAR IGG TITR SER IF: NEGATIVE {TITER}

## 2023-08-21 RX ORDER — INSULIN LISPRO 100 [IU]/ML
INJECTION, SOLUTION INTRAVENOUS; SUBCUTANEOUS
Qty: 90 ML | Refills: 1 | Status: SHIPPED | OUTPATIENT
Start: 2023-08-21 | End: 2023-08-21

## 2023-08-21 RX ORDER — INSULIN LISPRO 100 [IU]/ML
INJECTION, SOLUTION INTRAVENOUS; SUBCUTANEOUS
Qty: 90 ML | Refills: 1 | Status: SHIPPED | OUTPATIENT
Start: 2023-08-21

## 2023-08-21 NOTE — TELEPHONE ENCOUNTER
Resent Humalog prescription to [facility] clarifying vials.     Lawence Letters APRN, BC-ADM, CDCES

## 2023-08-22 LAB
ANTI-MPO ANTIBODIES: <0.2 UNITS
ANTI-PR3 ANTIBODIES: <0.2 UNITS
APTT PPP: 25.9 SECONDS (ref 23.3–35.6)
INR BLD: 1.12 (ref 0.85–1.16)
LA 3 SCREEN W REFLEX-IMP: NEGATIVE
PROTHROMBIN TIME: 14.4 SECONDS (ref 11.6–14.8)
SCREEN DRVVT: 1.35 S (ref 0–1.29)
SCREEN DRVVT: POSITIVE S
STACLOT LA DELTA: 1 SECONDS (ref ?–8)

## 2023-08-25 ENCOUNTER — PATIENT MESSAGE (OUTPATIENT)
Dept: RHEUMATOLOGY | Facility: CLINIC | Age: 58
End: 2023-08-25

## 2023-08-28 NOTE — TELEPHONE ENCOUNTER
Attempted to call patient but kept ringing without option of leaving voicemail. We will send [employer] message.     08/2023  Lupus anticoagulant Positive  B2G and ACL pending  C3 179 normal  C4 43 normal  MPO, AL-3, c-ANCA, p-ANCA negative  ANNAMARIE by IFA negative  CRP 1.99 elevated  ESR 46 elevated    Maria E Lisa, DO  [facility]  8/28/2023

## 2023-08-28 NOTE — TELEPHONE ENCOUNTER
Phoned pt, pt is co terrible body aches. States it is widespread pain, pt having difficulties with sleep. Tried one dose of Prednisone 20mg which did not offer her any relief.

## 2023-08-29 LAB
B2 GLYCOPROT1 IGG SERPL IA-ACNC: <0.8 U/ML (ref ?–7)
B2 GLYCOPROT1 IGM SERPL IA-ACNC: <2.4 U/ML (ref ?–7)
CARDIOLIPIN IGG SERPL-ACNC: 1.1 GPL (ref ?–10)
CARDIOLIPIN IGM SERPL-ACNC: 1.2 MPL (ref ?–10)

## 2023-08-30 ENCOUNTER — TELEPHONE (OUTPATIENT)
Dept: RHEUMATOLOGY | Facility: CLINIC | Age: 58
End: 2023-08-30

## 2023-09-08 ENCOUNTER — TELEMEDICINE (OUTPATIENT)
Dept: ENDOCRINOLOGY CLINIC | Facility: CLINIC | Age: 58
End: 2023-09-08
Payer: COMMERCIAL

## 2023-09-08 DIAGNOSIS — Z96.41 INSULIN PUMP IN PLACE: ICD-10-CM

## 2023-09-08 DIAGNOSIS — E11.65 TYPE 2 DIABETES MELLITUS WITH HYPERGLYCEMIA, WITH LONG-TERM CURRENT USE OF INSULIN (HCC): Primary | ICD-10-CM

## 2023-09-08 DIAGNOSIS — Z79.4 TYPE 2 DIABETES MELLITUS WITH HYPERGLYCEMIA, WITH LONG-TERM CURRENT USE OF INSULIN (HCC): Primary | ICD-10-CM

## 2023-09-08 PROCEDURE — 95251 CONT GLUC MNTR ANALYSIS I&R: CPT | Performed by: NURSE PRACTITIONER

## 2023-09-08 PROCEDURE — 99213 OFFICE O/P EST LOW 20 MIN: CPT | Performed by: NURSE PRACTITIONER

## 2023-09-08 NOTE — PROGRESS NOTES
HPI:   Jaye Fregoso is a 66year old female who presents virtually for the management of her diabetes. This visit is conducted using Telemedicine with live, two way, interactive video and audio. Patient verbally consents to Telemedicine visit. Patient understands and accepts financial responsibility for any deductible, co-insurance and/or co-pays associated with this service. Chief Complaint: Diabetes Follow Up    Patient is using continuous glucose monitoring or would be testing BGs at least 4 times per day. Patient is on insulin pump therapy or would be administering at least 3 insulin injections per day. Patient is making self-adjustments in insulin according to blood sugars or carbs. Patient experiences high glucose fluctuations.       Diabetes: uncontrolled, needs improvement  Type: 2   Duration:dx 2010  Current Meds: Humalog via insulin pump, Toujeo for insulin pump back up plan, Gvoke for tx of severe hypoglycemia       Long term insulin use: yes  Failed Meds: Byetta, Metformin, Januvia, Actos, Novolog, Levemir, Invokana, Trulicity, glipizide, Victoza, Farxiga - recurrent mycotic infections   Complications: Neuropathy, Gastroparesis, Proteinuria, CAD, CHF, PEDRO    *Patient is currently on steroid therapy    OmniPod 5 Insulin Pump  Automated Mode: 100%    Basal rates:   Max basal: 2.5 units/hour     12MN: 1.75 units/hour     Bolus settings:   Max bolus: 20 units     Carbohydrate ratio:   12MN: 1u:5g    Insulin sensitivity:  12MN: 1u:20 mg/dl    BG target:  12MN: 110 mg/dl    Active insulin: 3 hours     TDD insulin: 83.4 units   Basal: 65.6% units (79%)  Bolus: 17.9 units (21%)  Average daily carbs: 74.7g - 1-2 entries per day    PPersonal FDexcom CGM  Analysis of data: 8/24/2023 - 9/6/2023  % Time CGM is Active: 98.6%  Sensor download: full report  in media  Average glucose : 237 mg/dl     Standard deviation: 61 mg/dl   CV (coefficient of variation) : 25.5%     43% time above 180mg /dl   39% time above 250 mg/dl  18% time in target range:  mg/dl   0% time below target range: 70mg/dl     Evaluation   1. Baseline hyperglycemia  2. Postprandial elevations - not bolusing consistently, not entering correction doses  3. Low likelihood of hypoglycemia         Overall glucose control:   HGBA1C:    Lab Results   Component Value Date    A1C 11.0 (H) 06/14/2023    A1C 7.5 (A) 12/07/2022    A1C 7.1 (A) 08/23/2022     (H) 06/14/2023          Wt Readings from Last 3 Encounters:  08/09/23 : 265 lb (120.2 kg)  08/03/23 : 265 lb (120.2 kg)  07/03/23 : 263 lb (119.3 kg)          Past History:   She  has a past medical history of Allergic rhinitis, cause unspecified (05/05/2008), Anemia, Anxiety and depression, Asthma, Laguerre's esophagus, Churg-Shania syndrome (2022), Colloid cyst of brain ([facility] [address] Congestive heart disease ([facility] Coronary artery disease of native heart with stable angina pectoris ([address] Diabetes ([address] Diabetes mellitus ([address] Diverticulosis of large intestine, Essential hypertension, Gastroparesis, GERD, Hematemesis with nausea (11/02/2017), High cholesterol, History of pulmonary embolus (PE) (12/01/2016), History of recurrent deep vein thrombosis (DVT) (12/04/2014), Hypertension, IBS, LGSIL (low grade squamous intraepithelial dysplasia) (10/2010), Migraines, Moderate persistent asthma without complication (57/65/5893), DESOUZA (nonalcoholic steatohepatitis), NSTEMI (non-ST elevated myocardial infarction) ([address] (2017), Obesity, Osteoarthritis, Patella-femoral syndrome, bilateral knee (01/15/2015), Pneumonia due to organism, and Pulmonary embolism ([address] Her family history includes Anemia in her daughter and mother; Asthma in her daughter and daughter; Colon Cancer in her maternal aunt and maternal grandfather; Dementia in her mother; Depression in her daughter; Heart Disorder in her father and mother; Hypertension in her mother and sister; Lipids in her mother; Obesity in her sister;  Other in her brother, mother, and sister; Stroke in her mother and sister. She  reports that she has never smoked. She has been exposed to tobacco smoke. She has never used smokeless tobacco. She reports that she does not drink alcohol and does not use drugs. She is allergic to peanut-containing drug products and pcn [penicillins]. insulin lispro (HUMALOG) 100 UNIT/ML Injection Solution, Uses up to 100 units daily via insulin pump  methylPREDNISolone (MEDROL) 4 MG Oral Tablet Therapy Pack, As directed. gabapentin 100 MG Oral Cap, Start with 100mg nightly x 2 weeks then increase to 100mg BID  montelukast 10 MG Oral Tab, Take 1 tablet (10 mg total) by mouth nightly. ALPRAZolam 0.5 MG Oral Tab, Take 1 tablet (0.5 mg total) by mouth daily as needed. glucagon (GVOKE HYPOPEN 1-PACK) 1 MG/0.2ML Subcutaneous SUBQ injection, Inject 0.2 mL (1 mg total) into the skin once as needed for Low blood glucose. amLODIPine 2.5 MG Oral Tab, amLODIPine 2.5 mg tablet, [RxNorm: 403792]  Continuous Blood Gluc Transmit (DEXCOM G6 TRANSMITTER) Does not apply Misc, 1 each by Other route every 3 (three) months. Insulin Disposable Pump (OMNIPOD 5 G6 INTRO, GEN 5,) Does not apply Kit, 1 each one time for 1 dose. Insulin Disposable Pump (OMNIPOD 5 G6 POD, GEN 5,) Does not apply Misc, 1 each every other day. DEXCOM G6 SENSOR Does not apply Misc, APPLY TO SKIN EVERY 10 DAYS  Mepolizumab (NUCALA SC), Inject into the skin every 30 (thirty) days. XARELTO 20 MG Oral Tab, TAKE 1 TABLET AT BEDTIME  Tiotropium Bromide Monohydrate (SPIRIVA RESPIMAT) 2.5 MCG/ACT Inhalation Aero Soln, Inhale 2 puffs into the lungs daily. PANTOPRAZOLE 40 MG Oral Tab EC, TAKE 1 TABLET BEFORE BREAKFAST  escitalopram 20 MG Oral Tab, Take 1 tablet (20 mg total) by mouth every morning. Metoprolol-HCTZ ER 25-12.5 MG Oral Tablet 24 Hr, metoprolol succ 25 mg-hydrochlorothiazide 12.5 mg tablet,ext. rel 24 hr   25mg 1/day  Azelastine HCl 0.1 % Nasal Solution,   SUMAtriptan Succinate 50 MG Oral Tab, TAKE 1 TABLET BY MOUTH EVERY 2 HOURS AS NEEDED FOR  MIGRAINE. DO  NOT  EXCEED  200MG  IN  24  HOURS  BREO ELLIPTA 200-25 MCG/INH Inhalation Aerosol Powder, Breath Activated,   Clopidogrel Bisulfate 75 MG Oral Tab, Take 1 tablet (75 mg total) by mouth daily. nightly  losartan 100 MG Oral Tab, Take 1 tablet (100 mg total) by mouth daily. ezetimibe 10 MG Oral Tab, Take 1 tablet (10 mg total) by mouth nightly. torsemide 20 MG Oral Tab, Take 1 tablet (20 mg total) by mouth every morning. Fluticasone Propionate 50 MCG/ACT Nasal Suspension, 2 sprays by Each Nare route 2 (two) times daily. cetirizine (ZYRTEC) 10 MG Oral Tab, Take 1 tablet (10 mg total) by mouth daily. No current facility-administered medications on file prior to visit.       CMP  (most recent labs)   Lab Results   Component Value Date/Time     (H) 06/14/2023 12:37 PM    BUN 8 06/14/2023 12:37 PM    CREATSERUM 0.81 06/14/2023 12:37 PM    GFRNAA 94 03/14/2022 11:07 AM    GFRAA 108 03/14/2022 11:07 AM    EGFRCR 85 06/14/2023 12:37 PM    CA 9.3 06/14/2023 12:37 PM    ALKPHO 98 06/14/2023 12:37 PM    AST 32 06/14/2023 12:37 PM    ALT 39 06/14/2023 12:37 PM    BILT 0.4 06/14/2023 12:37 PM    TP 7.2 06/14/2023 12:37 PM    ALB 3.4 06/14/2023 12:37 PM     06/14/2023 12:37 PM    K 4.5 06/14/2023 12:37 PM     06/14/2023 12:37 PM    CO2 23.0 06/14/2023 12:37 PM           Lipids  (most recent labs)   Lab Results   Component Value Date/Time    CHOLEST 171 06/14/2023 12:37 PM    TRIG 191 (H) 06/14/2023 12:37 PM    HDL 37 (L) 06/14/2023 12:37 PM     (H) 06/14/2023 12:37 PM    NONHDLC 134 (H) 06/14/2023 12:37 PM          Diabetes  (most recent labs)   Lab Results   Component Value Date/Time    A1C 11.0 (H) 06/14/2023 12:38 PM          Microalb (most recent labs)   Lab Results   Component Value Date/Time    [facility] FIRST [place]  04/06/2022 03:09 PM      Comment:      Unable to calculate due to Urine Microalbumin <0.5 mg/dL Lab results reviewed with patient. REVIEW OF SYSTEMS:   SKIN: denies any unusual skin lesions or rashes  RESPIRATORY: c/o congestion and shortness of breath with exertion  CARDIOVASCULAR: denies chest pain on exertion  GI: denies abdominal pain and denies heartburn  NEURO: denies headaches     EXAM:   Physical Exam  Pulmonary:      Comments: Able to speak in complete sentences without difficulty  Neurological:      Mental Status: She is alert and oriented to person, place, and time. Psychiatric:         Mood and Affect: Mood normal.         ASSESSMENT AND PLAN:   Diabetes:  Patient to continue Humalog via insulin pump, Toujeo for insulin pump back up plan and Gvoke for treatment of severe hypoglycemia. Adjustments to insulin pump settings:  *Patient was unable to make changes while on video visit, patient will meet with educator next week to have pump setting changes made as follows. Reinforced importance of bolusing consistently for food intake and correction. OmniPod 5 Insulin Pump  Basal rates:   Max basal: 2.5 units/hour     12MN: 1.9 units/hour (increased from 1.75 units/hour)  12PM: 2.0 units/hour (added)     Bolus settings:   Max bolus: 20 units     Carbohydrate ratio:   12MN: 1u:5g    Insulin sensitivity:  12MN: 1u:20 mg/dl    BG target:  12MN: 110 mg/dl    Active insulin: 3 hours     Insulin pump back up plan: Given Toujeo 60 units daily and continue Humalog ICR 1u:5g and ISF 1u:20 mg/dl for target of 110 mg/dl. For mechanical issues contact [facility]. Patient to continue to use personal Dexcom CGM for glucose monitoring. Discussed self monitoring blood glucose and the importance of monitoring. Patient agreed to monitoring qid- before mealsand 2 hours postprandial of one meal per day if not using CGM. Hypoglycemia S&S, Rx, and when to call APRN/CDE reviewed using Rule of 15's. Stressed need to call if 2 readings below 80 mg/dl in 1 week for medication adjustment.        The patient indicates understanding of these issues and agrees to the plan. Refills addressed at time of office visit. Diagnoses and all orders for this visit:    Type 2 diabetes mellitus with hyperglycemia, with long-term current use of insulin (HCC)  -     OP REFERRAL INADEQUATE GLYCEMIC CONTROL/CHANGE TREATMENT 3 HRS    Insulin pump in place  -     OP REFERRAL INADEQUATE GLYCEMIC CONTROL/CHANGE TREATMENT 3 HRS       Return in about 6 weeks (around 10/19/2023) for 45 minute appointment, Insulin pump, Personal CGM. The risks and benefits of my recommendations, as well as other treatment options were discussed with the patient today. questions were answered to the best of my knowledge. Patient verbalizes understanding and amendable to plan of care.   Duration of this service:  15 minutes   Kathie AJ, BC-ADM, CDCES

## 2023-09-13 ENCOUNTER — TELEPHONE (OUTPATIENT)
Dept: ENDOCRINOLOGY CLINIC | Facility: CLINIC | Age: 58
End: 2023-09-13

## 2023-09-18 ENCOUNTER — TELEPHONE (OUTPATIENT)
Dept: ENDOCRINOLOGY CLINIC | Facility: CLINIC | Age: 58
End: 2023-09-18

## 2023-09-28 DIAGNOSIS — F41.9 ANXIETY: ICD-10-CM

## 2023-09-28 DIAGNOSIS — F33.1 MODERATE EPISODE OF RECURRENT MAJOR DEPRESSIVE DISORDER (HCC): ICD-10-CM

## 2023-09-28 DIAGNOSIS — M79.7 FIBROMYALGIA: ICD-10-CM

## 2023-09-28 RX ORDER — ALPRAZOLAM 0.5 MG/1
TABLET ORAL DAILY PRN
Qty: 45 TABLET | Refills: 0 | OUTPATIENT
Start: 2023-09-28

## 2023-09-28 NOTE — TELEPHONE ENCOUNTER
LOV 08/09/23  Future Appointments   Date Time Provider Praful Kayi   10/3/2023  3:20 PM [facility] [place]   10/19/2023  9:30 AM MADAI Choe [facility] [address]   10/19/2023  9:00 PM [address] [facility] [address]   10/26/2023  2:30 PM Darío Rosales MD [facility] [place]   1/2/2024  2:00 PM Maria E Gonzalez DO [facility] [place]   4/9/2024  1:30 PM Maria E Gonzalez DO [facility] [place]   LF 08/29/23    Patient would like this script to go to [facility]    Medication pended

## 2023-09-28 NOTE — TELEPHONE ENCOUNTER
Requesting Alprazolam 0.5mg  LOV: 8/10/23  RTC: not noted  Last Relevant Labs: 6/14/23  Filled: 8/10/23 #90 with 1 refills    Future Appointments   Date Time Provider Praful Hi   10/3/2023  3:20 PM Pj Jeong MD [facility] [place]   10/19/2023  9:30 AM MADAI Hubbard [facility] [address]   10/19/2023  9:00 PM [address] [facility] [address]   10/26/2023  2:30 PM Kumar Shaffer MD [facility] [place]   1/2/2024  2:00 PM Сергей Gonzalez DO [facility] [place] Amarjit   4/9/2024  1:30 PM Сергей Gonzalez DO [facility] [place] Diver with [facility]	[place], pt has refills on file at the pharmacy.  Okay to disregard this request.

## 2023-09-29 NOTE — TELEPHONE ENCOUNTER
Spoke to patient regarding her Gabapentin refill. Patient states that her local pharmacy only gives her a 21 day supply of the medication. She states that she is taking 100 mg BID and that only gets her 28 tablets for a 21 day supply. She is asking for the script to be resent for 3 months to her [facility].

## 2023-10-01 ENCOUNTER — HOSPITAL ENCOUNTER (EMERGENCY)
Age: 58
Discharge: LEFT BEFORE DISCHARGE | End: 2023-10-01
Payer: COMMERCIAL

## 2023-10-01 VITALS
RESPIRATION RATE: 18 BRPM | DIASTOLIC BLOOD PRESSURE: 71 MMHG | SYSTOLIC BLOOD PRESSURE: 138 MMHG | WEIGHT: 260 LBS | OXYGEN SATURATION: 96 % | HEART RATE: 96 BPM | HEIGHT: 68 IN | TEMPERATURE: 99 F | BODY MASS INDEX: 39.4 KG/M2

## 2023-10-03 ENCOUNTER — MED REC SCAN ONLY (OUTPATIENT)
Facility: CLINIC | Age: 58
End: 2023-10-03

## 2023-10-03 RX ORDER — GABAPENTIN 100 MG/1
100 CAPSULE ORAL 2 TIMES DAILY
Qty: 180 CAPSULE | Refills: 0 | Status: SHIPPED | OUTPATIENT
Start: 2023-10-03

## 2023-10-04 ENCOUNTER — PATIENT MESSAGE (OUTPATIENT)
Dept: FAMILY MEDICINE CLINIC | Facility: CLINIC | Age: 58
End: 2023-10-04

## 2023-10-04 DIAGNOSIS — F41.9 ANXIETY: ICD-10-CM

## 2023-10-04 DIAGNOSIS — F33.1 MODERATE EPISODE OF RECURRENT MAJOR DEPRESSIVE DISORDER (HCC): ICD-10-CM

## 2023-10-05 RX ORDER — ALPRAZOLAM 0.5 MG/1
0.5 TABLET ORAL 2 TIMES DAILY PRN
Qty: 60 TABLET | Refills: 0 | Status: SHIPPED | OUTPATIENT
Start: 2023-10-05 | End: 2023-11-04

## 2023-10-05 NOTE — TELEPHONE ENCOUNTER
From: Jase Blandon  To: Aixa Doll  Sent: 10/4/2023 10:18 AM CDT  Subject: Alprazolam Refill    My Alprazolam was only filled for 21-day supply, which is half of what I normally receive. Can you please send in my usual refill amount?

## 2023-10-05 NOTE — TELEPHONE ENCOUNTER
Medication Quantity Refills Start End   ALPRAZolam 0.5 MG Oral Tab (Discontinued) 45 tablet 0 6/8/2023 8/10/2023   Sig:   Take 1-2 tablets (0.5-1 mg total) by mouth daily as needed.      Route:   Oral     Order #:   [id number]       Please advise if rx can be changed to 2 tablets daily prn?

## 2023-10-10 DIAGNOSIS — E11.65 TYPE 2 DIABETES MELLITUS WITH HYPERGLYCEMIA, WITH LONG-TERM CURRENT USE OF INSULIN (HCC): ICD-10-CM

## 2023-10-10 DIAGNOSIS — Z79.4 TYPE 2 DIABETES MELLITUS WITH HYPERGLYCEMIA, WITH LONG-TERM CURRENT USE OF INSULIN (HCC): ICD-10-CM

## 2023-10-10 RX ORDER — PROCHLORPERAZINE 25 MG/1
1 SUPPOSITORY RECTAL
Qty: 1 EACH | Refills: 1 | Status: SHIPPED | OUTPATIENT
Start: 2023-10-10

## 2023-10-10 NOTE — TELEPHONE ENCOUNTER
Received fax from [facility] requesting for transmitter to be e prescribed there   Requested Prescriptions     Pending Prescriptions Disp Refills    Continuous Blood Gluc Transmit (DEXCOM G6 TRANSMITTER) Does not apply Misc 1 each 1     Si each by Other route every 3 (three) months.      Future Appointments   Date Time Provider Praful Sussy   10/19/2023  9:30 AM MADAI Rooney [facility] [address]   10/19/2023  9:00 PM [address] Lian Mola   10/26/2023  2:30 PM Reza Celeste MD [facility] [place]   10/27/2023  1:00 PM MADAI Pal [facility] [place]   2024  2:00 PM Maria E Gonzalez DO [facility] [place]   2024  1:30 PM Maria E Gonzalez DO [facility] [place]       Last A1c value was 11% done 2023  Refill 23  LOV 8/3/23

## 2023-10-19 ENCOUNTER — OFFICE VISIT (OUTPATIENT)
Dept: ENDOCRINOLOGY CLINIC | Facility: CLINIC | Age: 58
End: 2023-10-19
Payer: COMMERCIAL

## 2023-10-19 VITALS
DIASTOLIC BLOOD PRESSURE: 70 MMHG | BODY MASS INDEX: 41 KG/M2 | RESPIRATION RATE: 16 BRPM | OXYGEN SATURATION: 92 % | WEIGHT: 268 LBS | HEART RATE: 89 BPM | SYSTOLIC BLOOD PRESSURE: 136 MMHG

## 2023-10-19 DIAGNOSIS — E11.65 TYPE 2 DIABETES MELLITUS WITH HYPERGLYCEMIA, WITH LONG-TERM CURRENT USE OF INSULIN (HCC): Primary | ICD-10-CM

## 2023-10-19 DIAGNOSIS — I10 ESSENTIAL HYPERTENSION: ICD-10-CM

## 2023-10-19 DIAGNOSIS — Z96.41 INSULIN PUMP IN PLACE: ICD-10-CM

## 2023-10-19 DIAGNOSIS — E66.01 CLASS 3 SEVERE OBESITY DUE TO EXCESS CALORIES WITH SERIOUS COMORBIDITY AND BODY MASS INDEX (BMI) OF 40.0 TO 44.9 IN ADULT (HCC): ICD-10-CM

## 2023-10-19 DIAGNOSIS — Z79.4 TYPE 2 DIABETES MELLITUS WITH HYPERGLYCEMIA, WITH LONG-TERM CURRENT USE OF INSULIN (HCC): Primary | ICD-10-CM

## 2023-10-19 DIAGNOSIS — E78.2 HYPERLIPIDEMIA, MIXED: ICD-10-CM

## 2023-10-19 LAB
CARTRIDGE LOT#: 612 NUMERIC
HEMOGLOBIN A1C: 7.8 % (ref 4.3–5.6)

## 2023-10-19 PROCEDURE — 3051F HG A1C>EQUAL 7.0%<8.0%: CPT | Performed by: STUDENT IN AN ORGANIZED HEALTH CARE EDUCATION/TRAINING PROGRAM

## 2023-10-19 PROCEDURE — 95251 CONT GLUC MNTR ANALYSIS I&R: CPT | Performed by: NURSE PRACTITIONER

## 2023-10-19 PROCEDURE — 99215 OFFICE O/P EST HI 40 MIN: CPT | Performed by: NURSE PRACTITIONER

## 2023-10-19 PROCEDURE — 83036 HEMOGLOBIN GLYCOSYLATED A1C: CPT | Performed by: NURSE PRACTITIONER

## 2023-10-19 PROCEDURE — 3078F DIAST BP <80 MM HG: CPT | Performed by: NURSE PRACTITIONER

## 2023-10-19 PROCEDURE — 3075F SYST BP GE 130 - 139MM HG: CPT | Performed by: NURSE PRACTITIONER

## 2023-10-25 ENCOUNTER — OFFICE VISIT (OUTPATIENT)
Facility: CLINIC | Age: 58
End: 2023-10-25

## 2023-10-25 VITALS — DIASTOLIC BLOOD PRESSURE: 78 MMHG | OXYGEN SATURATION: 97 % | HEART RATE: 86 BPM | SYSTOLIC BLOOD PRESSURE: 128 MMHG

## 2023-10-25 DIAGNOSIS — E11.65 TYPE 2 DIABETES MELLITUS WITH HYPERGLYCEMIA, WITH LONG-TERM CURRENT USE OF INSULIN (HCC): Primary | ICD-10-CM

## 2023-10-25 DIAGNOSIS — J45.40 MODERATE PERSISTENT ASTHMA WITHOUT COMPLICATION: ICD-10-CM

## 2023-10-25 DIAGNOSIS — I50.20 HFREF (HEART FAILURE WITH REDUCED EJECTION FRACTION) (HCC): ICD-10-CM

## 2023-10-25 DIAGNOSIS — Z79.4 TYPE 2 DIABETES MELLITUS WITH HYPERGLYCEMIA, WITH LONG-TERM CURRENT USE OF INSULIN (HCC): Primary | ICD-10-CM

## 2023-10-25 DIAGNOSIS — E66.01 CLASS 3 SEVERE OBESITY DUE TO EXCESS CALORIES WITH SERIOUS COMORBIDITY AND BODY MASS INDEX (BMI) OF 40.0 TO 44.9 IN ADULT (HCC): ICD-10-CM

## 2023-10-25 PROCEDURE — 3074F SYST BP LT 130 MM HG: CPT | Performed by: STUDENT IN AN ORGANIZED HEALTH CARE EDUCATION/TRAINING PROGRAM

## 2023-10-25 PROCEDURE — 95251 CONT GLUC MNTR ANALYSIS I&R: CPT | Performed by: STUDENT IN AN ORGANIZED HEALTH CARE EDUCATION/TRAINING PROGRAM

## 2023-10-25 PROCEDURE — 3078F DIAST BP <80 MM HG: CPT | Performed by: STUDENT IN AN ORGANIZED HEALTH CARE EDUCATION/TRAINING PROGRAM

## 2023-10-25 PROCEDURE — 99215 OFFICE O/P EST HI 40 MIN: CPT | Performed by: STUDENT IN AN ORGANIZED HEALTH CARE EDUCATION/TRAINING PROGRAM

## 2023-10-25 NOTE — PROGRESS NOTES
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 Initial HPI consult in 2023:  Diagnosed age: 0731-[age] (steroid-induced, chronic prednisone for asthma)  Follows with DM APN, LV 2023    Currently follows with [facility]; on-and-off prednisone depending on asthma flares with Churg-Shania, currently not on any steroids    Current DM meds: Toujeo 96U; Humalog 16U qAC TID + ISF 2:50>150 (doesn't carb count exactly); been on MDI x ~6 months now  Previously trialed/failed (from DM Apn note): Byetta, Metformin (GI), Januvia, Actos, Novolog, Levemir, Invokana, Trulicity, glipizide, Victoza, Farxiga - recurrent mycotic infections     It was previously 7% in 2023    Blood Glucose log/CGM: per memory  Checking 3-4 times per day  Morning/fastins  Pre-meal: 200s  HS: 200s  Episodes of hypoglycemia: No    Sometimes not a great appetite but does try to eat low carb and high protein  Has downloaded a carb-counting abrahan  Hasn't worn Dexcom recently due to various imaging tests lately, has been waiting for mail order of new refills    Is interested in Omnipod 5, ordered by previous DM provider, has not picked it up yet    Interim hx:  Oct 2023  Pt has started on Omnipod5 in auto mode since last visit  Settings:   Basal 1.90-1.95U/hr, ICR 1:5, ISF 1:20 (basal was just increased from 1.75 since 1 week ago by DM APN)  She has been told to continue f/u with endocrine  Has been on/off antibiotic and steroids during the past few months for sinus infxn and inner ear imbalance. Poor appetite, does try to drink sugar-free drinks, but notices BG increases when in pain.   Also looking into weight loss options  Also with painful neuropathy      Continuous Glucose Monitoring Interpretation    Jose Tee has undergone continuous glucose monitoring with the Dexcom CGM. The blood glucose tracings were evaluated for two weeks prior to office visit. Blood glucose tracings demonstrated areas of  hyperglycemia throughout the entire day, no specific pattern  There were no areas of hypoglycemia during the weeks of evaluation. At goal () 19% of the time. High 41% of the time. Very high 10% of the time. Low 0% of the time. Very low 0% of the time. REVIEW OF SYSTEMS  Ten point review of systems has been performed and is otherwise negative and/or non-contributory, except as described above. Medications:     Current Outpatient Medications:     Continuous Blood Gluc Transmit (DEXCOM G6 TRANSMITTER) Does not apply Misc, 1 each by Other route every 3 (three) months., Disp: 1 each, Rfl: 1    ALPRAZolam 0.5 MG Oral Tab, Take 1 tablet (0.5 mg total) by mouth 2 (two) times daily as needed. , Disp: 60 tablet, Rfl: 0    gabapentin 100 MG Oral Cap, Take 1 capsule (100 mg total) by mouth 2 (two) times daily. , Disp: 180 capsule, Rfl: 0    methylPREDNISolone (MEDROL) 4 MG Oral Tablet Therapy Pack, As directed., Disp: 1 each, Rfl: 0    insulin lispro (HUMALOG) 100 UNIT/ML Injection Solution, Uses up to 100 units daily via insulin pump, Disp: 90 mL, Rfl: 1    montelukast 10 MG Oral Tab, Take 1 tablet (10 mg total) by mouth nightly., Disp: 90 tablet, Rfl: 0    glucagon (GVOKE HYPOPEN 1-PACK) 1 MG/0.2ML Subcutaneous SUBQ injection, Inject 0.2 mL (1 mg total) into the skin once as needed for Low blood glucose., Disp: 0.2 mL, Rfl: 1    amLODIPine 2.5 MG Oral Tab, amLODIPine 2.5 mg tablet, [RxNorm: 751323], Disp: , Rfl:     Insulin Disposable Pump (OMNIPOD 5 G6 POD, GEN 5,) Does not apply Misc, 1 each every other day., Disp: 45 each, Rfl: 1    DEXCOM G6 SENSOR Does not apply Misc, APPLY TO SKIN EVERY 10 DAYS, Disp: 9 each, Rfl: 3    Mepolizumab (NUCALA SC), Inject into the skin every 30 (thirty) days. , Disp: , Rfl:     XARELTO 20 MG Oral Tab, TAKE 1 TABLET AT BEDTIME, Disp: 90 tablet, Rfl: 2    Tiotropium Bromide Monohydrate (SPIRIVA RESPIMAT) 2.5 MCG/ACT Inhalation Aero Soln, Inhale 2 puffs into the lungs daily. , Disp: 1 each, Rfl: 5    PANTOPRAZOLE 40 MG Oral Tab EC, TAKE 1 TABLET BEFORE BREAKFAST, Disp: 90 tablet, Rfl: 3    escitalopram 20 MG Oral Tab, Take 1 tablet (20 mg total) by mouth every morning., Disp: 90 tablet, Rfl: 3    Metoprolol-HCTZ ER 25-12.5 MG Oral Tablet 24 Hr, metoprolol succ 25 mg-hydrochlorothiazide 12.5 mg tablet,ext. rel 24 hr  25mg 1/day, Disp: , Rfl:     Azelastine HCl 0.1 % Nasal Solution, , Disp: , Rfl:     SUMAtriptan Succinate 50 MG Oral Tab, TAKE 1 TABLET BY MOUTH EVERY 2 HOURS AS NEEDED FOR  MIGRAINE. DO  NOT  EXCEED  200MG  IN  24  HOURS, Disp: 9 tablet, Rfl: 3    BREO ELLIPTA 200-25 MCG/INH Inhalation Aerosol Powder, Breath Activated, , Disp: , Rfl:     Clopidogrel Bisulfate 75 MG Oral Tab, Take 1 tablet (75 mg total) by mouth daily. nightly, Disp: , Rfl:     losartan 100 MG Oral Tab, Take 1 tablet (100 mg total) by mouth daily. , Disp: , Rfl:     ezetimibe 10 MG Oral Tab, Take 1 tablet (10 mg total) by mouth nightly., Disp: , Rfl:     Fluticasone Propionate 50 MCG/ACT Nasal Suspension, 2 sprays by Each Nare route 2 (two) times daily. , Disp: , Rfl:     cetirizine (ZYRTEC) 10 MG Oral Tab, Take 1 tablet (10 mg total) by mouth daily. , Disp: , Rfl:      Allergies:     Peanut-Containing D*    HIVES, SHORTNESS OF BREATH    Comment:Hives, asthma attack  Pcn [Penicillins]       UNKNOWN    Comment:HAPPENED AS A CHILD    Social History:   Social History    Socioeconomic History      Marital status:       Number of children: 3    Occupational History      Occupation: homemaker    Tobacco Use      Smoking status: Never      Smokeless tobacco: Never    Vaping Use      Vaping Use: Never used    Substance and Sexual Activity      Alcohol use: No      Drug use:  No Sexual activity: Never        Partners: Male        Birth control/protection: I.U.D.       Medical History:   Past Medical History:   Diagnosis Date    Allergic rhinitis, cause unspecified 05/05/2008    Anemia     Anxiety and depression     Asthma     Laguerre's esophagus     Churg-Shania syndrome  2022    Colloid cyst of brain (HCC)     Congestive heart disease ([facility] [address]     Coronary artery disease of native heart with stable angina pectoris ([facility] [address]     Diabetes ([facility] [address]     Diabetes mellitus ([facility] [address]     Diverticulosis of large intestine     Essential hypertension     Gastroparesis     GERD     Hematemesis with nausea 11/02/2017 2017, No EGD, Hgb stable    High cholesterol     History of pulmonary embolus (PE) 12/01/2016    History of recurrent deep vein thrombosis (DVT) 12/04/2014    Hypertension     IBS     LGSIL (low grade squamous intraepithelial dysplasia) 10/2010    Pap neg 2014    Migraines     perfumes    Moderate persistent asthma without complication 46/39/7854    DESOUZA (nonalcoholic steatohepatitis)     NSTEMI (non-ST elevated myocardial infarction) ([facility] [address] 2017    FELTON in LAD    Obesity     Osteoarthritis     Patella-femoral syndrome, bilateral knee 01/15/2015    Pneumonia due to organism     Pulmonary embolism [facility])        Surgical history:   Past Surgical History:   Procedure Laterality Date    BREAST SURGERY PROCEDURE UNLISTED      cyst removed    CATH DRUG ELUTING STENT      CHOLECYSTECTOMY  04/10/2011    PERFORMED BY DR KRAFT-LAPAROSCOPIC    COLONOSCOPY  2012    polyps    COLONOSCOPY N/A 2/23/2016    Procedure: COLONOSCOPY;  Surgeon: Dawood Escamilla MD;  Location: [address] [place] [address] ENDOSCOPY    COLPOSCOPY, CERVIX W/UPPER ADJACENT VAGINA; W/ENDOCERVICAL CURETTAGE  2-2011    WNL    CYST ASPIRATION LEFT  2000    OTHER SURGICAL HISTORY      sinus surgery    OTHER SURGICAL HISTORY      CYST REMOVED ON LEFT WRIST 17 YRS AGO    SINUS SURGERY        UPPER GI ENDOSCOPY,EXAM           PHYSICAL EXAM  There were no vitals filed for this visit. CONSTITUTIONAL:  awake, alert, cooperative, no apparent distress, and appears stated age  PSYCH: normal affect  LUNGS: breathing comfortably  CARDIOVASCULAR:  regular rate         Labs/Imaging:  Reviewed      ASSESSMENT/PLAN:    (E11.65,  Z79.4) Type 2 diabetes mellitus with hyperglycemia, with long-term current use of insulin ([facility])  (primary encounter diagnosis)  ((J45.40) Moderate persistent asthma without complication  Class 3 severe obesity due to excess calories with serious comorbidity and body mass index (BMI) of 40.0 to 44.9 in adult ([facility])  (I50.20) HFrEF (heart failure with reduced ejection fraction) ([facility] [address]    Plan:   A1C improving but still above goal, contributed by frequent steroid courses for asthma. Pt currently on Omnipod5 running 100% in auto mode. She is still under a lot of stress and pain from neuropathy and sinus infection requiring intermittent steroids/antibiotic. In the future with steroid courses, options include layering with NPH vs temp settings (will need to switch to manual mode). Pt has previously trialed and intolerant of many non-insulin options. She responded well to SGLT2i but had frequent yeast infxn. We discussed the importance of glycemic control to prevent complications of diabetes. We discussed the importance of SBGM and consistent, low carb diet as well as moderate exercise as well. We also discussed the complications of diabetes include retinopathy, neuropathy, nephropathy and cardiovascular disease.   - incr basal rate from 1.95--> 2.2U/hr  - continue in auto mode  - consider lowering active insulin time to 2hrs next time  - insulin pattern is basal-heavy; pt doesn't have a good appetite right now with the sinus infxn   - in case of pump failure: use Toujeo 50U, humalog ICR 1:5, ISF :120.  For mechanical issues contact [facility]  - continue Dexcom G6  - weight clinic; she will  self-refer  - consider self-referral to pain clinic for back pain/neuropathy    - Ophtho: no DR, is due for annual exam  - BP controlled, on ARB beta blocker and beta block  - LDL nearlyat goal, on ezetimibe ; not on statin due to myopathy  - MAB negative, on ARB  - Neuropathy/ Foot exam: No data recorded  - CAD: yes     No follow-ups on file. A total of 40  minutes was spent today on obtaining history, reviewing pertinent labs, evaluating patient, providing multiple treatment options, reinforcing diet/exercise and compliance, and completing documentation.      10/25/2023  Catalina Benitez MD

## 2023-10-27 ENCOUNTER — OFFICE VISIT (OUTPATIENT)
Dept: FAMILY MEDICINE CLINIC | Facility: CLINIC | Age: 58
End: 2023-10-27

## 2023-10-27 ENCOUNTER — PATIENT MESSAGE (OUTPATIENT)
Facility: CLINIC | Age: 58
End: 2023-10-27

## 2023-10-27 ENCOUNTER — HOSPITAL ENCOUNTER (OUTPATIENT)
Dept: GENERAL RADIOLOGY | Age: 58
Discharge: HOME | End: 2023-10-27
Attending: INTERNAL MEDICINE

## 2023-10-27 VITALS
HEART RATE: 105 BPM | SYSTOLIC BLOOD PRESSURE: 150 MMHG | DIASTOLIC BLOOD PRESSURE: 98 MMHG | BODY MASS INDEX: 40.14 KG/M2 | WEIGHT: 271 LBS | OXYGEN SATURATION: 95 % | HEIGHT: 69 IN | TEMPERATURE: 98 F | RESPIRATION RATE: 16 BRPM

## 2023-10-27 DIAGNOSIS — F41.9 ANXIETY: ICD-10-CM

## 2023-10-27 DIAGNOSIS — E04.1 THYROID NODULE: ICD-10-CM

## 2023-10-27 DIAGNOSIS — J45.902 PERSISTENT ASTHMA WITH STATUS ASTHMATICUS, UNSPECIFIED ASTHMA SEVERITY: ICD-10-CM

## 2023-10-27 DIAGNOSIS — F33.1 MODERATE EPISODE OF RECURRENT MAJOR DEPRESSIVE DISORDER (HCC): ICD-10-CM

## 2023-10-27 DIAGNOSIS — R06.02 SHORTNESS OF BREATH: Primary | ICD-10-CM

## 2023-10-27 PROBLEM — E55.9 VITAMIN D DEFICIENCY: Status: ACTIVE | Noted: 2023-06-26

## 2023-10-27 PROBLEM — I87.2 VENOUS INSUFFICIENCY: Status: ACTIVE | Noted: 2023-06-26

## 2023-10-27 PROBLEM — E11.9 TYPE 2 DIABETES MELLITUS WITHOUT COMPLICATION (HCC): Status: ACTIVE | Noted: 2023-06-26

## 2023-10-27 PROBLEM — I83.813 VARICOSE VEINS OF BOTH LOWER EXTREMITIES WITH PAIN: Status: ACTIVE | Noted: 2023-06-26

## 2023-10-27 LAB
ALBUMIN SERPL-MCNC: 3.5 G/DL (ref 3.4–5)
ALBUMIN/GLOB SERPL: 0.9 {RATIO} (ref 1–2)
ALP LIVER SERPL-CCNC: 119 U/L
ALT SERPL-CCNC: 43 U/L
ANION GAP SERPL CALC-SCNC: 5 MMOL/L (ref 0–18)
AST SERPL-CCNC: 22 U/L (ref 15–37)
BASOPHILS # BLD AUTO: 0.12 X10(3) UL (ref 0–0.2)
BASOPHILS NFR BLD AUTO: 1.2 %
BILIRUB SERPL-MCNC: 0.3 MG/DL (ref 0.1–2)
BUN BLD-MCNC: 7 MG/DL (ref 7–18)
CALCIUM BLD-MCNC: 9.5 MG/DL (ref 8.5–10.1)
CHLORIDE SERPL-SCNC: 103 MMOL/L (ref 98–112)
CO2 SERPL-SCNC: 28 MMOL/L (ref 21–32)
CREAT BLD-MCNC: 1.01 MG/DL
EGFRCR SERPLBLD CKD-EPI 2021: 65 ML/MIN/1.73M2 (ref 60–?)
EOSINOPHIL # BLD AUTO: 0.15 X10(3) UL (ref 0–0.7)
EOSINOPHIL NFR BLD AUTO: 1.5 %
ERYTHROCYTE [DISTWIDTH] IN BLOOD BY AUTOMATED COUNT: 17 %
FASTING STATUS PATIENT QL REPORTED: NO
GLOBULIN PLAS-MCNC: 4.1 G/DL (ref 2.8–4.4)
GLUCOSE BLD-MCNC: 160 MG/DL (ref 70–99)
HCT VFR BLD AUTO: 45.7 %
HGB BLD-MCNC: 14.1 G/DL
IMM GRANULOCYTES # BLD AUTO: 0.04 X10(3) UL (ref 0–1)
IMM GRANULOCYTES NFR BLD: 0.4 %
LYMPHOCYTES # BLD AUTO: 4.33 X10(3) UL (ref 1–4)
LYMPHOCYTES NFR BLD AUTO: 42.9 %
MCH RBC QN AUTO: 24 PG (ref 26–34)
MCHC RBC AUTO-ENTMCNC: 30.9 G/DL (ref 31–37)
MCV RBC AUTO: 77.9 FL
MONOCYTES # BLD AUTO: 0.48 X10(3) UL (ref 0.1–1)
MONOCYTES NFR BLD AUTO: 4.8 %
NEUTROPHILS # BLD AUTO: 4.97 X10 (3) UL (ref 1.5–7.7)
NEUTROPHILS # BLD AUTO: 4.97 X10(3) UL (ref 1.5–7.7)
NEUTROPHILS NFR BLD AUTO: 49.2 %
OSMOLALITY SERPL CALC.SUM OF ELEC: 283 MOSM/KG (ref 275–295)
PLATELET # BLD AUTO: 406 10(3)UL (ref 150–450)
POTASSIUM SERPL-SCNC: 3.9 MMOL/L (ref 3.5–5.1)
PROT SERPL-MCNC: 7.6 G/DL (ref 6.4–8.2)
RBC # BLD AUTO: 5.87 X10(6)UL
SODIUM SERPL-SCNC: 136 MMOL/L (ref 136–145)
TSI SER-ACNC: 0.8 MIU/ML (ref 0.36–3.74)
WBC # BLD AUTO: 10.1 X10(3) UL (ref 4–11)

## 2023-10-27 PROCEDURE — 3077F SYST BP >= 140 MM HG: CPT | Performed by: NURSE PRACTITIONER

## 2023-10-27 PROCEDURE — 85025 COMPLETE CBC W/AUTO DIFF WBC: CPT | Performed by: NURSE PRACTITIONER

## 2023-10-27 PROCEDURE — 80053 COMPREHEN METABOLIC PANEL: CPT | Performed by: NURSE PRACTITIONER

## 2023-10-27 PROCEDURE — 71046 X-RAY EXAM CHEST 2 VIEWS: CPT | Performed by: INTERNAL MEDICINE

## 2023-10-27 PROCEDURE — 99213 OFFICE O/P EST LOW 20 MIN: CPT | Performed by: NURSE PRACTITIONER

## 2023-10-27 PROCEDURE — 3080F DIAST BP >= 90 MM HG: CPT | Performed by: NURSE PRACTITIONER

## 2023-10-27 PROCEDURE — 84443 ASSAY THYROID STIM HORMONE: CPT | Performed by: NURSE PRACTITIONER

## 2023-10-27 PROCEDURE — 3008F BODY MASS INDEX DOCD: CPT | Performed by: NURSE PRACTITIONER

## 2023-10-27 RX ORDER — ESCITALOPRAM OXALATE 20 MG/1
20 TABLET ORAL EVERY MORNING
Qty: 90 TABLET | Refills: 1 | Status: SHIPPED | OUTPATIENT
Start: 2023-10-27

## 2023-10-30 ENCOUNTER — TELEPHONE (OUTPATIENT)
Dept: FAMILY MEDICINE CLINIC | Facility: CLINIC | Age: 58
End: 2023-10-30

## 2023-10-30 RX ORDER — ALBUTEROL SULFATE 90 UG/1
2 AEROSOL, METERED RESPIRATORY (INHALATION)
Qty: 3 EACH | Refills: 1 | Status: SHIPPED | OUTPATIENT
Start: 2023-10-30

## 2023-10-30 RX ORDER — MONTELUKAST SODIUM 10 MG/1
10 TABLET ORAL NIGHTLY
Qty: 90 TABLET | Refills: 1 | Status: SHIPPED | OUTPATIENT
Start: 2023-10-30

## 2023-10-30 NOTE — TELEPHONE ENCOUNTER
----- Message from MADAI Dubose sent at 10/29/2023  6:59 PM CDT -----  CMP - blood sugar is elevated, expected in nonfasting state.   Electrolytes, kidney function, liver enzymes and protein levels are normal    CBC - blood counts are all stable, cells are slightly smaller and more pale than average, consider daily multivitamin that contains iron for women if not already taking one (gummies do not tend to have iron in them fyi)    TSH - thyroid is within normal range

## 2023-10-30 NOTE — TELEPHONE ENCOUNTER
Pt requesting refill for inhaler albuterol and montelukast. [facility] sent to notify pt [facility].

## 2023-10-30 NOTE — TELEPHONE ENCOUNTER
Attempted to call pt - VMB full, cannot accept new messages    [facility] sent to pt  Notify me if not read by 11/06/23

## 2023-11-04 ENCOUNTER — PATIENT MESSAGE (OUTPATIENT)
Facility: CLINIC | Age: 58
End: 2023-11-04

## 2023-11-06 NOTE — TELEPHONE ENCOUNTER
Pt called re: setting THV with Flavia AJ. LVM to call office. Update: Per MADAI Chacon pt not a candidate to start Paxlovid due to contraindication/interreactions with current home meds. Pt states her Spo2 sats are mid to low 90's. Currently 94%. Pt norm is 97%. Denies dyspnea and/or chest pain. Pt advised if breathing worsens and is unrelieved by inhalers or develops chest pain, she should go to the ER for evaluation. Pt verbalized understanding. States she took prednisone 40 mg yesterday and 40 mg the day prior. Asking if she should continue. Will discuss with Dr. Kumar Marsh. Update: Per Beltran AJ she discussed with Dr. Nita Greco and there's no role for steroids when not hypoxic. She recommends pt to take Tylenol or Ibuprofen if needed. Pt called and made aware they don't recommend steroids unless she's short of breath and advised to take tylenol or ibuprofen. I will check with Dr. Kumar Marsh tomorrow. Pt verbalized understanding.

## 2023-11-06 NOTE — TELEPHONE ENCOUNTER
Via [facility], pt advised to call office after 830am to schedule THV. Will check in with pt.

## 2023-11-06 NOTE — TELEPHONE ENCOUNTER
From: Jocy Perez  To: Wendy Calderon  Sent: 11/4/2023 12:25 PM CDT  Subject: COVID    I have been really miserable for the last 2 days; I took at home covid test, and it was positive. I have a fever, stuffiness, and extremely bad headache. I am a little wheezy but not horrible. Do I need to do anything or just let it runs its course?

## 2023-12-06 ENCOUNTER — LAB ENCOUNTER (OUTPATIENT)
Dept: LAB | Facility: HOSPITAL | Age: 58
End: 2023-12-06
Payer: COMMERCIAL

## 2023-12-06 ENCOUNTER — OFFICE VISIT (OUTPATIENT)
Facility: CLINIC | Age: 58
End: 2023-12-06
Payer: COMMERCIAL

## 2023-12-06 VITALS — DIASTOLIC BLOOD PRESSURE: 84 MMHG | SYSTOLIC BLOOD PRESSURE: 136 MMHG | HEART RATE: 96 BPM | OXYGEN SATURATION: 94 %

## 2023-12-06 DIAGNOSIS — R53.83 FATIGUE, UNSPECIFIED TYPE: ICD-10-CM

## 2023-12-06 DIAGNOSIS — E55.9 VITAMIN D DEFICIENCY: ICD-10-CM

## 2023-12-06 DIAGNOSIS — E11.65 TYPE 2 DIABETES MELLITUS WITH HYPERGLYCEMIA, WITH LONG-TERM CURRENT USE OF INSULIN (HCC): Primary | ICD-10-CM

## 2023-12-06 DIAGNOSIS — Z79.4 TYPE 2 DIABETES MELLITUS WITH HYPERGLYCEMIA, WITH LONG-TERM CURRENT USE OF INSULIN (HCC): Primary | ICD-10-CM

## 2023-12-06 LAB
T4 FREE SERPL-MCNC: 0.9 NG/DL (ref 0.8–1.7)
TSI SER-ACNC: 0.87 MIU/ML (ref 0.36–3.74)
VIT D+METAB SERPL-MCNC: 5.3 NG/ML (ref 30–100)

## 2023-12-06 PROCEDURE — 95251 CONT GLUC MNTR ANALYSIS I&R: CPT

## 2023-12-06 PROCEDURE — 3079F DIAST BP 80-89 MM HG: CPT

## 2023-12-06 PROCEDURE — 3075F SYST BP GE 130 - 139MM HG: CPT

## 2023-12-06 PROCEDURE — 84439 ASSAY OF FREE THYROXINE: CPT

## 2023-12-06 PROCEDURE — 99214 OFFICE O/P EST MOD 30 MIN: CPT

## 2023-12-06 PROCEDURE — 84443 ASSAY THYROID STIM HORMONE: CPT

## 2023-12-06 PROCEDURE — 36415 COLL VENOUS BLD VENIPUNCTURE: CPT

## 2023-12-06 PROCEDURE — 82306 VITAMIN D 25 HYDROXY: CPT

## 2023-12-06 NOTE — PROGRESS NOTES
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 Initial HPI consult in 2023:  Diagnosed age: 9178-1724 (steroid-induced, chronic prednisone for asthma)  Follows with DM APN, LV 2023    Currently follows with [facility]; on-and-off prednisone depending on asthma flares with Churg-Shania, currently not on any steroids    Current DM meds: Toujeo 96U; Humalog 16U qAC TID + ISF 2:50>150 (doesn't carb count exactly); been on MDI x ~6 months now  Previously trialed/failed (from DM Apn note): Byetta, Metformin (GI), Januvia, Actos, Novolog, Levemir, Invokana, Trulicity, glipizide, Victoza, Farxiga - recurrent mycotic infections     It was previously 7% in 2023    Blood Glucose log/CGM: per memory  Checking 3-4 times per day  Morning/fastins  Pre-meal: 200s  HS: 200s  Episodes of hypoglycemia: No    Sometimes not a great appetite but does try to eat low carb and high protein  Has downloaded a carb-counting abrahan  Hasn't worn Dexcom recently due to various imaging tests lately, has been waiting for mail order of new refills    Is interested in Omnipod 5, ordered by previous DM provider, has not picked it up yet    Interim hx:  Oct 2023  Pt has started on Omnipod5 in auto mode since last visit  Settings:   Basal 1.90-1.95U/hr, ICR 1:5, ISF 1:20 (basal was just increased from 1.75 since 1 week ago by DM APN)  She has been told to continue f/u with endocrine  Has been on/off antibiotic and steroids during the past few months for sinus infxn and inner ear imbalance. Poor appetite, does try to drink sugar-free drinks, but notices BG increases when in pain.   Also looking into weight loss options  Also with painful neuropathy    Dec 2023- w/ christy DAVE  Had COVID two weeks after her appt; feeling incr'd fatigue since her COVID infxn. Of note did have vit D deficiency in June, not taking Vit D currently  Then had trouble getting Omnipod5 from pharmacy; now it's better (1 month off); was taking MDI in that time    Omnipod 5 Auto Mode insulin pump with Humalog U100 insulin + Dexcom G6 CGM  Daily dose: 109.9 units  Average totals: Basal/day= 74.6u (68%), bolus/day 35.3u (32%)  Active insulin time: 3 hours  BASAL: MN: 2.20 u/hr  BOLUS:  ISF: 18 mg/dl  ICR 5 g/Unit  BG correction threshold: 110 mg/dl  BG target range: 110 mg/dl      Continuous Glucose Monitoring Interpretation    [place] has undergone continuous glucose monitoring with the Dexcom CGM. The blood glucose tracings were evaluated for two weeks prior to office visit. Blood glucose tracings demonstrated areas of  hyperglycemia throughout the entire day, no specific pattern  There were no areas of hypoglycemia during the weeks of evaluation. At goal () 19% of the time. High 41% of the time. Very high 10% of the time. Low 0% of the time. Very low 0% of the time. REVIEW OF SYSTEMS  Ten point review of systems has been performed and is otherwise negative and/or non-contributory, except as described above. Medications:     Current Outpatient Medications:     predniSONE 10 MG Oral Tab, Take 3 tabs (30mg) daily for 2 days, then take 2 tabs (20mg) daily for 2 days, then take 1 tab (10mg) daily for 2 days. , Disp: 12 tablet, Rfl: 0    albuterol 108 (90 Base) MCG/ACT Inhalation Aero Soln, Inhale 2 puffs into the lungs every 4 to 6 hours as needed for Wheezing or Shortness of Breath., Disp: 3 each, Rfl: 1    montelukast 10 MG Oral Tab, Take 1 tablet (10 mg total) by mouth nightly., Disp: 90 tablet, Rfl: 1    escitalopram 20 MG Oral Tab, Take 1 tablet (20 mg total) by mouth every morning., Disp: 90 tablet, Rfl: 1    Continuous Blood Gluc Transmit (DEXCOM G6 TRANSMITTER) Does not apply Misc, 1 each by Other route every 3 (three) months., Disp: 1 each, Rfl: 1    gabapentin 100 MG Oral Cap, Take 1 capsule (100 mg total) by mouth 2 (two) times daily. , Disp: 180 capsule, Rfl: 0    methylPREDNISolone (MEDROL) 4 MG Oral Tablet Therapy Pack, As directed., Disp: 1 each, Rfl: 0    insulin lispro (HUMALOG) 100 UNIT/ML Injection Solution, Uses up to 100 units daily via insulin pump, Disp: 90 mL, Rfl: 1    amLODIPine 2.5 MG Oral Tab, amLODIPine 2.5 mg tablet, [RxNorm: 892551], Disp: , Rfl:     Insulin Disposable Pump (OMNIPOD 5 G6 POD, GEN 5,) Does not apply Misc, 1 each every other day., Disp: 45 each, Rfl: 1    DEXCOM G6 SENSOR Does not apply Misc, APPLY TO SKIN EVERY 10 DAYS, Disp: 9 each, Rfl: 3    Mepolizumab (NUCALA SC), Inject into the skin every 30 (thirty) days. , Disp: , Rfl:     XARELTO 20 MG Oral Tab, TAKE 1 TABLET AT BEDTIME, Disp: 90 tablet, Rfl: 2    Tiotropium Bromide Monohydrate (SPIRIVA RESPIMAT) 2.5 MCG/ACT Inhalation Aero Soln, Inhale 2 puffs into the lungs daily. , Disp: 1 each, Rfl: 5    PANTOPRAZOLE 40 MG Oral Tab EC, TAKE 1 TABLET BEFORE BREAKFAST, Disp: 90 tablet, Rfl: 3    Metoprolol-HCTZ ER 25-12.5 MG Oral Tablet 24 Hr, metoprolol succ 25 mg-hydrochlorothiazide 12.5 mg tablet,ext. rel 24 hr  25mg 1/day, Disp: , Rfl:     Azelastine HCl 0.1 % Nasal Solution, , Disp: , Rfl:     SUMAtriptan Succinate 50 MG Oral Tab, TAKE 1 TABLET BY MOUTH EVERY 2 HOURS AS NEEDED FOR  MIGRAINE. DO  NOT  EXCEED  200MG  IN  24  HOURS, Disp: 9 tablet, Rfl: 3    BREO ELLIPTA 200-25 MCG/INH Inhalation Aerosol Powder, Breath Activated, , Disp: , Rfl:     Clopidogrel Bisulfate 75 MG Oral Tab, Take 1 tablet (75 mg total) by mouth daily. nightly, Disp: , Rfl:     losartan 100 MG Oral Tab, Take 1 tablet (100 mg total) by mouth daily. , Disp: , Rfl:     ezetimibe 10 MG Oral Tab, Take 1 tablet (10 mg total) by mouth nightly., Disp: , Rfl:     Fluticasone Propionate 50 MCG/ACT Nasal Suspension, 2 sprays by Each Nare route 2 (two) times daily. , Disp: , Rfl:     cetirizine (ZYRTEC) 10 MG Oral Tab, Take 1 tablet (10 mg total) by mouth daily. , Disp: , Rfl:     glucagon (GVOKE HYPOPEN 1-PACK) 1 MG/0.2ML Subcutaneous SUBQ injection, Inject 0.2 mL (1 mg total) into the skin once as needed for Low blood glucose., Disp: 0.2 mL, Rfl: 1     Allergies: Allergies   Allergen Reactions    Peanut-Containing Drug Products HIVES and SHORTNESS OF BREATH     Hives, asthma attack    Pcn [Penicillins] UNKNOWN     HAPPENED AS A CHILD       Social History:   Social History     Socioeconomic History    Marital status:     Number of children: 3   Occupational History    Occupation: homemaker   Tobacco Use    Smoking status: Never     Passive exposure: Current    Smokeless tobacco: Never   Vaping Use    Vaping Use: Never used   Substance and Sexual Activity    Alcohol use: No    Drug use: No    Sexual activity: Never     Partners: Male     Birth control/protection: I.U.D.        Medical History:   Past Medical History:   Diagnosis Date    Allergic rhinitis, cause unspecified 05/05/2008    Anemia     Anxiety and depression     Asthma     Laguerre's esophagus     Churg-Shania syndrome  2022    Colloid cyst of brain (HCC)     Congestive heart disease ([address]     Coronary artery disease of native heart with stable angina pectoris ([address]     Diabetes ([address]     Diabetes mellitus ([address]     Diverticulosis of large intestine     Essential hypertension     Gastroparesis     GERD     Hematemesis with nausea 11/02/2017 2017, No EGD, Hgb stable    High cholesterol     History of pulmonary embolus (PE) 12/01/2016    History of recurrent deep vein thrombosis (DVT) 12/04/2014    Hypertension     IBS     LGSIL (low grade squamous intraepithelial dysplasia) 10/2010    Pap neg 2014    Migraines     perfumes    Moderate persistent asthma without complication 98/50/7900    DESOUZA (nonalcoholic steatohepatitis)     NSTEMI (non-ST elevated myocardial infarction) ([address] 2017    FELTON in LAD    Obesity Osteoarthritis     Patella-femoral syndrome, bilateral knee 01/15/2015    Pneumonia due to organism     Pulmonary embolism [facility])        Surgical history:   Past Surgical History:   Procedure Laterality Date    BREAST SURGERY PROCEDURE UNLISTED      cyst removed    CATH DRUG ELUTING STENT      CHOLECYSTECTOMY  04/10/2011    PERFORMED BY DR KRAFT-LAPAROSCOPIC    COLONOSCOPY  2012    polyps    COLONOSCOPY N/A 2/23/2016    Procedure: COLONOSCOPY;  Surgeon: Marilyne Bence, MD;  Location: [address] [place] [address] ENDOSCOPY    COLPOSCOPY, CERVIX W/UPPER ADJACENT VAGINA; W/ENDOCERVICAL CURETTAGE  2-2011    WNL    CYST ASPIRATION LEFT  2000    OTHER SURGICAL HISTORY      sinus surgery    OTHER SURGICAL HISTORY      CYST REMOVED ON LEFT WRIST 17 YRS AGO    SINUS SURGERY        UPPER GI ENDOSCOPY,EXAM           PHYSICAL EXAM  Vitals:    12/06/23 1340   BP: 136/84   Pulse: 96   SpO2: 94%       CONSTITUTIONAL:  awake, alert, cooperative, no apparent distress, and appears stated age  PSYCH: normal affect  LUNGS: breathing comfortably  CARDIOVASCULAR:  regular rate         Labs/Imaging:  Reviewed      ASSESSMENT/PLAN:    (E11.65,  Z79.4) Type 2 diabetes mellitus with hyperglycemia, with long-term current use of insulin ([facility])  (primary encounter diagnosis)  ((J45.40) Moderate persistent asthma without complication  Class 3 severe obesity due to excess calories with serious comorbidity and body mass index (BMI) of 40.0 to 44.9 in adult ([facility])  (I50.20) HFrEF (heart failure with reduced ejection fraction) ([facility]    Plan:   A1C improving but still above goal, contributed by frequent steroid courses for asthma. Pt currently on Omnipod5 running 100% in auto mode. She is still under a lot of stress and pain from neuropathy and sinus infection requiring intermittent steroids/antibiotic. In the future with steroid courses, options include layering with NPH vs temp settings (will need to switch to manual mode).   We discussed adding on NPH today but first will trial one more setting adjustment. Changes made to active insulin time, encouraged pt to be more interactive with pump for corrections. She may have better outcome with a pump that has different infusion sites and a larger cartridge as she is changing often. Will plan on that discussion given it's the end of year and may have better insurance coverage options. Pt has previously trialed and intolerant of many non-insulin options. She responded well to SGLT2i but had frequent yeast infxn. Omnipod 5 Pump settings adjusted: Active insulin time: 3 hours --> 2h  BASAL: MN: 2.20 u/hr  BOLUS:  ISF: 18 mg/dl --> 16  ICR 5 g/Unit   BG correction threshold: 110 mg/dl  BG target range: 110 mg/dl    - insulin pattern is basal-heavy; poor appetite, however encouraged to be more active with correction doses  - meet with endo educator to discuss pumps with larger insulin reservoirs  - can also consider U200 in OP5  - in case of pump failure: use Toujeo 50U, humalog ICR 1:5, ISF :120. For mechanical issues contact [place]  - continue Dexcom G6  - previously intolerant: Byetta, Metformin (GI), Januvia, Actos, Novolog, Levemir, Invokana, Trulicity, glipizide, Victoza, Farxiga - recurrent mycotic infections   - weight clinic; she will self-refer  - consider self-referral to pain clinic for back pain/neuropathy    - Ophtho: no DR, is due for annual exam  - BP controlled, on ARB beta blocker and beta block  - LDL nearly at goal, on ezetimibe ; not on statin due to myopathy  - MAB negative, on ARB  - Neuropathy/ Foot exam: No data recorded  - CAD: yes     (R53.83) Fatigue, unspecified type  (E55.9) Vitamin D deficiency  Plan: Vitamin D [E]    Reviewed that fatigue is often multifactorial and we have reviewed the common etiologies, both endocrine and non-endocrine. Vit D previously insufficient, will repeat today.  Hx of hyperTH with pregnancy with daughter, will repeat TFTs.  - TFTs have historically been normal, will check again   - no anemia per CBC  - vit D - repeat today  - pt reports snoring/PEDRO, plans on sleep study  - reviewed if all above normal, pt will continue to follow up with PCP       Return in about 6 weeks (around 1/17/2024) for DM follow up, follow up on pump settings.  3 mo with MD.    12/6/2023  TENZIN Alvarado

## 2023-12-11 ENCOUNTER — TELEPHONE (OUTPATIENT)
Facility: CLINIC | Age: 58
End: 2023-12-11

## 2023-12-11 DIAGNOSIS — E55.9 VITAMIN D DEFICIENCY: Primary | ICD-10-CM

## 2023-12-11 RX ORDER — ERGOCALCIFEROL 1.25 MG/1
50000 CAPSULE ORAL WEEKLY
Qty: 12 CAPSULE | Refills: 0 | Status: SHIPPED | OUTPATIENT
Start: 2023-12-11

## 2023-12-11 NOTE — TELEPHONE ENCOUNTER
Spoke with patient on telephone and reviewed result notes from labs dated 12/6/23. Verbalized understanding of all. Patient is willing to start rx strength Vitamin D- confirmed she would like the rx to go through [address] Reviewed we will send rx and enter repeat lab to be done after completing, verbalized understanding. Pended rx and repeat lab routed to [facility] for review and sign off.

## 2023-12-12 ENCOUNTER — PATIENT MESSAGE (OUTPATIENT)
Dept: FAMILY MEDICINE CLINIC | Facility: CLINIC | Age: 58
End: 2023-12-12

## 2023-12-12 DIAGNOSIS — F41.9 ANXIETY: ICD-10-CM

## 2023-12-12 DIAGNOSIS — F33.1 MODERATE EPISODE OF RECURRENT MAJOR DEPRESSIVE DISORDER (HCC): ICD-10-CM

## 2023-12-12 NOTE — TELEPHONE ENCOUNTER
From: Mark Washington  To: Timur Lay  Sent: 12/12/2023 3:38 PM CST  Subject: Alprazolam Refill    Hi,   Can you please send a ref[facility] on [address]?

## 2023-12-13 ENCOUNTER — NURSE ONLY (OUTPATIENT)
Facility: CLINIC | Age: 58
End: 2023-12-13
Payer: COMMERCIAL

## 2023-12-13 DIAGNOSIS — E11.65 TYPE 2 DIABETES MELLITUS WITH HYPERGLYCEMIA, WITH LONG-TERM CURRENT USE OF INSULIN (HCC): Primary | ICD-10-CM

## 2023-12-13 DIAGNOSIS — Z79.4 TYPE 2 DIABETES MELLITUS WITH HYPERGLYCEMIA, WITH LONG-TERM CURRENT USE OF INSULIN (HCC): Primary | ICD-10-CM

## 2023-12-13 PROCEDURE — 99211 OFF/OP EST MAY X REQ PHY/QHP: CPT

## 2023-12-13 NOTE — PROGRESS NOTES
Insulin Pump Therapy 101    Start Time:2:10 pm  End Time: 2:36 pm    Indication:   Currently using OP5 but having to change frequently due to insulin use. If not open to other options, may try u200 in the OP5. Patient Concerns:  - More room for insulin    Provided patient with information and discussion on available pump options for dosing insulin. Discussion included:  - Overview of Medtronic, Omnipod and Tandem pump options, and iLet Bionic Pancreas  - Basal / Bolus insulin  - Automated Delivery System operations  - Pros and Cons of each device  - CGM integration  - Social and scientific aspects which might effect choice  - Insurance and coverage considerations of each device      Patient most interested in Tandem T-slim. Insurance coverage inquiry started.     Patient Payor: [employer]    Follow up:  As soon as she receives the supplies - will start on Dexcom G7 as well    [employer]

## 2023-12-14 ENCOUNTER — TELEPHONE (OUTPATIENT)
Facility: CLINIC | Age: 58
End: 2023-12-14

## 2023-12-14 RX ORDER — ALPRAZOLAM 0.5 MG/1
0.5 TABLET ORAL NIGHTLY PRN
Qty: 30 TABLET | Refills: 0 | Status: SHIPPED | OUTPATIENT
Start: 2023-12-14 | End: 2024-01-13

## 2023-12-14 NOTE — TELEPHONE ENCOUNTER
Faxed sign order form and insurance information to [facility] for review.   Paperwork with [facility]

## 2023-12-14 NOTE — TELEPHONE ENCOUNTER
Thank you - sent 30 tabs to maria e   : [address], [address] [place] [id number] [facility]

## 2023-12-14 NOTE — PROGRESS NOTES
Appreciate recommendations by diabetes educator. Tandem pump settings placed on order form, and order form signed. Will continue present omnipod 5 system settings onto Tandem.      TENZIN Campbell  12/14/2023

## 2023-12-18 ENCOUNTER — TELEMEDICINE (OUTPATIENT)
Dept: FAMILY MEDICINE CLINIC | Facility: CLINIC | Age: 58
End: 2023-12-18
Payer: COMMERCIAL

## 2023-12-18 ENCOUNTER — TELEPHONE (OUTPATIENT)
Dept: ENDOCRINOLOGY CLINIC | Facility: CLINIC | Age: 58
End: 2023-12-18

## 2023-12-18 DIAGNOSIS — B96.89 ACUTE BACTERIAL RHINOSINUSITIS: ICD-10-CM

## 2023-12-18 DIAGNOSIS — J01.90 ACUTE BACTERIAL RHINOSINUSITIS: ICD-10-CM

## 2023-12-18 DIAGNOSIS — N76.0 ACUTE VAGINITIS: ICD-10-CM

## 2023-12-18 DIAGNOSIS — F33.1 MODERATE EPISODE OF RECURRENT MAJOR DEPRESSIVE DISORDER (HCC): ICD-10-CM

## 2023-12-18 DIAGNOSIS — R05.2 SUBACUTE COUGH: Primary | ICD-10-CM

## 2023-12-18 DIAGNOSIS — F41.9 ANXIETY: ICD-10-CM

## 2023-12-18 PROCEDURE — 99214 OFFICE O/P EST MOD 30 MIN: CPT | Performed by: NURSE PRACTITIONER

## 2023-12-18 RX ORDER — ALPRAZOLAM 0.5 MG/1
TABLET ORAL DAILY PRN
Qty: 42 TABLET | Refills: 0 | Status: SHIPPED | OUTPATIENT
Start: 2023-12-28 | End: 2024-01-18

## 2023-12-18 RX ORDER — DOXYCYCLINE 100 MG/1
100 TABLET ORAL 2 TIMES DAILY
Qty: 14 TABLET | Refills: 0 | Status: SHIPPED | OUTPATIENT
Start: 2023-12-18 | End: 2023-12-25

## 2023-12-18 RX ORDER — FLUCONAZOLE 150 MG/1
150 TABLET ORAL ONCE
Qty: 1 TABLET | Refills: 1 | Status: SHIPPED | OUTPATIENT
Start: 2023-12-18 | End: 2023-12-18

## 2023-12-18 NOTE — TELEPHONE ENCOUNTER
Received fax from [employer] with care consideration for polypharmacy. Gave to [facility] to review/make changes if needed. No response required.

## 2023-12-18 NOTE — TELEPHONE ENCOUNTER
12/18/23-Received via fax from [employer] a Statement of medical necessity and prescription order. Placed in PA in basket.

## 2023-12-18 NOTE — PROGRESS NOTES
VIDEO VISIT    CHIEF COMPLAINT:  Sinus pressure, yeast infection, and medication refill for anxiety    HISTORY OF PRESENT ILLNESS:  This is a 66year old female who verbally consents to a video visit today, 12/18/23 for sinus infection, yeast infection, and medication refill. Roxann Zabala) reports 2 month history of sinus pressure. Had covid around 10/31/2023 and since then has been battling sinus pressure and \"feeling miserable\". Has tried multiple OTC medications such as benadryl, mucinex, flonase, and breathe right strips. Using albuterol, breo ellipta, and fluticasone propionate inhalers as prescribed. Positive for nonproductive cough, some chest pain with breathing and activity intolerance. Denies fevers, chills, or near fainting. Positive for vaginitis, believes she has a yeast infection. Kristina Buck would also like to discuss refills of alprazolam.  Taking 1-2 daily PRN. Medical POA of her mother who has dementia, states it is a stressful time of the year and that she only takes alprazolam as needed. Has attended counseling in the past.  Taking escitalopram 20mg daily. Denies need to change plan of care at this point. Has been taking alprazolam for about 12 years per patient. Denies thoughts of harming self or others. ALLERGIES:  Allergies   Allergen Reactions    Peanut-Containing Drug Products HIVES and SHORTNESS OF BREATH     Hives, asthma attack    Pcn [Penicillins] UNKNOWN     HAPPENED AS A CHILD       CURRENT MEDICATIONS:  Current Outpatient Medications   Medication Sig Dispense Refill    ergocalciferol 1.25 MG (06704 UT) Oral Cap Take 1 capsule (50,000 Units total) by mouth once a week. 12 capsule 0    ALPRAZolam 0.5 MG Oral Tab Take 1 tablet (0.5 mg total) by mouth nightly as needed for Sleep or Anxiety. 30 tablet 0    predniSONE 10 MG Oral Tab Take 3 tabs (30mg) daily for 2 days, then take 2 tabs (20mg) daily for 2 days, then take 1 tab (10mg) daily for 2 days.  12 tablet 0 albuterol 108 (90 Base) MCG/ACT Inhalation Aero Soln Inhale 2 puffs into the lungs every 4 to 6 hours as needed for Wheezing or Shortness of Breath. 3 each 1    montelukast 10 MG Oral Tab Take 1 tablet (10 mg total) by mouth nightly. 90 tablet 1    escitalopram 20 MG Oral Tab Take 1 tablet (20 mg total) by mouth every morning. 90 tablet 1    Continuous Blood Gluc Transmit (DEXCOM G6 TRANSMITTER) Does not apply Misc 1 each by Other route every 3 (three) months. 1 each 1    gabapentin 100 MG Oral Cap Take 1 capsule (100 mg total) by mouth 2 (two) times daily. 180 capsule 0    methylPREDNISolone (MEDROL) 4 MG Oral Tablet Therapy Pack As directed. 1 each 0    insulin lispro (HUMALOG) 100 UNIT/ML Injection Solution Uses up to 100 units daily via insulin pump 90 mL 1    glucagon (GVOKE HYPOPEN 1-PACK) 1 MG/0.2ML Subcutaneous SUBQ injection Inject 0.2 mL (1 mg total) into the skin once as needed for Low blood glucose. 0.2 mL 1    amLODIPine 2.5 MG Oral Tab amLODIPine 2.5 mg tablet, [RxNorm: 230063]      Insulin Disposable Pump (OMNIPOD 5 G6 POD, GEN 5,) Does not apply Misc 1 each every other day. 45 each 1    DEXCOM G6 SENSOR Does not apply Misc APPLY TO SKIN EVERY 10 DAYS 9 each 3    Mepolizumab (NUCALA SC) Inject into the skin every 30 (thirty) days. XARELTO 20 MG Oral Tab TAKE 1 TABLET AT BEDTIME 90 tablet 2    Tiotropium Bromide Monohydrate (SPIRIVA RESPIMAT) 2.5 MCG/ACT Inhalation Aero Soln Inhale 2 puffs into the lungs daily. 1 each 5    PANTOPRAZOLE 40 MG Oral Tab EC TAKE 1 TABLET BEFORE BREAKFAST 90 tablet 3    Metoprolol-HCTZ ER 25-12.5 MG Oral Tablet 24 Hr metoprolol succ 25 mg-hydrochlorothiazide 12.5 mg tablet,ext. rel 24 hr   25mg 1/day      Azelastine HCl 0.1 % Nasal Solution       SUMAtriptan Succinate 50 MG Oral Tab TAKE 1 TABLET BY MOUTH EVERY 2 HOURS AS NEEDED FOR  MIGRAINE.   DO  NOT  EXCEED  200MG  IN  24  HOURS 9 tablet 3    BREO ELLIPTA 200-25 MCG/INH Inhalation Aerosol Powder, Breath Activated Clopidogrel Bisulfate 75 MG Oral Tab Take 1 tablet (75 mg total) by mouth daily. nightly      losartan 100 MG Oral Tab Take 1 tablet (100 mg total) by mouth daily. ezetimibe 10 MG Oral Tab Take 1 tablet (10 mg total) by mouth nightly. Fluticasone Propionate 50 MCG/ACT Nasal Suspension 2 sprays by Each Nare route 2 (two) times daily. cetirizine (ZYRTEC) 10 MG Oral Tab Take 1 tablet (10 mg total) by mouth daily.          MEDICAL HISTORY:  Past Medical History:   Diagnosis Date    Allergic rhinitis, cause unspecified 05/05/2008    Anemia     Anxiety and depression     Asthma     Laguerre's esophagus     Churg-Shania syndrome  ([facility] [address] 2022    Colloid cyst of brain (HCC)     Congestive heart disease ([facility] [address]     Coronary artery disease of native heart with stable angina pectoris ([facility] [address]     Diabetes ([facility] [address]     Diabetes mellitus ([facility] [address]     Diverticulosis of large intestine     Essential hypertension     Gastroparesis     GERD     Hematemesis with nausea 11/02/2017 2017, No EGD, Hgb stable    High cholesterol     History of pulmonary embolus (PE) 12/01/2016    History of recurrent deep vein thrombosis (DVT) 12/04/2014    Hypertension     IBS     LGSIL (low grade squamous intraepithelial dysplasia) 10/2010    Pap neg 2014    Migraines     perfumes    Moderate persistent asthma without complication 35/89/0548    DESOUZA (nonalcoholic steatohepatitis)     NSTEMI (non-ST elevated myocardial infarction) ([facility] [address] 2017    FELTON in LAD    Obesity     Osteoarthritis     Patella-femoral syndrome, bilateral knee 01/15/2015    Pneumonia due to organism     Pulmonary embolism [facility])      Past Surgical History:   Procedure Laterality Date    BREAST SURGERY PROCEDURE UNLISTED      cyst removed    CATH DRUG ELUTING STENT      CHOLECYSTECTOMY  04/10/2011    PERFORMED BY DR KRAFT-LAPAROSCOPIC    COLONOSCOPY  2012    polyps    COLONOSCOPY N/A 2/23/2016    Procedure: COLONOSCOPY;  Surgeon: Tor Bryant MD;  Location: [address] [place] [address] ENDOSCOPY COLPOSCOPY, CERVIX W/UPPER ADJACENT VAGINA; W/ENDOCERVICAL CURETTAGE  2-    WNL    CYST ASPIRATION LEFT  2000    OTHER SURGICAL HISTORY      sinus surgery    OTHER SURGICAL HISTORY      CYST REMOVED ON LEFT WRIST 17 YRS AGO    SINUS SURGERY        UPPER GI ENDOSCOPY,EXAM       Family History   Problem Relation Age of Onset    Heart Disorder Father     Hypertension Mother     Lipids Mother     Stroke Mother     Anemia Mother     Dementia Mother     Heart Disorder Mother     Other (Other) Mother     Hypertension Sister     Obesity Sister     Stroke Sister     Other (Other) Sister         stroke,pe    Other (Other) Brother         kidney stones    Colon Cancer Maternal Grandfather     Colon Cancer Maternal Aunt     Anemia Daughter     Asthma Daughter     Depression Daughter     Asthma Daughter      Family Status   Relation Status    Fa     Mo Alive    Sis Alive    Bro (Not Specified)    MGFA (Not Specified)    Mat Aunt (Not Specified)    Lissette Alive    Lissette Alive     Social History     Socioeconomic History    Marital status:     Number of children: 3   Occupational History    Occupation: homemaker   Tobacco Use    Smoking status: Never     Passive exposure: Current    Smokeless tobacco: Never   Vaping Use    Vaping Use: Never used   Substance and Sexual Activity    Alcohol use: No    Drug use: No    Sexual activity: Never     Partners: Male     Birth control/protection: I.U.D. ROS:    GENERAL:  Denies fever or chills  RESPIRATORY:  + as stated in HPI  CARDIO:  Denies chest pain with exertion    VITALS:  Per patient's home monitor - HR 97 at rest  Respiration of 18 breaths per minute    PHYSICAL EXAM:    Physical exam is limited due to video visit. Viridiana Archuleta serves as a reliable historian. Speech is clear and organized without difficulty speaking in full sentences. No shortness of breath noted during our conversation. Mild dry intermittent cough/throat clearing.     ASSESSMENT & PLAN:    1. Subacute cough  Continue inhalers as prescribed  Begin abx  Complete CXR if failure to improve, considering further imaging given history  Continue taking clopidogrel and xarelto  - XR CHEST PA + LAT CHEST (CPT=71046); Future  - Doxycycline Monohydrate 100 MG Oral Tab; Take 100 mg by mouth in the morning and 100 mg before bedtime. Do all this for 7 days. Dispense: 14 tablet; Refill: 0    2. Acute bacterial rhinosinusitis  Begin abx as prescribed  - Doxycycline Monohydrate 100 MG Oral Tab; Take 100 mg by mouth in the morning and 100 mg before bedtime. Do all this for 7 days. Dispense: 14 tablet; Refill: 0    3. Acute vaginitis  - fluconazole (DIFLUCAN) 150 MG Oral Tab; Take 1 tablet (150 mg total) by mouth once for 1 dose. Dispense: 1 tablet; Refill: 1    4. Anxiety  Future refill provided, taking 42 to last at least 21 days  - ALPRAZolam 0.5 MG Oral Tab; Take 1-2 tablets (0.5-1 mg total) by mouth daily as needed for Sleep or Anxiety. Dispense: 42 tablet; Refill: 0    5. Moderate episode of recurrent major depressive disorder (HCC)  - ALPRAZolam 0.5 MG Oral Tab; Take 1-2 tablets (0.5-1 mg total) by mouth daily as needed for Sleep or Anxiety. Dispense: 42 tablet;  Refill: 0

## 2023-12-18 NOTE — TELEPHONE ENCOUNTER
Letter of medical necessity completed and given to provider for review and signature    Anjum Kulkarni RN  DCES

## 2023-12-29 ENCOUNTER — PATIENT MESSAGE (OUTPATIENT)
Facility: CLINIC | Age: 58
End: 2023-12-29

## 2023-12-29 NOTE — TELEPHONE ENCOUNTER
From: Nabil Kumar  To: Aaron Laura  Sent: 12/29/2023 12:23 PM CST  Subject: Alix Diallo,  It is time for [facility] Paperwork for my husbands work for [employer]. I have an upcoming appointment next week. If you could please fill this out and fax I would greatly appreciate it. I cannot send the attachment here; is there an email address I can attach it to? Thanks!   Florentin Gomez

## 2024-01-02 ENCOUNTER — OFFICE VISIT (OUTPATIENT)
Dept: RHEUMATOLOGY | Facility: CLINIC | Age: 59
End: 2024-01-02
Payer: COMMERCIAL

## 2024-01-02 VITALS
HEIGHT: 69 IN | OXYGEN SATURATION: 98 % | HEART RATE: 80 BPM | BODY MASS INDEX: 38.95 KG/M2 | SYSTOLIC BLOOD PRESSURE: 140 MMHG | WEIGHT: 263 LBS | DIASTOLIC BLOOD PRESSURE: 80 MMHG | RESPIRATION RATE: 18 BRPM

## 2024-01-02 DIAGNOSIS — M79.7 FIBROMYALGIA: Primary | ICD-10-CM

## 2024-01-02 DIAGNOSIS — M79.2 NEUROPATHIC PAIN: ICD-10-CM

## 2024-01-02 DIAGNOSIS — R79.82 ELEVATED C-REACTIVE PROTEIN (CRP): ICD-10-CM

## 2024-01-02 DIAGNOSIS — M25.50 POLYARTHRALGIA: ICD-10-CM

## 2024-01-02 DIAGNOSIS — M25.512 ACUTE PAIN OF LEFT SHOULDER: ICD-10-CM

## 2024-01-02 DIAGNOSIS — R70.0 ELEVATED SED RATE: ICD-10-CM

## 2024-01-02 PROCEDURE — 99215 OFFICE O/P EST HI 40 MIN: CPT | Performed by: INTERNAL MEDICINE

## 2024-01-02 PROCEDURE — 3077F SYST BP >= 140 MM HG: CPT | Performed by: INTERNAL MEDICINE

## 2024-01-02 PROCEDURE — 3079F DIAST BP 80-89 MM HG: CPT | Performed by: INTERNAL MEDICINE

## 2024-01-02 PROCEDURE — 3008F BODY MASS INDEX DOCD: CPT | Performed by: INTERNAL MEDICINE

## 2024-01-02 RX ORDER — FLUCONAZOLE 150 MG/1
150 TABLET ORAL ONCE
COMMUNITY
Start: 2023-12-18

## 2024-01-02 RX ORDER — METOPROLOL SUCCINATE 25 MG/1
25 TABLET, EXTENDED RELEASE ORAL DAILY
COMMUNITY
Start: 2023-12-18 | End: 2024-01-02 | Stop reason: ALTCHOICE

## 2024-01-02 RX ORDER — GABAPENTIN 100 MG/1
CAPSULE ORAL
Qty: 360 CAPSULE | Refills: 0 | Status: SHIPPED | OUTPATIENT
Start: 2024-01-02

## 2024-01-02 NOTE — PROGRESS NOTES
RHEUMATOLOGY FOLLOW UP   Date of visit: 01/02/2024  ?  Chief Complaint   Patient presents with    Fibromyalgia     Follow up 4 month follow up fibromyalgia. States that she doing better, gabapentin is helping her      ?  ASSESSMENT, DISCUSSION & PLAN   Assessment:  1. Fibromyalgia    2. Polyarthralgia    3. Elevated sed rate    4. Elevated C-reactive protein (CRP)    5. Neuropathic pain    6. Acute pain of left shoulder        Discussion:  Ms. Danielle Jerome is a 57 yo woman who presented for evaluation of joint pain. She has been suffering from chronic sinus disease as well as chronic lung problems for several years. 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 Typically for eosinophilic granulomatosis with polyangiitis patients have eosinophilia, as well and obstructive airway disease, nasal polyps, eosinophilic predominant inflammation and either mononeuritis multiplex or other motor neuropathy not due to radiculopathy.  This is per the diagnosis and classification criteria and vasculitis study (DC VAS) unfortunately for this patient, she also is morbidly obese and has a history of diabetes so some of the neuropathic symptoms might be related to diabetic neuropathy versus a radiculopathy from her lower back.  On her exam, she does not have obvious signs of active synovitis to warrant immunosuppression.  A lot of her potential EGPA symptoms previously improved since starting medication through pulmonology.  She now is dealing with worsened sinus and asthma symptoms. Felt better on dupixent compared to the nucala.   Strongly recommended she coordinate care with pulmonology and immunology regarding this.   If there is still concern for EGPA, recommend some biopsy to help support disease.    Her fibro pain has improved significantly with the low dose  gabapentin  Recommended she increase slightly if able to tolerate. Start by going to 100mg TID or 100mg in am and 200mg nightly then increasing to 100mg in am, 100mg in afternoon and 200mg at night.     Follow back with me in 3 months or sooner as needed  Encouraged to keep me updated in the meantime.     Patient verbalized understanding of above instructions. No further questions at this time.    Code selection for this visit was based on time spent (40min) on date of service in preparing to see the patient, obtaining and/or reviewing separately obtained history, performing a medically appropriate examination, counseling and educating the patient/family/caregiver, ordering medications or testing, referring and communicating with other healthcare providers, documenting clinical information in the E HR, independently interpreting results and communicating results to the patient/family/caregiver and care coordination with the patient's other providers.  Extensive record review performed as well.     ?  Plan:  Diagnoses and all orders for this visit:    Fibromyalgia  -     gabapentin 100 MG Oral Cap; Take 100mg in am, 100mg in afternoon and 200mg at night    Polyarthralgia    Elevated sed rate    Elevated C-reactive protein (CRP)    Neuropathic pain  -     gabapentin 100 MG Oral Cap; Take 100mg in am, 100mg in afternoon and 200mg at night    Acute pain of left shoulder  -     XR SHOULDER, COMPLETE (MIN 2 VIEWS), LEFT (CPT=73030); Future          Return in about 3 months (around 4/2/2024).  ?  HPI   Danielle Jerome is a 58 year old female with the following active problems who is referred for rheumatologic evaluation due to joint pain, back pain and possible EGPA. Her work up     Has been on/off prednisone for the past few months. Has been dealing with sinus infections and covid infection in November. Has plans to see pulm tomorrow and has to make follow up with Dr. Coreas.   Does feel better while on  steroids  Thinks she felt better on dupixent. On Nucala right now but does not think working as well. Has to follow with immunology.   + low oxygenation when waking up (83 when waking up; now at rest 95; drops lower when ambulating). Had similar symptoms when hyperthyroid but recent function normal.   + muscular chest pain     Vit d deficiency severe. Started on rx once weekly, 2 weeks ago. Was not taking vitamin d previously.    Has been tolerating gabapentin and feels making her diffuse pain much more tolerable. Taking 100mg BID. Denies obvious side effects.   Having some left shoulder pain. Difficulty putting her bra on. Still has right leg weakness and pain in the hip/knee. Has sensation of leg giving out on the right side.     Denies recent rashes except rosacea flaring. Getting pearls over her face.      HPI from initial consultation  Around 2006, was seen at 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 Told \"explosive asthma.\" Had been on prednisone at multiple dosages over the years. Finally got off steroids with dupixent which has helped with the asthma part.     Seen at [facility] and saw pulmonologist told possible EGPA but hard to tell for sure. Recommended Nucala but insurance did not approve.   Has started and on her 3rd injection planned with improvement of her breathing.     Hx of blood clots. States family hx of blood clots. Hx of blood clot in her heart while on xarelto- now on plavix in addition to xarelto (2017)  Hx of fetal demise in 2002 (at 23weeks) and told multiple PE's (first clot). Shortly after, had multiple incidents and eventually started on long term anticoagulation. Started warfarin and prednisone but hard to get control of INR. So decision made by hematology to start xarelto around 2012.   No recent clots.   Hx of at least 3 miscarriages including fetal demise.   Able 2 other children after the fetal demise and one prior to that.     Joint  pain has been present for about 2 years. Reports feeling throughout the body- legs, knees, hips, thighs, shoulders and finger.   Describes as numbness in the left leg, attributes to diabetes (which she feels induced by prednisone).   Pain described also as sharp.  Denies swelling of the joints but feels puffy in general  Has pain to the touch   Has tried PT, muscle relaxants and naproxen without improvement.  Unclear if she took gabapentin     Seen by [facility] and recommended PT.     + skin rashes over the face. Dx with rosacea. Feels worsened when pain present. Tried topicals in the past but felt more burning with it. Gets worse when over heated or sun exposure.   Denies recurrence of hives like before and no scarring from rashes   + hair loss/thinning over the past 10 years. Denies bald spots.   + hx of anemia over the years   + flank pain but no dysuria, frequency, hematuria  + hx of fatty liver   + hx of colloid cyst in brain. There was previously concern for encephalitis in the 80s.   + bumps under the skin over the legs and arms- never biopsied, can be tender to the touch   + hx of frequent cysts   + hx of reflux, takes pantoprazole but also has significant hiatal hernia. Told not surgical candidate due to other complications   + infrequent diarrhea/constipation   + hx of cataracts  + told edema of the eyes from the prednisone   + pain over achilles and plantar fasciitis   + hx of \"tumor\" on bottom of right foot (?neuroma, pt unclear)   + back and neck pain. Feels worsened with activity. Has hx of DDD of lumbar spine.   + dry eyes, feels sandpaper in the eyes, uses pataday drops. Has tried visine. Has not other artifical tears. Not severe daily.   + dry mouth, constant. Does have hx of recurrent cavities. Grinds teeth and hx of fractures of the teeth.   + angle of jaw pain   + frequent sinus infections and hx of nasal polyps  Denies epistaxis   + chest pain and palpitations chronically. Has had CAD with  stenting in 2017. Had cardiac PET scan through [facility], told follow up angiogram negative.     LMP in 2008, cannot recall when last BMD was done     Denies new skin nodule formation.  The patient denies oral or nasal ulcers, elevated or scarring rashes, Raynaud's phenomenon, prior renal or liver disease, or history of seizures.  Denies nonhealing ulcers on the fingertips, trouble swallowing, or severe acid reflux.  The patient denies any history of uveitis, nodular painful shin bruises,  psoriatic lesions, spooning or pitting of the nails, history of dactylitis.   Denies swelling of the cheeks or under the jawbone. No prior history of punctal plugs being applied.  No fevers, chills, lymphadenopathy, night sweats        Past Medical History:  Past Medical History:   Diagnosis Date    Allergic rhinitis, cause unspecified 05/05/2008    Anemia     Anxiety and depression     Asthma     Laguerre's esophagus     Churg-Shania syndrome  (HCC) 2022    Colloid cyst of brain (HCC)     Congestive heart disease (HCC)     Coronary artery disease of native heart with stable angina pectoris (HCC)     Diabetes (HCC)     Diabetes mellitus (HCC)     Diverticulosis of large intestine     Essential hypertension     Gastroparesis     GERD     Hematemesis with nausea 11/02/2017 2017, No EGD, Hgb stable    High cholesterol     History of pulmonary embolus (PE) 12/01/2016    History of recurrent deep vein thrombosis (DVT) 12/04/2014    Hypertension     IBS     LGSIL (low grade squamous intraepithelial dysplasia) 10/2010    Pap neg 2014    Migraines     perfumes    Moderate persistent asthma without complication 03/20/2018    DESOUZA (nonalcoholic steatohepatitis)     NSTEMI (non-ST elevated myocardial infarction) (HCC) 2017    FELTON in LAD    Obesity     Osteoarthritis     Patella-femoral syndrome, bilateral knee 01/15/2015    Pneumonia due to organism     Pulmonary embolism (HCC)      Past Surgical History:  Past Surgical History:   Procedure  Laterality Date    BREAST SURGERY PROCEDURE UNLISTED      cyst removed    CATH DRUG ELUTING STENT      CHOLECYSTECTOMY  04/10/2011    PERFORMED BY DR KRAFT-LAPAROSCOPIC    COLONOSCOPY  2012    polyps    COLONOSCOPY N/A 2/23/2016    Procedure: COLONOSCOPY;  Surgeon: Kishor Stout MD;  Location:  ENDOSCOPY    COLPOSCOPY, CERVIX W/UPPER ADJACENT VAGINA; W/ENDOCERVICAL CURETTAGE  2-2011    WNL    CYST ASPIRATION LEFT  2000    OTHER SURGICAL HISTORY      sinus surgery    OTHER SURGICAL HISTORY      CYST REMOVED ON LEFT WRIST 17 YRS AGO    SINUS SURGERY        UPPER GI ENDOSCOPY,EXAM       Family History:  Family History   Problem Relation Age of Onset    Heart Disorder Father     Hypertension Mother     Lipids Mother     Stroke Mother     Anemia Mother     Dementia Mother     Heart Disorder Mother     Other (Other) Mother     Hypertension Sister     Obesity Sister     Stroke Sister     Other (Other) Sister         stroke,pe    Other (Other) Brother         kidney stones    Colon Cancer Maternal Grandfather     Colon Cancer Maternal Aunt     Anemia Daughter     Asthma Daughter     Depression Daughter     Asthma Daughter      Social History:  Social History     Socioeconomic History    Marital status:     Number of children: 3   Occupational History    Occupation: homemaker   Tobacco Use    Smoking status: Never     Passive exposure: Current    Smokeless tobacco: Never   Vaping Use    Vaping Use: Never used   Substance and Sexual Activity    Alcohol use: No    Drug use: No    Sexual activity: Never     Partners: Male     Birth control/protection: I.U.D.     Medications:  Outpatient Medications Marked as Taking for the 1/2/24 encounter (Office Visit) with Maria E Gonzalez,    Medication Sig Dispense Refill    fluconazole 150 MG Oral Tab Take 1 tablet (150 mg total) by mouth once.      gabapentin 100 MG Oral Cap Take 100mg in am, 100mg in afternoon and 200mg at night 360 capsule 0    ALPRAZolam 0.5 MG Oral Tab  Take 1-2 tablets (0.5-1 mg total) by mouth daily as needed for Sleep or Anxiety. 42 tablet 0    ergocalciferol 1.25 MG (51586 UT) Oral Cap Take 1 capsule (50,000 Units total) by mouth once a week. 12 capsule 0    predniSONE 10 MG Oral Tab Take 3 tabs (30mg) daily for 2 days, then take 2 tabs (20mg) daily for 2 days, then take 1 tab (10mg) daily for 2 days. 12 tablet 0    albuterol 108 (90 Base) MCG/ACT Inhalation Aero Soln Inhale 2 puffs into the lungs every 4 to 6 hours as needed for Wheezing or Shortness of Breath. 3 each 1    montelukast 10 MG Oral Tab Take 1 tablet (10 mg total) by mouth nightly. 90 tablet 1    escitalopram 20 MG Oral Tab Take 1 tablet (20 mg total) by mouth every morning. 90 tablet 1    Continuous Blood Gluc Transmit (DEXCOM G6 TRANSMITTER) Does not apply Misc 1 each by Other route every 3 (three) months. 1 each 1    methylPREDNISolone (MEDROL) 4 MG Oral Tablet Therapy Pack As directed. 1 each 0    insulin lispro (HUMALOG) 100 UNIT/ML Injection Solution Uses up to 100 units daily via insulin pump 90 mL 1    amLODIPine 2.5 MG Oral Tab amLODIPine 2.5 mg tablet, [RxNorm: 361933]      Insulin Disposable Pump (OMNIPOD 5 G6 POD, GEN 5,) Does not apply Misc 1 each every other day. 45 each 1    DEXCOM G6 SENSOR Does not apply Misc APPLY TO SKIN EVERY 10 DAYS 9 each 3    Mepolizumab (NUCALA SC) Inject into the skin every 30 (thirty) days.      XARELTO 20 MG Oral Tab TAKE 1 TABLET AT BEDTIME 90 tablet 2    Tiotropium Bromide Monohydrate (SPIRIVA RESPIMAT) 2.5 MCG/ACT Inhalation Aero Soln Inhale 2 puffs into the lungs daily. 1 each 5    PANTOPRAZOLE 40 MG Oral Tab EC TAKE 1 TABLET BEFORE BREAKFAST 90 tablet 3    Metoprolol-HCTZ ER 25-12.5 MG Oral Tablet 24 Hr metoprolol succ 25 mg-hydrochlorothiazide 12.5 mg tablet,ext.rel 24 hr   25mg 1/day      Azelastine HCl 0.1 % Nasal Solution       SUMAtriptan Succinate 50 MG Oral Tab TAKE 1 TABLET BY MOUTH EVERY 2 HOURS AS NEEDED FOR  MIGRAINE.  DO  NOT  EXCEED   200MG  IN  24  HOURS 9 tablet 3    BREO ELLIPTA 200-25 MCG/INH Inhalation Aerosol Powder, Breath Activated       Clopidogrel Bisulfate 75 MG Oral Tab Take 1 tablet (75 mg total) by mouth daily. nightly      losartan 100 MG Oral Tab Take 1 tablet (100 mg total) by mouth daily.      ezetimibe 10 MG Oral Tab Take 1 tablet (10 mg total) by mouth nightly.      Fluticasone Propionate 50 MCG/ACT Nasal Suspension 2 sprays by Each Nare route 2 (two) times daily.      cetirizine (ZYRTEC) 10 MG Oral Tab Take 1 tablet (10 mg total) by mouth daily.       Modified Medications    Modified Medication Previous Medication    GABAPENTIN 100 MG ORAL CAP gabapentin 100 MG Oral Cap       Take 100mg in am, 100mg in afternoon and 200mg at night    Take 1 capsule (100 mg total) by mouth 2 (two) times daily.     Medications Discontinued During This Encounter   Medication Reason    ALPRAZolam 0.5 MG Oral Tab Therapy completed    metoprolol succinate ER 25 MG Oral Tablet 24 Hr Therapy completed    gabapentin 100 MG Oral Cap      ?  ?  Allergies:  Allergies   Allergen Reactions    Peanut-Containing Drug Products HIVES and SHORTNESS OF BREATH     Hives, asthma attack    Pcn [Penicillins] UNKNOWN     HAPPENED AS A CHILD     ?  REVIEW OF SYSTEMS   ?  Review of Systems   Constitutional:  Positive for malaise/fatigue. Negative for chills, fever and weight loss.   HENT:  Positive for congestion, ear pain and sinus pain. Negative for sore throat.    Eyes:  Positive for blurred vision. Negative for pain and redness.   Respiratory:  Positive for cough (dry) and shortness of breath. Negative for hemoptysis and sputum production.    Cardiovascular:  Positive for chest pain, palpitations and leg swelling.   Gastrointestinal:  Positive for heartburn (improved). Negative for abdominal pain, blood in stool, constipation, diarrhea and nausea.   Genitourinary:  Negative for dysuria, frequency, hematuria and urgency.   Musculoskeletal:  Positive for back pain,  joint pain, myalgias and neck pain.   Skin:  Negative for itching and rash.   Neurological:  Positive for dizziness, tingling, weakness and headaches.   Endo/Heme/Allergies:  Positive for environmental allergies. Does not bruise/bleed easily.   Psychiatric/Behavioral:  Positive for depression. The patient is nervous/anxious and has insomnia.      PHYSICAL EXAM   Today's Vitals:  Temperature Blood Pressure Heart Rate Resp Rate SpO2     BP: 140/80 Pulse: 80 Resp: 18 SpO2: 98 %   ?  Current Weight Height BMI BSA Pain   Wt Readings from Last 1 Encounters:   01/02/24 263 lb (119.3 kg)    Height: 5' 9\" (175.3 cm) Body mass index is 38.84 kg/m². Body surface area is 2.32 meters squared.         Physical Exam  Vitals and nursing note reviewed.   Constitutional:       General: She is not in acute distress.     Appearance: She is well-developed. She is obese. She is not diaphoretic.   HENT:      Head: Normocephalic.   Eyes:      General: No scleral icterus.     Extraocular Movements: Extraocular movements intact.      Conjunctiva/sclera: Conjunctivae normal.   Neck:      Vascular: No JVD.      Trachea: No tracheal deviation.   Cardiovascular:      Rate and Rhythm: Normal rate and regular rhythm.      Heart sounds: Normal heart sounds. No murmur heard.  Pulmonary:      Effort: Pulmonary effort is normal. No respiratory distress.      Breath sounds: Wheezing present.   Abdominal:      General: Bowel sounds are normal. There is no distension.      Palpations: Abdomen is soft.      Tenderness: There is no abdominal tenderness.   Musculoskeletal:         General: Tenderness present. No swelling or deformity.      Cervical back: Neck supple.      Comments: No evidence of heberden or aditya nodes of any of the fingers, no basilar joint tenderness of the 1st CMC bilaterally.  Tenderness diffusely - improved slightly   No swelling, redness or restriction of motion of the DIPs, PIPs, MCPs, wrists, elbows, ankles, or joints of the  feet.  Bilateral shoulders with full ROM, no evidence of impingement with provocative maneuvers.  SI joints tender. Spinous process tenderness diffusely, tenderness over the greater trochanters.  Bilateral knees with medial joint line tenderness, mild crepitus, no effusion.  Pan positive fibro tender points positive - still present    Lymphadenopathy:      Cervical: No cervical adenopathy.   Skin:     General: Skin is warm and dry.      Findings: No erythema or rash.      Comments: No malar rash  No periungal erythema  No hives  No vasculitis lesions    Neurological:      Mental Status: She is alert and oriented to person, place, and time.      Cranial Nerves: No cranial nerve deficit.      Gait: Gait normal.   Psychiatric:         Behavior: Behavior normal.      Comments: Appears tired and frustrated        ?  Radiology review:      PROCEDURE:  CT SINUS (CPT=70486)     LOCATION:  [facility]       COMPARISON:  [facility] , CT, CT SINUS (CPT=70486), 2/09/2022, 11:48 AM.     INDICATIONS:  J32.8 Other chronic sinusitis     TECHNIQUE:  Axial thin section noncontrast imaging performed through the paranasal sinuses with coronal and sagittal reconstructed imaging. Dose reduction techniques were used. Dose information is transmitted to the ACR (American College of Radiology)  NRDR (National Radiology Data Registry) which includes the Dose Index Registry.  PATIENT STATED HISTORY: (As transcribed by Technologist)  History of chronic sinusitis. This episode has been going on for the past 5 months with pressure to maxillary and frontal sinus area.         FINDINGS:    NASAL SEPTUM:  Slight rightward nasal septal deviation.  TURBINATES:  No becky bullosa or paradoxical curvature.  UNCINATE PROCESSES:  No deviation or bulla formation.  OMU:  The ostia, infundibula, and hiatus semilunaris are widely patent.  SINUSES:  Mild opacification of the right ethmoid bulla (series 5, image 21).  Large mucous retention cyst within the caudal aspect  of the left maxillary sinus in addition to small mucous retention cysts within the caudal aspect of the right maxillary  sinus.  Complete opacification of the right sphenoid sinus with bone remodeling/sclerosis of the right sphenoid sinus walls indicating sequela of chronic inflammation.  Frontal sinuses are clear.  FOVEA ETHMOIDALIS:  The fovea ethmoidalis is intact. The cribriform plate is not low lying.  OTHER:  Visualized intracranial contents are within normal limits.                   Impression   CONCLUSION:       Paranasal sinus disease, similar compared to 02/09/2022.  This includes diffuse opacification of and bone remodeling of the right sphenoid sinus indicating sequela of chronic inflammation.        Dictated by (CST): Jesus Young MD on 3/12/2023 at 9:10 AM      Finalized by (CST): Jesus Young MD on 3/12/2023 at 9:19 AM       PROCEDURE:  CT CHEST PE AORTA (IV ONLY) (CPT=71260)     COMPARISON:  [facility] , CT, CT ANGIOGRAPHY, CHEST (CPT=71275), 5/11/2020, 4:17 PM.     INDICATIONS:  I26.99 Pulmonary embolism (HCC)     TECHNIQUE:  CT images were obtained with non-ionic intravenous contrast material. Dose reduction techniques were used. Dose information is transmitted to the ACR (American College of Radiology) NRDR (National Radiology Data Registry) which includes the  Dose Index Registry.     PATIENT STATED HISTORY:(As transcribed by Technologist)  Patient has shortness of breath and history of PE.      CONTRAST USED:  100cc of Omnipaque 350     FINDINGS:    LUNGS:  Biapical pleural parenchymal scarring.  No focal consolidation or new nodule appreciated.    VASCULATURE:  No visible pulmonary arterial thrombus or attenuation.    FATUMA:  No mass or adenopathy.    MEDIASTINUM:  Stable 1.4 cm left lobe thyroid nodule.  No adenopathy.    CARDIAC:  No enlargement or pericardial thickening.  Coronary artery stent.  PLEURA:  No mass or effusion.    THORACIC AORTA:  No aneurysm.    CHEST WALL:  No mass or axillary  adenopathy.    LIMITED ABDOMEN:  Limited images of the upper abdomen demonstrate a small to moderate hiatal hernia.    BONES:  No bony lesion or fracture.                     Impression   CONCLUSION:    1. No CT evidence for pulmonary embolism.        Dictated by (CST): Alexandria Trotter MD on 4/08/2022 at 2:23 PM      Finalized by (CST): Alexandria Trotter MD on 4/08/2022 at 2:30 PM       PROCEDURE:  MRI ABDOMEN+PELVIS (ALL W+WO) (CPT=74183/96356)     COMPARISON:  [facility] , CT, CT ABDOMEN PELVIS IV CONTRAST, NO ORAL (ER), 4/14/2021, 1:19 PM.     INDICATIONS:  R63.4 Unintentional weight loss N95.0 Postmenopausal bleeding N85.00 Endometrial hyperplasia     TECHNIQUE:  A comprehensive examination was performed utilizing a variety of imaging planes and imaging parameters to optimize visualization of suspected pathology.  Images were obtained both before and after intravenous gadolinium infusion.       PATIENT STATED HISTORY:(As transcribed by Technologist)  the patient complains of cramping, vaginal bleeding, and weight loss.      CONTRAST USED:  20 mL of Dotarem     FINDINGS:    LIVER:  Diffuse fatty infiltration of the liver/steatosis of the paddle megaly measuring approximately 23 cm in craniocaudal dimension.  There is a small ovoid focus of increased enhancement along the posterior segment of the right lobe inferomedially  measuring 1.6 x 1.2 cm.  On in/out phase imaging, this focus reveals slightly increased T2 signal on out of phase imaging, suggesting a focus of mild fat sparing versus potential small underlying FNH.  Otherwise no significant hepatic lesions are  suspected.  BILIARY:  No visible dilatation or filling defect.    PANCREAS:  No lesion, fluid collection, ductal dilatation, or atrophy.    SPLEEN:  No enlargement or focal lesion.    KIDNEYS:  No renal stones or hydronephrosis.  There are 2 fat containing renal angiomyolipomas within the midpole of the right kidney.  The larger of the 2 measures 0.9 cm.  No  additional renal lesions are noted.  ADRENALS:  No mass or enlargement.    AORTA/VASCULAR:  No aneurysm or dissection.    RETROPERITONEUM:  No mass or adenopathy.    BOWEL/MESENTERY:  No visible mass, obstruction, or bowel wall thickening.    ABDOMINAL WALL:  No mass or hernia.    PELVIC NODES:  Normal.  PELVIC ORGANS:  Normal size and configuration to the uterus.  No evidence of endometrial thickening or junctional zone thickening/abnormalities.  No free fluid or inflammatory changes within the pelvis.  No adnexal lesions are identified.  BONES:  No bony lesion or fracture.    LUNG BASES:  No visible pleural disease.  Lung bases not well assessed with MRI.                  Impression   CONCLUSION:    1. No acute process noted.  2. Diffuse fatty infiltration of the liver/steatosis with hepatomegaly measuring 23 cm in craniocaudal dimension.  There is a small, ovoid focus of increased enhancement along the posterior segment of the right lobe inferomedially measuring 1.6 x 1.2 cm.    This is either representative of a small FNH or small focus of focal fat sparing.  In either case, this focus has a benign appearance.  3. There are 2 fat containing renal angiomyolipomas within the midpole the right kidney measuring 0.9 cm.        Dictated by (CST): Jessica Liu DO on 4/30/2021 at 3:15 PM         Labs:  Lab Results   Component Value Date    WBC 10.1 10/27/2023    RBC 5.87 (H) 10/27/2023    HGB 14.1 10/27/2023    HCT 45.7 10/27/2023    .0 10/27/2023    MPV 9.3 (L) 02/27/2013    MCV 77.9 (L) 10/27/2023    MCH 24.0 (L) 10/27/2023    MCHC 30.9 (L) 10/27/2023    RDW 17.0 10/27/2023    NEPRELIM 4.97 10/27/2023    NEUTABS 8.84 (H) 10/17/2018    LYMPHABS 3.67 10/17/2018    EOSABS 0.82 (H) 10/17/2018    BASABS 0.00 10/17/2018    NEUT 65 10/17/2018    LYMPH 27 10/17/2018    MON 2 10/17/2018    EOS 6 10/17/2018    BASO 0 10/17/2018    NEPERCENT 49.2 10/27/2023    LYPERCENT 42.9 10/27/2023    MOPERCENT 4.8 10/27/2023     EOPERCENT 1.5 10/27/2023    BAPERCENT 1.2 10/27/2023    NE 4.97 10/27/2023    LYMABS 4.33 (H) 10/27/2023    MOABSO 0.48 10/27/2023    EOABSO 0.15 10/27/2023    BAABSO 0.12 10/27/2023     Lab Results   Component Value Date     (H) 10/27/2023    BUN 7 10/27/2023    BUNCREA 10.1 06/21/2021    CREATSERUM 1.01 10/27/2023    ANIONGAP 5 10/27/2023    GFR 68 01/20/2018    GFRNAA 94 03/14/2022    GFRAA 108 03/14/2022    CA 9.5 10/27/2023    OSMOCALC 283 10/27/2023    ALKPHO 119 (H) 10/27/2023    AST 22 10/27/2023    ALT 43 10/27/2023    BILT 0.3 10/27/2023    TP 7.6 10/27/2023    ALB 3.5 10/27/2023    GLOBULIN 4.1 10/27/2023    AGRATIO 1.7 11/19/2012     10/27/2023    K 3.9 10/27/2023     10/27/2023    CO2 28.0 10/27/2023       Additional Labs:  12/2023  Vit d 5.3 very low     08/2023  Lupus anticoagulant Positive  B2G and ACL pending  C3 179 normal  C4 43 normal  MPO, WI-3, c-ANCA, p-ANCA negative  ANNAMARIE by IFA negative  CRP 1.99 elevated  ESR 46 elevated    06/2023  No eosinophilia on CBC    06/2022  ANCA screen negative  MPO negative  Proteinase 3 negative  CBC with MCV 77 otherwise normal.  No eosinophilia noted  CRP 16.3 (N<10)  Protein to creatinine ratio 0.07  CMP grossly normal with exception of elevated glucose  UA with glucose otherwise negative for blood or protein  ESR 47 elevated  ANNAMARIE screen negative  CCP negative  RF negative    03/2022  CRP 2.04 (N<0.3)  ESR 11 normal  CK 69 normal    02/2022  ANCA by IFA negative  MPO, WI-3 negative    11/2018   CBC with absolute eosinophil count slightly elevated at 0.65    01/2018  IgA, IgE normal  IgG 659 borderline low    11/2017 ANNAMARIE screen negative  ESR 9 normal  09/2012  Lupus anticoagulant negative  Beta-2 glycoprotein IgM, IgA negative  Anticardiolipin IgG IgM negative      Maria E Gonzalez,   [facility]  01/02/2024

## 2024-01-03 ENCOUNTER — OFFICE VISIT (OUTPATIENT)
Facility: CLINIC | Age: 59
End: 2024-01-03
Payer: COMMERCIAL

## 2024-01-03 ENCOUNTER — HOSPITAL ENCOUNTER (OUTPATIENT)
Dept: GENERAL RADIOLOGY | Facility: HOSPITAL | Age: 59
Discharge: HOME | End: 2024-01-03
Attending: INTERNAL MEDICINE
Payer: COMMERCIAL

## 2024-01-03 ENCOUNTER — HOSPITAL ENCOUNTER (OUTPATIENT)
Dept: GENERAL RADIOLOGY | Facility: HOSPITAL | Age: 59
Discharge: HOME | End: 2024-01-03
Attending: NURSE PRACTITIONER
Payer: COMMERCIAL

## 2024-01-03 VITALS
DIASTOLIC BLOOD PRESSURE: 80 MMHG | HEART RATE: 105 BPM | SYSTOLIC BLOOD PRESSURE: 132 MMHG | RESPIRATION RATE: 16 BRPM | OXYGEN SATURATION: 96 % | HEIGHT: 69 IN | BODY MASS INDEX: 39 KG/M2

## 2024-01-03 DIAGNOSIS — J45.902 PERSISTENT ASTHMA WITH STATUS ASTHMATICUS, UNSPECIFIED ASTHMA SEVERITY: Primary | ICD-10-CM

## 2024-01-03 DIAGNOSIS — M25.512 ACUTE PAIN OF LEFT SHOULDER: ICD-10-CM

## 2024-01-03 DIAGNOSIS — R05.2 SUBACUTE COUGH: ICD-10-CM

## 2024-01-03 PROCEDURE — 73030 X-RAY EXAM OF SHOULDER: CPT | Performed by: INTERNAL MEDICINE

## 2024-01-03 PROCEDURE — 71046 X-RAY EXAM CHEST 2 VIEWS: CPT | Performed by: NURSE PRACTITIONER

## 2024-01-03 PROCEDURE — 3079F DIAST BP 80-89 MM HG: CPT | Performed by: INTERNAL MEDICINE

## 2024-01-03 PROCEDURE — 3075F SYST BP GE 130 - 139MM HG: CPT | Performed by: INTERNAL MEDICINE

## 2024-01-03 PROCEDURE — 99214 OFFICE O/P EST MOD 30 MIN: CPT | Performed by: INTERNAL MEDICINE

## 2024-01-03 RX ORDER — PREDNISONE 10 MG/1
TABLET ORAL
Qty: 20 TABLET | Refills: 0 | Status: SHIPPED | OUTPATIENT
Start: 2024-01-03

## 2024-01-03 NOTE — PROGRESS NOTES
Pulmonary Progress Note    History of Present Illness:  Danielle Jerome is a 58 year old female presenting to pulmonary clinic dyspnea     - aunt  - - others in [place]-   Got sick after - and 4  weeks before -- had 2 courses of prednisone      Had testing for abnl PET - and had angio - dr tolliver- all OK -   Some palptitsion   Remains with shortness of breath- some days with difficulty with stairs- knees and back-- DJD - limit as well-   To see vascular - for legs and carotid  No coughing   Now on nucula-- sl light headed at outset- - one dose thus far-- thinks breathing is better   Off prednsione -   Losing weight -- new dexcom- with pump- ongoing -   Breo and spiriva - once a day -- - some rescue use -- twice a day -- a lot less-   Overall feels much better                 Social History:   Social History     Socioeconomic History    Marital status:     Number of children: 3   Occupational History    Occupation: homemaker   Tobacco Use    Smoking status: Never     Passive exposure: Current    Smokeless tobacco: Never   Vaping Use    Vaping Use: Never used   Substance and Sexual Activity    Alcohol use: No    Drug use: No    Sexual activity: Never     Partners: Male     Birth control/protection: I.U.D.        Medications:   Current Outpatient Medications   Medication Sig Dispense Refill    fluconazole 150 MG Oral Tab Take 1 tablet (150 mg total) by mouth once.      gabapentin 100 MG Oral Cap Take 100mg in am, 100mg in afternoon and 200mg at night 360 capsule 0    ALPRAZolam 0.5 MG Oral Tab Take 1-2 tablets (0.5-1 mg total) by mouth daily as needed for Sleep or Anxiety. 42 tablet 0    ergocalciferol 1.25 MG (43592 UT) Oral Cap Take 1 capsule (50,000 Units total) by mouth once a week. 12 capsule 0    predniSONE 10 MG Oral Tab Take 3 tabs (30mg) daily for 2 days, then take 2 tabs (20mg) daily for 2 days, then take 1 tab (10mg) daily for 2 days. 12 tablet 0    albuterol 108 (90 Base) MCG/ACT  Inhalation Aero Soln Inhale 2 puffs into the lungs every 4 to 6 hours as needed for Wheezing or Shortness of Breath. 3 each 1    montelukast 10 MG Oral Tab Take 1 tablet (10 mg total) by mouth nightly. 90 tablet 1    escitalopram 20 MG Oral Tab Take 1 tablet (20 mg total) by mouth every morning. 90 tablet 1    Continuous Blood Gluc Transmit (DEXCOM G6 TRANSMITTER) Does not apply Misc 1 each by Other route every 3 (three) months. 1 each 1    methylPREDNISolone (MEDROL) 4 MG Oral Tablet Therapy Pack As directed. 1 each 0    insulin lispro (HUMALOG) 100 UNIT/ML Injection Solution Uses up to 100 units daily via insulin pump 90 mL 1    amLODIPine 2.5 MG Oral Tab amLODIPine 2.5 mg tablet, [RxNorm: 805652]      Insulin Disposable Pump (OMNIPOD 5 G6 POD, GEN 5,) Does not apply Misc 1 each every other day. 45 each 1    DEXCOM G6 SENSOR Does not apply Misc APPLY TO SKIN EVERY 10 DAYS 9 each 3    Mepolizumab (NUCALA SC) Inject into the skin every 30 (thirty) days.      XARELTO 20 MG Oral Tab TAKE 1 TABLET AT BEDTIME 90 tablet 2    Tiotropium Bromide Monohydrate (SPIRIVA RESPIMAT) 2.5 MCG/ACT Inhalation Aero Soln Inhale 2 puffs into the lungs daily. 1 each 5    PANTOPRAZOLE 40 MG Oral Tab EC TAKE 1 TABLET BEFORE BREAKFAST 90 tablet 3    Metoprolol-HCTZ ER 25-12.5 MG Oral Tablet 24 Hr metoprolol succ 25 mg-hydrochlorothiazide 12.5 mg tablet,ext.rel 24 hr   25mg 1/day      Azelastine HCl 0.1 % Nasal Solution       SUMAtriptan Succinate 50 MG Oral Tab TAKE 1 TABLET BY MOUTH EVERY 2 HOURS AS NEEDED FOR  MIGRAINE.  DO  NOT  EXCEED  200MG  IN  24  HOURS 9 tablet 3    BREO ELLIPTA 200-25 MCG/INH Inhalation Aerosol Powder, Breath Activated       Clopidogrel Bisulfate 75 MG Oral Tab Take 1 tablet (75 mg total) by mouth daily. nightly      losartan 100 MG Oral Tab Take 1 tablet (100 mg total) by mouth daily.      ezetimibe 10 MG Oral Tab Take 1 tablet (10 mg total) by mouth nightly.      Fluticasone Propionate 50 MCG/ACT Nasal Suspension  2 sprays by Each Nare route 2 (two) times daily.      cetirizine (ZYRTEC) 10 MG Oral Tab Take 1 tablet (10 mg total) by mouth daily.      glucagon (GVOKE HYPOPEN 1-PACK) 1 MG/0.2ML Subcutaneous SUBQ injection Inject 0.2 mL (1 mg total) into the skin once as needed for Low blood glucose. 0.2 mL 1       Review of Systems:    Weight - up 20# since July - - and down now 263-- was 277   gerd mostly controlled - ppi once a day   No dairrhea -- rarely   Rare hives-no recent issue  Some swelling- - trace now - comes and goes   Torsemide daily     Overall thinks sleep is a bit better she is not waking up during the night to take her rescue inhaler though continues to have awakenings-   Tolerating Nucala well without skin lesions             Physical Exam:  /80 (BP Location: Radial, Patient Position: Sitting, Cuff Size: adult)   Pulse 105   Resp 16   Ht 5' 9\" (1.753 m)   LMP 09/23/2004   SpO2 96%   BMI 38.84 kg/m²    Constitutional: comfortable . No acute distress.  Much clearer without significant congesti  HEENT: Head NC/AT. PEERL. Throat is clear   Cardio: .  Distant tones regular rate and rhythm no ectopy noted  Respiratory: Clear-no wheeze or rhonchi much improved  GI:  Abd soft, non-tender.  Obese  Extremities: No clubbing .  No significant edema  Neurologic: A&Ox3. No gross motor deficits.  Skin: Warm, dry.no rashes or hives noted   Lymphatic: No cervical or supraclavicular lymphadenopathy.  Neck is thick there is no JVD at 90 degrees  Psych: Calm, cooperative. Pleasant affect.    Results: reviewed     Assessment/Plan:  #History of breast lesion  Stable on mammos 2020  Encouraged to get follow-up  Negative mammos 3/23      #Risk of PEDRO  Remote negative study  Recent increase in nocturnal awakenings and fatigue  Did not get follow-up study yet  5/23-agrees to proceed  7/23-sleep study AHI 24.3 REM 40 gonzalo 82.7% 5% of the night less than 88  For titration          #Diabetes  Ongoing issues some  improvement  Some weight loss-now on new monitor and pulm      #Coronary artery disease  11/2017 with thrombotic lesion to the LAD-FELTON placed  Associated ischemic cardiomyopathy  Repeat cath for elevated troponin 4/2018 with nonobstructive coronary disease  Negative stress 8/2019  Remains on Plavix and lifelong Xarelto  Recurrent chest pain 2020 negative dobutamine stress  7/21 states due for testing   1/23 follows with Gloria Becker-repeat stress and carotid Dopplers pending  5/23 due for follow-up after testing including stress and echo  7/23-repeat angiogram last month with Dr. Avila-reports stent widely patent coronaries widely patent-last cath prior was April 2018      #Ischemic cardiomyopathy  EF 35 to 40% improved on echo 2017  Had been followed in heart failure clinic for a time  6/20 states normal EF  Remains on daily torsemide  Last echo 2/4/2022 -EF 50 to 55% diastolic dysfunction unable to measure ----last with PAS 30-35  Stable fluid status        #Pulmonary emboli  Long history remote and recurrent  Plans for lifelong anticoagulation--unable to tolerate aspirin so remains on antiplatelet      #Asthma severe  Childhood asthma progressed early adulthood  Longstanding history multiple hospitalizations multiple steroid dosing remains on MDIs  Begun on Dupixent 2020-sinus disease rash and eosinophilia  Rash biopsy negative for vasculitis  Overall improved control on Dupixent-had been on Xolair prior  Recent [facility] visit with negative 6-minute walk; PFTs with normal timed volumes-ratio 67% total lung capacity 103% of predicted % of predicted-17% bronchodilator response in FEV1  1/23 now with ongoing flare suspect related to sinus issues  5/23 remains with significant obstruction on exam with plans to redose prednisone-worse now as Dupixent has been stopped with plans to begin Nucala after breathing test  7/1:23 dose of Nucala has noted significant improvement with plans to continue and  follow-wean MDIs as able          #History of chronic sinusitis and nasal polyposis  Remains with ongoing flares  Follows with ENT--minimal improvement in nasal polyps with the Dupixent  Repeat CT planned and Rx with Zithromax  5/23 CT sinuses with opacification and bone remodeling of the right sphenoid sinus-ENT referred her to [facility]-to consider seeing Lyudmila  7/23 has seen Dr. Mao with plans to follow on Nucala and follow CT        #Recent evaluation rule out EGPA  Questionably remote Churg-Shania----no history of pulmonary infiltrates  Negative skin biopsy in the past for vasculitis  Recent visit with [facility]-unable to confirm-multiple negative rheumatologic testing  Recommendation had been for EGPA dosing of Nucala-following  7/23-EGPA dosing of Nucala as above following with improvement        -Plan:  -to get CPAP titration   -   -Same inhalers  for now -- OK to stop spiriva if remains stable  See me in 2- months     Ana Luisa Miller MD  Pulmonary Medicine  7/3/23

## 2024-01-03 NOTE — PATIENT INSTRUCTIONS
-Plan:  -same meds -   - continue sinus rinse at least daily -   - consider scheduling CPAP titration - -   -make appointment to see Dr machuca please   - trial of prednsione   See me in 2- months     Ana Luisa Miller MD  Pulmonary Medicine  1/3/24

## 2024-01-03 NOTE — PROGRESS NOTES
Pulmonary Progress Note    History of Present Illness:  Danielle Jerome is a 58 year old female presenting to pulmonary clinic dyspnea     - aunt  - - others in [place]-   Got sick after - and 4  weeks before -- had 2 courses of prednisone      Had testing for abnl PET - and had angio - dr tolliver- all OK -   Some palptitsion   Remains with shortness of breath- some days with difficulty with stairs- knees and back-- DJD - limit as well-   To see vascular - for legs and carotid  No coughing   Now on nucula-- sl light headed at outset- - one dose thus far-- thinks breathing is better   Off prednsione -   Losing weight -- new dexcom- with pump- ongoing -   Breo and spiriva - once a day -- - some rescue use -- twice a day -- a lot less-   Overall feels much better    1.24- doing OK- - had covid- early novemeber- - unable to take paxlovid-- so fatigued and dozzy at times- low vit D-   Follows with evan-  Increased sinus drainage very thick and always stuffy- had 3 zpack and doxy   Increased wheeze - winded all the time- sats 94-96% - drops some with walking - can't exercise - stairs to bedroom-   Thinks dupexent was better than nucala - last 2 weeks ago-   [facility]  had recommended nuclala -- rather than do bx for churg marc   Breo and spiriva - daily - rescue - a lot - himidifiers   Donzelli xclear and Xhance (fluticasone)   Aseltine nasal and zyrtec and mucinex ?benadryl at night   Prednsione none at the moment  Heart - no recent visit - sharee next week   Singular remains every night -                    Social History:   Social History     Socioeconomic History    Marital status:     Number of children: 3   Occupational History    Occupation: homemaker   Tobacco Use    Smoking status: Never     Passive exposure: Current    Smokeless tobacco: Never   Vaping Use    Vaping Use: Never used   Substance and Sexual Activity    Alcohol use: No    Drug use: No    Sexual activity: Never      Partners: Male     Birth control/protection: I.U.D.        Medications:   Current Outpatient Medications   Medication Sig Dispense Refill    fluconazole 150 MG Oral Tab Take 1 tablet (150 mg total) by mouth once.      gabapentin 100 MG Oral Cap Take 100mg in am, 100mg in afternoon and 200mg at night 360 capsule 0    ALPRAZolam 0.5 MG Oral Tab Take 1-2 tablets (0.5-1 mg total) by mouth daily as needed for Sleep or Anxiety. 42 tablet 0    ergocalciferol 1.25 MG (78822 UT) Oral Cap Take 1 capsule (50,000 Units total) by mouth once a week. 12 capsule 0    predniSONE 10 MG Oral Tab Take 3 tabs (30mg) daily for 2 days, then take 2 tabs (20mg) daily for 2 days, then take 1 tab (10mg) daily for 2 days. 12 tablet 0    albuterol 108 (90 Base) MCG/ACT Inhalation Aero Soln Inhale 2 puffs into the lungs every 4 to 6 hours as needed for Wheezing or Shortness of Breath. 3 each 1    montelukast 10 MG Oral Tab Take 1 tablet (10 mg total) by mouth nightly. 90 tablet 1    escitalopram 20 MG Oral Tab Take 1 tablet (20 mg total) by mouth every morning. 90 tablet 1    Continuous Blood Gluc Transmit (DEXCOM G6 TRANSMITTER) Does not apply Misc 1 each by Other route every 3 (three) months. 1 each 1    methylPREDNISolone (MEDROL) 4 MG Oral Tablet Therapy Pack As directed. 1 each 0    insulin lispro (HUMALOG) 100 UNIT/ML Injection Solution Uses up to 100 units daily via insulin pump 90 mL 1    amLODIPine 2.5 MG Oral Tab amLODIPine 2.5 mg tablet, [RxNorm: 640971]      Insulin Disposable Pump (OMNIPOD 5 G6 POD, GEN 5,) Does not apply Misc 1 each every other day. 45 each 1    DEXCOM G6 SENSOR Does not apply Misc APPLY TO SKIN EVERY 10 DAYS 9 each 3    Mepolizumab (NUCALA SC) Inject into the skin every 30 (thirty) days.      XARELTO 20 MG Oral Tab TAKE 1 TABLET AT BEDTIME 90 tablet 2    Tiotropium Bromide Monohydrate (SPIRIVA RESPIMAT) 2.5 MCG/ACT Inhalation Aero Soln Inhale 2 puffs into the lungs daily. 1 each 5    PANTOPRAZOLE 40 MG Oral Tab  EC TAKE 1 TABLET BEFORE BREAKFAST 90 tablet 3    Metoprolol-HCTZ ER 25-12.5 MG Oral Tablet 24 Hr metoprolol succ 25 mg-hydrochlorothiazide 12.5 mg tablet,ext.rel 24 hr   25mg 1/day      Azelastine HCl 0.1 % Nasal Solution       SUMAtriptan Succinate 50 MG Oral Tab TAKE 1 TABLET BY MOUTH EVERY 2 HOURS AS NEEDED FOR  MIGRAINE.  DO  NOT  EXCEED  200MG  IN  24  HOURS 9 tablet 3    BREO ELLIPTA 200-25 MCG/INH Inhalation Aerosol Powder, Breath Activated       Clopidogrel Bisulfate 75 MG Oral Tab Take 1 tablet (75 mg total) by mouth daily. nightly      losartan 100 MG Oral Tab Take 1 tablet (100 mg total) by mouth daily.      ezetimibe 10 MG Oral Tab Take 1 tablet (10 mg total) by mouth nightly.      Fluticasone Propionate 50 MCG/ACT Nasal Suspension 2 sprays by Each Nare route 2 (two) times daily.      cetirizine (ZYRTEC) 10 MG Oral Tab Take 1 tablet (10 mg total) by mouth daily.      glucagon (GVOKE HYPOPEN 1-PACK) 1 MG/0.2ML Subcutaneous SUBQ injection Inject 0.2 mL (1 mg total) into the skin once as needed for Low blood glucose. 0.2 mL 1       Review of Systems:    Weight - up 20# since July - - and down now 263-- was 277 - now 265   gerd mostly controlled - ppi once a day -- no breakthrough   No dairrhea -- rarely   Rare hives-no recent issue  Some swelling- - trace now - comes and goes   Torsemide daily   Shoulder pain -- for MRI     Overall thinks sleep is a bit better she is not waking up during the night to take her rescue inhaler though continues to have awakenings-   Tolerating Nucala well without skin lesions             Physical Exam:  /80 (BP Location: Radial, Patient Position: Sitting, Cuff Size: adult)   Pulse 105   Resp 16   Ht 5' 9\" (1.753 m)   LMP 09/23/2004   SpO2 96%   BMI 38.84 kg/m²    Constitutional: comfortable . No acute distress.  Much clearer without significant congesti  HEENT: Head NC/AT. PEERL. Throat is clear   Cardio: .  Distant tones regular rate and rhythm no ectopy  noted  Respiratory: maria de jesus exp wheeze noted no specific rales  GI:  Abd soft, non-tender.  Obese  Extremities: No clubbing .  No significant edema  Neurologic: A&Ox3. No gross motor deficits.  Skin: Warm, dry.no rashes or hives noted   Lymphatic: No cervical or supraclavicular lymphadenopathy.  Neck is thick there is no JVD at 90 degrees  Psych: Calm, cooperative. Pleasant affect.    Results: reviewed     Assessment/Plan:  #History of breast lesion  Stable on mammos 2020  Encouraged to get follow-up  Negative mammos 3/23  States up-to-date      #Risk of PEDRO  Remote negative study  Recent increase in nocturnal awakenings and fatigue  Did not get follow-up study yet  5/23-agrees to proceed  7/23-sleep study AHI 24.3 REM 40 gonzalo 82.7% 5% of the night less than 88  For titration  1/24  did not go for  titration -states will          #Diabetes  Ongoing issues some improvement  Some weight loss-now on new monitor and pulm  Blood sugars get significantly out of control on prednisone with endocrine following      #Coronary artery disease  11/2017 with thrombotic lesion to the LAD-FELTON placed  Associated ischemic cardiomyopathy  Repeat cath for elevated troponin 4/2018 with nonobstructive coronary disease  Negative stress 8/2019  Remains on Plavix and lifelong Xarelto  Recurrent chest pain 2020 negative dobutamine stress  7/21 states due for testing   1/23 follows with Gloria Tolliver-repeat stress and carotid Dopplers pending  5/23 due for follow-up after testing including stress and echo  7/23-repeat angiogram last month with Dr. Avila-reports stent widely patent coronaries widely patent-last cath prior was April 2018  1.3.24- due to see dr tolliver       #Ischemic cardiomyopathy  EF 35 to 40% improved on echo 2017  Had been followed in heart failure clinic for a time  6/20 states normal EF  Remains on daily torsemide  Last echo 2/4/2022 -EF 50 to 55% diastolic dysfunction unable to measure ----last with PAS 30-35  Stable fluid  status  1/24 fluid status seems improved        #Pulmonary emboli  Long history remote and recurrent  Plans for lifelong anticoagulation--unable to tolerate aspirin so remains on antiplatelet      #Asthma severe  Childhood asthma progressed early adulthood  Longstanding history multiple hospitalizations multiple steroid dosing remains on MDIs  Begun on Dupixent 2020-sinus disease rash and eosinophilia  Rash biopsy negative for vasculitis  Overall improved control on Dupixent-had been on Xolair prior  Recent [facility] visit with negative 6-minute walk; PFTs with normal timed volumes-ratio 67% total lung capacity 103% of predicted % of predicted-17% bronchodilator response in FEV1  1/23 now with ongoing flare suspect related to sinus issues  5/23 remains with significant obstruction on exam with plans to redose prednisone-worse now as Dupixent has been stopped with plans to begin Nucala after breathing test  7/1:23 dose of Nucala has noted significant improvement with plans to continue and follow-wean MDIs as able  1.24- recent covid infection -- since  worse with nucala vs dupixent - to talk to machuca and to see -short prednisone dose          #History of chronic sinusitis and nasal polyposis  Remains with ongoing flares  Follows with ENT--minimal improvement in nasal polyps with the Dupixent  Repeat CT planned and Rx with Zithromax  5/23 CT sinuses with opacification and bone remodeling of the right sphenoid sinus-ENT referred her to [facility]-to consider seeing Lyudmila  7/23 has seen Dr. Mao with plans to follow on Nucala and follow CT        #Recent evaluation rule out EGPA  Questionably remote [place]-Shania----no history of pulmonary infiltrates  Negative skin biopsy in the past for vasculitis  Recent visit with [facility]-unable to confirm-multiple negative rheumatologic testing  Recommendation had been for EGPA dosing of Nucala-following  7/23-EGPA dosing of Nucala as above following with  improvement    # COVID -   Halloween - since with marked fatigue and cough and sinus congestion     -Plan:  -same meds -   - continue sinus rinse at least daily -   - consider scheduling CPAP titration - -   -make appointment to see Dr machuca please   - trial of prednsione   See me in 2- months     Ana Luisa Miller MD  Pulmonary Medicine  1/3/24

## 2024-01-09 DIAGNOSIS — Z86.711 HISTORY OF PULMONARY EMBOLUS (PE): ICD-10-CM

## 2024-01-12 ENCOUNTER — PATIENT MESSAGE (OUTPATIENT)
Facility: CLINIC | Age: 59
End: 2024-01-12

## 2024-01-12 NOTE — TELEPHONE ENCOUNTER
From: Danielle Jerome  To: Ana Luisa Miller  Sent: 1/12/2024 1:39 PM CST  Subject: FMLA Additional Paperwork    Hi,   Of course, [employer] needs additional information. I emailed the paperwork to [employer] because I cannot load it here. Please call me at [phone] if you need more information. They have scheduled my  for a week from now for an attendance investigation for time he took off in Nov when I had COVID.

## 2024-01-15 ENCOUNTER — PATIENT MESSAGE (OUTPATIENT)
Dept: FAMILY MEDICINE CLINIC | Facility: CLINIC | Age: 59
End: 2024-01-15

## 2024-01-15 DIAGNOSIS — K21.9 GASTROESOPHAGEAL REFLUX DISEASE, UNSPECIFIED WHETHER ESOPHAGITIS PRESENT: ICD-10-CM

## 2024-01-15 RX ORDER — PANTOPRAZOLE SODIUM 40 MG/1
40 TABLET, DELAYED RELEASE ORAL
Qty: 90 TABLET | Refills: 3 | Status: SHIPPED | OUTPATIENT
Start: 2024-01-15

## 2024-01-15 RX ORDER — PANTOPRAZOLE SODIUM 40 MG/1
TABLET, DELAYED RELEASE ORAL
Qty: 90 TABLET | Refills: 3 | OUTPATIENT
Start: 2024-01-15

## 2024-01-15 NOTE — TELEPHONE ENCOUNTER
From: Danielle Jerome  To: Joyce Whaley  Sent: 1/15/2024 3:08 PM CST  Subject: Pantoprazole Sod Dr Tabs 40 mg  90 qty  90-day supply refill.    Hi, can you please fill my prescription for Pantoprazole? I am out and i thought they had shipped it, however they need a refill for it, they were sending it to Dr. Daily.     Thanks!  Alta

## 2024-01-15 NOTE — TELEPHONE ENCOUNTER
Requesting Pantoprazole 40mg  Last OV: 8/10/23  RTC: not noted  Last Rx'd 1/19/23 #90 with 3 refills    Future Appointments   Date Time Provider Department Center   1/17/2024  2:15 PM Darling Gregory APN [facility]   2/28/2024  2:30 PM Ottoniel Hopper MD [facility]   3/22/2024  1:30 PM Ana Luisa Miller MD [facility]   4/9/2024  1:30 PM Maria E Gonzalez DO [facility]   8/7/2024  1:00 PM Maria E Gonzalez DO [facility]       Patient has new PCP. Rx denied.

## 2024-02-26 ENCOUNTER — MED REC SCAN ONLY (OUTPATIENT)
Facility: CLINIC | Age: 59
End: 2024-02-26

## 2024-02-28 ENCOUNTER — OFFICE VISIT (OUTPATIENT)
Facility: CLINIC | Age: 59
End: 2024-02-28
Payer: COMMERCIAL

## 2024-02-28 VITALS — HEART RATE: 88 BPM | SYSTOLIC BLOOD PRESSURE: 136 MMHG | OXYGEN SATURATION: 98 % | DIASTOLIC BLOOD PRESSURE: 88 MMHG

## 2024-02-28 DIAGNOSIS — E11.65 TYPE 2 DIABETES MELLITUS WITH HYPERGLYCEMIA, WITH LONG-TERM CURRENT USE OF INSULIN (HCC): Primary | ICD-10-CM

## 2024-02-28 DIAGNOSIS — Z96.41 INSULIN PUMP IN PLACE: ICD-10-CM

## 2024-02-28 DIAGNOSIS — R53.83 FATIGUE, UNSPECIFIED TYPE: ICD-10-CM

## 2024-02-28 DIAGNOSIS — Z79.4 TYPE 2 DIABETES MELLITUS WITH HYPERGLYCEMIA, WITH LONG-TERM CURRENT USE OF INSULIN (HCC): Primary | ICD-10-CM

## 2024-02-28 DIAGNOSIS — J45.40 MODERATE PERSISTENT ASTHMA WITHOUT COMPLICATION (HCC): ICD-10-CM

## 2024-02-28 LAB
CARTRIDGE LOT#: ABNORMAL NUMERIC
HEMOGLOBIN A1C: 12.5 % (ref 4.3–5.6)

## 2024-02-28 PROCEDURE — 95251 CONT GLUC MNTR ANALYSIS I&R: CPT | Performed by: STUDENT IN AN ORGANIZED HEALTH CARE EDUCATION/TRAINING PROGRAM

## 2024-02-28 PROCEDURE — 3079F DIAST BP 80-89 MM HG: CPT | Performed by: STUDENT IN AN ORGANIZED HEALTH CARE EDUCATION/TRAINING PROGRAM

## 2024-02-28 PROCEDURE — 99215 OFFICE O/P EST HI 40 MIN: CPT | Performed by: STUDENT IN AN ORGANIZED HEALTH CARE EDUCATION/TRAINING PROGRAM

## 2024-02-28 PROCEDURE — 83036 HEMOGLOBIN GLYCOSYLATED A1C: CPT | Performed by: STUDENT IN AN ORGANIZED HEALTH CARE EDUCATION/TRAINING PROGRAM

## 2024-02-28 PROCEDURE — 3075F SYST BP GE 130 - 139MM HG: CPT | Performed by: STUDENT IN AN ORGANIZED HEALTH CARE EDUCATION/TRAINING PROGRAM

## 2024-02-28 PROCEDURE — 3046F HEMOGLOBIN A1C LEVEL >9.0%: CPT | Performed by: STUDENT IN AN ORGANIZED HEALTH CARE EDUCATION/TRAINING PROGRAM

## 2024-02-28 RX ORDER — INSULIN PMP CART,AUT,G6/7,CNTR
1 EACH SUBCUTANEOUS
Qty: 45 EACH | Refills: 1 | Status: SHIPPED | OUTPATIENT
Start: 2024-02-28

## 2024-02-28 RX ORDER — PROCHLORPERAZINE 25 MG/1
1 SUPPOSITORY RECTAL
Qty: 1 EACH | Refills: 1 | Status: SHIPPED | OUTPATIENT
Start: 2024-02-28

## 2024-02-28 RX ORDER — INSULIN LISPRO 100 [IU]/ML
INJECTION, SOLUTION INTRAVENOUS; SUBCUTANEOUS
Qty: 90 ML | Refills: 1 | Status: SHIPPED | OUTPATIENT
Start: 2024-02-28

## 2024-02-28 RX ORDER — INSULIN HUMAN 100 [IU]/ML
INJECTION, SUSPENSION SUBCUTANEOUS
Qty: 9 ML | Refills: 0 | Status: SHIPPED | OUTPATIENT
Start: 2024-02-28

## 2024-02-28 RX ORDER — PROCHLORPERAZINE 25 MG/1
SUPPOSITORY RECTAL
Qty: 9 EACH | Refills: 3 | Status: SHIPPED | OUTPATIENT
Start: 2024-02-28

## 2024-02-28 NOTE — PATIENT INSTRUCTIONS
Pair NPH with prednisone as they have very similar activity time    Prednisone 10mg - NPH 10U   Prednisone 20mg - NPH 20U   Prednisone 30mg - NPH 30U

## 2024-02-28 NOTE — PROGRESS NOTES
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    Initial HPI consult in 2023:  [age]osed [age]: [id number] (steroid-induced, chronic prednisone for asthma)  Follows with DM APN, LV 2023    Currently follows with [facility]; on-and-off prednisone depending on asthma flares with Churg-Shania, currently not on any steroids    Current DM meds:Toujeo 96U; Humalog 16U qAC TID + ISF 2:50>150 (doesn't carb count exactly); been on MDI x ~6 months now  Previously trialed/failed (from DM Apn note): Byetta, Metformin (GI), Januvia, Actos, Novolog, Levemir, Invokana, Trulicity, glipizide, Victoza, Farxiga - recurrent mycotic infections     It was previously 7% in 2023    Blood Glucose log/CGM: per memory  Checking 3-4 times per day  Morning/fastins  Pre-meal: 200s  HS: 200s  Episodes of hypoglycemia: No    Sometimes not a great appetite but does try to eat low carb and high protein  Has downloaded a carb-counting abrahan  Hasn't worn Dexcom recently due to various imaging tests lately, has been waiting for mail order of new refills    Is interested in Omnipod 5, ordered by previous DM provider, has not picked it up yet    Interim hx:  Oct 2023  Pt has started on Omnipod5 in auto mode since last visit  Settings:   Basal 1.90-1.95U/hr, ICR 1:5, ISF 1:20 (basal was just increased from 1.75 since 1 week ago by DM APN)  She has been told to continue f/u with endocrine  Has been on/off antibiotic and steroids during the past few months for sinus infxn and inner ear imbalance. Poor appetite, does try to drink sugar-free drinks, but notices BG increases when in pain.  Also looking into weight loss options  Also with painful neuropathy    Dec 2023- w/ endo BREANNAN  Had COVID two weeks after her appt;  feeling incr'd fatigue since her COVID infxn. Of note did have vit D deficiency in June, not taking Vit D currently  Then had trouble getting Omnipod5 from pharmacy; now it's better (1 month off); was taking MDI in that time    Feb 2024 - LX  Having continuous migraines and fatigue and sinus pressure since COVID  May go to [facility] for [facility] haul COVID  Taking prednisone 20mg as needed for migraines and sinus pain, further exacerbating hyperglycemia  Interested in trying Ozempic; did well previously with Victoza on low doses            REVIEW OF SYSTEMS  Ten point review of systems has been performed and is otherwise negative and/or non-contributory, except as described above.     Medications:     Current Outpatient Medications:     pantoprazole 40 MG Oral Tab EC, Take 1 tablet (40 mg total) by mouth before breakfast., Disp: 90 tablet, Rfl: 3    rivaroxaban (XARELTO) 20 MG Oral Tab, Take 1 tablet (20 mg total) by mouth nightly. TAKE AT BEDTIME, Disp: 90 tablet, Rfl: 3    predniSONE 10 MG Oral Tab, 3 po q am for 3 days then 2 po q am for 3 days then one po q am for 5 days, Disp: 20 tablet, Rfl: 0    fluconazole 150 MG Oral Tab, Take 1 tablet (150 mg total) by mouth once., Disp: , Rfl:     gabapentin 100 MG Oral Cap, Take 100mg in am, 100mg in afternoon and 200mg at night, Disp: 360 capsule, Rfl: 0    ergocalciferol 1.25 MG (39173 UT) Oral Cap, Take 1 capsule (50,000 Units total) by mouth once a week., Disp: 12 capsule, Rfl: 0    predniSONE 10 MG Oral Tab, Take 3 tabs (30mg) daily for 2 days, then take 2 tabs (20mg) daily for 2 days, then take 1 tab (10mg) daily for 2 days., Disp: 12 tablet, Rfl: 0    albuterol 108 (90 Base) MCG/ACT Inhalation Aero Soln, Inhale 2 puffs into the lungs every 4 to 6 hours as needed for Wheezing or Shortness of Breath., Disp: 3 each, Rfl: 1    montelukast 10 MG Oral Tab, Take 1 tablet (10 mg total) by mouth nightly., Disp: 90 tablet, Rfl: 1    escitalopram 20 MG Oral Tab, Take 1  tablet (20 mg total) by mouth every morning., Disp: 90 tablet, Rfl: 1    Continuous Blood Gluc Transmit (DEXCOM G6 TRANSMITTER) Does not apply Misc, 1 each by Other route every 3 (three) months., Disp: 1 each, Rfl: 1    methylPREDNISolone (MEDROL) 4 MG Oral Tablet Therapy Pack, As directed., Disp: 1 each, Rfl: 0    insulin lispro (HUMALOG) 100 UNIT/ML Injection Solution, Uses up to 100 units daily via insulin pump, Disp: 90 mL, Rfl: 1    amLODIPine 2.5 MG Oral Tab, amLODIPine 2.5 mg tablet, [RxNorm: 523834], Disp: , Rfl:     Insulin Disposable Pump (OMNIPOD 5 G6 POD, GEN 5,) Does not apply Misc, 1 each every other day., Disp: 45 each, Rfl: 1    DEXCOM G6 SENSOR Does not apply Misc, APPLY TO SKIN EVERY 10 DAYS, Disp: 9 each, Rfl: 3    Mepolizumab (NUCALA SC), Inject into the skin every 30 (thirty) days., Disp: , Rfl:     Tiotropium Bromide Monohydrate (SPIRIVA RESPIMAT) 2.5 MCG/ACT Inhalation Aero Soln, Inhale 2 puffs into the lungs daily., Disp: 1 each, Rfl: 5    Metoprolol-HCTZ ER 25-12.5 MG Oral Tablet 24 Hr, metoprolol succ 25 mg-hydrochlorothiazide 12.5 mg tablet,ext.rel 24 hr  25mg 1/day, Disp: , Rfl:     Azelastine HCl 0.1 % Nasal Solution, , Disp: , Rfl:     SUMAtriptan Succinate 50 MG Oral Tab, TAKE 1 TABLET BY MOUTH EVERY 2 HOURS AS NEEDED FOR  MIGRAINE.  DO  NOT  EXCEED  200MG  IN  24  HOURS, Disp: 9 tablet, Rfl: 3    BREO ELLIPTA 200-25 MCG/INH Inhalation Aerosol Powder, Breath Activated, , Disp: , Rfl:     Clopidogrel Bisulfate 75 MG Oral Tab, Take 1 tablet (75 mg total) by mouth daily. nightly, Disp: , Rfl:     losartan 100 MG Oral Tab, Take 1 tablet (100 mg total) by mouth daily., Disp: , Rfl:     ezetimibe 10 MG Oral Tab, Take 1 tablet (10 mg total) by mouth nightly., Disp: , Rfl:     Fluticasone Propionate 50 MCG/ACT Nasal Suspension, 2 sprays by Each Nare route 2 (two) times daily., Disp: , Rfl:     cetirizine (ZYRTEC) 10 MG Oral Tab, Take 1 tablet (10 mg total) by mouth daily., Disp: , Rfl:      glucagon (GVOKE HYPOPEN 1-PACK) 1 MG/0.2ML Subcutaneous SUBQ injection, Inject 0.2 mL (1 mg total) into the skin once as needed for Low blood glucose., Disp: 0.2 mL, Rfl: 1     Allergies:   Allergies   Allergen Reactions    Peanut-Containing Drug Products HIVES and SHORTNESS OF BREATH     Hives, asthma attack    Pcn [Penicillins] UNKNOWN     HAPPENED AS A CHILD       Social History:   Social History     Socioeconomic History    Marital status:     Number of children: 3   Occupational History    Occupation: homemaker   Tobacco Use    Smoking status: Never     Passive exposure: Current    Smokeless tobacco: Never   Vaping Use    Vaping Use: Never used   Substance and Sexual Activity    Alcohol use: No    Drug use: No    Sexual activity: Never     Partners: Male     Birth control/protection: I.U.D.       Medical History:   Past Medical History:   Diagnosis Date    Allergic rhinitis, cause unspecified 05/05/2008    Anemia     Anxiety and depression     Asthma (HCC)     Laguerre's esophagus     Churg-Shania syndrome (HCC) 2022    Colloid cyst of brain (HCC)     Congestive heart disease (HCC)     Coronary artery disease of native heart with stable angina pectoris (HCC)     Diabetes (HCC)     Diabetes mellitus (HCC)     Diverticulosis of large intestine     Essential hypertension     Gastroparesis     GERD     Hematemesis with nausea 11/02/2017 2017, No EGD, Hgb stable    High cholesterol     History of pulmonary embolus (PE) 12/01/2016    History of recurrent deep vein thrombosis (DVT) 12/04/2014    Hypertension     IBS     LGSIL (low grade squamous intraepithelial dysplasia) 10/2010    Pap neg 2014    Migraines     perfumes    Moderate persistent asthma without complication (HCC) 03/20/2018    DESOUZA (nonalcoholic steatohepatitis)     NSTEMI (non-ST elevated myocardial infarction) ([facility]) 2017    FELTON in LAD    Obesity     Osteoarthritis     Patella-femoral syndrome, bilateral knee 01/15/2015    Pneumonia due to  organism     Pulmonary embolism (HCC)        Surgical history:   Past Surgical History:   Procedure Laterality Date    BREAST SURGERY PROCEDURE UNLISTED      cyst removed    CATH DRUG ELUTING STENT      CHOLECYSTECTOMY  04/10/2011    PERFORMED BY DR KRAFT-LAPAROSCOPIC    COLONOSCOPY  2012    polyps    COLONOSCOPY N/A 2/23/2016    Procedure: COLONOSCOPY;  Surgeon: Kishor Stout MD;  Location:  ENDOSCOPY    COLPOSCOPY, CERVIX W/UPPER ADJACENT VAGINA; W/ENDOCERVICAL CURETTAGE  2-2011    WNL    CYST ASPIRATION LEFT  2000    OTHER SURGICAL HISTORY      sinus surgery    OTHER SURGICAL HISTORY      CYST REMOVED ON LEFT WRIST 17 YRS AGO    SINUS SURGERY        UPPER GI ENDOSCOPY,EXAM           PHYSICAL EXAM  Vitals:    02/28/24 1442   BP: 136/88   Pulse: 88   SpO2: 98%       CONSTITUTIONAL:  awake, alert, cooperative, no apparent distress, and appears stated age  PSYCH: normal affect  LUNGS: breathing comfortably  CARDIOVASCULAR:  regular rate       Labs/Imaging:  Reviewed      ASSESSMENT/PLAN:    (E11.65,  Z79.4) Type 2 diabetes mellitus with hyperglycemia, with long-term current use of insulin ([facility])  (primary encounter diagnosis)  ((J45.40) Moderate persistent asthma without complication  Class 3 severe obesity due to excess calories with serious comorbidity and body mass index (BMI) of 40.0 to 44.9 in adult ([facility])  (I50.20) HFrEF (heart failure with reduced ejection fraction) ([facility])    Plan:   A1C well above goal, contributed by frequent steroid courses for asthma/migraine/sinus pain. Pt currently on Omnipod5 running 100% in auto mode. She is still under a lot of stress and pain, with frequent prednisone tapers further exacerbating her BG.    We discussed pt to input more frequently her meals so that her omnipod isn't just relying on basal insulin (often at max) to bring down high sugars. She is not currently interested in switching pumps as her insurance wouldn't cover. She is amenable to pairing NPH.    - insulin  pattern is basal-heavy; poor appetite, however encouraged to be more active with correction doses  - pair NPH 10U for every 10mg of prednisone; her doses are usually 10-30mg  - in case of pump failure: use Toujeo 50U, humalog ICR 1:5, ISF :120. For mechanical issues contact [facility]  - continue Dexcom G6  - previously intolerant: Byetta, Metformin (GI), Januvia, Actos, Novolog, Levemir, Invokana, Trulicity, glipizide, Victoza, Farxiga - recurrent mycotic infections   - she is interested in trying Ozempic at next visit  - Ophtho: no DR, is due for annual exam  - BP controlled, on ARB beta blocker and beta block  - LDL nearly at goal, on ezetimibe ; not on statin due to myopathy  - MAB negative, on ARB  - Neuropathy/ Foot exam: No data recorded  - CAD: yes     Return to Clinic in 3 months with endo APN    2/28/2024  Ottoniel Hopper MD

## 2024-03-01 ENCOUNTER — PATIENT MESSAGE (OUTPATIENT)
Dept: FAMILY MEDICINE CLINIC | Facility: CLINIC | Age: 59
End: 2024-03-01

## 2024-03-01 DIAGNOSIS — F33.1 MODERATE EPISODE OF RECURRENT MAJOR DEPRESSIVE DISORDER (HCC): ICD-10-CM

## 2024-03-01 DIAGNOSIS — F41.9 ANXIETY: ICD-10-CM

## 2024-03-01 RX ORDER — ALPRAZOLAM 0.5 MG/1
TABLET ORAL DAILY PRN
Qty: 42 TABLET | Refills: 0 | Status: SHIPPED | OUTPATIENT
Start: 2024-03-01

## 2024-03-01 NOTE — TELEPHONE ENCOUNTER
From: Danielle Jerome  To: Joyce Whaley  Sent: 3/1/2024 3:27 PM CST  Subject: Alprazolam    Cab you please send a refill for Alprazolam .5 mg 2x a day as \needed? They said they sent a refill request last week, however they never refilled my other meds, so now very confident they sent one.   Thanks!  Alta

## 2024-03-01 NOTE — TELEPHONE ENCOUNTER
LOV 10/27/23  Last refill on 12/28/23, for #42 tabs, with 0 refills  ALPRAZolam 0.5 MG Oral Tab     Controlled Substance Medication [id number]/01/2024 03:19 PM    This medication is a controlled substance - forward to provider to refill     Future Appointments   Date Time Provider Department Center   3/22/2024  1:30 PM Ana Luisa Miller MD [facility] [place]   4/9/2024  1:30 PM Maria E Gonzalez DO [facility]   5/30/2024  2:45 PM Darling Gregory APN [facility] [place]   8/7/2024  1:00 PM Maria E Gonzalez DO [facility]     Order(s) pending, please review. Thank you.

## 2024-03-22 ENCOUNTER — OFFICE VISIT (OUTPATIENT)
Facility: CLINIC | Age: 59
End: 2024-03-22
Payer: COMMERCIAL

## 2024-03-22 ENCOUNTER — MED REC SCAN ONLY (OUTPATIENT)
Facility: CLINIC | Age: 59
End: 2024-03-22

## 2024-03-22 VITALS
WEIGHT: 266 LBS | DIASTOLIC BLOOD PRESSURE: 74 MMHG | HEIGHT: 69 IN | OXYGEN SATURATION: 96 % | SYSTOLIC BLOOD PRESSURE: 128 MMHG | HEART RATE: 92 BPM | RESPIRATION RATE: 16 BRPM | BODY MASS INDEX: 39.4 KG/M2

## 2024-03-22 DIAGNOSIS — J45.50 SEVERE PERSISTENT ASTHMA, UNSPECIFIED WHETHER COMPLICATED (HCC): Primary | ICD-10-CM

## 2024-03-22 PROCEDURE — 99214 OFFICE O/P EST MOD 30 MIN: CPT | Performed by: INTERNAL MEDICINE

## 2024-03-22 PROCEDURE — 3078F DIAST BP <80 MM HG: CPT | Performed by: INTERNAL MEDICINE

## 2024-03-22 PROCEDURE — 3008F BODY MASS INDEX DOCD: CPT | Performed by: INTERNAL MEDICINE

## 2024-03-22 PROCEDURE — 3074F SYST BP LT 130 MM HG: CPT | Performed by: INTERNAL MEDICINE

## 2024-03-22 NOTE — PATIENT INSTRUCTIONS
Plan:  -same meds -   To get pfts   - continue sinus rinse at least daily -   - consider scheduling CPAP titration - -please  See me in 3- months     Ana Luisa Miller MD  Pulmonary Medicine  3.22.24

## 2024-03-22 NOTE — PROGRESS NOTES
Pulmonary Progress Note    History of Present Illness:  Danielle Jerome is a 58 year old female presenting to pulmonary clinic dyspnea     - aunt  - - others in [place]-   Got sick after - and 4  weeks before -- had 2 courses of prednisone      Had testing for abnl PET - and had angio - dr tolliver- all OK -   Some palptitsion   Remains with shortness of breath- some days with difficulty with stairs- knees and back-- DJD - limit as well-   To see vascular - for legs and carotid  No coughing   Now on nucula-- sl light headed at outset- - one dose thus far-- thinks breathing is better   Off prednsione -   Losing weight -- new dexcom- with pump- ongoing -   Breo and spiriva - once a day -- - some rescue use -- twice a day -- a lot less-   Overall feels much better    1.24- doing OK- - had covid- early novemeber- - unable to take paxlovid-- so fatigued and dozzy at times- low vit D-   Follows with evan-  Increased sinus drainage very thick and always stuffy- had 3 zpack and doxy   Increased wheeze - winded all the time- sats 94-96% - drops some with walking - can't exercise - stairs to bedroom-   Thinks dupexent was better than nucala - last 2 weeks ago-   [facility]  had recommended nuclala -- rather than do bx for churg marc   Breo and spiriva - daily - rescue - a lot - himidifiers   Donzelli xclear and Xhance (fluticasone)   Aseltine nasal and zyrtec and mucinex ?benadryl at night   Prednsione none at the moment  Heart - no recent visit - sharee next week   Singular remains every night -     3.24- saw dr machuca - thinks long hauler - remains exhausted -   Wants to switch back to dupuxent -- thihks sinus was more helpful-- ongoing sinus drainage - some dizzyness - no allergy testing   No plans for bx-   No side effects - side effects from nuclala- dizzy - once a month   No prednsione - allergy attack - jacket with cat fur-   Breo and spiriva - - rescue - less overall -  Singular   Asteline  and zyrtec capsasain - had nose bleed- -   Lyudmila following for possible surgery - xhance and xclear                                Social History:   Social History     Socioeconomic History    Marital status:     Number of children: 3   Occupational History    Occupation: homemaker   Tobacco Use    Smoking status: Never     Passive exposure: Current    Smokeless tobacco: Never    Tobacco comments:     2nd hand with Father and now    Vaping Use    Vaping Use: Never used   Substance and Sexual Activity    Alcohol use: No    Drug use: No    Sexual activity: Never     Partners: Male     Birth control/protection: I.U.D.        Medications:   Current Outpatient Medications   Medication Sig Dispense Refill    ALPRAZOLAM 0.5 MG Oral Tab TAKE 1 TO 2 TABLETS(0.5 TO 1 MG) BY MOUTH DAILY AS NEEDED FOR SLEEP OR ANXIETY 42 tablet 0    Continuous Blood Gluc Sensor (DEXCOM G6 SENSOR) Does not apply Misc Apply to skin every 10 days 9 each 3    Continuous Blood Gluc Transmit (DEXCOM G6 TRANSMITTER) Does not apply Misc 1 each by Other route every 3 (three) months. 1 each 1    Insulin Disposable Pump (OMNIPOD 5 G6 PODS, GEN 5,) Does not apply Misc 1 each every other day. 45 each 1    insulin lispro (HUMALOG) 100 UNIT/ML Injection Solution Uses up to 100 units daily via insulin pump 90 mL 1    Insulin NPH, Human,, Isophane, (HUMULIN N KWIKPEN) 100 UNIT/ML Subcutaneous Suspension Pen-injector Pair with prednisone only. Up to 30 units a day max. 9 mL 0    pantoprazole 40 MG Oral Tab EC Take 1 tablet (40 mg total) by mouth before breakfast. 90 tablet 3    rivaroxaban (XARELTO) 20 MG Oral Tab Take 1 tablet (20 mg total) by mouth nightly. TAKE AT BEDTIME 90 tablet 3    predniSONE 10 MG Oral Tab 3 po q am for 3 days then 2 po q am for 3 days then one po q am for 5 days 20 tablet 0    fluconazole 150 MG Oral Tab Take 1 tablet (150 mg total) by mouth once.      gabapentin 100 MG Oral Cap Take 100mg in am, 100mg in afternoon and  200mg at night 360 capsule 0    ergocalciferol 1.25 MG (26319 UT) Oral Cap Take 1 capsule (50,000 Units total) by mouth once a week. 12 capsule 0    predniSONE 10 MG Oral Tab Take 3 tabs (30mg) daily for 2 days, then take 2 tabs (20mg) daily for 2 days, then take 1 tab (10mg) daily for 2 days. 12 tablet 0    albuterol 108 (90 Base) MCG/ACT Inhalation Aero Soln Inhale 2 puffs into the lungs every 4 to 6 hours as needed for Wheezing or Shortness of Breath. 3 each 1    montelukast 10 MG Oral Tab Take 1 tablet (10 mg total) by mouth nightly. 90 tablet 1    escitalopram 20 MG Oral Tab Take 1 tablet (20 mg total) by mouth every morning. 90 tablet 1    methylPREDNISolone (MEDROL) 4 MG Oral Tablet Therapy Pack As directed. 1 each 0    amLODIPine 2.5 MG Oral Tab amLODIPine 2.5 mg tablet, [RxNorm: 725325]      Mepolizumab (NUCALA SC) Inject into the skin every 30 (thirty) days.      Tiotropium Bromide Monohydrate (SPIRIVA RESPIMAT) 2.5 MCG/ACT Inhalation Aero Soln Inhale 2 puffs into the lungs daily. 1 each 5    Metoprolol-HCTZ ER 25-12.5 MG Oral Tablet 24 Hr metoprolol succ 25 mg-hydrochlorothiazide 12.5 mg tablet,ext.rel 24 hr   25mg 1/day      Azelastine HCl 0.1 % Nasal Solution       SUMAtriptan Succinate 50 MG Oral Tab TAKE 1 TABLET BY MOUTH EVERY 2 HOURS AS NEEDED FOR  MIGRAINE.  DO  NOT  EXCEED  200MG  IN  24  HOURS 9 tablet 3    BREO ELLIPTA 200-25 MCG/INH Inhalation Aerosol Powder, Breath Activated       Clopidogrel Bisulfate 75 MG Oral Tab Take 1 tablet (75 mg total) by mouth daily. nightly      losartan 100 MG Oral Tab Take 1 tablet (100 mg total) by mouth daily.      ezetimibe 10 MG Oral Tab Take 1 tablet (10 mg total) by mouth nightly.      Fluticasone Propionate 50 MCG/ACT Nasal Suspension 2 sprays by Each Nare route 2 (two) times daily.      cetirizine (ZYRTEC) 10 MG Oral Tab Take 1 tablet (10 mg total) by mouth daily.      glucagon (GVOKE HYPOPEN 1-PACK) 1 MG/0.2ML Subcutaneous SUBQ injection Inject 0.2 mL (1  mg total) into the skin once as needed for Low blood glucose. 0.2 mL 1       Review of Systems:    Weight - up 20# since July - - and down now 263-- was 277 - now 265 - steady   gerd mostly controlled - ppi once a day -- no breakthrough - ppi daily   No dairrhea -- rarely   Rare hives-no recent issue  Some swelling- - trace now - comes and goes   Torsemide only as needed - no recent use -   Shoulder pain -- for MRI     Overall thinks sleep is a bit better she is not waking up during the night to take her rescue inhaler though continues to have awakenings-   Tolerating Nucala well without skin lesions             Physical Exam:  /74 (BP Location: Radial, Patient Position: Sitting, Cuff Size: adult)   Pulse 92   Resp 16   Ht 5' 9\" (1.753 m)   Wt 266 lb (120.7 kg)   LMP 09/23/2004   SpO2 96%   BMI 39.28 kg/m²    Constitutional: comfortable . No acute distress.  Much clearer without significant congesti  HEENT: Head NC/AT. PEERL. Throat is clear -small area  Cardio: .  Distant tones regular rate and rhythm no ectopy noted  Respiratory: Much clearer today rare sense of rhonchi at most no rales  GI:  Abd soft, non-tender.  Obese  Extremities: No clubbing .  No significant edema  Neurologic: A&Ox3. No gross motor deficits.  Skin: Warm, dry.no rashes or hives noted   Lymphatic: No cervical or supraclavicular lymphadenopathy.  Neck is thick there is no JVD at 90 degrees  Psych: Calm, cooperative. Pleasant affect.    Results: reviewed     Assessment/Plan:  #History of breast lesion  Stable on mammos 2020  Encouraged to get follow-up  Negative mammos 3/23  States up-to-date      #Risk of PEDRO  Remote negative study  Recent increase in nocturnal awakenings and fatigue  Did not get follow-up study yet  5/23-agrees to proceed  7/23-sleep study AHI 24.3 REM 40 gonzalo 82.7% 5% of the night less than 88  For titration  1/24  did not go for  titration -states will  3.24- continues to complain of exhaustion - did not go for  titration - - states will -discussed again          #Diabetes  Ongoing issues some improvement  Some weight loss-now on new monitor and pulm  Blood sugars get significantly out of control on prednisone with endocrine following  Remains with increasing weight and increasing hemoglobin A1c      #Coronary artery disease  11/2017 with thrombotic lesion to the LAD-FELTON placed  Associated ischemic cardiomyopathy  Repeat cath for elevated troponin 4/2018 with nonobstructive coronary disease  Negative stress 8/2019  Remains on Plavix and lifelong Xarelto  Recurrent chest pain 2020 negative dobutamine stress  7/21 states due for testing   1/23 follows with Gloria Tolliver-repeat stress and carotid Dopplers pending  5/23 due for follow-up after testing including stress and echo  7/23-repeat angiogram last month with Dr. Avila-reports stent widely patent coronaries widely patent-last cath prior was April 2018  1.3.24- due to see dr tolliver   3/24 no significant change no recent testing      #Ischemic cardiomyopathy  EF 35 to 40% improved on echo 2017  Had been followed in heart failure clinic for a time  6/20 states normal EF  Remains on daily torsemide  Last echo 2/4/2022 -EF 50 to 55% diastolic dysfunction unable to measure ----last with PAS 30-35  Stable fluid status  1/24 fluid status seems improved  3/24 echo 5/23 EF 65 to 70% with grade 1 diastolic dysfunction unable to assess pulmonary pressures normal TAPSE  Fluid status stable        #Pulmonary emboli  Long history remote and recurrent  Plans for lifelong anticoagulation--unable to tolerate aspirin so remains on antiplatelet      #Asthma severe  Childhood asthma progressed early adulthood  Longstanding history multiple hospitalizations multiple steroid dosing remains on MDIs  Begun on Dupixent 2020-sinus disease rash and eosinophilia  Rash biopsy negative for vasculitis  Overall improved control on Dupixent-had been on Xolair prior  Recent [facility] visit with  negative 6-minute walk; PFTs with normal timed volumes-ratio 67% total lung capacity 103% of predicted % of predicted-17% bronchodilator response in FEV1  1/23 now with ongoing flare suspect related to sinus issues  5/23 remains with significant obstruction on exam with plans to redose prednisone-worse now as Dupixent has been stopped with plans to begin Nucala after breathing test  7/1:23 dose of Nucala has noted significant improvement with plans to continue and follow-wean MDIs as able  1.24- recent covid infection -- since  worse with nucala vs dupixent - to talk to sven and to see -short prednisone dose  3/24-asthma now very well compensated-believes her sinus disease was significantly better when she was on Dupixent versus the current Nucala  Insurance investigation ongoing-repeat PFTs pending          #History of chronic sinusitis and nasal polyposis  Remains with ongoing flares  Follows with ENT--minimal improvement in nasal polyps with the Dupixent  Repeat CT planned and Rx with Zithromax  5/23 CT sinuses with opacification and bone remodeling of the right sphenoid sinus-ENT referred her to [facility]-to consider seeing Lyudmila  7/23 has seen Dr. Mao with plans to follow on Nucala and follow CT  3/24 ongoing and current chief complaint remains on 7 sinus medication plan to follow if able to get Dupixent        #Recent evaluation rule out EGPA  Questionably remote Churg-Shania----no history of pulmonary infiltrates  Negative skin biopsy in the past for vasculitis  Recent visit with [facility]-unable to confirm-multiple negative rheumatologic testing  Recommendation had been for EGPA dosing of Nucala-following  7/23-EGPA dosing of Nucala as above following with improvement  3/24 anticipating changed back to Dupixent    # COVID -   Halloween - since with marked fatigue and cough and sinus congestion   Has been told may have long COVID symptoms    -Plan:  -same meds -   To get pfts   - continue sinus  rinse at least daily -   - consider scheduling CPAP titration - -please  See me in 3- months     Ana Luisa Miller MD  Pulmonary Medicine  3.22.24

## 2024-03-24 PROBLEM — Z78.9 STATIN INTOLERANCE: Status: ACTIVE | Noted: 2023-06-26

## 2024-03-24 PROBLEM — R60.0 EDEMA OF EXTREMITIES: Status: ACTIVE | Noted: 2023-06-26

## 2024-03-26 ENCOUNTER — PATIENT MESSAGE (OUTPATIENT)
Facility: CLINIC | Age: 59
End: 2024-03-26

## 2024-03-26 DIAGNOSIS — J45.40 MODERATE PERSISTENT ASTHMA WITHOUT COMPLICATION (HCC): ICD-10-CM

## 2024-03-26 RX ORDER — ALBUTEROL SULFATE 90 UG/1
2 AEROSOL, METERED RESPIRATORY (INHALATION)
Qty: 3 EACH | Refills: 1 | Status: SHIPPED | OUTPATIENT
Start: 2024-03-26 | End: 2024-03-29

## 2024-03-26 RX ORDER — TIOTROPIUM BROMIDE INHALATION SPRAY 3.12 UG/1
2 SPRAY, METERED RESPIRATORY (INHALATION) DAILY
Qty: 3 EACH | Refills: 1 | Status: SHIPPED | OUTPATIENT
Start: 2024-03-26

## 2024-03-26 RX ORDER — FLUTICASONE FUROATE AND VILANTEROL TRIFENATATE 200; 25 UG/1; UG/1
1 POWDER RESPIRATORY (INHALATION) DAILY
Qty: 3 EACH | Refills: 1 | Status: SHIPPED | OUTPATIENT
Start: 2024-03-26

## 2024-03-26 RX ORDER — MONTELUKAST SODIUM 10 MG/1
10 TABLET ORAL NIGHTLY
Qty: 90 TABLET | Refills: 1 | Status: SHIPPED | OUTPATIENT
Start: 2024-03-26

## 2024-03-26 NOTE — TELEPHONE ENCOUNTER
[facility]  Advised pt that it is in her orders and she will need to call Central scheduling to schedule.    Pt needs the following meds refilled at [facility]:  Breo, Spiriva, Albuterol, and Singulair.    Pt last seen by Dr. Miller on 3-22-24.  Per OV notes:  -same meds.  Pt advised to follow up in 3 months.  Appt scheduled on 6-24-24. Refills sent as requested.

## 2024-03-26 NOTE — TELEPHONE ENCOUNTER
From: Danielle Jerome  To: Ana Luisa Miller  Sent: 3/26/2024 11:59 AM CDT  Subject: Pulmonary Function Test and Refills    Hi Dr Miller,   The Pulmonary function order is not in the system and the inhaler refills were not [facility] at [facility]. Can you please resubmit?    Thanks!   Alta

## 2024-03-29 ENCOUNTER — PATIENT MESSAGE (OUTPATIENT)
Facility: CLINIC | Age: 59
End: 2024-03-29

## 2024-03-29 DIAGNOSIS — J45.40 MODERATE PERSISTENT ASTHMA WITHOUT COMPLICATION (HCC): ICD-10-CM

## 2024-03-29 RX ORDER — ALBUTEROL SULFATE 90 UG/1
2 AEROSOL, METERED RESPIRATORY (INHALATION)
Qty: 1 EACH | Refills: 0 | Status: SHIPPED | OUTPATIENT
Start: 2024-03-29

## 2024-03-29 NOTE — TELEPHONE ENCOUNTER
From: Danielle Jerome  To: Ana Luisa Miller  Sent: 3/29/2024 1:11 PM CDT  Subject: Albuterol Inhaler    Hi,   Is there any way I can get an Albuterol inhaler prescription sent to the [facility] on [address] in [place]. [facility]ill [facility] will not be filled until April 6th, and I only have a few puffs left on my last one.

## 2024-03-29 NOTE — TELEPHONE ENCOUNTER
Pt last seen by Dr. Miller on 3-22-24.  Per OV notes:  -same meds.  Pt advised to follow up in 3 months.  Appt scheduled on 6-24-24.  Albuterol inhaler sent to [facility].

## 2024-04-05 ENCOUNTER — HOSPITAL ENCOUNTER (EMERGENCY)
Age: 59
Discharge: HOME | End: 2024-04-05
Attending: EMERGENCY MEDICINE
Payer: COMMERCIAL

## 2024-04-05 ENCOUNTER — TELEPHONE (OUTPATIENT)
Facility: CLINIC | Age: 59
End: 2024-04-05

## 2024-04-05 ENCOUNTER — APPOINTMENT (OUTPATIENT)
Dept: GENERAL RADIOLOGY | Age: 59
End: 2024-04-05
Attending: EMERGENCY MEDICINE
Payer: COMMERCIAL

## 2024-04-05 VITALS
WEIGHT: 256 LBS | HEART RATE: 105 BPM | SYSTOLIC BLOOD PRESSURE: 120 MMHG | BODY MASS INDEX: 38.8 KG/M2 | DIASTOLIC BLOOD PRESSURE: 65 MMHG | OXYGEN SATURATION: 95 % | RESPIRATION RATE: 20 BRPM | HEIGHT: 68 IN | TEMPERATURE: 99 F

## 2024-04-05 DIAGNOSIS — J98.01 ACUTE BRONCHOSPASM: Primary | ICD-10-CM

## 2024-04-05 DIAGNOSIS — J18.9 COMMUNITY ACQUIRED PNEUMONIA, UNSPECIFIED LATERALITY: ICD-10-CM

## 2024-04-05 LAB
POCT INFLUENZA A: NEGATIVE
POCT INFLUENZA B: NEGATIVE
SARS-COV-2 RNA RESP QL NAA+PROBE: NOT DETECTED

## 2024-04-05 PROCEDURE — 99285 EMERGENCY DEPT VISIT HI MDM: CPT

## 2024-04-05 PROCEDURE — 94644 CONT INHLJ TX 1ST HOUR: CPT

## 2024-04-05 PROCEDURE — 99284 EMERGENCY DEPT VISIT MOD MDM: CPT

## 2024-04-05 PROCEDURE — 87502 INFLUENZA DNA AMP PROBE: CPT | Performed by: EMERGENCY MEDICINE

## 2024-04-05 PROCEDURE — 71045 X-RAY EXAM CHEST 1 VIEW: CPT | Performed by: EMERGENCY MEDICINE

## 2024-04-05 RX ORDER — ALBUTEROL SULFATE 90 UG/1
2 AEROSOL, METERED RESPIRATORY (INHALATION) EVERY 4 HOURS PRN
Qty: 1 EACH | Refills: 0 | Status: SHIPPED | OUTPATIENT
Start: 2024-04-05 | End: 2024-04-09

## 2024-04-05 RX ORDER — PREDNISONE 20 MG/1
60 TABLET ORAL ONCE
Status: COMPLETED | OUTPATIENT
Start: 2024-04-05 | End: 2024-04-05

## 2024-04-05 RX ORDER — PREDNISONE 20 MG/1
40 TABLET ORAL DAILY
Qty: 10 TABLET | Refills: 0 | Status: SHIPPED | OUTPATIENT
Start: 2024-04-05 | End: 2024-04-12

## 2024-04-05 RX ORDER — DOXYCYCLINE HYCLATE 100 MG/1
100 CAPSULE ORAL 2 TIMES DAILY
Qty: 20 CAPSULE | Refills: 0 | Status: SHIPPED | OUTPATIENT
Start: 2024-04-05 | End: 2024-04-12

## 2024-04-05 NOTE — ED PROVIDER NOTES
Patient Seen in: [facility] [place]      History     Chief Complaint   Patient presents with    Cough/URI    Fever     Stated Complaint: sent by dr shepard, daughter has flu at home and states physican wants a flu, co*    Subjective:   HPI    59-year-old female with history of asthma presents to emergency department for evaluation of cough chest congestion and head congestion for the last 3 days.  Cough has been productive of some yellow sputum.  States she has had subjective fevers.  Feels she is wheezing.  States her daughter also has been sick the last few days as well.  Was sent by her pulmonary office for evaluation.  Denies lower extremity swelling or calf pain denies PND or orthopnea.  Objective:   Past Medical History:   Diagnosis Date    Allergic rhinitis     Allergic rhinitis, cause unspecified 05/05/2008    Anemia     Anxiety     Anxiety and depression     Asthma (HCC)     Atherosclerosis of coronary artery     Laguerre's esophagus     Churg-Shania syndrome (HCC) 2022    Colloid cyst of brain (HCC)     Congestive heart disease (HCC)     Coronary artery disease of native heart with stable angina pectoris (HCC)     Depression     Diabetes (HCC)     Diabetes mellitus (HCC)     Diverticulosis of large intestine     Essential hypertension     Gastroparesis     GERD     Hematemesis with nausea 11/02/2017 2017, No EGD, Hgb stable    High cholesterol     History of blood clots     History of pulmonary embolus (PE) 12/01/2016    History of recurrent deep vein thrombosis (DVT) 12/04/2014    Hyperlipidemia     Hypertension     IBS     LGSIL (low grade squamous intraepithelial dysplasia) 10/2010    Pap neg 2014    Migraines     perfumes    Moderate persistent asthma without complication (HCC) 03/20/2018    DESOUZA (nonalcoholic steatohepatitis)     NSTEMI (non-ST elevated myocardial infarction) (HCC) 2017    FELTON in LAD    Obesity     Osteoarthritis     Patella-femoral syndrome, bilateral knee  01/15/2015    Pneumonia due to organism     Pulmonary embolism (HCC)     Sleep apnea     Thyroid disease               Past Surgical History:   Procedure Laterality Date    ANGIOPLASTY (CORONARY)      BREAST SURGERY PROCEDURE UNLISTED      cyst removed    CATH DRUG ELUTING STENT      CHOLECYSTECTOMY  04/10/2011    PERFORMED BY DR KRAFT-LAPAROSCOPIC    COLONOSCOPY  2012    polyps    COLONOSCOPY N/A 2016    Procedure: COLONOSCOPY;  Surgeon: Kishor Stout MD;  Location:  ENDOSCOPY    COLPOSCOPY, CERVIX W/UPPER ADJACENT VAGINA; W/ENDOCERVICAL CURETTAGE  2011    WNL    CYST ASPIRATION LEFT  2000    HERNIA SURGERY            OTHER SURGICAL HISTORY      sinus surgery    OTHER SURGICAL HISTORY      CYST REMOVED ON LEFT WRIST 17 YRS AGO    SINUS SURGERY        SKIN SURGERY      UPPER GI ENDOSCOPY,EXAM                  Social History     Socioeconomic History    Marital status:     Number of children: 3   Occupational History    Occupation: homemaker   Tobacco Use    Smoking status: Never     Passive exposure: Current    Smokeless tobacco: Never    Tobacco comments:     2nd hand with Father and now    Vaping Use    Vaping Use: Never used   Substance and Sexual Activity    Alcohol use: No    Drug use: No    Sexual activity: Never     Partners: Male     Birth control/protection: I.U.D.              Review of Systems    Positive for stated complaint: sent by dr shepard, daughter has flu at home and states physican wants a flu, co*  Other systems are as noted in HPI.  Constitutional and vital signs reviewed.      All other systems reviewed and negative except as noted above.    Physical Exam     ED Triage Vitals [24 1347]   /73   Pulse 97   Resp 22   Temp 97.6 °F (36.4 °C)   Temp src Temporal   SpO2 94 %   O2 Device None (Room air)       Current:/65   Pulse 105   Temp 98.8 °F (37.1 °C) (Temporal)   Resp 20   Ht 172.7 cm (5' 8\")   Wt 116.1 kg   LMP 2004   SpO2 95%    BMI 38.92 kg/m²         Physical Exam    GENERAL: Patient is awake, alert  HEENT: no scleral icterus.  Mucous membranes are moist, clear postnasal drip present, oropharynx is clear, uvula midline.    NECK: Neck is supple, there is no nuchal rigidity.    HEART: Regular rate and rhythm, no murmurs.  LUNGS: Diffuse wheezing bilaterally, occasional crackle left lung field.    ABDOMEN: Soft, nondistended,non tender  EXTREMITIES: No peripheral edema, no calf tenderness    ED Course     Labs Reviewed   RAPID SARS-COV-2 BY PCR - Normal   POCT FLU TEST - Normal    Narrative:     This assay is a rapid molecular in vitro test utilizing nucleic acid amplification of influenza A and B viral RNA.             A total of 40 minutes of critical care time (exclusive of billable procedures) was administered to manage the patient's respiratory instability due to her pneumonia with acute bronchospasm requiring continuous nebulizer treatment.  This involved direct patient intervention, complex decision making, and/or extensive discussions with the patient, family, and clinical staff.           MDM        Differential diagnosis before testing includes but not limited to pneumonia, pleural effusion, pulm edema, pneumothorax, COVID, influenza, which is a medical condition that poses a threat to life/function    Past Medical History/comorbidities-asthma, coronary disease    Radiographic images  I personally reviewed the radiographs and my individual interpretation shows chest x-ray no pneumothorax  I also reviewed the official reports that showed chest x-ray new small airspace consolidation lateral left midlung field may reflect pneumonia.      Medications Provided: Prednisone, nebulizer treatments    Course of Events during Emergency Room Visit include patient chest x-ray performed, COVID and influenza testing were negative.  Patient did receive prednisone and continuous nebulizer treatment.  On reevaluation patient reports she is feeling  much better, is marked improvement in air movement, there is still mild expiratory wheezing.  Patient not tachypneic or hypoxic.  I discussed all results with the patient we will treat for community-acquired pneumonia with doxycycline, prescription for prednisone and albuterol provided.  Close follow-up with primary care and pulmonary, call in the morning for an appointment.  Return to ER if any change or worsening symptoms.  Patient well-appearing discharge good condition    Shared decision making was utilized       Discharge  I have discussed with the patient the results of test, differential diagnosis, treatment plan, warning signs and symptoms which should prompt immediate return.  They expressed understanding of these instructions and agrees to the following plan provided.  They were given written discharge instructions and agrees to return for any concerns and voiced understanding and all questions were answered.    Note to patient: The 21st Century Cures Act makes medical notes like these available to patients in the interest of transparency. However, this is a medical document intended as peer to peer communication. It is written in medical language and may contain abbreviations or verbiage that are unfamiliar. It may appear blunt or direct. Medical documents are intended to carry relevant information, facts as evident, and the clinical opinion of the practitioner.                                            Medical Decision Making      Disposition and Plan     Clinical Impression:  1. Acute bronchospasm    2. Community acquired pneumonia, unspecified laterality         Disposition:  Discharge  4/5/2024  4:02 pm    Follow-up:  Luca Rai,   [address]  [place] [facility] [address]  [phone]    Follow up in 2 day(s)            Medications Prescribed:  Discharge Medication List as of 4/5/2024  4:03 PM        START taking these medications    Details   predniSONE 20 MG Oral Tab Take 2 tablets (40 mg  total) by mouth daily for 5 days., Normal, Disp-10 tablet, R-0      !! albuterol 108 (90 Base) MCG/ACT Inhalation Aero Soln Inhale 2 puffs into the lungs every 4 (four) hours as needed for Wheezing., Normal, Disp-1 each, R-0      doxycycline 100 MG Oral Cap Take 1 capsule (100 mg total) by mouth 2 (two) times daily for 10 days., Normal, Disp-20 capsule, R-0       !! - Potential duplicate medications found. Please discuss with provider.

## 2024-04-05 NOTE — TELEPHONE ENCOUNTER
PT not feeling great she think she has a flu . Has a fever 102.1  and shortness of breath and  dry annoying cough, wheezing , headache

## 2024-04-05 NOTE — TELEPHONE ENCOUNTER
Pt has been sick X 3 days.  States daughter came home with sx on Easter and now she is sick.  Daughter did not test for viral panel but her boyfriend had the flu.  Advised pt to go to  to be tested for viral panel and determine if she is eligible to receive Tamiflu if + for the flu.  Pt states she cannot take paxlovid due to being on blood thinner.  Pt will go to Urgent Care this afternoon.

## 2024-04-05 NOTE — ED INITIAL ASSESSMENT (HPI)
Reports nasal congestion and head stuffiness for past 3 days,  coughing with minimal production  reports wheezing

## 2024-04-09 ENCOUNTER — HOSPITAL ENCOUNTER (INPATIENT)
Facility: HOSPITAL | Age: 59
LOS: 3 days | Discharge: HOME | End: 2024-04-12
Attending: EMERGENCY MEDICINE | Admitting: HOSPITALIST
Payer: COMMERCIAL

## 2024-04-09 ENCOUNTER — APPOINTMENT (OUTPATIENT)
Dept: GENERAL RADIOLOGY | Facility: HOSPITAL | Age: 59
End: 2024-04-09
Attending: EMERGENCY MEDICINE
Payer: COMMERCIAL

## 2024-04-09 ENCOUNTER — TELEMEDICINE (OUTPATIENT)
Dept: RHEUMATOLOGY | Facility: CLINIC | Age: 59
End: 2024-04-09
Payer: COMMERCIAL

## 2024-04-09 VITALS — BODY MASS INDEX: 40.16 KG/M2 | HEIGHT: 68 IN | WEIGHT: 265 LBS

## 2024-04-09 DIAGNOSIS — M25.50 POLYARTHRALGIA: ICD-10-CM

## 2024-04-09 DIAGNOSIS — R79.82 ELEVATED C-REACTIVE PROTEIN (CRP): ICD-10-CM

## 2024-04-09 DIAGNOSIS — M79.602 ARM PAIN, MUSCULOSKELETAL, LEFT: ICD-10-CM

## 2024-04-09 DIAGNOSIS — M79.2 NEUROPATHIC PAIN: ICD-10-CM

## 2024-04-09 DIAGNOSIS — M79.7 FIBROMYALGIA: Primary | ICD-10-CM

## 2024-04-09 DIAGNOSIS — R04.2 HEMOPTYSIS: ICD-10-CM

## 2024-04-09 DIAGNOSIS — R70.0 ELEVATED SED RATE: ICD-10-CM

## 2024-04-09 DIAGNOSIS — J45.41 MODERATE PERSISTENT ASTHMA WITH EXACERBATION (HCC): ICD-10-CM

## 2024-04-09 DIAGNOSIS — R06.02 SHORTNESS OF BREATH: ICD-10-CM

## 2024-04-09 DIAGNOSIS — J18.9 SEPSIS DUE TO PNEUMONIA (HCC): ICD-10-CM

## 2024-04-09 DIAGNOSIS — J18.9 COMMUNITY ACQUIRED PNEUMONIA OF LEFT LOWER LOBE OF LUNG: Primary | ICD-10-CM

## 2024-04-09 DIAGNOSIS — A41.9 SEPSIS DUE TO PNEUMONIA (HCC): ICD-10-CM

## 2024-04-09 DIAGNOSIS — E55.9 VITAMIN D DEFICIENCY: ICD-10-CM

## 2024-04-09 DIAGNOSIS — R71.8 MICROCYTOSIS: ICD-10-CM

## 2024-04-09 LAB
ALBUMIN SERPL-MCNC: 3.6 G/DL (ref 3.4–5)
ALBUMIN/GLOB SERPL: 0.9 {RATIO} (ref 1–2)
ALP LIVER SERPL-CCNC: 99 U/L
ALT SERPL-CCNC: 41 U/L
ANION GAP SERPL CALC-SCNC: 8 MMOL/L (ref 0–18)
AST SERPL-CCNC: 8 U/L (ref 15–37)
BASOPHILS # BLD AUTO: 0.09 X10(3) UL (ref 0–0.2)
BASOPHILS NFR BLD AUTO: 0.7 %
BILIRUB SERPL-MCNC: 0.3 MG/DL (ref 0.1–2)
BUN BLD-MCNC: 17 MG/DL (ref 9–23)
CALCIUM BLD-MCNC: 9.6 MG/DL (ref 8.5–10.1)
CHLORIDE SERPL-SCNC: 106 MMOL/L (ref 98–112)
CO2 SERPL-SCNC: 25 MMOL/L (ref 21–32)
CREAT BLD-MCNC: 1.18 MG/DL
D DIMER PPP FEU-MCNC: 0.41 UG/ML FEU (ref ?–0.57)
EGFRCR SERPLBLD CKD-EPI 2021: 54 ML/MIN/1.73M2 (ref 60–?)
EOSINOPHIL # BLD AUTO: 0.01 X10(3) UL (ref 0–0.7)
EOSINOPHIL NFR BLD AUTO: 0.1 %
ERYTHROCYTE [DISTWIDTH] IN BLOOD BY AUTOMATED COUNT: 19 %
FLUAV + FLUBV RNA SPEC NAA+PROBE: NEGATIVE
FLUAV + FLUBV RNA SPEC NAA+PROBE: NEGATIVE
GLOBULIN PLAS-MCNC: 4.1 G/DL (ref 2.8–4.4)
GLUCOSE BLD-MCNC: 277 MG/DL (ref 70–99)
GLUCOSE BLD-MCNC: 351 MG/DL (ref 70–99)
HCT VFR BLD AUTO: 46.1 %
HGB BLD-MCNC: 14.5 G/DL
IMM GRANULOCYTES # BLD AUTO: 0.24 X10(3) UL (ref 0–1)
IMM GRANULOCYTES NFR BLD: 1.8 %
LACTATE SERPL-SCNC: 1.9 MMOL/L (ref 0.4–2)
LACTATE SERPL-SCNC: 3.6 MMOL/L (ref 0.4–2)
LYMPHOCYTES # BLD AUTO: 3.77 X10(3) UL (ref 1–4)
LYMPHOCYTES NFR BLD AUTO: 28.5 %
MCH RBC QN AUTO: 24.1 PG (ref 26–34)
MCHC RBC AUTO-ENTMCNC: 31.5 G/DL (ref 31–37)
MCV RBC AUTO: 76.7 FL
MONOCYTES # BLD AUTO: 0.71 X10(3) UL (ref 0.1–1)
MONOCYTES NFR BLD AUTO: 5.4 %
NEUTROPHILS # BLD AUTO: 8.43 X10 (3) UL (ref 1.5–7.7)
NEUTROPHILS # BLD AUTO: 8.43 X10(3) UL (ref 1.5–7.7)
NEUTROPHILS NFR BLD AUTO: 63.5 %
OSMOLALITY SERPL CALC.SUM OF ELEC: 299 MOSM/KG (ref 275–295)
PLATELET # BLD AUTO: 371 10(3)UL (ref 150–450)
POTASSIUM SERPL-SCNC: 3.8 MMOL/L (ref 3.5–5.1)
PROT SERPL-MCNC: 7.7 G/DL (ref 6.4–8.2)
RBC # BLD AUTO: 6.01 X10(6)UL
RSV RNA SPEC NAA+PROBE: NEGATIVE
SARS-COV-2 RNA RESP QL NAA+PROBE: NOT DETECTED
SODIUM SERPL-SCNC: 139 MMOL/L (ref 136–145)
TROPONIN I SERPL HS-MCNC: 7 NG/L
WBC # BLD AUTO: 13.3 X10(3) UL (ref 4–11)

## 2024-04-09 PROCEDURE — 99214 OFFICE O/P EST MOD 30 MIN: CPT | Performed by: INTERNAL MEDICINE

## 2024-04-09 PROCEDURE — 3E0333Z INTRODUCTION OF ANTI-INFLAMMATORY INTO PERIPHERAL VEIN, PERCUTANEOUS APPROACH: ICD-10-PCS | Performed by: EMERGENCY MEDICINE

## 2024-04-09 PROCEDURE — 3078F DIAST BP <80 MM HG: CPT | Performed by: HOSPITALIST

## 2024-04-09 PROCEDURE — 3008F BODY MASS INDEX DOCD: CPT | Performed by: HOSPITALIST

## 2024-04-09 PROCEDURE — 71045 X-RAY EXAM CHEST 1 VIEW: CPT | Performed by: EMERGENCY MEDICINE

## 2024-04-09 PROCEDURE — 3077F SYST BP >= 140 MM HG: CPT | Performed by: HOSPITALIST

## 2024-04-09 PROCEDURE — 99223 1ST HOSP IP/OBS HIGH 75: CPT | Performed by: HOSPITALIST

## 2024-04-09 PROCEDURE — 5A09357 ASSISTANCE WITH RESPIRATORY VENTILATION, LESS THAN 24 CONSECUTIVE HOURS, CONTINUOUS POSITIVE AIRWAY PRESSURE: ICD-10-PCS | Performed by: HOSPITALIST

## 2024-04-09 RX ORDER — SODIUM CHLORIDE 9 MG/ML
INJECTION, SOLUTION INTRAVENOUS CONTINUOUS
Status: CANCELLED | OUTPATIENT
Start: 2024-04-09 | End: 2024-04-09

## 2024-04-09 RX ORDER — ENOXAPARIN SODIUM 100 MG/ML
40 INJECTION SUBCUTANEOUS DAILY
Status: DISCONTINUED | OUTPATIENT
Start: 2024-04-10 | End: 2024-04-10

## 2024-04-09 RX ORDER — NICOTINE POLACRILEX 4 MG
15 LOZENGE BUCCAL
Status: DISCONTINUED | OUTPATIENT
Start: 2024-04-09 | End: 2024-04-12

## 2024-04-09 RX ORDER — IPRATROPIUM BROMIDE AND ALBUTEROL SULFATE 2.5; .5 MG/3ML; MG/3ML
3 SOLUTION RESPIRATORY (INHALATION)
Status: DISCONTINUED | OUTPATIENT
Start: 2024-04-09 | End: 2024-04-12

## 2024-04-09 RX ORDER — METHYLPREDNISOLONE SODIUM SUCCINATE 125 MG/2ML
60 INJECTION, POWDER, LYOPHILIZED, FOR SOLUTION INTRAMUSCULAR; INTRAVENOUS EVERY 8 HOURS
Status: DISCONTINUED | OUTPATIENT
Start: 2024-04-09 | End: 2024-04-11

## 2024-04-09 RX ORDER — DEXTROSE MONOHYDRATE 25 G/50ML
50 INJECTION, SOLUTION INTRAVENOUS
Status: DISCONTINUED | OUTPATIENT
Start: 2024-04-09 | End: 2024-04-12

## 2024-04-09 RX ORDER — ACETAMINOPHEN 500 MG
500 TABLET ORAL EVERY 4 HOURS PRN
Status: DISCONTINUED | OUTPATIENT
Start: 2024-04-09 | End: 2024-04-12

## 2024-04-09 RX ORDER — GUAIFENESIN 600 MG/1
600 TABLET, EXTENDED RELEASE ORAL 2 TIMES DAILY
Status: DISCONTINUED | OUTPATIENT
Start: 2024-04-09 | End: 2024-04-12

## 2024-04-09 RX ORDER — GABAPENTIN 100 MG/1
100 CAPSULE ORAL EVERY MORNING
COMMUNITY
Start: 2024-04-09

## 2024-04-09 RX ORDER — ERGOCALCIFEROL 1.25 MG/1
50000 CAPSULE ORAL WEEKLY
Qty: 12 CAPSULE | Refills: 0 | Status: SHIPPED | OUTPATIENT
Start: 2024-04-09 | End: 2024-07-02

## 2024-04-09 RX ORDER — METHYLPREDNISOLONE SODIUM SUCCINATE 125 MG/2ML
125 INJECTION, POWDER, LYOPHILIZED, FOR SOLUTION INTRAMUSCULAR; INTRAVENOUS ONCE
Status: COMPLETED | OUTPATIENT
Start: 2024-04-09 | End: 2024-04-09

## 2024-04-09 RX ORDER — DEXTROSE MONOHYDRATE 25 G/50ML
50 INJECTION, SOLUTION INTRAVENOUS
Status: DISCONTINUED | OUTPATIENT
Start: 2024-04-09 | End: 2024-04-09

## 2024-04-09 RX ORDER — NICOTINE POLACRILEX 4 MG
30 LOZENGE BUCCAL
Status: DISCONTINUED | OUTPATIENT
Start: 2024-04-09 | End: 2024-04-09

## 2024-04-09 RX ORDER — ECHINACEA PURPUREA EXTRACT 125 MG
1 TABLET ORAL
Status: DISCONTINUED | OUTPATIENT
Start: 2024-04-09 | End: 2024-04-12

## 2024-04-09 RX ORDER — POLYETHYLENE GLYCOL 3350 17 G/17G
17 POWDER, FOR SOLUTION ORAL DAILY PRN
Status: DISCONTINUED | OUTPATIENT
Start: 2024-04-09 | End: 2024-04-12

## 2024-04-09 RX ORDER — BISACODYL 10 MG
10 SUPPOSITORY, RECTAL RECTAL
Status: DISCONTINUED | OUTPATIENT
Start: 2024-04-09 | End: 2024-04-12

## 2024-04-09 RX ORDER — ALPRAZOLAM 0.5 MG/1
0.5 TABLET ORAL 4 TIMES DAILY PRN
Status: DISCONTINUED | OUTPATIENT
Start: 2024-04-09 | End: 2024-04-12

## 2024-04-09 RX ORDER — IPRATROPIUM BROMIDE AND ALBUTEROL SULFATE 2.5; .5 MG/3ML; MG/3ML
SOLUTION RESPIRATORY (INHALATION)
Status: DISPENSED
Start: 2024-04-09 | End: 2024-04-10

## 2024-04-09 RX ORDER — BENZONATATE 200 MG/1
200 CAPSULE ORAL 3 TIMES DAILY PRN
Status: DISCONTINUED | OUTPATIENT
Start: 2024-04-09 | End: 2024-04-12

## 2024-04-09 RX ORDER — SENNOSIDES 8.6 MG
17.2 TABLET ORAL NIGHTLY PRN
Status: DISCONTINUED | OUTPATIENT
Start: 2024-04-09 | End: 2024-04-12

## 2024-04-09 RX ORDER — METOPROLOL SUCCINATE 25 MG/1
25 TABLET, EXTENDED RELEASE ORAL NIGHTLY
COMMUNITY

## 2024-04-09 RX ORDER — MELATONIN
3 NIGHTLY PRN
Status: DISCONTINUED | OUTPATIENT
Start: 2024-04-09 | End: 2024-04-12

## 2024-04-09 RX ORDER — ONDANSETRON 2 MG/ML
8 INJECTION INTRAMUSCULAR; INTRAVENOUS EVERY 6 HOURS PRN
Status: DISCONTINUED | OUTPATIENT
Start: 2024-04-09 | End: 2024-04-12

## 2024-04-09 RX ORDER — HYDROCODONE BITARTRATE AND HOMATROPINE METHYLBROMIDE ORAL SOLUTION 5; 1.5 MG/5ML; MG/5ML
5 LIQUID ORAL EVERY 4 HOURS PRN
Status: DISCONTINUED | OUTPATIENT
Start: 2024-04-09 | End: 2024-04-12

## 2024-04-09 RX ORDER — NICOTINE POLACRILEX 4 MG
30 LOZENGE BUCCAL
Status: DISCONTINUED | OUTPATIENT
Start: 2024-04-09 | End: 2024-04-12

## 2024-04-09 RX ORDER — INSULIN ASPART 100 [IU]/ML
0.1 INJECTION, SOLUTION INTRAVENOUS; SUBCUTANEOUS ONCE
Status: DISCONTINUED | OUTPATIENT
Start: 2024-04-09 | End: 2024-04-09

## 2024-04-09 RX ORDER — NICOTINE POLACRILEX 4 MG
15 LOZENGE BUCCAL
Status: DISCONTINUED | OUTPATIENT
Start: 2024-04-09 | End: 2024-04-09

## 2024-04-09 NOTE — ED INITIAL ASSESSMENT (HPI)
Patient seen at [facility] on 4/5, diagnosed with pneumonia. Taking Albuterol, Prednisone, Doxycycline without improvement. Short of breath, coughing. No fever. States she's dizzy today. Her HR \"major rockets\" when she exerts herself. Tight cough.

## 2024-04-09 NOTE — PROGRESS NOTES
[facility] TeleHealth Audio and Visual Visit     This was an audio/video conversation using Epic/Wellsphere in lieu of an in-person visit due to current infectious symptoms. The patient was within the state of [place] during our visit.   Danielle Jerome  consents to a virtual/telephone check in service on 04/09/2024.  Patient understands and accepts financial responsibility for any deductible, coinsurance and/or co-pays associated with the service. The patient understands that the physical exam will be limited.     Telehealth provider: Dr. Maria E Gonzalez, DO; encounter initiated by Nimisha Angel MA    Please note that the following visit was completed using two-way, real-time interactive audio and/or video communication. This has been done in good jaun to provide continuity of care the in the base interest of the provider/patient relationship, due to the ongoing public health crisis/national emergency and because of restrictions of visitation. There are limitations of this visit as a limited physical exam could be performed. Every conscious effort was taken to allow for sufficient and adequate time. Time was spent on reviewing labs, medications, radiology tests and decision making. Appropriate medical decision-making and tests are ordered as detailed in the plan of care above.    RHEUMATOLOGY FOLLOW UP   Date of visit: 04/09/2024  ?  Chief Complaint   Patient presents with    Follow - Up     LOV 1/2/2024. Pt states she has pneumonia x 1 week so she is not feeling well. COVID and flu test were negative. She is on Prednisone and ABX but states she is not improving. She states the gabapentin helps with the pain but makes her feel loopy. Overall she states her pain is much better 2/10 most days.he does have lt shoulder pain that is not getting better and shoots down to her elbow 9/10. She states it feels like lightening. She still has numbness and tingling in her feet and legs.     ?  ASSESSMENT, DISCUSSION &  PLAN   Assessment:  1. Fibromyalgia    2. Polyarthralgia    3. Elevated sed rate    4. Elevated C-reactive protein (CRP)    5. Neuropathic pain    6. Microcytosis    7. Shortness of breath    8. Hemoptysis    9. Arm pain, musculoskeletal, left    10. Vitamin D deficiency          Discussion:  Ms. Danielle Jerome is a 60 yo woman who presented for evaluation of joint pain. She has been suffering from chronic sinus disease as well as chronic lung problems for several years. 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 Typically for eosinophilic granulomatosis with polyangiitis patients have eosinophilia, as well and obstructive airway disease, nasal polyps, eosinophilic predominant inflammation and either mononeuritis multiplex or other motor neuropathy not due to radiculopathy.  This is per the diagnosis and classification criteria and vasculitis study (DC VAS) unfortunately for this patient, she also is morbidly obese and has a history of diabetes so some of the neuropathic symptoms might be related to diabetic neuropathy versus a radiculopathy from her lower back.  On her prior exam, she does not have obvious signs of active synovitis to warrant immunosuppression.  A lot of her potential EGPA symptoms previously improved since starting medication through pulmonology.  She now is dealing with worsened sinus and asthma symptoms. Felt better on dupixent compared to the nucala. She now has symptoms of pneumonia and not feeling better despite oral abx and albuterol. Recommended she consider ER evaluation due to hypoxia and worsened symptoms.  She may require CT Imaging/further workup by pulm including bronchoscopy.   Otherwise, will have her get updated cervical spine xray due to radiculopathy symptoms.  Will have her continue gabapentin since pain overall improved- she can  try 100mg in am and 200mg in pm to see if this offsets some of the worsened fatigue with medication.  Follow up in 3 months or sooner as needed  Encouraged her to keep me posted on her lung status over the next several days. As discussed before, if there is still concern for EGPA, recommend some biopsy to help support disease.    Patient verbalized understanding of above instructions. No further questions at this time.    Code selection for this visit was based on time spent (30min) on date of service in preparing to see the patient, obtaining and/or reviewing separately obtained history, performing a medically appropriate examination, counseling and educating the patient/family/caregiver, ordering medications or testing, referring and communicating with other healthcare providers, documenting clinical information in the E HR, independently interpreting results and communicating results to the patient/family/caregiver and care coordination with the patient's other providers.    ?  Plan:  Diagnoses and all orders for this visit:    Fibromyalgia    Polyarthralgia    Elevated sed rate  -     ANCA Panel Vasculitis; Future    Elevated C-reactive protein (CRP)  -     ANCA Panel Vasculitis; Future    Neuropathic pain  -     XR CERVICAL SPINE (2-3 VIEWS) (CPT=72040); Future    Microcytosis  -     Iron And Tibc; Future  -     Ferritin; Future    Shortness of breath  -     ANCA Panel Vasculitis; Future    Hemoptysis  -     ANCA Panel Vasculitis; Future    Arm pain, musculoskeletal, left  -     XR CERVICAL SPINE (2-3 VIEWS) (CPT=72040); Future    Vitamin D deficiency  -     ergocalciferol 1.25 MG (87240 UT) Oral Cap; Take 1 capsule (50,000 Units total) by mouth once a week.            No follow-ups on file.  ?  HPI   Danielle Jerome is a 59 year old female with the following active problems who is referred for rheumatologic evaluation due to joint pain, back pain and possible EGPA. Her work up was grossly negative.  Recommended further tx for her fibro. Presents for follow up today.     Has been having shortness of breath, cough, fevers (fevers improved since abx)- dx with pneumonia but still not feeling well. States she's not able to cough it al up- feels like bloody and brown. Cannot even  the shower. Covid negative but feels since covid in November, has not been feeling well overall. Thinks long haul covid. Using inhalers but has not been using nebulizer due to old tubing.   Symptoms started after Easter.     Was given prednisone 60mg and then 40mg but does not think sufficient  Worsened fatigue.   On ambulation her o2 drops to 80s.     Thinks she felt better on dupixent. On Nucala right now but does not think working as well. Has to follow with immunology, has not been able to get the PFTs due to feeling sick.     Vit d deficiency severe. Started on rx once weekly, completed and waiting to get labs.     Pain overall is better.   Still having shoulder pain that shoots down to the elbow. 2 weeks ago. Was not taking vitamin d previously.  Increased gabapentin to 100mg TID. Does feel increased \"loopy\"   Still tingling in the feet.     Denies recent rashes except rosacea flaring. Getting pearls over her face. Redness/flushed appearance to her face.   Some increased bruising   Denies epistaxis but a lot of congestions ?blood  Denies hematuria.         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 Told \"explosive asthma.\" Had been on prednisone at multiple dosages over the years. Finally got off steroids with dupixent which has helped with the asthma part.     Seen at [place] and saw pulmonologist told possible EGPA but hard to tell for sure. Recommended Nucala but insurance did not approve.   Has started and on her 3rd injection planned with improvement of her breathing.     Hx of blood clots. States family hx of blood  clots. Hx of blood clot in her heart while on xarelto- now on plavix in addition to xarelto (2017)  Hx of fetal demise in 2002 (at 23weeks) and told multiple PE's (first clot). Shortly after, had multiple incidents and eventually started on long term anticoagulation. Started warfarin and prednisone but hard to get control of INR. So decision made by hematology to start xarelto around 2012.   No recent clots.   Hx of at least 3 miscarriages including fetal demise.   Able 2 other children after the fetal demise and one prior to that.     Joint pain has been present for about 2 years. Reports feeling throughout the body- legs, knees, hips, thighs, shoulders and finger.   Describes as numbness in the left leg, attributes to diabetes (which she feels induced by prednisone).   Pain described also as sharp.  Denies swelling of the joints but feels puffy in general  Has pain to the touch   Has tried PT, muscle relaxants and naproxen without improvement.  Unclear if she took gabapentin     Seen by [facility] and recommended PT.     + skin rashes over the face. Dx with rosacea. Feels worsened when pain present. Tried topicals in the past but felt more burning with it. Gets worse when over heated or sun exposure.   Denies recurrence of hives like before and no scarring from rashes   + hair loss/thinning over the past 10 years. Denies bald spots.   + hx of anemia over the years   + flank pain but no dysuria, frequency, hematuria  + hx of fatty liver   + hx of colloid cyst in brain. There was previously concern for encephalitis in the 80s.   + bumps under the skin over the legs and arms- never biopsied, can be tender to the touch   + hx of frequent cysts   + hx of reflux, takes pantoprazole but also has significant hiatal hernia. Told not surgical candidate due to other complications   + infrequent diarrhea/constipation   + hx of cataracts  + told edema of the eyes from the prednisone   + pain over achilles and plantar fasciitis    + hx of \"tumor\" on bottom of right foot (?neuroma, pt unclear)   + back and neck pain. Feels worsened with activity. Has hx of DDD of lumbar spine.   + dry eyes, feels sandpaper in the eyes, uses pataday drops. Has tried visine. Has not other artifical tears. Not severe daily.   + dry mouth, constant. Does have hx of recurrent cavities. Grinds teeth and hx of fractures of the teeth.   + angle of jaw pain   + frequent sinus infections and hx of nasal polyps  Denies epistaxis   + chest pain and palpitations chronically. Has had CAD with stenting in 2017. Had cardiac PET scan through [facility], told follow up angiogram negative.     LMP in 2008, cannot recall when last BMD was done     Denies new skin nodule formation.  The patient denies oral or nasal ulcers, elevated or scarring rashes, Raynaud's phenomenon, prior renal or liver disease, or history of seizures.  Denies nonhealing ulcers on the fingertips, trouble swallowing, or severe acid reflux.  The patient denies any history of uveitis, nodular painful shin bruises,  psoriatic lesions, spooning or pitting of the nails, history of dactylitis.   Denies swelling of the cheeks or under the jawbone. No prior history of punctal plugs being applied.  No fevers, chills, lymphadenopathy, night sweats        Past Medical History:  Past Medical History:   Diagnosis Date    Allergic rhinitis     Allergic rhinitis, cause unspecified 05/05/2008    Anemia     Anxiety     Anxiety and depression     Asthma (HCC)     Atherosclerosis of coronary artery     Laguerre's esophagus     Churg-Shania syndrome (HCC) 2022    Colloid cyst of brain (HCC)     Congestive heart disease (HCC)     Coronary artery disease of native heart with stable angina pectoris (HCC)     Depression     Diabetes (HCC)     Diabetes mellitus (HCC)     Diverticulosis of large intestine     Essential hypertension     Gastroparesis     GERD     Hematemesis with nausea 11/02/2017 2017, No EGD, Hgb stable    High  cholesterol     History of blood clots     History of pulmonary embolus (PE) 2016    History of recurrent deep vein thrombosis (DVT) 2014    Hyperlipidemia     Hypertension     IBS     LGSIL (low grade squamous intraepithelial dysplasia) 10/2010    Pap neg     Migraines     perfumes    Moderate persistent asthma without complication (HCC) 2018    DESOUZA (nonalcoholic steatohepatitis)     NSTEMI (non-ST elevated myocardial infarction) (HCC) 2017    FELTON in LAD    Obesity     Osteoarthritis     Patella-femoral syndrome, bilateral knee 01/15/2015    Pneumonia due to organism     Pulmonary embolism (HCC)     Sleep apnea     Thyroid disease      Past Surgical History:  Past Surgical History:   Procedure Laterality Date    ANGIOPLASTY (CORONARY)      BREAST SURGERY PROCEDURE UNLISTED      cyst removed    CATH DRUG ELUTING STENT      CHOLECYSTECTOMY  04/10/2011    PERFORMED BY DR KRAFT-LAPAROSCOPIC    COLONOSCOPY      polyps    COLONOSCOPY N/A 2016    Procedure: COLONOSCOPY;  Surgeon: Kishor Stout MD;  Location:  ENDOSCOPY    COLPOSCOPY, CERVIX W/UPPER ADJACENT VAGINA; W/ENDOCERVICAL CURETTAGE  2011    WNL    CYST ASPIRATION LEFT  2000    HERNIA SURGERY            OTHER SURGICAL HISTORY      sinus surgery    OTHER SURGICAL HISTORY      CYST REMOVED ON LEFT WRIST 17 YRS AGO    SINUS SURGERY        SKIN SURGERY      UPPER GI ENDOSCOPY,EXAM       Family History:  Family History   Problem Relation Age of Onset    Heart Disorder Father     Hypertension Mother     Lipids Mother     Stroke Mother     Anemia Mother     Dementia Mother     Heart Disorder Mother     Other (Other) Mother     Bleeding Disorders Mother     Hypertension Sister     Obesity Sister     Stroke Sister     Other (Other) Sister         stroke,pe    Other (Other) Brother         kidney stones    Colon Cancer Maternal Grandfather     Colon Cancer Maternal Aunt     Anemia Daughter     Asthma Daughter     Depression  Daughter     Asthma Daughter     Bleeding Disorders Sister     Stroke Sister      Social History:  Social History     Socioeconomic History    Marital status:     Number of children: 3   Occupational History    Occupation: homemaker   Tobacco Use    Smoking status: Never     Passive exposure: Current    Smokeless tobacco: Never    Tobacco comments:     2nd hand with Father and now    Vaping Use    Vaping Use: Never used   Substance and Sexual Activity    Alcohol use: No    Drug use: No    Sexual activity: Never     Partners: Male     Birth control/protection: I.U.D.     Medications:  Outpatient Medications Marked as Taking for the 4/9/24 encounter (Telemedicine) with Maria E Gonzalez DO   Medication Sig Dispense Refill    ergocalciferol 1.25 MG (49619 UT) Oral Cap Take 1 capsule (50,000 Units total) by mouth once a week. 12 capsule 0    gabapentin 100 MG Oral Cap Take 1 capsule (100 mg total) by mouth in the morning and 1 capsule (100 mg total) before bedtime.      predniSONE 20 MG Oral Tab Take 2 tablets (40 mg total) by mouth daily for 5 days. 10 tablet 0    doxycycline 100 MG Oral Cap Take 1 capsule (100 mg total) by mouth 2 (two) times daily for 10 days. 20 capsule 0    albuterol 108 (90 Base) MCG/ACT Inhalation Aero Soln Inhale 2 puffs into the lungs every 4 to 6 hours as needed for Wheezing or Shortness of Breath. 1 each 0    BREO ELLIPTA 200-25 MCG/ACT Inhalation Aerosol Powder, Breath Activated Inhale 1 puff into the lungs daily. 3 each 1    montelukast 10 MG Oral Tab Take 1 tablet (10 mg total) by mouth nightly. 90 tablet 1    Tiotropium Bromide Monohydrate (SPIRIVA RESPIMAT) 2.5 MCG/ACT Inhalation Aero Soln Inhale 2 puffs into the lungs daily. (Patient taking differently: Inhale 2 puffs into the lungs in the morning and 2 puffs before bedtime.) 3 each 1    ALPRAZOLAM 0.5 MG Oral Tab TAKE 1 TO 2 TABLETS(0.5 TO 1 MG) BY MOUTH DAILY AS NEEDED FOR SLEEP OR ANXIETY 42 tablet 0    Insulin Disposable  Pump (OMNIPOD 5 G6 PODS, GEN 5,) Does not apply Misc 1 each every other day. 45 each 1    insulin lispro (HUMALOG) 100 UNIT/ML Injection Solution Uses up to 100 units daily via insulin pump 90 mL 1    Insulin NPH, Human,, Isophane, (HUMULIN N KWIKPEN) 100 UNIT/ML Subcutaneous Suspension Pen-injector Pair with prednisone only. Up to 30 units a day max. 9 mL 0    pantoprazole 40 MG Oral Tab EC Take 1 tablet (40 mg total) by mouth before breakfast. 90 tablet 3    rivaroxaban (XARELTO) 20 MG Oral Tab Take 1 tablet (20 mg total) by mouth nightly. TAKE AT BEDTIME 90 tablet 3    escitalopram 20 MG Oral Tab Take 1 tablet (20 mg total) by mouth every morning. 90 tablet 1    amLODIPine 2.5 MG Oral Tab amLODIPine 2.5 mg tablet, [RxNorm: 542060]      Mepolizumab (NUCALA SC) Inject into the skin every 30 (thirty) days.      [DISCONTINUED] Metoprolol-HCTZ ER 25-12.5 MG Oral Tablet 24 Hr metoprolol succ 25 mg-hydrochlorothiazide 12.5 mg tablet,ext.rel 24 hr   25mg 1/day      Azelastine HCl 0.1 % Nasal Solution       SUMAtriptan Succinate 50 MG Oral Tab TAKE 1 TABLET BY MOUTH EVERY 2 HOURS AS NEEDED FOR  MIGRAINE.  DO  NOT  EXCEED  200MG  IN  24  HOURS 9 tablet 3    Clopidogrel Bisulfate 75 MG Oral Tab Take 1 tablet (75 mg total) by mouth daily. nightly      losartan 100 MG Oral Tab Take 1 tablet (100 mg total) by mouth daily.      ezetimibe 10 MG Oral Tab Take 1 tablet (10 mg total) by mouth nightly.      Fluticasone Propionate 50 MCG/ACT Nasal Suspension 2 sprays by Each Nare route 2 (two) times daily.      cetirizine (ZYRTEC) 10 MG Oral Tab Take 1 tablet (10 mg total) by mouth nightly.       Modified Medications    Modified Medication Previous Medication    GABAPENTIN 100 MG ORAL CAP gabapentin 100 MG Oral Cap       Take 1 capsule (100 mg total) by mouth in the morning and 1 capsule (100 mg total) before bedtime.    Take 100mg in am, 100mg in afternoon and 200mg at night     Medications Discontinued During This Encounter    Medication Reason    albuterol 108 (90 Base) MCG/ACT Inhalation Aero Soln     BREO ELLIPTA 200-25 MCG/INH Inhalation Aerosol Powder, Breath Activated     ergocalciferol 1.25 MG (76425 UT) Oral Cap     gabapentin 100 MG Oral Cap      ?  ?  Allergies:  Allergies   Allergen Reactions    Peanut-Containing Drug Products HIVES and SHORTNESS OF BREATH     Hives, asthma attack    Pcn [Penicillins] UNKNOWN     HAPPENED AS A CHILD     ?  REVIEW OF SYSTEMS   ?  Review of Systems   Constitutional:  Positive for malaise/fatigue. Negative for chills, fever and weight loss.   HENT:  Positive for congestion, ear pain and sinus pain.    Eyes:  Positive for blurred vision. Negative for pain and redness.   Respiratory:  Positive for cough (dry), hemoptysis, sputum production and shortness of breath.    Cardiovascular:  Positive for chest pain, palpitations and leg swelling.   Gastrointestinal:  Positive for heartburn (improved). Negative for abdominal pain, blood in stool, constipation, diarrhea and nausea.   Genitourinary:  Negative for dysuria, frequency, hematuria and urgency.   Musculoskeletal:  Positive for back pain, joint pain, myalgias and neck pain.   Skin:  Negative for itching and rash.   Neurological:  Positive for dizziness, tingling, weakness and headaches.   Endo/Heme/Allergies:  Positive for environmental allergies. Does not bruise/bleed easily.   Psychiatric/Behavioral:  Positive for depression. The patient is nervous/anxious and has insomnia.      PHYSICAL EXAM   Today's Vitals:  Temperature Blood Pressure Heart Rate Resp Rate SpO2               ?  Current Weight Height BMI BSA Pain   Wt Readings from Last 1 Encounters:   04/09/24 259 lb 11.2 oz (117.8 kg)    Height: 5' 8\" (172.7 cm) Body mass index is 40.29 kg/m². Body surface area is 2.3 meters squared.         Physical Exam  Vitals and nursing note reviewed.   Constitutional:       General: She is not in acute distress.     Appearance: She is well-developed. She  is obese. She is not diaphoretic.   HENT:      Head: Normocephalic.   Eyes:      General: No scleral icterus.     Extraocular Movements: Extraocular movements intact.      Conjunctiva/sclera: Conjunctivae normal.   Neck:      Vascular: No JVD.      Trachea: No tracheal deviation.   Pulmonary:      Effort: Pulmonary effort is normal. No respiratory distress.      Breath sounds: Wheezing present.      Comments: Significant wet cough  Musculoskeletal:      Comments: (Prior exam)  No evidence of heberden or aditya nodes of any of the fingers, no basilar joint tenderness of the 1st CMC bilaterally.  Tenderness diffusely - improved slightly   No swelling, redness or restriction of motion of the DIPs, PIPs, MCPs, wrists, elbows, ankles, or joints of the feet.  Bilateral shoulders with full ROM, no evidence of impingement with provocative maneuvers.  SI joints tender. Spinous process tenderness diffusely, tenderness over the greater trochanters.  Bilateral knees with medial joint line tenderness, mild crepitus, no effusion.  Pan positive fibro tender points positive - still present    Skin:     General: Skin is warm and dry.      Comments: No malar rash but facial flushing noted   Neurological:      Mental Status: She is alert and oriented to person, place, and time.      Cranial Nerves: No cranial nerve deficit.      Gait: Gait normal.       ?  Radiology review:  XR CHEST AP PORTABLE  (CPT=71045)    Result Date: 4/9/2024  CONCLUSION:  Left lower lobe consolidation is mildly increased.  No pneumothorax.   LOCATION:  [facility]      Dictated by (CST): Иван Beckford MD on 4/09/2024 at 5:09 PM     Finalized by (CST): Иван Beckford MD on 4/09/2024 at 5:10 PM       XR CHEST AP PORTABLE  (CPT=71045)    Result Date: 4/5/2024  CONCLUSION:  New small airspace consolidation in the lateral left mid lung field which may reflect pneumonia in the proper clinical setting.  The infiltrates should be followed to resolution to  exclude any underlying mass which is considered unlikely given that there was no opacification in this location on the previous radiograph from 1/3/2024.   LOCATION:  [facility]      Dictated by (CST): Joel Bardales MD on 4/05/2024 at 2:18 PM     Finalized by (CST): Joel Bardales MD on 4/05/2024 at 2:20 PM      PROCEDURE:  CT SINUS (CPT=70486)     LOCATION:  [facility]       COMPARISON:  [facility] , CT, CT SINUS (CPT=70486), 2/09/2022, 11:48 AM.     INDICATIONS:  J32.8 Other chronic sinusitis     TECHNIQUE:  Axial thin section noncontrast imaging performed through the paranasal sinuses with coronal and sagittal reconstructed imaging. Dose reduction techniques were used. Dose information is transmitted to the ACR (American College of Radiology)  NRDR (National Radiology Data Registry) which includes the Dose Index Registry.  PATIENT STATED HISTORY: (As transcribed by Technologist)  History of chronic sinusitis. This episode has been going on for the past 5 months with pressure to maxillary and frontal sinus area.         FINDINGS:    NASAL SEPTUM:  Slight rightward nasal septal deviation.  TURBINATES:  No becky bullosa or paradoxical curvature.  UNCINATE PROCESSES:  No deviation or bulla formation.  OMU:  The ostia, infundibula, and hiatus semilunaris are widely patent.  SINUSES:  Mild opacification of the right ethmoid bulla (series 5, image 21).  Large mucous retention cyst within the caudal aspect of the left maxillary sinus in addition to small mucous retention cysts within the caudal aspect of the right maxillary  sinus.  Complete opacification of the right sphenoid sinus with bone remodeling/sclerosis of the right sphenoid sinus walls indicating sequela of chronic inflammation.  Frontal sinuses are clear.  FOVEA ETHMOIDALIS:  The fovea ethmoidalis is intact. The cribriform plate is not low lying.  OTHER:  Visualized intracranial contents are within normal limits.                   Impression   CONCLUSION:        Paranasal sinus disease, similar compared to 02/09/2022.  This includes diffuse opacification of and bone remodeling of the right sphenoid sinus indicating sequela of chronic inflammation.        Dictated by (CST): Jesus Young MD on 3/12/2023 at 9:10 AM      Finalized by (CST): Jesus Young MD on 3/12/2023 at 9:19 AM       PROCEDURE:  CT CHEST PE AORTA (IV ONLY) (CPT=71260)     COMPARISON:  [facility] , CT, CT ANGIOGRAPHY, CHEST (CPT=71275), 5/11/2020, 4:17 PM.     INDICATIONS:  I26.99 Pulmonary embolism (HCC)     TECHNIQUE:  CT images were obtained with non-ionic intravenous contrast material. Dose reduction techniques were used. Dose information is transmitted to the ACR (American College of Radiology) NRDR (National Radiology Data Registry) which includes the  Dose Index Registry.     PATIENT STATED HISTORY:(As transcribed by Technologist)  Patient has shortness of breath and history of PE.      CONTRAST USED:  100cc of Omnipaque 350     FINDINGS:    LUNGS:  Biapical pleural parenchymal scarring.  No focal consolidation or new nodule appreciated.    VASCULATURE:  No visible pulmonary arterial thrombus or attenuation.    FATUMA:  No mass or adenopathy.    MEDIASTINUM:  Stable 1.4 cm left lobe thyroid nodule.  No adenopathy.    CARDIAC:  No enlargement or pericardial thickening.  Coronary artery stent.  PLEURA:  No mass or effusion.    THORACIC AORTA:  No aneurysm.    CHEST WALL:  No mass or axillary adenopathy.    LIMITED ABDOMEN:  Limited images of the upper abdomen demonstrate a small to moderate hiatal hernia.    BONES:  No bony lesion or fracture.                     Impression   CONCLUSION:    1. No CT evidence for pulmonary embolism.        Dictated by (CST): Alexandria Trotter MD on 4/08/2022 at 2:23 PM      Finalized by (CST): Alexandria Trotter MD on 4/08/2022 at 2:30 PM       PROCEDURE:  MRI ABDOMEN+PELVIS (ALL W+WO) (CPT=74183/17051)     COMPARISON:  [facility] , CT, CT ABDOMEN PELVIS IV CONTRAST, NO ORAL (ER), 4/14/2021, 1:19  PM.     INDICATIONS:  R63.4 Unintentional weight loss N95.0 Postmenopausal bleeding N85.00 Endometrial hyperplasia     TECHNIQUE:  A comprehensive examination was performed utilizing a variety of imaging planes and imaging parameters to optimize visualization of suspected pathology.  Images were obtained both before and after intravenous gadolinium infusion.       PATIENT STATED HISTORY:(As transcribed by Technologist)  the patient complains of cramping, vaginal bleeding, and weight loss.      CONTRAST USED:  20 mL of Dotarem     FINDINGS:    LIVER:  Diffuse fatty infiltration of the liver/steatosis of the paddle megaly measuring approximately 23 cm in craniocaudal dimension.  There is a small ovoid focus of increased enhancement along the posterior segment of the right lobe inferomedially  measuring 1.6 x 1.2 cm.  On in/out phase imaging, this focus reveals slightly increased T2 signal on out of phase imaging, suggesting a focus of mild fat sparing versus potential small underlying FNH.  Otherwise no significant hepatic lesions are  suspected.  BILIARY:  No visible dilatation or filling defect.    PANCREAS:  No lesion, fluid collection, ductal dilatation, or atrophy.    SPLEEN:  No enlargement or focal lesion.    KIDNEYS:  No renal stones or hydronephrosis.  There are 2 fat containing renal angiomyolipomas within the midpole of the right kidney.  The larger of the 2 measures 0.9 cm.  No additional renal lesions are noted.  ADRENALS:  No mass or enlargement.    AORTA/VASCULAR:  No aneurysm or dissection.    RETROPERITONEUM:  No mass or adenopathy.    BOWEL/MESENTERY:  No visible mass, obstruction, or bowel wall thickening.    ABDOMINAL WALL:  No mass or hernia.    PELVIC NODES:  Normal.  PELVIC ORGANS:  Normal size and configuration to the uterus.  No evidence of endometrial thickening or junctional zone thickening/abnormalities.  No free fluid or inflammatory changes within the pelvis.  No adnexal lesions are  identified.  BONES:  No bony lesion or fracture.    LUNG BASES:  No visible pleural disease.  Lung bases not well assessed with MRI.                  Impression   CONCLUSION:    1. No acute process noted.  2. Diffuse fatty infiltration of the liver/steatosis with hepatomegaly measuring 23 cm in craniocaudal dimension.  There is a small, ovoid focus of increased enhancement along the posterior segment of the right lobe inferomedially measuring 1.6 x 1.2 cm.    This is either representative of a small FNH or small focus of focal fat sparing.  In either case, this focus has a benign appearance.  3. There are 2 fat containing renal angiomyolipomas within the midpole the right kidney measuring 0.9 cm.        Dictated by (CST): Jessica Liu DO on 4/30/2021 at 3:15 PM         Labs:  Lab Results   Component Value Date    WBC 13.3 (H) 04/09/2024    RBC 6.01 (H) 04/09/2024    HGB 14.5 04/09/2024    HCT 46.1 04/09/2024    .0 04/09/2024    MPV 9.3 (L) 02/27/2013    MCV 76.7 (L) 04/09/2024    MCH 24.1 (L) 04/09/2024    MCHC 31.5 04/09/2024    RDW 19.0 04/09/2024    NEPRELIM 8.43 (H) 04/09/2024    NEUTABS 8.84 (H) 10/17/2018    LYMPHABS 3.67 10/17/2018    EOSABS 0.82 (H) 10/17/2018    BASABS 0.00 10/17/2018    NEUT 65 10/17/2018    LYMPH 27 10/17/2018    MON 2 10/17/2018    EOS 6 10/17/2018    BASO 0 10/17/2018    NEPERCENT 63.5 04/09/2024    LYPERCENT 28.5 04/09/2024    MOPERCENT 5.4 04/09/2024    EOPERCENT 0.1 04/09/2024    BAPERCENT 0.7 04/09/2024    NE 8.43 (H) 04/09/2024    LYMABS 3.77 04/09/2024    MOABSO 0.71 04/09/2024    EOABSO 0.01 04/09/2024    BAABSO 0.09 04/09/2024     Lab Results   Component Value Date     (H) 04/09/2024    BUN 17 04/09/2024    BUNCREA 10.1 06/21/2021    CREATSERUM 1.18 (H) 04/09/2024    ANIONGAP 8 04/09/2024    GFR 68 01/20/2018    GFRNAA 94 03/14/2022    GFRAA 108 03/14/2022    CA 9.6 04/09/2024    OSMOCALC 299 (H) 04/09/2024    ALKPHO 99 04/09/2024    AST 8 (L) 04/09/2024    ALT 41  04/09/2024    BILT 0.3 04/09/2024    TP 7.7 04/09/2024    ALB 3.6 04/09/2024    GLOBULIN 4.1 04/09/2024    AGRATIO 1.7 11/19/2012     04/09/2024    K 3.8 04/09/2024     04/09/2024    CO2 25.0 04/09/2024       Additional Labs:  12/2023  Vit d 5.3 very low     08/2023  Lupus anticoagulant Positive  B2G and ACL pending  C3 179 normal  C4 43 normal  MPO, GA-3, c-ANCA, p-ANCA negative  ANNAMARIE by IFA negative  CRP 1.99 elevated  ESR 46 elevated    06/2023  No eosinophilia on CBC    06/2022  ANCA screen negative  MPO negative  Proteinase 3 negative  CBC with MCV 77 otherwise normal.  No eosinophilia noted  CRP 16.3 (N<10)  Protein to creatinine ratio 0.07  CMP grossly normal with exception of elevated glucose  UA with glucose otherwise negative for blood or protein  ESR 47 elevated  ANNAMARIE screen negative  CCP negative  RF negative    03/2022  CRP 2.04 (N<0.3)  ESR 11 normal  CK 69 normal    02/2022  ANCA by IFA negative  MPO, GA-3 negative    11/2018   CBC with absolute eosinophil count slightly elevated at 0.65    01/2018  IgA, IgE normal  IgG 659 borderline low    11/2017 ANNAMARIE screen negative  ESR 9 normal  09/2012  Lupus anticoagulant negative  Beta-2 glycoprotein IgM, IgA negative  Anticardiolipin IgG IgM negative      Maria E Gonzalez DO  [facility]  04/09/2024

## 2024-04-09 NOTE — H&P
[facility]     Danielle Jerome Patient Status:  Emergency    3/25/1965 MRN [id number]   Location [facility] Attending Jigar Mace MD   Hosp Day # 0 PCP Luca Rai DO     Chief Complaint: cough    Subjective:    History of Present Illness:     Danielle Jerome is a 59 year old female with cough.  Has had over a week of symptoms, along with weakness, dyspnea, wheezing and fevers.  Was in clinic 5 days ago and had xray and treated for pneumonia.      History/Other:    Past Medical History:  Past Medical History:   Diagnosis Date    Allergic rhinitis     Allergic rhinitis, cause unspecified 2008    Anemia     Anxiety     Anxiety and depression     Asthma (HCC)     Atherosclerosis of coronary artery     Laguerre's esophagus     Churg-Shania syndrome (HCC)     Colloid cyst of brain (HCC)     Congestive heart disease (HCC)     Coronary artery disease of native heart with stable angina pectoris (HCC)     Depression     Diabetes (HCC)     Diabetes mellitus (HCC)     Diverticulosis of large intestine     Essential hypertension     Gastroparesis     GERD     Hematemesis with nausea 2017, No EGD, Hgb stable    High cholesterol     History of blood clots     History of pulmonary embolus (PE) 2016    History of recurrent deep vein thrombosis (DVT) 2014    Hyperlipidemia     Hypertension     IBS     LGSIL (low grade squamous intraepithelial dysplasia) 10/2010    Pap neg 2014    Migraines     perfumes    Moderate persistent asthma without complication (HCC) 2018    DESOUZA (nonalcoholic steatohepatitis)     NSTEMI (non-ST elevated myocardial infarction) (HCC)     FELTON in LAD    Obesity     Osteoarthritis     Patella-femoral syndrome, bilateral knee 01/15/2015    Pneumonia due to organism     Pulmonary embolism (HCC)     Sleep apnea     Thyroid disease      Past Surgical History:   Past Surgical History:   Procedure  Laterality Date    ANGIOPLASTY (CORONARY)      BREAST SURGERY PROCEDURE UNLISTED      cyst removed    CATH DRUG ELUTING STENT      CHOLECYSTECTOMY  04/10/2011    PERFORMED BY DR KRAFT-LAPAROSCOPIC    COLONOSCOPY  2012    polyps    COLONOSCOPY N/A 2016    Procedure: COLONOSCOPY;  Surgeon: Kishor Stout MD;  Location:  ENDOSCOPY    COLPOSCOPY, CERVIX W/UPPER ADJACENT VAGINA; W/ENDOCERVICAL CURETTAGE  2011    WNL    CYST ASPIRATION LEFT  2000    HERNIA SURGERY            OTHER SURGICAL HISTORY      sinus surgery    OTHER SURGICAL HISTORY      CYST REMOVED ON LEFT WRIST 17 YRS AGO    SINUS SURGERY        SKIN SURGERY      UPPER GI ENDOSCOPY,EXAM        Family History:   Family History   Problem Relation Age of Onset    Heart Disorder Father     Hypertension Mother     Lipids Mother     Stroke Mother     Anemia Mother     Dementia Mother     Heart Disorder Mother     Other (Other) Mother     Bleeding Disorders Mother     Hypertension Sister     Obesity Sister     Stroke Sister     Other (Other) Sister         stroke,pe    Other (Other) Brother         kidney stones    Colon Cancer Maternal Grandfather     Colon Cancer Maternal Aunt     Anemia Daughter     Asthma Daughter     Depression Daughter     Asthma Daughter     Bleeding Disorders Sister     Stroke Sister        hypertension Social History:    reports that she has never smoked. She has been exposed to tobacco smoke. She has never used smokeless tobacco. She reports that she does not drink alcohol and does not use drugs.     Allergies:   Allergies   Allergen Reactions    Peanut-Containing Drug Products HIVES and SHORTNESS OF BREATH     Hives, asthma attack    Pcn [Penicillins] UNKNOWN     HAPPENED AS A CHILD       Medications:    Current Facility-Administered Medications on File Prior to Encounter   Medication Dose Route Frequency Provider Last Rate Last Admin    [COMPLETED] albuterol (Ventolin) (5 MG/ML) 0.5% nebulizer solution 10 mg  10 mg  Nebulization Once Rick Pineda, DO   10 mg at 04/05/24 1414    [COMPLETED] ipratropium (Atrovent) 0.02 % nebulizer solution 1 mg  1 mg Nebulization Once Rick Pineda, DO   1 mg at 04/05/24 1414    [COMPLETED] predniSONE (Deltasone) tab 60 mg  60 mg Oral Once Rick Pineda, DO   60 mg at 04/05/24 1418     Current Outpatient Medications on File Prior to Encounter   Medication Sig Dispense Refill    ergocalciferol 1.25 MG (38451 UT) Oral Cap Take 1 capsule (50,000 Units total) by mouth once a week. 12 capsule 0    gabapentin 100 MG Oral Cap Take 1 capsule (100 mg total) by mouth 3 (three) times daily.      predniSONE 20 MG Oral Tab Take 2 tablets (40 mg total) by mouth daily for 5 days. 10 tablet 0    doxycycline 100 MG Oral Cap Take 1 capsule (100 mg total) by mouth 2 (two) times daily for 10 days. 20 capsule 0    albuterol 108 (90 Base) MCG/ACT Inhalation Aero Soln Inhale 2 puffs into the lungs every 4 to 6 hours as needed for Wheezing or Shortness of Breath. 1 each 0    BREO ELLIPTA 200-25 MCG/ACT Inhalation Aerosol Powder, Breath Activated Inhale 1 puff into the lungs daily. 3 each 1    montelukast 10 MG Oral Tab Take 1 tablet (10 mg total) by mouth nightly. 90 tablet 1    Tiotropium Bromide Monohydrate (SPIRIVA RESPIMAT) 2.5 MCG/ACT Inhalation Aero Soln Inhale 2 puffs into the lungs daily. 3 each 1    ALPRAZOLAM 0.5 MG Oral Tab TAKE 1 TO 2 TABLETS(0.5 TO 1 MG) BY MOUTH DAILY AS NEEDED FOR SLEEP OR ANXIETY 42 tablet 0    Continuous Blood Gluc Sensor (DEXCOM G6 SENSOR) Does not apply Misc Apply to skin every 10 days (Patient not taking: Reported on 4/9/2024) 9 each 3    Continuous Blood Gluc Transmit (DEXCOM G6 TRANSMITTER) Does not apply Misc 1 each by Other route every 3 (three) months. (Patient not taking: Reported on 4/9/2024) 1 each 1    Insulin Disposable Pump (OMNIPOD 5 G6 PODS, GEN 5,) Does not apply Misc 1 each every other day. 45 each 1    insulin lispro (HUMALOG) 100 UNIT/ML Injection Solution Uses up to 100  units daily via insulin pump 90 mL 1    Insulin NPH, Human,, Isophane, (HUMULIN N KWIKPEN) 100 UNIT/ML Subcutaneous Suspension Pen-injector Pair with prednisone only. Up to 30 units a day max. 9 mL 0    pantoprazole 40 MG Oral Tab EC Take 1 tablet (40 mg total) by mouth before breakfast. 90 tablet 3    rivaroxaban (XARELTO) 20 MG Oral Tab Take 1 tablet (20 mg total) by mouth nightly. TAKE AT BEDTIME 90 tablet 3    fluconazole 150 MG Oral Tab Take 1 tablet (150 mg total) by mouth once. (Patient not taking: Reported on 4/9/2024)      escitalopram 20 MG Oral Tab Take 1 tablet (20 mg total) by mouth every morning. 90 tablet 1    glucagon (GVOKE HYPOPEN 1-PACK) 1 MG/0.2ML Subcutaneous SUBQ injection Inject 0.2 mL (1 mg total) into the skin once as needed for Low blood glucose. 0.2 mL 1    amLODIPine 2.5 MG Oral Tab amLODIPine 2.5 mg tablet, [RxNorm: 140551]      Mepolizumab (NUCALA SC) Inject into the skin every 30 (thirty) days.      Metoprolol-HCTZ ER 25-12.5 MG Oral Tablet 24 Hr metoprolol succ 25 mg-hydrochlorothiazide 12.5 mg tablet,ext.rel 24 hr   25mg 1/day      Azelastine HCl 0.1 % Nasal Solution       SUMAtriptan Succinate 50 MG Oral Tab TAKE 1 TABLET BY MOUTH EVERY 2 HOURS AS NEEDED FOR  MIGRAINE.  DO  NOT  EXCEED  200MG  IN  24  HOURS 9 tablet 3    Clopidogrel Bisulfate 75 MG Oral Tab Take 1 tablet (75 mg total) by mouth daily. nightly      losartan 100 MG Oral Tab Take 1 tablet (100 mg total) by mouth daily.      ezetimibe 10 MG Oral Tab Take 1 tablet (10 mg total) by mouth nightly.      Fluticasone Propionate 50 MCG/ACT Nasal Suspension 2 sprays by Each Nare route 2 (two) times daily.      cetirizine (ZYRTEC) 10 MG Oral Tab Take 1 tablet (10 mg total) by mouth daily.         Review of Systems:   A comprehensive 12 point review of systems was completed.    Pertinent positives and negatives noted in the HPI.    Objective:   Physical Exam:    BP (!) 179/90   Pulse 88   Temp 98.4 °F (36.9 °C) (Oral)   Resp 24    Ht 5' 8\" (1.727 m)   Wt 264 lb 15.9 oz (120.2 kg)   LMP 09/23/2004   SpO2 94%   BMI 40.29 kg/m²   General: No acute distress, Alert  Respiratory: diffuse expiraotry wheezing.  Mild rhonchi LLL  Cardiovascular: S1, S2. Regular rate and rhythm  Abdomen: Soft, NT/ND, +BS  Neuro: No new focal deficits  Extremities: No edema      Results:    Labs:      Labs Last 24 Hours:    Recent Labs   Lab 04/09/24  1619   RBC 6.01*   HGB 14.5   HCT 46.1   MCV 76.7*   MCH 24.1*   MCHC 31.5   RDW 19.0   NEPRELIM 8.43*   WBC 13.3*   .0       Recent Labs   Lab 04/09/24  1618   *   BUN 17   CREATSERUM 1.18*   EGFRCR 54*   CA 9.6   ALB 3.6      K 3.8      CO2 25.0   ALKPHO 99   AST 8*   ALT 41   BILT 0.3   TP 7.7       Lab Results   Component Value Date    PT 18.3 (H) 03/05/2014    PT 16.5 (H) 03/04/2014    PT 15.4 (H) 03/03/2014    INR 1.36 (H) 02/08/2022    INR 1.05 12/26/2019    INR 0.98 11/01/2017       Recent Labs   Lab 04/09/24  1618   TROPHS 7       No results for input(s): \"TROP\", \"PBNP\" in the last 168 hours.    No results for input(s): \"PCT\" in the last 168 hours.    Imaging: Imaging data reviewed in Epic.    Assessment & Plan:      #pneumonia; empiric abx; pulm to see  #acute Asthma exacerbation; iv steroids; nebs  #hx possible Churgg miranda??   #History of VTE on xarelto  #CAD sp PCI; on plavix  #Essential hypertension  #Dyslipidemia  #Diabetes mellitus, 2-monitor on insulin  #GERD  #Anxiety  #Depression    Needs med rec in morning    Quality:  DVT prophylaxis:  SCDs, doac once meds reviewed  Code Status: see chart  [place]: NO  [place] Duration (in days): N/A  Central line: NO  Estimated discharge date: tbd    Plan of care discussed with patient and ER MD rBan Hannah MD    Supplementary Documentation:

## 2024-04-09 NOTE — ED PROVIDER NOTES
Patient Seen in: [facility] Emergency Department      History     Chief Complaint   Patient presents with    Pneumonia     Stated Complaint: told she has pneumonia 5 days ago and feels worse, HA, SOB, 95% RA    Subjective:   HPI    Patient is a 59-year-old woman who presents with persistent cough congestion and wheezing.  Patient was diagnosed with pneumonia on the fifth at the [facility] emergency department.  Patient has a history of asthma.  Says she has not felt well since COVID-pneumonia.  Symptoms worse over the last 2 weeks with cough and congestion.  She had an infiltrate on her chest x-ray.  She was treated with doxycycline, steroids.  Says her symptoms are not improving and in fact are worsening.  She is short of breath with activity.  She was satting 89% to 91% on arrival here.  Chest tightness with activity.  Wheezing.   is at bedside.  She does not smoke but her  does.  No other specific complaints.  Patient is otherwise at her medical baseline.    Objective:   Past Medical History:   Diagnosis Date    Allergic rhinitis     Allergic rhinitis, cause unspecified 05/05/2008    Anemia     Anxiety     Anxiety and depression     Asthma (HCC)     Atherosclerosis of coronary artery     Laguerre's esophagus     Churg-Shania syndrome (HCC) 2022    Colloid cyst of brain (HCC)     Congestive heart disease (HCC)     Coronary artery disease of native heart with stable angina pectoris (HCC)     Depression     Diabetes (HCC)     Diabetes mellitus (HCC)     Diverticulosis of large intestine     Essential hypertension     Gastroparesis     GERD     Hematemesis with nausea 11/02/2017 2017, No EGD, Hgb stable    High cholesterol     History of blood clots     History of pulmonary embolus (PE) 12/01/2016    History of recurrent deep vein thrombosis (DVT) 12/04/2014    Hyperlipidemia     Hypertension     IBS     LGSIL (low grade squamous intraepithelial dysplasia) 10/2010    Pap neg 2014    Migraines      perfumes    Moderate persistent asthma without complication (HCC) 2018    DESOUZA (nonalcoholic steatohepatitis)     NSTEMI (non-ST elevated myocardial infarction) (HCC) 2017    FELTON in LAD    Obesity     Osteoarthritis     Patella-femoral syndrome, bilateral knee 01/15/2015    Pneumonia due to organism     Pulmonary embolism (HCC)     Sleep apnea     Thyroid disease               Past Surgical History:   Procedure Laterality Date    ANGIOPLASTY (CORONARY)      BREAST SURGERY PROCEDURE UNLISTED      cyst removed    CATH DRUG ELUTING STENT      CHOLECYSTECTOMY  04/10/2011    PERFORMED BY DR KRAFT-LAPAROSCOPIC    COLONOSCOPY      polyps    COLONOSCOPY N/A 2016    Procedure: COLONOSCOPY;  Surgeon: Kishor Stout MD;  Location:  ENDOSCOPY    COLPOSCOPY, CERVIX W/UPPER ADJACENT VAGINA; W/ENDOCERVICAL CURETTAGE  2011    WNL    CYST ASPIRATION LEFT  2000    HERNIA SURGERY            OTHER SURGICAL HISTORY      sinus surgery    OTHER SURGICAL HISTORY      CYST REMOVED ON LEFT WRIST 17 YRS AGO    SINUS SURGERY        SKIN SURGERY      UPPER GI ENDOSCOPY,EXAM                  Social History     Socioeconomic History    Marital status:     Number of children: 3   Occupational History    Occupation: homemaker   Tobacco Use    Smoking status: Never     Passive exposure: Current    Smokeless tobacco: Never    Tobacco comments:     2nd hand with Father and now    Vaping Use    Vaping Use: Never used   Substance and Sexual Activity    Alcohol use: No    Drug use: No    Sexual activity: Never     Partners: Male     Birth control/protection: I.U.D.              Review of Systems    Positive for stated complaint: told she has pneumonia 5 days ago and feels worse, HA, SOB, 95% RA  Other systems are as noted in HPI.  Constitutional and vital signs reviewed.      All other systems reviewed and negative except as noted above.    Physical Exam     ED Triage Vitals [24 1538]   BP (!) 179/90    Pulse 88   Resp 24   Temp 98.4 °F (36.9 °C)   Temp src Oral   SpO2 91 %   O2 Device None (Room air)       Current:BP (!) 179/90   Pulse 88   Temp 98.4 °F (36.9 °C) (Oral)   Resp 24   Ht 172.7 cm (5' 8\")   Wt 120.2 kg   LMP 09/23/2004   SpO2 94%   BMI 40.29 kg/m²         Physical Exam    General: Patient is resting comfortably in no acute distress  HEENT: Normal cephalic atraumatic.  Nonicteric sclera.  Moist mucous membranes.  No meningismus.  No adenopathy  Lungs: Expiratory wheezing in all lung fields cardiac: No tachycardia.  No murmurs.  Regular rate and rhythm.  Abdomen: Soft and nontender throughout.  No rebound or guarding  Extremities: No clubbing/cyanosis/edema.  Skin: No rashes, no pallor  Neuro: Awake oriented ×3.  Nonfocal.  Good strength throughout    ED Course     Labs Reviewed   COMP METABOLIC PANEL (14) - Abnormal; Notable for the following components:       Result Value    Glucose 277 (*)     Creatinine 1.18 (*)     Calculated Osmolality 299 (*)     eGFR-Cr 54 (*)     AST 8 (*)     A/G Ratio 0.9 (*)     All other components within normal limits   LACTIC ACID, PLASMA - Abnormal; Notable for the following components:    Lactic Acid 3.6 (*)     All other components within normal limits   CBC W/ DIFFERENTIAL - Abnormal; Notable for the following components:    WBC 13.3 (*)     RBC 6.01 (*)     MCV 76.7 (*)     MCH 24.1 (*)     Neutrophil Absolute Prelim 8.43 (*)     Neutrophil Absolute 8.43 (*)     All other components within normal limits   D-DIMER - Normal   TROPONIN I HIGH SENSITIVITY - Normal   SARS-COV-2/FLU A AND B/RSV BY PCR (GENEXPERT) - Normal    Narrative:     This test is intended for the qualitative detection and differentiation of SARS-CoV-2, influenza A, influenza B, and respiratory syncytial virus (RSV) viral RNA in nasopharyngeal or nares swabs from individuals suspected of respiratory viral infection consistent with COVID-19 by their healthcare provider. Signs and symptoms of  respiratory viral infection due to SARS-CoV-2, influenza, and RSV can be similar.    Test performed using the Xpert Xpress SARS-CoV-2/FLU/RSV (real time RT-PCR)  assay on the [employer] instrument, [address], [place] [address].   This test is being used under the Food and Drug Administration's Emergency Use Authorization.    The authorized Fact Sheet for Healthcare Providers for this assay is available upon request from the laboratory.   CBC WITH DIFFERENTIAL WITH PLATELET    Narrative:     The following orders were created for panel order CBC With Differential With Platelet.  Procedure                               Abnormality         Status                     ---------                               -----------         ------                     CBC W/ DIFFERENTIAL[[id number]]          Abnormal            Final result                 Please view results for these tests on the individual orders.   LACTIC ACID REFLEX POST POSTIVE   BLOOD CULTURE   BLOOD CULTURE     EKG    Rate, intervals and axes as noted on EKG Report.  Rate: 78  Rhythm: Sinus Rhythm  Reading: Normal sinus rhythm.  No acute ST-T wave changes.  Axis/intervals are noted.  Otherwise, agree with EKG report           COVID/flu negative.  Lactic elevated 3.6.  Electrolytes noted.  Glucose 277.   13,000.  D-dimer 0.41.  Troponin negative.  Chest x-ray: I personally reviewed the films and my independent interpertaion showed left lower lobe infiltrate.  Official report reviewed.  Consolidation slightly worse.         MDM      Cough congestion wheezing.  Hypoxia.  History of asthma.  History chugStrauss syndrome.  Wheezing and hypoxic here.  Differential occludes asthma exacerbation, worsening pneumonia, PE, other.  Will treat with an hour-long neb.  IV steroids.  Will check blood cultures, lactate.  Will hydrate.  Will check x-ray.  Given failure of outpatient treatment would have a low threshold for admission.  Will reassess after breathing treatments,  x-ray.      Patient with worsening pneumonia despite outpatient therapy.  Lactate high though I believe it is more work of breathing than true sepsis.  Will hydrate with 30/kg.  Will treat with Rocephin/Zithromax.  Will treat for fluids with ideal body weight.  Given her worsening symptoms and her persistent dyspnea will admit for further treatment.                        [facility]   part of [facility]      Sepsis Reassessment Note    BP (!) 179/90   Pulse 88   Temp 98.4 °F (36.9 °C) (Oral)   Resp 24   Ht 172.7 cm (5' 8\")   Wt 120.2 kg   LMP 09/23/2004   SpO2 94%   BMI 40.29 kg/m²      I completed the sepsis reassessment at 7pm    Cardiac:  Regularity: Regular  Rate: Normal  Heart Sounds: S1,S2    Lungs:   Right: Expiratory Wheezes  Left: Expiratory Wheezes    Peripheral Pulses:  Radial: Right 1+ or Left 1+      Capillary Refill:  <3 Secs    Skin:  Temp/Moisture: Warm and Dry  Color: Normal      Jigar Mace MD  4/9/2024  5:23 PM      Medical Decision Making      Disposition and Plan     Clinical Impression:  1. Community acquired pneumonia of left lower lobe of lung    2. Moderate persistent asthma with exacerbation (HCC)    3. Sepsis due to pneumonia (HCC)         Disposition:  There is no disposition on file for this visit.  There is no disposition time on file for this visit.    Follow-up:  No follow-up provider specified.        Medications Prescribed:  Current Discharge Medication List               A total of 35 minutes of critical care time (exclusive of billable procedures) was administered to manage the patient's respiratory instability due to @HIS@acute bronchospasm, asthma exacerbation along with pneumonia.  This involved direct patient intervention, complex decision making, and/or extensive discussions with the patient, family, and clinical staff.  Treated with an hour-long neb, IV steroids, IV antibiotics, reassessments.  Discussed with hospitalist.

## 2024-04-10 LAB
ANION GAP SERPL CALC-SCNC: 8 MMOL/L (ref 0–18)
ATRIAL RATE: 78 BPM
BUN BLD-MCNC: 16 MG/DL (ref 9–23)
CALCIUM BLD-MCNC: 8.9 MG/DL (ref 8.5–10.1)
CHLORIDE SERPL-SCNC: 109 MMOL/L (ref 98–112)
CO2 SERPL-SCNC: 23 MMOL/L (ref 21–32)
CREAT BLD-MCNC: 0.97 MG/DL
EGFRCR SERPLBLD CKD-EPI 2021: 68 ML/MIN/1.73M2 (ref 60–?)
ERYTHROCYTE [DISTWIDTH] IN BLOOD BY AUTOMATED COUNT: 19 %
GLUCOSE BLD-MCNC: 268 MG/DL (ref 70–99)
GLUCOSE BLD-MCNC: 308 MG/DL (ref 70–99)
GLUCOSE BLD-MCNC: 321 MG/DL (ref 70–99)
GLUCOSE BLD-MCNC: 330 MG/DL (ref 70–99)
GLUCOSE BLD-MCNC: 376 MG/DL (ref 70–99)
HCT VFR BLD AUTO: 42.7 %
HGB BLD-MCNC: 13.5 G/DL
MAGNESIUM SERPL-MCNC: 2.5 MG/DL (ref 1.6–2.6)
MCH RBC QN AUTO: 23.7 PG (ref 26–34)
MCHC RBC AUTO-ENTMCNC: 31.6 G/DL (ref 31–37)
MCV RBC AUTO: 75 FL
OSMOLALITY SERPL CALC.SUM OF ELEC: 304 MOSM/KG (ref 275–295)
P AXIS: 29 DEGREES
P-R INTERVAL: 130 MS
PHOSPHATE SERPL-MCNC: 3.8 MG/DL (ref 2.5–4.9)
PLATELET # BLD AUTO: 334 10(3)UL (ref 150–450)
POTASSIUM SERPL-SCNC: 4.3 MMOL/L (ref 3.5–5.1)
Q-T INTERVAL: 390 MS
QRS DURATION: 90 MS
QTC CALCULATION (BEZET): 444 MS
R AXIS: 35 DEGREES
RBC # BLD AUTO: 5.69 X10(6)UL
SODIUM SERPL-SCNC: 140 MMOL/L (ref 136–145)
T AXIS: 37 DEGREES
VENTRICULAR RATE: 78 BPM
WBC # BLD AUTO: 10.4 X10(3) UL (ref 4–11)

## 2024-04-10 PROCEDURE — 99223 1ST HOSP IP/OBS HIGH 75: CPT | Performed by: INTERNAL MEDICINE

## 2024-04-10 PROCEDURE — 99233 SBSQ HOSP IP/OBS HIGH 50: CPT | Performed by: HOSPITALIST

## 2024-04-10 RX ORDER — ALBUTEROL SULFATE 90 UG/1
2 AEROSOL, METERED RESPIRATORY (INHALATION) EVERY 6 HOURS PRN
Status: DISCONTINUED | OUTPATIENT
Start: 2024-04-10 | End: 2024-04-12

## 2024-04-10 RX ORDER — ESCITALOPRAM OXALATE 20 MG/1
20 TABLET ORAL EVERY MORNING
Status: DISCONTINUED | OUTPATIENT
Start: 2024-04-10 | End: 2024-04-12

## 2024-04-10 RX ORDER — MONTELUKAST SODIUM 10 MG/1
10 TABLET ORAL NIGHTLY
Status: DISCONTINUED | OUTPATIENT
Start: 2024-04-10 | End: 2024-04-12

## 2024-04-10 RX ORDER — FLUTICASONE FUROATE AND VILANTEROL 200; 25 UG/1; UG/1
1 POWDER RESPIRATORY (INHALATION) DAILY
Status: DISCONTINUED | OUTPATIENT
Start: 2024-04-10 | End: 2024-04-12

## 2024-04-10 RX ORDER — METOPROLOL SUCCINATE 25 MG/1
25 TABLET, EXTENDED RELEASE ORAL NIGHTLY
Status: DISCONTINUED | OUTPATIENT
Start: 2024-04-10 | End: 2024-04-12

## 2024-04-10 RX ORDER — AMLODIPINE BESYLATE 2.5 MG/1
2.5 TABLET ORAL DAILY
Status: DISCONTINUED | OUTPATIENT
Start: 2024-04-10 | End: 2024-04-12

## 2024-04-10 RX ORDER — INSULIN DEGLUDEC 100 U/ML
40 INJECTION, SOLUTION SUBCUTANEOUS DAILY
Status: DISCONTINUED | OUTPATIENT
Start: 2024-04-11 | End: 2024-04-10

## 2024-04-10 RX ORDER — INSULIN DEGLUDEC 100 U/ML
30 INJECTION, SOLUTION SUBCUTANEOUS DAILY
Status: DISCONTINUED | OUTPATIENT
Start: 2024-04-10 | End: 2024-04-10

## 2024-04-10 RX ORDER — EZETIMIBE 10 MG/1
10 TABLET ORAL NIGHTLY
Status: DISCONTINUED | OUTPATIENT
Start: 2024-04-10 | End: 2024-04-12

## 2024-04-10 RX ORDER — GABAPENTIN 100 MG/1
100 CAPSULE ORAL EVERY MORNING
Status: DISCONTINUED | OUTPATIENT
Start: 2024-04-10 | End: 2024-04-12

## 2024-04-10 RX ORDER — LOSARTAN POTASSIUM 100 MG/1
100 TABLET ORAL DAILY
Status: DISCONTINUED | OUTPATIENT
Start: 2024-04-10 | End: 2024-04-12

## 2024-04-10 RX ORDER — CETIRIZINE HYDROCHLORIDE 10 MG/1
10 TABLET ORAL NIGHTLY
Status: DISCONTINUED | OUTPATIENT
Start: 2024-04-10 | End: 2024-04-12

## 2024-04-10 RX ORDER — GABAPENTIN 100 MG/1
200 CAPSULE ORAL NIGHTLY
Status: DISCONTINUED | OUTPATIENT
Start: 2024-04-10 | End: 2024-04-12

## 2024-04-10 RX ORDER — INSULIN DEGLUDEC 100 U/ML
50 INJECTION, SOLUTION SUBCUTANEOUS DAILY
Status: DISCONTINUED | OUTPATIENT
Start: 2024-04-11 | End: 2024-04-12

## 2024-04-10 RX ORDER — PANTOPRAZOLE SODIUM 40 MG/1
40 TABLET, DELAYED RELEASE ORAL NIGHTLY
Status: DISCONTINUED | OUTPATIENT
Start: 2024-04-10 | End: 2024-04-12

## 2024-04-10 RX ORDER — CLOPIDOGREL BISULFATE 75 MG/1
75 TABLET ORAL NIGHTLY
Status: DISCONTINUED | OUTPATIENT
Start: 2024-04-10 | End: 2024-04-12

## 2024-04-10 NOTE — PROGRESS NOTES
04/09/24 6439   Provider Notification   Reason for Communication Other (comment)   Provider Name Other (comment)  (Dr. Irvin)   Method of Communication Page       Pt in 504 PTA med list is complete if you want to review any of her home meds. I do want to let you know she has an insulin pump. I saw you order insulin. SHe stated she has enough insulin to give herself insulin bolus because shes eating right now but it won't last the night. Since it wont last tonight and she has no more supplies do you want her to remove it and she'll just do the correction factor novolog?  Thanks

## 2024-04-10 NOTE — PLAN OF CARE
Patient A&O x4. O2 @ 2L, Tele/O2 monitoring.   Ambulating, continent x2, no complaints of pain.   Steroids and antibiotics continued     Problem: Patient/Family Goals  Goal: Patient/Family Long Term Goal  Description: Patient's Long Term Goal: To be discharged    Interventions:  - Follow MD orders  - See additional Care Plan goals for specific interventions  Outcome: Progressing     Problem: Patient/Family Goals  Goal: Patient/Family Short Term Goal  Description: Patient's Short Term Goal:   04/09NOC: get rest and breathe better  04/10: Control cough     Interventions:   - steroids  -nebs  -ABx's  - See additional Care Plan goals for specific interventions  Outcome: Progressing

## 2024-04-10 NOTE — PLAN OF CARE
Problem: Diabetes/Glucose Control  Goal: Glucose maintained within prescribed range  Description: INTERVENTIONS:  - Monitor Blood Glucose as ordered  - Assess for signs and symptoms of hyperglycemia and hypoglycemia  - Administer ordered medications to maintain glucose within target range  - Assess barriers to adequate nutritional intake and initiate nutrition consult as needed  - Instruct patient on self management of diabetes  Outcome: Progressing     Problem: Patient/Family Goals  Goal: Patient/Family Long Term Goal  Description: Patient's Long Term Goal: To be dsicahrged    Interventions:  - Follow MD orders  - See additional Care Plan goals for specific interventions  Outcome: Progressing  Goal: Patient/Family Short Term Goal  Description: Patient's Short Term Goal:   04/09NOC: get rest and breathe better    Interventions:   - steroids  -nebs  -ABx's  - See additional Care Plan goals for specific interventions  Outcome: Progressing

## 2024-04-10 NOTE — PROGRESS NOTES
[facility]   part of [facility]     Hospitalist Progress Note     Danielle Jerome Patient Status:  Inpatient    3/25/1965 MRN [id number]   [facility] 5NW-A Attending Brian Reilly MD   Hosp Day # 1 PCP Luca Rai DO     Chief Complaint:   Chief Complaint   Patient presents with    Pneumonia       Subjective:     Very slightly better today; ongoing cough, wheeizng, dyspnea    Objective:    Review of Systems:   6  point ROS completed and was negative, except for pertinent positive and negatives stated in subjective.    Vital signs:  Temp:  [97.7 °F (36.5 °C)-98.4 °F (36.9 °C)] 97.7 °F (36.5 °C)  Pulse:  [51-88] 77  Resp:  [13-25] 20  BP: (130-179)/(51-90) 159/58  SpO2:  [89 %-97 %] 93 %    Physical Exam:    General: No acute distress.   Respiratory: ongoing wheezing and rhonchi  Cardiovascular: S1, S2. Regular rate and rhythm. No murmurs.  Abdomen: Soft, nontender, nondistended.    Extremities: No edema.    Diagnostic Data:    Labs:  Recent Labs   Lab 24  1619 04/10/24  0703   WBC 13.3* 10.4   HGB 14.5 13.5   MCV 76.7* 75.0*   .0 334.0       Recent Labs   Lab 24  1618   *   BUN 17   CREATSERUM 1.18*   CA 9.6   ALB 3.6      K 3.8      CO2 25.0   ALKPHO 99   AST 8*   ALT 41   BILT 0.3   TP 7.7       Estimated Creatinine Clearance: 53.1 mL/min (A) (based on SCr of 1.18 mg/dL (H)).    No results for input(s): \"PTP\", \"INR\" in the last 168 hours.         COVID-19 Lab Results    COVID-19  Lab Results   Component Value Date    COVID19 Not Detected 2024    COVID19 Not Detected 2024    COVID19 Not Detected 2023       Pro-Calcitonin  No results for input(s): \"PCT\" in the last 168 hours.    Cardiac  No results for input(s): \"TROP\", \"PBNP\" in the last 168 hours.    Creatinine Kinase  No results for input(s): \"CK\" in the last 168 hours.    Inflammatory Markers  Recent Labs   Lab 24  1618   DDIMER 0.41       Recent Labs   Lab  04/09/24  1618   TROPHS 7       Imaging: Imaging data reviewed in Epic.    Medications:    methylPREDNISolone  60 mg Intravenous Q8H    cefTRIAXone  1 g Intravenous Q24H    azithromycin  500 mg Intravenous Q24H    guaiFENesin ER  600 mg Oral BID    enoxaparin  40 mg Subcutaneous Daily    insulin aspart  1-5 Units Subcutaneous TID AC and HS    ipratropium-albuterol  3 mL Nebulization 6 times per day    ipratropium-albuterol           Assessment & Plan:      #pneumonia; empiric abx; pulm to see  #acute Asthma exacerbation; iv steroids; nebs  #hx possible Churgg miranda??   #History of VTE on xarelto; resume xarelto today  #CAD sp PCI; on plavix, resumed  #Essential hypertension  #Dyslipidemia  #Diabetes mellitus, 2-monitor on insulin; will adjust today based on usual home doses and also exacerbation from steroids consideration.  #GERD  #Anxiety  #Depression  #fibromyalgia, polyarthralgias    BMP/mag/phos pending; will further adjust insulin today as well  Plan of care discussed with patient and RN    Bran Hannah MD    Supplementary Documentation:     Quality:  DVT Prophylaxis:  resume xarelto tonight; stop lovenox  CODE status: see chart  [place]: no  Central line: no      Estimated date of discharge: tbd  At this point Ms. Jerome is expected to be discharge to: home            **Certification      PHYSICIAN Certification of Need for Inpatient Hospitalization - Initial Certification    Patient will require inpatient services that will reasonably be expected to span two midnight's based on the clinical documentation in H+P.   Based on patients current state of illness, I anticipate that, after discharge, patient will require TBD.

## 2024-04-10 NOTE — PROGRESS NOTES
04/09/24 2780   Provider Notification   Reason for Communication Other (comment)   Provider Name Other (comment)  (Dr. Craft)   Method of Communication Page   Response See orders     PTA med list done and completed for 504, I didn't see any home meds reordered incase you want to review and order thanks.

## 2024-04-10 NOTE — ED QUICK NOTES
Orders for admission, patient is aware of plan and ready to go upstairs. Any questions, please call ED RN Jenn at extension [phone].     Patient Covid vaccination status: Partially vaccinated     COVID Test Ordered in ED: SARS-CoV-2/Flu A and B/RSV by PCR (GeneXpert)    COVID Suspicion at Admission: N/A    Running Infusions:    sodium chloride Stopped (04/09/24 1923)    None    Mental Status/LOC at time of transport: A&O x3     Other pertinent information:   CIWA score: N/A   NIH score:  N/A

## 2024-04-10 NOTE — PROGRESS NOTES
04/10/24 0105   Provider Notification   Reason for Communication New consult   Provider Name Other (comment)  (Dr. Bear)   Method of Communication Page   Response Waiting for response       New consult from Dr. Irvin for pt in 504, Here with PNA on 2L NC with O2 sats at 96%, On Rocephin, Zithro and Solumedrol. ANy new orders?

## 2024-04-10 NOTE — CONSULTS
[facility]  Report of Consultation    Danielle Jerome Patient Status:  Inpatient    3/25/1965 MRN [id number]   Location [facility] 5NW-A Attending Brian Reilly MD   Hosp Day # 1 PCP Luca Rai DO     Reason for Consultation:  Flare of asthma and suspected left lower lobe pneumonia      History of Present Illness:  Danielle Jerome is a a(n) 59 year old female.  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  She has been on Xolair over the years most recently on Dupixent changed to Nucala now with attempts at returning to Dupixent related to better control and lack of side effects.  Last dose 3 weeks ago-other issues include significant sinus disease allergic rhinitis polyposis and ongoing allergies ;recurrent thrombosis with lifelong Xarelto diabetes severe GERD ; chronic migraines and history of extensive coronary disease acute MI 2017 with stent to LAD and associated LV dysfunction-most recently with full recovery EF 55 to 60%-ongoing diastolic dysfunction and last cath 2023.  Most recently with increasing sinus disease related to difficulties with Nucala.-And required some oral steroid dosing.  Overall however she has been out of the hospital for over 2 years..  She had been well up until  when she had what appears to been a viral syndrome.  Urgent care visit  with negative testing chest x-ray with questionable left lower lobe infiltrate was discharged on doxycycline and prednisone 40 x 5 days.  Minimal improvement with subsequent worsening of cough congestion wheezing and shortness of breath.  She has had no mucus unaware of any fevers denies any chest pain though feels short of breath short time after neb is completed.  She is requiring 2 to 3 L of O2  She has been afebrile  Tolerated her first go  with CPAP last night       History:  Past Medical History:    Allergic rhinitis    Allergic rhinitis, cause unspecified    Anemia    Anxiety    Anxiety and depression    Asthma (HCC)    Atherosclerosis of coronary artery    Laguerre's esophagus    Churg-Shania syndrome (HCC)    Colloid cyst of brain (HCC)    Congestive heart disease (HCC)    Coronary artery disease of native heart with stable angina pectoris (HCC)    Depression    Diabetes (HCC)    Diabetes mellitus (HCC)    Diverticulosis of large intestine    Essential hypertension    Gastroparesis    GERD    Hematemesis with nausea    2017, No EGD, Hgb stable    High cholesterol    History of blood clots    History of pulmonary embolus (PE)    History of recurrent deep vein thrombosis (DVT)    Hyperlipidemia    Hypertension    IBS    LGSIL (low grade squamous intraepithelial dysplasia)    Pap neg 2014    Migraines    perfumes    Moderate persistent asthma without complication (HCC)    DESOUZA (nonalcoholic steatohepatitis)    NSTEMI (non-ST elevated myocardial infarction) (HCC)    FELTON in LAD    Obesity    Osteoarthritis    Patella-femoral syndrome, bilateral knee    Pneumonia due to organism    Pulmonary embolism (HCC)    Sleep apnea    Thyroid disease     Family History   Problem Relation Age of Onset    Heart Disorder Father     Hypertension Mother     Lipids Mother     Stroke Mother     Anemia Mother     Dementia Mother     Heart Disorder Mother     Other (Other) Mother     Bleeding Disorders Mother     Hypertension Sister     Obesity Sister     Stroke Sister     Other (Other) Sister         stroke,pe    Other (Other) Brother         kidney stones    Colon Cancer Maternal Grandfather     Colon Cancer Maternal Aunt     Anemia Daughter     Asthma Daughter     Depression Daughter     Asthma Daughter     Bleeding Disorders Sister     Stroke Sister       reports that she has never smoked. She has been exposed to tobacco smoke. She has never used smokeless tobacco. She  reports that she does not drink alcohol and does not use drugs.    Allergies:  Allergies   Allergen Reactions    Peanut-Containing Drug Products HIVES and SHORTNESS OF BREATH     Hives, asthma attack    Pcn [Penicillins] UNKNOWN     HAPPENED AS A CHILD       Medications:  Medications Prior to Admission   Medication Sig Dispense Refill Last Dose    metoprolol succinate ER 25 MG Oral Tablet 24 Hr Take 1 tablet (25 mg total) by mouth nightly.   4/8/2024    predniSONE 20 MG Oral Tab Take 2 tablets (40 mg total) by mouth daily for 5 days. 10 tablet 0 4/9/2024    doxycycline 100 MG Oral Cap Take 1 capsule (100 mg total) by mouth 2 (two) times daily for 10 days. 20 capsule 0 4/8/2024    albuterol 108 (90 Base) MCG/ACT Inhalation Aero Soln Inhale 2 puffs into the lungs every 4 to 6 hours as needed for Wheezing or Shortness of Breath. 1 each 0 4/9/2024    BREO ELLIPTA 200-25 MCG/ACT Inhalation Aerosol Powder, Breath Activated Inhale 1 puff into the lungs daily. 3 each 1 4/9/2024    montelukast 10 MG Oral Tab Take 1 tablet (10 mg total) by mouth nightly. 90 tablet 1 4/8/2024    Tiotropium Bromide Monohydrate (SPIRIVA RESPIMAT) 2.5 MCG/ACT Inhalation Aero Soln Inhale 2 puffs into the lungs daily. (Patient taking differently: Inhale 2 puffs into the lungs in the morning and 2 puffs before bedtime.) 3 each 1 4/9/2024    ALPRAZOLAM 0.5 MG Oral Tab TAKE 1 TO 2 TABLETS(0.5 TO 1 MG) BY MOUTH DAILY AS NEEDED FOR SLEEP OR ANXIETY 42 tablet 0 4/8/2024    Continuous Blood Gluc Sensor (DEXCOM G6 SENSOR) Does not apply Misc Apply to skin every 10 days 9 each 3 4/9/2024    Insulin Disposable Pump (OMNIPOD 5 G6 PODS, GEN 5,) Does not apply Misc 1 each every other day. 45 each 1 4/9/2024    insulin lispro (HUMALOG) 100 UNIT/ML Injection Solution Uses up to 100 units daily via insulin pump 90 mL 1 4/9/2024    pantoprazole 40 MG Oral Tab EC Take 1 tablet (40 mg total) by mouth before breakfast. (Patient taking differently: Take 1 tablet (40 mg  total) by mouth nightly.) 90 tablet 3 4/8/2024    rivaroxaban (XARELTO) 20 MG Oral Tab Take 1 tablet (20 mg total) by mouth nightly. TAKE AT BEDTIME 90 tablet 3 4/8/2024    escitalopram 20 MG Oral Tab Take 1 tablet (20 mg total) by mouth every morning. 90 tablet 1 4/9/2024    amLODIPine 2.5 MG Oral Tab Take 1 tablet (2.5 mg total) by mouth daily.   4/8/2024    Azelastine HCl 0.1 % Nasal Solution 1 spray by Nasal route in the morning and 1 spray before bedtime.   4/8/2024    Clopidogrel Bisulfate 75 MG Oral Tab Take 1 tablet (75 mg total) by mouth nightly. nightly   4/8/2024    losartan 100 MG Oral Tab Take 1 tablet (100 mg total) by mouth daily.   4/9/2024    ezetimibe 10 MG Oral Tab Take 1 tablet (10 mg total) by mouth nightly.   4/9/2024    Fluticasone Propionate 50 MCG/ACT Nasal Suspension 1 spray by Each Nare route 2 (two) times daily.   4/8/2024    cetirizine (ZYRTEC) 10 MG Oral Tab Take 1 tablet (10 mg total) by mouth nightly.   4/8/2024    ergocalciferol 1.25 MG (66858 UT) Oral Cap Take 1 capsule (50,000 Units total) by mouth once a week. 12 capsule 0 Unknown    gabapentin 100 MG Oral Cap Take 1 capsule (100 mg total) by mouth in the morning and 1 capsule (100 mg total) before bedtime.       Continuous Blood Gluc Transmit (DEXCOM G6 TRANSMITTER) Does not apply Misc 1 each by Other route every 3 (three) months. (Patient not taking: Reported on 4/9/2024) 1 each 1     Insulin NPH, Human,, Isophane, (HUMULIN N KWIKPEN) 100 UNIT/ML Subcutaneous Suspension Pen-injector Pair with prednisone only. Up to 30 units a day max. 9 mL 0 Unknown    fluconazole 150 MG Oral Tab Take 1 tablet (150 mg total) by mouth once. (Patient not taking: Reported on 4/9/2024)       glucagon (GVOKE HYPOPEN 1-PACK) 1 MG/0.2ML Subcutaneous SUBQ injection Inject 0.2 mL (1 mg total) into the skin once as needed for Low blood glucose. 0.2 mL 1     Mepolizumab (NUCALA SC) Inject into the skin every 30 (thirty) days.   3/10/2023    SUMAtriptan  Succinate 50 MG Oral Tab TAKE 1 TABLET BY MOUTH EVERY 2 HOURS AS NEEDED FOR  MIGRAINE.  DO  NOT  EXCEED  200MG  IN  24  HOURS 9 tablet 3 More than a month       Review of Systems:    Constitutional: Weight has been up with recent steroids denies any specific changing  Eyes: No issues  Ears, nose, mouth, throat, and face: Denies any sore throat or difficulty swallowing  Respiratory: As above  Cardiovascular: Unaware of chest pain or palpitations  Gastrointestinal: GERD is chronically controlled with PPI  Musculoskeletal: Vague feeling of swelling overall without focal edema    Neurological: Fibromyalgia and chronic migraines follows with neurology and gabapentin-no specific change     All other review of systems are negative.-Awaiting CPAP titration tolerated auto overnight    Vital signs in last 24 hours:  Patient Vitals for the past 24 hrs:   BP Temp Temp src Pulse Resp SpO2 Height Weight   04/10/24 0731 159/58 97.7 °F (36.5 °C) Oral 77 20 93 % -- 259 lb (117.5 kg)   04/10/24 0710 -- -- -- 51 20 95 % -- --   04/10/24 0325 130/59 97.7 °F (36.5 °C) Oral 64 20 94 % -- --   04/10/24 0025 -- -- -- 72 -- 97 % -- --   04/09/24 2354 144/51 98 °F (36.7 °C) Axillary 69 20 97 % -- --   04/09/24 2100 158/73 98.4 °F (36.9 °C) Oral 75 22 96 % -- --   04/09/24 2047 -- -- -- -- -- -- -- 259 lb 11.2 oz (117.8 kg)   04/09/24 2015 139/77 -- -- 69 13 95 % -- --   04/09/24 2000 150/75 -- -- 75 13 95 % -- --   04/09/24 1945 145/78 -- -- 76 25 94 % -- --   04/09/24 1830 -- -- -- 78 16 93 % -- --   04/09/24 1815 (!) 163/74 -- -- 81 18 95 % -- --   04/09/24 1745 -- -- -- 78 14 97 % -- --   04/09/24 1546 -- -- -- -- -- 94 % -- --   04/09/24 1543 -- -- -- -- -- (!) 89 % -- --   04/09/24 1538 (!) 179/90 98.4 °F (36.9 °C) Oral 88 24 91 % 5' 8\" (1.727 m) 264 lb 15.9 oz (120.2 kg)         Physical Exam:   General: alert, cooperative, oriented.  No respiratory distress.  Intermittent harsh coughing-on nasal cannula   Head: Normocephalic, without  obvious abnormality, atraumatic.   Eyes/Nose: Close equal and reactive   Throat: Lips, mucosa, and tongue normal.  No thrush noted.   Neck: trachea midline, no adenopathy, no thyromegaly. No JVD.   Lungs: Tight bilateral expiratory wheeze and rhonchi minimal crackles at both bases   Chest wall: No tenderness or deformity.   Heart: Distant tones regular rate and rhythm   Abdomen: soft, non-distended, no masses, no guarding, no     Rebound.  Obese nontender   Extremity: No edema no evidence [place] area   Skin: No rashes or lesions.  No NeutraCare area     Neurological: Alert, interactive, no focal deficits    Lab Data Review:  Lab Results   Component Value Date    WBC 10.4 04/10/2024    HGB 13.5 04/10/2024    HCT 42.7 04/10/2024    .0 04/10/2024    CREATSERUM 0.97 04/10/2024    BUN 16 04/10/2024     04/10/2024    K 4.3 04/10/2024     04/10/2024    CO2 23.0 04/10/2024     04/10/2024    CA 8.9 04/10/2024    ALB 3.6 04/09/2024    ALKPHO 99 04/09/2024    BILT 0.3 04/09/2024    TP 7.7 04/09/2024    AST 8 04/09/2024    ALT 41 04/09/2024    DDIMER 0.41 04/09/2024    MG 2.5 04/10/2024    PHOS 3.8 04/10/2024    PGLU 330 04/10/2024       Cultures:    BC thus far negative/pending  Recent negative COVID flu RSV    Radiology:  XR CHEST AP PORTABLE  (CPT=71045)    Result Date: 4/9/2024  CONCLUSION:  Left lower lobe consolidation is mildly increased.  No pneumothorax.   LOCATION:  [facility]      Dictated by (CST): Иван Beckford MD on 4/09/2024 at 5:09 PM     Finalized by (CST): Иван Beckford MD on 4/09/2024 at 5:10 PM      Chest x-rays reviewed -mild infiltrate left lower lobe seems slightly increased on follow-up film 4 days later no evidence of advancing pleural effusion--    Assessment and Plan:  Patient Active Problem List   Diagnosis    Allergic rhinitis    Esophageal reflux    Other chronic sinusitis    Moderate episode of recurrent major depressive disorder (HCC)    Anxiety    Class 3 severe  obesity due to excess calories with serious comorbidity and body mass index (BMI) of 40.0 to 44.9 in adult (HCC)    History of recurrent deep vein thrombosis (DVT)    History of pulmonary embolus (PE)    Diabetes mellitus (HCC)    Hyperglycemia    Exacerbation of asthma (HCC)    Cardiac angina (HCC)    CAD (coronary artery disease), native coronary artery    NSTEMI (non-ST elevated myocardial infarction) (HCC)    Gastroparesis    Moderate persistent asthma without complication (HCC)    Migraines    Laguerre esophagus    HFrEF (heart failure with reduced ejection fraction) (HCC)    Irreducible ventral incisional hernia    B12 deficiency    Cardiomyopathy, ischemic    Colloid cyst of brain (HCC)    Essential hypertension    Family history of early CAD    Hiatal hernia    History of steroid therapy    Hyperlipidemia, mixed    Presence of drug coated stent in LAD coronary artery    Steroid-induced diabetes (HCC)    NAFLD (nonalcoholic fatty liver disease)    Hepatomegaly    Postmenopausal bleeding    Churg-Shania syndrome (HCC)    Varicose veins of both lower extremities with pain    Venous insufficiency    Vitamin D deficiency    Type 2 diabetes mellitus without complication (HCC)    Statin intolerance    Edema of extremities    Community acquired pneumonia of left lower lobe of lung    Moderate persistent asthma with exacerbation (HCC)    Sepsis due to pneumonia (HCC)       Assessment:  Left lower lobe infiltrate suspected pneumonia possible viral without response to doxycycline  Flare of asthma suspected in response to the above-in addition his been off her Nucala for 3 weeks  Hypoxic respiratory failure related to the above  History of severe asthma chronic due to carrier chronic sinus disease with polyps on multiple trials of Biologics-trying to change back from Nucala to Dupixent  History of coronary disease status post MI and stents-last cath 6/23 all open --diastolic dysfunction  Diabetes ongoing  issues-following on steroids  PEDRO-AHI 24 gonzalo 82%-CPAP titration has been recommended--tolerating auto PAP here thus far    Plan:  Steroids ICS/LAMA/LAMA  Wean FiO2 as able  Continuing IV antibiotic coverage for community-acquired pneumonia  Continuing Xarelto/PPI  Plan for spirometry as we are attempting to change her biologic to Dupixent  Encouraged to continue to try CPAP and plan for outpatient titration    Reviewed at length with patient at bedside    Ana Luisa Miller MD  4/10/2024  11:41 AM

## 2024-04-10 NOTE — PAYOR COMM NOTE
--------------  ADMISSION REVIEW     Payor: [employer]  Subscriber #:  [id number]  Authorization Number: [id number]    Admit date: 4/9/24  Admit time:  8:56 PM       REVIEW DOCUMENTATION:     ED Provider Notes        ED Provider Notes signed by Jigar Mace MD at 4/9/2024  7:40 PM       Author: Jigar Mace MD Service: -- Author Type: Physician    Filed: 4/9/2024  7:40 PM Date of Service: 4/9/2024  4:07 PM Status: Signed    : Jigar Mace MD (Physician)           Patient Seen in: [facility]      History     Chief Complaint   Patient presents with    Pneumonia     Stated Complaint: told she has pneumonia 5 days ago and feels worse, HA, SOB, 95% RA    Subjective:   HPI    Patient is a 59-year-old woman who presents with persistent cough congestion and wheezing.  Patient was diagnosed with pneumonia on the fifth at the [facility] emergency department.  Patient has a history of asthma.  Says she has not felt well since COVID-pneumonia.  Symptoms worse over the last 2 weeks with cough and congestion.  She had an infiltrate on her chest x-ray.  She was treated with doxycycline, steroids.  Says her symptoms are not improving and in fact are worsening.  She is short of breath with activity.  She was satting 89% to 91% on arrival here.  Chest tightness with activity.  Wheezing.   is at bedside.  She does not smoke but her  does.  No other specific complaints.  Patient is otherwise at her medical baseline.    Objective:   Past Medical History:   Diagnosis Date    Allergic rhinitis     Allergic rhinitis, cause unspecified 05/05/2008    Anemia     Anxiety     Anxiety and depression     Asthma (HCC)     Atherosclerosis of coronary artery     Laguerre's esophagus     Churg-Shania syndrome (HCC) 2022    Colloid cyst of brain (HCC)     Congestive heart disease (HCC)     Coronary artery disease of native heart with stable angina pectoris (HCC)     Depression      Diabetes (HCC)     Diabetes mellitus (HCC)     Diverticulosis of large intestine     Essential hypertension     Gastroparesis     GERD     Hematemesis with nausea 2017, No EGD, Hgb stable    High cholesterol     History of blood clots     History of pulmonary embolus (PE) 2016    History of recurrent deep vein thrombosis (DVT) 2014    Hyperlipidemia     Hypertension     IBS     LGSIL (low grade squamous intraepithelial dysplasia) 10/2010    Pap neg 2014    Migraines     perfumes    Moderate persistent asthma without complication (HCC) 2018    DESOUZA (nonalcoholic steatohepatitis)     NSTEMI (non-ST elevated myocardial infarction) (HCC)     FELTON in LAD    Obesity     Osteoarthritis     Patella-femoral syndrome, bilateral knee 01/15/2015    Pneumonia due to organism     Pulmonary embolism (HCC)     Sleep apnea     Thyroid disease               Past Surgical History:   Procedure Laterality Date    ANGIOPLASTY (CORONARY)      BREAST SURGERY PROCEDURE UNLISTED      cyst removed    CATH DRUG ELUTING STENT      CHOLECYSTECTOMY  04/10/2011    PERFORMED BY DR KRAFT-LAPAROSCOPIC    COLONOSCOPY      polyps    COLONOSCOPY N/A 2016    Procedure: COLONOSCOPY;  Surgeon: Kishor Stout MD;  Location:  ENDOSCOPY    COLPOSCOPY, CERVIX W/UPPER ADJACENT VAGINA; W/ENDOCERVICAL CURETTAGE  2011    WNL    CYST ASPIRATION LEFT  2000    HERNIA SURGERY            OTHER SURGICAL HISTORY      sinus surgery    OTHER SURGICAL HISTORY      CYST REMOVED ON LEFT WRIST 17 YRS AGO    SINUS SURGERY        SKIN SURGERY      UPPER GI ENDOSCOPY,EXAM                  Social History     Socioeconomic History    Marital status:     Number of children: 3   Occupational History    Occupation: homemaker   Tobacco Use    Smoking status: Never     Passive exposure: Current    Smokeless tobacco: Never    Tobacco comments:     2nd hand with Father and now    Vaping Use    Vaping Use: Never used    Substance and Sexual Activity    Alcohol use: No    Drug use: No    Sexual activity: Never     Partners: Male     Birth control/protection: I.U.D.              Review of Systems    Positive for stated complaint: told she has pneumonia 5 days ago and feels worse, HA, SOB, 95% RA  Other systems are as noted in HPI.  Constitutional and vital signs reviewed.      All other systems reviewed and negative except as noted above.    Physical Exam     ED Triage Vitals [04/09/24 1538]   BP (!) 179/90   Pulse 88   Resp 24   Temp 98.4 °F (36.9 °C)   Temp src Oral   SpO2 91 %   O2 Device None (Room air)       Current:BP (!) 179/90   Pulse 88   Temp 98.4 °F (36.9 °C) (Oral)   Resp 24   Ht 172.7 cm (5' 8\")   Wt 120.2 kg   LMP 09/23/2004   SpO2 94%   BMI 40.29 kg/m²         Physical Exam    General: Patient is resting comfortably in no acute distress  HEENT: Normal cephalic atraumatic.  Nonicteric sclera.  Moist mucous membranes.  No meningismus.  No adenopathy  Lungs: Expiratory wheezing in all lung fields cardiac: No tachycardia.  No murmurs.  Regular rate and rhythm.  Abdomen: Soft and nontender throughout.  No rebound or guarding  Extremities: No clubbing/cyanosis/edema.  Skin: No rashes, no pallor  Neuro: Awake oriented ×3.  Nonfocal.  Good strength throughout    ED Course     Labs Reviewed   COMP METABOLIC PANEL (14) - Abnormal; Notable for the following components:       Result Value    Glucose 277 (*)     Creatinine 1.18 (*)     Calculated Osmolality 299 (*)     eGFR-Cr 54 (*)     AST 8 (*)     A/G Ratio 0.9 (*)     All other components within normal limits   LACTIC ACID, PLASMA - Abnormal; Notable for the following components:    Lactic Acid 3.6 (*)     All other components within normal limits   CBC W/ DIFFERENTIAL - Abnormal; Notable for the following components:    WBC 13.3 (*)     RBC 6.01 (*)     MCV 76.7 (*)     MCH 24.1 (*)     Neutrophil Absolute Prelim 8.43 (*)     Neutrophil Absolute 8.43 (*)     All  other components within normal limits   D-DIMER - Normal   TROPONIN I HIGH SENSITIVITY - Normal   SARS-COV-2/FLU A AND B/RSV BY PCR (GENEXPERT) - Normal    Narrative:     This test is intended for the qualitative detection and differentiation of SARS-CoV-2, influenza A, influenza B, and respiratory syncytial virus (RSV) viral RNA in nasopharyngeal or nares swabs from individuals suspected of respiratory viral infection consistent with COVID-19 by their healthcare provider. Signs and symptoms of respiratory viral infection due to SARS-CoV-2, influenza, and RSV can be similar.    Test performed using the Xpert Xpress SARS-CoV-2/FLU/RSV (real time RT-PCR)  assay on the GeneXpert instrument, [employer], [place] [address].   This test is being used under the Food and Drug Administration's Emergency Use Authorization.    The authorized Fact Sheet for Healthcare Providers for this assay is available upon request from the laboratory.   CBC WITH DIFFERENTIAL WITH PLATELET    Narrative:     The following orders were created for panel order CBC With Differential With Platelet.  Procedure                               Abnormality         Status                     ---------                               -----------         ------                     CBC W/ DIFFERENTIAL[[id number]]          Abnormal            Final result                 Please view results for these tests on the individual orders.   LACTIC ACID REFLEX POST POSTIVE   BLOOD CULTURE   BLOOD CULTURE     EKG    Rate, intervals and axes as noted on EKG Report.  Rate: 78  Rhythm: Sinus Rhythm  Reading: Normal sinus rhythm.  No acute ST-T wave changes.  Axis/intervals are noted.  Otherwise, agree with EKG report           COVID/flu negative.  Lactic elevated 3.6.  Electrolytes noted.  Glucose 277.   13,000.  D-dimer 0.41.  Troponin negative.  Chest x-ray: I personally reviewed the films and my independent interpertaion showed left lower lobe infiltrate.  Official report  reviewed.  Consolidation slightly worse.        MDM      Cough congestion wheezing.  Hypoxia.  History of asthma.  History chugStrauss syndrome.  Wheezing and hypoxic here.  Differential occludes asthma exacerbation, worsening pneumonia, PE, other.  Will treat with an hour-long neb.  IV steroids.  Will check blood cultures, lactate.  Will hydrate.  Will check x-ray.  Given failure of outpatient treatment would have a low threshold for admission.  Will reassess after breathing treatments, x-ray.      Patient with worsening pneumonia despite outpatient therapy.  Lactate high though I believe it is more work of breathing than true sepsis.  Will hydrate with 30/kg.  Will treat with Rocephin/Zithromax.  Will treat for fluids with ideal body weight.  Given her worsening symptoms and her persistent dyspnea will admit for further treatment.                        [facility]   part of [facility]      Sepsis Reassessment Note    BP (!) 179/90   Pulse 88   Temp 98.4 °F (36.9 °C) (Oral)   Resp 24   Ht 172.7 cm (5' 8\")   Wt 120.2 kg   LMP 09/23/2004   SpO2 94%   BMI 40.29 kg/m²      I completed the sepsis reassessment at 7pm    Cardiac:  Regularity: Regular  Rate: Normal  Heart Sounds: S1,S2    Lungs:   Right: Expiratory Wheezes  Left: Expiratory Wheezes    Peripheral Pulses:  Radial: Right 1+ or Left 1+      Capillary Refill:  <3 Secs    Skin:  Temp/Moisture: Warm and Dry  Color: Normal      Jigar Mace MD  4/9/2024  5:23 PM      Medical Decision Making      Disposition and Plan     Clinical Impression:  1. Community acquired pneumonia of left lower lobe of lung    2. Moderate persistent asthma with exacerbation (HCC)    3. Sepsis due to pneumonia (HCC)         Disposition:  There is no disposition on file for this visit.  There is no disposition time on file for this visit.    Follow-up:  No follow-up provider specified.        Medications Prescribed:  Current Discharge Medication List               A  total of 35 minutes of critical care time (exclusive of billable procedures) was administered to manage the patient's respiratory instability due to @HIS@acute bronchospasm, asthma exacerbation along with pneumonia.  This involved direct patient intervention, complex decision making, and/or extensive discussions with the patient, family, and clinical staff.  Treated with an hour-long neb, IV steroids, IV antibiotics, reassessments.  Discussed with hospitalist.                            Signed by Jigar Mace MD on 4/9/2024  7:40 PM         MEDICATIONS ADMINISTERED IN LAST 1 DAY:  acetaminophen (Tylenol Extra Strength) tab 500 mg       Date Action Dose Route User    4/9/2024 2228 Given 500 mg Oral Tamra Cabezas RN          albuterol (Ventolin) (5 MG/ML) 0.5% nebulizer solution 10 mg       Date Action Dose Route User    4/9/2024 1657 Given 10 mg Nebulization Dayna Escalante RCP          amLODIPine (Norvasc) tab 2.5 mg       Date Action Dose Route User    4/10/2024 0948 Given 2.5 mg Oral Tatianna Jones RN          azithromycin (Zithromax) 500 mg in sodium chloride 0.9% 250mL IVPB premix       Date Action Dose Route User    4/9/2024 1808 New Bag 500 mg Intravenous Dung Perrin RN          cefTRIAXone (Rocephin) 2 g in D5W 100 mL IVPB-ADD       Date Action Dose Route User    4/9/2024 1718 New Bag 2 g Intravenous Dung Perrin RN          enoxaparin (Lovenox) 40 MG/0.4ML SUBQ injection 40 mg       Date Action Dose Route User    4/10/2024 0819 Given 40 mg Subcutaneous (Right Lower Abdomen) Tana Lara RN          escitalopram (Lexapro) tab 20 mg       Date Action Dose Route User    4/10/2024 0948 Given 20 mg Oral Tatianna Jones RN          fluticasone furoate-vilanterol (Breo Ellipta) 200-25 MCG/ACT inhaler 1 puff       Date Action Dose Route User    4/10/2024 1130 Given 1 puff Inhalation Fadumo White RCP          gabapentin (Neurontin) cap 100 mg       Date Action Dose Route User     4/10/2024 0948 Given 100 mg Oral Tatianna Jones RN          guaiFENesin ER (Mucinex) 12 hr tab 600 mg       Date Action Dose Route User    4/10/2024 0819 Given 600 mg Oral Tana Lara RN    4/9/2024 2228 Given 600 mg Oral Tamra Cabezas RN          HYDROcodone-homatropine (Hydromet) 5-1.5 MG/5ML oral syrup 5 mL       Date Action Dose Route User    4/9/2024 2230 Given 5 mL Oral Tamra Cabezas RN          insulin aspart (NovoLOG) 100 Units/mL FlexPen 1-5 Units       Date Action Dose Route User    4/10/2024 0547 Given 4 Units Subcutaneous (Right Upper Arm) Tamra Cabezas RN          insulin aspart (NovoLOG) 100 Units/mL FlexPen 6 Units       Date Action Dose Route User    4/10/2024 0023 Given 6 Units Subcutaneous (Right Upper Arm) Tamra Cabezas RN          insulin aspart (NovoLOG) 100 Units/mL FlexPen 1-68 Units       Date Action Dose Route User    4/10/2024 0949 Given 3 Units Subcutaneous (Left Upper Arm) Tatianna Jones RN          insulin aspart (NovoLOG) 100 Units/mL FlexPen 4-20 Units       Date Action Dose Route User    4/10/2024 1203 Given 20 Units Subcutaneous (Right Upper Arm) Tana Lara RN          insulin degludec 100 units/mL flextouch 30 Units       Date Action Dose Route User    4/10/2024 0949 Given 30 Units Subcutaneous (Left Upper Arm) Tatianna Jones RN          ipratropium (Atrovent) 0.02 % nebulizer solution 0.5 mg       Date Action Dose Route User    4/9/2024 1657 Given 0.5 mg Nebulization Dayna Escalante RCP          ipratropium-albuterol (Duoneb) 0.5-2.5 (3) MG/3ML inhalation solution 3 mL       Date Action Dose Route User    4/10/2024 1125 Given 3 mL Nebulization Fadumo White RCP    4/10/2024 0710 Given 3 mL Nebulization Fadumo White RCP    4/10/2024 0339 Given 3 mL Nebulization Dung Baker RCP    4/10/2024 0004 Given 3 mL Nebulization Dung Baker, SHARON    4/9/2024 2112 Given 3 mL Nebulization Xavier Sawant, SHARON          losartan (Cozaar) tab  100 mg       Date Action Dose Route User    4/10/2024 0948 Given 100 mg Oral Tatianna Jones RN          methylPREDNISolone sodium succinate (Solu-MEDROL) injection 125 mg       Date Action Dose Route User    4/9/2024 1608 Given 125 mg Intravenous Dung Perrin RN          methylPREDNISolone sodium succinate (Solu-MEDROL) injection 60 mg       Date Action Dose Route User    4/10/2024 1204 Given 60 mg Intravenous Tana Lara RN    4/10/2024 0548 Given 60 mg Intravenous Tamra Cabezas RN    4/9/2024 2230 Given 60 mg Intravenous Tamra Cabezas RN          sodium chloride 0.9 % IV bolus 1,000 mL       Date Action Dose Route User    4/9/2024 1608 New Bag 1,000 mL Intravenous Dung Perrin RN          sodium chloride 0.9 % IV bolus 1,917 mL       Date Action Dose Route User    4/9/2024 1808 New Bag 1,917 mL Intravenous Dung Perrin RN          umeclidinium bromide (Incruse Ellipta) 62.5 MCG/ACT inhaler 1 puff       Date Action Dose Route User    4/10/2024 1133 Given 1 puff Inhalation Fadumo White, RCP            Vitals (last day)       Date/Time Temp Pulse Resp BP SpO2 Weight O2 Device O2 Flow Rate (L/min) [facility]    04/10/24 1130 98 °F (36.7 °C) 55 20 141/53 97 % -- None (Room air) --     04/10/24 0731 97.7 °F (36.5 °C) 77 20 159/58 93 % -- None (Room air) -- MR    04/10/24 0731 -- -- -- -- -- 259 lb -- -- SB    04/10/24 0716 -- -- -- -- -- -- None (Room air) -- KW    04/10/24 0710 -- 51 20 -- 95 % -- -- -- KW    04/10/24 0325 97.7 °F (36.5 °C) 64 20 130/59 94 % -- CPAP 2 L/min SW    04/10/24 0025 -- 72 -- -- 97 % -- CPAP 2 L/min LM    04/09/24 2354 98 °F (36.7 °C) 69 20 144/51 97 % -- CPAP 4 L/min     04/09/24 2100 98.4 °F (36.9 °C) 75 22 158/73 96 % -- Nasal cannula 4 L/min     04/09/24 2047 -- -- -- -- -- 259 lb 11.2 oz -- --     04/09/24 2015 -- 69 -- 139/77 95 % -- Nasal cannula 2 L/min     04/09/24 2015 -- -- 13 -- -- -- -- --     04/09/24 2009 -- -- -- -- -- -- Nasal cannula 2 L/min      04/09/24 2000 -- 75 13 150/75 95 % -- Nasal cannula 2 L/min     04/09/24 1945 -- 76 25 145/78 94 % -- Nasal cannula 2 L/min     04/09/24 1830 -- 78 16 -- 93 % -- -- --     04/09/24 1815 -- 81 18 163/74 95 % -- -- --     04/09/24 1745 -- 78 14 -- 97 % -- -- --     04/09/24 1546 -- -- -- -- 94 % -- Nasal cannula 4 L/min     04/09/24 1543 -- -- -- -- 89 % -- None (Room air) --     04/09/24 1538 98.4 °F (36.9 °C) 88 24 179/90 91 % 264 lb 15.9 oz None (Room air) --          4/9 H&P    Subjective:  History of Present Illness:      Danielle Jerome is a 59 year old female with cough.  Has had over a week of symptoms, along with weakness, dyspnea, wheezing and fevers.  Was in clinic 5 days ago and had xray and treated for pneumonia.          Assessment & Plan:  #pneumonia; empiric abx; pulm to see  #acute Asthma exacerbation; iv steroids; nebs  #hx possible Churgg miranda??   #History of VTE on xarelto  #CAD sp PCI; on plavix  #Essential hypertension  #Dyslipidemia  #Diabetes mellitus, 2-monitor on insulin  #GERD  #Anxiety  #Depression     4/10 HOSPITALIST NOTE  Patient presents with    Pneumonia            Subjective:  Very slightly better today; ongoing cough, wheeizng, dyspnea           Objective:  Review of Systems:   6  point ROS completed and was negative, except for pertinent positive and negatives stated in subjective.     Vital signs:  Temp:  [97.7 °F (36.5 °C)-98.4 °F (36.9 °C)] 97.7 °F (36.5 °C)  Pulse:  [51-88] 77  Resp:  [13-25] 20  BP: (130-179)/(51-90) 159/58  SpO2:  [89 %-97 %] 93 %     Physical Exam:    General: No acute distress.   Respiratory: ongoing wheezing and rhonchi  Cardiovascular: S1, S2. Regular rate and rhythm. No murmurs.  Abdomen: Soft, nontender, nondistended.    Extremities: No edema.     Diagnostic Data:    Labs:       Recent Labs   Lab 04/09/24  1619 04/10/24  0703   WBC 13.3* 10.4   HGB 14.5 13.5   MCV 76.7* 75.0*   .0 334.0             Recent Labs   Lab  04/09/24  1618   *   BUN 17   CREATSERUM 1.18*   CA 9.6   ALB 3.6      K 3.8      CO2 25.0   ALKPHO 99   AST 8*   ALT 41   BILT 0.3   TP 7.7         Medications:   Scheduled Medications    methylPREDNISolone  60 mg Intravenous Q8H    cefTRIAXone  1 g Intravenous Q24H    azithromycin  500 mg Intravenous Q24H    guaiFENesin ER  600 mg Oral BID    enoxaparin  40 mg Subcutaneous Daily    insulin aspart  1-5 Units Subcutaneous TID AC and HS    ipratropium-albuterol  3 mL Nebulization 6 times per day    ipratropium-albuterol                        Assessment & Plan:  #pneumonia; empiric abx; pulm to see  #acute Asthma exacerbation; iv steroids; nebs  #hx possible Churgg miranda??   #History of VTE on xarelto; resume xarelto today  #CAD sp PCI; on plavix, resumed  #Essential hypertension  #Dyslipidemia  #Diabetes mellitus, 2-monitor on insulin; will adjust today based on usual home doses and also exacerbation from steroids consideration.  #GERD  #Anxiety  #Depression  #fibromyalgia, polyarthralgias     BMP/mag/phos pending; will further adjust insulin today as well  Plan of care discussed with patient and RN

## 2024-04-11 LAB
ALBUMIN SERPL-MCNC: 2.9 G/DL (ref 3.4–5)
ALBUMIN/GLOB SERPL: 0.9 {RATIO} (ref 1–2)
ALP LIVER SERPL-CCNC: 71 U/L
ALT SERPL-CCNC: 28 U/L
ANION GAP SERPL CALC-SCNC: 6 MMOL/L (ref 0–18)
AST SERPL-CCNC: 10 U/L (ref 15–37)
BASOPHILS # BLD AUTO: 0.04 X10(3) UL (ref 0–0.2)
BASOPHILS NFR BLD AUTO: 0.4 %
BILIRUB SERPL-MCNC: 0.4 MG/DL (ref 0.1–2)
BUN BLD-MCNC: 17 MG/DL (ref 9–23)
CALCIUM BLD-MCNC: 8.8 MG/DL (ref 8.5–10.1)
CHLORIDE SERPL-SCNC: 109 MMOL/L (ref 98–112)
CO2 SERPL-SCNC: 24 MMOL/L (ref 21–32)
CREAT BLD-MCNC: 0.68 MG/DL
EGFRCR SERPLBLD CKD-EPI 2021: 102 ML/MIN/1.73M2 (ref 60–?)
EOSINOPHIL # BLD AUTO: 0 X10(3) UL (ref 0–0.7)
EOSINOPHIL NFR BLD AUTO: 0 %
ERYTHROCYTE [DISTWIDTH] IN BLOOD BY AUTOMATED COUNT: 18.4 %
GLOBULIN PLAS-MCNC: 3.3 G/DL (ref 2.8–4.4)
GLUCOSE BLD-MCNC: 260 MG/DL (ref 70–99)
GLUCOSE BLD-MCNC: 280 MG/DL (ref 70–99)
GLUCOSE BLD-MCNC: 291 MG/DL (ref 70–99)
GLUCOSE BLD-MCNC: 342 MG/DL (ref 70–99)
GLUCOSE BLD-MCNC: 346 MG/DL (ref 70–99)
HCT VFR BLD AUTO: 40.1 %
HGB BLD-MCNC: 12.9 G/DL
IMM GRANULOCYTES # BLD AUTO: 0.28 X10(3) UL (ref 0–1)
IMM GRANULOCYTES NFR BLD: 2.9 %
LYMPHOCYTES # BLD AUTO: 1.49 X10(3) UL (ref 1–4)
LYMPHOCYTES NFR BLD AUTO: 15.4 %
MCH RBC QN AUTO: 24 PG (ref 26–34)
MCHC RBC AUTO-ENTMCNC: 32.2 G/DL (ref 31–37)
MCV RBC AUTO: 74.5 FL
MONOCYTES # BLD AUTO: 0.27 X10(3) UL (ref 0.1–1)
MONOCYTES NFR BLD AUTO: 2.8 %
NEUTROPHILS # BLD AUTO: 7.58 X10 (3) UL (ref 1.5–7.7)
NEUTROPHILS # BLD AUTO: 7.58 X10(3) UL (ref 1.5–7.7)
NEUTROPHILS NFR BLD AUTO: 78.5 %
OSMOLALITY SERPL CALC.SUM OF ELEC: 303 MOSM/KG (ref 275–295)
PLATELET # BLD AUTO: 303 10(3)UL (ref 150–450)
POTASSIUM SERPL-SCNC: 4.1 MMOL/L (ref 3.5–5.1)
PROT SERPL-MCNC: 6.2 G/DL (ref 6.4–8.2)
RBC # BLD AUTO: 5.38 X10(6)UL
SODIUM SERPL-SCNC: 139 MMOL/L (ref 136–145)
WBC # BLD AUTO: 9.7 X10(3) UL (ref 4–11)

## 2024-04-11 PROCEDURE — 99232 SBSQ HOSP IP/OBS MODERATE 35: CPT | Performed by: HOSPITALIST

## 2024-04-11 PROCEDURE — 99232 SBSQ HOSP IP/OBS MODERATE 35: CPT | Performed by: INTERNAL MEDICINE

## 2024-04-11 RX ORDER — METHYLPREDNISOLONE SODIUM SUCCINATE 40 MG/ML
40 INJECTION, POWDER, LYOPHILIZED, FOR SOLUTION INTRAMUSCULAR; INTRAVENOUS EVERY 12 HOURS
Status: DISCONTINUED | OUTPATIENT
Start: 2024-04-12 | End: 2024-04-12

## 2024-04-11 RX ORDER — AZITHROMYCIN 250 MG/1
500 TABLET, FILM COATED ORAL ONCE
Status: COMPLETED | OUTPATIENT
Start: 2024-04-11 | End: 2024-04-11

## 2024-04-11 NOTE — PROGRESS NOTES
Problem: PNA    Data:   Pt is A&Ox4.  received on 4L .CPAP at NOC.weaned to 2L this AM. on tele. NSR. Xarelto. Voids. Tolerating diet. Up SBA.     Intervention: IV abx's, nebs, Iv steroids     Edu: Pt updated on POC. VSS. Call light within reach.  Saline locked.

## 2024-04-11 NOTE — PROGRESS NOTES
[facility]  Progress Note    Danielle Jerome Patient Status:  Inpatient    3/25/1965 MRN [id number]   Location [facility] 5NW-A Attending Bran Ashley MD   Hosp Day # 2 PCP Luca Rai,      Subjective:  Danielle Jerome is a(n) 59 year old female feels better today remains with significant wheezing cough is improved  Tolerated CPAP well    Objective:  /60 (BP Location: Right arm)   Pulse 82   Temp 98.9 °F (37.2 °C) (Oral)   Resp 18   Ht 5' 8\" (1.727 m)   Wt 259 lb (117.5 kg)   LMP 2004   SpO2 94%   BMI 39.38 kg/m²    transition to room air      Temp (24hrs), Av.3 °F (36.8 °C), Min:98.1 °F (36.7 °C), Max:98.9 °F (37.2 °C)      Intake/Output:  No intake or output data in the 24 hours ending 24 1241    Physical Exam:   General: alert, cooperative, oriented.  No respiratory distress.   Head: Normocephalic, without obvious abnormality, atraumatic.   Throat: Lips, mucosa, and tongue normal.  No thrush noted.   Neck: trachea midline, no adenopathy, no thyromegaly. No JVD.   Lungs: Much better air entry bilateral wheezes persist regular rate and rhythm obese no diarrhea   Chest wall: No tenderness or deformity.   Heart: Regular rate and rhythm   Abdomen: soft, non-distended, no masses, no guarding, no     Rebound.   Extremity: No edema nontender   Skin: No rashes or lesions.   Neurological: Alert, interactive, no focal deficits    Lab Data Review:  Recent Labs     24  1619 04/10/24  0703 24  0627   WBC 13.3* 10.4 9.7   HGB 14.5 13.5 12.9   .0 334.0 303.0     Recent Labs     24  1618 04/10/24  0703 24  0627    140 139   K 3.8 4.3 4.1    109 109   CO2 25.0 23.0 24.0   BUN 17 16 17   CREATSERUM 1.18* 0.97 0.68     No results for input(s): \"PTP\", \"INR\", \"PTT\" in the last 168 hours.    Cultures: Blood cultures remain negative    Radiology:  No results found.  Recheck      Medications reviewed     Assessment and Plan:   Patient  Active Problem List   Diagnosis    Allergic rhinitis    Esophageal reflux    Other chronic sinusitis    Moderate episode of recurrent major depressive disorder (HCC)    Anxiety    Class 3 severe obesity due to excess calories with serious comorbidity and body mass index (BMI) of 40.0 to 44.9 in adult (HCC)    History of recurrent deep vein thrombosis (DVT)    History of pulmonary embolus (PE)    Diabetes mellitus (HCC)    Hyperglycemia    Exacerbation of asthma (HCC)    Cardiac angina (HCC)    CAD (coronary artery disease), native coronary artery    NSTEMI (non-ST elevated myocardial infarction) (HCC)    Gastroparesis    Moderate persistent asthma without complication (HCC)    Migraines    Laguerre esophagus    HFrEF (heart failure with reduced ejection fraction) (HCC)    Irreducible ventral incisional hernia    B12 deficiency    Cardiomyopathy, ischemic    Colloid cyst of brain (HCC)    Essential hypertension    Family history of early CAD    Hiatal hernia    History of steroid therapy    Hyperlipidemia, mixed    Presence of drug coated stent in LAD coronary artery    Steroid-induced diabetes (HCC)    NAFLD (nonalcoholic fatty liver disease)    Hepatomegaly    Postmenopausal bleeding    Churg-Shania syndrome (HCC)    Varicose veins of both lower extremities with pain    Venous insufficiency    Vitamin D deficiency    Type 2 diabetes mellitus without complication (HCC)    Statin intolerance    Edema of extremities    Community acquired pneumonia of left lower lobe of lung    Moderate persistent asthma with exacerbation (HCC)    Sepsis due to pneumonia (HCC)       Assessment:  Left lower lobe infiltrate suspected pneumonia possible viral without response to doxycycline  Flare of asthma suspected in response to the above-in addition his been off her Nucala for 3 weeks  Hypoxic respiratory failure related to the above  History of severe asthma chronic due to carrier chronic sinus disease with polyps on multiple trials  of Biologics-trying to change back from Nucala to Dupixent  History of coronary disease status post MI and stents-last cath 6/23 all open --diastolic dysfunction  Diabetes ongoing issues-following on steroids  PEDRO-AHI 24 gonzalo 82%-CPAP titration has been recommended--tolerating auto PAP here thus far       Plan:  Continuing current level of steroids and inhaled bronchodilators as well as antibiotic coverage  Recheck chest x-ray in a.m.  Hopefully discharge 1 to 2 days pending medical response    CC     Ana Luisa Miller MD  4/11/2024  12:41 PM

## 2024-04-11 NOTE — PROGRESS NOTES
[facility]   part of [facility]     Hospitalist Progress Note     Danielle Jerome Patient Status:  Inpatient    3/25/1965 MRN [id number]   [facility] 5NW-A Attending Brian Reilly MD   Hosp Day # 2 PCP Luca Rai DO     Chief Complaint:   Chief Complaint   Patient presents with    Pneumonia       Subjective:     Feel much better today; feels more comfortable staying one more night she says    Objective:    Review of Systems:   6  point ROS completed and was negative, except for pertinent positive and negatives stated in subjective.    Vital signs:  Temp:  [98 °F (36.7 °C)-98.5 °F (36.9 °C)] 98.5 °F (36.9 °C)  Pulse:  [53-82] 60  Resp:  [16-20] 18  BP: (125-149)/(50-67) 142/61  SpO2:  [92 %-98 %] 95 %    Physical Exam:    General: No acute distress.   Respiratory: ongoing wheezing and rhonchi but markedly improved  Cardiovascular: S1, S2. Regular rate and rhythm. No murmurs.  Abdomen: Soft, nontender, nondistended.    Extremities: No edema.    Diagnostic Data:    Labs:  Recent Labs   Lab 24  1619 04/10/24  0703 24  0627   WBC 13.3* 10.4 9.7   HGB 14.5 13.5 12.9   MCV 76.7* 75.0* 74.5*   .0 334.0 303.0       Recent Labs   Lab 24  1618 04/10/24  0703 24  0627   * 321* 346*   BUN 17 16 17   CREATSERUM 1.18* 0.97 0.68   CA 9.6 8.9 8.8   ALB 3.6  --  2.9*    140 139   K 3.8 4.3 4.1    109 109   CO2 25.0 23.0 24.0   ALKPHO 99  --  71   AST 8*  --  10*   ALT 41  --  28   BILT 0.3  --  0.4   TP 7.7  --  6.2*       Estimated Creatinine Clearance: 92.1 mL/min (based on SCr of 0.68 mg/dL).    No results for input(s): \"PTP\", \"INR\" in the last 168 hours.         COVID-19 Lab Results    COVID-19  Lab Results   Component Value Date    COVID19 Not Detected 2024    COVID19 Not Detected 2024    COVID19 Not Detected 2023       Pro-Calcitonin  No results for input(s): \"PCT\" in the last 168 hours.    Cardiac  No results for input(s):  \"TROP\", \"PBNP\" in the last 168 hours.    Creatinine Kinase  No results for input(s): \"CK\" in the last 168 hours.    Inflammatory Markers  Recent Labs   Lab 04/09/24  1618   DDIMER 0.41       Recent Labs   Lab 04/09/24  1618   TROPHS 7       Imaging: Imaging data reviewed in Epic.    Medications:    azithromycin  500 mg Oral Once    cefTRIAXone  2 g Intravenous Daily    amLODIPine  2.5 mg Oral Daily    fluticasone furoate-vilanterol  1 puff Inhalation Daily    cetirizine  10 mg Oral Nightly    clopidogrel  75 mg Oral Nightly    escitalopram  20 mg Oral QAM    ezetimibe  10 mg Oral Nightly    gabapentin  100 mg Oral QAM    losartan  100 mg Oral Daily    metoprolol succinate ER  25 mg Oral Nightly    montelukast  10 mg Oral Nightly    pantoprazole  40 mg Oral Nightly    rivaroxaban  20 mg Oral Nightly    umeclidinium bromide  1 puff Inhalation Daily    insulin aspart  1-68 Units Subcutaneous TID CC    insulin aspart  4-20 Units Subcutaneous TID AC and HS    gabapentin  200 mg Oral Nightly    insulin degludec  50 Units Subcutaneous Daily    methylPREDNISolone  60 mg Intravenous Q8H    guaiFENesin ER  600 mg Oral BID    ipratropium-albuterol  3 mL Nebulization 6 times per day       Assessment & Plan:      #pneumonia; empiric abx; pulm to see  #acute Asthma exacerbation; iv steroids; nebs  #hx possible Churgg miranda??   #History of VTE on xarelto; resume xarelto today  #CAD sp PCI; on plavix, resumed  #Essential hypertension  #Dyslipidemia  #Diabetes mellitus, 2-monitor on insulin; will adjust today based on usual home doses and also exacerbation from steroids consideration.  #GERD  #Anxiety  #Depression  #fibromyalgia, polyarthralgias    Hopefully home tomorrow    Plan of care discussed with patient and RN    Bran Hannah MD    Supplementary Documentation:     Quality:  DVT Prophylaxis:  xarelto   CODE status: see chart  [place]: no  Central line: no      Estimated date of discharge: tbd  At this point Ms. Jerome is  expected to be discharge to: home

## 2024-04-11 NOTE — PLAN OF CARE
4/11 AM: Pt is A/O x 4. Lung sounds are diminished, on room air, CPAP at night 4L. BP and HR are WNL, NSR on tele. BM today, voids in bathroom. Up SBA. QID accucheck. Patient has insulin pump that is not in use. IV Abx and IV steroids     Problem: Diabetes/Glucose Control  Goal: Glucose maintained within prescribed range  Description: INTERVENTIONS:  - Monitor Blood Glucose as ordered  - Assess for signs and symptoms of hyperglycemia and hypoglycemia  - Administer ordered medications to maintain glucose within target range  - Assess barriers to adequate nutritional intake and initiate nutrition consult as needed  - Instruct patient on self management of diabetes  Outcome: Progressing

## 2024-04-11 NOTE — DIETARY NOTE
[facility]   part of [facility]   CLINICAL NUTRITION    Danielle Jerome admitted on 4/9 presents with CAP.    PMH: Anemia, Anxiety, Asthma (HCC), Atherosclerosis of coronary artery, Laguerre's esophagus, Churg-Shania syndrome (HCC) (2022), Colloid cyst of brain (HCC), Congestive heart disease (HCC), Coronary artery disease, Depression, Diabetes mellitus (HCC), Diverticulosis of large intestine, Gastroparesis, GERD, High cholesterol, Hyperlipidemia, Hypertension, IBS, LGSIL (low grade squamous intraepithelial dysplasia) (10/2010), Migraines, Moderate persistent asthma without complication ([facility]) (03/20/2018), DESOUZA (nonalcoholic steatohepatitis), NSTEMI (non-ST elevated myocardial infarction) ([facility]) (2017), Obesity, Osteoarthritis, Patella-femoral syndrome, bilateral knee (01/15/2015), Pneumonia due to organism, Pulmonary embolism ([facility]), Sleep apnea, and Thyroid disease.    Admitting diagnosis:  Moderate persistent asthma with exacerbation ([facility]) [J45.41]  Sepsis due to pneumonia ([facility]) [J18.9, A41.9]  Community acquired pneumonia of left lower lobe of lung [J18.9]    Ht: 172.7 cm (5' 8\")  Wt: 117.5 kg (259 lb).   Body mass index is 39.38 kg/m².    Wt Readings from Last 6 Encounters:   04/11/24 117.5 kg (259 lb)   04/09/24 120.2 kg (265 lb)   04/05/24 116.1 kg (256 lb)   03/22/24 120.7 kg (266 lb)   01/02/24 119.3 kg (263 lb)   10/27/23 122.9 kg (271 lb)        Labs/Meds reviewed    Diet:       Procedures    Regular/General diet Calorie Restriction/Carb Controlled: 1800 kcal/60 grams; Is Patient on Accuchecks? No       Percent Meals Eaten (last 3 days)       None            Pt chart reviewed d/t elevated A1c 12.5%. Visited pt at bedside. Pt reports decreased appetite/PO PTA due to not feeling well but reports it is better today. Eating 2 meals/day. Denies GI symptoms at this time with last BM yesterday. No chewing or swallowing difficulties. Confirm allergy to peanut. No significant weight changes noted;  reports her UBW ~255-265 lbs.   Pt reports her A1c was better controlled a couple months ago but since she got COVID in Nov, she has been on/off steroids which she believes is reason for her BG elevation. Offered to provide CHO Counting education but pt declined at this time. All questions answered.    Patient is at low nutrition risk at this time.    Please consult if patient status changes or nutrition issues arise.    Makenna Acuna RD, LDN, [facility]  Clinical Dietitian  Spectra: [phone]

## 2024-04-12 ENCOUNTER — APPOINTMENT (OUTPATIENT)
Dept: GENERAL RADIOLOGY | Facility: HOSPITAL | Age: 59
End: 2024-04-12
Attending: INTERNAL MEDICINE
Payer: COMMERCIAL

## 2024-04-12 VITALS
WEIGHT: 258 LBS | SYSTOLIC BLOOD PRESSURE: 158 MMHG | BODY MASS INDEX: 39.1 KG/M2 | DIASTOLIC BLOOD PRESSURE: 65 MMHG | RESPIRATION RATE: 18 BRPM | HEART RATE: 60 BPM | HEIGHT: 68 IN | TEMPERATURE: 99 F | OXYGEN SATURATION: 94 %

## 2024-04-12 LAB
ALBUMIN SERPL-MCNC: 2.9 G/DL (ref 3.4–5)
ALBUMIN/GLOB SERPL: 0.9 {RATIO} (ref 1–2)
ALP LIVER SERPL-CCNC: 65 U/L
ALT SERPL-CCNC: 32 U/L
ANION GAP SERPL CALC-SCNC: 8 MMOL/L (ref 0–18)
AST SERPL-CCNC: 20 U/L (ref 15–37)
BILIRUB SERPL-MCNC: 0.5 MG/DL (ref 0.1–2)
BUN BLD-MCNC: 24 MG/DL (ref 9–23)
CALCIUM BLD-MCNC: 8.9 MG/DL (ref 8.5–10.1)
CHLORIDE SERPL-SCNC: 108 MMOL/L (ref 98–112)
CO2 SERPL-SCNC: 20 MMOL/L (ref 21–32)
CREAT BLD-MCNC: 0.88 MG/DL
EGFRCR SERPLBLD CKD-EPI 2021: 77 ML/MIN/1.73M2 (ref 60–?)
GLOBULIN PLAS-MCNC: 3.1 G/DL (ref 2.8–4.4)
GLUCOSE BLD-MCNC: 256 MG/DL (ref 70–99)
GLUCOSE BLD-MCNC: 319 MG/DL (ref 70–99)
GLUCOSE BLD-MCNC: 360 MG/DL (ref 70–99)
OSMOLALITY SERPL CALC.SUM OF ELEC: 301 MOSM/KG (ref 275–295)
POTASSIUM SERPL-SCNC: 4.8 MMOL/L (ref 3.5–5.1)
PROT SERPL-MCNC: 6 G/DL (ref 6.4–8.2)
SODIUM SERPL-SCNC: 136 MMOL/L (ref 136–145)

## 2024-04-12 PROCEDURE — 99239 HOSP IP/OBS DSCHRG MGMT >30: CPT | Performed by: HOSPITALIST

## 2024-04-12 PROCEDURE — 71045 X-RAY EXAM CHEST 1 VIEW: CPT | Performed by: INTERNAL MEDICINE

## 2024-04-12 PROCEDURE — 99233 SBSQ HOSP IP/OBS HIGH 50: CPT | Performed by: INTERNAL MEDICINE

## 2024-04-12 RX ORDER — CEFDINIR 300 MG/1
300 CAPSULE ORAL 2 TIMES DAILY
Qty: 10 CAPSULE | Refills: 0 | Status: SHIPPED | OUTPATIENT
Start: 2024-04-12 | End: 2024-04-17

## 2024-04-12 RX ORDER — TIOTROPIUM BROMIDE INHALATION SPRAY 3.12 UG/1
2 SPRAY, METERED RESPIRATORY (INHALATION) 2 TIMES DAILY
Status: SHIPPED | COMMUNITY
Start: 2024-04-12

## 2024-04-12 RX ORDER — PREDNISONE 10 MG/1
TABLET ORAL
Qty: 30 TABLET | Refills: 0 | Status: SHIPPED | OUTPATIENT
Start: 2024-04-12

## 2024-04-12 RX ORDER — GABAPENTIN 100 MG/1
200 CAPSULE ORAL NIGHTLY
COMMUNITY

## 2024-04-12 NOTE — PROGRESS NOTES
Patient seen and examined.  Discharge if ok with consultants.  Hospitalist portion of med rec completed.    Bran Ashley MD  [facility]  Internal Medicine Hospitalist

## 2024-04-12 NOTE — PROGRESS NOTES
[facility]  Pulmonary/Critical Care/Sleep Medicine Progress note    Danielle Jerome Patient Status:  Inpatient    3/25/1965 MRN [id number]   Location [facility] 5NW-A Attending Bran Ashley MD   Hosp Day # 3 PCP Luca Rai DO     Chief Complaint   Patient presents with    Pneumonia        History of Present Illness:  Breathing much better.  Wants to go home.  Reports minimal cough and shortness of breath.    States that she has been diagnosed with obstructive sleep apnea syndrome over 6 months ago but did not use CPAP machine because she was stubborn.  She uses CPAP machine in the hospital and she feels good to use it when she goes home.  Dr. Miller will arrange the machine for her.      History:  Past Medical History:    Allergic rhinitis    Allergic rhinitis, cause unspecified    Anemia    Anxiety    Anxiety and depression    Asthma (HCC)    Atherosclerosis of coronary artery    Laguerre's esophagus    Churg-Shania syndrome (HCC)    Colloid cyst of brain (HCC)    Congestive heart disease (HCC)    Coronary artery disease of native heart with stable angina pectoris (HCC)    Depression    Diabetes (HCC)    Diabetes mellitus (HCC)    Diverticulosis of large intestine    Essential hypertension    Gastroparesis    GERD    Hematemesis with nausea    , No EGD, Hgb stable    High cholesterol    History of blood clots    History of pulmonary embolus (PE)    History of recurrent deep vein thrombosis (DVT)    Hyperlipidemia    Hypertension    IBS    LGSIL (low grade squamous intraepithelial dysplasia)    Pap neg     Migraines    perfumes    Moderate persistent asthma without complication (HCC)    DESOUZA (nonalcoholic steatohepatitis)    NSTEMI (non-ST elevated myocardial infarction) (HCC)    FELTON in LAD    Obesity    Osteoarthritis    Patella-femoral syndrome, bilateral knee    Pneumonia due to organism    Pulmonary embolism (HCC)    Sleep apnea    Thyroid disease     Past Surgical History:    Procedure Laterality Date    Angioplasty (coronary)      Breast surgery procedure unlisted      cyst removed    Cath drug eluting stent      Cholecystectomy  04/10/2011    PERFORMED BY DR KRAFT-LAPAROSCOPIC    Colonoscopy  2012    polyps    Colonoscopy N/A 2016    Procedure: COLONOSCOPY;  Surgeon: Kishor Stout MD;  Location:  ENDOSCOPY    Colposcopy, cervix w/upper adjacent vagina; w/endocervical curettage  2011    WNL    Cyst aspiration left  2000    Hernia surgery            Other surgical history      sinus surgery    Other surgical history      CYST REMOVED ON LEFT WRIST 17 YRS AGO    Sinus surgery        Skin surgery      Upper gi endoscopy,exam       Family History   Problem Relation Age of Onset    Heart Disorder Father     Hypertension Mother     Lipids Mother     Stroke Mother     Anemia Mother     Dementia Mother     Heart Disorder Mother     Other (Other) Mother     Bleeding Disorders Mother     Hypertension Sister     Obesity Sister     Stroke Sister     Other (Other) Sister         stroke,pe    Other (Other) Brother         kidney stones    Colon Cancer Maternal Grandfather     Colon Cancer Maternal Aunt     Anemia Daughter     Asthma Daughter     Depression Daughter     Asthma Daughter     Bleeding Disorders Sister     Stroke Sister       reports that she has never smoked. She has been exposed to tobacco smoke. She has never used smokeless tobacco. She reports that she does not drink alcohol and does not use drugs.    Allergies:  Allergies   Allergen Reactions    Peanut-Containing Drug Products HIVES and SHORTNESS OF BREATH     Hives, asthma attack    Pcn [Penicillins] UNKNOWN     HAPPENED AS A CHILD       Medications:    Current Facility-Administered Medications:     cefTRIAXone (Rocephin) 2 g in D5W 100 mL IVPB-ADD, 2 g, Intravenous, Daily    methylPREDNISolone sodium succinate (Solu-MEDROL) injection 40 mg, 40 mg, Intravenous, Q12H    albuterol (Ventolin HFA) 108 (90 Base)  MCG/ACT inhaler 2 puff, 2 puff, Inhalation, Q6H PRN    amLODIPine (Norvasc) tab 2.5 mg, 2.5 mg, Oral, Daily    fluticasone furoate-vilanterol (Breo Ellipta) 200-25 MCG/ACT inhaler 1 puff, 1 puff, Inhalation, Daily    cetirizine (ZyrTEC) tab 10 mg, 10 mg, Oral, Nightly    clopidogrel (Plavix) tab 75 mg, 75 mg, Oral, Nightly    escitalopram (Lexapro) tab 20 mg, 20 mg, Oral, QAM    ezetimibe (Zetia) tab 10 mg, 10 mg, Oral, Nightly    gabapentin (Neurontin) cap 100 mg, 100 mg, Oral, QAM    losartan (Cozaar) tab 100 mg, 100 mg, Oral, Daily    metoprolol succinate ER (Toprol XL) 24 hr tab 25 mg, 25 mg, Oral, Nightly    montelukast (Singulair) tab 10 mg, 10 mg, Oral, Nightly    pantoprazole (Protonix) DR tab 40 mg, 40 mg, Oral, Nightly    rivaroxaban (Xarelto) tab 20 mg, 20 mg, Oral, Nightly    umeclidinium bromide (Incruse Ellipta) 62.5 MCG/ACT inhaler 1 puff, 1 puff, Inhalation, Daily    insulin aspart (NovoLOG) 100 Units/mL FlexPen 1-68 Units, 1-68 Units, Subcutaneous, TID CC    insulin aspart (NovoLOG) 100 Units/mL FlexPen 4-20 Units, 4-20 Units, Subcutaneous, TID AC and HS    gabapentin (Neurontin) cap 200 mg, 200 mg, Oral, Nightly    insulin degludec 100 units/mL flextouch 50 Units, 50 Units, Subcutaneous, Daily    guaiFENesin ER (Mucinex) 12 hr tab 600 mg, 600 mg, Oral, BID    HYDROcodone-homatropine (Hydromet) 5-1.5 MG/5ML oral syrup 5 mL, 5 mL, Oral, Q4H PRN    glucose (Dex4) 15 GM/59ML oral liquid 15 g, 15 g, Oral, Q15 Min PRN **OR** glucose (Glutose) 40% oral gel 15 g, 15 g, Oral, Q15 Min PRN **OR** glucose-vitamin C (Dex-4) chewable tab 4 tablet, 4 tablet, Oral, Q15 Min PRN **OR** dextrose 50% injection 50 mL, 50 mL, Intravenous, Q15 Min PRN **OR** glucose (Dex4) 15 GM/59ML oral liquid 30 g, 30 g, Oral, Q15 Min PRN **OR** glucose (Glutose) 40% oral gel 30 g, 30 g, Oral, Q15 Min PRN **OR** glucose-vitamin C (Dex-4) chewable tab 8 tablet, 8 tablet, Oral, Q15 Min PRN    acetaminophen (Tylenol Extra Strength) tab  500 mg, 500 mg, Oral, Q4H PRN    melatonin tab 3 mg, 3 mg, Oral, Nightly PRN    polyethylene glycol (PEG 3350) (Miralax) 17 g oral packet 17 g, 17 g, Oral, Daily PRN    sennosides (Senokot) tab 17.2 mg, 17.2 mg, Oral, Nightly PRN    bisacodyl (Dulcolax) 10 MG rectal suppository 10 mg, 10 mg, Rectal, Daily PRN    ondansetron (Zofran) 4 MG/2ML injection 8 mg, 8 mg, Intravenous, Q6H PRN    benzonatate (Tessalon) cap 200 mg, 200 mg, Oral, TID PRN    glycerin-hypromellose- (Artificial Tears) 0.2-0.2-1 % ophthalmic solution 1 drop, 1 drop, Both Eyes, QID PRN    sodium chloride (Saline Mist) 0.65 % nasal solution 1 spray, 1 spray, Each Nare, Q3H PRN    ipratropium-albuterol (Duoneb) 0.5-2.5 (3) MG/3ML inhalation solution 3 mL, 3 mL, Nebulization, 6 times per day    ALPRAZolam (Xanax) tab 0.5 mg, 0.5 mg, Oral, QID PRN    Intake/Output:  No intake or output data in the 24 hours ending 04/12/24 1139       Review of Systems    Review of Systems: A comprehensive 10 point review of systems was completed.  Pertinent positives and negatives noted in the the HPI.         Patient Vitals for the past 24 hrs:   BP Temp Temp src Pulse Resp SpO2 Weight   04/12/24 0500 158/65 99.4 °F (37.4 °C) Oral 60 18 94 % 258 lb (117 kg)   04/12/24 0347 -- -- -- -- -- 95 % --   04/11/24 2340 -- -- -- 52 -- 94 % --   04/11/24 1939 134/55 98.6 °F (37 °C) Oral 70 16 92 % --   04/11/24 1704 144/55 98.7 °F (37.1 °C) Oral 70 16 95 % --   04/11/24 1205 141/60 98.9 °F (37.2 °C) Oral 82 18 94 % --     Vitals:    04/11/24 1939 04/11/24 2340 04/12/24 0347 04/12/24 0500   BP: 134/55   158/65   BP Location: Right arm   Right arm   Pulse: 70 52  60   Resp: 16   18   Temp: 98.6 °F (37 °C)   99.4 °F (37.4 °C)   TempSrc: Oral   Oral   SpO2: 92% 94% 95% 94%   Weight:    258 lb (117 kg)   Height:          Body mass index is 39.23 kg/m².     Physical Exam  Constitutional:       General: She is not in acute distress.     Appearance: Normal appearance. She is  obese. She is not ill-appearing or diaphoretic.   HENT:      Head: Normocephalic and atraumatic.      Nose: Nose normal. No congestion or rhinorrhea.      Mouth/Throat:      Mouth: Mucous membranes are moist.      Pharynx: Oropharynx is clear. No oropharyngeal exudate or posterior oropharyngeal erythema.      Comments: Mallampati class IV palate.  Eyes:      Extraocular Movements: Extraocular movements intact.      Pupils: Pupils are equal, round, and reactive to light.   Cardiovascular:      Rate and Rhythm: Normal rate.      Pulses: Normal pulses.      Heart sounds: Normal heart sounds. No murmur heard.  Pulmonary:      Effort: Pulmonary effort is normal. No respiratory distress.      Breath sounds: Normal breath sounds. No wheezing or rhonchi.      Comments: Diminished breath sounds in bilateral lower lung zones otherwise clear  Chest:      Chest wall: No tenderness.   Abdominal:      General: Abdomen is flat. Bowel sounds are normal.      Palpations: Abdomen is soft.   Musculoskeletal:         General: Normal range of motion.   Skin:     General: Skin is warm.   Neurological:      General: No focal deficit present.      Mental Status: She is alert and oriented to person, place, and time.   Psychiatric:         Mood and Affect: Mood normal.         Behavior: Behavior normal.         Thought Content: Thought content normal.         Judgment: Judgment normal.             Lab Data Review:    Recent Labs   Lab 04/09/24  1619 04/10/24  0703 04/11/24  0627   WBC 13.3* 10.4 9.7   HGB 14.5 13.5 12.9   HCT 46.1 42.7 40.1   .0 334.0 303.0       Recent Labs   Lab 04/09/24  1618 04/10/24  0703 04/11/24  0627 04/12/24  0605    140 139 136   K 3.8 4.3 4.1 4.8    109 109 108   CO2 25.0 23.0 24.0 20.0*   BUN 17 16 17 24*   CREATSERUM 1.18* 0.97 0.68 0.88   CA 9.6 8.9 8.8 8.9   ALB 3.6  --  2.9* 2.9*   ALKPHO 99  --  71 65   ALT 41  --  28 32   AST 8*  --  10* 20   * 321* 346* 360*       Recent Labs    Lab 04/10/24  0703   MG 2.5       Lab Results   Component Value Date    PHOS 3.8 04/10/2024        No results for input(s): \"PT\", \"INR\", \"PTT\" in the last 168 hours.    No results for input(s): \"ABGPHT\", \"UAGQGW2X\", \"HLNRJ0O\", \"ABGHCO3\", \"ABGBE\", \"TEMP\", \"FEDERICO\", \"SITE\", \"DEV\", \"THGB\" in the last 168 hours.    No results for input(s): \"TROP\", \"CKMB\" in the last 168 hours.      Cultures:   Hospital Encounter on 04/09/24   1. Blood Culture     Status: None (Preliminary result)    Collection Time: 04/09/24  4:17 PM    Specimen: Blood,peripheral   Result Value Ref Range    Blood Culture Result No Growth 2 Days N/A            Radiology personally reviewed:  XR CHEST AP PORTABLE  (CPT=71045)    Result Date: 4/12/2024  CONCLUSION:  1. Stable cardiomegaly with mild interstitial opacities which may represent mild edema. 2. Hyperexpansion of the lungs. 3. Stable left basilar opacity representing atelectasis versus infiltrates.    LOCATION:  [facility]      Dictated by (CST): Rowena Rossi MD on 4/12/2024 at 7:39 AM     Finalized by (CST): Rowena Rossi MD on 4/12/2024 at 7:39 AM         Patient Active Problem List   Diagnosis    Allergic rhinitis    Esophageal reflux    Other chronic sinusitis    Moderate episode of recurrent major depressive disorder (HCC)    Anxiety    Class 3 severe obesity due to excess calories with serious comorbidity and body mass index (BMI) of 40.0 to 44.9 in adult ([facility])    History of recurrent deep vein thrombosis (DVT)    History of pulmonary embolus (PE)    Diabetes mellitus ([facility])    Hyperglycemia    Exacerbation of asthma ([facility])    Cardiac angina ([facility])    CAD (coronary artery disease), native coronary artery    NSTEMI (non-ST elevated myocardial infarction) ([facility])    Gastroparesis    Moderate persistent asthma without complication ([facility])    Migraines    Laguerre esophagus    HFrEF (heart failure with reduced ejection fraction) ([facility])    Irreducible ventral incisional hernia    B12 deficiency     Cardiomyopathy, ischemic    Colloid cyst of brain (HCC)    Essential hypertension    Family history of early CAD    Hiatal hernia    History of steroid therapy    Hyperlipidemia, mixed    Presence of drug coated stent in LAD coronary artery    Steroid-induced diabetes (HCC)    NAFLD (nonalcoholic fatty liver disease)    Hepatomegaly    Postmenopausal bleeding    Churg-Shania syndrome (HCC)    Varicose veins of both lower extremities with pain    Venous insufficiency    Vitamin D deficiency    Type 2 diabetes mellitus without complication (HCC)    Statin intolerance    Edema of extremities    Community acquired pneumonia of left lower lobe of lung    Moderate persistent asthma with exacerbation (HCC)    Sepsis due to pneumonia (HCC)     Assessment:  Left lower lobe lung infiltrate/pneumonia stable chest x-ray today compared to 4/9/2024  Bronchial asthma with acute exacerbation: Clinically improving  History of severe asthma: Requiring [employer]  Obstructive sleep apnea syndrome:: Tolerated BiPAP machine in the hospital and now willing to wear the CPAP machine for recent diagnosis of obstructive sleep apnea syndrome 6 months ago.  History of Churg-Shania syndrome  History of pulmonary embolism  Varicose veins in lower extremities  History of chronic sinus disease with polyps:   Coronary artery disease  GERD  Type 2 diabetes mellitus with hyperglycemia due to steroids  Fibromyalgia  Allergic rhinitis  Anemia  Anxiety disorder  Depression  DESOUZA  Hypothyroidism  Hyperlipidemia      Plan:  Discontinue Solu-Medrol  Prednisone taper  Discontinue Rocephin and start cefdinir 300 mg oral twice daily for 5 days  Continue montelukast 10 mg oral daily  Dr. Miller to arrange home CPAP as outpatient  Strongly advised patient to wear CPAP machine for treatment of obstructive sleep apnea syndrome understanding complications from sleep apnea.  She is in agreement.  May discharge home from pulmonary standpoint    Thank You for  allowing me to participate in this patient's care     Alphonse Ritter MD

## 2024-04-12 NOTE — PLAN OF CARE
Problem: Diabetes/Glucose Control  Goal: Glucose maintained within prescribed range  Description: INTERVENTIONS:  - Monitor Blood Glucose as ordered  - Assess for signs and symptoms of hyperglycemia and hypoglycemia  - Administer ordered medications to maintain glucose within target range  - Assess barriers to adequate nutritional intake and initiate nutrition consult as needed  - Instruct patient on self management of diabetes  Outcome: Completed     Problem: Patient/Family Goals  Goal: Patient/Family Long Term Goal  Description: Patient's Long Term Goal: To be dsicahrged    Interventions:  - Follow MD orders  - See additional Care Plan goals for specific interventions  Outcome: Completed  Goal: Patient/Family Short Term Goal  Description: Patient's Short Term Goal:   04/09NOC: get rest and breathe better  4/11 AM: O2 walk  4/1 noc: get some rest    Interventions:   - steroids  -nebs  -ABx's  - See additional Care Plan goals for specific interventions  Outcome: Completed

## 2024-04-12 NOTE — PROGRESS NOTES
NURSING DISCHARGE NOTE    Discharged Home via Wheelchair.  Accompanied by Support staff  Belongings Taken by patient/family.    Pt's IV access removed. Tele and pulse ox returned. All paperwork and prescriptions are given to pt. All questions and concerns were answered. Discharge navigator completed.

## 2024-04-15 ENCOUNTER — PATIENT OUTREACH (OUTPATIENT)
Dept: CASE MANAGEMENT | Age: 59
End: 2024-04-15

## 2024-04-15 NOTE — PROGRESS NOTES
Attempted to reach the patient to complete a [facility] FU call. Left a message for the pt to call the NCM back at, [phone].The number for the TCC was also included for the patient to schedule at, [phone].

## 2024-04-18 ENCOUNTER — TELEPHONE (OUTPATIENT)
Facility: CLINIC | Age: 59
End: 2024-04-18

## 2024-04-18 NOTE — TELEPHONE ENCOUNTER
Briefly reviewed her reports- hyperglycemic continues, want to talk to her about U200 insulin in OP5, reschedule for sooner appointment then can close encounter  
Patient rescheduled to May 9th with APN.  Closing Encounter.   
Received a letter from [employer] regarding the patient taking prednisone given her history of diabetes.  Last seen by Dr. Hopper in February 2023.    Please touch base re: current steroid dose and BG control- per Dr Hopper last note, - pair NPH 10U for every 10mg of prednisone; her doses are usually 10-30mg.   
Was taking 30 mg prednisone, but last dose was yesterday. Patient BG is trending down now.  Stopped NPH since prednisone is done.     Patient is currently using omnipod with Dexcom G6.  Report given to provider for review    FOV on 5/30 with TENZIN   
aching

## 2024-04-19 DIAGNOSIS — F41.9 ANXIETY: ICD-10-CM

## 2024-04-19 DIAGNOSIS — F33.1 MODERATE EPISODE OF RECURRENT MAJOR DEPRESSIVE DISORDER (HCC): ICD-10-CM

## 2024-04-19 RX ORDER — ALPRAZOLAM 0.5 MG/1
TABLET ORAL DAILY PRN
Qty: 42 TABLET | Refills: 0 | Status: SHIPPED | OUTPATIENT
Start: 2024-04-19

## 2024-04-23 DIAGNOSIS — F41.9 ANXIETY: ICD-10-CM

## 2024-04-23 DIAGNOSIS — F33.1 MODERATE EPISODE OF RECURRENT MAJOR DEPRESSIVE DISORDER (HCC): ICD-10-CM

## 2024-04-24 RX ORDER — ESCITALOPRAM OXALATE 20 MG/1
20 TABLET ORAL EVERY MORNING
Qty: 90 TABLET | Refills: 1 | Status: SHIPPED | OUTPATIENT
Start: 2024-04-24

## 2024-04-24 NOTE — TELEPHONE ENCOUNTER
Can we set a reminder for June/July to call Danielle to let her know I'd like to see her for a preventive care exam sometime before October 2024?  I know she is busy seeing other specialists; however, I would like to check in this year

## 2024-04-26 ENCOUNTER — MED REC SCAN ONLY (OUTPATIENT)
Facility: CLINIC | Age: 59
End: 2024-04-26

## 2024-04-30 ENCOUNTER — OFFICE VISIT (OUTPATIENT)
Dept: FAMILY MEDICINE CLINIC | Facility: CLINIC | Age: 59
End: 2024-04-30
Payer: COMMERCIAL

## 2024-04-30 ENCOUNTER — PATIENT MESSAGE (OUTPATIENT)
Dept: FAMILY MEDICINE CLINIC | Facility: CLINIC | Age: 59
End: 2024-04-30

## 2024-04-30 VITALS
HEART RATE: 112 BPM | TEMPERATURE: 98 F | RESPIRATION RATE: 16 BRPM | DIASTOLIC BLOOD PRESSURE: 82 MMHG | HEIGHT: 68 IN | OXYGEN SATURATION: 97 % | BODY MASS INDEX: 40.77 KG/M2 | WEIGHT: 269 LBS | SYSTOLIC BLOOD PRESSURE: 132 MMHG

## 2024-04-30 DIAGNOSIS — E55.9 VITAMIN D DEFICIENCY: ICD-10-CM

## 2024-04-30 DIAGNOSIS — R10.33 PERIUMBILICAL ABDOMINAL PAIN: Primary | ICD-10-CM

## 2024-04-30 DIAGNOSIS — L02.211 ABSCESS OF SKIN OF ABDOMEN: ICD-10-CM

## 2024-04-30 LAB — VIT D+METAB SERPL-MCNC: 23.2 NG/ML (ref 30–100)

## 2024-04-30 PROCEDURE — 3008F BODY MASS INDEX DOCD: CPT | Performed by: NURSE PRACTITIONER

## 2024-04-30 PROCEDURE — 99213 OFFICE O/P EST LOW 20 MIN: CPT | Performed by: NURSE PRACTITIONER

## 2024-04-30 PROCEDURE — 82306 VITAMIN D 25 HYDROXY: CPT

## 2024-04-30 PROCEDURE — 3079F DIAST BP 80-89 MM HG: CPT | Performed by: NURSE PRACTITIONER

## 2024-04-30 PROCEDURE — 3075F SYST BP GE 130 - 139MM HG: CPT | Performed by: NURSE PRACTITIONER

## 2024-04-30 RX ORDER — SULFAMETHOXAZOLE AND TRIMETHOPRIM 800; 160 MG/1; MG/1
1 TABLET ORAL 2 TIMES DAILY
Qty: 14 TABLET | Refills: 0 | Status: SHIPPED | OUTPATIENT
Start: 2024-04-30

## 2024-04-30 NOTE — PROGRESS NOTES
CHIEF COMPLAINT:    Chief Complaint   Patient presents with    Stomach Pain     Around belly button,   Started 1 week ago       HISTORY OF PRESENT ILLNESS:    Danielle who has a history of diabetes, heart disease, and pulmonary disease presents today, April 30, 2024, for abdominal pain that began a week ago after moving furniture.  Pain is made worse with activity and position changes.  Rates pain 7/10.  Interfering with sleep quality.  Denies blood in stool or urine.    Danielle would also like to discuss painful mass to LLQ and to get labs drawn for vitamin d deficiency.  Painful mass ongoing for greater than 7 days.  Comes and goes.  Reports some improvement of area today, decreasing in size.  Denies drainage or fevers.    ALLERGIES:  Allergies   Allergen Reactions    Peanut-Containing Drug Products HIVES and SHORTNESS OF BREATH     Hives, asthma attack    Pcn [Penicillins] UNKNOWN     HAPPENED AS A CHILD       CURRENT MEDICATIONS:  Current Outpatient Medications   Medication Sig Dispense Refill    escitalopram 20 MG Oral Tab TAKE 1 TABLET EVERY MORNING 90 tablet 1    ALPRAZolam 0.5 MG Oral Tab Take 1-2 tablets (0.5-1 mg total) by mouth daily as needed for Sleep or Anxiety. 42 tablet 0    Tiotropium Bromide Monohydrate (SPIRIVA RESPIMAT) 2.5 MCG/ACT Inhalation Aero Soln Inhale 2 puffs into the lungs in the morning and 2 puffs before bedtime.      predniSONE 10 MG Oral Tab 40mg for 3 days, then 30mg for 3 days, then 20mg for 3 days, then 10mg for 3 days, then stop 30 tablet 0    gabapentin 100 MG Oral Cap Take 2 capsules (200 mg total) by mouth nightly. Takes Gabapentin 100mg in the morning and 200mg at night      ergocalciferol 1.25 MG (29066 UT) Oral Cap Take 1 capsule (50,000 Units total) by mouth once a week. 12 capsule 0    gabapentin 100 MG Oral Cap Take 1 capsule (100 mg total) by mouth every morning. Takes Gabapentin 100mg in the morning and 200mg at night      metoprolol succinate ER 25 MG Oral Tablet  24 Hr Take 1 tablet (25 mg total) by mouth nightly.      albuterol 108 (90 Base) MCG/ACT Inhalation Aero Soln Inhale 2 puffs into the lungs every 4 to 6 hours as needed for Wheezing or Shortness of Breath. 1 each 0    BREO ELLIPTA 200-25 MCG/ACT Inhalation Aerosol Powder, Breath Activated Inhale 1 puff into the lungs daily. 3 each 1    montelukast 10 MG Oral Tab Take 1 tablet (10 mg total) by mouth nightly. 90 tablet 1    Continuous Blood Gluc Sensor (DEXCOM G6 SENSOR) Does not apply Misc Apply to skin every 10 days 9 each 3    Continuous Blood Gluc Transmit (DEXCOM G6 TRANSMITTER) Does not apply Misc 1 each by Other route every 3 (three) months. 1 each 1    Insulin Disposable Pump (OMNIPOD 5 G6 PODS, GEN 5,) Does not apply Misc 1 each every other day. 45 each 1    insulin lispro (HUMALOG) 100 UNIT/ML Injection Solution Uses up to 100 units daily via insulin pump 90 mL 1    Insulin NPH, Human,, Isophane, (HUMULIN N KWIKPEN) 100 UNIT/ML Subcutaneous Suspension Pen-injector Pair with prednisone only. Up to 30 units a day max. 9 mL 0    pantoprazole 40 MG Oral Tab EC Take 1 tablet (40 mg total) by mouth before breakfast. (Patient taking differently: Take 1 tablet (40 mg total) by mouth nightly.) 90 tablet 3    rivaroxaban (XARELTO) 20 MG Oral Tab Take 1 tablet (20 mg total) by mouth nightly. TAKE AT BEDTIME 90 tablet 3    amLODIPine 2.5 MG Oral Tab Take 1 tablet (2.5 mg total) by mouth daily.      Mepolizumab (NUCALA SC) Inject into the skin every 30 (thirty) days.      Azelastine HCl 0.1 % Nasal Solution 1 spray by Nasal route in the morning and 1 spray before bedtime.      SUMAtriptan Succinate 50 MG Oral Tab TAKE 1 TABLET BY MOUTH EVERY 2 HOURS AS NEEDED FOR  MIGRAINE.  DO  NOT  EXCEED  200MG  IN  24  HOURS 9 tablet 3    Clopidogrel Bisulfate 75 MG Oral Tab Take 1 tablet (75 mg total) by mouth nightly. nightly      losartan 100 MG Oral Tab Take 1 tablet (100 mg total) by mouth daily.      ezetimibe 10 MG Oral Tab  Take 1 tablet (10 mg total) by mouth nightly.      Fluticasone Propionate 50 MCG/ACT Nasal Suspension 1 spray by Each Nare route 2 (two) times daily.      cetirizine (ZYRTEC) 10 MG Oral Tab Take 1 tablet (10 mg total) by mouth nightly.      glucagon (GVOKE HYPOPEN 1-PACK) 1 MG/0.2ML Subcutaneous SUBQ injection Inject 0.2 mL (1 mg total) into the skin once as needed for Low blood glucose. 0.2 mL 1       MEDICAL HISTORY:  Past Medical History:    Allergic rhinitis    Allergic rhinitis, cause unspecified    Anemia    Anxiety    Anxiety and depression    Asthma (HCC)    Atherosclerosis of coronary artery    Laguerre's esophagus    Churg-Shania syndrome (HCC)    Colloid cyst of brain (HCC)    Congestive heart disease (HCC)    Coronary artery disease of native heart with stable angina pectoris (HCC)    Depression    Diabetes (HCC)    Diabetes mellitus (HCC)    Diverticulosis of large intestine    Essential hypertension    Gastroparesis    GERD    Hematemesis with nausea    2017, No EGD, Hgb stable    High cholesterol    History of blood clots    History of pulmonary embolus (PE)    History of recurrent deep vein thrombosis (DVT)    Hyperlipidemia    Hypertension    IBS    LGSIL (low grade squamous intraepithelial dysplasia)    Pap neg 2014    Migraines    perfumes    Moderate persistent asthma without complication (HCC)    DESOUZA (nonalcoholic steatohepatitis)    NSTEMI (non-ST elevated myocardial infarction) (HCC)    FELTON in LAD    Obesity    Osteoarthritis    Patella-femoral syndrome, bilateral knee    Pneumonia due to organism    Pulmonary embolism (HCC)    Sleep apnea    Thyroid disease     Past Surgical History:   Procedure Laterality Date    Angioplasty (coronary)      Breast surgery procedure unlisted      cyst removed    Cath drug eluting stent      Cholecystectomy  04/10/2011    PERFORMED BY DR KRAFT-LAPAROSCOPIC    Colonoscopy  2012    polyps    Colonoscopy N/A 02/23/2016    Procedure: COLONOSCOPY;  Surgeon:  Kishor Stout MD;  Location:  ENDOSCOPY    Colposcopy, cervix w/upper adjacent vagina; w/endocervical curettage  2011    WNL    Cyst aspiration left  2000    Hernia surgery            Other surgical history      sinus surgery    Other surgical history      CYST REMOVED ON LEFT WRIST 17 YRS AGO    Sinus surgery        Skin surgery      Upper gi endoscopy,exam       Family History   Problem Relation Age of Onset    Heart Disorder Father     Hypertension Mother     Lipids Mother     Stroke Mother     Anemia Mother     Dementia Mother     Heart Disorder Mother     Other (Other) Mother     Bleeding Disorders Mother     Hypertension Sister     Obesity Sister     Stroke Sister     Other (Other) Sister         stroke,pe    Other (Other) Brother         kidney stones    Colon Cancer Maternal Grandfather     Colon Cancer Maternal Aunt     Anemia Daughter     Asthma Daughter     Depression Daughter     Asthma Daughter     Bleeding Disorders Sister     Stroke Sister      Family Status   Relation Status    Fa     Mo Alive    Sis Alive    Bro (Not Specified)    MGFA (Not Specified)    Mat Aunt (Not Specified)    Lissette Alive    Lissette Alive    Sis (Not Specified)     Social History     Socioeconomic History    Marital status:     Number of children: 3   Occupational History    Occupation: homemaker   Tobacco Use    Smoking status: Never     Passive exposure: Current    Smokeless tobacco: Never    Tobacco comments:     2nd hand with Father and now    Vaping Use    Vaping status: Never Used   Substance and Sexual Activity    Alcohol use: No    Drug use: No    Sexual activity: Never     Partners: Male     Birth control/protection: I.U.D.     Social Determinants of Health     Food Insecurity: No Food Insecurity (2024)    Food Insecurity     Food Insecurity: Never true   Transportation Needs: No Transportation Needs (2024)    Transportation Needs     Lack of Transportation: No   Physical Activity:  Inactive (2/2/2021)    Received from [facility]    Exercise Vital Sign     Days of Exercise per Week: 0 days     Minutes of Exercise per Session: 0 min   Housing Stability: Low Risk  (4/9/2024)    Housing Stability     Housing Instability: No       ROS:  GENERAL:  Denies recorded temperatures greater than 100.5F  RESPIRATORY:  Denies difficulty breathing  CARDIAC:  Denies chest pain with exertion    VITALS:   /82   Pulse 112   Temp 98.2 °F (36.8 °C) (Temporal)   Resp 16   Ht 5' 8\" (1.727 m)   Wt 269 lb (122 kg)   LMP 09/23/2004   SpO2 97%   BMI 40.90 kg/m²    Reviewed by Joyce Whaley MS, APRN, FNP-BC    PHYSICAL EXAM:    Constitutional:       Appears well.  Sitting upright on exam table.  Well developed, well nourished, and in no acute distress  HEENT:      Facial features symmetric. Normocephalic and atraumatic  Cardiovascular:      Heart sounds: Tachycardic  Pulmonary:      Chest expansion symmetric.  Breathing nonlabored.   Abdomen:       Abdomen soft, tender, protruding mass to left quadrant near umbilical region.       LLQ with warm mass approximately 2cm in length and 0.5cm with purple semi-firm center.  Erythematous border.  Musculoskeletal:         Movements smooth and controlled with appropriate coordination.       Gait is steady, nonantalgic.  Changes positions without difficulty.  Neuro:       No focal deficits, cranial nerves grossly intact.       Movements smooth and controlled, appropriate coordination without ataxia or tremors.  Psychiatric:         Alert and oriented.  Calm and cooperative.  Speech is clear.     ASSESSMENT & PLAN:    1. Periumbilical abdominal pain  Acetaminophen PRN for pain  - CT ABDOMEN (CPT=74150); Future  - Surgery Referral - In Network    2. Vitamin D deficiency  Danielle requesting completion of labs today, orders active from rheumatology team  MA to obtain blood and send to lab  - Vitamin D [E]    3. Abscess of skin of  abdomen  - sulfamethoxazole-trimethoprim DS (BACTRIM DS) 800-160 MG Oral Tab per tablet; Take 1 tablet by mouth 2 (two) times daily. 1 tab bid x 7 days  Dispense: 14 tablet; Refill: 0  - Surgery Referral - In Network    RTC if worsening symptoms

## 2024-04-30 NOTE — TELEPHONE ENCOUNTER
From: Danielle Jerome  To: Joyce Whaley  Sent: 4/30/2024 3:34 PM CDT  Subject: Muscle Relaxer    We never discussed muscle relaxer.

## 2024-05-01 ENCOUNTER — TELEPHONE (OUTPATIENT)
Facility: CLINIC | Age: 59
End: 2024-05-01

## 2024-05-01 DIAGNOSIS — E55.9 VITAMIN D DEFICIENCY: Primary | ICD-10-CM

## 2024-05-01 NOTE — TELEPHONE ENCOUNTER
Per Jackeline: \"These labs are in context of weekly vit D replacement x 12 weeks; let's do another round of Vit D50,000IU once weekly x 8 more weeks, after completion then recommend 1000IU daily. Repeat Vit D in 8 weeks after starting replacement\"    Patient returned RN's phone call; pt made aware of Jackeline's above response.    Per pt, rheumatologist already ordered second round of Vit D replacement (50,000 IU weekly x 8 weeks). Pt was instructed per Dr. Gonzalez to start second round of replacement after pt was home from the hospital  and feeling better (was admitted 4/9/24 with pneumonia). Pt stated she will start Vit D soon.    On 4/09/24, Dr. Gonzalez ordered Vit D 50,000 UT capsules; 1 capsule weekly for 12 weeks (qty. 12)    Pt aware needs to have repeat Vit D level drawn in 8 weeks after starting replacement and after completion, pt take Vit D 1000 international units daily. Pt verbalizes understanding.    RN to pend repeat Vit D lab and route to Jackeline for approval.

## 2024-05-02 ENCOUNTER — HOSPITAL ENCOUNTER (OUTPATIENT)
Dept: GENERAL RADIOLOGY | Age: 59
Discharge: HOME | End: 2024-05-02
Attending: INTERNAL MEDICINE
Payer: COMMERCIAL

## 2024-05-02 DIAGNOSIS — M79.2 NEUROPATHIC PAIN: ICD-10-CM

## 2024-05-02 DIAGNOSIS — M79.602 ARM PAIN, MUSCULOSKELETAL, LEFT: ICD-10-CM

## 2024-05-02 PROCEDURE — 72040 X-RAY EXAM NECK SPINE 2-3 VW: CPT | Performed by: INTERNAL MEDICINE

## 2024-05-09 ENCOUNTER — TELEPHONE (OUTPATIENT)
Facility: CLINIC | Age: 59
End: 2024-05-09

## 2024-05-09 ENCOUNTER — OFFICE VISIT (OUTPATIENT)
Facility: CLINIC | Age: 59
End: 2024-05-09
Payer: COMMERCIAL

## 2024-05-09 ENCOUNTER — TELEPHONE (OUTPATIENT)
Dept: FAMILY MEDICINE CLINIC | Facility: CLINIC | Age: 59
End: 2024-05-09

## 2024-05-09 VITALS — DIASTOLIC BLOOD PRESSURE: 90 MMHG | SYSTOLIC BLOOD PRESSURE: 136 MMHG | OXYGEN SATURATION: 98 % | HEART RATE: 107 BPM

## 2024-05-09 DIAGNOSIS — L02.211 ABSCESS OF SKIN OF ABDOMEN: ICD-10-CM

## 2024-05-09 DIAGNOSIS — E66.01 CLASS 3 SEVERE OBESITY DUE TO EXCESS CALORIES WITH SERIOUS COMORBIDITY AND BODY MASS INDEX (BMI) OF 40.0 TO 44.9 IN ADULT (HCC): ICD-10-CM

## 2024-05-09 DIAGNOSIS — I50.20 HFREF (HEART FAILURE WITH REDUCED EJECTION FRACTION) (HCC): ICD-10-CM

## 2024-05-09 DIAGNOSIS — Z79.4 TYPE 2 DIABETES MELLITUS WITH HYPERGLYCEMIA, WITH LONG-TERM CURRENT USE OF INSULIN (HCC): Primary | ICD-10-CM

## 2024-05-09 DIAGNOSIS — Z96.41 INSULIN PUMP IN PLACE: ICD-10-CM

## 2024-05-09 DIAGNOSIS — R10.33 PERIUMBILICAL ABDOMINAL PAIN: Primary | ICD-10-CM

## 2024-05-09 DIAGNOSIS — E11.65 TYPE 2 DIABETES MELLITUS WITH HYPERGLYCEMIA, WITH LONG-TERM CURRENT USE OF INSULIN (HCC): Primary | ICD-10-CM

## 2024-05-09 LAB — HEMOGLOBIN A1C: 10.3 % (ref 4.3–5.6)

## 2024-05-09 PROCEDURE — 99214 OFFICE O/P EST MOD 30 MIN: CPT

## 2024-05-09 PROCEDURE — 3046F HEMOGLOBIN A1C LEVEL >9.0%: CPT

## 2024-05-09 PROCEDURE — 3080F DIAST BP >= 90 MM HG: CPT

## 2024-05-09 PROCEDURE — 83036 HEMOGLOBIN GLYCOSYLATED A1C: CPT

## 2024-05-09 PROCEDURE — 3075F SYST BP GE 130 - 139MM HG: CPT

## 2024-05-09 RX ORDER — SEMAGLUTIDE 0.68 MG/ML
INJECTION, SOLUTION SUBCUTANEOUS
Qty: 9 ML | Refills: 0 | Status: SHIPPED | OUTPATIENT
Start: 2024-05-09

## 2024-05-09 NOTE — TELEPHONE ENCOUNTER
JOURDAN PAGE  CALLED THE OFFICE TO ADV PT COMING IN TOMORROW FOR CT .    WOULD ADV THAT A NEW ORDER NEEDS TO BE PLACED TO MAKE SURE WE GET ALL THAT IS NEEDED.    ADV NEW ORDERS: ABDOMEN AND PELVIS CT W/IV CONTRAST.    PLEASE ADV - PT HAS APT TOMORROW.    THANK YOU

## 2024-05-09 NOTE — TELEPHONE ENCOUNTER
PA requested via Epic.  Questions answered.   Ozempic will likely need appeal as Ozempic is non-preferred.  Considering failure of alternatives will be able to complete any necessary appeal.

## 2024-05-09 NOTE — PATIENT INSTRUCTIONS
Follow up plan:  With TENZIN Hendricks: Return in about 6 weeks (around 6/20/2024) for Ozempic dose follow up.  **CALL office and schedule an appointment with [facility] to start the U200 insulin once [facility]**      Medications:   --> When you're taking prednisone, you can take NPH 10u per 10mg of prednisone (for example, if you're on 30mg of prednison, take 30u of NPH that morning) -- using the NPH pen, this can be 'layered' on top of what your omnipod is doing    - switch U100 insulin in pump to U200- I will send the prescription to [facility] for you - it will likely need prior authorization; once that is completed and you pick it up-- bring insulin and pods with you to training with [facility]    - Start Ozempic 0.25mg once weekly for four weeks, on fifth week you can inject 0.5mg once if tolerating  - Plan to meet in one month to evaluate Ozempic tolerance and refill for Ozempic 0.5mg once weekly  - The dosing for Ozempic is 0.25 --> 0.5mg --> 1mg --> 2mg. Each of these doses must be taken for a minimum of four weeks. This is increased slowly on purpose to ensure the patient is tolerating it.   The doses often depend on how patients are tolerating it, what their weight loss goals are, and what their blood sugars are. If you are wanting a dose adjustment, we will need to have an office visit in order to determine if appropriate to increase the dose, and what other medications would need adjusting. This visit can be done as an office visit or video visit through [facility].     One of the most common side effects of GLP1 medications (including mounjaro, Ozempic, Trulicity, Rybelsus) is nausea and GI upset.   Here is some advice to reduce these side effects:  - if you're having nausea and ate a large meal before the nausea, the volume of food may be too much for your stomach to handle, reduce meal size  - practice mindfulness to stop eating once full  - avoid eating when not hungry  - avoid  high-fat or spicy food (particularly during the initial dose-escalation period)  - moderate your intake of alcohol and fizzy drinks (particularly in the context of nausea and heartburn)  - If you have constipation at baseline, make sure you are drinking enough water and eating enough fiber  - if you notice some nausea symptoms and haven't eaten in a while, make sure you are eating at least three meals per day, can try snacking on high protein snack item like yogurt, string cheese, cottage cheese, protein shake  - If you have made these changes and you still notice nausea, please contact the clinic             Blood sugar targets:  Before breakfast: , 2 hours after meals: <180 (preferably <150), A1C goal: <7.0%  Time in Range goal is higher than 80% if using a continuous glucose monitor (Dexcom or Monica).  Time in Range can be found within the Dexcom G7 abrahan, on Clarity if using Dexcom G6, or on LibreView if using Monica.    HOW TO TREAT LOW BLOOD SUGAR (Hypoglycemia)  Low blood sugar= Less than 70, or if you start to have symptoms (below)  Symptoms: Shaking or trembling, fast heart rate, extreme hunger, sweating, confusion/difficulty concentrating, dizziness.    How to treat a low blood sugar if you are able to eat/drink: The Rule of 15  If you are using continuous glucose monitor that says you are low, but you do not have any symptoms, verify on fingerstick that your blood sugar is actually low before treating.   Eat 15 grams of carbs (see examples below)  Check your blood sugar after 15 minutes. If it’s still below your target range, have another serving.   Repeat these steps until it’s in your target range. Once it’s in range, if you're nervous about your sugar going low again, have a protein source (ie, a spoonful of peanut butter).   If you have a CGM you want to look for how your arrow has changed. If you arrow is pointed up or sideways after 15 min, give your CGM more time OR check with a finger stick.  Try not to eat more food until at least 15 min after the first BG check - otherwise you risk having a rebound high.  If you are experiencing symptoms and you are unable to check your blood glucose for any reason, treat the hypoglycemia.  If someone has a low blood sugar and is unconscious: Don’t hesitate to call 911. If someone is unconscious and glucagon is not available or someone does not know how to use it, call 911 immediately.    To treat a low, I recommend you carry with you easy, pre-portioned treatment for low blood sugars that are 15G of carbs:   - Children sized squeeze pouch applesauce (high fiber + carbs help prevent too high of a spike)  - Small children's sized juicebox- 15g carb --> 4oz juice box  - Glucose tablets from "[employer], you can find them near diabetes supplies --> Note, you will need to eat 3-4 tablets to get to 15g of carbs  - Children sized fruit snack pack- look for one with 15 grams of total carbohydrate  - Choice of how to treat your low is important. Complex carbs, or foods that contain fats along with carbs (like chocolate) can slow the absorption of glucose and should NOT be used to treat an emergency low    NAVIGATING INSURANCE / PRICING OF MEDICATIONS WITH DIABETES:  Diabetes medications and supplies can be costly. It is your responsibility to contact your insurance company if there is an issue with pricing and then contact the office with what medications are covered.  The best way to reduce costs relating to your diabetes is to contact your insurance at the start of the year directly.  If you have questions about the pricing of any of your diabetes supplies or medications, please reach out directly to your insurance pharmacy plan (phone number on the back of your card), or look online at their website and their 'formulary'.   Here are some questions you can ask your insurance that may help you identify areas to save on costs:    1.    What is the formulary preferred brand  of my medication / supply (ie: my doctor ordered Ozempic, is that preferred or is another agent preferred)? Do you prefer a generic over brand name?  2.    What quantity is preferred by my plan? (30 or 90 day supply)  3.    Will my price be lower if I use a specific local or mail order pharmacy? (For example, [facility] or [facility] is sometimes preferred, or a mail order service like [facility] or [facility])  4.    What is my estimated pricing for these medications / supplies if all rules are followed?  5.     Do I have a deductible I have to meet before my medications will be fully covered? What is my deductible? (In that case, you often will pay full price until you reach your deductible)    For CGM's (Dexcom or Monica) specifically, if your pharmacy plan states that they do NOT offer coverage/there is a high copay at your pharmacy, ask to be transferred to your plans Durable Medical Equipment team, or to be give a direct contact number for that team if they cannot transfer.  Your plan may prefer that your CGM goes through a medical supplier rather than pharmacy coverage.  If this is the case, please ask the following questions:    1.    Which durable medical equipment suppliers do you have a contract with for CGMs specifically (not all DME's supply CGMs!)  2.    What is the estimated pricing for my CGM supplies through medical coverage?  process the refill, and ensure there are no other problems with obtaining the medication (backorders, prior authorization paperwork, etc). Routine refills will not be addressed on weekends, so please submit these requests during the week.

## 2024-05-09 NOTE — PROGRESS NOTES
[facility]    Name: Danielle Jerome    Date: 2024      HISTORY OF PRESENT ILLNESS   Danielle Jerome is a 59 year old female with PMHx significant for CAD, CHF, DM2, hypertension, hyperlipidemia, asthma, Churg-Shania who presents for Dm2 mgmt.    Initial HPI consult in 2023:  Diagnosed age: [id number] (steroid-induced, chronic prednisone for asthma)  Follows with DM APN, LV 2023    Currently follows with [facility]; on-and-off prednisone depending on asthma flares with Churg-Shania, currently not on any steroids    Current DM meds:Toujeo 96U; Humalog 16U qAC TID + ISF 2:50>150 (doesn't carb count exactly); been on MDI x ~6 months now  Previously trialed/failed (from DM Apn note): Byetta, Metformin (GI), Januvia, Actos, Novolog, Levemir, Invokana, Trulicity, glipizide, Victoza, Farxiga - recurrent mycotic infections     It was previously 7% in 2023    Blood Glucose log/CGM: per memory  Checking 3-4 times per day  Morning/fastins  Pre-meal: 200s  HS: 200s  Episodes of hypoglycemia: No    Sometimes not a great appetite but does try to eat low carb and high protein  Has downloaded a carb-counting abrahan  Hasn't worn Dexcom recently due to various imaging tests lately, has been waiting for mail order of new refills    Is interested in Omnipod 5, ordered by previous DM provider, has not picked it up yet    Interim hx:  Oct 2023  Pt has started on Omnipod5 in auto mode since last visit  Settings:   Basal 1.90-1.95U/hr, ICR 1:5, ISF 1:20 (basal was just increased from 1.75 since 1 week ago by DM APN)  She has been told to continue f/u with endocrine  Has been on/off antibiotic and steroids during the past few months for sinus infxn and inner ear imbalance. Poor appetite, does try to drink sugar-free drinks, but notices BG increases when in pain.  Also looking into weight loss options  Also with painful neuropathy    Dec 2023- w/ endo BREANNAN  Had COVID two weeks after her appt;  feeling incr'd fatigue since her COVID infxn. Of note did have vit D deficiency in June, not taking Vit D currently  Then had trouble getting Omnipod5 from pharmacy; now it's better (1 month off); was taking MDI in that time    Feb 2024 - LX  Having continuous migraines and fatigue and sinus pressure since COVID  May go to [facility] for long haul COVID  Taking prednisone 20mg as needed for migraines and sinus pain, further exacerbating hyperglycemia  Interested in trying Ozempic; did well previously with Victoza on low doses    May 2024-w/ Jackeline DAVE. Last A1c value was 10.3% done 5/9/2024.  Documentation from MA: Diabetes (Patient presents for diabetes follow up. States blood sugars have been higher but believes it is due to recent pneumonia. Fasting AM blood sugar is around 200-300. )  Ongoing hyperglycemia despite omnipod use. She will go a few days not wearing or not change overnight if it runs out (has to change daily). Did not start NPH use with steroids, but notes it did help while hospitalized  Current DM meds at visit:   Omnipod 5 in Automode + Dexcom G6  Time Basal ICR ISF Active Insulin Time Target   MN 2.2u/hr 5 16 2h 110   TDD 105u / day      Continuous Glucose Monitoring Interpretation  Danielle Jerome has undergone continuous glucose monitoring with their CGM.  The blood glucose tracings were evaluated for two weeks prior to office visit.   Blood glucose tracings demonstrated areas of hyperglycemia throughout the entire day, no specific pattern There were no areas of hypoglycemia noted.            History/Other:   REVIEW OF SYSTEMS  Ten point review of systems has been performed and is otherwise negative and/or non-contributory, except as described above.     Medications:     Current Outpatient Medications:     semaglutide (OZEMPIC, 0.25 OR 0.5 MG/DOSE,) 2 MG/3ML Subcutaneous Solution Pen-injector, Take 0.25mg once weekly for four weeks. After four weeks, increase to 0.5mg once weekly., Disp:  9 mL, Rfl: 0    sulfamethoxazole-trimethoprim DS (BACTRIM DS) 800-160 MG Oral Tab per tablet, Take 1 tablet by mouth 2 (two) times daily. 1 tab bid x 7 days, Disp: 14 tablet, Rfl: 0    escitalopram 20 MG Oral Tab, TAKE 1 TABLET EVERY MORNING, Disp: 90 tablet, Rfl: 1    ALPRAZolam 0.5 MG Oral Tab, Take 1-2 tablets (0.5-1 mg total) by mouth daily as needed for Sleep or Anxiety., Disp: 42 tablet, Rfl: 0    Tiotropium Bromide Monohydrate (SPIRIVA RESPIMAT) 2.5 MCG/ACT Inhalation Aero Soln, Inhale 2 puffs into the lungs in the morning and 2 puffs before bedtime., Disp: , Rfl:     predniSONE 10 MG Oral Tab, 40mg for 3 days, then 30mg for 3 days, then 20mg for 3 days, then 10mg for 3 days, then stop, Disp: 30 tablet, Rfl: 0    gabapentin 100 MG Oral Cap, Take 2 capsules (200 mg total) by mouth nightly. Takes Gabapentin 100mg in the morning and 200mg at night, Disp: , Rfl:     ergocalciferol 1.25 MG (56058 UT) Oral Cap, Take 1 capsule (50,000 Units total) by mouth once a week., Disp: 12 capsule, Rfl: 0    gabapentin 100 MG Oral Cap, Take 1 capsule (100 mg total) by mouth every morning. Takes Gabapentin 100mg in the morning and 200mg at night, Disp: , Rfl:     metoprolol succinate ER 25 MG Oral Tablet 24 Hr, Take 1 tablet (25 mg total) by mouth nightly., Disp: , Rfl:     albuterol 108 (90 Base) MCG/ACT Inhalation Aero Soln, Inhale 2 puffs into the lungs every 4 to 6 hours as needed for Wheezing or Shortness of Breath., Disp: 1 each, Rfl: 0    BREO ELLIPTA 200-25 MCG/ACT Inhalation Aerosol Powder, Breath Activated, Inhale 1 puff into the lungs daily., Disp: 3 each, Rfl: 1    montelukast 10 MG Oral Tab, Take 1 tablet (10 mg total) by mouth nightly., Disp: 90 tablet, Rfl: 1    Continuous Blood Gluc Sensor (DEXCOM G6 SENSOR) Does not apply Misc, Apply to skin every 10 days, Disp: 9 each, Rfl: 3    Continuous Blood Gluc Transmit (DEXCOM G6 TRANSMITTER) Does not apply Misc, 1 each by Other route every 3 (three) months., Disp: 1  each, Rfl: 1    Insulin Disposable Pump (OMNIPOD 5 G6 PODS, GEN 5,) Does not apply Misc, 1 each every other day., Disp: 45 each, Rfl: 1    insulin lispro (HUMALOG) 100 UNIT/ML Injection Solution, Uses up to 100 units daily via insulin pump, Disp: 90 mL, Rfl: 1    Insulin NPH, Human,, Isophane, (HUMULIN N KWIKPEN) 100 UNIT/ML Subcutaneous Suspension Pen-injector, Pair with prednisone only. Up to 30 units a day max., Disp: 9 mL, Rfl: 0    pantoprazole 40 MG Oral Tab EC, Take 1 tablet (40 mg total) by mouth before breakfast. (Patient taking differently: Take 1 tablet (40 mg total) by mouth nightly.), Disp: 90 tablet, Rfl: 3    rivaroxaban (XARELTO) 20 MG Oral Tab, Take 1 tablet (20 mg total) by mouth nightly. TAKE AT BEDTIME, Disp: 90 tablet, Rfl: 3    amLODIPine 2.5 MG Oral Tab, Take 1 tablet (2.5 mg total) by mouth daily., Disp: , Rfl:     Mepolizumab (NUCALA SC), Inject into the skin every 30 (thirty) days., Disp: , Rfl:     Azelastine HCl 0.1 % Nasal Solution, 1 spray by Nasal route in the morning and 1 spray before bedtime., Disp: , Rfl:     SUMAtriptan Succinate 50 MG Oral Tab, TAKE 1 TABLET BY MOUTH EVERY 2 HOURS AS NEEDED FOR  MIGRAINE.  DO  NOT  EXCEED  200MG  IN  24  HOURS, Disp: 9 tablet, Rfl: 3    Clopidogrel Bisulfate 75 MG Oral Tab, Take 1 tablet (75 mg total) by mouth nightly. nightly, Disp: , Rfl:     losartan 100 MG Oral Tab, Take 1 tablet (100 mg total) by mouth daily., Disp: , Rfl:     ezetimibe 10 MG Oral Tab, Take 1 tablet (10 mg total) by mouth nightly., Disp: , Rfl:     Fluticasone Propionate 50 MCG/ACT Nasal Suspension, 1 spray by Each Nare route 2 (two) times daily., Disp: , Rfl:     cetirizine (ZYRTEC) 10 MG Oral Tab, Take 1 tablet (10 mg total) by mouth nightly., Disp: , Rfl:     glucagon (GVOKE HYPOPEN 1-PACK) 1 MG/0.2ML Subcutaneous SUBQ injection, Inject 0.2 mL (1 mg total) into the skin once as needed for Low blood glucose., Disp: 0.2 mL, Rfl: 1     Allergies:   Allergies   Allergen  Reactions    Peanut-Containing Drug Products HIVES and SHORTNESS OF BREATH     Hives, asthma attack    Pcn [Penicillins] UNKNOWN     HAPPENED AS A CHILD       Social History:   Social History     Socioeconomic History    Marital status:     Number of children: 3   Occupational History    Occupation: homemaker   Tobacco Use    Smoking status: Never     Passive exposure: Current    Smokeless tobacco: Never    Tobacco comments:     2nd hand with Father and now    Vaping Use    Vaping status: Never Used   Substance and Sexual Activity    Alcohol use: No    Drug use: No    Sexual activity: Never     Partners: Male     Birth control/protection: I.U.D.       Medical History:   Reviewed    Surgical history:   Past Surgical History:   Procedure Laterality Date    Angioplasty (coronary)      Breast surgery procedure unlisted      cyst removed    Cath drug eluting stent      Cholecystectomy  04/10/2011    PERFORMED BY DR KRAFT-LAPAROSCOPIC    Colonoscopy  2012    polyps    Colonoscopy N/A 2016    Procedure: COLONOSCOPY;  Surgeon: Kishor Stout MD;  Location:  ENDOSCOPY    Colposcopy, cervix w/upper adjacent vagina; w/endocervical curettage  2011    WNL    Cyst aspiration left  2000    Hernia surgery            Other surgical history      sinus surgery    Other surgical history      CYST REMOVED ON LEFT WRIST 17 YRS AGO    Sinus surgery        Skin surgery      Upper gi endoscopy,exam         Objective:   Vitals:  Vitals:    24 1422   BP: 136/90   Pulse: 107   SpO2: 98%       Physical exam:  CONSTITUTIONAL:  awake, alert, cooperative, no apparent distress, and appears stated age  PSYCH: normal affect  LUNGS: breathing comfortably  CARDIOVASCULAR:  regular rate     Labs/Imaging: Pertinent imaging reviewed.    Surveillance for Complications & Risks   Last A1c value was 10.3% done 2024.    CAD: yes, hx CAD s/p FELTON (2017)  On Xarelto and Plavix  Neuropathy/ Foot exam: Encouraged proper  footwear and regular diabetic foot care.No data recorded  Ophtho: No data recorded No data recorded- due, reminded  Kidney health evaluation:  Lab Results   Component Value Date    EGFRCR 77 04/12/2024    MICROALBCREA  04/06/2022      Comment:      Unable to calculate due to Urine Microalbumin <0.5 mg/dL        HTN:   BP Readings from Last 1 Encounters:   05/09/24 136/90     Blood Pressure and Cardiac Medications            metoprolol succinate ER 25 MG Oral Tablet 24 Hr    amLODIPine 2.5 MG Oral Tab    losartan 100 MG Oral Tab          Lipids:  Not on statin due to myopathy  Lab Results   Component Value Date    CHOLEST 171 06/14/2023     (H) 06/14/2023    TRIG 191 (H) 06/14/2023    HDL 37 (L) 06/14/2023    FASTING No 10/27/2023     Cholesterol Lowering Medications            ezetimibe 10 MG Oral Tab             Assessment & Plan:    (E11.65,  Z79.4) Type 2 diabetes mellitus with hyperglycemia, with long-term current use of insulin ([facility])  (primary encounter diagnosis)  ((J45.40) Moderate persistent asthma without complication  Class 3 severe obesity due to excess calories with serious comorbidity and body mass index (BMI) of 40.0 to 44.9 in adult ([facility])  (I50.20) HFrEF (heart failure with reduced ejection fraction) ([facility])  Plan:   A1C well above goal, Last A1c value was 10.3% done 5/9/2024. Goal <7%. Hyperglycemia is worsened by frequent steroid courses for asthma/migraine/sinus pain. Pt currently on Omnipod5 running 100% in auto mode. She is still under a lot of stress and pain, with frequent prednisone tapers further exacerbating her BG.    She is not currently interested in switching pumps as her insurance wouldn't cover. She is amenable to pairing NPH-- instructed how today. We are going to restart ozempic (previously intolerant of several other agents; additionally, patient has individual high risk for CV event including DM2, CKD, obesity and HFpEF.  Per SUSTAIN6 trials, semaglutide reduced major adverse  cardiovascular (CV) events (MACE) vs placebo in subjects with type 2 diabetes (T2D) and high CV risk (Juanita et al., 2019).     Will switch U100--> U200 insulin in her pump which will reduce the need for q1 day pod changes.     - start ozempic 0.25mg weekly  - switch U100 --> U200; will need to come in to reset pump with DCES before starting U200, was very clear this requires settings changes before start  - pair NPH 10U for every 10mg of prednisone; her doses are usually 10-30mg  - in case of pump failure: use Toujeo 50U, humalog ICR 1:5, ISF :120. For mechanical issues contact [facility]  - continue Dexcom G6  - attempted to transition over to Tandem pump, but would need to pay another $400 out of pocket, so wants to stay with Omnipod 5 for now  - previously intolerant: Byetta- intolerant, Metformin (GI), Januvia, Actos, Novolog, Levemir, Invokana, Trulicity, glipizide, Victoza- felt poorly, Farxiga - recurrent mycotic infections   - 5/13/2024 addendum: reviewed chart-- defer GLP1a start due to hx of gastroparesis; will first trial the u200 insulin      Return in about 6 weeks (around 6/20/2024) for Ozempic dose follow up.  Prev Dr. Hopper patient, discussed patient can follow up with Dr. Hopper when she returns in Jan 2025, establish with other endo MD in group, or continue follow up with APN until Dr. Hopper returns    TENZIN Damon  5/9/2024                   Adequate: hears normal conversation without difficulty

## 2024-05-09 NOTE — TELEPHONE ENCOUNTER
5/9/24-Received via fax from [employer] a prior authorization for Giorgio.  Given to Felicity STAPLES to review.

## 2024-05-09 NOTE — PROGRESS NOTES
GLP-1 Training     Patient to start a GLP-1 soon, addressed the following information regarding injection, storage and other guidelines for GLP-1    - Method of injection (use of needles if applicable)  - Medication should be stored in the fridge consistently until used  - If traveling, Medication should be kept cold during travel and at destination  - Patient should use areas of the body where they are able to 'pinch an inch' including the abdomen and outer thighs  - Provided instructions on how to notify the office in case of backorders  - gave direction regarding treatment of side effects and when to contact the office     Patient v/u and showed positive return demonstration of GLP-1 injector anshul Blevins RN   BC-ADM    ALTERED LOC

## 2024-05-10 ENCOUNTER — HOSPITAL ENCOUNTER (OUTPATIENT)
Dept: CT IMAGING | Age: 59
Discharge: HOME | End: 2024-05-10
Attending: NURSE PRACTITIONER
Payer: COMMERCIAL

## 2024-05-10 ENCOUNTER — TELEPHONE (OUTPATIENT)
Facility: CLINIC | Age: 59
End: 2024-05-10

## 2024-05-10 DIAGNOSIS — L02.211 ABSCESS OF SKIN OF ABDOMEN: ICD-10-CM

## 2024-05-10 DIAGNOSIS — R10.33 PERIUMBILICAL ABDOMINAL PAIN: ICD-10-CM

## 2024-05-10 NOTE — TELEPHONE ENCOUNTER
5/10/24-Received via fax from [email or web address] a drug utilization clarification request regarding Ozempic.  Given to Felicity STAPLES to review.

## 2024-05-13 RX ORDER — INSULIN LISPRO 200 [IU]/ML
INJECTION, SOLUTION SUBCUTANEOUS
Qty: 60 ML | Refills: 1 | Status: SHIPPED | OUTPATIENT
Start: 2024-05-13

## 2024-05-13 RX ORDER — INSULIN LISPRO 200 [IU]/ML
INJECTION, SOLUTION SUBCUTANEOUS
Qty: 48 ML | Refills: 1 | Status: SHIPPED | OUTPATIENT
Start: 2024-05-13 | End: 2024-05-13

## 2024-05-13 NOTE — TELEPHONE ENCOUNTER
Signed--- let's defer her GLP1a start for now due to the gastroparesis history, and I updated my note; can let the patient know as well, thanks!

## 2024-05-13 NOTE — TELEPHONE ENCOUNTER
Pump settings ordered.   U200 refill sent-- TDD ~105u/day -- however this is with extreme hyperglycemia; will adjust to TDD 130u approximately  Patient MUST come in for training on this insulin with Felicity the DCES, she cannot use this insulin without doing a hard reset on the pump.    New pump settings AFTER RESET  Omnipod 5 in Auto mode  Time Basal ICR ISF Active Insulin Time Target   MN 1.1 u/hr 10 32 2h 110

## 2024-05-16 ENCOUNTER — PATIENT MESSAGE (OUTPATIENT)
Facility: CLINIC | Age: 59
End: 2024-05-16

## 2024-05-16 DIAGNOSIS — E11.65 TYPE 2 DIABETES MELLITUS WITH HYPERGLYCEMIA, WITH LONG-TERM CURRENT USE OF INSULIN (HCC): Primary | ICD-10-CM

## 2024-05-16 DIAGNOSIS — Z79.4 TYPE 2 DIABETES MELLITUS WITH HYPERGLYCEMIA, WITH LONG-TERM CURRENT USE OF INSULIN (HCC): Primary | ICD-10-CM

## 2024-05-16 RX ORDER — SEMAGLUTIDE 0.68 MG/ML
INJECTION, SOLUTION SUBCUTANEOUS
Qty: 9 ML | Refills: 0 | Status: SHIPPED | OUTPATIENT
Start: 2024-05-16

## 2024-05-22 ENCOUNTER — TELEPHONE (OUTPATIENT)
Facility: CLINIC | Age: 59
End: 2024-05-22

## 2024-05-22 DIAGNOSIS — Z79.4 TYPE 2 DIABETES MELLITUS WITH HYPERGLYCEMIA, WITH LONG-TERM CURRENT USE OF INSULIN (HCC): Primary | ICD-10-CM

## 2024-05-22 DIAGNOSIS — E11.65 TYPE 2 DIABETES MELLITUS WITH HYPERGLYCEMIA, WITH LONG-TERM CURRENT USE OF INSULIN (HCC): Primary | ICD-10-CM

## 2024-05-22 RX ORDER — INSULIN LISPRO 200 [IU]/ML
INJECTION, SOLUTION SUBCUTANEOUS
Qty: 60 ML | Refills: 1 | Status: SHIPPED | OUTPATIENT
Start: 2024-05-22

## 2024-05-22 NOTE — TELEPHONE ENCOUNTER
Patient placed call to clinic, received u100 insulin pens from [facility] instead of u200.  Patient will be bringing supply back to [facility] and requesting correct medication be dispensed.  Requesting that future orders of the u200 be sent through [facility].  Patient also states that [facility] is requesting more information about Ozempic. Pump Reboot with CDCES canceled until u200 issue is resolved.     Placed call to [facility] regarding Ozempic order.  This is the second issue that has occurred with order.  PA was approved.  Last order was canceled by pharmacist.  New order sent 5/16/2024.  [employer] rep states that the order is under MD review.  [facility] pharmacy requesting review since patient has documented gastroparesis.      Direct callback for this case: [phone], Ref # [id number]    Routed to provider for review

## 2024-05-23 NOTE — TELEPHONE ENCOUNTER
We already faxed something different about this-- I just updated med list-- we are NOT going to proceed with Ozempic start given gastroparesis hx

## 2024-05-25 NOTE — PROGRESS NOTES
Patient requesting order for OmniPod 5. Order sent to [facility]. Patient to contact office to schedule training once received. Referral placed.   Dexcom transmitter sent to local pharmacy per request.    [facility] MADAI, BC-ADM, CDCES
Wheelchair

## 2024-05-29 ENCOUNTER — OFFICE VISIT (OUTPATIENT)
Dept: FAMILY MEDICINE CLINIC | Facility: CLINIC | Age: 59
End: 2024-05-29

## 2024-05-29 VITALS
HEART RATE: 92 BPM | BODY MASS INDEX: 41.22 KG/M2 | HEIGHT: 68 IN | WEIGHT: 272 LBS | DIASTOLIC BLOOD PRESSURE: 70 MMHG | SYSTOLIC BLOOD PRESSURE: 128 MMHG | TEMPERATURE: 99 F | RESPIRATION RATE: 16 BRPM | OXYGEN SATURATION: 94 %

## 2024-05-29 DIAGNOSIS — N76.0 ACUTE VAGINITIS: Primary | ICD-10-CM

## 2024-05-29 PROCEDURE — 81514 NFCT DS BV&VAGINITIS DNA ALG: CPT | Performed by: NURSE PRACTITIONER

## 2024-05-29 PROCEDURE — 3074F SYST BP LT 130 MM HG: CPT | Performed by: NURSE PRACTITIONER

## 2024-05-29 PROCEDURE — 3078F DIAST BP <80 MM HG: CPT | Performed by: NURSE PRACTITIONER

## 2024-05-29 PROCEDURE — 99213 OFFICE O/P EST LOW 20 MIN: CPT | Performed by: NURSE PRACTITIONER

## 2024-05-29 PROCEDURE — 3008F BODY MASS INDEX DOCD: CPT | Performed by: NURSE PRACTITIONER

## 2024-05-29 RX ORDER — FLUCONAZOLE 150 MG/1
150 TABLET ORAL ONCE
Qty: 2 TABLET | Refills: 0 | Status: SHIPPED | OUTPATIENT
Start: 2024-05-29 | End: 2024-05-29

## 2024-05-29 NOTE — PROGRESS NOTES
CHIEF COMPLAINT:    Chief Complaint   Patient presents with    Vaginal Problem     Possible yeast infection-vaginal itching       HISTORY OF PRESENT ILLNESS:    Danielle who has a history including diabetes presents today, May 29, 2024, for vaginal itching that began over the weekend after wearing tight clothing, shape wear.  Reports history of vaginal yeast infection in the past, states symptoms are comparable to past infections.  Positive for recent prednisone use. Denies vaginal bleeding or clumpy vaginal discharge.      ALLERGIES:  Allergies   Allergen Reactions    Peanut-Containing Drug Products HIVES and SHORTNESS OF BREATH     Hives, asthma attack    Pcn [Penicillins] UNKNOWN     HAPPENED AS A CHILD       CURRENT MEDICATIONS:  Current Outpatient Medications   Medication Sig Dispense Refill    HUMALOG KWIKPEN 200 UNIT/ML Subcutaneous Solution Pen-injector Do NOT use until trained by diabetes educator in office. Use to fill insulin pump with up to 130u/day. 60 mL 1    sulfamethoxazole-trimethoprim DS (BACTRIM DS) 800-160 MG Oral Tab per tablet Take 1 tablet by mouth 2 (two) times daily. 1 tab bid x 7 days 14 tablet 0    escitalopram 20 MG Oral Tab TAKE 1 TABLET EVERY MORNING 90 tablet 1    ALPRAZolam 0.5 MG Oral Tab Take 1-2 tablets (0.5-1 mg total) by mouth daily as needed for Sleep or Anxiety. 42 tablet 0    Tiotropium Bromide Monohydrate (SPIRIVA RESPIMAT) 2.5 MCG/ACT Inhalation Aero Soln Inhale 2 puffs into the lungs in the morning and 2 puffs before bedtime.      predniSONE 10 MG Oral Tab 40mg for 3 days, then 30mg for 3 days, then 20mg for 3 days, then 10mg for 3 days, then stop 30 tablet 0    gabapentin 100 MG Oral Cap Take 2 capsules (200 mg total) by mouth nightly. Takes Gabapentin 100mg in the morning and 200mg at night      ergocalciferol 1.25 MG (85452 UT) Oral Cap Take 1 capsule (50,000 Units total) by mouth once a week. 12 capsule 0    gabapentin 100 MG Oral Cap Take 1 capsule (100 mg total) by  mouth every morning. Takes Gabapentin 100mg in the morning and 200mg at night      metoprolol succinate ER 25 MG Oral Tablet 24 Hr Take 1 tablet (25 mg total) by mouth nightly.      albuterol 108 (90 Base) MCG/ACT Inhalation Aero Soln Inhale 2 puffs into the lungs every 4 to 6 hours as needed for Wheezing or Shortness of Breath. 1 each 0    BREO ELLIPTA 200-25 MCG/ACT Inhalation Aerosol Powder, Breath Activated Inhale 1 puff into the lungs daily. 3 each 1    montelukast 10 MG Oral Tab Take 1 tablet (10 mg total) by mouth nightly. 90 tablet 1    Continuous Blood Gluc Sensor (DEXCOM G6 SENSOR) Does not apply Misc Apply to skin every 10 days 9 each 3    Continuous Blood Gluc Transmit (DEXCOM G6 TRANSMITTER) Does not apply Misc 1 each by Other route every 3 (three) months. 1 each 1    Insulin Disposable Pump (OMNIPOD 5 G6 PODS, GEN 5,) Does not apply Misc 1 each every other day. 45 each 1    Insulin NPH, Human,, Isophane, (HUMULIN N KWIKPEN) 100 UNIT/ML Subcutaneous Suspension Pen-injector Pair with prednisone only. Up to 30 units a day max. 9 mL 0    pantoprazole 40 MG Oral Tab EC Take 1 tablet (40 mg total) by mouth before breakfast. (Patient taking differently: Take 1 tablet (40 mg total) by mouth nightly.) 90 tablet 3    rivaroxaban (XARELTO) 20 MG Oral Tab Take 1 tablet (20 mg total) by mouth nightly. TAKE AT BEDTIME 90 tablet 3    glucagon (GVOKE HYPOPEN 1-PACK) 1 MG/0.2ML Subcutaneous SUBQ injection Inject 0.2 mL (1 mg total) into the skin once as needed for Low blood glucose. 0.2 mL 1    amLODIPine 2.5 MG Oral Tab Take 1 tablet (2.5 mg total) by mouth daily.      Mepolizumab (NUCALA SC) Inject into the skin every 30 (thirty) days.      Azelastine HCl 0.1 % Nasal Solution 1 spray by Nasal route in the morning and 1 spray before bedtime.      SUMAtriptan Succinate 50 MG Oral Tab TAKE 1 TABLET BY MOUTH EVERY 2 HOURS AS NEEDED FOR  MIGRAINE.  DO  NOT  EXCEED  200MG  IN  24  HOURS 9 tablet 3    Clopidogrel Bisulfate  75 MG Oral Tab Take 1 tablet (75 mg total) by mouth nightly. nightly      losartan 100 MG Oral Tab Take 1 tablet (100 mg total) by mouth daily.      ezetimibe 10 MG Oral Tab Take 1 tablet (10 mg total) by mouth nightly.      Fluticasone Propionate 50 MCG/ACT Nasal Suspension 1 spray by Each Nare route 2 (two) times daily.      cetirizine (ZYRTEC) 10 MG Oral Tab Take 1 tablet (10 mg total) by mouth nightly.         MEDICAL HISTORY:  Past Medical History:    Allergic rhinitis    Allergic rhinitis, cause unspecified    Anemia    Anxiety    Anxiety and depression    Asthma (HCC)    Atherosclerosis of coronary artery    Laguerre's esophagus    Churg-Shania syndrome (HCC)    Colloid cyst of brain (HCC)    Congestive heart disease (HCC)    Coronary artery disease of native heart with stable angina pectoris (HCC)    Depression    Diabetes (HCC)    Diabetes mellitus (HCC)    Diverticulosis of large intestine    Essential hypertension    Gastroparesis    GERD    Hematemesis with nausea    2017, No EGD, Hgb stable    High cholesterol    History of blood clots    History of pulmonary embolus (PE)    History of recurrent deep vein thrombosis (DVT)    Hyperlipidemia    Hypertension    IBS    LGSIL (low grade squamous intraepithelial dysplasia)    Pap neg 2014    Migraines    perfumes    Moderate persistent asthma without complication (HCC)    DESOUZA (nonalcoholic steatohepatitis)    NSTEMI (non-ST elevated myocardial infarction) (HCC)    FELTON in LAD    Obesity    Osteoarthritis    Patella-femoral syndrome, bilateral knee    Pneumonia due to organism    Pulmonary embolism (HCC)    Sleep apnea    Thyroid disease     Past Surgical History:   Procedure Laterality Date    Angioplasty (coronary)      Breast surgery procedure unlisted      cyst removed    Cath drug eluting stent      Cholecystectomy  04/10/2011    PERFORMED BY DR KRAFT-LAPAROSCOPIC    Colonoscopy  2012    polyps    Colonoscopy N/A 02/23/2016    Procedure: COLONOSCOPY;   Surgeon: Kishor Stout MD;  Location:  ENDOSCOPY    Colposcopy, cervix w/upper adjacent vagina; w/endocervical curettage  2011    WNL    Cyst aspiration left  2000    Hernia surgery            Other surgical history      sinus surgery    Other surgical history      CYST REMOVED ON LEFT WRIST 17 YRS AGO    Sinus surgery        Skin surgery      Upper gi endoscopy,exam       Family History   Problem Relation Age of Onset    Heart Disorder Father     Hypertension Mother     Lipids Mother     Stroke Mother     Anemia Mother     Dementia Mother     Heart Disorder Mother     Other (Other) Mother     Bleeding Disorders Mother     Hypertension Sister     Obesity Sister     Stroke Sister     Other (Other) Sister         stroke,pe    Other (Other) Brother         kidney stones    Colon Cancer Maternal Grandfather     Colon Cancer Maternal Aunt     Anemia Daughter     Asthma Daughter     Depression Daughter     Asthma Daughter     Bleeding Disorders Sister     Stroke Sister      Family Status   Relation Status    Fa     Mo Alive    Sis Alive    Bro (Not Specified)    MGFA (Not Specified)    Mat Aunt (Not Specified)    Lissette Alive    Lissette Alive    Sis (Not Specified)     Social History     Socioeconomic History    Marital status:     Number of children: 3   Occupational History    Occupation: homemaker   Tobacco Use    Smoking status: Never     Passive exposure: Current    Smokeless tobacco: Never    Tobacco comments:     2nd hand with Father and now    Vaping Use    Vaping status: Never Used   Substance and Sexual Activity    Alcohol use: No    Drug use: No    Sexual activity: Never     Partners: Male     Birth control/protection: I.U.D.     Social Determinants of Health     Food Insecurity: No Food Insecurity (2024)    Food Insecurity     Food Insecurity: Never true   Transportation Needs: No Transportation Needs (2024)    Transportation Needs     Lack of Transportation: No   Physical  Activity: Inactive (2/2/2021)    Received from [employer], [facility] [employer]    Exercise Vital Sign     Days of Exercise per Week: 0 days     Minutes of Exercise per Session: 0 min   Housing Stability: Low Risk  (4/9/2024)    Housing Stability     Housing Instability: No       ROS:  GENERAL:  Denies recorded temperatures greater than 100.5F  RESPIRATORY:  Denies difficulty breathing  CARDIAC:  Denies chest pain with exertion    VITALS:   /70   Pulse 92   Temp 98.8 °F (37.1 °C) (Temporal)   Resp 16   Ht 5' 8\" (1.727 m)   Wt 272 lb (123.4 kg)   LMP 09/23/2004   SpO2 94%   BMI 41.36 kg/m²     Reviewed by Joyce Whaley MS, APRN, FNP-BC    PHYSICAL EXAM:    Constitutional:       Appears well.  Sitting upright on exam table.  Well developed, well nourished, and in no acute distress  HEENT:      Facial features symmetric. Normocephalic and atraumatic  Musculoskeletal:         Movements smooth and controlled with appropriate coordination.    GYN: External genitalia without masses, lesions, erythema, or tenderness.  Mucosa is pink in color, moist, and with mild erythema. Discharge is thin and light yellow, without clumping/adhering to vaginal walls.   Skin:     Warm and dry without jaundice or rashes.  Psychiatric:         Alert and oriented.  Calm and cooperative.  Speech is clear.     ASSESSMENT & PLAN:    1. Acute vaginitis  - fluconazole (DIFLUCAN) 150 MG Oral Tab; Take 1 tablet (150 mg total) by mouth once for 1 dose. May repeat in 3 days if symptoms persist.  Dispense: 2 tablet; Refill: 0  - Vaginitis Vaginosis PCR Panel; Future  - Vaginitis Vaginosis PCR Panel  Awaiting results to further guide plan of care  Ddx:  Vaginosis vs. Yeast infection

## 2024-05-30 LAB
BV BACTERIA DNA VAG QL NAA+PROBE: NEGATIVE
C GLABRATA DNA VAG QL NAA+PROBE: NEGATIVE
C KRUSEI DNA VAG QL NAA+PROBE: NEGATIVE
CANDIDA DNA VAG QL NAA+PROBE: POSITIVE
T VAGINALIS DNA VAG QL NAA+PROBE: NEGATIVE

## 2024-05-31 ENCOUNTER — PATIENT MESSAGE (OUTPATIENT)
Facility: CLINIC | Age: 59
End: 2024-05-31

## 2024-05-31 NOTE — TELEPHONE ENCOUNTER
From: Felicity CLINTON  To: Danielle Jerome  Sent: 5/31/2024 10:07 AM CDT  Subject: Mathew Holm,    According to our records, you are not currently connected with our up-to-date [employer] account. To connect to our correct office sharing code, please follow the instructions below:    Omnipod [employer] Connect  Once you have your supplies, you will need to go through the following steps to get set up to share your data with our clinic:    Go to [email or web address] and sign in with your omnipod ID and password (you used this to set up your controller)  Scroll to the bottom of the screen and choose: \"change my physician\". If your physician is already listed correctly, you can skip this step.  Enter your providers information as requested by the website (our zip code is [address])  Then go to [email or web address] and login to your [employer] account - this may or may not be the same as your omnipod ID info.  Once logged in click the icon on the top right hand corner with your initials and select \"settings\"  Scroll to the ACCOUNT section of the settings screen to where it says primary provider  Click \"add new code\" and enter our pro connect code which is: [email or web address]  REMOVE any additional offices you are currently connected to.  Once you are connected your settings box should look like this:        If you have any trouble with this process, please reach out directly to [email or web address] Technical Support at [phone] or [email or web address] for assistance getting connected.     Once you have successfully connected to our clinic, please let us know.    Thanks!  Felicity Knapp RN, MSN, BC-[facility], [facility]  Diabetes Care &   Merged with [facility]   [phone] (office)  [phone] (fax)   Office Phone Hours  8:30 am - 4:30 pm, M-AMPARO

## 2024-06-04 ENCOUNTER — NURSE ONLY (OUTPATIENT)
Facility: CLINIC | Age: 59
End: 2024-06-04
Payer: COMMERCIAL

## 2024-06-04 ENCOUNTER — PATIENT MESSAGE (OUTPATIENT)
Facility: CLINIC | Age: 59
End: 2024-06-04

## 2024-06-04 DIAGNOSIS — E11.65 TYPE 2 DIABETES MELLITUS WITH HYPERGLYCEMIA, WITH LONG-TERM CURRENT USE OF INSULIN (HCC): Primary | ICD-10-CM

## 2024-06-04 DIAGNOSIS — Z79.4 TYPE 2 DIABETES MELLITUS WITH HYPERGLYCEMIA, WITH LONG-TERM CURRENT USE OF INSULIN (HCC): Primary | ICD-10-CM

## 2024-06-04 PROCEDURE — 99211 OFF/OP EST MAY X REQ PHY/QHP: CPT

## 2024-06-04 NOTE — PROGRESS NOTES
Omnipod 5 u200 Reboot    Patient: Danielle Jerome  : 3/25/1965    Referred by: TENZIN Damon    Indication: Restarting omnipod with u200 insulin    Patient Settings Per Provider:    Omnipod 5 + Dexcom in Automode  Time ICR ISF Active Insulin Time Target   MN 10 32 2.5 110     Starting Basal Rates - 1.1 u/hr    Patient shows positive ability to place and manage insulin pump appropriately during training.    Follow [facility]:  [facility]    2024  Felicity Knapp RN, MSN, BC-ADM, [facility]  Diabetes Care &      A total of 40 minutes was spent with the patient including chart review, discussion and education / advisement pertinent to patient and provider specified concerns as documented above.     Note to patient: The  Cures Act makes medical notes like these available to patients in the interest of transparency. However, be advised this is a medical document. It is intended as peer to peer communication. It is written in medical language and may contain abbreviations or verbiage that are unfamiliar. It may appear blunt or direct. Medical documents are intended to carry relevant information, facts as evident, and the clinical opinion of the practitioner.

## 2024-06-07 ENCOUNTER — MED REC SCAN ONLY (OUTPATIENT)
Facility: CLINIC | Age: 59
End: 2024-06-07

## 2024-06-11 ENCOUNTER — PATIENT MESSAGE (OUTPATIENT)
Facility: CLINIC | Age: 59
End: 2024-06-11

## 2024-06-12 ENCOUNTER — NURSE ONLY (OUTPATIENT)
Facility: CLINIC | Age: 59
End: 2024-06-12
Payer: COMMERCIAL

## 2024-06-12 DIAGNOSIS — E11.65 TYPE 2 DIABETES MELLITUS WITH HYPERGLYCEMIA, WITH LONG-TERM CURRENT USE OF INSULIN (HCC): Primary | ICD-10-CM

## 2024-06-12 DIAGNOSIS — Z79.4 TYPE 2 DIABETES MELLITUS WITH HYPERGLYCEMIA, WITH LONG-TERM CURRENT USE OF INSULIN (HCC): Primary | ICD-10-CM

## 2024-06-12 NOTE — PROGRESS NOTES
Pump Settings Adjustment Evaluation - Op5 w/ u200    Patient: Danielle Jerome  :3/25/1965    Referred by: TENZIN Damon    Indication:  Started OP5 with u200 humalog on 2024    Current Pump Settings:  - Ozempic 0.25 mg weekly  - NPH 10 u / 10 mg prednisone   Omnipod 5 + Dexcom in Automode  Time ICR ISF Active Insulin Time Target   MN 10 32 2.5 110       Patient Concerns:  - Patient has been in considerable pain lately  - Taking prednisone some days, unsure of how to take the NPH on prednisone days   --> Advised patient should take 10 u of NPH for every 10 mg of prednisone, sent mcm for patient to look back at if needed    Data Review:      Data shows imbalanced insulin delivery with very few entered boluses for carb intake throughout the day, some days not carbs are entered at all, as shown below:        When patient does enter carbs, bolus is not robust enough in most cases, and corrections are not bringing the blood glucose levels down either, as exemplified below:      TIR has increased by 20%, but overall trend remains hyperglycemic, and system is kicking patient into manula mode due to frequent high blood glucose levels.       Recommended Settings Adjustments:  Omnipod 5 + Dexcom in Automode  Time ICR ISF Active Insulin Time Target   MN - 9 am  10 28 2.5 110   9 am - MN 8 28 2.5 110         Follow up scheduled for 2024 with TENZIN Damon       2024  Felicity Knapp RN, MSN, BC-ADM, [facility]  Diabetes Care &      A total of 15 minutes was spent with the patient including chart review, discussion and education / advisement pertinent to patient and provider specified concerns as documented above.     Note to patient: The  Cures Act makes medical notes like these available to patients in the interest of transparency. However, be advised this is a medical document. It is intended as peer to peer communication. It is written in medical language and  may contain abbreviations or verbiage that are unfamiliar. It may appear blunt or direct. Medical documents are intended to carry relevant information, facts as evident, and the clinical opinion of the practitioner.

## 2024-06-17 ENCOUNTER — TELEPHONE (OUTPATIENT)
Facility: CLINIC | Age: 59
End: 2024-06-17

## 2024-06-17 NOTE — TELEPHONE ENCOUNTER
6/17/24-Received via fax from [employer] Patient Safety and Health Consideration to be reviewed.  Placed in PA in basket.

## 2024-06-24 ENCOUNTER — OFFICE VISIT (OUTPATIENT)
Facility: CLINIC | Age: 59
End: 2024-06-24

## 2024-06-24 VITALS
OXYGEN SATURATION: 97 % | BODY MASS INDEX: 40.92 KG/M2 | HEIGHT: 68 IN | SYSTOLIC BLOOD PRESSURE: 130 MMHG | DIASTOLIC BLOOD PRESSURE: 76 MMHG | WEIGHT: 270 LBS | HEART RATE: 100 BPM | RESPIRATION RATE: 16 BRPM

## 2024-06-24 DIAGNOSIS — J45.50 SEVERE PERSISTENT ASTHMA, UNSPECIFIED WHETHER COMPLICATED (HCC): ICD-10-CM

## 2024-06-24 DIAGNOSIS — G47.33 OBSTRUCTIVE SLEEP APNEA SYNDROME: Primary | ICD-10-CM

## 2024-06-24 PROCEDURE — 3008F BODY MASS INDEX DOCD: CPT | Performed by: INTERNAL MEDICINE

## 2024-06-24 PROCEDURE — 3078F DIAST BP <80 MM HG: CPT | Performed by: INTERNAL MEDICINE

## 2024-06-24 PROCEDURE — 3075F SYST BP GE 130 - 139MM HG: CPT | Performed by: INTERNAL MEDICINE

## 2024-06-24 PROCEDURE — 99214 OFFICE O/P EST MOD 30 MIN: CPT | Performed by: INTERNAL MEDICINE

## 2024-06-24 RX ORDER — ALBUTEROL SULFATE 90 UG/1
2 AEROSOL, METERED RESPIRATORY (INHALATION) EVERY 4 HOURS PRN
Qty: 1 EACH | Refills: 11 | Status: SHIPPED | OUTPATIENT
Start: 2024-06-24

## 2024-06-24 RX ORDER — ALBUTEROL SULFATE 90 UG/1
3 AEROSOL, METERED RESPIRATORY (INHALATION) EVERY 4 HOURS PRN
Qty: 3 EACH | Refills: 3 | Status: SHIPPED | OUTPATIENT
Start: 2024-06-24

## 2024-06-24 NOTE — PATIENT INSTRUCTIONS
Plan:  -same meds -   - consider scheduling CPAP titration please   See me in 4  months      Ana Luisa Miller MD  Pulmonary Medicine  6/24/24

## 2024-06-24 NOTE — PROGRESS NOTES
History of Present Illness:  Danielle Jerome is a 58 year old female presenting to pulmonary clinic dyspnea     - aunt  - - others in [place]-   Got sick after - and 4  weeks before -- had 2 courses of prednisone        Had testing for abnl PET - and had angio - dr tolliver- all OK -   Some palptitsion   Remains with shortness of breath- some days with difficulty with stairs- knees and back-- DJD - limit as well-   To see vascular - for legs and carotid  No coughing   Now on nucula-- sl light headed at outset- - one dose thus far-- thinks breathing is better   Off prednsione -   Losing weight -- new dexcom- with pump- ongoing -   Breo and spiriva - once a day -- - some rescue use -- twice a day -- a lot less-   Overall feels much better     1.24- doing OK- - had covid- early novemeber- - unable to take paxlovid-- so fatigued and dozzy at times- low vit D-   Follows with evan-  Increased sinus drainage very thick and always stuffy- had 3 zpack and doxy   Increased wheeze - winded all the time- sats 94-96% - drops some with walking - can't exercise - stairs to bedroom-   Thinks dupexent was better than nucala - last 2 weeks ago-   [facility]  had recommended nuclala -- rather than do bx for churg marc   Breo and spiriva - daily - rescue - a lot - himidifiers   Donzelli xclear and Xhance (fluticasone)   Aseltine nasal and zyrtec and mucinex ?benadryl at night   Prednsione none at the moment  Heart - no recent visit - sharee next week   Singular remains every night -      3.24- saw dr machuca - thinks long hauler - remains exhausted -   Wants to switch back to dupuxent -- thihks sinus was more helpful-- ongoing sinus drainage - some dizzyness - no allergy testing   No plans for bx-   No side effects - side effects from nuclala- dizzy - once a month   No prednsione - allergy attack - jacket with cat fur-   Breo and spiriva - - rescue - less overall -  Singular   Asteline and zyrtec capsasain -  had nose bleed- -   Donzelli following for possible surgery - xhance and xclear     6/24- whole body hurts aches and pains -- -- to see dr gordon - in August - gabapentin not helping- tylenol as needed -to start PT- left shoulder - to start   Neck and spine and told RA ?   Asthma is good - remains on dupixent -- hard in the heat - - every 2 weeks - no reaction -   Breo 200 daily - uses rescue  3-4 times per day- gets winded- with most activty - vaccums etc- gets winded easily and rescue helps   No exercise-- pain limits --sciatica - can't get up off the floor   Disc issues - ongoing   No change in heart- due to see dr tolliver- -     In hosp April 9th -   Remains on Xhance and xclear   Sinus less congested - - but increased eye findings   Trying for ozempic-- not to take related to gastro[areisis -- U2 insulin - better now controlled   No prednisone - last for her spine -                                 Social History:   Social Hx on file   Social History            Socioeconomic History    Marital status:     Number of children: 3   Occupational History    Occupation: homemaker   Tobacco Use    Smoking status: Never       Passive exposure: Current    Smokeless tobacco: Never    Tobacco comments:       2nd hand with Father and now    Vaping Use    Vaping Use: Never used   Substance and Sexual Activity    Alcohol use: No    Drug use: No    Sexual activity: Never       Partners: Male       Birth control/protection: I.U.D.            Medications:   Current Medications          Current Outpatient Medications   Medication Sig Dispense Refill    ALPRAZOLAM 0.5 MG Oral Tab TAKE 1 TO 2 TABLETS(0.5 TO 1 MG) BY MOUTH DAILY AS NEEDED FOR SLEEP OR ANXIETY 42 tablet 0    Continuous Blood Gluc Sensor (DEXCOM G6 SENSOR) Does not apply Misc Apply to skin every 10 days 9 each 3    Continuous Blood Gluc Transmit (DEXCOM G6 TRANSMITTER) Does not apply Misc 1 each by Other route every 3 (three) months. 1 each 1    Insulin  Disposable Pump (OMNIPOD 5 G6 PODS, GEN 5,) Does not apply Misc 1 each every other day. 45 each 1    insulin lispro (HUMALOG) 100 UNIT/ML Injection Solution Uses up to 100 units daily via insulin pump 90 mL 1    Insulin NPH, Human,, Isophane, (HUMULIN N KWIKPEN) 100 UNIT/ML Subcutaneous Suspension Pen-injector Pair with prednisone only. Up to 30 units a day max. 9 mL 0    pantoprazole 40 MG Oral Tab EC Take 1 tablet (40 mg total) by mouth before breakfast. 90 tablet 3    rivaroxaban (XARELTO) 20 MG Oral Tab Take 1 tablet (20 mg total) by mouth nightly. TAKE AT BEDTIME 90 tablet 3    predniSONE 10 MG Oral Tab 3 po q am for 3 days then 2 po q am for 3 days then one po q am for 5 days 20 tablet 0    fluconazole 150 MG Oral Tab Take 1 tablet (150 mg total) by mouth once.        gabapentin 100 MG Oral Cap Take 100mg in am, 100mg in afternoon and 200mg at night 360 capsule 0    ergocalciferol 1.25 MG (97895 UT) Oral Cap Take 1 capsule (50,000 Units total) by mouth once a week. 12 capsule 0    predniSONE 10 MG Oral Tab Take 3 tabs (30mg) daily for 2 days, then take 2 tabs (20mg) daily for 2 days, then take 1 tab (10mg) daily for 2 days. 12 tablet 0    albuterol 108 (90 Base) MCG/ACT Inhalation Aero Soln Inhale 2 puffs into the lungs every 4 to 6 hours as needed for Wheezing or Shortness of Breath. 3 each 1    montelukast 10 MG Oral Tab Take 1 tablet (10 mg total) by mouth nightly. 90 tablet 1    escitalopram 20 MG Oral Tab Take 1 tablet (20 mg total) by mouth every morning. 90 tablet 1    methylPREDNISolone (MEDROL) 4 MG Oral Tablet Therapy Pack As directed. 1 each 0    amLODIPine 2.5 MG Oral Tab amLODIPine 2.5 mg tablet, [RxNorm: 886861]        Mepolizumab (NUCALA SC) Inject into the skin every 30 (thirty) days.        Tiotropium Bromide Monohydrate (SPIRIVA RESPIMAT) 2.5 MCG/ACT Inhalation Aero Soln Inhale 2 puffs into the lungs daily. 1 each 5    Metoprolol-HCTZ ER 25-12.5 MG Oral Tablet 24 Hr metoprolol succ 25  mg-hydrochlorothiazide 12.5 mg tablet,ext.rel 24 hr   25mg 1/day        Azelastine HCl 0.1 % Nasal Solution          SUMAtriptan Succinate 50 MG Oral Tab TAKE 1 TABLET BY MOUTH EVERY 2 HOURS AS NEEDED FOR  MIGRAINE.  DO  NOT  EXCEED  200MG  IN  24  HOURS 9 tablet 3    BREO ELLIPTA 200-25 MCG/INH Inhalation Aerosol Powder, Breath Activated          Clopidogrel Bisulfate 75 MG Oral Tab Take 1 tablet (75 mg total) by mouth daily. nightly        losartan 100 MG Oral Tab Take 1 tablet (100 mg total) by mouth daily.        ezetimibe 10 MG Oral Tab Take 1 tablet (10 mg total) by mouth nightly.        Fluticasone Propionate 50 MCG/ACT Nasal Suspension 2 sprays by Each Nare route 2 (two) times daily.        cetirizine (ZYRTEC) 10 MG Oral Tab Take 1 tablet (10 mg total) by mouth daily.        glucagon (GVOKE HYPOPEN 1-PACK) 1 MG/0.2ML Subcutaneous SUBQ injection Inject 0.2 mL (1 mg total) into the skin once as needed for Low blood glucose. 0.2 mL 1            Review of Systems:    Weight - up 20# since July - - and down now 263-- was 277 - now 265 - steady   gerd mostly controlled - ppi once a day -- no breakthrough - ppi daily   No diarrhea- rarely   Rare hives-no recent issue  Some swelling- - trace now - comes and goes   Torsemide only as needed - no recent use -   Shoulder pain -- for MRI      Overall thinks sleep is a bit better she is not waking up during the night to take her rescue inhaler though continues to have awakenings-   Tolerating Nucala well without skin lesions  Alleriges worse without singular   Agrees to CPAP titration                 Physical Exam:  /74 (BP Location: Radial, Patient Position: Sitting, Cuff Size: adult)   Pulse 92   Resp 16   Ht 5' 9\" (1.753 m)   Wt 266 lb (120.7 kg)   LMP 09/23/2004   SpO2 96%   BMI 39.28 kg/m²    Constitutional: comfortable . No acute distress.  Much clearer without significant congesti  HEENT: Head NC/AT. PEERL. Throat is clear -small area  Cardio: .   Distant tones regular rate and rhythm no ectopy noted  Respiratory: Clear today no wheezes rales or rhonchi noted  GI:  Abd soft, non-tender.  Obese  Extremities: No clubbing .  No significant edema  Neurologic: A&Ox3. No gross motor deficits.  Skin: Warm, dry.no rashes or hives noted   Lymphatic: No cervical or supraclavicular lymphadenopathy.  Neck is thick there is no JVD at 90 degrees  Psych: Calm, cooperative. Pleasant affect.     Results: reviewed      Assessment/Plan:  #History of breast lesion  Stable on mammos 2020  Encouraged to get follow-up  Negative mammos 3/23  States up-to-date        #Risk of PEDRO  Remote negative study  Recent increase in nocturnal awakenings and fatigue  Did not get follow-up study yet  5/23-agrees to proceed  7/23-sleep study AHI 24.3 REM 40 gonzalo 82.7% 5% of the night less than 88  For titration  1/24  did not go for  titration -states will  3.24- continues to complain of exhaustion - did not go for titration - - states will -discussed again  6/24 agrees to titration              #Diabetes  Ongoing issues some improvement  Some weight loss-now on new monitor and pulm  Blood sugars get significantly out of control on prednisone with endocrine following  Remains with increasing weight and increasing hemoglobin A1c  6/24 notes improvement        #Coronary artery disease  11/2017 with thrombotic lesion to the LAD-FELTON placed  Associated ischemic cardiomyopathy  Repeat cath for elevated troponin 4/2018 with nonobstructive coronary disease  Negative stress 8/2019  Remains on Plavix and lifelong Xarelto  Recurrent chest pain 2020 negative dobutamine stress  7/21 states due for testing   1/23 follows with Gloria Tolliver-repeat stress and carotid Dopplers pending  5/23 due for follow-up after testing including stress and echo  7/23-repeat angiogram last month with Dr. Avila-reports stent widely patent coronaries widely patent-last cath prior was April 2018  1.3.24- due to see dr tolliver   3/24  no significant change no recent testing        #Ischemic cardiomyopathy  EF 35 to 40% improved on echo 2017  Had been followed in heart failure clinic for a time  6/20 states normal EF  Remains on daily torsemide  Last echo 2/4/2022 -EF 50 to 55% diastolic dysfunction unable to measure ----last with PAS 30-35  Stable fluid status  1/24 fluid status seems improved  3/24 echo 5/23 EF 65 to 70% with grade 1 diastolic dysfunction unable to assess pulmonary pressures normal TAPSE  Fluid status stable  6/24 remains with stable fluid status           #Pulmonary emboli  Long history remote and recurrent  Plans for lifelong anticoagulation--unable to tolerate aspirin so remains on antiplatelet        #Asthma severe  Childhood asthma progressed early adulthood  Longstanding history multiple hospitalizations multiple steroid dosing remains on MDIs  Begun on Dupixent 2020-sinus disease rash and eosinophilia  Rash biopsy negative for vasculitis  Overall improved control on Dupixent-had been on Xolair prior  Recent [facility] visit with negative 6-minute walk; PFTs with normal timed volumes-ratio 67% total lung capacity 103% of predicted % of predicted-17% bronchodilator response in FEV1  1/23 now with ongoing flare suspect related to sinus issues  5/23 remains with significant obstruction on exam with plans to redose prednisone-worse now as Dupixent has been stopped with plans to begin Nucala after breathing test  7/1:23 dose of Nucala has noted significant improvement with plans to continue and follow-wean MDIs as able  1.24- recent covid infection -- since  worse with nucala vs dupixent - to talk to machuca and to see -short prednisone dose  3/24-asthma now very well compensated-believes her sinus disease was significantly better when she was on Dupixent versus the current Nucala  Insurance investigation ongoing-repeat PFTs pending  6/24--significant flare in [id number] component pneumonia with left lower lobe  infiltrate Home on cefdinir and prednisone--reports stable since that time              #History of chronic sinusitis and nasal polyposis  Remains with ongoing flares  Follows with ENT--minimal improvement in nasal polyps with the Dupixent  Repeat CT planned and Rx with Zithromax  5/23 CT sinuses with opacification and bone remodeling of the right sphenoid sinus-ENT referred her to [facility]-to consider seeing Lyudmila  7/23 has seen Dr. Mao with plans to follow on Nucala and follow CT  3/24 ongoing and current chief complaint remains on  sinus medication plan to follow if able to get Dupixent  6/24 improved on Dupixent           #Recent evaluation rule out EGPA  Questionably remote Churg-Shania----no history of pulmonary infiltrates  Negative skin biopsy in the past for vasculitis  Recent visit with [facility]-unable to confirm-multiple negative rheumatologic testing  Recommendation had been for EGPA dosing of Nucala-following  7/23-EGPA dosing of Nucala as above following with improvement  3/24 anticipating changed back to Dupixent  6/24- -back on Dupixent with improved control of asthma and sinuses--- clinically improving with plans to follow--no plans currently to revisit [facility]--now following with Dr. Gonzalez     # COVID -   Halloween - since with marked fatigue and cough and sinus congestion   Has been told may have long COVID symptoms    # abd wall hernia after lifting heavy   No acute issues currently       -Plan:  -same meds -   - consider scheduling CPAP titration please   See me in 4  months      Ana Luisa Miller MD  Pulmonary Medicine  6/24/24

## 2024-06-28 ENCOUNTER — TELEPHONE (OUTPATIENT)
Facility: CLINIC | Age: 59
End: 2024-06-28

## 2024-06-28 RX ORDER — ALBUTEROL SULFATE 90 UG/1
2 AEROSOL, METERED RESPIRATORY (INHALATION)
Qty: 3 EACH | Refills: 3 | OUTPATIENT
Start: 2024-06-28

## 2024-06-28 NOTE — TELEPHONE ENCOUNTER
Call placed to [facility].  Rx for Ventolin changed to the form of albuterol that is covered by insurance.

## 2024-06-28 NOTE — TELEPHONE ENCOUNTER
Ventaline hfa inhaler not covered. Albuterol pa hfa inhaler 8.5gm  90 mcg  will be covered. New script  need to be sent

## 2024-07-08 ENCOUNTER — PATIENT MESSAGE (OUTPATIENT)
Dept: RHEUMATOLOGY | Facility: CLINIC | Age: 59
End: 2024-07-08

## 2024-07-09 ENCOUNTER — OFFICE VISIT (OUTPATIENT)
Dept: FAMILY MEDICINE CLINIC | Facility: CLINIC | Age: 59
End: 2024-07-09
Payer: COMMERCIAL

## 2024-07-09 ENCOUNTER — TELEPHONE (OUTPATIENT)
Dept: FAMILY MEDICINE CLINIC | Facility: CLINIC | Age: 59
End: 2024-07-09

## 2024-07-09 ENCOUNTER — PATIENT MESSAGE (OUTPATIENT)
Dept: FAMILY MEDICINE CLINIC | Facility: CLINIC | Age: 59
End: 2024-07-09

## 2024-07-09 ENCOUNTER — HOSPITAL ENCOUNTER (OUTPATIENT)
Dept: GENERAL RADIOLOGY | Age: 59
Discharge: HOME | End: 2024-07-09
Attending: NURSE PRACTITIONER
Payer: COMMERCIAL

## 2024-07-09 VITALS
SYSTOLIC BLOOD PRESSURE: 124 MMHG | TEMPERATURE: 98 F | OXYGEN SATURATION: 96 % | HEART RATE: 93 BPM | WEIGHT: 271 LBS | BODY MASS INDEX: 41.07 KG/M2 | HEIGHT: 68 IN | RESPIRATION RATE: 16 BRPM | DIASTOLIC BLOOD PRESSURE: 68 MMHG

## 2024-07-09 DIAGNOSIS — R20.8 DECREASED SENSATION OF FOOT: ICD-10-CM

## 2024-07-09 DIAGNOSIS — F41.9 ANXIETY: ICD-10-CM

## 2024-07-09 DIAGNOSIS — M54.41 ACUTE BILATERAL LOW BACK PAIN WITH BILATERAL SCIATICA: ICD-10-CM

## 2024-07-09 DIAGNOSIS — M54.41 ACUTE BILATERAL LOW BACK PAIN WITH BILATERAL SCIATICA: Primary | ICD-10-CM

## 2024-07-09 DIAGNOSIS — M54.42 ACUTE BILATERAL LOW BACK PAIN WITH BILATERAL SCIATICA: Primary | ICD-10-CM

## 2024-07-09 DIAGNOSIS — M54.42 ACUTE BILATERAL LOW BACK PAIN WITH BILATERAL SCIATICA: ICD-10-CM

## 2024-07-09 DIAGNOSIS — M62.830 MUSCLE SPASM OF BACK: ICD-10-CM

## 2024-07-09 DIAGNOSIS — F33.1 MODERATE EPISODE OF RECURRENT MAJOR DEPRESSIVE DISORDER (HCC): ICD-10-CM

## 2024-07-09 PROCEDURE — 3074F SYST BP LT 130 MM HG: CPT | Performed by: NURSE PRACTITIONER

## 2024-07-09 PROCEDURE — 99214 OFFICE O/P EST MOD 30 MIN: CPT | Performed by: NURSE PRACTITIONER

## 2024-07-09 PROCEDURE — 3008F BODY MASS INDEX DOCD: CPT | Performed by: NURSE PRACTITIONER

## 2024-07-09 PROCEDURE — 72110 X-RAY EXAM L-2 SPINE 4/>VWS: CPT | Performed by: NURSE PRACTITIONER

## 2024-07-09 PROCEDURE — 3078F DIAST BP <80 MM HG: CPT | Performed by: NURSE PRACTITIONER

## 2024-07-09 RX ORDER — ALPRAZOLAM 0.5 MG/1
TABLET ORAL DAILY PRN
Qty: 42 TABLET | Refills: 0 | Status: SHIPPED | OUTPATIENT
Start: 2024-07-09

## 2024-07-09 RX ORDER — CYCLOBENZAPRINE HCL 10 MG
10 TABLET ORAL NIGHTLY PRN
Qty: 10 TABLET | Refills: 0 | Status: SHIPPED | OUTPATIENT
Start: 2024-07-09

## 2024-07-09 NOTE — TELEPHONE ENCOUNTER
From: Danielle Jerome  To: Joyce Whaley  Sent: 7/9/2024 3:15 PM CDT  Subject: Alprazolam Refill    Hi,   I forgot to ask for a refill for Alprazolam while I was there. If you could please [facility] in [facility] I would appreciate it. I am also out of pantoprazole.     Thanks!  Alta Guerrier

## 2024-07-09 NOTE — TELEPHONE ENCOUNTER
Called pt and left a detailed voicemail to get more information. Sending my chart message as well.

## 2024-07-09 NOTE — PROGRESS NOTES
CHIEF COMPLAINT:    Chief Complaint   Patient presents with    Musculoskeletal Problem     Pain and weakness in legs    Back Pain     Lower back pain       HISTORY OF PRESENT ILLNESS:    Danielle presents today, July 09, 2024, for lower back pain.      Intermittent lower back pain ongoign for years.  This episode began April.  Over past two months, lower back pain is worsening.  Pain involves lower back, radiating into each leg.  Described as sharp and burning.  Increased frequency of muscle spasms, interfering with strength.  Reports weakness and difficulty walking, especially when on tip toes.      Rates lower back pain 9/10  Rates leg pain 7/10 bilaterally    ALLERGIES:  Allergies   Allergen Reactions    Peanut-Containing Drug Products HIVES and SHORTNESS OF BREATH     Hives, asthma attack    Pcn [Penicillins] UNKNOWN     HAPPENED AS A CHILD       CURRENT MEDICATIONS:  Current Outpatient Medications   Medication Sig Dispense Refill    albuterol 108 (90 Base) MCG/ACT Inhalation Aero Soln Inhale 2 puffs into the lungs every 4 to 6 hours as needed for Wheezing. 3 each 3    HUMALOG KWIKPEN 200 UNIT/ML Subcutaneous Solution Pen-injector Do NOT use until trained by diabetes educator in office. Use to fill insulin pump with up to 130u/day. 60 mL 1    sulfamethoxazole-trimethoprim DS (BACTRIM DS) 800-160 MG Oral Tab per tablet Take 1 tablet by mouth 2 (two) times daily. 1 tab bid x 7 days (Patient not taking: Reported on 6/24/2024) 14 tablet 0    escitalopram 20 MG Oral Tab TAKE 1 TABLET EVERY MORNING 90 tablet 1    ALPRAZolam 0.5 MG Oral Tab Take 1-2 tablets (0.5-1 mg total) by mouth daily as needed for Sleep or Anxiety. 42 tablet 0    Tiotropium Bromide Monohydrate (SPIRIVA RESPIMAT) 2.5 MCG/ACT Inhalation Aero Soln Inhale 2 puffs into the lungs in the morning and 2 puffs before bedtime.      predniSONE 10 MG Oral Tab 40mg for 3 days, then 30mg for 3 days, then 20mg for 3 days, then 10mg for 3 days, then stop (Patient  not taking: Reported on 6/24/2024) 30 tablet 0    gabapentin 100 MG Oral Cap Take 2 capsules (200 mg total) by mouth nightly. Takes Gabapentin 100mg in the morning and 200mg at night      gabapentin 100 MG Oral Cap Take 1 capsule (100 mg total) by mouth every morning. Takes Gabapentin 100mg in the morning and 200mg at night      metoprolol succinate ER 25 MG Oral Tablet 24 Hr Take 1 tablet (25 mg total) by mouth nightly.      BREO ELLIPTA 200-25 MCG/ACT Inhalation Aerosol Powder, Breath Activated Inhale 1 puff into the lungs daily. 3 each 1    montelukast 10 MG Oral Tab Take 1 tablet (10 mg total) by mouth nightly. 90 tablet 1    Continuous Blood Gluc Sensor (DEXCOM G6 SENSOR) Does not apply Misc Apply to skin every 10 days 9 each 3    Continuous Blood Gluc Transmit (DEXCOM G6 TRANSMITTER) Does not apply Misc 1 each by Other route every 3 (three) months. 1 each 1    Insulin Disposable Pump (OMNIPOD 5 G6 PODS, GEN 5,) Does not apply Misc 1 each every other day. 45 each 1    Insulin NPH, Human,, Isophane, (HUMULIN N KWIKPEN) 100 UNIT/ML Subcutaneous Suspension Pen-injector Pair with prednisone only. Up to 30 units a day max. 9 mL 0    pantoprazole 40 MG Oral Tab EC Take 1 tablet (40 mg total) by mouth before breakfast. (Patient taking differently: Take 1 tablet (40 mg total) by mouth nightly.) 90 tablet 3    rivaroxaban (XARELTO) 20 MG Oral Tab Take 1 tablet (20 mg total) by mouth nightly. TAKE AT BEDTIME 90 tablet 3    glucagon (GVOKE HYPOPEN 1-PACK) 1 MG/0.2ML Subcutaneous SUBQ injection Inject 0.2 mL (1 mg total) into the skin once as needed for Low blood glucose. 0.2 mL 1    amLODIPine 2.5 MG Oral Tab Take 1 tablet (2.5 mg total) by mouth daily.      Mepolizumab (NUCALA SC) Inject into the skin every 30 (thirty) days. (Patient not taking: Reported on 6/24/2024)      Azelastine HCl 0.1 % Nasal Solution 1 spray by Nasal route in the morning and 1 spray before bedtime.      SUMAtriptan Succinate 50 MG Oral Tab TAKE 1  TABLET BY MOUTH EVERY 2 HOURS AS NEEDED FOR  MIGRAINE.  DO  NOT  EXCEED  200MG  IN  24  HOURS 9 tablet 3    Clopidogrel Bisulfate 75 MG Oral Tab Take 1 tablet (75 mg total) by mouth nightly. nightly      losartan 100 MG Oral Tab Take 1 tablet (100 mg total) by mouth daily.      ezetimibe 10 MG Oral Tab Take 1 tablet (10 mg total) by mouth nightly.      Fluticasone Propionate 50 MCG/ACT Nasal Suspension 1 spray by Each Nare route 2 (two) times daily.      cetirizine (ZYRTEC) 10 MG Oral Tab Take 1 tablet (10 mg total) by mouth nightly.         MEDICAL HISTORY:  Past Medical History:    Allergic rhinitis    Allergic rhinitis, cause unspecified    Anemia    Anxiety    Anxiety and depression    Asthma (HCC)    Atherosclerosis of coronary artery    Laguerre's esophagus    Churg-Shania syndrome (HCC)    Colloid cyst of brain (HCC)    Congestive heart disease (HCC)    Coronary artery disease of native heart with stable angina pectoris (HCC)    Depression    Diabetes (HCC)    Diabetes mellitus (HCC)    Diverticulosis of large intestine    Essential hypertension    Gastroparesis    GERD    Hematemesis with nausea    2017, No EGD, Hgb stable    High cholesterol    History of blood clots    History of pulmonary embolus (PE)    History of recurrent deep vein thrombosis (DVT)    Hyperlipidemia    Hypertension    IBS    LGSIL (low grade squamous intraepithelial dysplasia)    Pap neg 2014    Migraines    perfumes    Moderate persistent asthma without complication (HCC)    DESOUZA (nonalcoholic steatohepatitis)    NSTEMI (non-ST elevated myocardial infarction) (HCC)    FELTON in LAD    Obesity    Osteoarthritis    Patella-femoral syndrome, bilateral knee    Pneumonia due to organism    Pulmonary embolism (HCC)    Sleep apnea    Thyroid disease     Past Surgical History:   Procedure Laterality Date    Angioplasty (coronary)      Breast surgery procedure unlisted      cyst removed    Cath drug eluting stent      Cholecystectomy  04/10/2011     PERFORMED BY DR KRAFT-LAPAROSCOPIC    Colonoscopy  2012    polyps    Colonoscopy N/A 2016    Procedure: COLONOSCOPY;  Surgeon: Kishor Stout MD;  Location:  ENDOSCOPY    Colposcopy, cervix w/upper adjacent vagina; w/endocervical curettage  2011    WNL    Cyst aspiration left  2000    Hernia surgery            Other surgical history      sinus surgery    Other surgical history      CYST REMOVED ON LEFT WRIST 17 YRS AGO    Sinus surgery        Skin surgery      Upper gi endoscopy,exam       Family History   Problem Relation Age of Onset    Heart Disorder Father     Hypertension Mother     Lipids Mother     Stroke Mother     Anemia Mother     Dementia Mother     Heart Disorder Mother     Other (Other) Mother     Bleeding Disorders Mother     Hypertension Sister     Obesity Sister     Stroke Sister     Other (Other) Sister         stroke,pe    Other (Other) Brother         kidney stones    Colon Cancer Maternal Grandfather     Colon Cancer Maternal Aunt     Anemia Daughter     Asthma Daughter     Depression Daughter     Asthma Daughter     Bleeding Disorders Sister     Stroke Sister      Family Status   Relation Status    Fa     Mo Alive    Sis Alive    Bro (Not Specified)    MGFA (Not Specified)    Mat Aunt (Not Specified)    Lissette Alive    Lissette Alive    Sis (Not Specified)     Social History     Socioeconomic History    Marital status:     Number of children: 3   Occupational History    Occupation: homemaker   Tobacco Use    Smoking status: Never     Passive exposure: Current    Smokeless tobacco: Never    Tobacco comments:     2nd hand with Father and now    Vaping Use    Vaping status: Never Used   Substance and Sexual Activity    Alcohol use: No    Drug use: No    Sexual activity: Never     Partners: Male     Birth control/protection: I.U.D.     Social Determinants of Health     Food Insecurity: No Food Insecurity (2024)    Food Insecurity     Food Insecurity: Never  true   Transportation Needs: No Transportation Needs (4/9/2024)    Transportation Needs     Lack of Transportation: No   Physical Activity: Inactive (2/2/2021)    Received from [facility], [facility]    Exercise Vital Sign     Days of Exercise per Week: 0 days     Minutes of Exercise per Session: 0 min   Housing Stability: Low Risk  (4/9/2024)    Housing Stability     Housing Instability: No       ROS:  GENERAL:  Denies recorded temperatures greater than 100.5F  RESPIRATORY:  Denies difficulty breathing  CARDIAC:  Denies chest pain with exertion    VITALS:   /68   Pulse 93   Temp 98.1 °F (36.7 °C) (Temporal)   Resp 16   Ht 5' 8\" (1.727 m)   Wt 271 lb (122.9 kg)   LMP 09/23/2004   SpO2 96%   BMI 41.21 kg/m²    Reviewed by Joyce Whaley MS, APRN, FNP-BC    PHYSICAL EXAM:    Constitutional:       Appears in pain, grimacing with movement.  Sitting upright on chair.  Well developed, well nourished.  HEENT:      Facial features symmetric. Normocephalic and atraumatic  Cardiovascular:      Heart sounds: Regular rate and rhythm without murmur     No edema of BLE  Back:     Spine without obvious deformities or discoloration.  No vertebral tenderness.       Mild bilateral SI joint tenderness.     Surrounding musculature semi-firm.     Demonstrates ability to walk on heels and toes.  Reports pain with walking on toes.       Neuro:       General: No focal deficit present. Speech is clear and organized.     Mental Status: Alert, demonstrates ability to recall information from past and present events     Sensory: Sensation is intact. Monofilament test normal, patient reports decreased sensation, right > left.     Motor: Motor function is intact. Movements are smooth and controlled without ataxia.     Coordination: Coordination is intact. Coordination normal.      Gait: Gait is intact. Gait steady and antalgic.      Deep Tendon Reflexes: Reflexes 2+ bilaterally.  Skin:     Warm and dry  without jaundice or rashes.  Psychiatric:         Alert and oriented.  Calm and cooperative.  Speech is clear.     ASSESSMENT & PLAN:    1. Acute bilateral low back pain with bilateral sciatica  - MRI SPINE LUMBAR (CPT=72148); Future  - XR LUMBAR SPINE (MIN 4 VIEWS) (CPT=72110); Future  - Pain Management Referral - In Network    2. Decreased sensation of foot  Monofilament normal, patient reports feeling decreased sensation left < right    3. Muscle spasm of back  - cyclobenzaprine 10 MG Oral Tab; Take 1 tablet (10 mg total) by mouth nightly as needed for Muscle spasms.  Dispense: 10 tablet; Refill: 0    4. Anxiety  - ALPRAZolam 0.5 MG Oral Tab; Take 1-2 tablets (0.5-1 mg total) by mouth daily as needed for Sleep or Anxiety.  Dispense: 42 tablet; Refill: 0    5. Moderate episode of recurrent major depressive disorder (HCC)  - ALPRAZolam 0.5 MG Oral Tab; Take 1-2 tablets (0.5-1 mg total) by mouth daily as needed for Sleep or Anxiety.  Dispense: 42 tablet; Refill: 0    Declines increase of gabapentin today  Muscle relaxer PRN  Begin PT  Follow-up with pain management to consider injections  MRI if failure to improve or worsening symptoms

## 2024-07-09 NOTE — TELEPHONE ENCOUNTER
Letter mailed to patient reminding her she is due for her yearly physical per patient reminder from Joyce.     Mayi Asencio, CMA  P Joyce Whaley Nurse    Can we set a reminder for June/July to call Danielle to let her know I'd like to see her for a preventive care exam sometime before October 2024?  I know she is busy seeing other specialists; however, I would like to check in this year        Electronically signed by Joyce Whaley APRN at 4/24/2024 10:26 AM

## 2024-08-02 ENCOUNTER — PATIENT MESSAGE (OUTPATIENT)
Facility: CLINIC | Age: 59
End: 2024-08-02

## 2024-08-02 NOTE — TELEPHONE ENCOUNTER
From: Danielle Jerome  To: Ana Luisa Miller  Sent: 8/2/2024 12:21 PM CDT  Subject: [facility] Medical Certificate    This is embarrassing however my gas is going to be shut off, and I do not have the money to pay until the end of the month when my social security check arrives. Can Dr Miller fill out this form, so my Gas is not turned off and I have time to pay?    Thanks!  [place]

## 2024-08-05 NOTE — TELEPHONE ENCOUNTER
Obtained form form patient and signed by Dr. Vitale who is covering for Dr. Miller.  [facility] form faxed to Deo @[phone]

## 2024-08-07 ENCOUNTER — OFFICE VISIT (OUTPATIENT)
Dept: RHEUMATOLOGY | Facility: CLINIC | Age: 59
End: 2024-08-07
Payer: COMMERCIAL

## 2024-08-07 VITALS
WEIGHT: 273 LBS | HEIGHT: 68 IN | OXYGEN SATURATION: 97 % | DIASTOLIC BLOOD PRESSURE: 72 MMHG | BODY MASS INDEX: 41.37 KG/M2 | HEART RATE: 98 BPM | RESPIRATION RATE: 18 BRPM | TEMPERATURE: 99 F | SYSTOLIC BLOOD PRESSURE: 138 MMHG

## 2024-08-07 DIAGNOSIS — M51.36 DDD (DEGENERATIVE DISC DISEASE), LUMBAR: ICD-10-CM

## 2024-08-07 DIAGNOSIS — R06.02 SHORTNESS OF BREATH: ICD-10-CM

## 2024-08-07 DIAGNOSIS — R53.81 PHYSICAL DECONDITIONING: ICD-10-CM

## 2024-08-07 DIAGNOSIS — M25.50 POLYARTHRALGIA: ICD-10-CM

## 2024-08-07 DIAGNOSIS — M79.7 FIBROMYALGIA: Primary | ICD-10-CM

## 2024-08-07 DIAGNOSIS — R79.82 ELEVATED C-REACTIVE PROTEIN (CRP): ICD-10-CM

## 2024-08-07 DIAGNOSIS — M79.2 NEUROPATHIC PAIN: ICD-10-CM

## 2024-08-07 DIAGNOSIS — R70.0 ELEVATED SED RATE: ICD-10-CM

## 2024-08-07 PROCEDURE — 3078F DIAST BP <80 MM HG: CPT | Performed by: INTERNAL MEDICINE

## 2024-08-07 PROCEDURE — 99215 OFFICE O/P EST HI 40 MIN: CPT | Performed by: INTERNAL MEDICINE

## 2024-08-07 PROCEDURE — 3008F BODY MASS INDEX DOCD: CPT | Performed by: INTERNAL MEDICINE

## 2024-08-07 PROCEDURE — G2211 COMPLEX E/M VISIT ADD ON: HCPCS | Performed by: INTERNAL MEDICINE

## 2024-08-07 PROCEDURE — 3075F SYST BP GE 130 - 139MM HG: CPT | Performed by: INTERNAL MEDICINE

## 2024-08-07 RX ORDER — DUPILUMAB 300 MG/2ML
INJECTION, SOLUTION SUBCUTANEOUS
COMMUNITY
Start: 2024-08-05

## 2024-08-07 RX ORDER — GABAPENTIN 100 MG/1
CAPSULE ORAL
Qty: 360 CAPSULE | Refills: 0 | Status: SHIPPED | OUTPATIENT
Start: 2024-08-07

## 2024-08-07 NOTE — PROGRESS NOTES
RHEUMATOLOGY FOLLOW UP   Date of visit: 08/07/2024  ?  Chief Complaint   Patient presents with    Fibromyalgia Syndrome     7 month f/u. Not feeling great. Having overall pain. More weakness when walking up and down stairs or more active. Still having shortness of breath. Converted rapid score of 5.0     ?  ASSESSMENT, DISCUSSION & PLAN   Assessment:  1. Fibromyalgia    2. Polyarthralgia    3. Elevated sed rate    4. Elevated C-reactive protein (CRP)    5. Neuropathic pain    6. Shortness of breath    7. DDD (degenerative disc disease), lumbar    8. Physical deconditioning      Discussion:  Ms. Danielle Jerome is a 60 yo woman who presented for evaluation of joint pain. She has been suffering from chronic sinus disease as well as chronic lung problems for several years. 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 Typically for eosinophilic granulomatosis with polyangiitis patients have eosinophilia, as well and obstructive airway disease, nasal polyps, eosinophilic predominant inflammation and either mononeuritis multiplex or other motor neuropathy not due to radiculopathy.  This is per the diagnosis and classification criteria and vasculitis study (DC VAS) unfortunately for this patient, she also is morbidly obese and has a history of diabetes so some of the neuropathic symptoms might be related to diabetic neuropathy versus a radiculopathy from her lower back.  On her prior exam, she does not have obvious signs of active synovitis to warrant immunosuppression.  A lot of her potential EGPA symptoms previously improved since starting medication through pulmonology.  She now is dealing with worsened sinus and asthma symptoms. Which has improved since switching back to dupixent instead of nucala.     At this time, discussed at length that it is hard to determine etiology  to symptoms since her reivew of systems is pan-positive. She has multiple health concerns and symptoms.   From my standpoint, there are no definitive signs of active extrapulmonary EGPA to warrant immunosuppression- I actually worry given her lung status and obesity that immunosuppression with have more negative effects.  She was instructed to work on significant weight loss since she continues to gain weight. This is likely worsening her arthritic symptoms and pain overall.   She has worsened depression and clear signs of fibromyalgia.  Will have her wean off escitalopram slowly and will plan on starting duloxetine in its place. Hopefully this will help with mood, fibro pain and pain from OA/DDD.   In the meantime, will increase gabapentin to 300mg nightly and if tolerating, can add 100mg in AM if not too sedating.   She will also consider weight loss medication to help with both her diabetes and morbid obesity.   She is unlikely to be a surgical candidate at this point for bariatric surgery  Strongly recommended some exercise since she is predominantly sedentary- PT ordered for general deconditioning.   Reminded to get labs done that were previously ordered.     Follow up in 3 months or sooner as needed    Patient verbalized understanding of above instructions. No further questions at this time.    Code selection for this visit was based on time spent (42min) on date of service in preparing to see the patient, obtaining and/or reviewing separately obtained history, performing a medically appropriate examination, counseling and educating the patient/family/caregiver, ordering medications or testing, referring and communicating with other healthcare providers, documenting clinical information in the [facility] HR, independently interpreting results and communicating results to the patient/family/caregiver and care coordination with the patient's other providers.    ?  Plan:  Diagnoses and all orders for this  visit:    Fibromyalgia  -     gabapentin 100 MG Oral Cap; Takes Gabapentin 100mg in the morning and 300mg at night  -     Physical Therapy Referral - [facility]    Polyarthralgia  -     gabapentin 100 MG Oral Cap; Takes Gabapentin 100mg in the morning and 300mg at night  -     Physical Therapy Referral - [facility]    Elevated sed rate    Elevated C-reactive protein (CRP)    Neuropathic pain  -     gabapentin 100 MG Oral Cap; Takes Gabapentin 100mg in the morning and 300mg at night    Shortness of breath    DDD (degenerative disc disease), lumbar    Physical deconditioning  -     Physical Therapy Referral - [facility]              Return in about 3 months (around 11/7/2024).  ?  HPI   Danielle Jerome is a 59 year old female with the following active problems who is referred for rheumatologic evaluation due to joint pain, back pain and possible EGPA. Her work up was grossly negative. Recommended further tx for her fibro. Presents for follow up today.     Since her last visit, she has been doing okay.   After her last visit, she was admitted to the hospital and required ICU visit.   Still suffering from shortness of breath  + fatigue  Increase cysts over the left jawline and around pannus left sided and along underwear line. Only once opened/oozed. Not sure if she gets in armpits but definitely in her groin.      + continues to gain weight  Interested in getting on GLP1 inhibitor but provider worried about her hx of gastroparesis     States she feels very weak and in pain diffusely   Has not done PT in a long time.   Has plans for MRI for her spine- has been told DDD based off xray. Having continued spasming for the lower back.   Limited ROM for the left shoulder.     Does feel better when on prednisone.     Denies recent rashes except some bumps over the upper arm and over the stomach. Denies scarring.    Some increased bruising   Denies epistaxis but blood when blowing nose  Denies hematuria.    Denies blood in stools.         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 Told \"explosive asthma.\" Had been on prednisone at multiple dosages over the years. Finally got off steroids with dupixent which has helped with the asthma part.     Seen at [facility] and saw pulmonologist told possible EGPA but hard to tell for sure. Recommended Nucala but insurance did not approve.   Has started and on her 3rd injection planned with improvement of her breathing.     Hx of blood clots. States family hx of blood clots. Hx of blood clot in her heart while on xarelto- now on plavix in addition to xarelto (2017)  Hx of fetal demise in 2002 (at 23weeks) and told multiple PE's (first clot). Shortly after, had multiple incidents and eventually started on long term anticoagulation. Started warfarin and prednisone but hard to get control of INR. So decision made by hematology to start xarelto around 2012.   No recent clots.   Hx of at least 3 miscarriages including fetal demise.   Able 2 other children after the fetal demise and one prior to that.     Joint pain has been present for about 2 years. Reports feeling throughout the body- legs, knees, hips, thighs, shoulders and finger.   Describes as numbness in the left leg, attributes to diabetes (which she feels induced by prednisone).   Pain described also as sharp.  Denies swelling of the joints but feels puffy in general  Has pain to the touch   Has tried PT, muscle relaxants and naproxen without improvement.  Unclear if she took gabapentin     Seen by [facility] and recommended PT.     + skin rashes over the face. Dx with rosacea. Feels worsened when pain present. Tried topicals in the past but felt more burning with it. Gets worse when over heated or sun exposure.   Denies recurrence of hives like before and no scarring from rashes   + hair loss/thinning over the past 10 years.  Denies bald spots.   + hx of anemia over the years   + flank pain but no dysuria, frequency, hematuria  + hx of fatty liver   + hx of colloid cyst in brain. There was previously concern for encephalitis in the 80s.   + bumps under the skin over the legs and arms- never biopsied, can be tender to the touch   + hx of frequent cysts   + hx of reflux, takes pantoprazole but also has significant hiatal hernia. Told not surgical candidate due to other complications   + infrequent diarrhea/constipation   + hx of cataracts  + told edema of the eyes from the prednisone   + pain over achilles and plantar fasciitis   + hx of \"tumor\" on bottom of right foot (?neuroma, pt unclear)   + back and neck pain. Feels worsened with activity. Has hx of DDD of lumbar spine.   + dry eyes, feels sandpaper in the eyes, uses pataday drops. Has tried visine. Has not other artifical tears. Not severe daily.   + dry mouth, constant. Does have hx of recurrent cavities. Grinds teeth and hx of fractures of the teeth.   + angle of jaw pain   + frequent sinus infections and hx of nasal polyps  Denies epistaxis   + chest pain and palpitations chronically. Has had CAD with stenting in 2017. Had cardiac PET scan through [facility], told follow up angiogram negative.     LMP in 2008, cannot recall when last BMD was done     Denies new skin nodule formation.  The patient denies oral or nasal ulcers, elevated or scarring rashes, Raynaud's phenomenon, prior renal or liver disease, or history of seizures.  Denies nonhealing ulcers on the fingertips, trouble swallowing, or severe acid reflux.  The patient denies any history of uveitis, nodular painful shin bruises,  psoriatic lesions, spooning or pitting of the nails, history of dactylitis.   Denies swelling of the cheeks or under the jawbone. No prior history of punctal plugs being applied.  No fevers, chills, lymphadenopathy, night sweats        Past Medical History:  Past Medical History:    Allergic rhinitis     Allergic rhinitis, cause unspecified    Anemia    Anxiety    Anxiety and depression    Asthma (HCC)    Atherosclerosis of coronary artery    Laguerre's esophagus    Churg-Shania syndrome (HCC)    Colloid cyst of brain (HCC)    Congestive heart disease (HCC)    Coronary artery disease of native heart with stable angina pectoris (HCC)    Depression    Diabetes (HCC)    Diabetes mellitus (HCC)    Diverticulosis of large intestine    Essential hypertension    Gastroparesis    GERD    Hematemesis with nausea    , No EGD, Hgb stable    High cholesterol    History of blood clots    History of pulmonary embolus (PE)    History of recurrent deep vein thrombosis (DVT)    Hyperlipidemia    Hypertension    IBS    LGSIL (low grade squamous intraepithelial dysplasia)    Pap neg     Migraines    perfumes    Moderate persistent asthma without complication (HCC)    DESOUZA (nonalcoholic steatohepatitis)    NSTEMI (non-ST elevated myocardial infarction) (HCC)    FELTON in LAD    Obesity    Osteoarthritis    Patella-femoral syndrome, bilateral knee    Pneumonia due to organism    Pulmonary embolism (HCC)    Sleep apnea    Thyroid disease     Past Surgical History:  Past Surgical History:   Procedure Laterality Date    Angioplasty (coronary)      Breast surgery procedure unlisted      cyst removed    Cath drug eluting stent      Cholecystectomy  04/10/2011    PERFORMED BY DR KRAFT-LAPAROSCOPIC    Colonoscopy      polyps    Colonoscopy N/A 2016    Procedure: COLONOSCOPY;  Surgeon: Kishor Stout MD;  Location:  [facility]    Colposcopy, cervix w/upper adjacent vagina; w/endocervical curettage  2011    WNL    Cyst aspiration left  2000    Hernia surgery            Other surgical history      sinus surgery    Other surgical history      CYST REMOVED ON LEFT WRIST 17 YRS AGO    Sinus surgery        Skin surgery      Upper gi endoscopy,exam       Family History:  Family History   Problem Relation Age of Onset     Heart Disorder Father     Hypertension Mother     Lipids Mother     Stroke Mother     Anemia Mother     Dementia Mother     Heart Disorder Mother     Other (Other) Mother     Bleeding Disorders Mother     Hypertension Sister     Obesity Sister     Stroke Sister     Other (Other) Sister         stroke,pe    Other (Other) Brother         kidney stones    Colon Cancer Maternal Grandfather     Colon Cancer Maternal Aunt     Anemia Daughter     Asthma Daughter     Depression Daughter     Asthma Daughter     Bleeding Disorders Sister     Stroke Sister      Social History:  Social History     Socioeconomic History    Marital status:     Number of children: 3   Occupational History    Occupation: homemaker   Tobacco Use    Smoking status: Never     Passive exposure: Current    Smokeless tobacco: Never    Tobacco comments:     2nd hand with Father and now    Vaping Use    Vaping status: Never Used   Substance and Sexual Activity    Alcohol use: No    Drug use: No    Sexual activity: Never     Partners: Male     Birth control/protection: I.U.D.     Social Determinants of Health     Food Insecurity: No Food Insecurity (4/9/2024)    Food Insecurity     Food Insecurity: Never true   Transportation Needs: No Transportation Needs (4/9/2024)    Transportation Needs     Lack of Transportation: No   Physical Activity: Inactive (2/2/2021)    [facility]    Exercise Vital Sign     Days of Exercise per Week: 0 days     Minutes of Exercise per Session: 0 min   Housing Stability: Low Risk  (4/9/2024)    Housing Stability     Housing Instability: No     Medications:  Outpatient Medications Marked as Taking for the 8/7/24 encounter (Office Visit) with Maria E Gonzalez DO   Medication Sig Dispense Refill    DUPIXENT 300 MG/2ML Subcutaneous Solution Pen-injector       gabapentin 100 MG Oral Cap Takes Gabapentin 100mg in the morning and 300mg at night 360 capsule 0    cyclobenzaprine  10 MG Oral Tab Take 1 tablet (10 mg total) by mouth nightly as needed for Muscle spasms. 10 tablet 0    ALPRAZolam 0.5 MG Oral Tab Take 1-2 tablets (0.5-1 mg total) by mouth daily as needed for Sleep or Anxiety. 42 tablet 0    albuterol 108 (90 Base) MCG/ACT Inhalation Aero Soln Inhale 2 puffs into the lungs every 4 to 6 hours as needed for Wheezing. 3 each 3    HUMALOG KWIKPEN 200 UNIT/ML Subcutaneous Solution Pen-injector Do NOT use until trained by diabetes educator in office. Use to fill insulin pump with up to 130u/day. 60 mL 1    escitalopram 20 MG Oral Tab TAKE 1 TABLET EVERY MORNING 90 tablet 1    Tiotropium Bromide Monohydrate (SPIRIVA RESPIMAT) 2.5 MCG/ACT Inhalation Aero Soln Inhale 2 puffs into the lungs in the morning and 2 puffs before bedtime.      metoprolol succinate ER 25 MG Oral Tablet 24 Hr Take 1 tablet (25 mg total) by mouth nightly.      BREO ELLIPTA 200-25 MCG/ACT Inhalation Aerosol Powder, Breath Activated Inhale 1 puff into the lungs daily. 3 each 1    montelukast 10 MG Oral Tab Take 1 tablet (10 mg total) by mouth nightly. 90 tablet 1    Insulin NPH, Human,, Isophane, (HUMULIN N KWIKPEN) 100 UNIT/ML Subcutaneous Suspension Pen-injector Pair with prednisone only. Up to 30 units a day max. 9 mL 0    pantoprazole 40 MG Oral Tab EC Take 1 tablet (40 mg total) by mouth before breakfast. (Patient taking differently: Take 1 tablet (40 mg total) by mouth nightly.) 90 tablet 3    rivaroxaban (XARELTO) 20 MG Oral Tab Take 1 tablet (20 mg total) by mouth nightly. TAKE AT BEDTIME 90 tablet 3    amLODIPine 2.5 MG Oral Tab Take 1 tablet (2.5 mg total) by mouth daily.      Azelastine HCl 0.1 % Nasal Solution 1 spray by Nasal route in the morning and 1 spray before bedtime.      SUMAtriptan Succinate 50 MG Oral Tab TAKE 1 TABLET BY MOUTH EVERY 2 HOURS AS NEEDED FOR  MIGRAINE.  DO  NOT  EXCEED  200MG  IN  24  HOURS 9 tablet 3    Clopidogrel Bisulfate 75 MG Oral Tab Take 1 tablet (75 mg total) by mouth  nightly. nightly      losartan 100 MG Oral Tab Take 1 tablet (100 mg total) by mouth daily.      ezetimibe 10 MG Oral Tab Take 1 tablet (10 mg total) by mouth nightly.      Fluticasone Propionate 50 MCG/ACT Nasal Suspension 1 spray by Each Nare route 2 (two) times daily.      cetirizine (ZYRTEC) 10 MG Oral Tab Take 1 tablet (10 mg total) by mouth nightly.       Modified Medications    Modified Medication Previous Medication    GABAPENTIN 100 MG ORAL CAP gabapentin 100 MG Oral Cap       Takes Gabapentin 100mg in the morning and 300mg at night    Take 2 capsules (200 mg total) by mouth nightly. Takes Gabapentin 100mg in the morning and 200mg at night     Medications Discontinued During This Encounter   Medication Reason    gabapentin 100 MG Oral Cap     Mepolizumab (NUCALA SC)     gabapentin 100 MG Oral Cap      ?  ?  Allergies:  Allergies   Allergen Reactions    Peanut-Containing Drug Products HIVES and SHORTNESS OF BREATH     Hives, asthma attack    Pcn [Penicillins] UNKNOWN     HAPPENED AS A CHILD     ?  REVIEW OF SYSTEMS   ?  Review of Systems   Constitutional:  Positive for malaise/fatigue. Negative for chills, fever and weight loss.   HENT:  Positive for congestion, ear pain and sinus pain.    Eyes:  Positive for blurred vision. Negative for pain and redness.   Respiratory:  Positive for cough (dry), hemoptysis, sputum production and shortness of breath.    Cardiovascular:  Positive for chest pain, palpitations and leg swelling.   Gastrointestinal:  Positive for abdominal pain, constipation, diarrhea and heartburn (improved). Negative for blood in stool and nausea.   Genitourinary:  Positive for frequency and urgency. Negative for dysuria and hematuria.   Musculoskeletal:  Positive for back pain, joint pain, myalgias and neck pain.   Skin:  Positive for itching and rash.   Neurological:  Positive for dizziness, tingling, weakness and headaches.   Endo/Heme/Allergies:  Positive for environmental allergies. Does  not bruise/bleed easily.   Psychiatric/Behavioral:  Positive for depression. The patient is nervous/anxious and has insomnia.      PHYSICAL EXAM   Today's Vitals:  Temperature Blood Pressure Heart Rate Resp Rate SpO2   Temp: 98.5 °F (36.9 °C) BP: 138/72 Pulse: 98 Resp: 18 SpO2: 97 %   ?  Current Weight Height BMI BSA Pain   Wt Readings from Last 1 Encounters:   08/07/24 273 lb (123.8 kg)    Height: 5' 8\" (172.7 cm) Body mass index is 41.51 kg/m². Body surface area is 2.33 meters squared.         Physical Exam  Vitals and nursing note reviewed.   Constitutional:       General: She is not in acute distress.     Appearance: She is well-developed. She is obese. She is not diaphoretic.   HENT:      Head: Normocephalic.   Eyes:      General: No scleral icterus.     Extraocular Movements: Extraocular movements intact.      Conjunctiva/sclera: Conjunctivae normal.   Neck:      Vascular: No JVD.      Trachea: No tracheal deviation.   Cardiovascular:      Rate and Rhythm: Normal rate and regular rhythm.      Heart sounds: Normal heart sounds. No murmur heard.  Pulmonary:      Effort: Pulmonary effort is normal. No respiratory distress.      Breath sounds: Wheezing present.      Comments: Significant wet cough  Musculoskeletal:         General: Tenderness present.      Cervical back: Neck supple.      Comments: No evidence of heberden or aditya nodes of any of the fingers, no basilar joint tenderness of the 1st CMC bilaterally.  Tenderness diffusely - improved slightly   No swelling, redness or restriction of motion of the DIPs, PIPs, MCPs, wrists, elbows, ankles, or joints of the feet.  Bilateral shoulders with full ROM, no evidence of impingement with provocative maneuvers.  SI joints tender. Spinous process tenderness diffusely, tenderness over the greater trochanters.  Bilateral knees with medial joint line tenderness, mild crepitus, no effusion.  Pan positive fibro tender points positive - still present     Lymphadenopathy:      Cervical: No cervical adenopathy.   Skin:     General: Skin is warm and dry.      Findings: No erythema or rash.      Comments: No malar rash   No periungal erythema   Neurological:      Mental Status: She is alert and oriented to person, place, and time.      Cranial Nerves: No cranial nerve deficit.      Gait: Gait normal.   Psychiatric:      Comments: seems depressed       ?  Radiology review:      PROCEDURE:  CT SINUS (CPT=70486)     LOCATION:  [facility]       COMPARISON:  [facility] , CT, CT SINUS (CPT=70486), 2/09/2022, 11:48 AM.     INDICATIONS:  J32.8 Other chronic sinusitis     TECHNIQUE:  Axial thin section noncontrast imaging performed through the paranasal sinuses with coronal and sagittal reconstructed imaging. Dose reduction techniques were used. Dose information is transmitted to the ACR (American College of Radiology)  NRDR (National Radiology Data Registry) which includes the Dose Index Registry.  PATIENT STATED HISTORY: (As transcribed by Technologist)  History of chronic sinusitis. This episode has been going on for the past 5 months with pressure to maxillary and frontal sinus area.         FINDINGS:    NASAL SEPTUM:  Slight rightward nasal septal deviation.  TURBINATES:  No becky bullosa or paradoxical curvature.  UNCINATE PROCESSES:  No deviation or bulla formation.  OMU:  The ostia, infundibula, and hiatus semilunaris are widely patent.  SINUSES:  Mild opacification of the right ethmoid bulla (series 5, image 21).  Large mucous retention cyst within the caudal aspect of the left maxillary sinus in addition to small mucous retention cysts within the caudal aspect of the right maxillary  sinus.  Complete opacification of the right sphenoid sinus with bone remodeling/sclerosis of the right sphenoid sinus walls indicating sequela of chronic inflammation.  Frontal sinuses are clear.  FOVEA ETHMOIDALIS:  The fovea ethmoidalis is intact. The cribriform plate is not low  lying.  OTHER:  Visualized intracranial contents are within normal limits.                   Impression   CONCLUSION:       Paranasal sinus disease, similar compared to 02/09/2022.  This includes diffuse opacification of and bone remodeling of the right sphenoid sinus indicating sequela of chronic inflammation.        Dictated by (CST): Jesus Young MD on 3/12/2023 at 9:10 AM      Finalized by (CST): Jesus Young MD on 3/12/2023 at 9:19 AM       PROCEDURE:  CT CHEST PE AORTA (IV ONLY) (CPT=71260)     COMPARISON:  [facility] , CT, CT ANGIOGRAPHY, CHEST (CPT=71275), 5/11/2020, 4:17 PM.     INDICATIONS:  I26.99 Pulmonary embolism (HCC)     TECHNIQUE:  CT images were obtained with non-ionic intravenous contrast material. Dose reduction techniques were used. Dose information is transmitted to the ACR (American College of Radiology) NRDR (National Radiology Data Registry) which includes the  Dose Index Registry.     PATIENT STATED HISTORY:(As transcribed by Technologist)  Patient has shortness of breath and history of PE.      CONTRAST USED:  100cc of Omnipaque 350     FINDINGS:    LUNGS:  Biapical pleural parenchymal scarring.  No focal consolidation or new nodule appreciated.    VASCULATURE:  No visible pulmonary arterial thrombus or attenuation.    FATUMA:  No mass or adenopathy.    MEDIASTINUM:  Stable 1.4 cm left lobe thyroid nodule.  No adenopathy.    CARDIAC:  No enlargement or pericardial thickening.  Coronary artery stent.  PLEURA:  No mass or effusion.    THORACIC AORTA:  No aneurysm.    CHEST WALL:  No mass or axillary adenopathy.    LIMITED ABDOMEN:  Limited images of the upper abdomen demonstrate a small to moderate hiatal hernia.    BONES:  No bony lesion or fracture.                     Impression   CONCLUSION:    1. No CT evidence for pulmonary embolism.        Dictated by (CST): Alexandria Trotter MD on 4/08/2022 at 2:23 PM      Finalized by (CST): Alexandria Trotter MD on 4/08/2022 at 2:30 PM       PROCEDURE:  MRI  ABDOMEN+PELVIS (ALL W+WO) (CPT=74183/17269)     COMPARISON:  [facility] , CT, CT ABDOMEN PELVIS IV CONTRAST, NO ORAL ([facility]), 4/14/2021, 1:19 PM.     INDICATIONS:  R63.4 Unintentional weight loss N95.0 Postmenopausal bleeding N85.00 Endometrial hyperplasia     TECHNIQUE:  A comprehensive examination was performed utilizing a variety of imaging planes and imaging parameters to optimize visualization of suspected pathology.  Images were obtained both before and after intravenous gadolinium infusion.       PATIENT STATED HISTORY:(As transcribed by Technologist)  the patient complains of cramping, vaginal bleeding, and weight loss.      CONTRAST USED:  20 mL of Dotarem     FINDINGS:    LIVER:  Diffuse fatty infiltration of the liver/steatosis of the paddle megaly measuring approximately 23 cm in craniocaudal dimension.  There is a small ovoid focus of increased enhancement along the posterior segment of the right lobe inferomedially  measuring 1.6 x 1.2 cm.  On in/out phase imaging, this focus reveals slightly increased T2 signal on out of phase imaging, suggesting a focus of mild fat sparing versus potential small underlying FNH.  Otherwise no significant hepatic lesions are  suspected.  BILIARY:  No visible dilatation or filling defect.    PANCREAS:  No lesion, fluid collection, ductal dilatation, or atrophy.    SPLEEN:  No enlargement or focal lesion.    KIDNEYS:  No renal stones or hydronephrosis.  There are 2 fat containing renal angiomyolipomas within the midpole of the right kidney.  The larger of the 2 measures 0.9 cm.  No additional renal lesions are noted.  ADRENALS:  No mass or enlargement.    AORTA/VASCULAR:  No aneurysm or dissection.    RETROPERITONEUM:  No mass or adenopathy.    BOWEL/MESENTERY:  No visible mass, obstruction, or bowel wall thickening.    ABDOMINAL WALL:  No mass or hernia.    PELVIC NODES:  Normal.  PELVIC ORGANS:  Normal size and configuration to the uterus.  No evidence of endometrial  thickening or junctional zone thickening/abnormalities.  No free fluid or inflammatory changes within the pelvis.  No adnexal lesions are identified.  BONES:  No bony lesion or fracture.    LUNG BASES:  No visible pleural disease.  Lung bases not well assessed with MRI.                  Impression   CONCLUSION:    1. No acute process noted.  2. Diffuse fatty infiltration of the liver/steatosis with hepatomegaly measuring 23 cm in craniocaudal dimension.  There is a small, ovoid focus of increased enhancement along the posterior segment of the right lobe inferomedially measuring 1.6 x 1.2 cm.    This is either representative of a small FNH or small focus of focal fat sparing.  In either case, this focus has a benign appearance.  3. There are 2 fat containing renal angiomyolipomas within the midpole the right kidney measuring 0.9 cm.        Dictated by (CST): Jessica Liu DO on 4/30/2021 at 3:15 PM         Labs:  Lab Results   Component Value Date    WBC 9.7 04/11/2024    RBC 5.38 (H) 04/11/2024    HGB 12.9 04/11/2024    HCT 40.1 04/11/2024    .0 04/11/2024    MPV 9.3 (L) 02/27/2013    MCV 74.5 (L) 04/11/2024    MCH 24.0 (L) 04/11/2024    MCHC 32.2 04/11/2024    RDW 18.4 04/11/2024    NEPRELIM 7.58 04/11/2024    NEUTABS 8.84 (H) 10/17/2018    LYMPHABS 3.67 10/17/2018    EOSABS 0.82 (H) 10/17/2018    BASABS 0.00 10/17/2018    NEUT 65 10/17/2018    LYMPH 27 10/17/2018    MON 2 10/17/2018    EOS 6 10/17/2018    BASO 0 10/17/2018    NEPERCENT 78.5 04/11/2024    LYPERCENT 15.4 04/11/2024    MOPERCENT 2.8 04/11/2024    EOPERCENT 0.0 04/11/2024    BAPERCENT 0.4 04/11/2024    NE 7.58 04/11/2024    LYMABS 1.49 04/11/2024    MOABSO 0.27 04/11/2024    EOABSO 0.00 04/11/2024    BAABSO 0.04 04/11/2024     Lab Results   Component Value Date     (H) 04/12/2024    BUN 24 (H) 04/12/2024    BUNCREA 10.1 06/21/2021    CREATSERUM 0.88 04/12/2024    ANIONGAP 8 04/12/2024    GFR 68 01/20/2018    GFRNAA 94 03/14/2022    GFRAA  108 03/14/2022    CA 8.9 04/12/2024    OSMOCALC 301 (H) 04/12/2024    ALKPHO 65 04/12/2024    AST 20 04/12/2024    ALT 32 04/12/2024    BILT 0.5 04/12/2024    TP 6.0 (L) 04/12/2024    ALB 2.9 (L) 04/12/2024    GLOBULIN 3.1 04/12/2024    AGRATIO 1.7 11/19/2012     04/12/2024    K 4.8 04/12/2024     04/12/2024    CO2 20.0 (L) 04/12/2024       Additional Labs:  12/2023  Vit d 5.3 very low     08/2023  Lupus anticoagulant Positive  B2G and ACL pending  C3 179 normal  C4 43 normal  MPO, KS-3, c-ANCA, p-ANCA negative  ANNAMARIE by IFA negative  CRP 1.99 elevated  ESR 46 elevated    06/2023  No eosinophilia on CBC    06/2022  ANCA screen negative  MPO negative  Proteinase 3 negative  CBC with MCV 77 otherwise normal.  No eosinophilia noted  CRP 16.3 (N<10)  Protein to creatinine ratio 0.07  CMP grossly normal with exception of elevated glucose  UA with glucose otherwise negative for blood or protein  ESR 47 elevated  ANNAMARIE screen negative  CCP negative  RF negative    03/2022  CRP 2.04 (N<0.3)  ESR 11 normal  CK 69 normal    02/2022  ANCA by IFA negative  MPO, KS-3 negative    11/2018   CBC with absolute eosinophil count slightly elevated at 0.65    01/2018  IgA, IgE normal  IgG 659 borderline low    11/2017 ANNAMARIE screen negative  ESR 9 normal  09/2012  Lupus anticoagulant negative  Beta-2 glycoprotein IgM, IgA negative  Anticardiolipin IgG IgM negative      Maria E Gonzalez, DO  [facility]  08/07/2024

## 2024-08-29 ENCOUNTER — PATIENT MESSAGE (OUTPATIENT)
Dept: ENDOCRINOLOGY CLINIC | Facility: CLINIC | Age: 59
End: 2024-08-29

## 2024-08-30 ENCOUNTER — OFFICE VISIT (OUTPATIENT)
Facility: CLINIC | Age: 59
End: 2024-08-30
Payer: COMMERCIAL

## 2024-08-30 VITALS
WEIGHT: 273 LBS | BODY MASS INDEX: 41.37 KG/M2 | SYSTOLIC BLOOD PRESSURE: 128 MMHG | HEIGHT: 68 IN | RESPIRATION RATE: 18 BRPM | HEART RATE: 102 BPM | OXYGEN SATURATION: 99 % | DIASTOLIC BLOOD PRESSURE: 88 MMHG

## 2024-08-30 DIAGNOSIS — E78.2 HYPERLIPIDEMIA, MIXED: ICD-10-CM

## 2024-08-30 DIAGNOSIS — Z46.81 INSULIN PUMP TITRATION: ICD-10-CM

## 2024-08-30 DIAGNOSIS — Z96.41 INSULIN PUMP IN PLACE: ICD-10-CM

## 2024-08-30 DIAGNOSIS — E11.65 TYPE 2 DIABETES MELLITUS WITH HYPERGLYCEMIA, WITH LONG-TERM CURRENT USE OF INSULIN (HCC): Primary | ICD-10-CM

## 2024-08-30 DIAGNOSIS — I10 ESSENTIAL HYPERTENSION: ICD-10-CM

## 2024-08-30 DIAGNOSIS — Z79.4 TYPE 2 DIABETES MELLITUS WITH HYPERGLYCEMIA, WITH LONG-TERM CURRENT USE OF INSULIN (HCC): Primary | ICD-10-CM

## 2024-08-30 DIAGNOSIS — I50.20 HFREF (HEART FAILURE WITH REDUCED EJECTION FRACTION) (HCC): ICD-10-CM

## 2024-08-30 LAB — HEMOGLOBIN A1C: 8.3 % (ref 4.3–5.6)

## 2024-08-30 RX ORDER — SEMAGLUTIDE 0.68 MG/ML
INJECTION, SOLUTION SUBCUTANEOUS
Qty: 9 ML | Refills: 0 | Status: SHIPPED | OUTPATIENT
Start: 2024-08-30

## 2024-08-30 RX ORDER — SEMAGLUTIDE 0.68 MG/ML
INJECTION, SOLUTION SUBCUTANEOUS
Qty: 3 ML | Refills: 0 | Status: SHIPPED | OUTPATIENT
Start: 2024-08-30 | End: 2024-08-30

## 2024-08-30 NOTE — PATIENT INSTRUCTIONS
Follow up plan:  With TENZIN Damon: Return in about 6 weeks (around 10/11/2024) for DM follow up.    - complete your diabetes eye exam. This exam is critical to preventing and treating eye conditions caused by diabetes that could potentially lead to vision loss. Once complete, please call your eye care provider and ask them to fax your latest report to our office. This will help us update our records. Our fax number is: [phone]       Omnipod 5 + Dexcom in [employer] + U200 HUMALOG  Time ICR ISF Active Insulin Time Target   MN 10 --> 8 32 2.5 --> 2h 110   9A 8 --> 7        - Start Ozempic 0.25mg once weekly for four weeks, on fifth week you can inject 0.5mg once if tolerating  - Take 0.25mg weekly x 4 weeks  - Take 0.5mg weekly x 4 weeks  - At the 8 week sarah- check in how you're feeling- if doing well we will increase to 1mg weekly    - The dosing for Ozempic is 0.25 --> 0.5mg --> 1mg --> 2mg. Each of these doses must be taken for a minimum of four weeks. This is increased slowly on purpose to ensure the patient is tolerating it.   The doses often depend on how patients are tolerating it, what their weight loss goals are, and what their blood sugars are. If you are wanting a dose adjustment, we will need to have an office visit in order to determine if appropriate to increase the dose, and what other medications would need adjusting. This visit can be done as an office visit or video visit through [facility].     One of the most common side effects of GLP1 medications (including mounjaro, Ozempic, Trulicity, Rybelsus) is nausea and GI upset.   Here is some advice to reduce these side effects:  - if you're having nausea and ate a large meal before the nausea, the volume of food may be too much for your stomach to handle, reduce meal size  - practice mindfulness to stop eating once full  - avoid eating when not hungry  - avoid high-fat or spicy food (particularly during the initial dose-escalation period)  -  moderate your intake of alcohol and fizzy drinks (particularly in the context of nausea and heartburn)  - If you have constipation at baseline, make sure you are drinking enough water and eating enough fiber  - if you notice some nausea symptoms and haven't eaten in a while, make sure you are eating at least three meals per day, can try snacking on high protein snack item like yogurt, string cheese, cottage cheese, protein shake  - If you have made these changes and you still notice nausea, please contact the clinic           Updated/current diabetes medication instructions:  Diabetic Medications               semaglutide (OZEMPIC, 0.25 OR 0.5 MG/DOSE,) 2 MG/3ML Subcutaneous Solution Pen-injector (Taking) Take 0.25mg once weekly for four weeks. After four weeks, increase to 0.5mg once weekly.    HUMALOG KWIKPEN 200 UNIT/ML Subcutaneous Solution Pen-injector (Taking) Do NOT use until trained by diabetes educator in office. Use to fill insulin pump with up to 130u/day.    Insulin NPH, Human,, Isophane, (HUMULIN N KWIKPEN) 100 UNIT/ML Subcutaneous Suspension Pen-injector (Taking) Pair with prednisone only. Up to 30 units a day max.    glucagon (GVOKE HYPOPEN 1-PACK) 1 MG/0.2ML Subcutaneous SUBQ injection Inject 0.2 mL (1 mg total) into the skin once as needed for Low blood glucose.            Office phone number: [phone]; phones are open Monday-Friday 8:30-4:30.   Thank you for visiting our office. We look forward to working with you to reach your health goals. As a reminder, if you need refills, please request early so there is enough time to process the request. We ask that you provide at least 5 days' notice before a refill is due, so there is time to send a request to pharmacy, process the refill, and ensure there are no other problems with obtaining the medication (backorders, prior authorization paperwork, etc). Routine refills will not be addressed on weekends, so please submit these requests during the  week.    Blood sugar targets:  Before breakfast: , 2 hours after meals: <180 (preferably <150), A1C goal: <7.0%  Time in Range goal is higher than 80% if using a continuous glucose monitor (Dexcom or Monica).  Time in Range can be found within the Dexcom G7 abrahan, on Clarity if using Dexcom G6, or on LibreView if using Monica.    HOW TO TREAT LOW BLOOD SUGAR (Hypoglycemia)  Low blood sugar= Less than 70, or if you start to have symptoms (below)  Symptoms: Shaking or trembling, fast heart rate, extreme hunger, sweating, confusion/difficulty concentrating, dizziness.    How to treat a low blood sugar if you are able to eat/drink: The Rule of 15  If you are using continuous glucose monitor that says you are low, but you do not have any symptoms, verify on fingerstick that your blood sugar is actually low before treating.   Eat 15 grams of carbs (see examples below)  Check your blood sugar after 15 minutes. If it’s still below your target range, have another serving.   Repeat these steps until it’s in your target range. Once it’s in range, if you're nervous about your sugar going low again, have a protein source (ie, a spoonful of peanut butter).   If you have a CGM you want to look for how your arrow has changed. If you arrow is pointed up or sideways after 15 min, give your CGM more time OR check with a finger stick. Try not to eat more food until at least 15 min after the first BG check - otherwise you risk having a rebound high.  If you are experiencing symptoms and you are unable to check your blood glucose for any reason, treat the hypoglycemia.  If someone has a low blood sugar and is unconscious: Don’t hesitate to call 911. If someone is unconscious and glucagon is not available or someone does not know how to use it, call 911 immediately.    To treat a low, I recommend you carry with you easy, pre-portioned treatment for low blood sugars that are 15G of carbs:   - Children sized squeeze pouch applesauce (high  fiber + carbs help prevent too high of a spike)  - Small children's sized juicebox- 15g carb --> 4oz juice box  - Glucose tablets from [employer], you can find them near diabetes supplies --> Note, you will need to eat 3-4 tablets to get to 15g of carbs  - Children sized fruit snack pack- look for one with 15 grams of total carbohydrate  - Choice of how to treat your low is important. Complex carbs, or foods that contain fats along with carbs (like chocolate) can slow the absorption of glucose and should NOT be used to treat an emergency low

## 2024-08-30 NOTE — PROGRESS NOTES
[facility]    Name: Danielle Jerome    Date: 2024    Danielle Jerome is a 59 year old female who presents for evaluation of T2DM management.     Chief complaint: Diabetes (Pt here for f/u, Omnipod5, Dexcom, no other concerns /A1c was done today with /Last A1C-8.3/Last Foot exam-2022 /Last Eye exam- 2019)       Subjective:   Initial HPI consult in 2023:  Diagnosed age: [id number] (steroid-induced, chronic prednisone for asthma)  Follows with DM APN, LV 2023     Currently follows with [facility]; on-and-off prednisone depending on asthma flares with Churg-Shania, currently not on any steroids     Current DM meds:Toujeo 96U; Humalog 16U qAC TID + ISF 2:50>150 (doesn't carb count exactly); been on MDI x ~6 months now  Previously trialed/failed (from DM Apn note): Byetta, Metformin (GI), Januvia, Actos, Novolog, Levemir, Invokana, Trulicity, glipizide, Victoza, Farxiga - recurrent mycotic infections      It was previously 7% in 2023     Blood Glucose log/CGM: per memory  Checking 3-4 times per day  Morning/fastins  Pre-meal: 200s  HS: 200s  Episodes of hypoglycemia: No     Sometimes not a great appetite but does try to eat low carb and high protein  Has downloaded a carb-counting abrahan  Hasn't worn Dexcom recently due to various imaging tests lately, has been waiting for mail order of new refills     Is interested in Omnipod 5, ordered by previous DM provider, has not picked it up yet     Interim hx:  Oct 2023  Pt has started on Omnipod5 in auto mode since last visit  Settings:   Basal 1.90-1.95U/hr, ICR 1:5, ISF 1:20 (basal was just increased from 1.75 since 1 week ago by DM APN)  She has been told to continue f/u with endocrine  Has been on/off antibiotic and steroids during the past few months for sinus infxn and inner ear imbalance. Poor appetite, does try to drink sugar-free drinks, but notices BG increases when in pain.  Also looking into weight loss  options  Also with painful neuropathy     Dec 2023- christopher/ christy DAVE  Had COVID two weeks after her appt; feeling incr'd fatigue since her COVID infxn. Of note did have vit D deficiency in June, not taking Vit D currently  Then had trouble getting Omnipod5 from pharmacy; now it's better (1 month off); was taking MDI in that time     Feb 2024 - LX  Having continuous migraines and fatigue and sinus pressure since COVID  May go to [facility] for long haul COVID  Taking prednisone 20mg as needed for migraines and sinus pain, further exacerbating hyperglycemia  Interested in trying Ozempic; did well previously with Victoza on low doses     May 2024-w/ Jackeline DAVE. Last A1c value was 10.3% done 5/9/2024.  Documentation from MA: Diabetes (Patient presents for diabetes follow up. States blood sugars have been higher but believes it is due to recent pneumonia. Fasting AM blood sugar is around 200-300. )  Ongoing hyperglycemia despite omnipod use. She will go a few days not wearing or not change overnight if it runs out (has to change daily). Did not start NPH use with steroids, but notes it did help while hospitalized    8/30/2024-christopher/ Jackeline DAVE.Last A1c value was 8.3% done 8/30/2024.  Changes made last visit include switching over to U200 insulin.  Really liking the change, making it easier to deal with her pump.  Ongoing pain, which limits her ability to move around.  Open to starting Ozempic, states she does not have a known history of gastroparesis  DM meds at visit:   Omnipod 5 + Dexcom in [employer] + U200 HUMALOG  Time ICR ISF Active Insulin Time Target   MN 10 32 2.5 110   TDD 57.9u/day    Continuous Glucose Monitoring Interpretation  Danielle Jerome has undergone continuous glucose monitoring with their CGM.  The blood glucose tracings were evaluated for two weeks prior to office visit.   Blood glucose tracings demonstrated areas of hyperglycemia post-meal, particularly post-lunch  There were no areas of hypoglycemia  noted.        History/Other:    Allergies, PMH, SocHx and FHx reviewed and updated as appropriate in Epic on    semaglutide (OZEMPIC, 0.25 OR 0.5 MG/DOSE,) 2 MG/3ML Subcutaneous Solution Pen-injector Take 0.25mg once weekly for four weeks. After four weeks, increase to 0.5mg once weekly. 9 mL 0    DUPIXENT 300 MG/2ML Subcutaneous Solution Pen-injector       gabapentin 100 MG Oral Cap Takes Gabapentin 100mg in the morning and 300mg at night 360 capsule 0    ALPRAZolam 0.5 MG Oral Tab Take 1-2 tablets (0.5-1 mg total) by mouth daily as needed for Sleep or Anxiety. 42 tablet 0    albuterol 108 (90 Base) MCG/ACT Inhalation Aero Soln Inhale 2 puffs into the lungs every 4 to 6 hours as needed for Wheezing. 3 each 3    HUMALOG KWIKPEN 200 UNIT/ML Subcutaneous Solution Pen-injector Do NOT use until trained by diabetes educator in office. Use to fill insulin pump with up to 130u/day. 60 mL 1    sulfamethoxazole-trimethoprim DS (BACTRIM DS) 800-160 MG Oral Tab per tablet Take 1 tablet by mouth 2 (two) times daily. 1 tab bid x 7 days 14 tablet 0    escitalopram 20 MG Oral Tab TAKE 1 TABLET EVERY MORNING 90 tablet 1    Tiotropium Bromide Monohydrate (SPIRIVA RESPIMAT) 2.5 MCG/ACT Inhalation Aero Soln Inhale 2 puffs into the lungs in the morning and 2 puffs before bedtime.      predniSONE 10 MG Oral Tab 40mg for 3 days, then 30mg for 3 days, then 20mg for 3 days, then 10mg for 3 days, then stop 30 tablet 0    metoprolol succinate ER 25 MG Oral Tablet 24 Hr Take 1 tablet (25 mg total) by mouth nightly.      BREO ELLIPTA 200-25 MCG/ACT Inhalation Aerosol Powder, Breath Activated Inhale 1 puff into the lungs daily. 3 each 1    montelukast 10 MG Oral Tab Take 1 tablet (10 mg total) by mouth nightly. 90 tablet 1    Continuous Blood Gluc Sensor (DEXCOM G6 SENSOR) Does not apply Misc Apply to skin every 10 days 9 each 3    Continuous Blood Gluc Transmit (DEXCOM G6 TRANSMITTER) Does not apply Misc 1 each by Other route every 3 (three)  months. 1 each 1    Insulin Disposable Pump (OMNIPOD 5 G6 PODS, GEN 5,) Does not apply Misc 1 each every other day. 45 each 1    Insulin NPH, Human,, Isophane, (HUMULIN N KWIKPEN) 100 UNIT/ML Subcutaneous Suspension Pen-injector Pair with prednisone only. Up to 30 units a day max. 9 mL 0    pantoprazole 40 MG Oral Tab EC Take 1 tablet (40 mg total) by mouth before breakfast. (Patient taking differently: Take 1 tablet (40 mg total) by mouth nightly.) 90 tablet 3    rivaroxaban (XARELTO) 20 MG Oral Tab Take 1 tablet (20 mg total) by mouth nightly. TAKE AT BEDTIME 90 tablet 3    amLODIPine 2.5 MG Oral Tab Take 1 tablet (2.5 mg total) by mouth daily.      Azelastine HCl 0.1 % Nasal Solution 1 spray by Nasal route in the morning and 1 spray before bedtime.      SUMAtriptan Succinate 50 MG Oral Tab TAKE 1 TABLET BY MOUTH EVERY 2 HOURS AS NEEDED FOR  MIGRAINE.  DO  NOT  EXCEED  200MG  IN  24  HOURS 9 tablet 3    Clopidogrel Bisulfate 75 MG Oral Tab Take 1 tablet (75 mg total) by mouth nightly. nightly      losartan 100 MG Oral Tab Take 1 tablet (100 mg total) by mouth daily.      ezetimibe 10 MG Oral Tab Take 1 tablet (10 mg total) by mouth nightly.      Fluticasone Propionate 50 MCG/ACT Nasal Suspension 1 spray by Each Nare route 2 (two) times daily.      cetirizine (ZYRTEC) 10 MG Oral Tab Take 1 tablet (10 mg total) by mouth nightly.       Allergies   Allergen Reactions    Peanut-Containing Drug Products HIVES and SHORTNESS OF BREATH     Hives, asthma attack    Pcn [Penicillins] UNKNOWN     HAPPENED AS A CHILD     Current Outpatient Medications   Medication Sig Dispense Refill    semaglutide (OZEMPIC, 0.25 OR 0.5 MG/DOSE,) 2 MG/3ML Subcutaneous Solution Pen-injector Take 0.25mg once weekly for four weeks. After four weeks, increase to 0.5mg once weekly. 9 mL 0    DUPIXENT 300 MG/2ML Subcutaneous Solution Pen-injector       gabapentin 100 MG Oral Cap Takes Gabapentin 100mg in the morning and 300mg at night 360 capsule 0     ALPRAZolam 0.5 MG Oral Tab Take 1-2 tablets (0.5-1 mg total) by mouth daily as needed for Sleep or Anxiety. 42 tablet 0    albuterol 108 (90 Base) MCG/ACT Inhalation Aero Soln Inhale 2 puffs into the lungs every 4 to 6 hours as needed for Wheezing. 3 each 3    HUMALOG KWIKPEN 200 UNIT/ML Subcutaneous Solution Pen-injector Do NOT use until trained by diabetes educator in office. Use to fill insulin pump with up to 130u/day. 60 mL 1    sulfamethoxazole-trimethoprim DS (BACTRIM DS) 800-160 MG Oral Tab per tablet Take 1 tablet by mouth 2 (two) times daily. 1 tab bid x 7 days 14 tablet 0    escitalopram 20 MG Oral Tab TAKE 1 TABLET EVERY MORNING 90 tablet 1    Tiotropium Bromide Monohydrate (SPIRIVA RESPIMAT) 2.5 MCG/ACT Inhalation Aero Soln Inhale 2 puffs into the lungs in the morning and 2 puffs before bedtime.      predniSONE 10 MG Oral Tab 40mg for 3 days, then 30mg for 3 days, then 20mg for 3 days, then 10mg for 3 days, then stop 30 tablet 0    metoprolol succinate ER 25 MG Oral Tablet 24 Hr Take 1 tablet (25 mg total) by mouth nightly.      BREO ELLIPTA 200-25 MCG/ACT Inhalation Aerosol Powder, Breath Activated Inhale 1 puff into the lungs daily. 3 each 1    montelukast 10 MG Oral Tab Take 1 tablet (10 mg total) by mouth nightly. 90 tablet 1    Continuous Blood Gluc Sensor (DEXCOM G6 SENSOR) Does not apply Misc Apply to skin every 10 days 9 each 3    Continuous Blood Gluc Transmit (DEXCOM G6 TRANSMITTER) Does not apply Misc 1 each by Other route every 3 (three) months. 1 each 1    Insulin Disposable Pump (OMNIPOD 5 G6 PODS, GEN 5,) Does not apply Misc 1 each every other day. 45 each 1    Insulin NPH, Human,, Isophane, (HUMULIN N KWIKPEN) 100 UNIT/ML Subcutaneous Suspension Pen-injector Pair with prednisone only. Up to 30 units a day max. 9 mL 0    pantoprazole 40 MG Oral Tab EC Take 1 tablet (40 mg total) by mouth before breakfast. (Patient taking differently: Take 1 tablet (40 mg total) by mouth nightly.) 90  tablet 3    rivaroxaban (XARELTO) 20 MG Oral Tab Take 1 tablet (20 mg total) by mouth nightly. TAKE AT BEDTIME 90 tablet 3    amLODIPine 2.5 MG Oral Tab Take 1 tablet (2.5 mg total) by mouth daily.      Azelastine HCl 0.1 % Nasal Solution 1 spray by Nasal route in the morning and 1 spray before bedtime.      SUMAtriptan Succinate 50 MG Oral Tab TAKE 1 TABLET BY MOUTH EVERY 2 HOURS AS NEEDED FOR  MIGRAINE.  DO  NOT  EXCEED  200MG  IN  24  HOURS 9 tablet 3    Clopidogrel Bisulfate 75 MG Oral Tab Take 1 tablet (75 mg total) by mouth nightly. nightly      losartan 100 MG Oral Tab Take 1 tablet (100 mg total) by mouth daily.      ezetimibe 10 MG Oral Tab Take 1 tablet (10 mg total) by mouth nightly.      Fluticasone Propionate 50 MCG/ACT Nasal Suspension 1 spray by Each Nare route 2 (two) times daily.      cetirizine (ZYRTEC) 10 MG Oral Tab Take 1 tablet (10 mg total) by mouth nightly.      glucagon (GVOKE HYPOPEN 1-PACK) 1 MG/0.2ML Subcutaneous SUBQ injection Inject 0.2 mL (1 mg total) into the skin once as needed for Low blood glucose. 0.2 mL 1     Past Medical History:    Allergic rhinitis    Allergic rhinitis, cause unspecified    Anemia    Anxiety    Anxiety and depression    Asthma (HCC)    Atherosclerosis of coronary artery    Laguerre's esophagus    Churg-Shania syndrome (HCC)    Colloid cyst of brain (HCC)    Congestive heart disease (HCC)    Coronary artery disease of native heart with stable angina pectoris (HCC)    Depression    Diabetes (HCC)    Diabetes mellitus (HCC)    Diverticulosis of large intestine    Essential hypertension    Gastroparesis    GERD    Hematemesis with nausea    2017, No EGD, Hgb stable    High cholesterol    History of blood clots    History of pulmonary embolus (PE)    History of recurrent deep vein thrombosis (DVT)    Hyperlipidemia    Hypertension    IBS    LGSIL (low grade squamous intraepithelial dysplasia)    Pap neg 2014    Migraines    perfumes    Moderate persistent asthma  without complication (HCC)    DESOUZA (nonalcoholic steatohepatitis)    NSTEMI (non-ST elevated myocardial infarction) (HCC)    FELTON in LAD    Obesity    Osteoarthritis    Patella-femoral syndrome, bilateral knee    Pneumonia due to organism    Pulmonary embolism (HCC)    Sleep apnea    Thyroid disease     Family History   Problem Relation Age of Onset    Heart Disorder Father     Hypertension Mother     Lipids Mother     Stroke Mother     Anemia Mother     Dementia Mother     Heart Disorder Mother     Other (Other) Mother     Bleeding Disorders Mother     Hypertension Sister     Obesity Sister     Stroke Sister     Other (Other) Sister         stroke,pe    Other (Other) Brother         kidney stones    Colon Cancer Maternal Grandfather     Colon Cancer Maternal Aunt     Anemia Daughter     Asthma Daughter     Depression Daughter     Asthma Daughter     Bleeding Disorders Sister     Stroke Sister      Social history: Reviewed.      ROS/Exam    REVIEW OF SYSTEMS: Ten point review of systems has been performed and is otherwise negative and/or non-contributory, except as described above.     VITALS  Vitals:    08/30/24 1538   BP: 128/88   Pulse: 102   Resp: 18   SpO2: 99%   Weight: 273 lb (123.8 kg)   Height: 5' 8\" (1.727 m)       Wt Readings from Last 6 Encounters:   08/30/24 273 lb (123.8 kg)   08/07/24 273 lb (123.8 kg)   07/09/24 271 lb (122.9 kg)   06/24/24 270 lb (122.5 kg)   05/29/24 272 lb (123.4 kg)   04/30/24 269 lb (122 kg)       PHYSICAL EXAM  CONSTITUTIONAL:  awake, alert, cooperative, no apparent distress, and appears stated age   PSYCH: normal affect  LUNGS: breathing comfortably  CARDIOVASCULAR:  regular rate     Labs/Imaging: Pertinent imaging reviewed.    Overall glucose control:  Lab Results   Component Value Date    A1C 8.3 (A) 08/30/2024    A1C 10.3 (A) 05/09/2024    A1C 12.5 (A) 02/28/2024    A1C 7.8 (A) 10/19/2023    A1C 11.0 (H) 06/14/2023       Supplementary Documentation:   -Surveillance for  Diabetes Complications & Risks  Foot exam/neuropathy: No data recorded  Retinopathy screening: No data recordedNo data recorded    Assessment & Plan:     ICD-10-CM    1. Type 2 diabetes mellitus with hyperglycemia, with long-term current use of insulin ([facility])  E11.65 HEMOGLOBIN A1C    Z79.4 GLUC MNTR CONT REC FROM NTRSTL TISS FLU PHYS I&R      2. Insulin pump in place  Z96.41       3. Insulin pump titration  Z46.81       4. HFrEF (heart failure with reduced ejection fraction) ([facility])  I50.20       5. Essential hypertension  I10       6. Hyperlipidemia, mixed  E78.2           Danielle Jerome is a pleasant 59 year old female here for evaluation of:    #Diabetes  #Class 3 severe obesity due to excess calories with serious comorbidity and body mass index (BMI) of 40.0 to 44.9 in adult    #HFrEF (heart failure with reduced ejection fraction) ([facility])-   PMHx of Type 2 diabetes mellitus diagnosed in 2018 after steroid induced hyperglycemia; chronic prednisone use for asthma. She started insulin pump in mid-2023. Ongoing hyperglycemia, so switched to U200 in the pump.  GLP-1 a therapy previously avoided as there was question if the patient has a history of gastroparesis.  Today we had a lengthy discussion about the use of Ozempic and type 2 diabetes management, risk of worsening gastroparesis in a patient with existing gastroparesis.  The patient states overall her GI symptoms are controlled by her diet, and she would like to pursue GLP-1 a therapy start.  Will start with Ozempic    Last A1c value was 8.3% done 8/30/2024. Goal <7%. Importance of better glucose control in preventing onset/progression of end-organ damage discussed, as well as goals of therapy and clinical significance of A1C.     - med changes:  - Omnipod 5 + Dexcom in [employer] + U200 HUMALOG  Time ICR ISF Active Insulin Time Target   MN 10 --> 8 32 2.5 --> 2h 110   9A 8 --> 7        - Start Ozempic 0.25mg once weekly for four weeks, on fifth week you can  inject 0.5mg once if tolerating  - Take 0.25mg weekly x 4 weeks  - Take 0.5mg weekly x 4 weeks  - At the 8 week sarah- check in how you're feeling- if doing well we will increase to 1mg weekly  - continue Dexcom G6 CGM with phone; can upgrade to G7 when it is available  - patient is on insulin, and has suboptimal glycemic control including wide glycemic swings, thus would benefit from CGM  - continue to check BG 3-4x/day using glucometer or CGM  - Discussed adding GLP-1 to current medication regimen, including action, risk vs benefit, dosing, and potential side effects. Patient denies hx of pancreatitis, gastroparesis, or personal or family hx of medullary thyroid CA or MEN 2.     -See above header \"Supplementary Documentation\" for surveillance for diabetes complications & risks    Nephropathy screening  - stable  Lab Results   Component Value Date    EGFRCR 77 04/12/2024    MICROALBCREA  04/06/2022      Comment:      Unable to calculate due to Urine Microalbumin <0.5 mg/dL        #Hypertension   - SBP is to goal <130   BP Readings from Last 1 Encounters:   08/30/24 128/88   BP Meds: amLODIPine Tabs - 2.5 MG; losartan Tabs - 100 MG; metoprolol succinate ER Tb24 - 25 MG     #Hyperlipidemia  - LDL is not to goal   -Intolerant of statin, due to myopathy  -hx CAD s/p FELTON (2017), On Xarelto    Lab Results   Component Value Date     (H) 06/14/2023    TRIG 191 (H) 06/14/2023   Cholesterol Meds: ezetimibe Tabs - 10 MG   Anticoagulant Meds: rivaroxaban Tabs - 20 MG       Return in about 6 weeks (around 10/11/2024) for DM follow up.    8/31/2024  TENZIN Damon    Note to patient: The 21 Century Cures Act makes medical notes like these available to patients in the interest of transparency. However, be advised this is a medical document. It is intended as peer to peer communication. It is written in medical language and may contain abbreviations or verbiage that are unfamiliar. It may appear blunt or direct.  Medical documents are intended to carry relevant information, facts as evident, and the clinical opinion of the practitioner.

## 2024-09-13 ENCOUNTER — PATIENT MESSAGE (OUTPATIENT)
Dept: FAMILY MEDICINE CLINIC | Facility: CLINIC | Age: 59
End: 2024-09-13

## 2024-09-13 DIAGNOSIS — F33.1 MODERATE EPISODE OF RECURRENT MAJOR DEPRESSIVE DISORDER (HCC): ICD-10-CM

## 2024-09-13 DIAGNOSIS — F41.9 ANXIETY: ICD-10-CM

## 2024-09-13 NOTE — TELEPHONE ENCOUNTER
From: Danielle Jerome  To: Joyce Whaley  Sent: 9/13/2024 1:30 PM CDT  Subject: Alprazolam.    Hi, can you please send a for my Alprazolam refill to the [facility] on [facility] in [facility].   Thanks!  Alta

## 2024-09-16 RX ORDER — ALPRAZOLAM 0.5 MG
TABLET ORAL DAILY PRN
Qty: 42 TABLET | Refills: 0 | Status: SHIPPED | OUTPATIENT
Start: 2024-09-16

## 2024-09-17 ENCOUNTER — OFFICE VISIT (OUTPATIENT)
Dept: FAMILY MEDICINE CLINIC | Facility: CLINIC | Age: 59
End: 2024-09-17
Payer: COMMERCIAL

## 2024-09-17 VITALS
DIASTOLIC BLOOD PRESSURE: 82 MMHG | BODY MASS INDEX: 41.83 KG/M2 | WEIGHT: 276 LBS | HEIGHT: 68 IN | SYSTOLIC BLOOD PRESSURE: 132 MMHG | TEMPERATURE: 98 F | HEART RATE: 115 BPM | OXYGEN SATURATION: 97 % | RESPIRATION RATE: 18 BRPM

## 2024-09-17 DIAGNOSIS — R20.8 DECREASED SENSATION OF FOOT: ICD-10-CM

## 2024-09-17 DIAGNOSIS — Z79.4 TYPE 2 DIABETES MELLITUS WITH HYPERGLYCEMIA, WITH LONG-TERM CURRENT USE OF INSULIN (HCC): ICD-10-CM

## 2024-09-17 DIAGNOSIS — M54.41 ACUTE BILATERAL LOW BACK PAIN WITH BILATERAL SCIATICA: ICD-10-CM

## 2024-09-17 DIAGNOSIS — I25.118 CORONARY ARTERY DISEASE INVOLVING NATIVE CORONARY ARTERY OF NATIVE HEART WITH OTHER FORM OF ANGINA PECTORIS (HCC): ICD-10-CM

## 2024-09-17 DIAGNOSIS — E11.65 TYPE 2 DIABETES MELLITUS WITH HYPERGLYCEMIA, WITH LONG-TERM CURRENT USE OF INSULIN (HCC): ICD-10-CM

## 2024-09-17 DIAGNOSIS — R29.898 LEFT LEG WEAKNESS: Primary | ICD-10-CM

## 2024-09-17 DIAGNOSIS — M54.42 ACUTE BILATERAL LOW BACK PAIN WITH BILATERAL SCIATICA: ICD-10-CM

## 2024-09-17 PROCEDURE — 3075F SYST BP GE 130 - 139MM HG: CPT | Performed by: NURSE PRACTITIONER

## 2024-09-17 PROCEDURE — 3008F BODY MASS INDEX DOCD: CPT | Performed by: NURSE PRACTITIONER

## 2024-09-17 PROCEDURE — 99214 OFFICE O/P EST MOD 30 MIN: CPT | Performed by: NURSE PRACTITIONER

## 2024-09-17 PROCEDURE — 3079F DIAST BP 80-89 MM HG: CPT | Performed by: NURSE PRACTITIONER

## 2024-09-17 RX ORDER — DULOXETIN HYDROCHLORIDE 30 MG/1
30 CAPSULE, DELAYED RELEASE ORAL DAILY
Qty: 30 CAPSULE | Refills: 0 | Status: SHIPPED | OUTPATIENT
Start: 2024-09-17 | End: 2024-09-30

## 2024-09-17 NOTE — PROGRESS NOTES
CHIEF COMPLAINT:    Chief Complaint   Patient presents with    Physical    Back Pain    Leg Pain     Left leg numbness, both legs in pain       HISTORY OF PRESENT ILLNESS:    Danielle Jerome is a 59 year old female who presents today, September 17, 2024, for an annual physical and worsening pain in left leg.    - NUTRITION:  Follows a low carb diet.  Drinks approximately 64oz of water a day.   - SLEEP:  Snoring at night.  Wearing cpap, needs titraton study.   - PHYSICAL ACTIVITY/SOCIAL/HOBBIES:  Left leg pain interferes with activities of daily living   - GOAL:  Danielle would like to improve health overall by controlling pain.    IMMUNIZATIONS:  Declines today.     Greater than 3 month history of lower back pain, extending into left leg.  Numbness and tingling.  Danielle has MRI scheduled for tomorrow.  Denies fecal incontinence or urinary retention.    Patient Active Problem List   Diagnosis    Allergic rhinitis    Esophageal reflux    Other chronic sinusitis    Moderate episode of recurrent major depressive disorder ([facility])    Anxiety    Class 3 severe obesity due to excess calories with serious comorbidity and body mass index (BMI) of 40.0 to 44.9 in adult ([facility])    History of recurrent deep vein thrombosis (DVT)    History of pulmonary embolus (PE)    Diabetes mellitus (HCC)    Hyperglycemia    Exacerbation of asthma (HCC)    Cardiac angina (HCC)    CAD (coronary artery disease), native coronary artery    NSTEMI (non-ST elevated myocardial infarction) ([facility])    Gastroparesis    Moderate persistent asthma without complication ([facility])    Migraines    Laguerre esophagus    HFrEF (heart failure with reduced ejection fraction) ([facility])    Irreducible ventral incisional hernia    B12 deficiency    Cardiomyopathy, ischemic    Colloid cyst of brain (HCC)    Essential hypertension    Family history of early CAD    Hiatal hernia    History of steroid therapy    Hyperlipidemia, mixed    Presence of drug coated stent in LAD  coronary artery    Steroid-induced diabetes (HCC)    NAFLD (nonalcoholic fatty liver disease)    Hepatomegaly    Postmenopausal bleeding    Churg-Shania syndrome (HCC)    Varicose veins of both lower extremities with pain    Venous insufficiency    Vitamin D deficiency    Type 2 diabetes mellitus without complication (HCC)    Statin intolerance    Edema of extremities    Community acquired pneumonia of left lower lobe of lung    Moderate persistent asthma with exacerbation (HCC)    Sepsis due to pneumonia (HCC)        ALLERGIES:  Allergies   Allergen Reactions    Peanut-Containing Drug Products HIVES and SHORTNESS OF BREATH     Hives, asthma attack    Pcn [Penicillins] UNKNOWN     HAPPENED AS A CHILD       CURRENT MEDICATIONS:  Current Outpatient Medications   Medication Sig Dispense Refill    ALPRAZolam 0.5 MG Oral Tab Take 1-2 tablets (0.5-1 mg total) by mouth daily as needed for Sleep or Anxiety. 42 tablet 0    semaglutide (OZEMPIC, 0.25 OR 0.5 MG/DOSE,) 2 MG/3ML Subcutaneous Solution Pen-injector Take 0.25mg once weekly for four weeks. After four weeks, increase to 0.5mg once weekly. 9 mL 0    DUPIXENT 300 MG/2ML Subcutaneous Solution Pen-injector       gabapentin 100 MG Oral Cap Takes Gabapentin 100mg in the morning and 300mg at night 360 capsule 0    albuterol 108 (90 Base) MCG/ACT Inhalation Aero Soln Inhale 2 puffs into the lungs every 4 to 6 hours as needed for Wheezing. 3 each 3    HUMALOG KWIKPEN 200 UNIT/ML Subcutaneous Solution Pen-injector Do NOT use until trained by diabetes educator in office. Use to fill insulin pump with up to 130u/day. 60 mL 1    Tiotropium Bromide Monohydrate (SPIRIVA RESPIMAT) 2.5 MCG/ACT Inhalation Aero Soln Inhale 2 puffs into the lungs in the morning and 2 puffs before bedtime.      predniSONE 10 MG Oral Tab 40mg for 3 days, then 30mg for 3 days, then 20mg for 3 days, then 10mg for 3 days, then stop 30 tablet 0    metoprolol succinate ER 25 MG Oral Tablet 24 Hr Take 1 tablet  (25 mg total) by mouth nightly.      BREO ELLIPTA 200-25 MCG/ACT Inhalation Aerosol Powder, Breath Activated Inhale 1 puff into the lungs daily. 3 each 1    montelukast 10 MG Oral Tab Take 1 tablet (10 mg total) by mouth nightly. 90 tablet 1    Continuous Blood Gluc Sensor (DEXCOM G6 SENSOR) Does not apply Misc Apply to skin every 10 days 9 each 3    Continuous Blood Gluc Transmit (DEXCOM G6 TRANSMITTER) Does not apply Misc 1 each by Other route every 3 (three) months. 1 each 1    Insulin Disposable Pump (OMNIPOD 5 G6 PODS, GEN 5,) Does not apply Misc 1 each every other day. 45 each 1    Insulin NPH, Human,, Isophane, (HUMULIN N KWIKPEN) 100 UNIT/ML Subcutaneous Suspension Pen-injector Pair with prednisone only. Up to 30 units a day max. 9 mL 0    pantoprazole 40 MG Oral Tab EC Take 1 tablet (40 mg total) by mouth before breakfast. (Patient taking differently: Take 1 tablet (40 mg total) by mouth nightly.) 90 tablet 3    rivaroxaban (XARELTO) 20 MG Oral Tab Take 1 tablet (20 mg total) by mouth nightly. TAKE AT BEDTIME 90 tablet 3    amLODIPine 2.5 MG Oral Tab Take 1 tablet (2.5 mg total) by mouth daily.      Azelastine HCl 0.1 % Nasal Solution 1 spray by Nasal route in the morning and 1 spray before bedtime.      SUMAtriptan Succinate 50 MG Oral Tab TAKE 1 TABLET BY MOUTH EVERY 2 HOURS AS NEEDED FOR  MIGRAINE.  DO  NOT  EXCEED  200MG  IN  24  HOURS 9 tablet 3    Clopidogrel Bisulfate 75 MG Oral Tab Take 1 tablet (75 mg total) by mouth nightly. nightly      losartan 100 MG Oral Tab Take 1 tablet (100 mg total) by mouth daily.      ezetimibe 10 MG Oral Tab Take 1 tablet (10 mg total) by mouth nightly.      Fluticasone Propionate 50 MCG/ACT Nasal Suspension 1 spray by Each Nare route 2 (two) times daily.      cetirizine (ZYRTEC) 10 MG Oral Tab Take 1 tablet (10 mg total) by mouth nightly.      glucagon (GVOKE HYPOPEN 1-PACK) 1 MG/0.2ML Subcutaneous SUBQ injection Inject 0.2 mL (1 mg total) into the skin once as needed  for Low blood glucose. 0.2 mL 1       MEDICAL HISTORY:  Past Medical History:    Allergic rhinitis    Allergic rhinitis, cause unspecified    Anemia    Anxiety    Anxiety and depression    Asthma (HCC)    Atherosclerosis of coronary artery    Laguerre's esophagus    Churg-Shania syndrome (HCC)    Colloid cyst of brain (HCC)    Congestive heart disease (HCC)    Coronary artery disease of native heart with stable angina pectoris (HCC)    Depression    Diabetes (HCC)    Diabetes mellitus (HCC)    Diverticulosis of large intestine    Essential hypertension    Gastroparesis    GERD    Hematemesis with nausea    , No EGD, Hgb stable    High cholesterol    History of blood clots    History of pulmonary embolus (PE)    History of recurrent deep vein thrombosis (DVT)    Hyperlipidemia    Hypertension    IBS    LGSIL (low grade squamous intraepithelial dysplasia)    Pap neg     Migraines    perfumes    Moderate persistent asthma without complication (HCC)    DESOUZA (nonalcoholic steatohepatitis)    NSTEMI (non-ST elevated myocardial infarction) (HCC)    FELTON in LAD    Obesity    Osteoarthritis    Patella-femoral syndrome, bilateral knee    Pneumonia due to organism    Pulmonary embolism (HCC)    Sleep apnea    Thyroid disease     Past Surgical History:   Procedure Laterality Date    Angioplasty (coronary)      Breast surgery procedure unlisted      cyst removed    Cath drug eluting stent      Cholecystectomy  04/10/2011    PERFORMED BY DR KRAFT-LAPAROSCOPIC    Colonoscopy  2012    polyps    Colonoscopy N/A 2016    Procedure: COLONOSCOPY;  Surgeon: Kishor Stout MD;  Location:  ENDOSCOPY    Colposcopy, cervix w/upper adjacent vagina; w/endocervical curettage  2011    WNL    Cyst aspiration left  2000    Hernia surgery            Other surgical history      sinus surgery    Other surgical history      CYST REMOVED ON LEFT WRIST 17 YRS AGO    Sinus surgery        Skin surgery      Upper gi endoscopy,exam        Family History   Problem Relation Age of Onset    Heart Disorder Father     Hypertension Mother     Lipids Mother     Stroke Mother     Anemia Mother     Dementia Mother     Heart Disorder Mother     Other (Other) Mother     Bleeding Disorders Mother     Hypertension Sister     Obesity Sister     Stroke Sister     Other (Other) Sister         stroke,pe    Other (Other) Brother         kidney stones    Colon Cancer Maternal Grandfather     Colon Cancer Maternal Aunt     Anemia Daughter     Asthma Daughter     Depression Daughter     Asthma Daughter     Bleeding Disorders Sister     Stroke Sister      Family Status   Relation Status    Fa     Mo Alive    Sis Alive    Bro (Not Specified)    MGFA (Not Specified)    Mat Aunt (Not Specified)    Lissette Alive    Lissette Alive    Sis (Not Specified)     Social History     Socioeconomic History    Marital status:     Number of children: 3   Occupational History    Occupation: homemaker   Tobacco Use    Smoking status: Never     Passive exposure: Current    Smokeless tobacco: Never    Tobacco comments:     2nd hand with Father and now    Vaping Use    Vaping status: Never Used   Substance and Sexual Activity    Alcohol use: No    Drug use: No    Sexual activity: Never     Partners: Male     Birth control/protection: I.U.D.     Social Determinants of Health     Food Insecurity: No Food Insecurity (2024)    Food Insecurity     Food Insecurity: Never true   Transportation Needs: No Transportation Needs (2024)    Transportation Needs     Lack of Transportation: No   Physical Activity: Inactive (2021)    [facility]    Exercise Vital Sign     Days of Exercise per Week: 0 days     Minutes of Exercise per Session: 0 min   Housing Stability: Low Risk  (2024)    Housing Stability     Housing Instability: No       ROS:    GENERAL:  Denies fever or chills  RESPIRATORY:  Denies difficulty  breathing  CARDIAC:  Denies chest pain with exertion  GI:  Denies blood in stool  :  Denies blood in urine or painful urination  NEURO:  +LLE numbness, tingling  MSKL:  +hpi  SKIN:  Denies rashes  PSYCH: Denies thoughts of self harm or harming others     VITALS:    /86   Pulse 115   Temp 97.8 °F (36.6 °C) (Temporal)   Resp 18   Ht 5' 8\" (1.727 m)   Wt 276 lb (125.2 kg)   LMP 09/23/2004   SpO2 97%   BMI 41.97 kg/m²     Ideal body weight: 63.9 kg (140 lb 14 oz)  Adjusted ideal body weight: 88.4 kg (194 lb 14.8 oz)  Wt Readings from Last 3 Encounters:   09/17/24 276 lb (125.2 kg)   08/30/24 273 lb (123.8 kg)   08/07/24 273 lb (123.8 kg)     BP Readings from Last 3 Encounters:   09/17/24 142/86   08/30/24 128/88   08/07/24 138/72     Reviewed by Joyce Whaley MS, APRN, FNP-BC    PHYSICAL EXAM:    Constitutional:       Appearance: Normal appearance.  Sitting on exam chair.  Well developed, well nourished, and appears to be in pain/unfomfortable.  HENT:      Head: Facial features symmetric. Normocephalic and atraumatic.   Eyes:      Extraocular Movements: Extraocular movements intact.      Conjunctiva/sclera: Conjunctivae normal. Sclera anicteric         Pupils: Pupils are equal, round, and reactive to light.   Neck:     Neck is supple. Trachea is midline.  No masses.      No obvious lympadenopathy with palpation of submandibular, pre/posterior auricular, anterior/posterior cervical, occipital, and supraclavicular nodes.  Cardiovascular:      Heart sounds: Regular rate and rhythm without murmur.     BLE without edema.  Pulmonary:      Effort: Pulmonary effort is normal.      Breath sounds: Lungs clear throughout.     No cough or wheezing.  Abdominal:      General: Abdomen is nondistended, bowel sounds normoactive, soft, nontender.  No organomegaly.  Musculoskeletal:         General: Normal range of motion. Strength of extremities are equal bilaterally.     Range of motion without limitations.  Skin:      General: Skin is warm and dry.      Coloration: Skin is without jaundice     Findings: No bruising or rashes  Neurological:      General: No focal deficit present. Speech is clear and organized.     Mental Status: Alert and oriented to person, place, and time.      Sensory: Sensation is intact.      Motor: Motor function is intact. Movements are smooth and controlled without ataxia.     Coordination: Coordination is intact. Coordination normal.      Gait: Gait steady and nonantalgic.      Unable to elicit Deep Tendon Reflexes of LLE.  Danielle unable to dorsiflex left foot.  Unable to tip toe walk or heel walk due to report of pain.  Psychiatric:         Mood and Affect: Mood normal.         Behavior: Behavior normal.         Thought Content: Thought content normal.         Judgment: Judgment normal.     ASSESSMENT & PLAN:    1. Annual physical exam  With one uncontrolled problem  Unable to complete full preventive care exam due to severity of pain, requesting follow-up visit for annual physical    Danielle was here 9/17/2024 for annual physical, due to pain, we were unable to complete comprehensive annual physical.  Staff to call Danielle to get her rescheduled for annual physical for preventive care.  We need to address 41 items from problem list in addition to preventive care.     - Comp Metabolic Panel (14); Future  - CBC With Differential With Platelet; Future  - Lipid Panel; Future  - TSH and Free T4 [E]; Future  - Microalb/Creat Ratio, Random Urine [E]; Future    1. Left leg weakness  MRI reveals mild to moderate degenerative change without foraminal stenosis, recommending   EMG, I have placed an order for this, and then to follow with neurology  - Neurosurgery Referral - In Network    2. Type 2 diabetes mellitus with hyperglycemia, with long-term current use of insulin ([facility])  - Comp Metabolic Panel (14); Future  - CBC With Differential With Platelet; Future  - Lipid Panel; Future    3. Decreased sensation  of foot  - Comp Metabolic Panel (14); Future  - CBC With Differential With Platelet; Future  - Neurosurgery Referral - In Network    4. Acute bilateral low back pain with bilateral sciatica  - Neurosurgery Referral - In Network  - DULoxetine 30 MG Oral Cap DR Particles; Take 1 capsule (30 mg total) by mouth daily.  Dispense: 30 capsule; Refill: 0    5. Coronary artery disease involving native coronary artery of native heart with other form of angina pectoris (HCC)  - Comp Metabolic Panel (14); Future  - Lipid Panel; Future  - rosuvastatin 5 MG Oral Tab; Take 1 tablet (5 mg total) by mouth nightly.  Dispense: 90 tablet; Refill: 3

## 2024-09-18 ENCOUNTER — HOSPITAL ENCOUNTER (OUTPATIENT)
Dept: MRI IMAGING | Facility: HOSPITAL | Age: 59
Discharge: HOME | End: 2024-09-18
Attending: NURSE PRACTITIONER
Payer: COMMERCIAL

## 2024-09-18 DIAGNOSIS — M54.42 ACUTE BILATERAL LOW BACK PAIN WITH BILATERAL SCIATICA: ICD-10-CM

## 2024-09-18 DIAGNOSIS — M54.41 ACUTE BILATERAL LOW BACK PAIN WITH BILATERAL SCIATICA: ICD-10-CM

## 2024-09-18 PROCEDURE — 72148 MRI LUMBAR SPINE W/O DYE: CPT | Performed by: NURSE PRACTITIONER

## 2024-09-24 PROBLEM — E11.9 TYPE 2 DIABETES MELLITUS WITHOUT COMPLICATION (HCC): Chronic | Status: ACTIVE | Noted: 2023-06-26

## 2024-09-24 RX ORDER — ROSUVASTATIN CALCIUM 5 MG/1
5 TABLET, COATED ORAL NIGHTLY
Qty: 90 TABLET | Refills: 3 | Status: SHIPPED | OUTPATIENT
Start: 2024-09-24 | End: 2024-09-30 | Stop reason: ALTCHOICE

## 2024-09-30 ENCOUNTER — HOSPITAL ENCOUNTER (OUTPATIENT)
Dept: GENERAL RADIOLOGY | Age: 59
Discharge: HOME | End: 2024-09-30
Attending: NURSE PRACTITIONER
Payer: COMMERCIAL

## 2024-09-30 ENCOUNTER — OFFICE VISIT (OUTPATIENT)
Dept: FAMILY MEDICINE CLINIC | Facility: CLINIC | Age: 59
End: 2024-09-30
Payer: COMMERCIAL

## 2024-09-30 VITALS
RESPIRATION RATE: 22 BRPM | WEIGHT: 278.63 LBS | BODY MASS INDEX: 42 KG/M2 | SYSTOLIC BLOOD PRESSURE: 138 MMHG | TEMPERATURE: 98 F | OXYGEN SATURATION: 95 % | DIASTOLIC BLOOD PRESSURE: 76 MMHG | HEART RATE: 91 BPM

## 2024-09-30 DIAGNOSIS — Z86.718 HISTORY OF RECURRENT DEEP VEIN THROMBOSIS (DVT): ICD-10-CM

## 2024-09-30 DIAGNOSIS — I25.118 CORONARY ARTERY DISEASE INVOLVING NATIVE CORONARY ARTERY OF NATIVE HEART WITH OTHER FORM OF ANGINA PECTORIS (HCC): ICD-10-CM

## 2024-09-30 DIAGNOSIS — Z92.241 HISTORY OF STEROID THERAPY: ICD-10-CM

## 2024-09-30 DIAGNOSIS — I10 ESSENTIAL HYPERTENSION: ICD-10-CM

## 2024-09-30 DIAGNOSIS — N95.0 POSTMENOPAUSAL BLEEDING: ICD-10-CM

## 2024-09-30 DIAGNOSIS — D72.18 CHURG-STRAUSS SYNDROME (HCC): ICD-10-CM

## 2024-09-30 DIAGNOSIS — Z12.31 ENCOUNTER FOR SCREENING MAMMOGRAM FOR MALIGNANT NEOPLASM OF BREAST: ICD-10-CM

## 2024-09-30 DIAGNOSIS — E11.65 UNCONTROLLED TYPE 2 DIABETES MELLITUS WITH HYPERGLYCEMIA (HCC): ICD-10-CM

## 2024-09-30 DIAGNOSIS — J30.89 ALLERGIC RHINITIS DUE TO OTHER ALLERGIC TRIGGER, UNSPECIFIED SEASONALITY: ICD-10-CM

## 2024-09-30 DIAGNOSIS — R73.9 HYPERGLYCEMIA: ICD-10-CM

## 2024-09-30 DIAGNOSIS — F41.9 ANXIETY: ICD-10-CM

## 2024-09-30 DIAGNOSIS — K44.9 HIATAL HERNIA: ICD-10-CM

## 2024-09-30 DIAGNOSIS — Z00.00 ANNUAL PHYSICAL EXAM: Primary | ICD-10-CM

## 2024-09-30 DIAGNOSIS — R06.2 INSPIRATORY WHEEZING: ICD-10-CM

## 2024-09-30 DIAGNOSIS — M54.42 ACUTE BILATERAL LOW BACK PAIN WITH BILATERAL SCIATICA: ICD-10-CM

## 2024-09-30 DIAGNOSIS — Z78.9 STATIN INTOLERANCE: ICD-10-CM

## 2024-09-30 DIAGNOSIS — I87.2 VENOUS INSUFFICIENCY: ICD-10-CM

## 2024-09-30 DIAGNOSIS — J18.9 SEPSIS DUE TO PNEUMONIA (HCC): ICD-10-CM

## 2024-09-30 DIAGNOSIS — I20.9 CARDIAC ANGINA (HCC): ICD-10-CM

## 2024-09-30 DIAGNOSIS — K43.0 IRREDUCIBLE VENTRAL INCISIONAL HERNIA: ICD-10-CM

## 2024-09-30 DIAGNOSIS — A41.9 SEPSIS DUE TO PNEUMONIA (HCC): ICD-10-CM

## 2024-09-30 DIAGNOSIS — M54.41 ACUTE BILATERAL LOW BACK PAIN WITH BILATERAL SCIATICA: ICD-10-CM

## 2024-09-30 DIAGNOSIS — Q04.6 COLLOID CYST OF BRAIN (HCC): ICD-10-CM

## 2024-09-30 DIAGNOSIS — R16.0 HEPATOMEGALY: ICD-10-CM

## 2024-09-30 DIAGNOSIS — K76.0 NAFLD (NONALCOHOLIC FATTY LIVER DISEASE): ICD-10-CM

## 2024-09-30 DIAGNOSIS — Z95.5 PRESENCE OF DRUG COATED STENT IN LAD CORONARY ARTERY: ICD-10-CM

## 2024-09-30 DIAGNOSIS — I50.20 HFREF (HEART FAILURE WITH REDUCED EJECTION FRACTION) (HCC): ICD-10-CM

## 2024-09-30 DIAGNOSIS — J45.41 MODERATE PERSISTENT ASTHMA WITH EXACERBATION (HCC): ICD-10-CM

## 2024-09-30 DIAGNOSIS — Z86.711 HISTORY OF PULMONARY EMBOLUS (PE): ICD-10-CM

## 2024-09-30 DIAGNOSIS — E55.9 VITAMIN D DEFICIENCY: ICD-10-CM

## 2024-09-30 DIAGNOSIS — Z82.49 FAMILY HISTORY OF EARLY CAD: ICD-10-CM

## 2024-09-30 DIAGNOSIS — J32.8 OTHER CHRONIC SINUSITIS: ICD-10-CM

## 2024-09-30 DIAGNOSIS — I25.5 CARDIOMYOPATHY, ISCHEMIC: ICD-10-CM

## 2024-09-30 DIAGNOSIS — K31.84 GASTROPARESIS: ICD-10-CM

## 2024-09-30 DIAGNOSIS — K21.9 GASTROESOPHAGEAL REFLUX DISEASE, UNSPECIFIED WHETHER ESOPHAGITIS PRESENT: ICD-10-CM

## 2024-09-30 DIAGNOSIS — M30.1 CHURG-STRAUSS SYNDROME (HCC): ICD-10-CM

## 2024-09-30 DIAGNOSIS — E53.8 B12 DEFICIENCY: ICD-10-CM

## 2024-09-30 DIAGNOSIS — R60.0 EDEMA OF EXTREMITIES: ICD-10-CM

## 2024-09-30 DIAGNOSIS — I21.4 NSTEMI (NON-ST ELEVATED MYOCARDIAL INFARCTION) (HCC): ICD-10-CM

## 2024-09-30 DIAGNOSIS — E78.2 HYPERLIPIDEMIA, MIXED: ICD-10-CM

## 2024-09-30 DIAGNOSIS — K22.719 BARRETT'S ESOPHAGUS WITH DYSPLASIA: ICD-10-CM

## 2024-09-30 DIAGNOSIS — E66.01 CLASS 3 SEVERE OBESITY DUE TO EXCESS CALORIES WITH SERIOUS COMORBIDITY AND BODY MASS INDEX (BMI) OF 40.0 TO 44.9 IN ADULT (HCC): ICD-10-CM

## 2024-09-30 DIAGNOSIS — I83.813 VARICOSE VEINS OF BOTH LOWER EXTREMITIES WITH PAIN: ICD-10-CM

## 2024-09-30 DIAGNOSIS — G43.709 CHRONIC MIGRAINE WITHOUT AURA WITHOUT STATUS MIGRAINOSUS, NOT INTRACTABLE: ICD-10-CM

## 2024-09-30 DIAGNOSIS — F33.1 MODERATE EPISODE OF RECURRENT MAJOR DEPRESSIVE DISORDER (HCC): ICD-10-CM

## 2024-09-30 DIAGNOSIS — Z87.01 HISTORY OF PNEUMONIA: ICD-10-CM

## 2024-09-30 PROBLEM — J45.901 EXACERBATION OF ASTHMA (HCC): Status: RESOLVED | Noted: 2017-09-29 | Resolved: 2024-09-30

## 2024-09-30 PROBLEM — J45.40 MODERATE PERSISTENT ASTHMA WITHOUT COMPLICATION (HCC): Status: RESOLVED | Noted: 2018-03-20 | Resolved: 2024-09-30

## 2024-09-30 LAB
ALBUMIN SERPL-MCNC: 4.4 G/DL (ref 3.2–4.8)
ALBUMIN/GLOB SERPL: 1.6 {RATIO} (ref 1–2)
ALP LIVER SERPL-CCNC: 108 U/L
ALT SERPL-CCNC: 27 U/L
ANION GAP SERPL CALC-SCNC: 8 MMOL/L (ref 0–18)
AST SERPL-CCNC: 22 U/L (ref ?–34)
BASOPHILS # BLD AUTO: 0.14 X10(3) UL (ref 0–0.2)
BASOPHILS NFR BLD AUTO: 1.4 %
BILIRUB SERPL-MCNC: 0.4 MG/DL (ref 0.3–1.2)
BUN BLD-MCNC: 11 MG/DL (ref 9–23)
CALCIUM BLD-MCNC: 10 MG/DL (ref 8.7–10.4)
CHLORIDE SERPL-SCNC: 109 MMOL/L (ref 98–112)
CHOLEST SERPL-MCNC: 191 MG/DL (ref ?–200)
CO2 SERPL-SCNC: 24 MMOL/L (ref 21–32)
CREAT BLD-MCNC: 0.65 MG/DL
EGFRCR SERPLBLD CKD-EPI 2021: 103 ML/MIN/1.73M2 (ref 60–?)
EOSINOPHIL # BLD AUTO: 0.73 X10(3) UL (ref 0–0.7)
EOSINOPHIL NFR BLD AUTO: 7.5 %
ERYTHROCYTE [DISTWIDTH] IN BLOOD BY AUTOMATED COUNT: 16.8 %
FASTING PATIENT LIPID ANSWER: YES
FASTING STATUS PATIENT QL REPORTED: YES
GLOBULIN PLAS-MCNC: 2.7 G/DL (ref 2–3.5)
GLUCOSE BLD-MCNC: 113 MG/DL (ref 70–99)
HCT VFR BLD AUTO: 42.6 %
HDLC SERPL-MCNC: 41 MG/DL (ref 40–59)
HGB BLD-MCNC: 13.6 G/DL
IMM GRANULOCYTES # BLD AUTO: 0.04 X10(3) UL (ref 0–1)
IMM GRANULOCYTES NFR BLD: 0.4 %
LDLC SERPL CALC-MCNC: 127 MG/DL (ref ?–100)
LYMPHOCYTES # BLD AUTO: 3.66 X10(3) UL (ref 1–4)
LYMPHOCYTES NFR BLD AUTO: 37.8 %
MCH RBC QN AUTO: 24.3 PG (ref 26–34)
MCHC RBC AUTO-ENTMCNC: 31.9 G/DL (ref 31–37)
MCV RBC AUTO: 76.1 FL
MONOCYTES # BLD AUTO: 0.42 X10(3) UL (ref 0.1–1)
MONOCYTES NFR BLD AUTO: 4.3 %
NEUTROPHILS # BLD AUTO: 4.69 X10 (3) UL (ref 1.5–7.7)
NEUTROPHILS # BLD AUTO: 4.69 X10(3) UL (ref 1.5–7.7)
NEUTROPHILS NFR BLD AUTO: 48.6 %
NONHDLC SERPL-MCNC: 150 MG/DL (ref ?–130)
OSMOLALITY SERPL CALC.SUM OF ELEC: 292 MOSM/KG (ref 275–295)
PLATELET # BLD AUTO: 395 10(3)UL (ref 150–450)
POTASSIUM SERPL-SCNC: 4.2 MMOL/L (ref 3.5–5.1)
PROT SERPL-MCNC: 7.1 G/DL (ref 5.7–8.2)
RBC # BLD AUTO: 5.6 X10(6)UL
SODIUM SERPL-SCNC: 141 MMOL/L (ref 136–145)
TRIGL SERPL-MCNC: 130 MG/DL (ref 30–149)
TSI SER-ACNC: 0.8 MIU/ML (ref 0.55–4.78)
VIT B12 SERPL-MCNC: 562 PG/ML (ref 211–911)
VIT D+METAB SERPL-MCNC: 37.4 NG/ML (ref 30–100)
VLDLC SERPL CALC-MCNC: 23 MG/DL (ref 0–30)
WBC # BLD AUTO: 9.7 X10(3) UL (ref 4–11)

## 2024-09-30 PROCEDURE — 80053 COMPREHEN METABOLIC PANEL: CPT | Performed by: NURSE PRACTITIONER

## 2024-09-30 PROCEDURE — 71046 X-RAY EXAM CHEST 2 VIEWS: CPT | Performed by: NURSE PRACTITIONER

## 2024-09-30 PROCEDURE — 83036 HEMOGLOBIN GLYCOSYLATED A1C: CPT | Performed by: NURSE PRACTITIONER

## 2024-09-30 PROCEDURE — 85025 COMPLETE CBC W/AUTO DIFF WBC: CPT | Performed by: NURSE PRACTITIONER

## 2024-09-30 PROCEDURE — 80061 LIPID PANEL: CPT | Performed by: NURSE PRACTITIONER

## 2024-09-30 PROCEDURE — 82306 VITAMIN D 25 HYDROXY: CPT | Performed by: NURSE PRACTITIONER

## 2024-09-30 PROCEDURE — 82607 VITAMIN B-12: CPT | Performed by: NURSE PRACTITIONER

## 2024-09-30 PROCEDURE — 84443 ASSAY THYROID STIM HORMONE: CPT | Performed by: NURSE PRACTITIONER

## 2024-09-30 RX ORDER — LEVOFLOXACIN 500 MG/1
500 TABLET, FILM COATED ORAL DAILY
Qty: 5 TABLET | Refills: 0 | Status: SHIPPED | OUTPATIENT
Start: 2024-09-30 | End: 2024-10-05

## 2024-09-30 RX ORDER — DULOXETINE 40 MG/1
40 CAPSULE, DELAYED RELEASE ORAL DAILY
Qty: 90 CAPSULE | Refills: 0 | Status: SHIPPED | OUTPATIENT
Start: 2024-09-30 | End: 2024-12-29

## 2024-09-30 RX ORDER — PREDNISONE 20 MG/1
TABLET ORAL
Qty: 9 TABLET | Refills: 0 | Status: SHIPPED | OUTPATIENT
Start: 2024-09-30 | End: 2024-10-07

## 2024-09-30 NOTE — PROGRESS NOTES
CHIEF COMPLAINT:  No chief complaint on file.      HISTORY OF PRESENT ILLNESS:    Danielle Jerome is a 59 year old female who has a significant history of multiple conditions presents today, September 30, 2024, for an annual physical and right sided sinus pressure, sinus headache, wheezing, and cough.  Reports not feeling well, low energy level and shortness of breath.  Has been using albuterol inhaler without significant improvement.  Positive right ear fullness.    - NUTRITION:  Follows a regular diet.     - SAFETY:  Reports feeling safe at home.  travels for work.  Lives with family and various dogs.   - PHYSICAL ACTIVITY/SOCIAL/HOBBIES:  Limited due to chronic pain   - GOAL:  Danielle would like to improve health overall by managing pain    IMMUNIZATIONS:  Declines today    Patient Active Problem List   Diagnosis    Allergic rhinitis    Esophageal reflux    Other chronic sinusitis    Moderate episode of recurrent major depressive disorder (HCC)    Anxiety    Class 3 severe obesity due to excess calories with serious comorbidity and body mass index (BMI) of 40.0 to 44.9 in adult (HCC)    History of recurrent deep vein thrombosis (DVT)    History of pulmonary embolus (PE)    Hyperglycemia    Cardiac angina (HCC)    CAD (coronary artery disease), native coronary artery    NSTEMI (non-ST elevated myocardial infarction) (HCC)    Gastroparesis    Migraines    Laguerre esophagus    HFrEF (heart failure with reduced ejection fraction) (HCC)    Irreducible ventral incisional hernia    B12 deficiency    Cardiomyopathy, ischemic    Colloid cyst of brain (HCC)    Essential hypertension    Family history of early CAD    Hiatal hernia    History of steroid therapy    Hyperlipidemia, mixed    Presence of drug coated stent in LAD coronary artery    NAFLD (nonalcoholic fatty liver disease)    Hepatomegaly    Postmenopausal bleeding    Churg-Shania syndrome (HCC)    Varicose veins of both lower extremities with pain     Venous insufficiency    Vitamin D deficiency    Type 2 diabetes mellitus with hyperglycemia, with long-term current use of insulin ([facility])    Statin intolerance    Edema of extremities    Moderate persistent asthma with exacerbation ([facility])    Sepsis due to pneumonia ([facility])        ALLERGIES:  Allergies   Allergen Reactions    Peanut-Containing Drug Products HIVES and SHORTNESS OF BREATH     Hives, asthma attack    Pcn [Penicillins] UNKNOWN     HAPPENED AS A CHILD       CURRENT MEDICATIONS:  Current Outpatient Medications   Medication Sig Dispense Refill    DULoxetine 30 MG Oral Cap DR Particles Take 1 capsule (30 mg total) by mouth daily. 30 capsule 0    ALPRAZolam 0.5 MG Oral Tab Take 1-2 tablets (0.5-1 mg total) by mouth daily as needed for Sleep or Anxiety. 42 tablet 0    semaglutide (OZEMPIC, 0.25 OR 0.5 MG/DOSE,) 2 MG/3ML Subcutaneous Solution Pen-injector Take 0.25mg once weekly for four weeks. After four weeks, increase to 0.5mg once weekly. 9 mL 0    DUPIXENT 300 MG/2ML Subcutaneous Solution Pen-injector       gabapentin 100 MG Oral Cap Takes Gabapentin 100mg in the morning and 300mg at night 360 capsule 0    albuterol 108 (90 Base) MCG/ACT Inhalation Aero Soln Inhale 2 puffs into the lungs every 4 to 6 hours as needed for Wheezing. 3 each 3    HUMALOG KWIKPEN 200 UNIT/ML Subcutaneous Solution Pen-injector Do NOT use until trained by diabetes educator in office. Use to fill insulin pump with up to 130u/day. 60 mL 1    Tiotropium Bromide Monohydrate (SPIRIVA RESPIMAT) 2.5 MCG/ACT Inhalation Aero Soln Inhale 2 puffs into the lungs in the morning and 2 puffs before bedtime.      predniSONE 10 MG Oral Tab 40mg for 3 days, then 30mg for 3 days, then 20mg for 3 days, then 10mg for 3 days, then stop 30 tablet 0    metoprolol succinate ER 25 MG Oral Tablet 24 Hr Take 1 tablet (25 mg total) by mouth nightly.      BREO ELLIPTA 200-25 MCG/ACT Inhalation Aerosol Powder, Breath Activated Inhale 1 puff into the lungs daily.  3 each 1    montelukast 10 MG Oral Tab Take 1 tablet (10 mg total) by mouth nightly. 90 tablet 1    Continuous Blood Gluc Sensor (DEXCOM G6 SENSOR) Does not apply Misc Apply to skin every 10 days 9 each 3    Continuous Blood Gluc Transmit (DEXCOM G6 TRANSMITTER) Does not apply Misc 1 each by Other route every 3 (three) months. 1 each 1    Insulin Disposable Pump (OMNIPOD 5 G6 PODS, GEN 5,) Does not apply Misc 1 each every other day. 45 each 1    Insulin NPH, Human,, Isophane, (HUMULIN N KWIKPEN) 100 UNIT/ML Subcutaneous Suspension Pen-injector Pair with prednisone only. Up to 30 units a day max. 9 mL 0    pantoprazole 40 MG Oral Tab EC Take 1 tablet (40 mg total) by mouth before breakfast. (Patient taking differently: Take 1 tablet (40 mg total) by mouth nightly.) 90 tablet 3    rivaroxaban (XARELTO) 20 MG Oral Tab Take 1 tablet (20 mg total) by mouth nightly. TAKE AT BEDTIME 90 tablet 3    amLODIPine 2.5 MG Oral Tab Take 1 tablet (2.5 mg total) by mouth daily.      Azelastine HCl 0.1 % Nasal Solution 1 spray by Nasal route in the morning and 1 spray before bedtime.      SUMAtriptan Succinate 50 MG Oral Tab TAKE 1 TABLET BY MOUTH EVERY 2 HOURS AS NEEDED FOR  MIGRAINE.  DO  NOT  EXCEED  200MG  IN  24  HOURS 9 tablet 3    Clopidogrel Bisulfate 75 MG Oral Tab Take 1 tablet (75 mg total) by mouth nightly. nightly      losartan 100 MG Oral Tab Take 1 tablet (100 mg total) by mouth daily.      ezetimibe 10 MG Oral Tab Take 1 tablet (10 mg total) by mouth nightly.      Fluticasone Propionate 50 MCG/ACT Nasal Suspension 1 spray by Each Nare route 2 (two) times daily.      cetirizine (ZYRTEC) 10 MG Oral Tab Take 1 tablet (10 mg total) by mouth nightly.      glucagon (GVOKE HYPOPEN 1-PACK) 1 MG/0.2ML Subcutaneous SUBQ injection Inject 0.2 mL (1 mg total) into the skin once as needed for Low blood glucose. 0.2 mL 1       MEDICAL HISTORY:  Past Medical History:    Allergic rhinitis    Allergic rhinitis, cause unspecified     Anemia    Anxiety    Anxiety and depression    Asthma (HCC)    Atherosclerosis of coronary artery    Laguerre's esophagus    Churg-Shania syndrome (HCC)    Colloid cyst of brain (HCC)    Community acquired pneumonia of left lower lobe of lung    Congestive heart disease (HCC)    Coronary artery disease of native heart with stable angina pectoris (HCC)    Depression    Diabetes (HCC)    Diabetes mellitus (HCC)    Diverticulosis of large intestine    Essential hypertension    Exacerbation of asthma (HCC)    Gastroparesis    GERD    Hematemesis with nausea    , No EGD, Hgb stable    High cholesterol    History of blood clots    History of pulmonary embolus (PE)    History of recurrent deep vein thrombosis (DVT)    Hyperlipidemia    Hypertension    IBS    LGSIL (low grade squamous intraepithelial dysplasia)    Pap neg     Migraines    perfumes    Moderate persistent asthma without complication (HCC)    DESOUZA (nonalcoholic steatohepatitis)    NSTEMI (non-ST elevated myocardial infarction) (HCC)    FELTON in LAD    Obesity    Osteoarthritis    Patella-femoral syndrome, bilateral knee    Pneumonia due to organism    Pulmonary embolism (HCC)    Sleep apnea    Thyroid disease     Past Surgical History:   Procedure Laterality Date    Angioplasty (coronary)      Breast surgery procedure unlisted      cyst removed    Cath drug eluting stent      Cholecystectomy  04/10/2011    PERFORMED BY DR KRAFT-LAPAROSCOPIC    Colonoscopy      polyps    Colonoscopy N/A 2016    Procedure: COLONOSCOPY;  Surgeon: Kishor Stout MD;  Location:  ENDOSCOPY    Colposcopy, cervix w/upper adjacent vagina; w/endocervical curettage  2011    WNL    Cyst aspiration left  2000    Hernia surgery            Other surgical history      sinus surgery    Other surgical history      CYST REMOVED ON LEFT WRIST 17 YRS AGO    Sinus surgery        Skin surgery      Upper gi endoscopy,exam       Family History   Problem Relation Age of  Onset    Heart Disorder Father     Hypertension Mother     Lipids Mother     Stroke Mother     Anemia Mother     Dementia Mother     Heart Disorder Mother     Other (Other) Mother     Bleeding Disorders Mother     Hypertension Sister     Obesity Sister     Stroke Sister     Other (Other) Sister         stroke,pe    Other (Other) Brother         kidney stones    Colon Cancer Maternal Grandfather     Colon Cancer Maternal Aunt     Anemia Daughter     Asthma Daughter     Depression Daughter     Asthma Daughter     Bleeding Disorders Sister     Stroke Sister      Family Status   Relation Status    Fa     Mo Alive    Sis Alive    Bro (Not Specified)    MGFA (Not Specified)    Mat Aunt (Not Specified)    Lissette Alive    Lissette Alive    Sis (Not Specified)     Social History     Socioeconomic History    Marital status:     Number of children: 3   Occupational History    Occupation: homemaker   Tobacco Use    Smoking status: Never     Passive exposure: Current    Smokeless tobacco: Never    Tobacco comments:     2nd hand with Father and now    Vaping Use    Vaping status: Never Used   Substance and Sexual Activity    Alcohol use: No    Drug use: No    Sexual activity: Never     Partners: Male     Birth control/protection: I.U.D.     Social Determinants of Health     Food Insecurity: No Food Insecurity (2024)    Food Insecurity     Food Insecurity: Never true   Transportation Needs: No Transportation Needs (2024)    Transportation Needs     Lack of Transportation: No   Physical Activity: Inactive (2021)    [facility]    Exercise Vital Sign     Days of Exercise per Week: 0 days     Minutes of Exercise per Session: 0 min   Housing Stability: Low Risk  (2024)    Housing Stability     Housing Instability: No       ROS:    GENERAL:  Denies fever or chills  RESPIRATORY:  +hpi  CARDIAC:  Denies chest pain with exertion  GI:  Denies changes in bowel  habits or blood in stool  :  Denies blood in urine  REPRO:  Denies vaginal bleeding  NEURO:  Denies recent falls   MSKL:  +chronic pain  SKIN:  Denies rashes  PSYCH: Denies thoughts of self harm or harming others     VITALS:    /76 (BP Location: Left arm, Patient Position: Sitting, Cuff Size: large)   Pulse 91   Temp 97.6 °F (36.4 °C) (Temporal)   Resp 22   Wt 278 lb 9.6 oz (126.4 kg)   LMP 09/23/2004   SpO2 95%   BMI 42.36 kg/m²     Patient height not recorded  Wt Readings from Last 3 Encounters:   09/30/24 278 lb 9.6 oz (126.4 kg)   09/17/24 276 lb (125.2 kg)   08/30/24 273 lb (123.8 kg)     BP Readings from Last 3 Encounters:   09/30/24 138/76   09/17/24 132/82   08/30/24 128/88     Reviewed by Joyce Whaley MS, APRN, FNP-BC    PHYSICAL EXAM:    Constitutional:       Appearance: Normal appearance.  Sitting upright on exam table.  Well developed, well nourished, and in no acute distress.  HENT:      Head: Facial features symmetric. Normocephalic and atraumatic.      Right Ear: Canal clear without erythema or drainage.  TM bulging and erythematous.     Left Ear: Canal clear without erythema or drainage.  TM clear and intact, neutral in position.      Nose: Nose normal.      Mouth/Throat: Mucous membranes are moist.  Uvula rises midline.  Eyes:      Extraocular Movements: Extraocular movements intact.      Conjunctiva/sclera: Conjunctivae normal. Sclera anicteric         Pupils: Pupils are equal, round, and reactive to light.   Neck:     Neck is supple. Trachea is midline.  No masses.   Cardiovascular:      Heart sounds: Regular rate and rhythm without murmur.     BLE without edema.  Pulmonary:      Effort: Pulmonary effort is normal.      Inspiratory and expiatory wheezing.  Abdominal:      General: Abdomen is nondistended, bowel sounds normoactive, soft, nontender.  No organomegaly.  Musculoskeletal:         General: Normal range of motion. Strength of extremities are equal bilaterally.     Range  of motion without limitations.  Skin:     General: Skin is warm and dry.      Coloration: Skin is without jaundice     Findings: No bruising or rashes  Neurological:      General: No focal deficit present. Speech is clear and organized.     Mental Status: Alert and oriented to person, place, and time.      Sensory: Sensation is intact.      Motor: Motor function is intact. Movements are smooth and controlled without ataxia.     Coordination: Coordination is intact. Coordination normal.      Gait: Gait steady and nonantalgic.      Deep Tendon Reflexes: Reflexes 2+ bilaterally.  Psychiatric:         Mood and Affect: Mood normal.         Behavior: Behavior normal.         Thought Content: Thought content normal.         Judgment: Judgment normal.     DM Foot:  Bilateral barefoot skin diabetic exam is normal, visualized feet and the appearance is normal.  Bilateral monofilament/sensation of both feet is normal.  Pulsation pedal pulse exam of both lower legs/feet is normal as well.       ASSESSMENT & PLAN:  Plan on follow-up in 5 days or earlier if needed  Refer to result notes for further information    Annual physical + 1 acute problem and multiple chronic conditions  - Comp Metabolic Panel (14); Future  - Hemoglobin A1C [E]; Future  - Microalb/Creat Ratio, Random Urine [E]; Future  - Comp Metabolic Panel (14)  - Hemoglobin A1C [E]  - Microalb/Creat Ratio, Random Urine [E]    1. Cardiac angina (HCC)  Stable, reports some chest congestion and discomfort  Denies left sided chest pain with activity    2. Churg-Shania syndrome (HCC)  Unchanged  CBC today  Continue following with rheumatology team - Dr. Gonzalez    3. Class 3 severe obesity due to excess calories with serious comorbidity and body mass index (BMI) of 40.0 to 44.9 in adult (HCC)  Patient has ozempic, hast not yet started due to fear of medication side effects  Patient is wanting to control pain before introducing new medication into her system    4. Colloid  cyst of brain ([facility])  History of positional headaches, resolved  Stable, consider reassessment before 2028, earlier if new migraines/headaches  Last CT 04/09/2018 showing 5mm colliod cyst, previously in 2011 was 6mm    5. HFrEF (heart failure with reduced ejection fraction) ([facility])  Stable  Denies pitting edema of BLE  Last ECHO on 02/03/2022 EF of 55-60%    6. Moderate episode of recurrent major depressive disorder ([facility])  Stable    7. NSTEMI (non-ST elevated myocardial infarction) ([facility])  EKG NSR 04/10/2024  Continue following with Cardiology team LIGIA Becker every 6 months    8. Sepsis due to pneumonia ([facility])  History of, reports new concern of chest congestion today, see HPI  CXR ordered     9. Uncontrolled type 2 diabetes mellitus with hyperglycemia ([facility])  Wearing CGM  Checking fasting blood glucose levels 110s  Denies eating a lot, drinking excessive, or frothy urine  Continue following with endocrinology team - Darling Gregory    10. Coronary artery disease involving native coronary artery of native heart with other form of angina pectoris ([facility])  Unchanged  Lipid panel ordered, patient fasting  Intolerance to statin  Continue following with Gloria Becker - Cardiology team    11. Cardiomyopathy, ischemic  Unchanged  Continue following with cardiology team every 6 months    12. Essential hypertension  Stable  Blood pressure, borderline  Reinforced healthier lifestyle choices    13. Family history of early CAD  Lipid panel today  Continue zetia    14. Hyperlipidemia, mixed  Lipid panel drawn today  Reports statin intolerance  Continue zetia  Continue following with Cardiology team LIGIA Becker every 6 months    15. Presence of drug coated stent in LAD coronary artery  Lipid panel drawn today  Reports statin intolerance  Continue zetia  Denies left sided chest pain or dizziness  Continue following with Cardiology team LIGIA Becker every 6 months    16. Varicose veins of both lower extremities with pain  Denies  unilateral redness or calf muscle pain    17. Venous insufficiency  Cardiology team has ordered BLE arterial and aoroiliac ultrasounds, patient to complete  Follow with Cardiology team as discussed    18. Moderate persistent asthma with exacerbation (HCC)  Borderline  Wheezing  Using albuterol 10 times last night  Sinus headache, right ear pain    19. B12 deficiency  Labs today    20. History of steroid therapy  New prescription given today due to wheezing and lung sounds    21. Hyperglycemia  Improving  Reports controlled fasting blood glucose readings  Continue following with endocrinology team    22. Vitamin D deficiency  Labs today    23. Chronic migraine without aura without status migrainosus, not intractable  Stable  Migraines are occurring less frequently   Last migraine about 6 months ago  Less severe in intesnsity  With aura, right eye, sumatriptan    24. Anxiety  Stable    25. Laguerre's esophagus with dysplasia  Denies frequent throat clearing or heartburn    26. Gastroesophageal reflux disease, unspecified whether esophagitis present  Improved, occurring less frequently  Improved with sleeping with head elevated, has an adjustable bed    27. Gastroparesis  Improved, stable    28. Hepatomegaly  Asymptomatic  CMP today    29. Hiatal hernia  Asymptomatic    30. Irreducible ventral incisional hernia  Unchanged, stable  Denies pain  Denies changes in bowel movements    31. NAFLD (nonalcoholic fatty liver disease)  Asymptomatic  CMP today    32. Postmenopausal bleeding  Denies vaginal bleeding    33. History of pulmonary embolus (PE)  Asymptomatic   Stable    34. History of recurrent deep vein thrombosis (DVT)  Asymptomatic   Stable    35. Statin intolerance  Declines statin therapy today    36. Allergic rhinitis due to other allergic trigger, unspecified seasonality  Zyrtec and montelukast     37. Other chronic sinusitis  Right sided headache  Sinus congestion    38. Edema of extremities  Improving  Follows  with cardiology team    39. Inspiratory wheezing  - XR CHEST PA + LAT CHEST (CPT=71046); Future  - CBC With Differential With Platelet; Future  - CBC With Differential With Platelet  - levoFLOXacin 500 MG Oral Tab; Take 1 tablet (500 mg total) by mouth daily for 5 days.  Dispense: 5 tablet; Refill: 0  - predniSONE 20 MG Oral Tab; Take 2 tablets (40 mg total) by mouth daily for 3 days, THEN 1 tablet (20 mg total) daily for 2 days, THEN 0.5 tablets (10 mg total) daily for 2 days.  Dispense: 9 tablet; Refill: 0    40. Encounter for screening mammogram for malignant neoplasm of breast  Denies bloody nipple discharge  - [facility] PRISCILLA 2D+3D SCREENING BILAT (CPT=77067/00888); Future    41. History of pneumonia  History of sepsis due to pneumonia, treating as precaution given lung sounds and worsening condition, symptoms began shortly before 09/17/2024  - levoFLOXacin 500 MG Oral Tab; Take 1 tablet (500 mg total) by mouth daily for 5 days.  Dispense: 5 tablet; Refill: 0  - predniSONE 20 MG Oral Tab; Take 2 tablets (40 mg total) by mouth daily for 3 days, THEN 1 tablet (20 mg total) daily for 2 days, THEN 0.5 tablets (10 mg total) daily for 2 days.  Dispense: 9 tablet; Refill: 0    42. Acute bilateral low back pain with bilateral sciatica  Reports some improvement of pain with use of duloxetine 30mg  Increase to 40mg  Reassess in 6 weeks  - DULoxetine 40 MG Oral Cap DR Particles; Take 40 mg by mouth daily.  Dispense: 90 capsule; Refill: 0

## 2024-10-01 LAB
EST. AVERAGE GLUCOSE BLD GHB EST-MCNC: 194 MG/DL (ref 68–126)
HBA1C MFR BLD: 8.4 % (ref ?–5.7)

## 2024-10-01 RX ORDER — MONTELUKAST SODIUM 10 MG/1
10 TABLET ORAL NIGHTLY
Qty: 90 TABLET | Refills: 3 | Status: SHIPPED | OUTPATIENT
Start: 2024-10-01

## 2024-10-07 ENCOUNTER — OFFICE VISIT (OUTPATIENT)
Dept: PAIN CLINIC | Facility: CLINIC | Age: 59
End: 2024-10-07
Payer: COMMERCIAL

## 2024-10-07 VITALS — DIASTOLIC BLOOD PRESSURE: 80 MMHG | SYSTOLIC BLOOD PRESSURE: 124 MMHG | HEART RATE: 92 BPM | OXYGEN SATURATION: 96 %

## 2024-10-07 DIAGNOSIS — M25.562 LEFT KNEE PAIN, UNSPECIFIED CHRONICITY: ICD-10-CM

## 2024-10-07 DIAGNOSIS — M47.816 SPONDYLOSIS OF LUMBAR REGION WITHOUT MYELOPATHY OR RADICULOPATHY: ICD-10-CM

## 2024-10-07 DIAGNOSIS — G89.29 OTHER CHRONIC PAIN: Primary | ICD-10-CM

## 2024-10-07 NOTE — PROGRESS NOTES
Patient: Danielle Jerome  Medical Record Number: [id number]  Site: [place], [place]  Referring Physician:  Joyce Whaley  PCP: Same as above    Dear Dr. Whaley:    Thank you very much for requesting this consultation. I had the opportunity to evaluate and initiate care for your patient today, as per your request.    HISTORY OF CHIEF COMPLAINT:      Danielle Jerome is a 59 year old female, who complains of primarily low back pain as well as left medial knee pain..  Patient also reports that she has pain in her left shoulder occasionally pain in her right knee as well as numbness and tingling in both of her feet left greater than right as well as left radiating leg symptoms.    Patient states that her low back pain has progressively gotten worse over the last 6 months.  She feels that she has very frequent tight spasms.  She also feels that her left lower extremity is weak and she has a popping sensation in her knee which makes it difficult for her to walk up and down the stairs.    For patient's low back pain she reports that doing activities of daily living like brushing her teeth, standing at the kitchen counter make her lower back very tight, feels like it is burning, severe pain.    Patient states that she has been on disability since approximately 2006 due to her severe asthma.  She has been on steroid medications on and off for almost her entire life.  She is now on Dupixent.  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  She had a blood clot in 2017 in her coronary artery which resulted in an MI.  Patient is chronically anticoagulated now with Xarelto and Plavix.    For patient's low back and knee symptoms she has done a course of physical therapy without any change in her symptoms.  She was encouraged to reduce her weight.  She is working with  rheumatology Dr. Clinton who has weaned her off of her escitalopram and started duloxetine for her pain and she feels that this is taking the edge off.  She is currently on gabapentin however she feels that it affects her cognition and would be open to any alternatives.  She has not had any EMG studies to evaluate her neuropathy.    Patient denies any radicular symptoms up and down her arms.  She does report that she has occasional pain in bilateral upper trapezius muscles.  Patient is rating her pain at a 7 out of 10.  It is on both sides however the left knee and left lower extremity radicular symptoms are worse than the right side.  She describes the pain as shooting sharp cramping aching with numbness and tingling.  She feels that her symptoms are all a result of degeneration.          VAS Pain Score:  /10    Aggravating Factors: Relieving Factors:   Standing, walking, bending, household chores, brushing her teeth Sometimes medications     Past Treatment Attempted/Patient’s Response:  Treatment up to this time has included:  Evaluation: Primary care, orthopedics, rheumatology  NSAIDS: Contraindicated due to chronic anticoagulation  Narcotic use: Patient prefers no opioids: Resulted in nausea  Physical therapy: Has done physical therapy for her knees in the past without relief  Spinal injections: Has not had any spine injections  Others: Has done acupuncture in the past with mild relief, ktape on knees, ice on knees and back        Past Medical History:    Allergic rhinitis    Allergic rhinitis, cause unspecified    Anemia    Anxiety    Anxiety and depression    Asthma (HCC)    Atherosclerosis of coronary artery    Laguerre's esophagus    Churg-Shania syndrome (HCC)    Colloid cyst of brain (HCC)    Community acquired pneumonia of left lower lobe of lung    Congestive heart disease (HCC)    Coronary artery disease of native heart with stable angina pectoris (HCC)    Depression    Diabetes (HCC)    Diabetes  mellitus (HCC)    Diverticulosis of large intestine    Essential hypertension    Exacerbation of asthma (HCC)    Gastroparesis    GERD    Hematemesis with nausea    2017, No EGD, Hgb stable    High cholesterol    History of blood clots    History of pulmonary embolus (PE)    History of recurrent deep vein thrombosis (DVT)    Hyperlipidemia    Hypertension    IBS    LGSIL (low grade squamous intraepithelial dysplasia)    Pap neg     Migraines    perfumes    Moderate persistent asthma without complication (HCC)    DESOUZA (nonalcoholic steatohepatitis)    NSTEMI (non-ST elevated myocardial infarction) (HCC)    FELTON in LAD    Obesity    Osteoarthritis    Patella-femoral syndrome, bilateral knee    Pneumonia due to organism    Pulmonary embolism (HCC)    Sleep apnea    Thyroid disease      Past Surgical History:   Procedure Laterality Date    Angioplasty (coronary)      Breast surgery procedure unlisted      cyst removed    Cath drug eluting stent      Cholecystectomy  04/10/2011    PERFORMED BY DR KRAFT-LAPAROSCOPIC    Colonoscopy      polyps    Colonoscopy N/A 2016    Procedure: COLONOSCOPY;  Surgeon: Kishor Stout MD;  Location: [facility]    Colposcopy, cervix w/upper adjacent vagina; w/endocervical curettage  2011    WNL    Cyst aspiration left  2000    Hernia surgery            Other surgical history      sinus surgery    Other surgical history      CYST REMOVED ON LEFT WRIST 17 YRS AGO    Sinus surgery        Skin surgery      Upper gi endoscopy,exam        Family History   Problem Relation Age of Onset    Heart Disorder Father     Hypertension Mother     Lipids Mother     Stroke Mother     Anemia Mother     Dementia Mother     Heart Disorder Mother     Other (Other) Mother     Bleeding Disorders Mother     Hypertension Sister     Obesity Sister     Stroke Sister     Other (Other) Sister         stroke,pe    Other (Other) Brother         kidney stones    Colon Cancer Maternal Grandfather      Colon Cancer Maternal Aunt     Anemia Daughter     Asthma Daughter     Depression Daughter     Asthma Daughter     Bleeding Disorders Sister     Stroke Sister       Social History     Socioeconomic History    Marital status:     Number of children: 3   Occupational History    Occupation: homemaker   Tobacco Use    Smoking status: Never     Passive exposure: Current    Smokeless tobacco: Never    Tobacco comments:     2nd hand with Father and now    Vaping Use    Vaping status: Never Used   Substance and Sexual Activity    Alcohol use: No    Drug use: No    Sexual activity: Never     Partners: Male     Birth control/protection: I.U.D.      Current Medications:  Current Outpatient Medications   Medication Sig Dispense Refill    MONTELUKAST 10 MG Oral Tab TAKE 1 TABLET NIGHTLY 90 tablet 3    DULoxetine 40 MG Oral Cap DR Particles Take 40 mg by mouth daily. 90 capsule 0    predniSONE 20 MG Oral Tab Take 2 tablets (40 mg total) by mouth daily for 3 days, THEN 1 tablet (20 mg total) daily for 2 days, THEN 0.5 tablets (10 mg total) daily for 2 days. 9 tablet 0    ALPRAZolam 0.5 MG Oral Tab Take 1-2 tablets (0.5-1 mg total) by mouth daily as needed for Sleep or Anxiety. 42 tablet 0    semaglutide (OZEMPIC, 0.25 OR 0.5 MG/DOSE,) 2 MG/3ML Subcutaneous Solution Pen-injector Take 0.25mg once weekly for four weeks. After four weeks, increase to 0.5mg once weekly. 9 mL 0    DUPIXENT 300 MG/2ML Subcutaneous Solution Pen-injector       gabapentin 100 MG Oral Cap Takes Gabapentin 100mg in the morning and 300mg at night 360 capsule 0    albuterol 108 (90 Base) MCG/ACT Inhalation Aero Soln Inhale 2 puffs into the lungs every 4 to 6 hours as needed for Wheezing. 3 each 3    HUMALOG KWIKPEN 200 UNIT/ML Subcutaneous Solution Pen-injector Do NOT use until trained by diabetes educator in office. Use to fill insulin pump with up to 130u/day. 60 mL 1    Tiotropium Bromide Monohydrate (SPIRIVA RESPIMAT) 2.5 MCG/ACT Inhalation  Aero Soln Inhale 2 puffs into the lungs in the morning and 2 puffs before bedtime.      metoprolol succinate ER 25 MG Oral Tablet 24 Hr Take 1 tablet (25 mg total) by mouth nightly.      BREO ELLIPTA 200-25 MCG/ACT Inhalation Aerosol Powder, Breath Activated Inhale 1 puff into the lungs daily. 3 each 1    Continuous Blood Gluc Sensor (DEXCOM G6 SENSOR) Does not apply Misc Apply to skin every 10 days 9 each 3    Continuous Blood Gluc Transmit (DEXCOM G6 TRANSMITTER) Does not apply Misc 1 each by Other route every 3 (three) months. 1 each 1    Insulin Disposable Pump (OMNIPOD 5 G6 PODS, GEN 5,) Does not apply Misc 1 each every other day. 45 each 1    Insulin NPH, Human,, Isophane, (HUMULIN N KWIKPEN) 100 UNIT/ML Subcutaneous Suspension Pen-injector Pair with prednisone only. Up to 30 units a day max. 9 mL 0    pantoprazole 40 MG Oral Tab EC Take 1 tablet (40 mg total) by mouth before breakfast. (Patient taking differently: Take 1 tablet (40 mg total) by mouth nightly.) 90 tablet 3    rivaroxaban (XARELTO) 20 MG Oral Tab Take 1 tablet (20 mg total) by mouth nightly. TAKE AT BEDTIME 90 tablet 3    amLODIPine 2.5 MG Oral Tab Take 1 tablet (2.5 mg total) by mouth daily.      Azelastine HCl 0.1 % Nasal Solution 1 spray by Nasal route in the morning and 1 spray before bedtime.      SUMAtriptan Succinate 50 MG Oral Tab TAKE 1 TABLET BY MOUTH EVERY 2 HOURS AS NEEDED FOR  MIGRAINE.  DO  NOT  EXCEED  200MG  IN  24  HOURS 9 tablet 3    Clopidogrel Bisulfate 75 MG Oral Tab Take 1 tablet (75 mg total) by mouth nightly. nightly      losartan 100 MG Oral Tab Take 1 tablet (100 mg total) by mouth daily.      ezetimibe 10 MG Oral Tab Take 1 tablet (10 mg total) by mouth nightly.      Fluticasone Propionate 50 MCG/ACT Nasal Suspension 1 spray by Each Nare route 2 (two) times daily.      cetirizine (ZYRTEC) 10 MG Oral Tab Take 1 tablet (10 mg total) by mouth nightly.      glucagon (GVOKE HYPOPEN 1-PACK) 1 MG/0.2ML Subcutaneous SUBQ  injection Inject 0.2 mL (1 mg total) into the skin once as needed for Low blood glucose. 0.2 mL 1        Functional Assessment: Patient reports that they are able to complete all of their ADL's such as eating, bathing, using the toilet, dressing and getting up from a bed or a chair independently.    Patient is .  Patient's  is typically away for extended periods of time for his work.  Patient has 2 children that are in their 20s.    Work History:  The patient currently is on disability.    REVIEW OF SYSTEMS:   GENERAL HEALTH: No fevers, chills, recent weight loss or unremitting night pain.  SKIN: No history of skin rashes.  EYES: No blurred vision, double vision or changes in vision.  HEENT: No swallowing or hearing difficulties.  RESPIRATORY: No shortness of breath.  History of chronic asthma  CARDIOVASCULAR: No chest pain or palpitations.  History of MI, multiple pulmonary embolisms  GI: Denies nausea, vomiting, constipation, diarrhea; no rectal bleeding;  no melana.  History of hiatal hernia, history of being on PPI  : No dysuria, urgency or frequency; no hematuria.  NEURO: No balance problems, no seizure problems, no depression.  PSYCHE: no symptoms of depression or anxiety.  History of anxiety depression  HEMATOLOGY: denies hx anemia; denies bruising or excessive bleeding.  ENDOCRINE: denies excessive thirst or urination; denies unexpected wt gain or wt loss.  History of insulin-dependent diabetes  MUSCULOSKELETAL: DENIES:, Swelling of joints, Hardware, Deformity, Gout, Heel spurs history of fibromyalgia, osteoporosis, knee crepitus, knee pain, low back pain  ALLERGY/IMM.: denies food or seasonal allergies.  No history of cancer, infectious disease exposure.    Radiology/Lab Test Reviewed:    PROCEDURE:  MRI SPINE LUMBAR (CPT=72148)     [facility]UM[facility] (CPT[facility]72148), 2/18/2015, 7:48 PM.     INDICATIONS:  M54.41 Acute bilateral low back pain with bilateral sciatica  M54.42 Acute bilateral low back pain with bilateral sciatica     TECHNIQUE:  Multiplanar T1 and T2 weighted images including fat suppression sequences.  Images acquired in sagittal and axial planes.       PATIENT STATED HISTORY: (As transcribed by Technologist)  Worsening low back pain radiating to bilateral lower extremities. There is difficulty standing straight and ambulating.         FINDINGS:    LUMBAR DISC LEVELS  L1-L2:  No significant disc/facet abnormality, spinal stenosis, or foraminal narrowing.  L2-L3:  No significant disc/facet abnormality, spinal stenosis, or foraminal narrowing.  L3-L4:  No significant disc/facet abnormality, spinal stenosis, or foraminal narrowing.  L4-L5:  There is no significant disc bulge.  There is mild to moderate right facet joint degenerative change and mild left facet joint degenerative change.  There is no significant central or foraminal stenosis.  L5-S1:  Minimal broad-based degenerative disc bulge.  There is mild bilateral facet joint degenerative change.  There is no central or foraminal stenosis.     PARASPINAL AREA:  Normal with no visible mass.  Small cyst in the right kidney measures 10 mm.  BONY STRUCTURES:  No fracture, pars defect, significant scoliosis, or osseous lesion.    CORD/CAUDA EQUINA:  Normal caliber, contour, and signal intensity.                    Impression  CONCLUSION:       Mild degenerative changes at L4-5 and L5-S1.  There is no central canal stenosis or nerve root impingement.        LOCATION:  [facility]        Dictated by (CST): Yuri Rivera MD on 9/18/2024 at 2:19 PM      Finalized by (CST): Yuri Rivera MD on 9/18/2024 at 2:23 PM   PROCEDURE:  XR LUMBAR SPINE (MIN 4 VIEWS) (CPT=72110)     TECHNIQUE:  AP, lateral, oblique, and coned down L5-S1 views were obtained.     COMPARISON:  [facility], XR, XR LUMBAR SPINE (MIN 4 VIEWS) (CPT=72110), 8/19/2023, 12:49 PM.     INDICATIONS:  M54.41 Acute bilateral low back pain with bilateral sciatica M54.42  Acute bilateral low back pain with bilateral sciatica     PATIENT STATED HISTORY: (As transcribed by Technologist)  The patient states that she has pain across her lower back.         FINDINGS:      BONES:  Mild levoscoliosis of the lumbar spine is noted.  Normal vertebral body height is present.  No acute fracture.  Mild-to-moderate facet joint arthropathy in the lower lumbar spine is noted.  DISC SPACES:  Normal.  No significant disc height narrowing, subluxation, or endplate abnormality.  PARASPINOUS:  Negative.  No paraspinous abnormality is seen.  OTHER:  Negative.                     Impression   CONCLUSION:  Mild levoscoliosis of the lumbar spine.        LOCATION:  [facility]        Dictated by (CST): Song Pelayo MD on 7/09/2024 at 2:28 PM      Finalized by (CST): Song Pelayo MD on 7/09/2024 at 2:28 PM       Result History    XR LUMBAR SPINE (MIN 4 VIEWS) (CPT=72110) (Order #[id number]) on 7/9/2024 - Order Result History Report    CBC:    Lab Results   Component Value Date    WBC 9.7 09/30/2024    WBC 9.7 04/11/2024    WBC 10.4 04/10/2024     Lab Results   Component Value Date    HEMOGLOBIN 13.9 09/23/2014    HEMOGLOBIN 13.7 11/19/2012    HEMOGLOBIN 14.3 02/11/2011     Lab Results   Component Value Date    .0 09/30/2024    .0 04/11/2024    .0 04/10/2024         PHYSICAL EXAMIMATION   PHYSICAL EXAMINATION: Danielle Jerome is a 59 year old  female who is observed sitting comfortably on a chair in the exam room alert and oriented times three. She looks consistent with her stated age.    Ht Readings from Last 1 Encounters:   09/17/24 68\"     Wt Readings from Last 1 Encounters:   09/30/24 278 lb 9.6 oz (126.4 kg)     The patient is well developed, well nourished, well muscled, overweight. She moves independently from sitting to standing with ease.       Inspection:  No acute distress    Patient displays Non-antalgic, and is able to rise on toes and heels standing at the  counter.  No atrophy noted to bilateral lower extremities  Coordination:  Well-coordinated, fluent gait, able to engage in rapid alternating movements of upper and lower extremities.    ROM Lumbar Spine:  See chart below:  Motion Right (+ or -) Left (+ or -)   Lumbar Flexion - -   Lumbar Extension + +   Lumbar Bending + +   Lumbar Extension/ twisting motion + +     Lumbar/Sacral Integument:  Skin over lumbar sacral spine is intact without rashes, excoriations, lesions or erythema noted    Palpation:  See chart below:  Palpation of lumbar area Right (+ or -) Left (+ or -)   Lumbar facets + +   Lumbar paraspinals + +   Piriformis + +   SIJ + +   Trochanteric Bursa - -     Deep Tendon Reflexes:  Grossly intact to bilateral lower extremities 2/4 throughout    Strength:  Strength of bilateral lower extremities grossly intact; 5/5 throughout    Sensation:  Sensory deficit noted:  Decreased sensation to left lateral thigh and bilateral plantar aspects of the feet     Tests:  Test Right (+ or -) Left (+ or -)   SLR - -   Lucas’s - -   Babinski - -   Clonus - -     HEAD/NECK: Head is normocephalic, neck supple  EYES: EOMI, ALICIA  SKIN EXAM: Skin is intact, head, neck, trunk and arms/legs. No rashes, mottling or ulcerations.  LYMPH EXAM: There is no lymph edema in either lower extremity.  VASCULAR EXAM: Pedal pulses are normal bilaterally, with good distal perfusion. No clubbing or cyanosis.  ABDOMINAL EXAM: Abdomen is soft without masses palpated/H/O HERNIA  MUSCULOSKELETAL: Smooth, pain-free ROM to bilat hips, ankles, and knees. + Crepitus to left knee with full extension, mild tenderness to medial distal patellar region        Patient Education: Patient was advised to continue normal activities as tolerated and was advised against any form of bedrest, since recent guidelines promote and encourage physical activity for improvment of functionality and overall pain.  (Family Practice Vol. 16, No. 1, 39-54Â© [employer] 1999 ), Patient was shown interactive content, shown and explained procedure on spinal model..    Patient is currently on pain medications:  No  Reason pain medications are prescribed: Chronic pain  Pain medications are prescribed by: Rheumatologist  [place] Prescription Monitoring review: Yes-Compliant  DIRE: N/A  Treatment decision: N/A    MEDICAL DECISION MAKING:     Impression:     ICD-10-CM    1. Other chronic pain  G89.29 [facility] Referral - In Network      2. Left knee pain, unspecified chronicity  M25.562 XR KNEE, COMPLETE (4 OR MORE VIEWS), LEFT (CPT=73564)      3. Spondylosis of lumbar region without myelopathy or radiculopathy  M47.816 [facility] PAIN NAVIGATOR        Patient is a 59-year-old female with a history of obesity, non-ST elevated MI, blood dyscrasia resulting in multiple pulmonary embolism, MDD, cardiomyopathy, hypertension, asthma resulting in chronic steroid use, diabetes induced by chronic steroid use, vitamin D deficiency, B12 deficiency, fibromyalgia with multiple joint pain and low back pain.  Patient is being seen primarily today for her back pain.  She is now working with rheumatology who is adjusting some of her medications, weaned her off escitalopram and started duloxetine and patient reports a modest effect from this medication.    We did review her lumbar MRI in detail.  Very mild if any degenerative changes at L4-5 and L5-S1 with some facet arthropathy.  We did discuss diagnostic lumbar medial branch blocks, the local anesthetic diagnostic phase, the monitoring of response and if the diagnostic injections are helpful during the time of the local anesthetic we would consider radiofrequency ablation to help eliminate the low back as a pain generator resulting in patient's reduced activity.  Asks and benefits of doing these injections were discussed with the patient.  She is on Xarelto and Plavix therefore we will need for her to be off these medications before the  injections are done.  Patient indicates that she has been able to be off these medications for other treatments in the past.    For patient's left knee pain I did order knee x-ray to evaluate for degenerative changes possible Synvisc as patient does not want to consider any additional steroids due to her diabetic health    We did discuss weight reduction and movement as well as physical therapy.  Patient has not found physical therapy to be very helpful in the past.  I did discuss with her that working with our physical therapist to is able to be more of a health  and develop a plan for patient more long-term as patient has been a chronic pain patient for many years.  I will reach out to Yvette Jack to see if she can work with this patient remotely since this patient is in [place] and Yvette [place] in [place] location.    I also discussed with the patient meeting with our pain psychologist.  Patient was open to this as an option to help identify additional strategies that she can manage and cope with her ongoing pain    We also discussed acupuncture.  She has had a positive response with acupuncture in the past she will check with her insurance to see if this is covered and if not patient would consider self-pay in order to work with her acupuncture team for both of fibromyalgia and neuropathy.    Lastly we discussed working with Dr. Gonzalez and possibly weaning off gabapentin and trialing Lyrica for her neuropathy as well as possibly obtaining EMG/NCV study to understand etiology of patient's neuropathy.  Sent message awaiting response      Plan:   > Lumbar medial branch blocks for diagnostic purposes staging towards radiofrequency if effective  > Left knee x-ray to evaluate for osteoarthritis if noted would consider Synvisc  > Work with Yvette Jack if available remotely for health coaching (patient has 2 physical therapy orders pending in system)  > Continue working with Dr. Gonzalez rheumatologist on duloxetine  and possible switch from gabapentin to Lyrica  > Work with pain psychologist  > Consider acupuncture if covered by insurance if not patient is considering self-pay      The patient indicates understanding of these issues and agrees to the plan.      Thank you very much.             Id#[id number]

## 2024-10-07 NOTE — PROGRESS NOTES
Subjective:   Patient ID: Danielle Jerome is a 59 year old female.    HPI    History/Other:   Review of Systems  Current Outpatient Medications   Medication Sig Dispense Refill   • MONTELUKAST 10 MG Oral Tab TAKE 1 TABLET NIGHTLY 90 tablet 3   • DULoxetine 40 MG Oral Cap DR Particles Take 40 mg by mouth daily. 90 capsule 0   • predniSONE 20 MG Oral Tab Take 2 tablets (40 mg total) by mouth daily for 3 days, THEN 1 tablet (20 mg total) daily for 2 days, THEN 0.5 tablets (10 mg total) daily for 2 days. 9 tablet 0   • ALPRAZolam 0.5 MG Oral Tab Take 1-2 tablets (0.5-1 mg total) by mouth daily as needed for Sleep or Anxiety. 42 tablet 0   • semaglutide (OZEMPIC, 0.25 OR 0.5 MG/DOSE,) 2 MG/3ML Subcutaneous Solution Pen-injector Take 0.25mg once weekly for four weeks. After four weeks, increase to 0.5mg once weekly. 9 mL 0   • DUPIXENT 300 MG/2ML Subcutaneous Solution Pen-injector      • gabapentin 100 MG Oral Cap Takes Gabapentin 100mg in the morning and 300mg at night 360 capsule 0   • albuterol 108 (90 Base) MCG/ACT Inhalation Aero Soln Inhale 2 puffs into the lungs every 4 to 6 hours as needed for Wheezing. 3 each 3   • HUMALOG KWIKPEN 200 UNIT/ML Subcutaneous Solution Pen-injector Do NOT use until trained by diabetes educator in office. Use to fill insulin pump with up to 130u/day. 60 mL 1   • Tiotropium Bromide Monohydrate (SPIRIVA RESPIMAT) 2.5 MCG/ACT Inhalation Aero Soln Inhale 2 puffs into the lungs in the morning and 2 puffs before bedtime.     • metoprolol succinate ER 25 MG Oral Tablet 24 Hr Take 1 tablet (25 mg total) by mouth nightly.     • BREO ELLIPTA 200-25 MCG/ACT Inhalation Aerosol Powder, Breath Activated Inhale 1 puff into the lungs daily. 3 each 1   • Continuous Blood Gluc Sensor (DEXCOM G6 SENSOR) Does not apply Misc Apply to skin every 10 days 9 each 3   • Continuous Blood Gluc Transmit (DEXCOM G6 TRANSMITTER) Does not apply Misc 1 each by Other route every 3 (three) months. 1 each 1   •  Insulin Disposable Pump (OMNIPOD 5 G6 PODS, GEN 5,) Does not apply Misc 1 each every other day. 45 each 1   • Insulin NPH, Human,, Isophane, (HUMULIN N KWIKPEN) 100 UNIT/ML Subcutaneous Suspension Pen-injector Pair with prednisone only. Up to 30 units a day max. 9 mL 0   • pantoprazole 40 MG Oral Tab EC Take 1 tablet (40 mg total) by mouth before breakfast. (Patient taking differently: Take 1 tablet (40 mg total) by mouth nightly.) 90 tablet 3   • rivaroxaban (XARELTO) 20 MG Oral Tab Take 1 tablet (20 mg total) by mouth nightly. TAKE AT BEDTIME 90 tablet 3   • glucagon (GVOKE HYPOPEN 1-PACK) 1 MG/0.2ML Subcutaneous SUBQ injection Inject 0.2 mL (1 mg total) into the skin once as needed for Low blood glucose. 0.2 mL 1   • amLODIPine 2.5 MG Oral Tab Take 1 tablet (2.5 mg total) by mouth daily.     • Azelastine HCl 0.1 % Nasal Solution 1 spray by Nasal route in the morning and 1 spray before bedtime.     • SUMAtriptan Succinate 50 MG Oral Tab TAKE 1 TABLET BY MOUTH EVERY 2 HOURS AS NEEDED FOR  MIGRAINE.  DO  NOT  EXCEED  200MG  IN  24  HOURS 9 tablet 3   • Clopidogrel Bisulfate 75 MG Oral Tab Take 1 tablet (75 mg total) by mouth nightly. nightly     • losartan 100 MG Oral Tab Take 1 tablet (100 mg total) by mouth daily.     • ezetimibe 10 MG Oral Tab Take 1 tablet (10 mg total) by mouth nightly.     • Fluticasone Propionate 50 MCG/ACT Nasal Suspension 1 spray by Each Nare route 2 (two) times daily.     • cetirizine (ZYRTEC) 10 MG Oral Tab Take 1 tablet (10 mg total) by mouth nightly.       Allergies:  Allergies   Allergen Reactions   • Peanut-Containing Drug Products HIVES and SHORTNESS OF BREATH     Hives, asthma attack   • Pcn [Penicillins] UNKNOWN     HAPPENED AS A CHILD       Objective:   Physical Exam  Constitutional:          Assessment & Plan:   No diagnosis found.    No orders of the defined types were placed in this encounter.      Meds This Visit:  Requested Prescriptions      No prescriptions requested or  ordered in this encounter       Imaging & Referrals:  None    Location of Pain: bilateral neck, left shoulder, bilateral low back, bilateral knees, bilateral feet    Date Pain Began: many years ago, worsening          Work Related:   No        Receiving Work Comp/Disability:   No    Numeric Rating Scale:  Pain at Present:  7/10                                                                                                            (No Pain) 0  to  10 (Worst Pain)                 Minimum Pain:   7  Maximum Pain  7    Distribution of Pain:    bilateral left side worse    Quality of Pain:   aching, numbness, sharp/stabbing, and tingling    Origin of Pain:    Degenerative    Aggravating Factors:    Standing and Walking    Past Treatments for Current Pain Condition:   Physical Therapy    Prior diagnostic testing for your pain:  MRI, xray

## 2024-10-07 NOTE — PATIENT INSTRUCTIONS
Refill policies:    Allow 2-3 business days for refills; controlled substances may take longer.  Contact your pharmacy at least 5 days prior to running out of medication and have them send an electronic request or submit request through the “request refill” option in your [employer] account.  Refills are not addressed on weekends; covering physicians do not authorize routine medications on weekends.  No narcotics or controlled substances are refilled after noon on Fridays or by on call physicians.  By law, narcotics must be electronically prescribed.  A 30 day supply with no refills is the maximum allowed.  If your prescription is due for a refill, you may be due for a follow up appointment.  To best provide you care, patients receiving routine medications need to be seen at least once a year.  Patients receiving narcotic/controlled substance medications need to be seen at least once every 3 months.  In the event that your preferred pharmacy does not have the requested medication in stock (e.g. Backordered), it is your responsibility to find another pharmacy that has the requested medication available.  We will gladly send a new prescription to that pharmacy at your request.    Scheduling Tests:    If your physician has ordered radiology tests such as MRI or CT scans, please contact Central Scheduling at [phone] right away to schedule the test.  Once scheduled, the [facility] Centralized Referral Team will work with your insurance carrier to obtain pre-certification or prior authorization.  Depending on your insurance carrier, approval may take 3-10 days.  It is highly recommended patients assure they have received an authorization before having a test performed.  If test is done without insurance authorization, patient may be responsible for the entire amount billed.      Precertification and Prior Authorizations:  If your physician has recommended that you have a procedure or additional testing performed the [facility]  Centralized Referral Team will contact your insurance carrier to obtain pre-certification or prior authorization.    You are strongly encouraged to contact your insurance carrier to verify that your procedure/test has been approved and is a COVERED benefit.  Although the [facility] Centralized Referral Team does its due diligence, the insurance carrier gives the disclaimer that \"Although the procedure is authorized, this does not guarantee payment.\"    Ultimately the patient is responsible for payment.   Thank you for your understanding in this matter.  Paperwork Completion:  If you require FMLA or disability paperwork for your recovery, please make sure to either drop it off or have it faxed to our office at [phone]. Be sure the form has your name and date of birth on it.  The form will be faxed to our Forms Department and they will complete it for you.  There is a 25$ fee for all forms that need to be filled out.  Please be aware there is a 10-14 day turnaround time.  You will need to sign a release of information (ZAYRA) form if your paperwork does not come with one.  You may call the Forms Department with any questions at [phone].  Their fax number is [phone].

## 2024-10-08 ENCOUNTER — TELEPHONE (OUTPATIENT)
Dept: PAIN CLINIC | Facility: CLINIC | Age: 59
End: 2024-10-08

## 2024-10-08 NOTE — TELEPHONE ENCOUNTER
Order Questions    Question Answer Comment   Anesthesia Type Local    Provider Natan    Location [facility] Procedure Lab    Procedure Medial Branch Block    Laterality/Level BILATERAL L4/5, L5/S1 MBB    CPT (Hit enter after each entry) INJ PARAVERT F JNT L/S 2 LEV    Medical clearance requested (will send to Pain Navigator) Yes XARELTO/PLAVIX   Patient has Medicare coverage? No

## 2024-10-08 NOTE — TELEPHONE ENCOUNTER
Contacted patient to confirm managing provider for Xarelto & Plavix to send medical clearance.     Per patient -- Xarelto is managed by MADAI Mercado & Plavix is managed by MADAI Serrano. Patient confirmed that medical clearance can be sent to  at this is the cardiologist.

## 2024-10-09 ENCOUNTER — TELEPHONE (OUTPATIENT)
Dept: PAIN CLINIC | Facility: CLINIC | Age: 59
End: 2024-10-09

## 2024-10-09 NOTE — TELEPHONE ENCOUNTER
Prior authorization request completed for: bilateral L4/5,L5/S1 MBB  Authorization # no auth needed   Pre-D: no   Exclusions/Restrictions: no  Covered Benefit: yes   Authorization dates: n/a  CPT codes approved: 10987,90876  Number of visits/dates of service approved: 1  Physician: aissatou  Location: [facility]   Call Ref#: [id number]  Representative Name: theo mckenzie  Insurance Carrier: [phone]    Patient can be scheduled. Routed to Navigator.

## 2024-10-10 NOTE — TELEPHONE ENCOUNTER
Jeana Farah Medical Assistant Signed Yesterday     Copy     Prior authorization request completed for: bilateral L4/5,L5/S1 MBB  Authorization # no auth needed   Pre-D: no   Exclusions/Restrictions: no  Covered Benefit: yes   Authorization dates: n/a  CPT codes approved: 67048,39236  Number of visits/dates of service approved: 1  Physician: aissatou  Location: [facility]   Call Ref#: [id number]  Representative Name: theo mckenzie  Insurance Carrier: [phone]     Patient can be scheduled. Routed to Navigator.

## 2024-10-11 ENCOUNTER — TELEPHONE (OUTPATIENT)
Facility: CLINIC | Age: 59
End: 2024-10-11

## 2024-10-11 ENCOUNTER — PATIENT MESSAGE (OUTPATIENT)
Facility: CLINIC | Age: 59
End: 2024-10-11

## 2024-10-11 DIAGNOSIS — Z79.4 TYPE 2 DIABETES MELLITUS WITH HYPERGLYCEMIA, WITH LONG-TERM CURRENT USE OF INSULIN (HCC): ICD-10-CM

## 2024-10-11 DIAGNOSIS — E11.65 TYPE 2 DIABETES MELLITUS WITH HYPERGLYCEMIA, WITH LONG-TERM CURRENT USE OF INSULIN (HCC): ICD-10-CM

## 2024-10-11 RX ORDER — INSULIN PMP CART,AUT,G6/7,CNTR
1 EACH SUBCUTANEOUS
Qty: 45 EACH | Refills: 1 | Status: SHIPPED | OUTPATIENT
Start: 2024-10-11 | End: 2024-10-14

## 2024-10-11 NOTE — TELEPHONE ENCOUNTER
Pt had cancelled yesterday 10/10/24 appt.    Per LOV note from 8/30/24:  Omnipod 5 + Dexcom in [employer] + U200 HUMALOG  Time ICR ISF Active Insulin Time Target   MN 10 --> 8 32 2.5 --> 2h 110   9A 8 --> 7

## 2024-10-11 NOTE — TELEPHONE ENCOUNTER
Endocrine Refill protocol for Omnipod insulin pumps and supplies    Protocol Criteria:  PASSED  Reason: N/A    If below requirement is met, send a 90-day supply with 1 refill per provider protocol.    Verify appointment with Endocrinology completed in the last 6 months or scheduled in the next 3 months.    Last completed office visit:8/30/2024 Darling Gregory APN   Next scheduled Follow up:   Future Appointments   Date Time Provider Department Center   10/16/2024 11:00 AM [facility] INTERN [facility] [place]   10/18/2024  2:00 PM OS CT RM 1 OS CT [place]   10/24/2024  2:00 PM Ana Luisa Miller MD [facility] [place]   11/5/2024 10:30 AM Maria E Gonzalez DO [facility] [place]   11/14/2024  2:45 PM Darling Gregory APN [facility] [place]   4/8/2025 11:15 AM Maria E Gonzalez DO [facility] [place]   8/6/2025 10:15 AM Maria E Gonzalez DO [facility] [place]      Refill protocol passed, 90 day supply with 1 refill sent to pharmacy.

## 2024-10-11 NOTE — TELEPHONE ENCOUNTER
10/11/24-Received via fax from [employer] a Refill request for Omnipod 5 G6 pack 5'S (Gen 5).  Placed in PA in basket.

## 2024-10-14 ENCOUNTER — TELEPHONE (OUTPATIENT)
Dept: HEMATOLOGY/ONCOLOGY | Age: 59
End: 2024-10-14

## 2024-10-14 DIAGNOSIS — E11.65 TYPE 2 DIABETES MELLITUS WITH HYPERGLYCEMIA, WITH LONG-TERM CURRENT USE OF INSULIN (HCC): ICD-10-CM

## 2024-10-14 DIAGNOSIS — Z79.4 TYPE 2 DIABETES MELLITUS WITH HYPERGLYCEMIA, WITH LONG-TERM CURRENT USE OF INSULIN (HCC): ICD-10-CM

## 2024-10-14 RX ORDER — INSULIN PMP CART,AUT,G6/7,CNTR
1 EACH SUBCUTANEOUS
Qty: 45 EACH | Refills: 3 | Status: SHIPPED | OUTPATIENT
Start: 2024-10-14

## 2024-10-14 RX ORDER — INSULIN LISPRO 200 [IU]/ML
INJECTION, SOLUTION SUBCUTANEOUS
Qty: 72 ML | Refills: 1 | Status: SHIPPED | OUTPATIENT
Start: 2024-10-14

## 2024-10-14 NOTE — TELEPHONE ENCOUNTER
Mathew ledezma- avg TDD ~66u/day  Will refill as 80u/day --> 160u volume needed in U200    2hPP still elevated despite carb entry, adjust ICR from 7/8 --> 6 across the day  My chart message sent.

## 2024-10-14 NOTE — TELEPHONE ENCOUNTER
Pt called stated she's having a procedure on her back and she needs medical clearance from Dr. Johnson to hold the Xarelto     Please give pt a call back

## 2024-10-14 NOTE — TELEPHONE ENCOUNTER
Reviewed fax asking for refills of Omnipod 5 G6 pack    LOV:     FU:    Month Supply Pendin days with 3 refills per [facility] request    Routed for review.

## 2024-10-14 NOTE — TELEPHONE ENCOUNTER
Contacted patient in regards to medical clearance - advised patient that she would need to contact hematology office to schedule follow up. Once she has follow up can contact the office back to send the medical clearance. Patient VIJAY.

## 2024-10-14 NOTE — TELEPHONE ENCOUNTER
Spoke with patient. PCP advised patient to get anticoag clearance for upcoming procedure from Dr Johnson. Last office visit was in 2016. Advised patient to schedule a follow up appointment to further discuss. Message sent to PSRs.

## 2024-10-14 NOTE — TELEPHONE ENCOUNTER
Received Medical Clearance from Julie Frommelt,APRN at [facility] - clearance indicates below :     If no new CV concerns, may proceed and hold Plavix for 7 days.   Requires f/u with hematology for Xarelto hold -prescribed for hypercoagulable state.     Sent to scan.

## 2024-10-24 ENCOUNTER — TELEMEDICINE (OUTPATIENT)
Facility: CLINIC | Age: 59
End: 2024-10-24
Payer: COMMERCIAL

## 2024-10-24 DIAGNOSIS — I50.20 HFREF (HEART FAILURE WITH REDUCED EJECTION FRACTION) (HCC): ICD-10-CM

## 2024-10-24 DIAGNOSIS — J45.50 SEVERE PERSISTENT ASTHMA, UNSPECIFIED WHETHER COMPLICATED (HCC): ICD-10-CM

## 2024-10-24 DIAGNOSIS — G47.33 OBSTRUCTIVE SLEEP APNEA SYNDROME: Primary | ICD-10-CM

## 2024-10-24 PROCEDURE — 99214 OFFICE O/P EST MOD 30 MIN: CPT | Performed by: INTERNAL MEDICINE

## 2024-10-24 RX ORDER — ALBUTEROL SULFATE 90 UG/1
2 INHALANT RESPIRATORY (INHALATION) EVERY 4 HOURS PRN
Qty: 1 EACH | Refills: 11 | Status: SHIPPED | OUTPATIENT
Start: 2024-10-24

## 2024-10-24 RX ORDER — ALBUTEROL SULFATE 0.83 MG/ML
2.5 SOLUTION RESPIRATORY (INHALATION) 3 TIMES DAILY
Qty: 120 EACH | Refills: 11 | Status: SHIPPED | OUTPATIENT
Start: 2024-10-24

## 2024-10-24 NOTE — PATIENT INSTRUCTIONS
-Plan:  -same meds -   - consider scheduling CPAP titration please   - vaccines - covid and flu and RSV   See me in 4  months      Ana Luisa Miller MD  Pulmonary Medicine  10/24/24

## 2024-10-24 NOTE — PROGRESS NOTES
History of Present Illness:  Danielle Jerome is a 58 year old female presenting to pulmonary clinic dyspnea     - aunt  - - others in [place]-   Got sick after - and 4  weeks before -- had 2 courses of prednisone        Had testing for abnl PET - and had angio - dr tolliver- all OK -   Some palptitsion   Remains with shortness of breath- some days with difficulty with stairs- knees and back-- DJD - limit as well-   To see vascular - for legs and carotid  No coughing   Now on nucula-- sl light headed at outset- - one dose thus far-- thinks breathing is better   Off prednsione -   Losing weight -- new dexcom- with pump- ongoing -   Breo and spiriva - once a day -- - some rescue use -- twice a day -- a lot less-   Overall feels much better     1.24- doing OK- - had covid- early novemeber- - unable to take paxlovid-- so fatigued and dozzy at times- low vit D-   Follows with evan-  Increased sinus drainage very thick and always stuffy- had 3 zpack and doxy   Increased wheeze - winded all the time- sats 94-96% - drops some with walking - can't exercise - stairs to bedroom-   Thinks dupexent was better than nucala - last 2 weeks ago-   [facility]  had recommended nuclala -- rather than do bx for churg marc   Breo and spiriva - daily - rescue - a lot - himidifiers   Donzelli xclear and Xhance (fluticasone)   Aseltine nasal and zyrtec and mucinex ?benadryl at night   Prednsione none at the moment  Heart - no recent visit - sharee next week   Singular remains every night -      3.24- saw dr machuca - thinks long hauler - remains exhausted -   Wants to switch back to dupuxent -- thihks sinus was more helpful-- ongoing sinus drainage - some dizzyness - no allergy testing   No plans for bx-   No side effects - side effects from nuclala- dizzy - once a month   No prednsione - allergy attack - jacket with cat fur-   Breo and spiriva - - rescue - less overall -  Singular   Asteline and zyrtec capsasain -  had nose bleed- -   Donzelli following for possible surgery - xhance and xclear     6/24- whole body hurts aches and pains -- -- to see dr gordon - in August - gabapentin not helping- tylenol as needed -to start PT- left shoulder - to start   Neck and spine and told RA ?   Asthma is good - remains on dupixent -- hard in the heat - - every 2 weeks - no reaction -   Breo 200 daily - uses rescue  3-4 times per day- gets winded- with most activty - vaccums etc- gets winded easily and rescue helps   No exercise-- pain limits --sciatica - can't get up off the floor   Disc issues - ongoing   No change in heart- due to see dr tolliver- -     In hosp April 9th -   Remains on Xhance and xclear   Sinus less congested - - but increased eye findings   Trying for ozempic-- not to take related to gastro[areisis -- U2 insulin - better now controlled   No prednisone - last for her spine -  10/24- dr reyez for RFA lumbar  with back pain onging with spasms - in nov- significant liitation     To see dr Alberto - no bridging -   Did not test but thought had covid -- ear infection prior and needed predisone-   Better breathing now-- used albuterol a lot-   Cough moslty clear--   Startd on ozempic- and threw up with acid reflux- - then coughed color -- very congestion   Breo spiriva     On Dupixent off Nucala-to follow-up with Dr. Hodgson  30-minute video visit                                    Social History:   Social Hx on file   Social History            Socioeconomic History    Marital status:     Number of children: 3   Occupational History    Occupation: homemaker   Tobacco Use    Smoking status: Never       Passive exposure: Current    Smokeless tobacco: Never    Tobacco comments:       2nd hand with Father and now    Vaping Use    Vaping Use: Never used   Substance and Sexual Activity    Alcohol use: No    Drug use: No    Sexual activity: Never       Partners: Male       Birth control/protection: I.U.D.             Medications:   Current Medications          Current Outpatient Medications   Medication Sig Dispense Refill    ALPRAZOLAM 0.5 MG Oral Tab TAKE 1 TO 2 TABLETS(0.5 TO 1 MG) BY MOUTH DAILY AS NEEDED FOR SLEEP OR ANXIETY 42 tablet 0    Continuous Blood Gluc Sensor (DEXCOM G6 SENSOR) Does not apply Misc Apply to skin every 10 days 9 each 3    Continuous Blood Gluc Transmit (DEXCOM G6 TRANSMITTER) Does not apply Misc 1 each by Other route every 3 (three) months. 1 each 1    Insulin Disposable Pump (OMNIPOD 5 G6 PODS, GEN 5,) Does not apply Misc 1 each every other day. 45 each 1    insulin lispro (HUMALOG) 100 UNIT/ML Injection Solution Uses up to 100 units daily via insulin pump 90 mL 1    Insulin NPH, Human,, Isophane, (HUMULIN N KWIKPEN) 100 UNIT/ML Subcutaneous Suspension Pen-injector Pair with prednisone only. Up to 30 units a day max. 9 mL 0    pantoprazole 40 MG Oral Tab EC Take 1 tablet (40 mg total) by mouth before breakfast. 90 tablet 3    rivaroxaban (XARELTO) 20 MG Oral Tab Take 1 tablet (20 mg total) by mouth nightly. TAKE AT BEDTIME 90 tablet 3    predniSONE 10 MG Oral Tab 3 po q am for 3 days then 2 po q am for 3 days then one po q am for 5 days 20 tablet 0    fluconazole 150 MG Oral Tab Take 1 tablet (150 mg total) by mouth once.        gabapentin 100 MG Oral Cap Take 100mg in am, 100mg in afternoon and 200mg at night 360 capsule 0    ergocalciferol 1.25 MG (25447 UT) Oral Cap Take 1 capsule (50,000 Units total) by mouth once a week. 12 capsule 0    predniSONE 10 MG Oral Tab Take 3 tabs (30mg) daily for 2 days, then take 2 tabs (20mg) daily for 2 days, then take 1 tab (10mg) daily for 2 days. 12 tablet 0    albuterol 108 (90 Base) MCG/ACT Inhalation Aero Soln Inhale 2 puffs into the lungs every 4 to 6 hours as needed for Wheezing or Shortness of Breath. 3 each 1    montelukast 10 MG Oral Tab Take 1 tablet (10 mg total) by mouth nightly. 90 tablet 1    escitalopram 20 MG Oral Tab Take 1 tablet (20 mg  total) by mouth every morning. 90 tablet 1    methylPREDNISolone (MEDROL) 4 MG Oral Tablet Therapy Pack As directed. 1 each 0    amLODIPine 2.5 MG Oral Tab amLODIPine 2.5 mg tablet, [RxNorm: 626636]        Mepolizumab (NUCALA SC) Inject into the skin every 30 (thirty) days.        Tiotropium Bromide Monohydrate (SPIRIVA RESPIMAT) 2.5 MCG/ACT Inhalation Aero Soln Inhale 2 puffs into the lungs daily. 1 each 5    Metoprolol-HCTZ ER 25-12.5 MG Oral Tablet 24 Hr metoprolol succ 25 mg-hydrochlorothiazide 12.5 mg tablet,ext.rel 24 hr   25mg 1/day        Azelastine HCl 0.1 % Nasal Solution          SUMAtriptan Succinate 50 MG Oral Tab TAKE 1 TABLET BY MOUTH EVERY 2 HOURS AS NEEDED FOR  MIGRAINE.  DO  NOT  EXCEED  200MG  IN  24  HOURS 9 tablet 3    BREO ELLIPTA 200-25 MCG/INH Inhalation Aerosol Powder, Breath Activated          Clopidogrel Bisulfate 75 MG Oral Tab Take 1 tablet (75 mg total) by mouth daily. nightly        losartan 100 MG Oral Tab Take 1 tablet (100 mg total) by mouth daily.        ezetimibe 10 MG Oral Tab Take 1 tablet (10 mg total) by mouth nightly.        Fluticasone Propionate 50 MCG/ACT Nasal Suspension 2 sprays by Each Nare route 2 (two) times daily.        cetirizine (ZYRTEC) 10 MG Oral Tab Take 1 tablet (10 mg total) by mouth daily.        glucagon (GVOKE HYPOPEN 1-PACK) 1 MG/0.2ML Subcutaneous SUBQ injection Inject 0.2 mL (1 mg total) into the skin once as needed for Low blood glucose. 0.2 mL 1            Review of Systems:    Weight - up 20# since July - - and down now 263-- was 277 - now 265 - thinks stable   gerd mostly controlled - ppi once a day -- no breakthrough - ppi daily -- breakthrough with ozempic   No diarrhea- rarely   Rare hives-no recent issue- 2 days ago then resolved -- needed benedryl   Some swelling- - trace now - comes and goes   Torsemide only as needed - rare use no recent use -   Shoulder pain -- for MRI      Overall thinks sleep is a bit better she is not waking up during the  night to take her rescue inhaler though continues to have awakenings- - no issue - nocturia - occ needs inhaler  Tolerating Nucala well without skin lesions  Alleriges worse without singular   Agrees to CPAP titration                 Physical Exam:-- comfortable no coughing and minimal congestion   Results: reviewed      Assessment/Plan:  #History of breast lesion  Stable on mammos 2020  Encouraged to get follow-up  Negative mammos 3/23  States up-to-date        #PEDRO  Remote negative study  Recent increase in nocturnal awakenings and fatigue  Did not get follow-up study yet  5/23-agrees to proceed  7/23-sleep study AHI 24.3 REM 40 gonzalo 82.7% 5% of the night less than 88  For titration  1/24  did not go for  titration -states will  3.24- continues to complain of exhaustion - did not go for titration - - states will -discussed again  6/24 agrees to titration  10/24 - plans for titration after first of the year               #Diabetes  Ongoing issues some improvement  Some weight loss-now on new monitor and pulm  Blood sugars get significantly out of control on prednisone with endocrine following  Remains with increasing weight and increasing hemoglobin A1c  6/24 notes improvement        #Coronary artery disease  11/2017 with thrombotic lesion to the LAD-FELTON placed  Associated ischemic cardiomyopathy  Repeat cath for elevated troponin 4/2018 with nonobstructive coronary disease  Negative stress 8/2019  Remains on Plavix and lifelong Xarelto  Recurrent chest pain 2020 negative dobutamine stress  7/21 states due for testing   1/23 follows with Gloria Tolliver-repeat stress and carotid Dopplers pending  5/23 due for follow-up after testing including stress and echo  7/23-repeat angiogram last month with Dr. Avila-reports stent widely patent coronaries widely patent-last cath prior was April 2018  1.3.24- due to see dr tolliver   3/24 no significant change no recent testing        #Ischemic cardiomyopathy  EF 35 to 40% improved  on echo 2017  Had been followed in heart failure clinic for a time  6/20 states normal EF  Remains on daily torsemide  Last echo 2/4/2022 -EF 50 to 55% diastolic dysfunction unable to measure ----last with PAS 30-35  Stable fluid status  1/24 fluid status seems improved  3/24 echo 5/23 EF 65 to 70% with grade 1 diastolic dysfunction unable to assess pulmonary pressures normal TAPSE  Fluid status stable  6/24 remains with stable fluid status  10/24- heart all good - higher chol-- dr tolliver            #Pulmonary emboli  Long history remote and recurrent  Plans for lifelong anticoagulation--unable to tolerate aspirin so remains on antiplatelet        #Asthma severe  Childhood asthma progressed early adulthood  Longstanding history multiple hospitalizations multiple steroid dosing remains on MDIs  Begun on Dupixent 2020-sinus disease rash and eosinophilia  Rash biopsy negative for vasculitis  Overall improved control on Dupixent-had been on Xolair prior  Recent [facility] visit with negative 6-minute walk; PFTs with normal timed volumes-ratio 67% total lung capacity 103% of predicted % of predicted-17% bronchodilator response in FEV1  1/23 now with ongoing flare suspect related to sinus issues  5/23 remains with significant obstruction on exam with plans to redose prednisone-worse now as Dupixent has been stopped with plans to begin Nucala after breathing test  7/1:23 dose of Nucala has noted significant improvement with plans to continue and follow-wean MDIs as able  1.24- recent covid infection -- since  worse with nucala vs dupixent - to talk to machuca and to see -short prednisone dose  3/24-asthma now very well compensated-believes her sinus disease was significantly better when she was on Dupixent versus the current Nucala  Insurance investigation ongoing-repeat PFTs pending  6/24--significant flare in [id number] component pneumonia with left lower lobe infiltrate Home on cefdinir and  prednisone--reports stable since that time  10.24 on dupixent now -               #History of chronic sinusitis and nasal polyposis  Remains with ongoing flares  Follows with ENT--minimal improvement in nasal polyps with the Dupixent  Repeat CT planned and Rx with Zithromax  5/23 CT sinuses with opacification and bone remodeling of the right sphenoid sinus-ENT referred her to [facility]-to consider seeing Lyudmila  7/23 has seen Dr. Mao with plans to follow on Nucala and follow CT  3/24 ongoing and current chief complaint remains on  sinus medication plan to follow if able to get Dupixent  6/24 improved on Dupixent  10/24- rmains better though with flares at times - one prednsione need            #Recent evaluation rule out EGPA  Questionably remote Churg-Shania----no history of pulmonary infiltrates  Negative skin biopsy in the past for vasculitis  Recent visit with [facility]-unable to confirm-multiple negative rheumatologic testing  Recommendation had been for EGPA dosing of Nucala-following  7/23-EGPA dosing of Nucala as above following with improvement  3/24 anticipating changed back to Dupixent  6/24- -back on Dupixent with improved control of asthma and sinuses--- clinically improving with plans to follow--no plans currently to revisit [facility]--now following with Dr. Gonzalez  10/24- remains on gabapartin ad now on cymbalta for pain issues      # COVID -   Halloween - since with marked fatigue and cough and sinus congestion   Has been told may have long COVID symptoms  10/24- thinks had covid again in sept     # abd wall hernia after lifting heavy   No acute issues currently-- no issues        -Plan:  -same meds -   - consider scheduling CPAP titration please   - vaccines - covid and flu and RSV   See me in 4  months      Ana Luisa Miller MD  Pulmonary Medicine  10/24/24

## 2024-10-29 ENCOUNTER — OFFICE VISIT (OUTPATIENT)
Dept: HEMATOLOGY/ONCOLOGY | Facility: HOSPITAL | Age: 59
End: 2024-10-29
Attending: SPECIALIST
Payer: COMMERCIAL

## 2024-10-29 VITALS
TEMPERATURE: 98 F | DIASTOLIC BLOOD PRESSURE: 70 MMHG | WEIGHT: 281 LBS | BODY MASS INDEX: 43 KG/M2 | HEART RATE: 96 BPM | SYSTOLIC BLOOD PRESSURE: 136 MMHG | OXYGEN SATURATION: 91 % | RESPIRATION RATE: 20 BRPM

## 2024-10-29 DIAGNOSIS — Z01.818 PRE-OPERATIVE CLEARANCE: ICD-10-CM

## 2024-10-29 DIAGNOSIS — Z79.01 ON CONTINUOUS ORAL ANTICOAGULATION: ICD-10-CM

## 2024-10-29 DIAGNOSIS — Z86.718 HISTORY OF RECURRENT DEEP VEIN THROMBOSIS (DVT): Primary | ICD-10-CM

## 2024-10-29 DIAGNOSIS — Z86.711 HISTORY OF PULMONARY EMBOLUS (PE): ICD-10-CM

## 2024-10-29 PROCEDURE — 99245 OFF/OP CONSLTJ NEW/EST HI 55: CPT | Performed by: SPECIALIST

## 2024-10-29 NOTE — PROGRESS NOTES
[facility] Consultation Note       Patient Name: Danielle Jerome   YOB: 1965  Medical Record Number: [id number]  Consulting Physician: Blaze Johnson M.D.   Referring Physician: MADAI Mercado    The 21st Century Cures Act makes medical notes like these available to patients in the interest of transparency. Please be advised this is a medical document. Medical documents are intended to carry relevant information, facts as evident, and the clinical opinion of the practitioner. The medical note is intended as peer to peer communication and may appear blunt or direct. It is written in medical language and may contain abbreviations or verbiage that are unfamiliar.      Date of Consultation: 10/29/2024       Reason for Consultation (Chief Complaint)  Recurrent thrombosis on therapeutic anticoagulation.    History of Present Illness  Danielle Jerome is a 59 year old year old female with history of right lower extremity deep venous thrombosis, left arm deep venous thrombosis, and pulmonary embolism.  Patient has been on therapeutic anti-coagulation since 2002.  Patient has a strong family history of thrombosis but etiology for these has been identified.  The patient herself has undergone a hypercoagulable workup that was unrevealing. In 2014, she switched from warfarin to rivaroxaban. She had not had a venous thrombotic event since starting rivaroxaban.     Patient presents for \"clearance\" for a planned series of nerve blocks and radiofrequency ablation by pain service for low back pain.     Past Medical History   Past Medical History:    Allergic rhinitis    Allergic rhinitis, cause unspecified    Anemia    Anxiety    Anxiety and depression    Asthma (HCC)    Atherosclerosis of coronary artery    Laguerre's esophagus    Churg-Shania syndrome (HCC)    Colloid cyst of brain (HCC)    Community acquired pneumonia of left lower lobe of lung    Congestive heart disease (HCC)     Coronary artery disease of native heart with stable angina pectoris (HCC)    Depression    Diabetes (HCC)    Diabetes mellitus (HCC)    Diverticulosis of large intestine    Essential hypertension    Exacerbation of asthma (HCC)    Gastroparesis    GERD    Hematemesis with nausea    , No EGD, Hgb stable    High cholesterol    History of blood clots    History of pulmonary embolus (PE)    History of recurrent deep vein thrombosis (DVT)    Hyperlipidemia    Hypertension    IBS    LGSIL (low grade squamous intraepithelial dysplasia)    Pap neg     Migraines    perfumes    Moderate persistent asthma without complication (HCC)    DESOUZA (nonalcoholic steatohepatitis)    NSTEMI (non-ST elevated myocardial infarction) (HCC)    FELTON in LAD    Obesity    Osteoarthritis    Patella-femoral syndrome, bilateral knee    Pneumonia due to organism    Pulmonary embolism (HCC)    Sleep apnea    Thyroid disease     Past Surgical History   Past Surgical History:   Procedure Laterality Date    Angioplasty (coronary)      Breast surgery procedure unlisted      cyst removed    Cath drug eluting stent      Cholecystectomy  04/10/2011    PERFORMED BY DR KRAFT-LAPAROSCOPIC    Colonoscopy      polyps    Colonoscopy N/A 2016    Procedure: COLONOSCOPY;  Surgeon: Kishor Stout MD;  Location:  ENDOSCOPY    Colposcopy, cervix w/upper adjacent vagina; w/endocervical curettage  2011    WNL    Cyst aspiration left  2000    Hernia surgery            Other surgical history      sinus surgery    Other surgical history      CYST REMOVED ON LEFT WRIST 17 YRS AGO    Sinus surgery        Skin surgery      Upper gi endoscopy,exam       Family History   Family History   Problem Relation Age of Onset    Heart Disorder Father     Hypertension Mother     Lipids Mother     Stroke Mother     Anemia Mother     Dementia Mother     Heart Disorder Mother     Other (Other) Mother     Bleeding Disorders Mother     Hypertension Sister      Obesity Sister     Stroke Sister     Other (Other) Sister         stroke,pe    Other (Other) Brother         kidney stones    Colon Cancer Maternal Grandfather     Colon Cancer Maternal Aunt     Anemia Daughter     Asthma Daughter     Depression Daughter     Asthma Daughter     Bleeding Disorders Sister     Stroke Sister      Social History   Social History     Socioeconomic History    Marital status:     Number of children: 3   Occupational History    Occupation: homemaker   Tobacco Use    Smoking status: Never     Passive exposure: Current    Smokeless tobacco: Never    Tobacco comments:     2nd hand with Father and now    Vaping Use    Vaping status: Never Used   Substance and Sexual Activity    Alcohol use: No    Drug use: No    Sexual activity: Never     Partners: Male     Birth control/protection: I.U.D.     Social Drivers of Health     Food Insecurity: No Food Insecurity (4/9/2024)    Food Insecurity     Food Insecurity: Never true   Transportation Needs: No Transportation Needs (4/9/2024)    Transportation Needs     Lack of Transportation: No   Physical Activity: Inactive (2/2/2021)    [facility]    Exercise Vital Sign     Days of Exercise per Week: 0 days     Minutes of Exercise per Session: 0 min   Housing Stability: Low Risk  (4/9/2024)    Housing Stability     Housing Instability: No     Current Medications    albuterol (2.5 MG/3ML) 0.083% Inhalation Nebu Soln Take 3 mL (2.5 mg total) by nebulization 3 (three) times daily. 120 each 11    albuterol 108 (90 Base) MCG/ACT Inhalation Aero Soln Inhale 2 puffs into the lungs every 4 (four) hours as needed. inhale 2 puff by inhalation route  every 4 - 6 hours as needed 1 each 11    HUMALOG KWIKPEN 200 UNIT/ML Subcutaneous Solution Pen-injector Use to fill insulin pump with up to 160u/day. 3 month supply. 72 mL 1    Insulin Disposable Pump (OMNIPOD 5 G6 PODS, GEN 5,) Does not apply Misc 1 each every  other day. 45 each 3    MONTELUKAST 10 MG Oral Tab TAKE 1 TABLET NIGHTLY 90 tablet 3    DULoxetine 40 MG Oral Cap DR Particles Take 40 mg by mouth daily. 90 capsule 0    ALPRAZolam 0.5 MG Oral Tab Take 1-2 tablets (0.5-1 mg total) by mouth daily as needed for Sleep or Anxiety. 42 tablet 0    semaglutide (OZEMPIC, 0.25 OR 0.5 MG/DOSE,) 2 MG/3ML Subcutaneous Solution Pen-injector Take 0.25mg once weekly for four weeks. After four weeks, increase to 0.5mg once weekly. 9 mL 0    DUPIXENT 300 MG/2ML Subcutaneous Solution Pen-injector       gabapentin 100 MG Oral Cap Takes Gabapentin 100mg in the morning and 300mg at night 360 capsule 0    albuterol 108 (90 Base) MCG/ACT Inhalation Aero Soln Inhale 2 puffs into the lungs every 4 to 6 hours as needed for Wheezing. 3 each 3    Tiotropium Bromide Monohydrate (SPIRIVA RESPIMAT) 2.5 MCG/ACT Inhalation Aero Soln Inhale 2 puffs into the lungs in the morning and 2 puffs before bedtime.      metoprolol succinate ER 25 MG Oral Tablet 24 Hr Take 1 tablet (25 mg total) by mouth nightly.      BREO ELLIPTA 200-25 MCG/ACT Inhalation Aerosol Powder, Breath Activated Inhale 1 puff into the lungs daily. 3 each 1    Continuous Blood Gluc Sensor (DEXCOM G6 SENSOR) Does not apply Misc Apply to skin every 10 days 9 each 3    Continuous Blood Gluc Transmit (DEXCOM G6 TRANSMITTER) Does not apply Misc 1 each by Other route every 3 (three) months. 1 each 1    Insulin NPH, Human,, Isophane, (HUMULIN N KWIKPEN) 100 UNIT/ML Subcutaneous Suspension Pen-injector Pair with prednisone only. Up to 30 units a day max. 9 mL 0    pantoprazole 40 MG Oral Tab EC Take 1 tablet (40 mg total) by mouth before breakfast. (Patient taking differently: Take 1 tablet (40 mg total) by mouth nightly.) 90 tablet 3    rivaroxaban (XARELTO) 20 MG Oral Tab Take 1 tablet (20 mg total) by mouth nightly. TAKE AT BEDTIME 90 tablet 3    amLODIPine 2.5 MG Oral Tab Take 1 tablet (2.5 mg total) by mouth daily.      Azelastine HCl  0.1 % Nasal Solution 1 spray by Nasal route in the morning and 1 spray before bedtime.      SUMAtriptan Succinate 50 MG Oral Tab TAKE 1 TABLET BY MOUTH EVERY 2 HOURS AS NEEDED FOR  MIGRAINE.  DO  NOT  EXCEED  200MG  IN  24  HOURS 9 tablet 3    Clopidogrel Bisulfate 75 MG Oral Tab Take 1 tablet (75 mg total) by mouth nightly. nightly      losartan 100 MG Oral Tab Take 1 tablet (100 mg total) by mouth daily.      ezetimibe 10 MG Oral Tab Take 1 tablet (10 mg total) by mouth nightly.      Fluticasone Propionate 50 MCG/ACT Nasal Suspension 1 spray by Each Nare route 2 (two) times daily.      cetirizine (ZYRTEC) 10 MG Oral Tab Take 1 tablet (10 mg total) by mouth nightly.          Allergies   Ms. Jerome is allergic to peanut-containing drug products and pcn [penicillins].     Review of Systems   Constitutional No fevers, chills, night sweats, excessive fatigue or weight loss.  Eyes  No significant visual changes.  ENMT  No oral pain, sore throat, epistaxis, hearing changes.  Lymphatic No tender or enlarged lymph nodes; no easy bruising or bleeding.  Respiratory Chronic mild shortness of breath with exertion.  Cardiovascular No anginal chest pain, palpitations, edema, orthopnea.  Gastrointestinal No melena, hematochezia.  Genitourinary No hematuria, vaginal bleeding.  MS  Radicular pain in legs.   Integumentary No acute or chronic rashes.  Neurologic No headache, blurred vision, areas of focal weakness or numbness, peripheral neuropathy, changes in gait.   Psychiatric No new or worsening depression, scot, mood swings, insomnia.    Vital Signs   /70 (BP Location: Left arm, Patient Position: Sitting, Cuff Size: large)   Pulse 96   Temp 97.7 °F (36.5 °C) (Temporal)   Resp 20   Wt 127.5 kg (281 lb)   LMP 09/23/2004   SpO2 91%   BMI 42.73 kg/m²     Physical Examination   Constitutional Well developed and well nourished; in no apparent distress; appears close to chronological age.  Head  Normocephalic and  atraumatic.  Eyes  Conjunctiva clear; sclera anicteric.  ENMT  External ears normal; external ears normal.  Neck  Supple, without masses.  Lymphatic No cervical or supraclavicular adenopathy.   Respiratory Normal effort; no respiratory distress; clear to auscultation bilaterally.   Cardiovascular Regular rate and rhythm.  Abdomen Not distended.   Back/Spine No spinal tenderness; no costovertebral tenderness.  MS  No joint swelling.  Extremities No lower extremity edema.   Integumentary No obvious rashes.   Neurologic Motor and sensory grossly intact.  Psychiatric Mood and affect appropriate.    Laboratory   No results found for this or any previous visit (from the past 24 hours).    Impression and Plan   Patient with history of VTE on lifelong anticoagulation. There is no absolute hematologic contraindication to the planned procedures by pain service. Patient is advised to hold rivaroxaban for 48 hours prior to the each of the planned procedures. She can restart the medication the night of the procedure unless directed otherwise by Dr. Gama based upon the procedure and it's complications/outcome. She should continue regular follow up with her primary care provider.     Electronically signed by:    Blaze Johnson M.D.  System Medical Director of Oncology Services  [facility]

## 2024-10-29 NOTE — PROGRESS NOTES
Patient is here for MD consult.. Long history of PE and DVT. On Xarelto and Plavix.  Here to re-establish with Dr Johnson. Patient needs to have back surgery and will need clearance.     Education Record    Learner:  Patient    Disease / Diagnosis:  DVT/PE    Barriers / Limitations:  None   Comments:    Method:  Discussion   Comments:    General Topics:  Plan of care reviewed   Comments:    Outcome:  Shows understanding   Comments:

## 2024-11-05 ENCOUNTER — OFFICE VISIT (OUTPATIENT)
Dept: RHEUMATOLOGY | Facility: CLINIC | Age: 59
End: 2024-11-05
Payer: COMMERCIAL

## 2024-11-05 VITALS
HEART RATE: 106 BPM | WEIGHT: 278 LBS | BODY MASS INDEX: 42.13 KG/M2 | HEIGHT: 68 IN | SYSTOLIC BLOOD PRESSURE: 146 MMHG | TEMPERATURE: 98 F | OXYGEN SATURATION: 98 % | RESPIRATION RATE: 20 BRPM | DIASTOLIC BLOOD PRESSURE: 80 MMHG

## 2024-11-05 DIAGNOSIS — M25.50 POLYARTHRALGIA: ICD-10-CM

## 2024-11-05 DIAGNOSIS — M79.7 FIBROMYALGIA: Primary | ICD-10-CM

## 2024-11-05 DIAGNOSIS — R79.82 ELEVATED C-REACTIVE PROTEIN (CRP): ICD-10-CM

## 2024-11-05 DIAGNOSIS — M79.2 NEUROPATHIC PAIN: ICD-10-CM

## 2024-11-05 DIAGNOSIS — R70.0 ELEVATED SED RATE: ICD-10-CM

## 2024-11-05 DIAGNOSIS — R53.1 WEAKNESS: ICD-10-CM

## 2024-11-05 PROCEDURE — 3077F SYST BP >= 140 MM HG: CPT | Performed by: INTERNAL MEDICINE

## 2024-11-05 PROCEDURE — 3079F DIAST BP 80-89 MM HG: CPT | Performed by: INTERNAL MEDICINE

## 2024-11-05 PROCEDURE — G2211 COMPLEX E/M VISIT ADD ON: HCPCS | Performed by: INTERNAL MEDICINE

## 2024-11-05 PROCEDURE — 3008F BODY MASS INDEX DOCD: CPT | Performed by: INTERNAL MEDICINE

## 2024-11-05 PROCEDURE — 99214 OFFICE O/P EST MOD 30 MIN: CPT | Performed by: INTERNAL MEDICINE

## 2024-11-05 RX ORDER — PREGABALIN 25 MG/1
50 CAPSULE ORAL NIGHTLY
Qty: 180 CAPSULE | Refills: 0 | Status: SHIPPED | OUTPATIENT
Start: 2024-11-05

## 2024-11-05 NOTE — PROGRESS NOTES
RHEUMATOLOGY FOLLOW UP   Date of visit: 11/05/2024  ?  Chief Complaint   Patient presents with    Fibromyalgia Syndrome     3 month f/u. Not feeling great. Having back pain currently. Started on ozempic recently. No new symptoms. Switched to duloxetine and that has helped take the edge off. Converted rapid score of 4.7     ?  ASSESSMENT, DISCUSSION & PLAN   Assessment:  1. Fibromyalgia    2. Polyarthralgia    3. Elevated sed rate    4. Elevated C-reactive protein (CRP)    5. Neuropathic pain    6. Weakness        Discussion:  Ms. Danielle Jerome is a 60 yo woman who presented for evaluation of joint pain. She has been suffering from chronic sinus disease as well as chronic lung problems for several years. 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 Typically for eosinophilic granulomatosis with polyangiitis patients have eosinophilia, as well and obstructive airway disease, nasal polyps, eosinophilic predominant inflammation and either mononeuritis multiplex or other motor neuropathy not due to radiculopathy.  This is per the diagnosis and classification criteria and vasculitis study (DC VAS) unfortunately for this patient, she also is morbidly obese and has a history of diabetes so some of the neuropathic symptoms might be related to diabetic neuropathy versus a radiculopathy from her lower back.  On her prior exam, she does not have obvious signs of active synovitis to warrant immunosuppression.  A lot of her potential EGPA symptoms previously improved since starting medication through pulmonology.  She now is dealing with worsened sinus and asthma symptoms. Which has improved since switching back to dupixent instead of nucala.     At this time, discussed at length that it is hard to determine etiology to symptoms since her reivew of systems is pan-positive. She has  multiple health concerns and symptoms.   From my standpoint, there are no definitive signs of active extrapulmonary EGPA to warrant immunosuppression- I actually worry given her lung status and obesity that immunosuppression with have more negative effects. She does feel better overall since being on dupixent.   At last visit, she had worsened depression and clear signs of fibromyalgia which seems better since being on duloxetine.     -- slowly wean off gabapentin over the next 2-3 weeks (first drop am dose, then go to 200mg nightly then 100mg nightly then stop)  -- once you stop the gabapentin, then start lyrica 25mg nightly for at least 2-3 weeks then update me via [facility].   -- continue working with Joyce regarding the anxiety/duloxetine... consider adding amitriptyline in the spring   -- obtain EMG (nerve conduction) for the left leg and arm, I will be in touch with those results when available   -- labs previously ordered- please get done, no fasting required  -- continue follow up with other providers and keep me posted  -- keep follow up in 6 months and if needed, can try to fit in sooner but we will be in communication in the meantime with response to above med changes    Patient verbalized understanding of above instructions. No further questions at this time.    Code selection for this visit was based on time spent (32min) on date of service in preparing to see the patient, obtaining and/or reviewing separately obtained history, performing a medically appropriate examination, counseling and educating the patient/family/caregiver, ordering medications or testing, referring and communicating with other healthcare providers, documenting clinical information in the E HR, independently interpreting results and communicating results to the patient/family/caregiver and care coordination with the patient's other providers.    ?  Plan:  Diagnoses and all orders for this visit:    Fibromyalgia  -     pregabalin 25  MG Oral Cap; Take 2 capsules (50 mg total) by mouth at bedtime.    Polyarthralgia    Elevated sed rate    Elevated C-reactive protein (CRP)    Neuropathic pain  -     EMG/NCV  -     pregabalin 25 MG Oral Cap; Take 2 capsules (50 mg total) by mouth at bedtime.    Weakness  -     EMG/NCV                Return in about 6 months (around 5/5/2025).  ?  HPI   Danielle Jerome is a 59 year old female with the following active problems who is referred for rheumatologic evaluation due to joint pain, back pain and possible EGPA. Her work up was grossly negative. Recommended further tx for her fibro. Presents for follow up today.     Since her last visit, she has been doing okay.   Worsened her back pain with running after her daughter's cat.   Other than that, she does feel better overall since being on duloxetine. Started at 30mg in September and now on 40mg. Denies obvious side effects.   Feels her anxiety is not as well controlled compared to escitalopram   Has plans for RFA for the back.     No recent hospitalizations  Did require prednisone taper in September due to lungs issues- thinks related to sick exposure likely covid.   States sob has improved since being on dupixent    Start ozempic in hopes for weight loss- has had a few shots- some side effects. Still worried about gastroparesis (present even before). Has to adjust diet a bit.     + still with a lot of fatigue- interested in change   Still having significant pain- particularly the left knee and shoulder. Hard to lay on that side.   Worsened pain with weather changes  Sensitivity to light touch.   Feels swelling has improved.     Denies recent cysts (was getting around underwear line and in the neck).   Denies recent rashes except under pannus   Not recent bruising   Denies epistaxis but blood when blowing nose still on right side occasionally   Denies hematuria.   Denies blood in stools.       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 Told \"explosive asthma.\" Had been on prednisone at multiple dosages over the years. Finally got off steroids with dupixent which has helped with the asthma part.     Seen at [facility] and saw pulmonologist told possible EGPA but hard to tell for sure. Recommended Nucala but insurance did not approve.   Has started and on her 3rd injection planned with improvement of her breathing.     Hx of blood clots. States family hx of blood clots. Hx of blood clot in her heart while on xarelto- now on plavix in addition to xarelto (2017)  Hx of fetal demise in 2002 (at 23weeks) and told multiple PE's (first clot). Shortly after, had multiple incidents and eventually started on long term anticoagulation. Started warfarin and prednisone but hard to get control of INR. So decision made by hematology to start xarelto around 2012.   No recent clots.   Hx of at least 3 miscarriages including fetal demise.   Able 2 other children after the fetal demise and one prior to that.     Joint pain has been present for about 2 years. Reports feeling throughout the body- legs, knees, hips, thighs, shoulders and finger.   Describes as numbness in the left leg, attributes to diabetes (which she feels induced by prednisone).   Pain described also as sharp.  Denies swelling of the joints but feels puffy in general  Has pain to the touch   Has tried PT, muscle relaxants and naproxen without improvement.  Unclear if she took gabapentin     Seen by [facility] and recommended PT.     + skin rashes over the face. Dx with rosacea. Feels worsened when pain present. Tried topicals in the past but felt more burning with it. Gets worse when over heated or sun exposure.   Denies recurrence of hives like before and no scarring from rashes   + hair loss/thinning over the past 10 years. Denies bald spots.   + hx of anemia over the years   + flank pain but no dysuria, frequency,  hematuria  + hx of fatty liver   + hx of colloid cyst in brain. There was previously concern for encephalitis in the 80s.   + bumps under the skin over the legs and arms- never biopsied, can be tender to the touch   + hx of frequent cysts   + hx of reflux, takes pantoprazole but also has significant hiatal hernia. Told not surgical candidate due to other complications   + infrequent diarrhea/constipation   + hx of cataracts  + told edema of the eyes from the prednisone   + pain over achilles and plantar fasciitis   + hx of \"tumor\" on bottom of right foot (?neuroma, pt unclear)   + back and neck pain. Feels worsened with activity. Has hx of DDD of lumbar spine.   + dry eyes, feels sandpaper in the eyes, uses pataday drops. Has tried visine. Has not other artifical tears. Not severe daily.   + dry mouth, constant. Does have hx of recurrent cavities. Grinds teeth and hx of fractures of the teeth.   + angle of jaw pain   + frequent sinus infections and hx of nasal polyps  Denies epistaxis   + chest pain and palpitations chronically. Has had CAD with stenting in 2017. Had cardiac PET scan through [facility], told follow up angiogram negative.     LMP in 2008, cannot recall when last BMD was done     Denies new skin nodule formation.  The patient denies oral or nasal ulcers, elevated or scarring rashes, Raynaud's phenomenon, prior renal or liver disease, or history of seizures.  Denies nonhealing ulcers on the fingertips, trouble swallowing, or severe acid reflux.  The patient denies any history of uveitis, nodular painful shin bruises,  psoriatic lesions, spooning or pitting of the nails, history of dactylitis.   Denies swelling of the cheeks or under the jawbone. No prior history of punctal plugs being applied.  No fevers, chills, lymphadenopathy, night sweats        Past Medical History:  Past Medical History:    Allergic rhinitis    Allergic rhinitis, cause unspecified    Anemia    Anxiety    Anxiety and depression    Asthma  (HCC)    Atherosclerosis of coronary artery    Laguerre's esophagus    Churg-Shania syndrome (HCC)    Colloid cyst of brain (HCC)    Community acquired pneumonia of left lower lobe of lung    Congestive heart disease (HCC)    Coronary artery disease of native heart with stable angina pectoris (HCC)    Depression    Diabetes (HCC)    Diabetes mellitus (HCC)    Diverticulosis of large intestine    Essential hypertension    Exacerbation of asthma (HCC)    Gastroparesis    GERD    Hematemesis with nausea    , No EGD, Hgb stable    High cholesterol    History of blood clots    History of pulmonary embolus (PE)    History of recurrent deep vein thrombosis (DVT)    Hyperlipidemia    Hypertension    IBS    LGSIL (low grade squamous intraepithelial dysplasia)    Pap neg     Migraines    perfumes    Moderate persistent asthma without complication (HCC)    DESOUZA (nonalcoholic steatohepatitis)    NSTEMI (non-ST elevated myocardial infarction) (HCC)    FELTON in LAD    Obesity    Osteoarthritis    Patella-femoral syndrome, bilateral knee    Pneumonia due to organism    Pulmonary embolism (HCC)    Sleep apnea    Thyroid disease     Past Surgical History:  Past Surgical History:   Procedure Laterality Date    Angioplasty (coronary)      Breast surgery procedure unlisted      cyst removed    Cath drug eluting stent      Cholecystectomy  04/10/2011    PERFORMED BY DR KRAFT-LAPAROSCOPIC    Colonoscopy      polyps    Colonoscopy N/A 2016    Procedure: COLONOSCOPY;  Surgeon: Kishor Stout MD;  Location:  ENDOSCOPY    Colposcopy, cervix w/upper adjacent vagina; w/endocervical curettage  2011    WNL    Cyst aspiration left  2000    Hernia surgery            Other surgical history      sinus surgery    Other surgical history      CYST REMOVED ON LEFT WRIST 17 YRS AGO    Sinus surgery        Skin surgery      Upper gi endoscopy,exam       Family History:  Family History   Problem Relation Age of Onset    Heart  Disorder Father     Hypertension Mother     Lipids Mother     Stroke Mother     Anemia Mother     Dementia Mother     Heart Disorder Mother     Other (Other) Mother     Bleeding Disorders Mother     Hypertension Sister     Obesity Sister     Stroke Sister     Other (Other) Sister         stroke,pe    Other (Other) Brother         kidney stones    Colon Cancer Maternal Grandfather     Colon Cancer Maternal Aunt     Anemia Daughter     Asthma Daughter     Depression Daughter     Asthma Daughter     Bleeding Disorders Sister     Stroke Sister      Social History:  Social History     Socioeconomic History    Marital status:     Number of children: 3   Occupational History    Occupation: homemaker   Tobacco Use    Smoking status: Never     Passive exposure: Current    Smokeless tobacco: Never    Tobacco comments:     2nd hand with Father and now    Vaping Use    Vaping status: Never Used   Substance and Sexual Activity    Alcohol use: No    Drug use: No    Sexual activity: Never     Partners: Male     Birth control/protection: I.U.D.     Social Drivers of Health     Food Insecurity: No Food Insecurity (4/9/2024)    Food Insecurity     Food Insecurity: Never true   Transportation Needs: No Transportation Needs (4/9/2024)    Transportation Needs     Lack of Transportation: No   Physical Activity: Inactive (2/2/2021)    [facility] from [facility]    Exercise Vital Sign     Days of Exercise per Week: 0 days     Minutes of Exercise per Session: 0 min   Housing Stability: Low Risk  (4/9/2024)    Housing Stability     Housing Instability: No     Medications:  Outpatient Medications Marked as Taking for the 11/5/24 encounter (Office Visit) with Maria E Gonzalez DO   Medication Sig Dispense Refill    pregabalin 25 MG Oral Cap Take 2 capsules (50 mg total) by mouth at bedtime. 180 capsule 0    [START ON 12/29/2024] DULoxetine HCl 40 MG Oral Cap DR Particles Take 40 mg by mouth daily.  90 capsule 0    albuterol (2.5 MG/3ML) 0.083% Inhalation Nebu Soln Take 3 mL (2.5 mg total) by nebulization 3 (three) times daily. 120 each 11    HUMALOG KWIKPEN 200 UNIT/ML Subcutaneous Solution Pen-injector Use to fill insulin pump with up to 160u/day. 3 month supply. 72 mL 1    MONTELUKAST 10 MG Oral Tab TAKE 1 TABLET NIGHTLY 90 tablet 3    ALPRAZolam 0.5 MG Oral Tab Take 1-2 tablets (0.5-1 mg total) by mouth daily as needed for Sleep or Anxiety. 42 tablet 0    semaglutide (OZEMPIC, 0.25 OR 0.5 MG/DOSE,) 2 MG/3ML Subcutaneous Solution Pen-injector Take 0.25mg once weekly for four weeks. After four weeks, increase to 0.5mg once weekly. 9 mL 0    DUPIXENT 300 MG/2ML Subcutaneous Solution Pen-injector       albuterol 108 (90 Base) MCG/ACT Inhalation Aero Soln Inhale 2 puffs into the lungs every 4 to 6 hours as needed for Wheezing. 3 each 3    Tiotropium Bromide Monohydrate (SPIRIVA RESPIMAT) 2.5 MCG/ACT Inhalation Aero Soln Inhale 2 puffs into the lungs in the morning and 2 puffs before bedtime.      metoprolol succinate ER 25 MG Oral Tablet 24 Hr Take 1 tablet (25 mg total) by mouth nightly.      BREO ELLIPTA 200-25 MCG/ACT Inhalation Aerosol Powder, Breath Activated Inhale 1 puff into the lungs daily. 3 each 1    pantoprazole 40 MG Oral Tab EC Take 1 tablet (40 mg total) by mouth before breakfast. (Patient taking differently: Take 1 tablet (40 mg total) by mouth nightly.) 90 tablet 3    rivaroxaban (XARELTO) 20 MG Oral Tab Take 1 tablet (20 mg total) by mouth nightly. TAKE AT BEDTIME 90 tablet 3    amLODIPine 2.5 MG Oral Tab Take 1 tablet (2.5 mg total) by mouth daily.      Azelastine HCl 0.1 % Nasal Solution 1 spray by Nasal route in the morning and 1 spray before bedtime.      SUMAtriptan Succinate 50 MG Oral Tab TAKE 1 TABLET BY MOUTH EVERY 2 HOURS AS NEEDED FOR  MIGRAINE.  DO  NOT  EXCEED  200MG  IN  24  HOURS 9 tablet 3    Clopidogrel Bisulfate 75 MG Oral Tab Take 1 tablet (75 mg total) by mouth nightly.  nightly      losartan 100 MG Oral Tab Take 1 tablet (100 mg total) by mouth daily.      ezetimibe 10 MG Oral Tab Take 1 tablet (10 mg total) by mouth nightly.      Fluticasone Propionate 50 MCG/ACT Nasal Suspension 1 spray by Each Nare route 2 (two) times daily.      cetirizine (ZYRTEC) 10 MG Oral Tab Take 1 tablet (10 mg total) by mouth nightly.       Modified Medications    No medications on file     Medications Discontinued During This Encounter   Medication Reason    gabapentin 100 MG Oral Cap        ?  ?  Allergies:  Allergies   Allergen Reactions    Peanut-Containing Drug Products HIVES and SHORTNESS OF BREATH     Hives, asthma attack    Pcn [Penicillins] UNKNOWN     HAPPENED AS A CHILD     ?  REVIEW OF SYSTEMS   ?  Review of Systems   Constitutional:  Positive for malaise/fatigue. Negative for chills and fever.   HENT:  Positive for congestion, ear pain and sinus pain. Negative for nosebleeds.    Eyes:  Positive for blurred vision (intermittent). Negative for pain and redness.   Respiratory:  Negative for cough, hemoptysis, sputum production and shortness of breath.    Cardiovascular:  Positive for palpitations (intermittent) and leg swelling (improved). Negative for chest pain.   Gastrointestinal:  Positive for abdominal pain, constipation, diarrhea and heartburn (improved). Negative for blood in stool and nausea.   Genitourinary:  Positive for frequency and urgency. Negative for dysuria and hematuria.   Musculoskeletal:  Positive for back pain, joint pain, myalgias and neck pain.   Skin:  Negative for itching and rash.   Neurological:  Positive for dizziness, tingling, weakness and headaches (worsened).   Endo/Heme/Allergies:  Positive for environmental allergies. Does not bruise/bleed easily.   Psychiatric/Behavioral:  Negative for depression. The patient is nervous/anxious and has insomnia.      PHYSICAL EXAM   Today's Vitals:  Temperature Blood Pressure Heart Rate Resp Rate SpO2   Temp: 97.9 °F (36.6 °C)  BP: 146/80 Pulse: 106 Resp: 20 SpO2: 98 %   ?  Current Weight Height BMI BSA Pain   Wt Readings from Last 1 Encounters:   11/05/24 278 lb (126.1 kg)    Height: 5' 8\" (172.7 cm) Body mass index is 42.27 kg/m². Body surface area is 2.35 meters squared.         Physical Exam  Vitals and nursing note reviewed.   Constitutional:       General: She is not in acute distress.     Appearance: She is well-developed. She is obese. She is not diaphoretic.   HENT:      Head: Normocephalic.   Eyes:      General: No scleral icterus.     Extraocular Movements: Extraocular movements intact.      Conjunctiva/sclera: Conjunctivae normal.   Neck:      Vascular: No JVD.      Trachea: No tracheal deviation.   Cardiovascular:      Rate and Rhythm: Normal rate and regular rhythm.      Heart sounds: Normal heart sounds. No murmur heard.  Pulmonary:      Effort: Pulmonary effort is normal. No respiratory distress.      Breath sounds: Wheezing present.      Comments: Significant wet cough  Musculoskeletal:         General: Tenderness present. No swelling or deformity.      Cervical back: Neck supple.      Comments: No evidence of heberden or aditya nodes of any of the fingers, no basilar joint tenderness of the 1st CMC bilaterally.  Tenderness diffusely - improved slightly   No swelling, redness or restriction of motion of the DIPs, PIPs, MCPs, wrists, elbows, ankles, or joints of the feet.  Bilateral shoulders with full ROM, no evidence of impingement with provocative maneuvers.  SI joints tender. Spinous process tenderness diffusely, tenderness over the greater trochanters.  Bilateral knees with medial joint line tenderness, mild crepitus, no effusion.  Pan positive fibro tender points positive - improved:  Posterior Tender Point Exam  Occiput -/-  Trapezius -/+  Supraspinatus -/-  Gluteal deferred   Greater trochanter +/+  Anterior Tender Point Exam:  Low cervical +/+  Second rib +/+  Lateral epicondyle +/+  Knee +/+  11 tender points  positive   Lymphadenopathy:      Cervical: No cervical adenopathy.   Skin:     General: Skin is warm and dry.      Findings: No erythema or rash.      Comments: No malar rash   No periungal erythema   Neurological:      Mental Status: She is alert and oriented to person, place, and time.      Cranial Nerves: No cranial nerve deficit.      Gait: Gait normal.   Psychiatric:         Mood and Affect: Mood normal.         Behavior: Behavior normal.       ?  Radiology review:      PROCEDURE:  CT SINUS (CPT=70486)     LOCATION:  [facility]       COMPARISON:  [facility] , CT, CT SINUS (CPT=70486), 2/09/2022, 11:48 AM.     INDICATIONS:  J32.8 Other chronic sinusitis     TECHNIQUE:  Axial thin section noncontrast imaging performed through the paranasal sinuses with coronal and sagittal reconstructed imaging. Dose reduction techniques were used. Dose information is transmitted to the ACR (American College of Radiology)  NRDR (National Radiology Data Registry) which includes the Dose Index Registry.  PATIENT STATED HISTORY: (As transcribed by Technologist)  History of chronic sinusitis. This episode has been going on for the past 5 months with pressure to maxillary and frontal sinus area.         FINDINGS:    NASAL SEPTUM:  Slight rightward nasal septal deviation.  TURBINATES:  No becky bullosa or paradoxical curvature.  UNCINATE PROCESSES:  No deviation or bulla formation.  OMU:  The ostia, infundibula, and hiatus semilunaris are widely patent.  SINUSES:  Mild opacification of the right ethmoid bulla (series 5, image 21).  Large mucous retention cyst within the caudal aspect of the left maxillary sinus in addition to small mucous retention cysts within the caudal aspect of the right maxillary  sinus.  Complete opacification of the right sphenoid sinus with bone remodeling/sclerosis of the right sphenoid sinus walls indicating sequela of chronic inflammation.  Frontal sinuses are clear.  FOVEA ETHMOIDALIS:  The fovea ethmoidalis is  intact. The cribriform plate is not low lying.  OTHER:  Visualized intracranial contents are within normal limits.                   Impression   CONCLUSION:       Paranasal sinus disease, similar compared to 02/09/2022.  This includes diffuse opacification of and bone remodeling of the right sphenoid sinus indicating sequela of chronic inflammation.        Dictated by (CST): Jesus Young MD on 3/12/2023 at 9:10 AM      Finalized by (CST): Jesus Young MD on 3/12/2023 at 9:19 AM       PROCEDURE:  CT CHEST PE AORTA (IV ONLY) (CPT=71260)     COMPARISON:  [facility] , CT, CT ANGIOGRAPHY, CHEST (CPT=71275), 5/11/2020, 4:17 PM.     INDICATIONS:  I26.99 Pulmonary embolism (HCC)     TECHNIQUE:  CT images were obtained with non-ionic intravenous contrast material. Dose reduction techniques were used. Dose information is transmitted to the ACR (American College of Radiology) NRDR (National Radiology Data Registry) which includes the  Dose Index Registry.     PATIENT STATED HISTORY:(As transcribed by Technologist)  Patient has shortness of breath and history of PE.      CONTRAST USED:  100cc of Omnipaque 350     FINDINGS:    LUNGS:  Biapical pleural parenchymal scarring.  No focal consolidation or new nodule appreciated.    VASCULATURE:  No visible pulmonary arterial thrombus or attenuation.    FATUMA:  No mass or adenopathy.    MEDIASTINUM:  Stable 1.4 cm left lobe thyroid nodule.  No adenopathy.    CARDIAC:  No enlargement or pericardial thickening.  Coronary artery stent.  PLEURA:  No mass or effusion.    THORACIC AORTA:  No aneurysm.    CHEST WALL:  No mass or axillary adenopathy.    LIMITED ABDOMEN:  Limited images of the upper abdomen demonstrate a small to moderate hiatal hernia.    BONES:  No bony lesion or fracture.                     Impression   CONCLUSION:    1. No CT evidence for pulmonary embolism.        Dictated by (CST): Alexandria Trotter MD on 4/08/2022 at 2:23 PM      Finalized by (CST): Alexandria Trotter MD on 4/08/2022 at  2:30 PM       PROCEDURE:  MRI ABDOMEN+PELVIS (ALL W+WO) (CPT=74183/58107)     COMPARISON:  [facility] , CT, CT ABDOMEN PELVIS IV CONTRAST, NO ORAL (ER), 4/14/2021, 1:19 PM.     INDICATIONS:  R63.4 Unintentional weight loss N95.0 Postmenopausal bleeding N85.00 Endometrial hyperplasia     TECHNIQUE:  A comprehensive examination was performed utilizing a variety of imaging planes and imaging parameters to optimize visualization of suspected pathology.  Images were obtained both before and after intravenous gadolinium infusion.       PATIENT STATED HISTORY:(As transcribed by Technologist)  the patient complains of cramping, vaginal bleeding, and weight loss.      CONTRAST USED:  20 mL of Dotarem     FINDINGS:    LIVER:  Diffuse fatty infiltration of the liver/steatosis of the paddle megaly measuring approximately 23 cm in craniocaudal dimension.  There is a small ovoid focus of increased enhancement along the posterior segment of the right lobe inferomedially  measuring 1.6 x 1.2 cm.  On in/out phase imaging, this focus reveals slightly increased T2 signal on out of phase imaging, suggesting a focus of mild fat sparing versus potential small underlying FNH.  Otherwise no significant hepatic lesions are  suspected.  BILIARY:  No visible dilatation or filling defect.    PANCREAS:  No lesion, fluid collection, ductal dilatation, or atrophy.    SPLEEN:  No enlargement or focal lesion.    KIDNEYS:  No renal stones or hydronephrosis.  There are 2 fat containing renal angiomyolipomas within the midpole of the right kidney.  The larger of the 2 measures 0.9 cm.  No additional renal lesions are noted.  ADRENALS:  No mass or enlargement.    AORTA/VASCULAR:  No aneurysm or dissection.    RETROPERITONEUM:  No mass or adenopathy.    BOWEL/MESENTERY:  No visible mass, obstruction, or bowel wall thickening.    ABDOMINAL WALL:  No mass or hernia.    PELVIC NODES:  Normal.  PELVIC ORGANS:  Normal size and configuration to the uterus.  No  evidence of endometrial thickening or junctional zone thickening/abnormalities.  No free fluid or inflammatory changes within the pelvis.  No adnexal lesions are identified.  BONES:  No bony lesion or fracture.    LUNG BASES:  No visible pleural disease.  Lung bases not well assessed with MRI.                  Impression   CONCLUSION:    1. No acute process noted.  2. Diffuse fatty infiltration of the liver/steatosis with hepatomegaly measuring 23 cm in craniocaudal dimension.  There is a small, ovoid focus of increased enhancement along the posterior segment of the right lobe inferomedially measuring 1.6 x 1.2 cm.    This is either representative of a small FNH or small focus of focal fat sparing.  In either case, this focus has a benign appearance.  3. There are 2 fat containing renal angiomyolipomas within the midpole the right kidney measuring 0.9 cm.        Dictated by (CST): Jessica Liu DO on 4/30/2021 at 3:15 PM         Labs:  Lab Results   Component Value Date    WBC 9.7 09/30/2024    RBC 5.60 (H) 09/30/2024    HGB 13.6 09/30/2024    HCT 42.6 09/30/2024    .0 09/30/2024    MPV 9.3 (L) 02/27/2013    MCV 76.1 (L) 09/30/2024    MCH 24.3 (L) 09/30/2024    MCHC 31.9 09/30/2024    RDW 16.8 09/30/2024    NEPRELIM 4.69 09/30/2024    NEUTABS 8.84 (H) 10/17/2018    LYMPHABS 3.67 10/17/2018    EOSABS 0.82 (H) 10/17/2018    BASABS 0.00 10/17/2018    NEUT 65 10/17/2018    LYMPH 27 10/17/2018    MON 2 10/17/2018    EOS 6 10/17/2018    BASO 0 10/17/2018    NEPERCENT 48.6 09/30/2024    LYPERCENT 37.8 09/30/2024    MOPERCENT 4.3 09/30/2024    EOPERCENT 7.5 09/30/2024    BAPERCENT 1.4 09/30/2024    NE 4.69 09/30/2024    LYMABS 3.66 09/30/2024    MOABSO 0.42 09/30/2024    EOABSO 0.73 (H) 09/30/2024    BAABSO 0.14 09/30/2024     Lab Results   Component Value Date     (H) 09/30/2024    BUN 11 09/30/2024    BUNCREA 10.1 06/21/2021    CREATSERUM 0.65 09/30/2024    ANIONGAP 8 09/30/2024    GFR 68 01/20/2018     GFRNAA 94 03/14/2022    GFRAA 108 03/14/2022    CA 10.0 09/30/2024    OSMOCALC 292 09/30/2024    ALKPHO 108 09/30/2024    AST 22 09/30/2024    ALT 27 09/30/2024    BILT 0.4 09/30/2024    TP 7.1 09/30/2024    ALB 4.4 09/30/2024    GLOBULIN 2.7 09/30/2024    AGRATIO 1.7 11/19/2012     09/30/2024    K 4.2 09/30/2024     09/30/2024    CO2 24.0 09/30/2024       Additional Labs:  09/2024  Vit D 37.4 normal    12/2023  Vit d 5.3 very low     08/2023  Lupus anticoagulant Positive  B2G and ACL pending  C3 179 normal  C4 43 normal  MPO, SD-3, c-ANCA, p-ANCA negative  ANNAMARIE by IFA negative  CRP 1.99 elevated  ESR 46 elevated    06/2023  No eosinophilia on CBC    06/2022  ANCA screen negative  MPO negative  Proteinase 3 negative  CBC with MCV 77 otherwise normal.  No eosinophilia noted  CRP 16.3 (N<10)  Protein to creatinine ratio 0.07  CMP grossly normal with exception of elevated glucose  UA with glucose otherwise negative for blood or protein  ESR 47 elevated  ANNAMARIE screen negative  CCP negative  RF negative    03/2022  CRP 2.04 (N<0.3)  ESR 11 normal  CK 69 normal    02/2022  ANCA by IFA negative  MPO, SD-3 negative    11/2018   CBC with absolute eosinophil count slightly elevated at 0.65    01/2018  IgA, IgE normal  IgG 659 borderline low    11/2017 ANNAMARIE screen negative  ESR 9 normal  09/2012  Lupus anticoagulant negative  Beta-2 glycoprotein IgM, IgA negative  Anticardiolipin IgG IgM negative      Maria E Gonzalez, DO  [facility]  11/05/2024

## 2024-11-05 NOTE — PATIENT INSTRUCTIONS
-- slowly wean off gabapentin over the next 2-3 weeks (first drop am dose, then go to 200mg nightly then 100mg nightly then stop)  -- once you stop the gabapentin, then start lyrica 25mg nightly for at least 2-3 weeks then update me via [employer].   -- continue working with Joyce regarding the anxiety/duloxetine... consider adding amitriptyline in the spring   -- obtain EMG (nerve conduction) for the left leg and arm, I will be in touch with those results when available   -- labs previously ordered- please get done, no fasting required  -- continue follow up with other providers and keep me posted  -- keep follow up in 6 months and if needed, can try to fit in sooner but we will be in communication in the meantime with response to above med changes    Dr. Gonzalez

## 2024-11-12 ENCOUNTER — OFFICE VISIT (OUTPATIENT)
Facility: CLINIC | Age: 59
End: 2024-11-12
Payer: COMMERCIAL

## 2024-11-12 VITALS
SYSTOLIC BLOOD PRESSURE: 128 MMHG | BODY MASS INDEX: 42.13 KG/M2 | HEART RATE: 105 BPM | DIASTOLIC BLOOD PRESSURE: 86 MMHG | WEIGHT: 278 LBS | OXYGEN SATURATION: 98 % | HEIGHT: 68 IN

## 2024-11-12 DIAGNOSIS — E11.69 HYPERLIPIDEMIA ASSOCIATED WITH TYPE 2 DIABETES MELLITUS (HCC): ICD-10-CM

## 2024-11-12 DIAGNOSIS — E11.65 TYPE 2 DIABETES MELLITUS WITH HYPERGLYCEMIA, WITH LONG-TERM CURRENT USE OF INSULIN (HCC): Primary | ICD-10-CM

## 2024-11-12 DIAGNOSIS — I15.2 HYPERTENSION ASSOCIATED WITH TYPE 2 DIABETES MELLITUS (HCC): ICD-10-CM

## 2024-11-12 DIAGNOSIS — Z78.9 STATIN INTOLERANCE: ICD-10-CM

## 2024-11-12 DIAGNOSIS — E11.59 HYPERTENSION ASSOCIATED WITH TYPE 2 DIABETES MELLITUS (HCC): ICD-10-CM

## 2024-11-12 DIAGNOSIS — Z79.4 TYPE 2 DIABETES MELLITUS WITH HYPERGLYCEMIA, WITH LONG-TERM CURRENT USE OF INSULIN (HCC): Primary | ICD-10-CM

## 2024-11-12 DIAGNOSIS — E78.5 HYPERLIPIDEMIA ASSOCIATED WITH TYPE 2 DIABETES MELLITUS (HCC): ICD-10-CM

## 2024-11-12 LAB — HEMOGLOBIN A1C: 7.9 % (ref 4.3–5.6)

## 2024-11-12 PROCEDURE — 3052F HG A1C>EQUAL 8.0%<EQUAL 9.0%: CPT

## 2024-11-12 PROCEDURE — 3051F HG A1C>EQUAL 7.0%<8.0%: CPT

## 2024-11-12 PROCEDURE — 3079F DIAST BP 80-89 MM HG: CPT

## 2024-11-12 PROCEDURE — 3008F BODY MASS INDEX DOCD: CPT

## 2024-11-12 PROCEDURE — 3074F SYST BP LT 130 MM HG: CPT

## 2024-11-12 PROCEDURE — 99214 OFFICE O/P EST MOD 30 MIN: CPT

## 2024-11-12 PROCEDURE — 95251 CONT GLUC MNTR ANALYSIS I&R: CPT

## 2024-11-12 PROCEDURE — 83036 HEMOGLOBIN GLYCOSYLATED A1C: CPT

## 2024-11-12 RX ORDER — INSULIN HUMAN 100 [IU]/ML
INJECTION, SUSPENSION SUBCUTANEOUS
Qty: 15 ML | Refills: 0 | Status: SHIPPED | OUTPATIENT
Start: 2024-11-12

## 2024-11-12 NOTE — PROGRESS NOTES
[facility] Note    Name: Danielle Jerome    Date: 24    Danielle Jerome is a 59 year old female who presents for evaluation of T2DM management.     Chief complaint: Follow - Up (Patient presents for DM follow up. )       Subjective:   Initial HPI consult in 2023:  Diagnosed age: 2342-1534 (steroid-induced, chronic prednisone for asthma)  Follows with DM APN, LV 2023     Currently follows with [facility]; on-and-off prednisone depending on asthma flares with Churg-Shania, currently not on any steroids     Current DM meds:Toujeo 96U; Humalog 16U qAC TID + ISF 2:50>150 (doesn't carb count exactly); been on MDI x ~6 months now  Previously trialed/failed (from DM Apn note): Byetta, Metformin (GI), Januvia, Actos, Novolog, Levemir, Invokana, Trulicity, glipizide, Victoza, Farxiga - recurrent mycotic infections      It was previously 7% in 2023     Blood Glucose log/CGM: per memory  Checking 3-4 times per day  Morning/fastins  Pre-meal: 200s  HS: 200s  Episodes of hypoglycemia: No     Sometimes not a great appetite but does try to eat low carb and high protein  Has downloaded a carb-counting abrahan  Hasn't worn Dexcom recently due to various imaging tests lately, has been waiting for mail order of new refills     Is interested in Omnipod 5, ordered by previous DM provider, has not picked it up yet     Interim hx:  2024-christopher/ Jackeline DAVE.Last A1c value was 7.9% done 2024.  Last office visit, started ozempic. Tonight darby be 4th dose of the 0.25mg, waited a while to take it because she wasn't feeling good. Tolerating the ozempic well, feeling ng sooner. Minor nausea but not so bad at the end of the week. Notes constipation, using fiber which helps. BG are trending lower.     DM meds at visit:   - Humulin NPH- matches units with pred dose (ie if on pred 40, takes 40u)   - ozempic 0.25mg weekly  - Omnipod 5 + Dexcom in [employer] + U200 HUMALOG  Time Basal ICR ISF Active  Insulin Time Target   MN 1.10 u/hr 6 28  2h 110   9A  6          Continuous Glucose Monitoring Interpretation  Danielle Jerome has undergone continuous glucose monitoring with their CGM.  The blood glucose tracings were evaluated for two weeks prior to office visit.   Blood glucose tracings demonstrated areas of hyperglycemia post-meal, particularly post-lunch/dinner  There were no areas of hypoglycemia noted.          History/Other:    Allergies, PMH, SocHx and FHx reviewed and updated as appropriate in Epic on    Insulin NPH, Human,, Isophane, (HUMULIN N KWIKPEN) 100 UNIT/ML Subcutaneous Suspension Pen-injector Pair with prednisone only. Up to 40 units a day max. 15 mL 0    pregabalin 25 MG Oral Cap Take 2 capsules (50 mg total) by mouth at bedtime. 180 capsule 0    [START ON 12/29/2024] DULoxetine HCl 40 MG Oral Cap DR Particles Take 40 mg by mouth daily. 90 capsule 0    albuterol (2.5 MG/3ML) 0.083% Inhalation Nebu Soln Take 3 mL (2.5 mg total) by nebulization 3 (three) times daily. 120 each 11    albuterol 108 (90 Base) MCG/ACT Inhalation Aero Soln Inhale 2 puffs into the lungs every 4 (four) hours as needed. inhale 2 puff by inhalation route  every 4 - 6 hours as needed 1 each 11    HUMALOG KWIKPEN 200 UNIT/ML Subcutaneous Solution Pen-injector Use to fill insulin pump with up to 160u/day. 3 month supply. 72 mL 1    Insulin Disposable Pump (OMNIPOD 5 G6 PODS, GEN 5,) Does not apply Misc 1 each every other day. 45 each 3    MONTELUKAST 10 MG Oral Tab TAKE 1 TABLET NIGHTLY 90 tablet 3    ALPRAZolam 0.5 MG Oral Tab Take 1-2 tablets (0.5-1 mg total) by mouth daily as needed for Sleep or Anxiety. 42 tablet 0    semaglutide (OZEMPIC, 0.25 OR 0.5 MG/DOSE,) 2 MG/3ML Subcutaneous Solution Pen-injector Take 0.25mg once weekly for four weeks. After four weeks, increase to 0.5mg once weekly. 9 mL 0    DUPIXENT 300 MG/2ML Subcutaneous Solution Pen-injector       albuterol 108 (90 Base) MCG/ACT Inhalation Aero Soln  Inhale 2 puffs into the lungs every 4 to 6 hours as needed for Wheezing. 3 each 3    Tiotropium Bromide Monohydrate (SPIRIVA RESPIMAT) 2.5 MCG/ACT Inhalation Aero Soln Inhale 2 puffs into the lungs in the morning and 2 puffs before bedtime.      metoprolol succinate ER 25 MG Oral Tablet 24 Hr Take 1 tablet (25 mg total) by mouth nightly.      BREO ELLIPTA 200-25 MCG/ACT Inhalation Aerosol Powder, Breath Activated Inhale 1 puff into the lungs daily. 3 each 1    Continuous Blood Gluc Sensor (DEXCOM G6 SENSOR) Does not apply Misc Apply to skin every 10 days 9 each 3    Continuous Blood Gluc Transmit (DEXCOM G6 TRANSMITTER) Does not apply Misc 1 each by Other route every 3 (three) months. 1 each 1    pantoprazole 40 MG Oral Tab EC Take 1 tablet (40 mg total) by mouth before breakfast. (Patient taking differently: Take 1 tablet (40 mg total) by mouth nightly.) 90 tablet 3    rivaroxaban (XARELTO) 20 MG Oral Tab Take 1 tablet (20 mg total) by mouth nightly. TAKE AT BEDTIME 90 tablet 3    amLODIPine 2.5 MG Oral Tab Take 1 tablet (2.5 mg total) by mouth daily.      Azelastine HCl 0.1 % Nasal Solution 1 spray by Nasal route in the morning and 1 spray before bedtime.      SUMAtriptan Succinate 50 MG Oral Tab TAKE 1 TABLET BY MOUTH EVERY 2 HOURS AS NEEDED FOR  MIGRAINE.  DO  NOT  EXCEED  200MG  IN  24  HOURS 9 tablet 3    Clopidogrel Bisulfate 75 MG Oral Tab Take 1 tablet (75 mg total) by mouth nightly. nightly      losartan 100 MG Oral Tab Take 1 tablet (100 mg total) by mouth daily.      ezetimibe 10 MG Oral Tab Take 1 tablet (10 mg total) by mouth nightly.      Fluticasone Propionate 50 MCG/ACT Nasal Suspension 1 spray by Each Nare route 2 (two) times daily.      cetirizine (ZYRTEC) 10 MG Oral Tab Take 1 tablet (10 mg total) by mouth nightly.       Allergies   Allergen Reactions    Peanut-Containing Drug Products HIVES and SHORTNESS OF BREATH     Hives, asthma attack    Pcn [Penicillins] UNKNOWN     HAPPENED AS A CHILD      Current Outpatient Medications   Medication Sig Dispense Refill    Insulin NPH, Human,, Isophane, (HUMULIN N KWIKPEN) 100 UNIT/ML Subcutaneous Suspension Pen-injector Pair with prednisone only. Up to 40 units a day max. 15 mL 0    pregabalin 25 MG Oral Cap Take 2 capsules (50 mg total) by mouth at bedtime. 180 capsule 0    [START ON 12/29/2024] DULoxetine HCl 40 MG Oral Cap DR Particles Take 40 mg by mouth daily. 90 capsule 0    albuterol (2.5 MG/3ML) 0.083% Inhalation Nebu Soln Take 3 mL (2.5 mg total) by nebulization 3 (three) times daily. 120 each 11    albuterol 108 (90 Base) MCG/ACT Inhalation Aero Soln Inhale 2 puffs into the lungs every 4 (four) hours as needed. inhale 2 puff by inhalation route  every 4 - 6 hours as needed 1 each 11    HUMALOG KWIKPEN 200 UNIT/ML Subcutaneous Solution Pen-injector Use to fill insulin pump with up to 160u/day. 3 month supply. 72 mL 1    Insulin Disposable Pump (OMNIPOD 5 G6 PODS, GEN 5,) Does not apply Misc 1 each every other day. 45 each 3    MONTELUKAST 10 MG Oral Tab TAKE 1 TABLET NIGHTLY 90 tablet 3    ALPRAZolam 0.5 MG Oral Tab Take 1-2 tablets (0.5-1 mg total) by mouth daily as needed for Sleep or Anxiety. 42 tablet 0    semaglutide (OZEMPIC, 0.25 OR 0.5 MG/DOSE,) 2 MG/3ML Subcutaneous Solution Pen-injector Take 0.25mg once weekly for four weeks. After four weeks, increase to 0.5mg once weekly. 9 mL 0    DUPIXENT 300 MG/2ML Subcutaneous Solution Pen-injector       albuterol 108 (90 Base) MCG/ACT Inhalation Aero Soln Inhale 2 puffs into the lungs every 4 to 6 hours as needed for Wheezing. 3 each 3    Tiotropium Bromide Monohydrate (SPIRIVA RESPIMAT) 2.5 MCG/ACT Inhalation Aero Soln Inhale 2 puffs into the lungs in the morning and 2 puffs before bedtime.      metoprolol succinate ER 25 MG Oral Tablet 24 Hr Take 1 tablet (25 mg total) by mouth nightly.      BREO ELLIPTA 200-25 MCG/ACT Inhalation Aerosol Powder, Breath Activated Inhale 1 puff into the lungs daily. 3  each 1    Continuous Blood Gluc Sensor (DEXCOM G6 SENSOR) Does not apply Misc Apply to skin every 10 days 9 each 3    Continuous Blood Gluc Transmit (DEXCOM G6 TRANSMITTER) Does not apply Misc 1 each by Other route every 3 (three) months. 1 each 1    pantoprazole 40 MG Oral Tab EC Take 1 tablet (40 mg total) by mouth before breakfast. (Patient taking differently: Take 1 tablet (40 mg total) by mouth nightly.) 90 tablet 3    rivaroxaban (XARELTO) 20 MG Oral Tab Take 1 tablet (20 mg total) by mouth nightly. TAKE AT BEDTIME 90 tablet 3    amLODIPine 2.5 MG Oral Tab Take 1 tablet (2.5 mg total) by mouth daily.      Azelastine HCl 0.1 % Nasal Solution 1 spray by Nasal route in the morning and 1 spray before bedtime.      SUMAtriptan Succinate 50 MG Oral Tab TAKE 1 TABLET BY MOUTH EVERY 2 HOURS AS NEEDED FOR  MIGRAINE.  DO  NOT  EXCEED  200MG  IN  24  HOURS 9 tablet 3    Clopidogrel Bisulfate 75 MG Oral Tab Take 1 tablet (75 mg total) by mouth nightly. nightly      losartan 100 MG Oral Tab Take 1 tablet (100 mg total) by mouth daily.      ezetimibe 10 MG Oral Tab Take 1 tablet (10 mg total) by mouth nightly.      Fluticasone Propionate 50 MCG/ACT Nasal Suspension 1 spray by Each Nare route 2 (two) times daily.      cetirizine (ZYRTEC) 10 MG Oral Tab Take 1 tablet (10 mg total) by mouth nightly.      glucagon (GVOKE HYPOPEN 1-PACK) 1 MG/0.2ML Subcutaneous SUBQ injection Inject 0.2 mL (1 mg total) into the skin once as needed for Low blood glucose. 0.2 mL 1     Past Medical History:    Allergic rhinitis    Allergic rhinitis, cause unspecified    Anemia    Anxiety    Anxiety and depression    Asthma (HCC)    Atherosclerosis of coronary artery    Laguerre's esophagus    Churg-Shania syndrome (HCC)    Colloid cyst of brain (HCC)    Community acquired pneumonia of left lower lobe of lung    Congestive heart disease (HCC)    Coronary artery disease of native heart with stable angina pectoris (HCC)    Depression    Diabetes (HCC)     Diabetes mellitus (HCC)    Diverticulosis of large intestine    Essential hypertension    Exacerbation of asthma (HCC)    Gastroparesis    GERD    Hematemesis with nausea    2017, No EGD, Hgb stable    High cholesterol    History of blood clots    History of pulmonary embolus (PE)    History of recurrent deep vein thrombosis (DVT)    Hyperlipidemia    Hypertension    IBS    LGSIL (low grade squamous intraepithelial dysplasia)    Pap neg 2014    Migraines    perfumes    Moderate persistent asthma without complication (HCC)    DESOUZA (nonalcoholic steatohepatitis)    NSTEMI (non-ST elevated myocardial infarction) (HCC)    FELTON in LAD    Obesity    Osteoarthritis    Patella-femoral syndrome, bilateral knee    Pneumonia due to organism    Pulmonary embolism (HCC)    Sleep apnea    Thyroid disease     Family History   Problem Relation Age of Onset    Heart Disorder Father     Hypertension Mother     Lipids Mother     Stroke Mother     Anemia Mother     Dementia Mother     Heart Disorder Mother     Other (Other) Mother     Bleeding Disorders Mother     Hypertension Sister     Obesity Sister     Stroke Sister     Other (Other) Sister         stroke,pe    Other (Other) Brother         kidney stones    Colon Cancer Maternal Grandfather     Colon Cancer Maternal Aunt     Anemia Daughter     Asthma Daughter     Depression Daughter     Asthma Daughter     Bleeding Disorders Sister     Stroke Sister      Social history: Reviewed.      ROS/Exam    REVIEW OF SYSTEMS: Ten point review of systems has been performed and is otherwise negative and/or non-contributory, except as described above.     VITALS  Vitals:    11/12/24 1530   BP: 128/86   BP Location: Left arm   Patient Position: Sitting   Cuff Size: large   Pulse: 105   SpO2: 98%   Weight: 278 lb (126.1 kg)   Height: 5' 8\" (1.727 m)       Wt Readings from Last 6 Encounters:   11/12/24 278 lb (126.1 kg)   11/05/24 278 lb (126.1 kg)   10/29/24 281 lb (127.5 kg)   09/30/24 278 lb  9.6 oz (126.4 kg)   09/17/24 276 lb (125.2 kg)   08/30/24 273 lb (123.8 kg)       PHYSICAL EXAM  CONSTITUTIONAL:  awake, alert, cooperative, no apparent distress, and appears stated age   PSYCH: normal affect  LUNGS: breathing comfortably  CARDIOVASCULAR:  regular rate     Labs/Imaging: Pertinent imaging reviewed.    Overall glucose control:  Lab Results   Component Value Date    A1C 7.9 (A) 11/12/2024    A1C 8.4 (H) 09/30/2024    A1C 8.3 (A) 08/30/2024    A1C 10.3 (A) 05/09/2024    A1C 12.5 (A) 02/28/2024       Supplementary Documentation:   -Surveillance for Diabetes Complications & Risks  Foot exam/neuropathy: No data recorded  Retinopathy screening: No data recordedNo data recorded    Assessment & Plan:     ICD-10-CM    1. Type 2 diabetes mellitus with hyperglycemia, with long-term current use of insulin ([facility])  E11.65 POC Hemoglobin A1C    Z79.4 GLUC MNTR CONT REC FROM NTRSTL TISS FLU PHYS I&R     Microalb/Creat Ratio, Random Urine [E]          Danielle Jerome is a pleasant 59 year old female here for evaluation of:    #Type 2 Diabetes  #Class 3 severe obesity due to excess calories with serious comorbidity and body mass index (BMI) of 40.0 to 44.9 in adult    #HFrEF (heart failure with reduced ejection fraction) ([facility])-   PMHx of Type 2 diabetes mellitus diagnosed in 2018 after steroid induced hyperglycemia; chronic prednisone use for asthma. She started insulin pump in mid-2023. Ongoing hyperglycemia, so switched to U200 in the pump.  GLP-1 a therapy previously avoided as there was question if the patient has a history of gastroparesis.  Started ozempic in Oct 2024- tolerating well and noting improvement in BG control. We discussed that she ensures she gets enough protein while on GLP1 and reviewed dietary sources.     Last A1c value was 7.9% done 11/12/2024. Goal <7%. Importance of better glucose control in preventing onset/progression of end-organ damage discussed, as well as goals of therapy and clinical  significance of A1C.     - med changes:    - Humulin NPH- matches units with pred dose (ie if on pred 40, takes 40u)   - ozempic 0.25mg weekly --> 0.5mg weekly for 8 weeks, then come in for visit   - Omnipod 5 + Dexcom in [employer] + U200 HUMALOG  Time Basal ICR ISF Active Insulin Time Target   MN 1.10 u/hr 6 28  2h 110   9A  6        - continue Dexcom G6 CGM with phone; can upgrade to G7 when it is available  - patient is on insulin, and has suboptimal glycemic control including wide glycemic swings, thus would benefit from CGM  - continue to check BG 3-4x/day using glucometer or CGM  -See above header \"Supplementary Documentation\" for surveillance for diabetes complications & risks    Nephropathy screening  - stable  Lab Results   Component Value Date    EGFRCR 103 09/30/2024    MICROALBCREA  04/06/2022      Comment:      Unable to calculate due to Urine Microalbumin <0.5 mg/dL        #Hypertension   - SBP is to goal <130   BP Readings from Last 1 Encounters:   11/12/24 128/86   BP Meds: amLODIPine Tabs - 2.5 MG; losartan Tabs - 100 MG; metoprolol succinate ER Tb24 - 25 MG     #Hyperlipidemia  - LDL is not to goal   - Intolerant of statin, due to myopathy  - hx CAD s/p FELTON (2017), On Xarelto    Lab Results   Component Value Date     (H) 09/30/2024    TRIG 130 09/30/2024   Cholesterol Meds: ezetimibe Tabs - 10 MG   Anticoagulant Meds: rivaroxaban Tabs - 20 MG       Return in about 3 months (around 2/12/2025).  A total of 31 minutes was spent today on obtaining history, reviewing pertinent labs, evaluating patient, providing multiple treatment options, reinforcing diet/exercise and compliance, and completing documentation.        11/12/24  TENZIN Damon    Note to patient: The 21 Century Cures Act makes medical notes like these available to patients in the interest of transparency. However, be advised this is a medical document. It is intended as peer to peer communication. It is written in medical  language and may contain abbreviations or verbiage that are unfamiliar. It may appear blunt or direct. Medical documents are intended to carry relevant information, facts as evident, and the clinical opinion of the practitioner.

## 2024-11-12 NOTE — PATIENT INSTRUCTIONS
Follow up plan:  With TENZIN Damon: Return in about 3 months (around 2/12/2025).    - complete your diabetes eye exam. This exam is critical to preventing and treating eye conditions caused by diabetes that could potentially lead to vision loss. Once complete, please call your eye care provider and ask them to fax your latest report to our office. This will help us update our records. Our fax number is: [phone]     - Humulin NPH- matches units with pred dose (ie if on pred 40, takes 40u)   - ozempic 0.25mg weekly --> 0.5mg weekly for 8 weeks, then come in for visit   - Omnipod 5 + Dexcom in Autom[employer] + U200 HUMALOG  Time Basal ICR ISF Active Insulin Time Target   MN 1.10 u/hr 6 28  2h 110   9A  6          - time in range on glooko goal is >80%, to get there, make sure you correct anytime >180. That will help 'teach' the algorithm how to trend you lower   - incorporate more protein-- greek yogurt, cottage cheese, protein shake once daily- aim for 20-30g per serving    Currently, [employer] is slowly rolling out the new Dexcom G7 compatible pods to local pharmacies.  The roll out is not consistent from pharmacy to pharmacy, so in order to know when YOU are eligible to upgrade, please keep an eye on your pod boxes.  As soon as you see \"Dexcom G7\" listed in green on the upper right hand of the front of your pod boxes, you can switch!  (See the image below)        Once you see the designation as above, please let us know, and we can send you a prescription for the Dexcom G7.     Lab Results   Component Value Date    A1C 7.9 (A) 11/12/2024    A1C 8.4 (H) 09/30/2024    A1C 8.3 (A) 08/30/2024    A1C 10.3 (A) 05/09/2024    A1C 12.5 (A) 02/28/2024      Office phone number: [phone]; phones are open Monday-Friday 8:30-4:30.   Thank you for visiting our office. We look forward to working with you to reach your health goals. As a reminder, if you need refills, please request early so there is enough time to process  the request. We ask that you provide at least 5 days' notice before a refill is due, so there is time to send a request to pharmacy, process the refill, and ensure there are no other problems with obtaining the medication (backorders, prior authorization paperwork, etc). Routine refills will not be addressed on weekends, so please submit these requests during the week.    Blood sugar targets:  Before breakfast: , 2 hours after meals: <180 (preferably <150), A1C goal: <7.0%  Time in Range goal is higher than 80% if using a continuous glucose monitor (Dexcom or Monica).  Time in Range can be found within the Dexcom G7 abrahan, on Clarity if using Dexcom G6, or on LibreView if using Monica.

## 2024-11-16 ENCOUNTER — PATIENT MESSAGE (OUTPATIENT)
Facility: CLINIC | Age: 59
End: 2024-11-16

## 2024-11-20 ENCOUNTER — PATIENT MESSAGE (OUTPATIENT)
Facility: CLINIC | Age: 59
End: 2024-11-20

## 2024-11-20 ENCOUNTER — MED REC SCAN ONLY (OUTPATIENT)
Facility: CLINIC | Age: 59
End: 2024-11-20

## 2024-11-20 DIAGNOSIS — J45.40 MODERATE PERSISTENT ASTHMA WITHOUT COMPLICATION (HCC): Primary | ICD-10-CM

## 2024-11-20 RX ORDER — ALBUTEROL SULFATE 90 UG/1
2 INHALANT RESPIRATORY (INHALATION)
Qty: 3 EACH | Refills: 3 | Status: SHIPPED | OUTPATIENT
Start: 2024-11-20

## 2024-11-20 NOTE — TELEPHONE ENCOUNTER
Received request for: Albuterol HFA inh 8.5 gm    Patient last seen by: Dr. Miller on 10/24/2024 via telehealth visit    Has scheduled appointment: none scheduled.     Physician recommendation: \"same meds\"    albuterol 108 (90 Base) MCG/ACT Inhalation Aero Soln Inhale 2 puffs into the lungs every 4 to 6 hours as needed for Wheezing or Shortness of Breath.     Prescription refilled. Patient notified via [employer] message.

## 2024-12-03 ENCOUNTER — PATIENT MESSAGE (OUTPATIENT)
Facility: CLINIC | Age: 59
End: 2024-12-03

## 2024-12-03 ENCOUNTER — TELEPHONE (OUTPATIENT)
Facility: CLINIC | Age: 59
End: 2024-12-03

## 2024-12-03 ENCOUNTER — PATIENT MESSAGE (OUTPATIENT)
Dept: FAMILY MEDICINE CLINIC | Facility: CLINIC | Age: 59
End: 2024-12-03

## 2024-12-03 DIAGNOSIS — F41.9 ANXIETY: ICD-10-CM

## 2024-12-03 DIAGNOSIS — Z79.4 TYPE 2 DIABETES MELLITUS WITH HYPERGLYCEMIA, WITH LONG-TERM CURRENT USE OF INSULIN (HCC): Primary | ICD-10-CM

## 2024-12-03 DIAGNOSIS — E11.65 TYPE 2 DIABETES MELLITUS WITH HYPERGLYCEMIA, WITH LONG-TERM CURRENT USE OF INSULIN (HCC): Primary | ICD-10-CM

## 2024-12-03 DIAGNOSIS — F33.1 MODERATE EPISODE OF RECURRENT MAJOR DEPRESSIVE DISORDER (HCC): ICD-10-CM

## 2024-12-03 DIAGNOSIS — G47.33 OSA (OBSTRUCTIVE SLEEP APNEA): Primary | ICD-10-CM

## 2024-12-03 RX ORDER — ACYCLOVIR 400 MG/1
1 TABLET ORAL
Qty: 3 EACH | Refills: 0 | Status: SHIPPED | OUTPATIENT
Start: 2024-12-03

## 2024-12-03 NOTE — TELEPHONE ENCOUNTER
Received call from patient to discuss Dexcom G7 sensors     Patient had a sensor failure and will be getting her next shipment from [facility] on 12/9- they are trying to expedite.    Patient was told by [facility] to phone her Doctors office to have a refill send to local pharmacy in order to get her through-     RN reviewed that sending this to local pharmacy could mess with insurance payment of her prescription with [facility]. Patient verbalized understanding but reviewed she was given this advise from [facility].    Patient confirmed she would like 3 sensors sent to local [facility] on file.     Patient is to call with any issues.     Endocrine Refill protocol for CGM supplies     Protocol Criteria:  PASSED Reason: N/A    If below requirement is met, send a 90-day supply with 1 refill per provider protocol.     Verify appointment with Endocrinology completed in the last 12 months or scheduled in the next 6 months     Last completed office visit:11/12/2024 Darling Gregory APN   Next scheduled Follow up:   Future Appointments   Date Time Provider Department Center   2/11/2025  2:00 PM Darling Gregory APN [facility]   4/8/2025 11:15 AM Maria E Gonzalez DO [facility]   8/6/2025 10:15 AM Maria E Gonzalez DO [facility] [place]        Passed and ordered per protocol

## 2024-12-03 NOTE — TELEPHONE ENCOUNTER
As per Dr. Miller's notes from 10-24-24:  consider scheduling CPAP titration please   Order placed.  [employer] message sent to pt with phone number to schedule.

## 2024-12-04 DIAGNOSIS — K21.9 GASTROESOPHAGEAL REFLUX DISEASE, UNSPECIFIED WHETHER ESOPHAGITIS PRESENT: ICD-10-CM

## 2024-12-04 RX ORDER — PANTOPRAZOLE SODIUM 40 MG/1
40 TABLET, DELAYED RELEASE ORAL
Qty: 90 TABLET | Refills: 1 | Status: SHIPPED | OUTPATIENT
Start: 2024-12-04

## 2024-12-04 RX ORDER — ALPRAZOLAM 0.5 MG
TABLET ORAL DAILY PRN
Qty: 42 TABLET | Refills: 0 | Status: SHIPPED | OUTPATIENT
Start: 2024-12-04

## 2024-12-04 NOTE — TELEPHONE ENCOUNTER
Sent per passed protocol.   Gastrointestional Medication Protocol [id number]/04/2024 08:56 AM   Protocol Details In person appointment or virtual visit in the past 12 mos or appointment in next 3 mos

## 2024-12-10 ENCOUNTER — TELEPHONE (OUTPATIENT)
Dept: PAIN CLINIC | Facility: CLINIC | Age: 59
End: 2024-12-10

## 2024-12-10 ENCOUNTER — PATIENT MESSAGE (OUTPATIENT)
Dept: FAMILY MEDICINE CLINIC | Facility: CLINIC | Age: 59
End: 2024-12-10

## 2024-12-10 DIAGNOSIS — K21.9 GASTROESOPHAGEAL REFLUX DISEASE, UNSPECIFIED WHETHER ESOPHAGITIS PRESENT: ICD-10-CM

## 2024-12-10 RX ORDER — PANTOPRAZOLE SODIUM 40 MG/1
40 TABLET, DELAYED RELEASE ORAL
Qty: 14 TABLET | Refills: 0 | Status: SHIPPED | OUTPATIENT
Start: 2024-12-10 | End: 2024-12-24

## 2024-12-10 NOTE — TELEPHONE ENCOUNTER
Patient called stating that she was advised that before she can schedule the RFA procedure she would need to get clearance from her cardiologist and endocrinologist, which patient stated she did already. Patient would like to move forward with scheduling that procedure. Please call patient back.

## 2024-12-11 ENCOUNTER — PATIENT MESSAGE (OUTPATIENT)
Dept: FAMILY MEDICINE CLINIC | Facility: CLINIC | Age: 59
End: 2024-12-11

## 2024-12-19 ENCOUNTER — APPOINTMENT (OUTPATIENT)
Dept: GENERAL RADIOLOGY | Facility: HOSPITAL | Age: 59
End: 2024-12-19
Attending: ANESTHESIOLOGY
Payer: COMMERCIAL

## 2024-12-19 ENCOUNTER — HOSPITAL ENCOUNTER (OUTPATIENT)
Facility: HOSPITAL | Age: 59
Setting detail: HOSPITAL OUTPATIENT SURGERY
Discharge: HOME | End: 2024-12-19
Attending: ANESTHESIOLOGY | Admitting: ANESTHESIOLOGY
Payer: COMMERCIAL

## 2024-12-19 VITALS
RESPIRATION RATE: 16 BRPM | TEMPERATURE: 98 F | HEART RATE: 83 BPM | SYSTOLIC BLOOD PRESSURE: 132 MMHG | DIASTOLIC BLOOD PRESSURE: 68 MMHG | BODY MASS INDEX: 42.13 KG/M2 | WEIGHT: 278 LBS | OXYGEN SATURATION: 99 % | HEIGHT: 68 IN

## 2024-12-19 LAB — GLUCOSE BLD-MCNC: 111 MG/DL (ref 70–99)

## 2024-12-19 PROCEDURE — 3E0T3BZ INTRODUCTION OF ANESTHETIC AGENT INTO PERIPHERAL NERVES AND PLEXI, PERCUTANEOUS APPROACH: ICD-10-PCS | Performed by: ANESTHESIOLOGY

## 2024-12-19 PROCEDURE — 64494 INJ PARAVERT F JNT L/S 2 LEV: CPT | Performed by: ANESTHESIOLOGY

## 2024-12-19 PROCEDURE — 64493 INJ PARAVERT F JNT L/S 1 LEV: CPT | Performed by: ANESTHESIOLOGY

## 2024-12-19 RX ORDER — BUPIVACAINE HYDROCHLORIDE 5 MG/ML
INJECTION, SOLUTION EPIDURAL; INTRACAUDAL
Status: DISCONTINUED | OUTPATIENT
Start: 2024-12-19 | End: 2024-12-19

## 2024-12-19 RX ORDER — METHYLPREDNISOLONE ACETATE 40 MG/ML
INJECTION, SUSPENSION INTRA-ARTICULAR; INTRALESIONAL; INTRAMUSCULAR; SOFT TISSUE
Status: DISCONTINUED | OUTPATIENT
Start: 2024-12-19 | End: 2024-12-19

## 2024-12-19 RX ORDER — NALOXONE HYDROCHLORIDE 0.4 MG/ML
0.08 INJECTION, SOLUTION INTRAMUSCULAR; INTRAVENOUS; SUBCUTANEOUS AS NEEDED
Status: DISCONTINUED | OUTPATIENT
Start: 2024-12-19 | End: 2024-12-19

## 2024-12-19 RX ORDER — LIDOCAINE HYDROCHLORIDE 10 MG/ML
INJECTION, SOLUTION EPIDURAL; INFILTRATION; INTRACAUDAL; PERINEURAL
Status: DISCONTINUED | OUTPATIENT
Start: 2024-12-19 | End: 2024-12-19

## 2024-12-19 NOTE — OPERATIVE REPORT
[facility]  Operative Report  2024     Danielle Jerome Patient Status:  Hospital Outpatient Surgery    3/25/1965 MRN [id number]   Location [facility] Attending Tej Gama MD   Hosp Day # 0 PCP Luca Rai DO     Indication: Danielle is a 59 year old female with lumbar spondylosis    Preoperative Diagnosis:  Spondylosis of lumbar region without myelopathy or radiculopathy [M47.816]    Postoperative Diagnosis: Same as above.    Procedure performed: BILATERAL L4/5, L5/S1 LUMBAR  MEDIAL BRANCH BLOCK with local    Anesthesia: Local      EBL: Less than 1 ml.    Procedure Description:  After reviewing the patient's history and performing a focused physical examination, the diagnosis was confirmed and contraindications such as infection and coagulopathy were ruled out.  Following review of allergy,  potential side effects and complications, including but not necessarily limited to infection, allergic reaction, local tissue breakdown, nerve injury, and paresis, the patient indicated they understood and agreed to proceed.  After obtaining the informed consent, the patient was brought to the procedure room and monitored.     The patient was placed prone on the table and the back was prepped and draped in a sterile fashion.  Attention was turned towards the patient's right side first.  The skin and subcutaneous tissue was anesthetized via 25-gauge 1.5\" needle with approximately 2 mL of 1% lidocaine.  Under fluoroscopy guidance, a 25-gauge 3.5\" Quincke spinal needle was introduced and advanced along the superior margin of the L3, L4, L5 transverse process and lateral to the L3, L4, L5 superior articular process, and it was directed inferiorly and medially so that the tip struck the superior aspect of the junction of the transverse process and the supe¬rior articular process.  Following negative aspiration for CSF and blood, approximately 1 mL of 1% Lidocaine was injected via  each needle without complication.  The similar procedure was repeated at the contralateral side.  The needles were removed with tips intact.  The patient tolerated procedure very well.  No complications were observed during or immediately after the procedure.  The patient was observed until discharge criteria met.  Discharge instructions were given and patient was released to a responsible adult.    Complications: None.    Follow up:  The patient was followed in the pain clinic as needed basis.    Tej Gama MD

## 2024-12-19 NOTE — DISCHARGE INSTRUCTIONS
Home Care Instructions Following Your Pain Procedure     Danielle,  It has been a pleasure to have you as our patient. To help you at home, you must follow these general discharge instructions. We will review these with you before you are discharged. It is our hope that you have a complete and uneventful recovery from our procedure.     General Instructions:  What to Expect:  Bandages from your procedure today can be removed when you get home.  Please avoid soaking and/or swimming for 24 hours.  Showering is okay  It is normal to have increased pain symptoms and/or pain at injection site for up to 3-5 days after procedure, you can use heat or ice (20 minutes on 20 minutes off) for comfort.  You may experience some temporary side effects which may include restlessness or insomnia, flushing of the face, or heart palpitations.  Please contact the provider if these symptoms do not resolve within 3-4 days.  Lightheadedness or nausea may occur and should resolve within 24 to 48 hours.  If you develop a headache after treatment, rest, drink fluids (with caffeine, if possible) and take mild over-the-counter pain medication.  If the headache does not improve with the above treatment, contact the physician.  Home Medications:  Resume all previously prescribed medication.  Please avoid taking NSAIDs (Non-Steriodal Anti-Inflammatory Drugs) such as:  Ibuprofen ( Advil, Motrin) Aleve (Naproxen), Diclofenac, Meloxicam for 6 hours after procedure.   If you are on Coumadin (Warfarin) or any other anti-coagulant (or \"blood thinning\") medication such as Plavix (Clopidogrel), Xarelto (Rivaroxaban), Eliquis (Apixaban), Effient (Prasugrel) etc., restart on the following day from the procedure unless otherwise directed by your provider.  If you are a diabetic, please increase the frequency of your glucose monitoring after the procedure as steroids may cause a temporary (2-3 day) increase in your blood sugar.  Contact your primary care  physician if your blood sugar remains elevated as you may require some medication adjustment.  Diet:  Resume your regular diet as tolerated.  Activity:  We recommend that you relax and rest during the rest of your procedure day.  If you feel weakness in your arms or legs do not drive.  Follow-up Appointment  Please schedule a follow-up visit within 3 to 4 weeks after your last procedure date.  Question or Concerns:  Feel free to call our office with any questions or concerns at [phone] (option #2)    Danielle  Thank you for coming to [facility] for your procedure.  The nurses try very hard to make sure you receive the best care possible.  Your trust in them as well as us is greatly appreciated.    Thanks so much,   Dr. Tej Gama

## 2024-12-19 NOTE — H&P
History & Physical Examination    Patient Name: Danielle Jerome  MRN: [id number]  CSN: [id number]  YOB: 1965    Pre-Operative Diagnosis:  Spondylosis of lumbar region without myelopathy or radiculopathy [M47.816]    Present Illness: Lumbar spondylosis    ASA: 3  MP class: 1  Sedation: Local      Prescriptions Prior to Admission[1]  No current facility-administered medications for this encounter.       Allergies: Allergies[2]    Past Medical History:    Allergic rhinitis    Allergic rhinitis, cause unspecified    Anemia    Anxiety    Anxiety and depression    Asthma (HCC)    Atherosclerosis of coronary artery    Laguerre's esophagus    Churg-Shania syndrome (HCC)    Colloid cyst of brain (HCC)    Community acquired pneumonia of left lower lobe of lung    Congestive heart disease (HCC)    Coronary artery disease of native heart with stable angina pectoris (HCC)    Depression    Diabetes (HCC)    Diabetes mellitus (HCC)    Diverticulosis of large intestine    Essential hypertension    Exacerbation of asthma (HCC)    Gastroparesis    GERD    Hematemesis with nausea    2017, No EGD, Hgb stable    High cholesterol    History of blood clots    History of pulmonary embolus (PE)    History of recurrent deep vein thrombosis (DVT)    Hyperlipidemia    Hypertension    IBS    LGSIL (low grade squamous intraepithelial dysplasia)    Pap neg 2014    Migraines    perfumes    Moderate persistent asthma without complication (HCC)    DESOUZA (nonalcoholic steatohepatitis)    NSTEMI (non-ST elevated myocardial infarction) (HCC)    FELTON in LAD    Obesity    Osteoarthritis    Patella-femoral syndrome, bilateral knee    Pneumonia due to organism    Pulmonary embolism (HCC)    Sleep apnea    Thyroid disease     Past Surgical History:   Procedure Laterality Date    Angioplasty (coronary)      Breast surgery procedure unlisted      cyst removed    Cath drug eluting stent      Cholecystectomy  04/10/2011    PERFORMED BY   [facility]AROSCOP[facility]    Colonoscopy  2012    polyps    Colonoscopy N/A 2016    Procedure: COLONOSCOPY;  Surgeon: Kishor Stout MD;  Location:  ENDOSCOPY    Colposcopy, cervix w/upper adjacent vagina; w/endocervical curettage  2011    WNL    Cyst aspiration left  2000    Hernia surgery            Other surgical history      sinus surgery    Other surgical history      CYST REMOVED ON LEFT WRIST 17 YRS AGO    Sinus surgery        Skin surgery      Upper gi endoscopy,exam       Family History   Problem Relation Age of Onset    Heart Disorder Father     Hypertension Mother     Lipids Mother     Stroke Mother     Anemia Mother     Dementia Mother     Heart Disorder Mother     Other (Other) Mother     Bleeding Disorders Mother     Hypertension Sister     Obesity Sister     Stroke Sister     Other (Other) Sister         stroke,pe    Other (Other) Brother         kidney stones    Colon Cancer Maternal Grandfather     Colon Cancer Maternal Aunt     Anemia Daughter     Asthma Daughter     Depression Daughter     Asthma Daughter     Bleeding Disorders Sister     Stroke Sister      Social History     Tobacco Use    Smoking status: Never     Passive exposure: Current    Smokeless tobacco: Never    Tobacco comments:     2nd hand with Father and now    Substance Use Topics    Alcohol use: No       SYSTEM Check if Review is Normal Check if Physical Exam is Normal If not normal, please explain:   HEENT [x ] [x ]    NECK & BACK [x ] [x ]    HEART [x ] [x ]    LUNGS [x ] [x ]    ABDOMEN [x ] [x ]    UROGENITAL [x ] [x ]    EXTREMITIES [x ] [x ]    OTHER        [ x ] I have discussed the risks and benefits and alternatives with the patient/family.  They understand and agree to proceed with plan of care.  [ x ] I have reviewed the History and Physical done within the last 30 days.  Any changes noted above.    Tej Gama MD              [1]   Medications Prior to Admission   Medication Sig Dispense Refill  Last Dose/Taking    albuterol 108 (90 Base) MCG/ACT Inhalation Aero Soln Inhale 2 puffs into the lungs every 4 to 6 hours as needed. inhale 2 puff by inhalation route  every 4 - 6 hours as needed 3 each 3 12/19/2024    pantoprazole 40 MG Oral Tab EC Take 1 tablet (40 mg total) by mouth before breakfast for 14 days. 14 tablet 0     ALPRAZolam 0.5 MG Oral Tab Take 1-2 tablets (0.5-1 mg total) by mouth daily as needed for Sleep or Anxiety. 42 tablet 0     Continuous Glucose Sensor (DEXCOM G7 SENSOR) Does not apply Misc 1 each Every 10 days. Use as directed every 10 days 3 each 0     Insulin NPH, Human,, Isophane, (HUMULIN N KWIKPEN) 100 UNIT/ML Subcutaneous Suspension Pen-injector Pair with prednisone only. Up to 40 units a day max. 15 mL 0     pregabalin 25 MG Oral Cap Take 2 capsules (50 mg total) by mouth at bedtime. 180 capsule 0     [START ON 12/29/2024] DULoxetine HCl 40 MG Oral Cap DR Particles Take 40 mg by mouth daily. 90 capsule 0     albuterol (2.5 MG/3ML) 0.083% Inhalation Nebu Soln Take 3 mL (2.5 mg total) by nebulization 3 (three) times daily. 120 each 11     HUMALOG KWIKPEN 200 UNIT/ML Subcutaneous Solution Pen-injector Use to fill insulin pump with up to 160u/day. 3 month supply. 72 mL 1     Insulin Disposable Pump (OMNIPOD 5 G6 PODS, GEN 5,) Does not apply Misc 1 each every other day. 45 each 3     MONTELUKAST 10 MG Oral Tab TAKE 1 TABLET NIGHTLY 90 tablet 3     semaglutide (OZEMPIC, 0.25 OR 0.5 MG/DOSE,) 2 MG/3ML Subcutaneous Solution Pen-injector Take 0.25mg once weekly for four weeks. After four weeks, increase to 0.5mg once weekly. 9 mL 0     DUPIXENT 300 MG/2ML Subcutaneous Solution Pen-injector        albuterol 108 (90 Base) MCG/ACT Inhalation Aero Soln Inhale 2 puffs into the lungs every 4 to 6 hours as needed for Wheezing. 3 each 3     Tiotropium Bromide Monohydrate (SPIRIVA RESPIMAT) 2.5 MCG/ACT Inhalation Aero Soln Inhale 2 puffs into the lungs in the morning and 2 puffs before bedtime.        metoprolol succinate ER 25 MG Oral Tablet 24 Hr Take 1 tablet (25 mg total) by mouth nightly.   12/18/2024    BREO ELLIPTA 200-25 MCG/ACT Inhalation Aerosol Powder, Breath Activated Inhale 1 puff into the lungs daily. 3 each 1     Continuous Blood Gluc Sensor (DEXCOM G6 SENSOR) Does not apply Misc Apply to skin every 10 days 9 each 3     Continuous Blood Gluc Transmit (DEXCOM G6 TRANSMITTER) Does not apply Misc 1 each by Other route every 3 (three) months. 1 each 1     rivaroxaban (XARELTO) 20 MG Oral Tab Take 1 tablet (20 mg total) by mouth nightly. TAKE AT BEDTIME 90 tablet 3 12/15/2024    glucagon (GVOKE HYPOPEN 1-PACK) 1 MG/0.2ML Subcutaneous SUBQ injection Inject 0.2 mL (1 mg total) into the skin once as needed for Low blood glucose. 0.2 mL 1     amLODIPine 2.5 MG Oral Tab Take 1 tablet (2.5 mg total) by mouth daily.       Azelastine HCl 0.1 % Nasal Solution 1 spray by Nasal route in the morning and 1 spray before bedtime.       SUMAtriptan Succinate 50 MG Oral Tab TAKE 1 TABLET BY MOUTH EVERY 2 HOURS AS NEEDED FOR  MIGRAINE.  DO  NOT  EXCEED  200MG  IN  24  HOURS 9 tablet 3     Clopidogrel Bisulfate 75 MG Oral Tab Take 1 tablet (75 mg total) by mouth nightly. nightly   12/10/2024    losartan 100 MG Oral Tab Take 1 tablet (100 mg total) by mouth daily.   12/18/2024    ezetimibe 10 MG Oral Tab Take 1 tablet (10 mg total) by mouth nightly.       Fluticasone Propionate 50 MCG/ACT Nasal Suspension 1 spray by Each Nare route 2 (two) times daily.       cetirizine (ZYRTEC) 10 MG Oral Tab Take 1 tablet (10 mg total) by mouth nightly.      [2]   Allergies  Allergen Reactions    Peanut-Containing Drug Products HIVES and SHORTNESS OF BREATH     Hives, asthma attack    Pcn [Penicillins] UNKNOWN     HAPPENED AS A CHILD

## 2024-12-20 ENCOUNTER — PATIENT MESSAGE (OUTPATIENT)
Facility: CLINIC | Age: 59
End: 2024-12-20

## 2024-12-20 ENCOUNTER — TELEPHONE (OUTPATIENT)
Dept: PAIN CLINIC | Facility: CLINIC | Age: 59
End: 2024-12-20

## 2024-12-20 NOTE — TELEPHONE ENCOUNTER
Phoned patient. Presently hyperglycemic, mostly in manual mode.     Advised the following:      Current ICR 1:6 and ISF 1:28 on omnipod    Advised to bring ICR down to 4.   Advised to bring ISF down to 22.     In 2-3 days when BG are back to normal, can revert these settings back to the original settings below    ORIGINAL SETTINGS:  Carb ratio 6  Sensitivity factor 28    Sent my chart message. She stated understanding. Closing encounter

## 2024-12-20 NOTE — TELEPHONE ENCOUNTER
RN received patient's [employer] message that her blood sugars are running high since she got a steroid back injection yesterday. Patient asking for direction on how to treat/what to do. Patient wants to know if she should take Humulin to \"offset the steroids\".    RN phoned patient to obtain more information. Patient didn't answer phone call. Per patient's ZAYRA, okay for RN to leave a detailed voice message. RN left a voice message; advised patient to call on-call doctor if blood sugars continue to run high or increase further.    Message routed to Jackeline.

## 2024-12-20 NOTE — TELEPHONE ENCOUNTER
Courtesy called placed to patient for post procedure follow up. Patient stated doing ok, a little sore. Informed patient that soreness is to be expected after the procedure. Educated patient that it takes 3-5 days for the steroid to be effective and to allow adequate time for medication to work. Encouraged patient to alternate ice and heat and to take medications as prescribed. Pt verbalized understanding to call with any questions or concerns.      Procedure: BILATERAL L4/5, L5/S1 LUMBAR  MEDIAL BRANCH BLOCK with local   Date: 12/19/24  Follow up Visit Scheduled: 12/31/24

## 2024-12-31 ENCOUNTER — TELEPHONE (OUTPATIENT)
Dept: FAMILY MEDICINE CLINIC | Facility: CLINIC | Age: 59
End: 2024-12-31

## 2024-12-31 ENCOUNTER — OFFICE VISIT (OUTPATIENT)
Dept: PAIN CLINIC | Facility: CLINIC | Age: 59
End: 2024-12-31
Payer: COMMERCIAL

## 2024-12-31 VITALS
BODY MASS INDEX: 42 KG/M2 | RESPIRATION RATE: 96 BRPM | WEIGHT: 278 LBS | HEART RATE: 104 BPM | SYSTOLIC BLOOD PRESSURE: 128 MMHG | DIASTOLIC BLOOD PRESSURE: 72 MMHG

## 2024-12-31 DIAGNOSIS — M47.816 SPONDYLOSIS OF LUMBAR REGION WITHOUT MYELOPATHY OR RADICULOPATHY: Primary | ICD-10-CM

## 2024-12-31 DIAGNOSIS — E11.65 UNCONTROLLED TYPE 2 DIABETES MELLITUS WITH HYPERGLYCEMIA (HCC): Primary | ICD-10-CM

## 2024-12-31 PROCEDURE — 3078F DIAST BP <80 MM HG: CPT | Performed by: NURSE PRACTITIONER

## 2024-12-31 PROCEDURE — 99214 OFFICE O/P EST MOD 30 MIN: CPT | Performed by: NURSE PRACTITIONER

## 2024-12-31 PROCEDURE — 3074F SYST BP LT 130 MM HG: CPT | Performed by: NURSE PRACTITIONER

## 2024-12-31 RX ORDER — PANTOPRAZOLE SODIUM 40 MG/1
TABLET, DELAYED RELEASE ORAL
COMMUNITY
Start: 2024-12-13

## 2024-12-31 NOTE — PROGRESS NOTES
HPI:   Danielle Jerome presents to follow-up after #1 bilateral L4-5, L5-S1 median branch blocks conducted on December 19, 2024.      Patient is reporting 90% sustained relief of her back pain.  She is very satisfied with her response to the block    Changes in condition/history since last visit:   Patient was seen for initial consultation on October 7, 2024 complaining of acute low back pain and left medial knee pain.  She was also reporting some left shoulder pain occasional pain in the right knee, as well as numbness and tingling in both feet left greater than right as well as left radiating leg symptoms.      Primary concern was her back pain which was progressively getting worse over the last 6 months therefore she was recommended for medial branch blocks.     Prior medical history:  Patient states that she has been on disability since approximately 2006 due to her severe asthma. She has been on steroid medications on and off for almost her entire life. She is now on Dupixent. 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 She had a blood clot in 2017 in her coronary artery which resulted in an MI. Patient is chronically anticoagulated now with Xarelto and Plavix. For patient's low back and knee symptoms she has done a course of physical therapy without any change in her symptoms.  She was encouraged to reduce her weight.  She is working with rheumatology Dr. Clinton who has weaned her off of her escitalopram and started duloxetine for her pain and she feels that this is taking the edge off.  She is currently on gabapentin however she feels that it affects her cognition and would be open to any alternatives.  She has not had any EMG studies to evaluate her neuropathy.        Last procedure: #1 bilateral L4-5, L5-S1 medial branch blocks    date: December 19, 2024    Percentage of  relief experienced from the procedure: 90%    Duration of the relief: Sustained after increased back pain for the first 1-4 days    The following activities will increase the patient’s pain: nothing specific    The following activities decrease the patient’s pain: changing position    Functional Assessment: Patient reports that they are able to complete all of their ADL's such as eating, bathing, using the toilet, dressing and getting up from a bed or a chair independently.    Past Treatment Attempted/Patient’s Response:  Treatment up to this time has included:  Evaluation: Primary care, orthopedics, rheumatology  NSAIDS: Contraindicated due to chronic anticoagulation  Narcotic use: Patient prefers no opioids: Resulted in nausea  Physical therapy: Has done physical therapy for her knees in the past without relief/done in 2022 at [facility]  Spinal injections: #1 L4/5, L5/S1 MBB 12/19/24 90% sustained relief  Others: Has done acupuncture in the past with mild relief, ktape on knees, ice on knees and back    REVIEW OF SYSTEMS:   GENERAL HEALTH: No fevers, chills, recent weight loss or unremitting night pain.  SKIN: No history of skin rashes.  EYES: No blurred vision, double vision or changes in vision.  HEENT: No swallowing or hearing difficulties.  RESPIRATORY: No shortness of breath.  History of chronic asthma  CARDIOVASCULAR: No chest pain or palpitations.  History of MI, multiple pulmonary embolisms  GI: Denies nausea, vomiting, constipation, diarrhea; no rectal bleeding;  no melana.  History of hiatal hernia, history of being on PPI  : No dysuria, urgency or frequency; no hematuria.  NEURO: No balance problems, no seizure problems, no depression.  PSYCHE: no symptoms of depression or anxiety.  History of anxiety depression  HEMATOLOGY: denies hx anemia; denies bruising or excessive bleeding.  ENDOCRINE: denies excessive thirst or urination; denies unexpected wt gain or wt loss.  History of  insulin-dependent diabetes  MUSCULOSKELETAL: DENIES:, Swelling of joints, Hardware, Deformity, Gout, Heel spurs history of fibromyalgia, osteoporosis, knee crepitus, knee pain, low back pain  ALLERGY/IMM.: denies food or seasonal allergies.  No history of cancer, infectious disease exposure.       Current Medications:  Current Outpatient Medications   Medication Sig Dispense Refill    ALPRAZolam 0.5 MG Oral Tab Take 1-2 tablets (0.5-1 mg total) by mouth daily as needed for Sleep or Anxiety. 42 tablet 0    Continuous Glucose Sensor (DEXCOM G7 SENSOR) Does not apply Misc 1 each Every 10 days. Use as directed every 10 days 3 each 0    albuterol 108 (90 Base) MCG/ACT Inhalation Aero Soln Inhale 2 puffs into the lungs every 4 to 6 hours as needed. inhale 2 puff by inhalation route  every 4 - 6 hours as needed 3 each 3    Insulin NPH, Human,, Isophane, (HUMULIN N KWIKPEN) 100 UNIT/ML Subcutaneous Suspension Pen-injector Pair with prednisone only. Up to 40 units a day max. 15 mL 0    pregabalin 25 MG Oral Cap Take 2 capsules (50 mg total) by mouth at bedtime. 180 capsule 0    DULoxetine HCl 40 MG Oral Cap DR Particles Take 40 mg by mouth daily. 90 capsule 0    albuterol (2.5 MG/3ML) 0.083% Inhalation Nebu Soln Take 3 mL (2.5 mg total) by nebulization 3 (three) times daily. 120 each 11    HUMALOG KWIKPEN 200 UNIT/ML Subcutaneous Solution Pen-injector Use to fill insulin pump with up to 160u/day. 3 month supply. 72 mL 1    Insulin Disposable Pump (OMNIPOD 5 G6 PODS, GEN 5,) Does not apply Misc 1 each every other day. 45 each 3    MONTELUKAST 10 MG Oral Tab TAKE 1 TABLET NIGHTLY 90 tablet 3    semaglutide (OZEMPIC, 0.25 OR 0.5 MG/DOSE,) 2 MG/3ML Subcutaneous Solution Pen-injector Take 0.25mg once weekly for four weeks. After four weeks, increase to 0.5mg once weekly. 9 mL 0    DUPIXENT 300 MG/2ML Subcutaneous Solution Pen-injector       albuterol 108 (90 Base) MCG/ACT Inhalation Aero Soln Inhale 2 puffs into the lungs every 4  to 6 hours as needed for Wheezing. 3 each 3    Tiotropium Bromide Monohydrate (SPIRIVA RESPIMAT) 2.5 MCG/ACT Inhalation Aero Soln Inhale 2 puffs into the lungs in the morning and 2 puffs before bedtime.      metoprolol succinate ER 25 MG Oral Tablet 24 Hr Take 1 tablet (25 mg total) by mouth nightly.      BREO ELLIPTA 200-25 MCG/ACT Inhalation Aerosol Powder, Breath Activated Inhale 1 puff into the lungs daily. 3 each 1    Continuous Blood Gluc Sensor (DEXCOM G6 SENSOR) Does not apply Misc Apply to skin every 10 days 9 each 3    Continuous Blood Gluc Transmit (DEXCOM G6 TRANSMITTER) Does not apply Misc 1 each by Other route every 3 (three) months. 1 each 1    rivaroxaban (XARELTO) 20 MG Oral Tab Take 1 tablet (20 mg total) by mouth nightly. TAKE AT BEDTIME 90 tablet 3    glucagon (GVOKE HYPOPEN 1-PACK) 1 MG/0.2ML Subcutaneous SUBQ injection Inject 0.2 mL (1 mg total) into the skin once as needed for Low blood glucose. 0.2 mL 1    amLODIPine 2.5 MG Oral Tab Take 1 tablet (2.5 mg total) by mouth daily.      Azelastine HCl 0.1 % Nasal Solution 1 spray by Nasal route in the morning and 1 spray before bedtime.      SUMAtriptan Succinate 50 MG Oral Tab TAKE 1 TABLET BY MOUTH EVERY 2 HOURS AS NEEDED FOR  MIGRAINE.  DO  NOT  EXCEED  200MG  IN  24  HOURS 9 tablet 3    Clopidogrel Bisulfate 75 MG Oral Tab Take 1 tablet (75 mg total) by mouth nightly. nightly      losartan 100 MG Oral Tab Take 1 tablet (100 mg total) by mouth daily.      ezetimibe 10 MG Oral Tab Take 1 tablet (10 mg total) by mouth nightly.      Fluticasone Propionate 50 MCG/ACT Nasal Suspension 1 spray by Each Nare route 2 (two) times daily.      cetirizine (ZYRTEC) 10 MG Oral Tab Take 1 tablet (10 mg total) by mouth nightly.        Patient requires assistance with: No assistance required  Have you recently had any feelings of harming yourself or others? The patient's response was no.    Physical Exam:   /72   Pulse 104   Resp (!) 96   Wt 278 lb  (126.1 kg)   [facility] 09/23/2004   BMI 42.27 kg/m²   VAS Pain Score:  0/10  General Appearance: Well developed, well nourished, normal build, independent body habitus, no apparent physical disabilities, well groomed    Neurological Exam: WNL-Orientation to time, place and person, normal mood & effect, normal concentration & attention span  Inspection:   Nonantalgic  Gross motor or sensory deficits  No acute distress easily rises from seated to standing position  Lumbar skin clean dry and intact nontender to palpation  Radiology/Lab Test Reviewed:   PROCEDURE:  MRI SPINE LUMBAR (CPT=72148)     [facility]72148[facility] 2/18/2015, 7:48 PM.     INDICATIONS:  M54.41 Acute bilateral low back pain with bilateral sciatica M54.42 Acute bilateral low back pain with bilateral sciatica     TECHNIQUE:  Multiplanar T1 and T2 weighted images including fat suppression sequences.  Images acquired in sagittal and axial planes.       PATIENT STATED HISTORY: (As transcribed by Technologist)  Worsening low back pain radiating to bilateral lower extremities. There is difficulty standing straight and ambulating.         FINDINGS:    LUMBAR DISC LEVELS  L1-L2:  No significant disc/facet abnormality, spinal stenosis, or foraminal narrowing.  L2-L3:  No significant disc/facet abnormality, spinal stenosis, or foraminal narrowing.  L3-L4:  No significant disc/facet abnormality, spinal stenosis, or foraminal narrowing.  L4-L5:  There is no significant disc bulge.  There is mild to moderate right facet joint degenerative change and mild left facet joint degenerative change.  There is no significant central or foraminal stenosis.  L5-S1:  Minimal broad-based degenerative disc bulge.  There is mild bilateral facet joint degenerative change.  There is no central or foraminal stenosis.     PARASPINAL AREA:  Normal with no visible mass.  Small cyst in the right kidney measures 10 mm.  BONY STRUCTURES:  No fracture, pars  defect, significant scoliosis, or osseous lesion.    CORD/CAUDA EQUINA:  Normal caliber, contour, and signal intensity.                     Impression   CONCLUSION:       Mild degenerative changes at L4-5 and L5-S1.  There is no central canal stenosis or nerve root impingement.        LOCATION:  [facility]        Dictated by (CST): Yuri Rivera MD on 9/18/2024 at 2:19 PM      Finalized by (CST): Yuri Rivera MD on 9/18/2024 at 2:23 PM       Result History    MRI SPINE LUMBAR (CPT=72148) (Order #[id number]) on 9/18/2024 - Order Result History Report        PROCEDURE:  XR LUMBAR SPINE (MIN 4 VIEWS) (CPT=72110)     TECHNIQUE:  AP, lateral, oblique, and coned down L5-S1 views were obtained.     COMPARISON:  [facility], XR, XR LUMBAR SPINE (MIN 4 VIEWS) (CPT=72110), 8/19/2023, 12:49 PM.     INDICATIONS:  M54.41 Acute bilateral low back pain with bilateral sciatica M54.42 Acute bilateral low back pain with bilateral sciatica     PATIENT STATED HISTORY: (As transcribed by Technologist)  The patient states that she has pain across her lower back.         FINDINGS:      BONES:  Mild levoscoliosis of the lumbar spine is noted.  Normal vertebral body height is present.  No acute fracture.  Mild-to-moderate facet joint arthropathy in the lower lumbar spine is noted.  DISC SPACES:  Normal.  No significant disc height narrowing, subluxation, or endplate abnormality.  PARASPINOUS:  Negative.  No paraspinous abnormality is seen.  OTHER:  Negative.                     Impression   CONCLUSION:  Mild levoscoliosis of the lumbar spine.        LOCATION:  [facility]        Dictated by (CST): Song Pelayo MD on 7/09/2024 at 2:28 PM      Finalized by (CST): Song Pelayo MD on 7/09/2024 at 2:28 PM       Result History    XR LUMBAR SPINE (MIN 4 VIEWS) (CPT=72110) (Order #[id number]) on 7/9/2024 - Order Result History Report  Signed by    Lab Results   Component Value Date    WBC 9.7 09/30/2024    WBC 9.7 04/11/2024    WBC 10.4 04/10/2024     Lab  Results   Component Value Date    HEMOGLOBIN 13.9 09/23/2014    HEMOGLOBIN 13.7 11/19/2012    HEMOGLOBIN 14.3 02/11/2011     Lab Results   Component Value Date    .0 09/30/2024    .0 04/11/2024    .0 04/10/2024           Patient Education: Patient was advised to continue normal activities as tolerated and was advised against any form of bedrest, since recent guidelines promote and encourage physical activity for improvment of functionality and overall pain.  (Family Practice Vol. 16, No. 1, 39-45Â© [employer] 1999 ).  Patient educated and verbalized understanding.  Medical Decision Making:   Diagnosis:    Encounter Diagnosis   Name Primary?    Spondylosis of lumbar region without myelopathy or radiculopathy Yes     Impression:   Patient presents to clinic re: her lbp/after having #1 bilat L4/5, L5/S1 MBB.  She reports 90% sustained relief of her back pain after 5 days of moderate discomfort.  She is satisfied with her response.  Patient informed no further injection therapy warranted at this time.  She was invited back to the clinic if the back pain returns we would repeat bilateral lumbar medial branch blocks as above.    Plan:   > No further injections needed at this time  > Follow-up in clinic if back pain returns would repeat bilateral L4-5 L5-S1 median branch blocks and monitor response  No orders of the defined types were placed in this encounter.      Meds & Refills for this Visit:  Requested Prescriptions      No prescriptions requested or ordered in this encounter       Imaging & Consults:  None    The patient indicates understanding of these issues and agrees to the plan.      ID#[id number]

## 2024-12-31 NOTE — PROGRESS NOTES
Last procedure: BILATERAL L4/5, L5/S1 LUMBAR  MEDIAL BRANCH BLOCK with local   Date: 12/19/24  Percentage of relief obtained: 90%  Duration of relief: current    Current Pain Score: 0

## 2024-12-31 NOTE — TELEPHONE ENCOUNTER
MCM not read    Left detailed message to voicemail (per verbal release form consent with confirmed identifying message) of Joyce AJ's MCM. Patient was advised to call office back with any questions/concerns.

## 2024-12-31 NOTE — PATIENT INSTRUCTIONS
Refill policies:    Allow 2-3 business days for refills; controlled substances may take longer.  Contact your pharmacy at least 5 days prior to running out of medication and have them send an electronic request or submit request through the “request refill” option in your [email or web address] account.  Refills are not addressed on weekends; covering physicians do not authorize routine medications on weekends.  No narcotics or controlled substances are refilled after noon on Fridays or by on call physicians.  By law, narcotics must be electronically prescribed.  A 30 day supply with no refills is the maximum allowed.  If your prescription is due for a refill, you may be due for a follow up appointment.  To best provide you care, patients receiving routine medications need to be seen at least once a year.  Patients receiving narcotic/controlled substance medications need to be seen at least once every 3 months.  In the event that your preferred pharmacy does not have the requested medication in stock (e.g. Backordered), it is your responsibility to find another pharmacy that has the requested medication available.  We will gladly send a new prescription to that pharmacy at your request.    Scheduling Tests:    If your physician has ordered radiology tests such as MRI or CT scans, please contact Central Scheduling at [phone] right away to schedule the test.  Once scheduled, the [facility] Centralized Referral Team will work with your insurance carrier to obtain pre-certification or prior authorization.  Depending on your insurance carrier, approval may take 3-10 days.  It is highly recommended patients assure they have received an authorization before having a test performed.  If test is done without insurance authorization, patient may be responsible for the entire amount billed.      Precertification and Prior Authorizations:  If your physician has recommended that you have a procedure or additional testing performed the [facility]  Centralized Referral Team will contact your insurance carrier to obtain pre-certification or prior authorization.    You are strongly encouraged to contact your insurance carrier to verify that your procedure/test has been approved and is a COVERED benefit.  Although the [facility] Centralized Referral Team does its due diligence, the insurance carrier gives the disclaimer that \"Although the procedure is authorized, this does not guarantee payment.\"    Ultimately the patient is responsible for payment.   Thank you for your understanding in this matter.  Paperwork Completion:  If you require FMLA or disability paperwork for your recovery, please make sure to either drop it off or have it faxed to our office at [phone]. Be sure the form has your name and date of birth on it.  The form will be faxed to our Forms Department and they will complete it for you.  There is a 25$ fee for all forms that need to be filled out.  Please be aware there is a 10-14 day turnaround time.  You will need to sign a release of information (ZAYRA) form if your paperwork does not come with one.  You may call the Forms Department with any questions at [phone].  Their fax number is [phone].

## 2025-01-10 ENCOUNTER — PATIENT MESSAGE (OUTPATIENT)
Facility: CLINIC | Age: 60
End: 2025-01-10

## 2025-01-13 ENCOUNTER — NURSE ONLY (OUTPATIENT)
Dept: FAMILY MEDICINE CLINIC | Facility: CLINIC | Age: 60
End: 2025-01-13
Payer: COMMERCIAL

## 2025-01-13 DIAGNOSIS — E11.65 UNCONTROLLED TYPE 2 DIABETES MELLITUS WITH HYPERGLYCEMIA (HCC): ICD-10-CM

## 2025-01-13 PROCEDURE — 92229 IMG RTA DETC/MNTR DS POC ALY: CPT | Performed by: FAMILY MEDICINE

## 2025-01-13 PROCEDURE — 2033F EYE IMG VALID W/O RTNOPTHY: CPT | Performed by: FAMILY MEDICINE

## 2025-01-13 NOTE — PROGRESS NOTES
Patient tolerated eye exam well.   Notified of results.     Do you have any floaters, blurred vision or vision loss that cannot be corrected with eyeglasses? No  Are you currently pregnant?  No  Do you have a diagnosis of macular edema, severe non-proliferative retinopathy, proliferative retinopathy, radiation retinopathy, or retinal vein occlusion? No  Have you had laser treatment of the retina or injections into either eye, or any history of retinal surgery? No  Are hypersensitive to light or taking medications that cause photosensitivity?  No  Have you recently undergone photodynamic therapy (PDT) (type of phototherapy used to elicit cell death for treatment of disease, including age-related macular degeneration). No

## 2025-01-30 ENCOUNTER — TELEPHONE (OUTPATIENT)
Dept: PAIN CLINIC | Facility: CLINIC | Age: 60
End: 2025-01-30

## 2025-01-30 NOTE — TELEPHONE ENCOUNTER
Prior authorization request completed for: bilateral L4/5,L5/S1 MBB   Authorization # no auth needed   Pre-D: no  Exclusions/Restrictions: no  Covered Benefit: yes   Authorization dates: n/a  CPT codes approved: 56391,39736  Number of visits/dates of service approved:   Physician: aissatou  Location: [facility]   Call Ref#: [id number]  Representative Name: radu chriss  Insurance Carrier: [phone]    Patient can be scheduled. Routed to Navigator.

## 2025-01-31 ENCOUNTER — PATIENT MESSAGE (OUTPATIENT)
Facility: CLINIC | Age: 60
End: 2025-01-31

## 2025-01-31 DIAGNOSIS — E11.65 TYPE 2 DIABETES MELLITUS WITH HYPERGLYCEMIA, WITH LONG-TERM CURRENT USE OF INSULIN (HCC): Primary | ICD-10-CM

## 2025-01-31 DIAGNOSIS — Z79.4 TYPE 2 DIABETES MELLITUS WITH HYPERGLYCEMIA, WITH LONG-TERM CURRENT USE OF INSULIN (HCC): Primary | ICD-10-CM

## 2025-01-31 RX ORDER — PROCHLORPERAZINE 25 MG/1
1 SUPPOSITORY RECTAL
Qty: 1 EACH | Refills: 3 | Status: SHIPPED | OUTPATIENT
Start: 2025-01-31

## 2025-01-31 RX ORDER — PROCHLORPERAZINE 25 MG/1
SUPPOSITORY RECTAL
Qty: 9 EACH | Refills: 3 | Status: SHIPPED | OUTPATIENT
Start: 2025-01-31

## 2025-01-31 RX ORDER — SEMAGLUTIDE 0.68 MG/ML
0.5 INJECTION, SOLUTION SUBCUTANEOUS WEEKLY
Qty: 9 ML | Refills: 0 | Status: CANCELLED
Start: 2025-01-31

## 2025-01-31 NOTE — TELEPHONE ENCOUNTER
manages Xarelto / [facility] manages Plavix.     Completed Plavix [employer] CRA Form faxed to [phone];confirmation r'cvd    Medical Clearance faxed to 's Office at Fax #: [phone];confirmation r'cvd     25      RE: Danielle Jerome    : 3/25/1965    Dear Dr. Johnson,    Your patient is being scheduled for a pain management procedure at [facility].    Procedure:  Bilateral L4/5,L5/S1 Medial Branch Block  Date of Procedure: TBD -pending medical clearance  Physician: Dr. Gama- Anesthesiologist     Your patient is currently taking Xarelto. Dr. Gama usually recommends this medication to be held for 3 days prior to injection.     Please verify patient is cleared to proceed with pain management procedures

## 2025-02-09 ENCOUNTER — HOSPITAL ENCOUNTER (OUTPATIENT)
Dept: CT IMAGING | Age: 60
Discharge: HOME | End: 2025-02-09
Attending: NURSE PRACTITIONER
Payer: COMMERCIAL

## 2025-02-09 ENCOUNTER — HOSPITAL ENCOUNTER (OUTPATIENT)
Dept: CT IMAGING | Age: 60
End: 2025-02-09
Attending: NURSE PRACTITIONER
Payer: COMMERCIAL

## 2025-02-09 DIAGNOSIS — Q04.6 COLLOID CYST OF BRAIN (HCC): ICD-10-CM

## 2025-02-09 DIAGNOSIS — Z00.00 ANNUAL PHYSICAL EXAM: ICD-10-CM

## 2025-02-09 PROCEDURE — 70450 CT HEAD/BRAIN W/O DYE: CPT | Performed by: NURSE PRACTITIONER

## 2025-02-10 ENCOUNTER — PATIENT MESSAGE (OUTPATIENT)
Dept: FAMILY MEDICINE CLINIC | Facility: CLINIC | Age: 60
End: 2025-02-10

## 2025-02-10 DIAGNOSIS — F41.9 ANXIETY: ICD-10-CM

## 2025-02-10 DIAGNOSIS — F33.1 MODERATE EPISODE OF RECURRENT MAJOR DEPRESSIVE DISORDER (HCC): ICD-10-CM

## 2025-02-10 DIAGNOSIS — Z86.718 HISTORY OF BLOOD CLOTS: Primary | ICD-10-CM

## 2025-02-10 DIAGNOSIS — M54.42 ACUTE BILATERAL LOW BACK PAIN WITH BILATERAL SCIATICA: ICD-10-CM

## 2025-02-10 DIAGNOSIS — Z86.711 HISTORY OF PULMONARY EMBOLUS (PE): ICD-10-CM

## 2025-02-10 DIAGNOSIS — M54.41 ACUTE BILATERAL LOW BACK PAIN WITH BILATERAL SCIATICA: ICD-10-CM

## 2025-02-11 ENCOUNTER — OFFICE VISIT (OUTPATIENT)
Facility: CLINIC | Age: 60
End: 2025-02-11
Payer: COMMERCIAL

## 2025-02-11 VITALS
HEIGHT: 68 IN | HEART RATE: 109 BPM | OXYGEN SATURATION: 99 % | WEIGHT: 279 LBS | BODY MASS INDEX: 42.28 KG/M2 | SYSTOLIC BLOOD PRESSURE: 132 MMHG | DIASTOLIC BLOOD PRESSURE: 88 MMHG

## 2025-02-11 DIAGNOSIS — E11.59 HYPERTENSION ASSOCIATED WITH TYPE 2 DIABETES MELLITUS (HCC): ICD-10-CM

## 2025-02-11 DIAGNOSIS — E78.5 HYPERLIPIDEMIA ASSOCIATED WITH TYPE 2 DIABETES MELLITUS (HCC): ICD-10-CM

## 2025-02-11 DIAGNOSIS — I15.2 HYPERTENSION ASSOCIATED WITH TYPE 2 DIABETES MELLITUS (HCC): ICD-10-CM

## 2025-02-11 DIAGNOSIS — Z78.9 STATIN INTOLERANCE: ICD-10-CM

## 2025-02-11 DIAGNOSIS — E11.69 HYPERLIPIDEMIA ASSOCIATED WITH TYPE 2 DIABETES MELLITUS (HCC): ICD-10-CM

## 2025-02-11 DIAGNOSIS — E11.65 TYPE 2 DIABETES MELLITUS WITH HYPERGLYCEMIA, WITH LONG-TERM CURRENT USE OF INSULIN (HCC): Primary | ICD-10-CM

## 2025-02-11 DIAGNOSIS — I50.20 HFREF (HEART FAILURE WITH REDUCED EJECTION FRACTION) (HCC): ICD-10-CM

## 2025-02-11 DIAGNOSIS — Z79.4 TYPE 2 DIABETES MELLITUS WITH HYPERGLYCEMIA, WITH LONG-TERM CURRENT USE OF INSULIN (HCC): Primary | ICD-10-CM

## 2025-02-11 DIAGNOSIS — T46.6X5A STATIN MYOPATHY: ICD-10-CM

## 2025-02-11 DIAGNOSIS — Z46.81 INSULIN PUMP TITRATION: ICD-10-CM

## 2025-02-11 DIAGNOSIS — G72.0 STATIN MYOPATHY: ICD-10-CM

## 2025-02-11 LAB — HEMOGLOBIN A1C: 6.9 % (ref 4.3–5.6)

## 2025-02-11 PROCEDURE — 3075F SYST BP GE 130 - 139MM HG: CPT

## 2025-02-11 PROCEDURE — 3044F HG A1C LEVEL LT 7.0%: CPT

## 2025-02-11 PROCEDURE — 83036 HEMOGLOBIN GLYCOSYLATED A1C: CPT

## 2025-02-11 PROCEDURE — 95251 CONT GLUC MNTR ANALYSIS I&R: CPT

## 2025-02-11 PROCEDURE — 3079F DIAST BP 80-89 MM HG: CPT

## 2025-02-11 PROCEDURE — G2211 COMPLEX E/M VISIT ADD ON: HCPCS

## 2025-02-11 PROCEDURE — 99214 OFFICE O/P EST MOD 30 MIN: CPT

## 2025-02-11 PROCEDURE — 3008F BODY MASS INDEX DOCD: CPT

## 2025-02-11 RX ORDER — INSULIN LISPRO 200 [IU]/ML
INJECTION, SOLUTION SUBCUTANEOUS
COMMUNITY
Start: 2025-02-11

## 2025-02-11 RX ORDER — DULOXETINE 40 MG/1
40 CAPSULE, DELAYED RELEASE ORAL DAILY
Qty: 90 CAPSULE | Refills: 1 | Status: SHIPPED | OUTPATIENT
Start: 2025-02-11

## 2025-02-11 RX ORDER — ALPRAZOLAM 0.5 MG
TABLET ORAL DAILY PRN
Qty: 30 TABLET | Refills: 0 | Status: SHIPPED | OUTPATIENT
Start: 2025-02-11

## 2025-02-11 RX ORDER — TIRZEPATIDE 2.5 MG/.5ML
2.5 INJECTION, SOLUTION SUBCUTANEOUS WEEKLY
Qty: 2 ML | Refills: 0 | Status: CANCELLED | OUTPATIENT
Start: 2025-02-11

## 2025-02-11 NOTE — PATIENT INSTRUCTIONS
Return Visit:  With TENZIN Dailey: Return in about 3 months (around 5/11/2025) for diabetes follow up.      - increase ozempic to 1mg weekly  - continue pump at current settings   - Omnipod 5 + Dexcom in [employer] + U200 HUMALOG  Time Basal ICR ISF Active Insulin Time Target   MN 1.10 u/hr 6 28  2h 110   9A  6          Updated diabetes medication instructions from today:   Diabetic Medications               HUMALOG KWIKPEN 200 UNIT/ML Subcutaneous Solution Pen-injector (Taking) Use to fill insulin pump with up to 90u/day. Updated 2/11/2025. 3 month supply.    semaglutide 4 MG/3ML Subcutaneous Solution Pen-injector Inject 1 mg into the skin once a week.    Insulin NPH, Human,, Isophane, (HUMULIN N KWIKPEN) 100 UNIT/ML Subcutaneous Suspension Pen-injector Pair with prednisone only. Up to 40 units a day max.    glucagon (GVOKE HYPOPEN 1-PACK) 1 MG/0.2ML Subcutaneous SUBQ injection Inject 0.2 mL (1 mg total) into the skin once as needed for Low blood glucose.            Lab Results   Component Value Date    A1C 6.9 (A) 02/11/2025    A1C 7.9 (A) 11/12/2024    A1C 8.4 (H) 09/30/2024    A1C 8.3 (A) 08/30/2024    A1C 10.3 (A) 05/09/2024        General follow up information:  Please let us know if you require any refills at least 1 week prior to your medication running out. If you do run out of medication, please call our office ASAP to request refills (do not wait until your follow up).   Please call our office if sugars at home are consistently greater than 250 or less than 70 for medication adjustment (do not wait until your follow up appointment).  Lab results and imaging will typically be reviewed at follow up appointments, or within 3-5 business days of ALL results being in if you do not have an appointment scheduled in the near future. Our office will contact you for any abnormal results requiring more urgent follow up or action.   The on-call pager is for urgent matters only. If you are a type 1 diabetic and run  out of insulin after business hours 8AM-4PM, you may call the on-call pager for a refill to a 24 hour pharmacy.  If you have adrenal insufficiency and run out of steroids, you may call the on-call pager for a refill to a 24 hour pharmacy. All other refill requests should be requested during business hours.    Office phone number: [phone]; phones are open Monday-Friday 8:30-4:30.       HOW TO TREAT LOW BLOOD SUGAR (Hypoglycemia)  Low blood sugar= Less than 70, or if you start to have symptoms (below)  Symptoms: Shaking or trembling, fast heart rate, extreme hunger, sweating, confusion/difficulty concentrating, dizziness.    How to treat a low blood sugar if you are able to eat/drink: The Rule of 15/15  If you are using continuous glucose monitor that says you are low, but you do not have any symptoms, verify on fingerstick that your blood sugar is actually low before treating.   Eat 15 grams of carbs (see examples below)  Check your blood sugar after 15 minutes. If it’s still below your target range, have another serving.   Repeat these steps until it’s in your target range. Once it’s in range, if you're nervous about your sugar going low again, have a protein source (ie, a spoonful of peanut butter).   If you have a CGM you want to look for how your arrow has changed. If you arrow is pointed up or sideways after 15 min, give your CGM more time OR check with a finger stick. Try not to eat more food until at least 15 min after the first BG check - otherwise you risk having a rebound high.  If you are experiencing symptoms and you are unable to check your blood glucose for any reason, treat the hypoglycemia.  If someone has a low blood sugar and is unconscious: Don’t hesitate to call 911. If someone is unconscious and glucagon is not available or someone does not know how to use it, call 911 immediately.     To treat a low, I recommend you carry with you easy, pre-portioned treatment for low blood sugars that are  15G of carbs:   - Children sized squeeze pouch applesauce (high fiber + carbs help prevent too high of a spike)  - Small children's sized juicebox- 15g carb --> 4oz juice box  - Glucose tablets from [employer], you can find them near diabetes supplies --> Note, you will need to eat 3-4 tablets to get to 15g of carbs  - Children sized fruit snack pack- look for one with 15 grams of total carbohydrate  - Choice of how to treat your low is important. Complex carbs, or foods that contain fats along with carbs (like chocolate) can slow the absorption of glucose and should NOT be used to treat an emergency low

## 2025-02-11 NOTE — TELEPHONE ENCOUNTER
[facility] sent to [facility]    Xarelto is being prescribed by Conor Elliott    Alprazolam sent to [facility] [address]

## 2025-02-11 NOTE — PROGRESS NOTES
[facility] Note    Name: Danielle Jerome    Date: 2025      Danielle Jerome is a 59 year old female who presents for evaluation of T2DM management.     Chief complaint: Follow - Up (Pt presents for DM follow up. Repeat A1C completed in clinic.  Pt states ozempic is being tolerated well. Had lost 20 lbs but then had back injury and was unable to exercise. )       Subjective:   Initial HPI consult in 2023:  Diagnosed [age]: [id number] (steroid-induced, chronic prednisone for asthma)  Follows with DM APN, LV 2023     Currently follows with [facility]; on-and-off prednisone depending on asthma flares with Churg-Shania, currently not on any steroids     Current DM meds:Toujeo 96U; Humalog 16U qAC TID + ISF 2:50>150 (doesn't carb count exactly); been on MDI x ~6 months now  Previously trialed/failed (from DM Apn note): Byetta, Metformin (GI), Januvia, Actos, Novolog, Levemir, Invokana, Trulicity, glipizide, Victoza, Farxiga - recurrent mycotic infections      It was previously 7% in 2023     Blood Glucose log/CGM: per memory  Checking 3-4 times per day  Morning/fastins  Pre-meal: 200s  HS: 200s  Episodes of hypoglycemia: No     Sometimes not a great appetite but does try to eat low carb and high protein  Has downloaded a carb-counting abrahan  Hasn't worn Dexcom recently due to various imaging tests lately, has been waiting for mail order of new refills     Is interested in Omnipod 5, ordered by previous DM provider, has not picked it up yet     Interim hx:  2025-w/ Jackeline DAVE. In person visit. Last A1c value was 6.9% done 2025. Last office visit, titrated Ozempic to 0.5 mg weekly dose.  Tolerated it well, lost about 20lbs while taking it.   Was more active with block in back - but this wore off and now limited in activity again. Has to have two blocks before any surgery on disc. Attempted to titrate to 1 mg weekly dose in late 2025-- still taking 0.5mg weekly  dose. - waiting on delivery. Dealing with sinus infection/polyp.    Using less insulin- so filling pods with less insulin.   Hasn't been on steroids in a while- so not needing NPH.     DM meds at visit:   Diabetic Medications               HUMALOG KWIKPEN 200 UNIT/ML Subcutaneous Solution Pen-injector (Taking) Use to fill insulin pump with up to 90u/day. Updated 2/11/2025. 3 month supply.    semaglutide 4 MG/3ML Subcutaneous Solution Pen-injector Inject 1 mg into the skin once a week.    Insulin NPH, Human,, Isophane, (HUMULIN N KWIKPEN) 100 UNIT/ML Subcutaneous Suspension Pen-injector Pair with prednisone only. Up to 40 units a day max.    glucagon (GVOKE HYPOPEN 1-PACK) 1 MG/0.2ML Subcutaneous SUBQ injection Inject 0.2 mL (1 mg total) into the skin once as needed for Low blood glucose.            Continuous Glucose Monitoring Interpretation  Danielle Jerome has undergone continuous glucose monitoring with their CGM.  The blood glucose tracings were evaluated for two weeks prior to office visit.   Blood glucose tracings demonstrated areas of hyperglycemia post-meal, particularly post-dinner-- generally speaking though pretty well controlled   There were no areas of hypoglycemia noted.          History/Other:    Allergies, PMH, SocHx and FHx reviewed and updated as appropriate in Epic on    HUMALOG KWIKPEN 200 UNIT/ML Subcutaneous Solution Pen-injector Use to fill insulin pump with up to 90u/day. Updated 2/11/2025. 3 month supply.       Allergies   Allergen Reactions    Peanut-Containing Drug Products HIVES and SHORTNESS OF BREATH     Hives, asthma attack    Pcn [Penicillins] UNKNOWN     HAPPENED AS A CHILD     Current Outpatient Medications   Medication Sig Dispense Refill    HUMALOG KWIKPEN 200 UNIT/ML Subcutaneous Solution Pen-injector Use to fill insulin pump with up to 90u/day. Updated 2/11/2025. 3 month supply.      DULoxetine HCl 40 MG Oral Cap DR Particles Take 40 mg by mouth daily. 90 capsule 1     ALPRAZolam 0.5 MG Oral Tab Take 1-2 tablets (0.5-1 mg total) by mouth daily as needed for Sleep or Anxiety. 30 tablet 0    semaglutide 4 MG/3ML Subcutaneous Solution Pen-injector Inject 1 mg into the skin once a week. 9 each 1    Continuous Glucose Sensor (DEXCOM G6 SENSOR) Does not apply Misc Apply to skin every 10 days 9 each 3    Continuous Glucose Transmitter (DEXCOM G6 TRANSMITTER) Does not apply Misc 1 each by Other route every 3 (three) months. 1 each 3    pantoprazole 40 MG Oral Tab EC       albuterol 108 (90 Base) MCG/ACT Inhalation Aero Soln Inhale 2 puffs into the lungs every 4 to 6 hours as needed. inhale 2 puff by inhalation route  every 4 - 6 hours as needed 3 each 3    Insulin NPH, Human,, Isophane, (HUMULIN N KWIKPEN) 100 UNIT/ML Subcutaneous Suspension Pen-injector Pair with prednisone only. Up to 40 units a day max. 15 mL 0    pregabalin 25 MG Oral Cap Take 2 capsules (50 mg total) by mouth at bedtime. 180 capsule 0    albuterol (2.5 MG/3ML) 0.083% Inhalation Nebu Soln Take 3 mL (2.5 mg total) by nebulization 3 (three) times daily. 120 each 11    Insulin Disposable Pump (OMNIPOD 5 G6 PODS, GEN 5,) Does not apply Misc 1 each every other day. 45 each 3    MONTELUKAST 10 MG Oral Tab TAKE 1 TABLET NIGHTLY 90 tablet 3    DUPIXENT 300 MG/2ML Subcutaneous Solution Pen-injector       albuterol 108 (90 Base) MCG/ACT Inhalation Aero Soln Inhale 2 puffs into the lungs every 4 to 6 hours as needed for Wheezing. 3 each 3    Tiotropium Bromide Monohydrate (SPIRIVA RESPIMAT) 2.5 MCG/ACT Inhalation Aero Soln Inhale 2 puffs into the lungs in the morning and 2 puffs before bedtime.      metoprolol succinate ER 25 MG Oral Tablet 24 Hr Take 1 tablet (25 mg total) by mouth nightly.      BREO ELLIPTA 200-25 MCG/ACT Inhalation Aerosol Powder, Breath Activated Inhale 1 puff into the lungs daily. 3 each 1    rivaroxaban (XARELTO) 20 MG Oral Tab Take 1 tablet (20 mg total) by mouth nightly. TAKE AT BEDTIME 90 tablet 3     glucagon (GVOKE HYPOPEN 1-PACK) 1 MG/0.2ML Subcutaneous SUBQ injection Inject 0.2 mL (1 mg total) into the skin once as needed for Low blood glucose. 0.2 mL 1    amLODIPine 2.5 MG Oral Tab Take 1 tablet (2.5 mg total) by mouth daily.      Azelastine HCl 0.1 % Nasal Solution 1 spray by Nasal route in the morning and 1 spray before bedtime.      SUMAtriptan Succinate 50 MG Oral Tab TAKE 1 TABLET BY MOUTH EVERY 2 HOURS AS NEEDED FOR  MIGRAINE.  DO  NOT  EXCEED  200MG  IN  24  HOURS 9 tablet 3    Clopidogrel Bisulfate 75 MG Oral Tab Take 1 tablet (75 mg total) by mouth nightly. nightly      losartan 100 MG Oral Tab Take 1 tablet (100 mg total) by mouth daily.      ezetimibe 10 MG Oral Tab Take 1 tablet (10 mg total) by mouth nightly.      Fluticasone Propionate 50 MCG/ACT Nasal Suspension 1 spray by Each Nare route 2 (two) times daily.      cetirizine (ZYRTEC) 10 MG Oral Tab Take 1 tablet (10 mg total) by mouth nightly.       Past Medical History:    Allergic rhinitis    Allergic rhinitis, cause unspecified    Anemia    Anxiety    Anxiety and depression    Asthma (HCC)    Atherosclerosis of coronary artery    Laguerre's esophagus    Churg-Shania syndrome (HCC)    Colloid cyst of brain (HCC)    Community acquired pneumonia of left lower lobe of lung    Congestive heart disease (HCC)    Coronary artery disease of native heart with stable angina pectoris    Depression    Diabetes (HCC)    Diabetes mellitus (HCC)    Diverticulosis of large intestine    Essential hypertension    Exacerbation of asthma (HCC)    Gastroparesis    GERD    Hematemesis with nausea    2017, No EGD, Hgb stable    High cholesterol    History of blood clots    History of pulmonary embolus (PE)    History of recurrent deep vein thrombosis (DVT)    Hyperlipidemia    Hypertension    IBS    LGSIL (low grade squamous intraepithelial dysplasia)    Pap neg 2014    Migraines    perfumes    Moderate persistent asthma without complication (HCC)    DESOUZA  (nonalcoholic steatohepatitis)    NSTEMI (non-ST elevated myocardial infarction) (HCC)    FELTON in LAD    Obesity    Osteoarthritis    Patella-femoral syndrome, bilateral knee    Pneumonia due to organism    Pulmonary embolism (HCC)    Sleep apnea    Thyroid disease     Family History   Problem Relation Age of Onset    Heart Disorder Father     Hypertension Mother     Lipids Mother     Stroke Mother     Anemia Mother     Dementia Mother     Heart Disorder Mother     Other (Other) Mother     Bleeding Disorders Mother     Hypertension Sister     Obesity Sister     Stroke Sister     Other (Other) Sister         stroke,pe    Other (Other) Brother         kidney stones    Colon Cancer Maternal Grandfather     Colon Cancer Maternal Aunt     Anemia Daughter     Asthma Daughter     Depression Daughter     Asthma Daughter     Bleeding Disorders Sister     Stroke Sister      Social history: Reviewed.      ROS/Exam    REVIEW OF SYSTEMS: Ten point review of systems has been performed and is otherwise negative and/or non-contributory, except as described above.     VITALS  Vitals:    02/11/25 1409   BP: 132/88   BP Location: Left arm   Patient Position: Sitting   Cuff Size: large   Pulse: 109   SpO2: 99%   Weight: 279 lb (126.6 kg)   Height: 5' 8\" (1.727 m)         Wt Readings from Last 6 Encounters:   02/11/25 279 lb (126.6 kg)   12/31/24 278 lb (126.1 kg)   12/18/24 278 lb (126.1 kg)   11/12/24 278 lb (126.1 kg)   11/05/24 278 lb (126.1 kg)   10/29/24 281 lb (127.5 kg)       PHYSICAL EXAM  CONSTITUTIONAL:  awake, alert, cooperative, no apparent distress, and appears stated age   PSYCH: normal affect  LUNGS: breathing comfortably  CARDIOVASCULAR:  regular rate     Labs/Imaging: Pertinent imaging reviewed.    Overall glucose control:  Lab Results   Component Value Date    A1C 6.9 (A) 02/11/2025    A1C 7.9 (A) 11/12/2024    A1C 8.4 (H) 09/30/2024    A1C 8.3 (A) 08/30/2024    A1C 10.3 (A) 05/09/2024       Supplementary  Documentation:   -Surveillance for Diabetes Complications & Risks  Foot exam/neuropathy: No data recorded  Retinopathy screening: Last Dilated Eye Exam: 01/13/25 (at PCP office per notes, using retinal camera)  No data recorded     Assessment & Plan:     ICD-10-CM    1. Type 2 diabetes mellitus with hyperglycemia, with long-term current use of insulin ([facility])  E11.65 GLUC MNTR CONT REC FROM NTRSTL TISS FLU PHYS I&R    Z79.4 POC Hemoglobin A1C     HUMALOG KWIKPEN 200 UNIT/ML Subcutaneous Solution Pen-injector      2. Hypertension associated with type 2 diabetes mellitus ([facility])  E11.59     I15.2       3. Hyperlipidemia associated with type 2 diabetes mellitus ([facility])  E11.69     E78.5       4. HFrEF (heart failure with reduced ejection fraction) ([facility])  I50.20       5. Insulin pump titration  Z46.81       6. Statin intolerance  Z78.9       7. Statin myopathy  G72.0     T46.6X5A             Danielle Jerome is a pleasant 59 year old female here for evaluation of:    #Type 2 Diabetes  #Class 3 severe obesity due to excess calories with serious comorbidity and body mass index (BMI) of 40.0 to 44.9 in adult    #HFrEF (heart failure with reduced ejection fraction) ([facility])-   PMHx of Type 2 diabetes mellitus diagnosed in 2018 after steroid induced hyperglycemia; chronic prednisone use for asthma. She started insulin pump in mid-2023. Ongoing hyperglycemia, so switched to U200 in the pump.  GLP-1 a therapy previously avoided as there was question if the patient has a history of gastroparesis.      Started ozempic in Oct 2024- tolerating well and noting improvement in BG control and using less insulin.  Will uptitrate to 1mg w/ goal of insulin wean.     Last A1c value was 6.9% done 2/11/2025. Goal <7%. Importance of better glucose control in preventing onset/progression of end-organ damage discussed, as well as goals of therapy and clinical significance of A1C.     - med changes:  - Humulin NPH- matches units with pred dose (ie if  on pred 40, takes 40u)   - ozempic 0.5mg --> 1 Mg weekly   - Omnipod 5 + Dexcom in [employer] + U200 HUMALOG  Time Basal ICR ISF Active Insulin Time Target   MN 1.10 u/hr 6 28  2h 110   9A  6        - continue Dexcom G6 CGM with phone; can upgrade to G7 when it is available  - patient is on insulin, and has suboptimal glycemic control including wide glycemic swings, thus would benefit from CGM  - continue to check BG 3-4x/day using glucometer or CGM  -See above header \"Supplementary Documentation\" for surveillance for diabetes complications & risks  - 2/11/2025 - discussed dietary intake, eating greek yogurt, higher protein, more fruits/veg/meats     Nephropathy screening  - stable, continue annual screening, due for repeat, reminded  Lab Results   Component Value Date    EGFRCR 103 09/30/2024    MICROALBCREA  04/06/2022      Comment:      Unable to calculate due to Urine Microalbumin <0.5 mg/dL        #Hypertension   - SBP is to goal <130, continue follow-up with PCP and cardiologist  BP Readings from Last 1 Encounters:   02/11/25 132/88   BP Meds: amLODIPine Tabs - 2.5 MG; losartan Tabs - 100 MG; metoprolol succinate ER Tb24 - 25 MG     #Hyperlipidemia  - LDL is not to goal   - Intolerant of statin, due to myopathy  - hx CAD s/p FELTON (2017), On Xarelto    Lab Results   Component Value Date     (H) 09/30/2024    TRIG 130 09/30/2024   Cholesterol Meds: ezetimibe Tabs - 10 MG   Anticoagulant Meds: rivaroxaban Tabs - 20 MG       Return in about 3 months (around 5/11/2025) for diabetes follow up.    2/11/2025  TENZIN Dailey      Note to patient: The 21 Century Cures Act makes medical notes like these available to patients in the interest of transparency. However, be advised this is a medical document. It is intended as peer to peer communication. It is written in medical language and may contain abbreviations or verbiage that are unfamiliar. It may appear blunt or direct. Medical documents are intended to  carry relevant information, facts as evident, and the clinical opinion of the practitioner.

## 2025-02-17 DIAGNOSIS — Z86.711 HISTORY OF PULMONARY EMBOLUS (PE): ICD-10-CM

## 2025-02-17 PROBLEM — Z86.718 HISTORY OF BLOOD CLOTS: Status: ACTIVE | Noted: 2025-02-17

## 2025-02-18 DIAGNOSIS — Z86.718 HISTORY OF BLOOD CLOTS: ICD-10-CM

## 2025-02-18 DIAGNOSIS — Z86.711 HISTORY OF PULMONARY EMBOLUS (PE): ICD-10-CM

## 2025-02-24 ENCOUNTER — TELEPHONE (OUTPATIENT)
Facility: CLINIC | Age: 60
End: 2025-02-24

## 2025-03-17 ENCOUNTER — HOSPITAL ENCOUNTER (OUTPATIENT)
Facility: HOSPITAL | Age: 60
Setting detail: HOSPITAL OUTPATIENT SURGERY
Discharge: HOME | End: 2025-03-17
Attending: ANESTHESIOLOGY | Admitting: ANESTHESIOLOGY
Payer: COMMERCIAL

## 2025-03-17 ENCOUNTER — APPOINTMENT (OUTPATIENT)
Dept: GENERAL RADIOLOGY | Facility: HOSPITAL | Age: 60
End: 2025-03-17
Attending: ANESTHESIOLOGY
Payer: COMMERCIAL

## 2025-03-17 VITALS
RESPIRATION RATE: 18 BRPM | SYSTOLIC BLOOD PRESSURE: 145 MMHG | HEIGHT: 68 IN | OXYGEN SATURATION: 97 % | HEART RATE: 75 BPM | TEMPERATURE: 98 F | BODY MASS INDEX: 42.28 KG/M2 | WEIGHT: 279 LBS | DIASTOLIC BLOOD PRESSURE: 61 MMHG

## 2025-03-17 DIAGNOSIS — E11.65 TYPE 2 DIABETES MELLITUS WITH HYPERGLYCEMIA, WITH LONG-TERM CURRENT USE OF INSULIN (HCC): ICD-10-CM

## 2025-03-17 DIAGNOSIS — Z79.4 TYPE 2 DIABETES MELLITUS WITH HYPERGLYCEMIA, WITH LONG-TERM CURRENT USE OF INSULIN (HCC): ICD-10-CM

## 2025-03-17 PROBLEM — M47.816 LUMBAR SPONDYLOSIS: Status: ACTIVE | Noted: 2025-03-17

## 2025-03-17 LAB — GLUCOSE BLD-MCNC: 147 MG/DL (ref 70–99)

## 2025-03-17 PROCEDURE — 64494 INJ PARAVERT F JNT L/S 2 LEV: CPT | Performed by: ANESTHESIOLOGY

## 2025-03-17 PROCEDURE — 64493 INJ PARAVERT F JNT L/S 1 LEV: CPT | Performed by: ANESTHESIOLOGY

## 2025-03-17 PROCEDURE — 3E0T3BZ INTRODUCTION OF ANESTHETIC AGENT INTO PERIPHERAL NERVES AND PLEXI, PERCUTANEOUS APPROACH: ICD-10-PCS | Performed by: ANESTHESIOLOGY

## 2025-03-17 RX ORDER — METHYLPREDNISOLONE ACETATE 40 MG/ML
INJECTION, SUSPENSION INTRA-ARTICULAR; INTRALESIONAL; INTRAMUSCULAR; SOFT TISSUE
Status: DISCONTINUED | OUTPATIENT
Start: 2025-03-17 | End: 2025-03-17

## 2025-03-17 RX ORDER — BUPIVACAINE HYDROCHLORIDE 5 MG/ML
INJECTION, SOLUTION EPIDURAL; INTRACAUDAL; PERINEURAL
Status: DISCONTINUED | OUTPATIENT
Start: 2025-03-17 | End: 2025-03-17

## 2025-03-17 RX ORDER — LIDOCAINE HYDROCHLORIDE 10 MG/ML
INJECTION, SOLUTION EPIDURAL; INFILTRATION; INTRACAUDAL; PERINEURAL
Status: DISCONTINUED | OUTPATIENT
Start: 2025-03-17 | End: 2025-03-17

## 2025-03-17 RX ORDER — NALOXONE HYDROCHLORIDE 0.4 MG/ML
0.08 INJECTION, SOLUTION INTRAMUSCULAR; INTRAVENOUS; SUBCUTANEOUS AS NEEDED
Status: DISCONTINUED | OUTPATIENT
Start: 2025-03-17 | End: 2025-03-17

## 2025-03-17 NOTE — DISCHARGE INSTRUCTIONS
Home Care Instructions Following Your Pain Procedure     Danielle,  It has been a pleasure to have you as our patient. To help you at home, you must follow these general discharge instructions. We will review these with you before you are discharged. It is our hope that you have a complete and uneventful recovery from our procedure.     General Instructions:  What to Expect:  Bandages from your procedure today can be removed when you get home.  Please avoid soaking and/or swimming for 24 hours.  Showering is okay  It is normal to have increased pain symptoms and/or pain at injection site for up to 3-5 days after procedure, you can use heat or ice (20 minutes on 20 minutes off) for comfort.  You may experience some temporary side effects which may include restlessness or insomnia, flushing of the face, or heart palpitations.  Please contact the provider if these symptoms do not resolve within 3-4 days.  Lightheadedness or nausea may occur and should resolve within 24 to 48 hours.  If you develop a headache after treatment, rest, drink fluids (with caffeine, if possible) and take mild over-the-counter pain medication.  If the headache does not improve with the above treatment, contact the physician.  Home Medications:  Resume all previously prescribed medication.  Please avoid taking NSAIDs (Non-Steriodal Anti-Inflammatory Drugs) such as:  Ibuprofen ( Advil, Motrin) Aleve (Naproxen), Diclofenac, Meloxicam for 6 hours after procedure.   If you are on Coumadin (Warfarin) or any other anti-coagulant (or \"blood thinning\") medication such as Plavix (Clopidogrel), Xarelto (Rivaroxaban), Eliquis (Apixaban), Effient (Prasugrel) etc., restart on the following day from the procedure unless otherwise directed by your provider.  If you are a diabetic, please increase the frequency of your glucose monitoring after the procedure as steroids may cause a temporary (2-3 day) increase in your blood sugar.  Contact your primary care  physician if your blood sugar remains elevated as you may require some medication adjustment.  Diet:  Resume your regular diet as tolerated.  Activity:  We recommend that you relax and rest during the rest of your procedure day.  If you feel weakness in your arms or legs do not drive.  Follow-up Appointment  Please schedule a follow-up visit within 3 to 4 weeks after your last procedure date.  Question or Concerns:  Feel free to call our office with any questions or concerns at [phone] (option #2)    Danielle  Thank you for coming to [facility] for your procedure.  The nurses try very hard to make sure you receive the best care possible.  Your trust in them as well as us is greatly appreciated.    Thanks so much,   Dr. Tej Gama

## 2025-03-17 NOTE — OPERATIVE REPORT
[facility]  Operative Report  3/17/2025     Danielle Jerome Patient Status:  Hospital Outpatient Surgery    3/25/1965 MRN [id number]   Location [facility] Attending Tej Gama MD   Hosp Day # 0 PCP Luca Rai DO     Indication: Danielle is a 59 year old female with lumbar spondylosis    Preoperative Diagnosis:  Spondylosis of lumbar region without myelopathy or radiculopathy [M47.816]    Postoperative Diagnosis: Same as above.    Procedure performed: BILATERAL L4/5, L5/S1 MEDIAL BRANCH BLOCK WITH LOCAL    Anesthesia: Local    EBL: Less than 1 ml.    Procedure Description:  After reviewing the patient's history and performing a focused physical examination, the diagnosis was confirmed and contraindications such as infection and coagulopathy were ruled out.  Following review of allergy,  potential side effects and complications, including but not necessarily limited to infection, allergic reaction, local tissue breakdown, nerve injury, and paresis, the patient indicated they understood and agreed to proceed.  After obtaining the informed consent, the patient was brought to the procedure room and monitored.      The patient was placed prone on the table and the back was prepped and draped in a sterile fashion.  Attention was turned towards the patient's right side first.  The skin and subcutaneous tissue was anesthetized via 25-gauge 1.5\" needle with approximately 2 mL of 1% lidocaine.  Under fluoroscopy guidance, a 25-gauge 3.5\" Quincke spinal needle was introduced and advanced along the superior margin of the L3, L4, L5 transverse process and lateral to the L3, L4, L5 superior articular process, and it was directed inferiorly and medially so that the tip struck the superior aspect of the junction of the transverse process and the supe¬rior articular process.  Following negative aspiration for CSF and blood, approximately 1 mL of 1% Lidocaine was injected via each needle  without complication.  The similar procedure was repeated at the contralateral side.  The needles were removed with tips intact.  The patient tolerated procedure very well.  No complications were observed during or immediately after the procedure.  The patient was observed until discharge criteria met.  Discharge instructions were given and patient was released to a responsible adult.    Complications: None.    Follow up:  The patient was followed in the pain clinic as needed basis.    Tej Gama MD

## 2025-03-17 NOTE — H&P
History & Physical Examination    Patient Name: Danielle Jerome  MRN: [id number]  CSN: [id number]  YOB: 1965    Pre-Operative Diagnosis:  Spondylosis of lumbar region without myelopathy or radiculopathy [M47.816]    Present Illness: Spondylosis    ASA: 3  MP class: 1  Sedation: Local     Prescriptions Prior to Admission[1]  No current facility-administered medications for this encounter.       Allergies: Allergies[2]    Past Medical History:    Allergic rhinitis    Allergic rhinitis, cause unspecified    Anemia    Anxiety    Anxiety and depression    Asthma (HCC)    Atherosclerosis of coronary artery    Laguerre's esophagus    Churg-Shania syndrome (HCC)    Colloid cyst of brain (HCC)    Community acquired pneumonia of left lower lobe of lung    Congestive heart disease (HCC)    Coronary artery disease of native heart with stable angina pectoris    Depression    Diabetes (HCC)    Diabetes mellitus (HCC)    Diverticulosis of large intestine    Essential hypertension    Exacerbation of asthma (HCC)    Gastroparesis    GERD    Hematemesis with nausea    2017, No EGD, Hgb stable    High cholesterol    History of blood clots    History of pulmonary embolus (PE)    History of recurrent deep vein thrombosis (DVT)    Hyperlipidemia    Hypertension    IBS    LGSIL (low grade squamous intraepithelial dysplasia)    Pap neg 2014    Migraines    perfumes    Moderate persistent asthma without complication (HCC)    DESOUZA (nonalcoholic steatohepatitis)    NSTEMI (non-ST elevated myocardial infarction) (HCC)    FELTON in LAD    Obesity    Osteoarthritis    Patella-femoral syndrome, bilateral knee    Pneumonia due to organism    Pulmonary embolism (HCC)    Sleep apnea    Thyroid disease     Past Surgical History:   Procedure Laterality Date    Angioplasty (coronary)      Breast surgery procedure unlisted      cyst removed    Cath drug eluting stent      Cholecystectomy  04/10/2011    PERFORMED BY DR KRAFT-LAPAROSCOPIC     Colonoscopy  2012    polyps    Colonoscopy N/A 2016    Procedure: COLONOSCOPY;  Surgeon: Kishor Stout MD;  Location:  ENDOSCOPY    Colposcopy, cervix w/upper adjacent vagina; w/endocervical curettage  2011    WNL    Cyst aspiration left  2000    Hernia surgery            Other surgical history      sinus surgery    Other surgical history      CYST REMOVED ON LEFT WRIST 17 YRS AGO    Sinus surgery        Skin surgery      Upper gi endoscopy,exam       Family History   Problem Relation Age of Onset    Heart Disorder Father     Hypertension Mother     Lipids Mother     Stroke Mother     Anemia Mother     Dementia Mother     Heart Disorder Mother     Other (Other) Mother     Bleeding Disorders Mother     Hypertension Sister     Obesity Sister     Stroke Sister     Other (Other) Sister         stroke,pe    Other (Other) Brother         kidney stones    Colon Cancer Maternal Grandfather     Colon Cancer Maternal Aunt     Anemia Daughter     Asthma Daughter     Depression Daughter     Asthma Daughter     Bleeding Disorders Sister     Stroke Sister      Social History     Tobacco Use    Smoking status: Never     Passive exposure: Current    Smokeless tobacco: Never    Tobacco comments:     2nd hand with Father and now    Substance Use Topics    Alcohol use: No       SYSTEM Check if Review is Normal Check if Physical Exam is Normal If not normal, please explain:   HEENT [x ] [x ]    NECK & BACK [x ] [x ]    HEART [x ] [x ]    LUNGS [x ] [x ]    ABDOMEN [x ] [x ]    UROGENITAL [x ] [x ]    EXTREMITIES [x ] [x ]    OTHER        [ x ] I have discussed the risks and benefits and alternatives with the patient/family.  They understand and agree to proceed with plan of care.  [ x ] I have reviewed the History and Physical done within the last 30 days.  Any changes noted above.    Tej Gama MD              [1]   Medications Prior to Admission   Medication Sig Dispense Refill Last Dose/Taking     rivaroxaban (XARELTO) 20 MG Oral Tab Take 1 tablet (20 mg total) by mouth nightly. TAKE AT BEDTIME 90 tablet 0 3/14/2025    [START ON 2025] rivaroxaban (XARELTO) 20 MG Oral Tab Take 1 tablet (20 mg total) by mouth nightly. TAKE AT BEDTIME 90 tablet 1     DULoxetine HCl 40 MG Oral Cap DR Particles Take 40 mg by mouth daily. 90 capsule 1     ALPRAZolam 0.5 MG Oral Tab Take 1-2 tablets (0.5-1 mg total) by mouth daily as needed for Sleep or Anxiety. 30 tablet 0     HUMALOG KWIKPEN 200 UNIT/ML Subcutaneous Solution Pen-injector Use to fill insulin pump with up to 90u/day. Updated 2025. 3 month supply.       semaglutide 4 MG/3ML Subcutaneous Solution Pen-injector Inject 1 mg into the skin once a week. 9 each 1     Continuous Glucose Sensor (DEXCOM G6 SENSOR) Does not apply Misc Apply to skin every 10 days 9 each 3     Continuous Glucose Transmitter (DEXCOM G6 TRANSMITTER) Does not apply Misc 1 each by Other route every 3 (three) months. 1 each 3     pantoprazole 40 MG Oral Tab EC        [] pantoprazole 40 MG Oral Tab EC Take 1 tablet (40 mg total) by mouth before breakfast for 14 days. 14 tablet 0     albuterol 108 (90 Base) MCG/ACT Inhalation Aero Soln Inhale 2 puffs into the lungs every 4 to 6 hours as needed. inhale 2 puff by inhalation route  every 4 - 6 hours as needed 3 each 3     Insulin NPH, Human,, Isophane, (HUMULIN N KWIKPEN) 100 UNIT/ML Subcutaneous Suspension Pen-injector Pair with prednisone only. Up to 40 units a day max. 15 mL 0     pregabalin 25 MG Oral Cap Take 2 capsules (50 mg total) by mouth at bedtime. 180 capsule 0     albuterol (2.5 MG/3ML) 0.083% Inhalation Nebu Soln Take 3 mL (2.5 mg total) by nebulization 3 (three) times daily. 120 each 11     Insulin Disposable Pump (OMNIPOD 5 G6 PODS, GEN 5,) Does not apply Misc 1 each every other day. 45 each 3     MONTELUKAST 10 MG Oral Tab TAKE 1 TABLET NIGHTLY 90 tablet 3     DUPIXENT 300 MG/2ML Subcutaneous Solution Pen-injector         albuterol 108 (90 Base) MCG/ACT Inhalation Aero Soln Inhale 2 puffs into the lungs every 4 to 6 hours as needed for Wheezing. 3 each 3     Tiotropium Bromide Monohydrate (SPIRIVA RESPIMAT) 2.5 MCG/ACT Inhalation Aero Soln Inhale 2 puffs into the lungs in the morning and 2 puffs before bedtime.       metoprolol succinate ER 25 MG Oral Tablet 24 Hr Take 1 tablet (25 mg total) by mouth nightly.       BREO ELLIPTA 200-25 MCG/ACT Inhalation Aerosol Powder, Breath Activated Inhale 1 puff into the lungs daily. 3 each 1     glucagon (GVOKE HYPOPEN 1-PACK) 1 MG/0.2ML Subcutaneous SUBQ injection Inject 0.2 mL (1 mg total) into the skin once as needed for Low blood glucose. 0.2 mL 1     amLODIPine 2.5 MG Oral Tab Take 1 tablet (2.5 mg total) by mouth daily.       Azelastine HCl 0.1 % Nasal Solution 1 spray by Nasal route in the morning and 1 spray before bedtime.       SUMAtriptan Succinate 50 MG Oral Tab TAKE 1 TABLET BY MOUTH EVERY 2 HOURS AS NEEDED FOR  MIGRAINE.  DO  NOT  EXCEED  200MG  IN  24  HOURS 9 tablet 3     Clopidogrel Bisulfate 75 MG Oral Tab Take 1 tablet (75 mg total) by mouth nightly. nightly   3/10/2025    losartan 100 MG Oral Tab Take 1 tablet (100 mg total) by mouth daily.       ezetimibe 10 MG Oral Tab Take 1 tablet (10 mg total) by mouth nightly.       Fluticasone Propionate 50 MCG/ACT Nasal Suspension 1 spray by Each Nare route 2 (two) times daily.       cetirizine (ZYRTEC) 10 MG Oral Tab Take 1 tablet (10 mg total) by mouth nightly.      [2]   Allergies  Allergen Reactions    Peanut-Containing Drug Products HIVES and SHORTNESS OF BREATH     Hives, asthma attack    Pcn [Penicillins] UNKNOWN     HAPPENED AS A CHILD

## 2025-03-18 ENCOUNTER — PATIENT MESSAGE (OUTPATIENT)
Facility: CLINIC | Age: 60
End: 2025-03-18

## 2025-03-19 RX ORDER — INSULIN LISPRO 200 [IU]/ML
INJECTION, SOLUTION SUBCUTANEOUS
Qty: 48 ML | Refills: 3 | Status: SHIPPED | OUTPATIENT
Start: 2025-03-19

## 2025-03-19 NOTE — TELEPHONE ENCOUNTER
Endocrine refill protocol for rapid acting, regular, intermediate, and mixed insulin:    Protocol Criteria:  PASSED Reason: N/A    If all below requirements are met, send a 90-day supply with 1 refill per provider protocol.    Verify appointment with Endocrinology completed in the last 6 months or scheduled in the next 3 months.  Verify A1C has been completed within the last 6 months and is below 8.5%    -May substitute prescriptions for Novolog and Humalog unless documented allergy (pens and vials) at the same dose and concentration per insurance preference and provider protocol.   -May substitute prescriptions for Novolin R and Humulin R unless documented allergy (pens and vials) at the same dose and concentration per insurance preference and provider protocol.   -May substitute prescriptions for Novolin N and Humulin N unless documented allergy (pens and vials) at the same dose and concentration per insurance preference and provider protocol.   -May substitute prescriptions for Humulin and Novolin 70/30 insulin unless documented allergy at the same dose and concentration per insurance preference and provider protocol.    Last completed office visit: 2/11/2025 Darling Palacios APN   Next scheduled Follow up:   Future Appointments   Date Time Provider Department Center   3/31/2025  1:00 PM Yaa Watson APRN [facility]   4/8/2025 11:15 AM Maria E Gonzalez DO [facility]   4/8/2025  1:30 PM Ana Luisa Miller MD [facility]   5/13/2025  2:00 PM Darling Palacios APN [facility]   8/6/2025 10:15 AM Maria E Gonzalez DO [facility]      Last A1C result: 6.9% done 2/11/2025.

## 2025-03-26 NOTE — TELEPHONE ENCOUNTER
HYPERGLYCEMIA TRIAGE    RN spoke with patient regarding reported current HYPERglycemia.    RN confirms patient is currently taking the following diabetes medications:  Wearing omnipod. Downloaded report for review.     How is patient checking their blood sugar? Dexcom G6 Using phone and connected to clinic; report downloaded and added to provider folder as necessary    Patient T2DM       Juliane SU RN  3/26/2025       Called patient unable to reach her left a voicemail to call us back.

## 2025-04-07 ENCOUNTER — TELEPHONE (OUTPATIENT)
Dept: PAIN CLINIC | Facility: CLINIC | Age: 60
End: 2025-04-07

## 2025-04-07 ENCOUNTER — PATIENT MESSAGE (OUTPATIENT)
Dept: FAMILY MEDICINE CLINIC | Facility: CLINIC | Age: 60
End: 2025-04-07

## 2025-04-07 ENCOUNTER — OFFICE VISIT (OUTPATIENT)
Dept: PAIN CLINIC | Facility: CLINIC | Age: 60
End: 2025-04-07
Payer: COMMERCIAL

## 2025-04-07 VITALS — DIASTOLIC BLOOD PRESSURE: 72 MMHG | SYSTOLIC BLOOD PRESSURE: 134 MMHG | HEART RATE: 104 BPM | OXYGEN SATURATION: 98 %

## 2025-04-07 DIAGNOSIS — F41.9 ANXIETY: ICD-10-CM

## 2025-04-07 DIAGNOSIS — F33.1 MODERATE EPISODE OF RECURRENT MAJOR DEPRESSIVE DISORDER (HCC): ICD-10-CM

## 2025-04-07 DIAGNOSIS — M47.816 SPONDYLOSIS OF LUMBAR REGION WITHOUT MYELOPATHY OR RADICULOPATHY: Primary | ICD-10-CM

## 2025-04-07 DIAGNOSIS — K21.9 GASTROESOPHAGEAL REFLUX DISEASE, UNSPECIFIED WHETHER ESOPHAGITIS PRESENT: ICD-10-CM

## 2025-04-07 DIAGNOSIS — K22.719 BARRETT'S ESOPHAGUS WITH DYSPLASIA: Primary | ICD-10-CM

## 2025-04-07 PROCEDURE — 96372 THER/PROPH/DIAG INJ SC/IM: CPT | Performed by: NURSE PRACTITIONER

## 2025-04-07 PROCEDURE — 3078F DIAST BP <80 MM HG: CPT | Performed by: NURSE PRACTITIONER

## 2025-04-07 PROCEDURE — 3075F SYST BP GE 130 - 139MM HG: CPT | Performed by: NURSE PRACTITIONER

## 2025-04-07 PROCEDURE — 99214 OFFICE O/P EST MOD 30 MIN: CPT | Performed by: NURSE PRACTITIONER

## 2025-04-07 RX ORDER — PANTOPRAZOLE SODIUM 40 MG/1
40 TABLET, DELAYED RELEASE ORAL
Qty: 90 TABLET | Refills: 0 | Status: SHIPPED | OUTPATIENT
Start: 2025-04-07

## 2025-04-07 RX ORDER — PANTOPRAZOLE SODIUM 40 MG/1
TABLET, DELAYED RELEASE ORAL
Refills: 0 | Status: CANCELLED
Start: 2025-04-07

## 2025-04-07 RX ORDER — BEMPEDOIC ACID AND EZETIMIBE 180; 10 MG/1; MG/1
TABLET, FILM COATED ORAL
COMMUNITY
Start: 2025-02-25

## 2025-04-07 RX ORDER — KETOROLAC TROMETHAMINE 30 MG/ML
30 INJECTION, SOLUTION INTRAMUSCULAR; INTRAVENOUS ONCE
Status: COMPLETED | OUTPATIENT
Start: 2025-04-07 | End: 2025-04-07

## 2025-04-07 RX ORDER — ALPRAZOLAM 0.5 MG
TABLET ORAL DAILY PRN
Qty: 30 TABLET | Refills: 0 | Status: SHIPPED | OUTPATIENT
Start: 2025-04-07

## 2025-04-07 RX ORDER — ALPRAZOLAM 0.5 MG
TABLET ORAL DAILY PRN
Qty: 30 TABLET | Refills: 0 | Status: CANCELLED
Start: 2025-04-07

## 2025-04-07 RX ORDER — PANTOPRAZOLE SODIUM 40 MG/1
40 TABLET, DELAYED RELEASE ORAL
Qty: 90 TABLET | Refills: 0 | Status: SHIPPED | OUTPATIENT
Start: 2025-04-07 | End: 2025-04-07

## 2025-04-07 RX ADMIN — KETOROLAC TROMETHAMINE 30 MG: 30 INJECTION, SOLUTION INTRAMUSCULAR; INTRAVENOUS at 13:56:00

## 2025-04-07 NOTE — PROGRESS NOTES
HPI:   Danielle Jerome presents to follow-up after #1 bilateral L4-5, L5-S1 median branch blocks conducted on December 19, 2024.      Patient is reporting 90% sustained relief of her back pain.  She was very satisfied with her response at that time.  Pain symptoms did return therefore she underwent a repeat bilateral L4-5 L5-S1 median branch block on March 17, 2025.  She is reporting 100% relief for 2 days now she is back to baseline rating her pain at a 7 out of 10.    Changes in condition/history since last visit:   Patient was seen for initial consultation on October 7, 2024 complaining of acute low back pain and left medial knee pain.  She was also reporting some left shoulder pain occasional pain in the right knee, as well as numbness and tingling in both feet left greater than right as well as left radiating leg symptoms.      Primary concern was her back pain which was progressively getting worse over the last 6 months therefore she was recommended for medial branch blocks.     Prior medical history:  Patient states that she has been on disability since approximately 2006 due to her severe asthma. She has been on steroid medications on and off for almost her entire life. She is now on Dupixent. 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 She had a blood clot in 2017 in her coronary artery which resulted in an MI. Patient is chronically anticoagulated now with Xarelto and Plavix. For patient's low back and knee symptoms she has done a course of physical therapy without any change in her symptoms.  She was encouraged to reduce her weight.  She is working with rheumatology Dr. Clinton who has weaned her off of her escitalopram and started duloxetine for her pain and she feels that this is taking the edge off.  She is currently on gabapentin however she feels that it affects her  cognition and would be open to any alternatives.  She has not had any EMG studies to evaluate her neuropathy.       procedure: #1 bilateral L4-5, L5-S1 medial branch blocks    date: December 19, 2024    Percentage of relief experienced from the procedure: 90%    Duration of the relief: Sustained after increased back pain for the first 1-4 days    Last procedure: #2 bilateral L4-5 L5-S1 medial branch block  date March 17, 2025  Percentage of relief from the procedure: 100%  Duration of relief from procedure: 2 days    The following activities will increase the patient’s pain: nothing specific    The following activities decrease the patient’s pain: changing position    Functional Assessment: Patient reports that they are able to complete all of their ADL's such as eating, bathing, using the toilet, dressing and getting up from a bed or a chair independently.    Past Treatment Attempted/Patient’s Response:  Treatment up to this time has included:  Evaluation: Primary care, orthopedics, rheumatology  NSAIDS: Contraindicated due to chronic anticoagulation  Narcotic use: Patient prefers no opioids: Resulted in nausea  Physical therapy: Has done physical therapy for her knees in the past without relief/done in 2022 at [facility]  Spinal injections: #1 L4/5, L5/S1 MBB 12/19/24 90% sustained relief  Others: Has done acupuncture in the past with mild relief, ktape on knees, ice on knees and back    REVIEW OF SYSTEMS:   GENERAL HEALTH: No fevers, chills, recent weight loss or unremitting night pain.  SKIN: No history of skin rashes.  EYES: No blurred vision, double vision or changes in vision.  HEENT: No swallowing or hearing difficulties.  RESPIRATORY: No shortness of breath.  History of chronic asthma  CARDIOVASCULAR: No chest pain or palpitations.  History of MI, multiple pulmonary embolisms  GI: Denies nausea, vomiting, constipation, diarrhea; no rectal bleeding;  no melana.  History of hiatal hernia, history of  being on PPI  : No dysuria, urgency or frequency; no hematuria.  NEURO: No balance problems, no seizure problems, no depression.  PSYCHE: no symptoms of depression or anxiety.  History of anxiety depression  HEMATOLOGY: denies hx anemia; denies bruising or excessive bleeding.  ENDOCRINE: denies excessive thirst or urination; denies unexpected wt gain or wt loss.  History of insulin-dependent diabetes  MUSCULOSKELETAL: DENIES:, Swelling of joints, Hardware, Deformity, Gout, Heel spurs history of fibromyalgia, osteoporosis, knee crepitus, knee pain, low back pain  ALLERGY/IMM.: denies food or seasonal allergies.  No history of cancer, infectious disease exposure.       Current Medications:  Current Outpatient Medications   Medication Sig Dispense Refill    ALPRAZolam 0.5 MG Oral Tab Take 1-2 tablets (0.5-1 mg total) by mouth daily as needed for Sleep or Anxiety. 30 tablet 0    pantoprazole 40 MG Oral Tab EC Take 1 tablet (40 mg total) by mouth every morning before breakfast. 90 tablet 0    HUMALOG KWIKPEN 200 UNIT/ML Subcutaneous Solution Pen-injector USE TO FILL INSULIN PUMP WITH UP  UNITS PER DAY 48 mL 3    rivaroxaban (XARELTO) 20 MG Oral Tab Take 1 tablet (20 mg total) by mouth nightly. TAKE AT BEDTIME 90 tablet 0    DULoxetine HCl 40 MG Oral Cap DR Particles Take 40 mg by mouth daily. 90 capsule 1    semaglutide 4 MG/3ML Subcutaneous Solution Pen-injector Inject 1 mg into the skin once a week. 9 each 1    Continuous Glucose Sensor (DEXCOM G6 SENSOR) Does not apply Misc Apply to skin every 10 days 9 each 3    Continuous Glucose Transmitter (DEXCOM G6 TRANSMITTER) Does not apply Misc 1 each by Other route every 3 (three) months. 1 each 3    albuterol 108 (90 Base) MCG/ACT Inhalation Aero Soln Inhale 2 puffs into the lungs every 4 to 6 hours as needed. inhale 2 puff by inhalation route  every 4 - 6 hours as needed 3 each 3    Insulin NPH, Human,, Isophane, (HUMULIN N KWIKPEN) 100 UNIT/ML Subcutaneous  Suspension Pen-injector Pair with prednisone only. Up to 40 units a day max. 15 mL 0    pregabalin 25 MG Oral Cap Take 2 capsules (50 mg total) by mouth at bedtime. 180 capsule 0    albuterol (2.5 MG/3ML) 0.083% Inhalation Nebu Soln Take 3 mL (2.5 mg total) by nebulization 3 (three) times daily. 120 each 11    Insulin Disposable Pump (OMNIPOD 5 G6 PODS, GEN 5,) Does not apply Misc 1 each every other day. 45 each 3    MONTELUKAST 10 MG Oral Tab TAKE 1 TABLET NIGHTLY 90 tablet 3    DUPIXENT 300 MG/2ML Subcutaneous Solution Pen-injector       albuterol 108 (90 Base) MCG/ACT Inhalation Aero Soln Inhale 2 puffs into the lungs every 4 to 6 hours as needed for Wheezing. 3 each 3    Tiotropium Bromide Monohydrate (SPIRIVA RESPIMAT) 2.5 MCG/ACT Inhalation Aero Soln Inhale 2 puffs into the lungs in the morning and 2 puffs before bedtime.      metoprolol succinate ER 25 MG Oral Tablet 24 Hr Take 1 tablet (25 mg total) by mouth nightly.      BREO ELLIPTA 200-25 MCG/ACT Inhalation Aerosol Powder, Breath Activated Inhale 1 puff into the lungs daily. 3 each 1    glucagon (GVOKE HYPOPEN 1-PACK) 1 MG/0.2ML Subcutaneous SUBQ injection Inject 0.2 mL (1 mg total) into the skin once as needed for Low blood glucose. 0.2 mL 1    amLODIPine 2.5 MG Oral Tab Take 1 tablet (2.5 mg total) by mouth daily.      Azelastine HCl 0.1 % Nasal Solution 1 spray by Nasal route in the morning and 1 spray before bedtime.      SUMAtriptan Succinate 50 MG Oral Tab TAKE 1 TABLET BY MOUTH EVERY 2 HOURS AS NEEDED FOR  MIGRAINE.  DO  NOT  EXCEED  200MG  IN  24  HOURS 9 tablet 3    Clopidogrel Bisulfate 75 MG Oral Tab Take 1 tablet (75 mg total) by mouth nightly. nightly      losartan 100 MG Oral Tab Take 1 tablet (100 mg total) by mouth daily.      Fluticasone Propionate 50 MCG/ACT Nasal Suspension 1 spray by Each Nare route 2 (two) times daily.      cetirizine (ZYRTEC) 10 MG Oral Tab Take 1 tablet (10 mg total) by mouth nightly.        Patient requires  assistance with: No assistance required  Have you recently had any feelings of harming yourself or others? The patient's response was no.    Physical Exam:   [facility] 09/23/2004   VAS Pain Score:  7/10  General Appearance: Well developed, well nourished, normal build, independent body habitus, no apparent physical disabilities, well groomed    Neurological Exam: WNL-Orientation to time, place and person, normal mood & effect, normal concentration & attention span  Inspection:   Nonantalgic  Gross motor or sensory deficits  No acute distress easily rises from seated to standing position  Lumbar skin clean dry and intact nontender to palpation  Radiology/Lab Test Reviewed:   PROCEDURE:  MRI SPINE LUMBAR (CPT=72148)     COMPARISON:  [facility], , MRI SPINE LUMBAR (CPT=72148), 2/18/2015, 7:48 PM.     INDICATIONS:  M54.41 Acute bilateral low back pain with bilateral sciatica M54.42 Acute bilateral low back pain with bilateral sciatica     TECHNIQUE:  Multiplanar T1 and T2 weighted images including fat suppression sequences.  Images acquired in sagittal and axial planes.       PATIENT STATED HISTORY: (As transcribed by Technologist)  Worsening low back pain radiating to bilateral lower extremities. There is difficulty standing straight and ambulating.         FINDINGS:    LUMBAR DISC LEVELS  L1-L2:  No significant disc/facet abnormality, spinal stenosis, or foraminal narrowing.  L2-L3:  No significant disc/facet abnormality, spinal stenosis, or foraminal narrowing.  L3-L4:  No significant disc/facet abnormality, spinal stenosis, or foraminal narrowing.  L4-L5:  There is no significant disc bulge.  There is mild to moderate right facet joint degenerative change and mild left facet joint degenerative change.  There is no significant central or foraminal stenosis.  L5-S1:  Minimal broad-based degenerative disc bulge.  There is mild bilateral facet joint degenerative change.  There is no central or foraminal stenosis.      PARASPINAL AREA:  Normal with no visible mass.  Small cyst in the right kidney measures 10 mm.  BONY STRUCTURES:  No fracture, pars defect, significant scoliosis, or osseous lesion.    CORD/CAUDA EQUINA:  Normal caliber, contour, and signal intensity.                     Impression   CONCLUSION:       Mild degenerative changes at L4-5 and L5-S1.  There is no central canal stenosis or nerve root impingement.        LOCATION:  [facility]        Dictated by (CST): Yuri Rivera MD on 9/18/2024 at 2:19 PM      Finalized by (CST): Yuri Rivera MD on 9/18/2024 at 2:23 PM       Result History    MRI SPINE LUMBAR (CPT=72148) (Order #[id number]) on 9/18/2024 - Order Result History Report        PROCEDURE:  XR LUMBAR SPINE (MIN 4 VIEWS) (CPT=72110)     TECHNIQUE:  AP, lateral, oblique, and coned down L5-S1 views were obtained.     COMPARISON:  [facility], XR, XR LUMBAR SPINE (MIN 4 VIEWS) (CPT=72110), 8/19/2023, 12:49 PM.     INDICATIONS:  M54.41 Acute bilateral low back pain with bilateral sciatica M54.42 Acute bilateral low back pain with bilateral sciatica     PATIENT STATED HISTORY: (As transcribed by Technologist)  The patient states that she has pain across her lower back.         FINDINGS:      BONES:  Mild levoscoliosis of the lumbar spine is noted.  Normal vertebral body height is present.  No acute fracture.  Mild-to-moderate facet joint arthropathy in the lower lumbar spine is noted.  DISC SPACES:  Normal.  No significant disc height narrowing, subluxation, or endplate abnormality.  PARASPINOUS:  Negative.  No paraspinous abnormality is seen.  OTHER:  Negative.                     Impression   CONCLUSION:  Mild levoscoliosis of the lumbar spine.        LOCATION:  [facility]        Dictated by (CST): Song Pelayo MD on 7/09/2024 at 2:28 PM      Finalized by (CST): Song Pelayo MD on 7/09/2024 at 2:28 PM       Result History    XR LUMBAR SPINE (MIN 4 VIEWS) (CPT=72110) (Order #[id number]) on 7/9/2024 - Order Result  History Report  Signed by    Lab Results   Component Value Date    WBC 9.7 09/30/2024    WBC 9.7 04/11/2024    WBC 10.4 04/10/2024     Lab Results   Component Value Date    HEMOGLOBIN 13.9 09/23/2014    HEMOGLOBIN 13.7 11/19/2012    HEMOGLOBIN 14.3 02/11/2011     Lab Results   Component Value Date    .0 09/30/2024    .0 04/11/2024    .0 04/10/2024           Patient Education: Patient was advised to continue normal activities as tolerated and was advised against any form of bedrest, since recent guidelines promote and encourage physical activity for improvment of functionality and overall pain.  (Family Practice Vol. 16, No. 1, 21-07Â© [employer] 1999 ).  Patient educated and verbalized understanding.  Medical Decision Making:   Diagnosis:    Encounter Diagnosis   Name Primary?    Spondylosis of lumbar region without myelopathy or radiculopathy Yes       Impression:   Patient presents to clinic re: her lbp/after having #1 bilat L4/5, L5/S1 MBB.  She reports 90% sustained relief of her back pain after 5 days of moderate discomfort.  After this block was done in December 2024 patient was satisfied with her response and no further injection therapy was done.  Patient's pain symptoms did return and she did return to clinic and #2 bilateral L4-5 L5-S1 median branch block was conducted on March 17, 2025.  Patient is reporting 100% relief x 2 days and now pain is back to baseline.  At this point would recommend radiofrequency ablation.      Patient is also reporting headache pain.  Patient typically uses sumatriptan for headache.  That was not helping.  Did offer patient a dose of Toradol.  She is on Plavix and Xarelto.  Patient states she does not have any problems with bleeding despite using these medications.  She occasionally uses an Advil.  She had considered going to the urgent care to get the Benadryl Toradol cocktail but she had this appointment instead.  Will provide patient a  single injection of Toradol 30 mg.    Current VAS Score: 7  Functional Deficits: Lifting bending twisting  Duration of pain symptoms: Rater than 2 years  Specific Levels (Only 2 allowed): L4-5 L5-S1  Initial treatment or subsequent? (Per area of the body): Subsequent  Radiculopathy & neurogenic claudication ruled out?:  Not applicable  Pre-procedure assessment completed on: December 31, 2024  Post-procedure assessment completed on: Today  % of pain relief from previous procedure/s: 100%  VAS Score during diagnostic phase: 0  Duration of relief from previous procedure/s: 2 days  Prior Conservative Treatment: Physical therapy, medication management, home exercises    Total visit time: 30 minutes  More than 50% spent coordinating care, providing patient education, reviewing imaging, discussing conservative vs surgical treatment options and counseling.           Plan:   > Recommend radiofrequency ablation of bilateral L4-5 L5-S1: Patient prefers IV sedation  > Follow-up 4 weeks after RFA to evaluate response  > 30 mg Toradol IM x 1: Patient accepts risks of bleeding and GERD    Orders Placed This Encounter   Procedures    [facility] PAIN NAVIGATOR       Meds & Refills for this Visit:  Requested Prescriptions      No prescriptions requested or ordered in this encounter       Imaging & Consults:  None    The patient indicates understanding of these issues and agrees to the plan.      ID#[id number]         fair minus

## 2025-04-07 NOTE — PROGRESS NOTES
Last procedure:   BILATERAL L4/5, L5/S1 MEDIAL BRANCH BLOCK WITH LOCAL     Date: 3/17/25  Percentage of relief obtained: 100%  Duration of relief: 2 days (now 0%)    Current Pain Score: 7/10    Narcotic Contract Exp: NA

## 2025-04-07 NOTE — PATIENT INSTRUCTIONS
Refill policies:    Allow 2-3 business days for refills; controlled substances may take longer.  Contact your pharmacy at least 5 days prior to running out of medication and have them send an electronic request or submit request through the “request refill” option in your [employer] account.  Refills are not addressed on weekends; covering physicians do not authorize routine medications on weekends.  No narcotics or controlled substances are refilled after noon on Fridays or by on call physicians.  By law, narcotics must be electronically prescribed.  A 30 day supply with no refills is the maximum allowed.  If your prescription is due for a refill, you may be due for a follow up appointment.  To best provide you care, patients receiving routine medications need to be seen at least once a year.  Patients receiving narcotic/controlled substance medications need to be seen at least once every 3 months.  In the event that your preferred pharmacy does not have the requested medication in stock (e.g. Backordered), it is your responsibility to find another pharmacy that has the requested medication available.  We will gladly send a new prescription to that pharmacy at your request.    Scheduling Tests:    If your physician has ordered radiology tests such as MRI or CT scans, please contact Central Scheduling at [phone] right away to schedule the test.  Once scheduled, the [facility] Centralized Referral Team will work with your insurance carrier to obtain pre-certification or prior authorization.  Depending on your insurance carrier, approval may take 3-10 days.  It is highly recommended patients assure they have received an authorization before having a test performed.  If test is done without insurance authorization, patient may be responsible for the entire amount billed.      Precertification and Prior Authorizations:  If your physician has recommended that you have a procedure or additional testing performed the [facility]  Centralized Referral Team will contact your insurance carrier to obtain pre-certification or prior authorization.    You are strongly encouraged to contact your insurance carrier to verify that your procedure/test has been approved and is a COVERED benefit.  Although the [facility] Centralized Referral Team does its due diligence, the insurance carrier gives the disclaimer that \"Although the procedure is authorized, this does not guarantee payment.\"    Ultimately the patient is responsible for payment.   Thank you for your understanding in this matter.  Paperwork Completion:  If you require FMLA or disability paperwork for your recovery, please make sure to either drop it off or have it faxed to our office at [phone]. Be sure the form has your name and date of birth on it.  The form will be faxed to our Forms Department and they will complete it for you.  There is a 25$ fee for all forms that need to be filled out.  Please be aware there is a 10-14 day turnaround time.  You will need to sign a release of information (ZAYRA) form if your paperwork does not come with one.  You may call the Forms Department with any questions at [phone].  Their fax number is [phone].

## 2025-04-07 NOTE — TELEPHONE ENCOUNTER
manages Xarelto / [employer] manages Plavix.      Completed Plavix [employer] CRA Form faxed to [phone];confirmation r'cvd     Medical Clearance faxed to 's Office at Fax #: [phone];confirmation r'cvd.         25      RE: Danielle Jerome    : 3/25/1965    Dear Dr. Johnson,     Your patient is being scheduled for a pain management procedure at [facility].    Procedure:  Bilateral L4/5,L5/S1 Radiofrequency Ablation.  Date of Procedure: TBD -pending medical clearance.  Physician: Dr. Gama- Anesthesiologist     Your patient is currently taking Xarelto. Dr. Gama usually recommends this medication to be held for 3 days prior to procedure.     Please verify patient is cleared to proceed with pain management procedures.

## 2025-04-07 NOTE — TELEPHONE ENCOUNTER
Order Questions    Question Answer Comment   Anesthesia Type Sedation    Provider [facility] Procedure Lab    Procedure RFA    Laterality/Level BILATERAL L4/5, L5/S1    CPT (Hit enter after each entry) DESTROY L/S FACET JNT ADDL    Medical clearance requested (will send to Pain Navigator) Yes XARELTO/PLAVIX   Patient has Medicare coverage? No    Comments (Please list entire procedure name here.) OKAY TO FOLLOW UP 4 WEEKS POST RFA

## 2025-04-08 ENCOUNTER — OFFICE VISIT (OUTPATIENT)
Dept: RHEUMATOLOGY | Facility: CLINIC | Age: 60
End: 2025-04-08
Payer: COMMERCIAL

## 2025-04-08 VITALS
RESPIRATION RATE: 16 BRPM | HEIGHT: 68 IN | HEART RATE: 103 BPM | WEIGHT: 273.63 LBS | SYSTOLIC BLOOD PRESSURE: 128 MMHG | TEMPERATURE: 97 F | BODY MASS INDEX: 41.47 KG/M2 | OXYGEN SATURATION: 96 % | DIASTOLIC BLOOD PRESSURE: 82 MMHG

## 2025-04-08 DIAGNOSIS — R70.0 ELEVATED SED RATE: ICD-10-CM

## 2025-04-08 DIAGNOSIS — E55.9 VITAMIN D DEFICIENCY: ICD-10-CM

## 2025-04-08 DIAGNOSIS — M79.2 NEUROPATHIC PAIN: ICD-10-CM

## 2025-04-08 DIAGNOSIS — R71.8 MICROCYTOSIS: ICD-10-CM

## 2025-04-08 DIAGNOSIS — M79.7 FIBROMYALGIA: Primary | ICD-10-CM

## 2025-04-08 DIAGNOSIS — M51.362 DEGENERATION OF INTERVERTEBRAL DISC OF LUMBAR REGION WITH DISCOGENIC BACK PAIN AND LOWER EXTREMITY PAIN: ICD-10-CM

## 2025-04-08 DIAGNOSIS — M25.50 POLYARTHRALGIA: ICD-10-CM

## 2025-04-08 DIAGNOSIS — R79.82 ELEVATED C-REACTIVE PROTEIN (CRP): ICD-10-CM

## 2025-04-08 DIAGNOSIS — R76.8 ABNORMAL ANCA TEST: ICD-10-CM

## 2025-04-08 PROCEDURE — 99214 OFFICE O/P EST MOD 30 MIN: CPT | Performed by: INTERNAL MEDICINE

## 2025-04-08 PROCEDURE — 3008F BODY MASS INDEX DOCD: CPT | Performed by: INTERNAL MEDICINE

## 2025-04-08 PROCEDURE — G2211 COMPLEX E/M VISIT ADD ON: HCPCS | Performed by: INTERNAL MEDICINE

## 2025-04-08 PROCEDURE — 3079F DIAST BP 80-89 MM HG: CPT | Performed by: INTERNAL MEDICINE

## 2025-04-08 PROCEDURE — 3074F SYST BP LT 130 MM HG: CPT | Performed by: INTERNAL MEDICINE

## 2025-04-08 NOTE — PROGRESS NOTES
RHEUMATOLOGY FOLLOW UP   Date of visit: 04/08/2025  ?  Chief Complaint   Patient presents with    Follow - Up     LOV 11/5/2024  Rapid 3 score is 4.3  Patient states she has pain all over but especially in her back.she has had two pain blocks. The first one lasted 2 months and the second one only 2 days. She is going to be having an ablation done.      ?  ASSESSMENT, DISCUSSION & PLAN   Assessment:  1. Fibromyalgia    2. Polyarthralgia    3. Elevated sed rate    4. Elevated C-reactive protein (CRP)    5. Neuropathic pain    6. Vitamin D deficiency    7. Degeneration of intervertebral disc of lumbar region with discogenic back pain and lower extremity pain    8. Microcytosis    9. Abnormal ANCA test          Discussion:  Ms. Danielle Jerome is a 59 yo woman who presented for evaluation of joint pain. She has been suffering from chronic sinus disease as well as chronic lung problems for several years. 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 Typically for eosinophilic granulomatosis with polyangiitis patients have eosinophilia, as well and obstructive airway disease, nasal polyps, eosinophilic predominant inflammation and either mononeuritis multiplex or other motor neuropathy not due to radiculopathy.  This is per the diagnosis and classification criteria and vasculitis study (DC VAS) unfortunately for this patient, she also is morbidly obese and has a history of diabetes so some of the neuropathic symptoms might be related to diabetic neuropathy versus a radiculopathy from her lower back.  On her prior exam, she does not have obvious signs of active synovitis to warrant immunosuppression.  A lot of her potential EGPA symptoms previously improved since starting medication through pulmonology.  She now is dealing with worsened sinus and asthma symptoms. Which has  improved since switching back to dupixent instead of nucala.     At this time, discussed at length that it is hard to determine etiology to symptoms since her reivew of systems is pan-positive. She has multiple health concerns and symptoms.   From my standpoint, there are no definitive signs of active extrapulmonary EGPA to warrant immunosuppression- I actually worry given her lung status and obesity that immunosuppression with have more negative effects. She does feel better overall since being on dupixent.   At last visit, she had worsened depression and clear signs of fibromyalgia which seems better since being on duloxetine and adding lyrica (getting off gabapentin)    -- continue lyrica 25mg nightly and see if you can increase slowly to 50mg nightly to see if helps more without being too sedating.   -- continue working with Joyce regarding the anxiety/duloxetine... consider adding amitriptyline in the future, can hold off for now so avoiding too many med changes at once. Talk to her about possible hydroxyzine instead of xanax to help with anxiety as well as itching  -- continue following with pain management for injections- discuss left shoulder complaints  -- updated labs ordered (no fasting required)  -- continue follow up with other providers and keep me posted (particularly ENT regarding nasal polyp and current symptoms)   -- keep follow up in 4 months and if needed, can try to fit in sooner but we will be in communication in the meantime with response to above med changes    Patient verbalized understanding of above instructions. No further questions at this time.    Code selection for this visit was based on time spent (32min) on date of service in preparing to see the patient, obtaining and/or reviewing separately obtained history, performing a medically appropriate examination, counseling and educating the patient/family/caregiver, ordering medications or testing, referring and communicating with other  healthcare providers, documenting clinical information in the E HR, independently interpreting results and communicating results to the patient/family/caregiver and care coordination with the patient's other providers.    ?  Plan:  Diagnoses and all orders for this visit:    Fibromyalgia    Polyarthralgia  -     ANCA Panel Vasculitis; Future    Elevated sed rate  -     ANCA Panel Vasculitis; Future    Elevated C-reactive protein (CRP)  -     ANCA Panel Vasculitis; Future    Neuropathic pain    Vitamin D deficiency  -     Vitamin D; Future    Degeneration of intervertebral disc of lumbar region with discogenic back pain and lower extremity pain    Microcytosis  -     Iron And Tibc; Future  -     Ferritin; Future    Abnormal ANCA test  -     ANCA Panel Vasculitis; Future                  Return in about 4 months (around 8/8/2025).  ?  HPI   Danielle Jerome is a 60 year old female with the following active problems who is referred for rheumatologic evaluation due to joint pain, back pain and possible EGPA. Her work up was grossly negative. Recommended further tx for her fibro. Presents for follow up today.     Since her last visit, she has been doing okay.   Is continuing to have low back pain with pain radiating down the legs. Can get numbness in the legs as well. Primarily on the left side and left shoulder weakness.  Having increased armpit itching, which is new of her. Feels more on the left. Denies redness, swelling, different smells. Denies changes in detergent or deodorant.     Otherwise, she continues with diffuse pain  Worsened migraines/headaches over the past two weeks. Had toradol injection yesterday. Has not heard back from ENT yet- but has a large nasal polyp seen on recent CT. Primarily right sided ear pain and sinus pain     Other than that, she does feel better overall since being on duloxetine, has been on 40mg for over 6 months.  Started on lyrica 25mg nightly but could not tolerate 50mg due to  significant fatigue and brain fog the following day. Does feel like 25mg is helping but felt the 50mg helped more (avoiding for now due side effects).  Still feels her anxiety is not as well controlled compared to escitalopram     Is working on weight loss, had lost but then back pain worsened. Is planning on switching ozempic to wegovy. Does have hx of hiatal hernia and gastroparesis.     No recent hospitalizations  Did require some steroids- but states more related to lung and after aspirating stomach fluids. Does adjust insulin pump as needed.   A1c is the best it's been in some time.     States sob has improved since being on dupixent (especially compared to the nucala)    + still with a lot of fatigue- but feels better overall   Worsened pain with weather changes  Sensitivity to light touch.     Denies recent cysts (except one under right breast was getting around underwear line and in the neck).   Denies recent rashes except under pannus   Not recent bruising   Denies epistaxis.   Denies hematuria.   Denies blood in stools.       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 Told \"explosive asthma.\" Had been on prednisone at multiple dosages over the years. Finally got off steroids with dupixent which has helped with the asthma part.     Seen at [facility] and saw pulmonologist told possible EGPA but hard to tell for sure. Recommended Nucala but insurance did not approve.   Has started and on her 3rd injection planned with improvement of her breathing.     Hx of blood clots. States family hx of blood clots. Hx of blood clot in her heart while on xarelto- now on plavix in addition to xarelto (2017)  Hx of fetal demise in 2002 (at 23weeks) and told multiple PE's (first clot). Shortly after, had multiple incidents and eventually started on long term anticoagulation. Started warfarin and prednisone but hard to get  control of INR. So decision made by hematology to start xarelto around 2012.   No recent clots.   Hx of at least 3 miscarriages including fetal demise.   Able 2 other children after the fetal demise and one prior to that.     Joint pain has been present for about 2 years. Reports feeling throughout the body- legs, knees, hips, thighs, shoulders and finger.   Describes as numbness in the left leg, attributes to diabetes (which she feels induced by prednisone).   Pain described also as sharp.  Denies swelling of the joints but feels puffy in general  Has pain to the touch   Has tried PT, muscle relaxants and naproxen without improvement.  Unclear if she took gabapentin     Seen by [facility] and recommended PT.     + skin rashes over the face. Dx with rosacea. Feels worsened when pain present. Tried topicals in the past but felt more burning with it. Gets worse when over heated or sun exposure.   Denies recurrence of hives like before and no scarring from rashes   + hair loss/thinning over the past 10 years. Denies bald spots.   + hx of anemia over the years   + flank pain but no dysuria, frequency, hematuria  + hx of fatty liver   + hx of colloid cyst in brain. There was previously concern for encephalitis in the 80s.   + bumps under the skin over the legs and arms- never biopsied, can be tender to the touch   + hx of frequent cysts   + hx of reflux, takes pantoprazole but also has significant hiatal hernia. Told not surgical candidate due to other complications   + infrequent diarrhea/constipation   + hx of cataracts  + told edema of the eyes from the prednisone   + pain over achilles and plantar fasciitis   + hx of \"tumor\" on bottom of right foot (?neuroma, pt unclear)   + back and neck pain. Feels worsened with activity. Has hx of DDD of lumbar spine.   + dry eyes, feels sandpaper in the eyes, uses pataday drops. Has tried visine. Has not other artifical tears. Not severe daily.   + dry mouth, constant. Does have  hx of recurrent cavities. Grinds teeth and hx of fractures of the teeth.   + angle of jaw pain   + frequent sinus infections and hx of nasal polyps  Denies epistaxis   + chest pain and palpitations chronically. Has had CAD with stenting in 2017. Had cardiac PET scan through [facility], told follow up angiogram negative.     LMP in 2008, cannot recall when last BMD was done     Denies new skin nodule formation.  The patient denies oral or nasal ulcers, elevated or scarring rashes, Raynaud's phenomenon, prior renal or liver disease, or history of seizures.  Denies nonhealing ulcers on the fingertips, trouble swallowing, or severe acid reflux.  The patient denies any history of uveitis, nodular painful shin bruises,  psoriatic lesions, spooning or pitting of the nails, history of dactylitis.   Denies swelling of the cheeks or under the jawbone. No prior history of punctal plugs being applied.  No fevers, chills, lymphadenopathy, night sweats        Past Medical History:  Past Medical History:    Allergic rhinitis    Allergic rhinitis, cause unspecified    Anemia    Anxiety    Anxiety and depression    Asthma (HCC)    Atherosclerosis of coronary artery    Laguerre's esophagus    Churg-Shania syndrome (HCC)    Colloid cyst of brain (HCC)    Community acquired pneumonia of left lower lobe of lung    Congestive heart disease (HCC)    Coronary artery disease of native heart with stable angina pectoris    Depression    Diabetes (HCC)    Diabetes mellitus (HCC)    Diverticulosis of large intestine    Essential hypertension    Exacerbation of asthma (HCC)    Gastroparesis    GERD    Hematemesis with nausea    2017, No EGD, Hgb stable    High cholesterol    History of blood clots    History of pulmonary embolus (PE)    History of recurrent deep vein thrombosis (DVT)    Hyperlipidemia    Hypertension    IBS    LGSIL (low grade squamous intraepithelial dysplasia)    Pap neg 2014    Migraines    perfumes    Moderate persistent asthma  without complication (HCC)    DESOUZA (nonalcoholic steatohepatitis)    NSTEMI (non-ST elevated myocardial infarction) (HCC)    FELTON in LAD    Obesity    Osteoarthritis    Patella-femoral syndrome, bilateral knee    Pneumonia due to organism    Pulmonary embolism (HCC)    Sleep apnea    Thyroid disease     Past Surgical History:  Past Surgical History:   Procedure Laterality Date    Angioplasty (coronary)      Breast surgery procedure unlisted      cyst removed    Cath drug eluting stent      Cholecystectomy  04/10/2011    PERFORMED BY DR KRAFT-LAPAROSCOPIC    Colonoscopy      polyps    Colonoscopy N/A 2016    Procedure: COLONOSCOPY;  Surgeon: Kishor Stout MD;  Location:  ENDOSCOPY    Colposcopy, cervix w/upper adjacent vagina; w/endocervical curettage  2011    WNL    Cyst aspiration left  2000    Hernia surgery            Other surgical history      sinus surgery    Other surgical history      CYST REMOVED ON LEFT WRIST 17 YRS AGO    Sinus surgery        Skin surgery      Upper gi endoscopy,exam       Family History:  Family History   Problem Relation Age of Onset    Heart Disorder Father     Hypertension Mother     Lipids Mother     Stroke Mother     Anemia Mother     Dementia Mother     Heart Disorder Mother     Other (Other) Mother     Bleeding Disorders Mother     Hypertension Sister     Obesity Sister     Stroke Sister     Other (Other) Sister         stroke,pe    Other (Other) Brother         kidney stones    Colon Cancer Maternal Grandfather     Colon Cancer Maternal Aunt     Anemia Daughter     Asthma Daughter     Depression Daughter     Asthma Daughter     Bleeding Disorders Sister     Stroke Sister      Social History:  Social History     Socioeconomic History    Marital status:     Number of children: 3   Occupational History    Occupation: homemaker   Tobacco Use    Smoking status: Never     Passive exposure: Current    Smokeless tobacco: Never    Tobacco comments:     2nd  hand with Father and now    Vaping Use    Vaping status: Never Used   Substance and Sexual Activity    Alcohol use: No    Drug use: No    Sexual activity: Never     Partners: Male     Birth control/protection: I.U.D.     Social Drivers of Health     Food Insecurity: No Food Insecurity (4/9/2024)    Food Insecurity     Food Insecurity: Never true   Transportation Needs: No Transportation Needs (4/9/2024)    Transportation Needs     Lack of Transportation: No   Housing Stability: Low Risk  (4/9/2024)    Housing Stability     Housing Instability: No     Medications:  Outpatient Medications Marked as Taking for the 4/8/25 encounter (Office Visit) with Maria E Gonzalez DO   Medication Sig Dispense Refill    ALPRAZolam 0.5 MG Oral Tab Take 1-2 tablets (0.5-1 mg total) by mouth daily as needed for Sleep or Anxiety. 30 tablet 0    pantoprazole 40 MG Oral Tab EC Take 1 tablet (40 mg total) by mouth every morning before breakfast. 90 tablet 0    HUMALOG KWIKPEN 200 UNIT/ML Subcutaneous Solution Pen-injector USE TO FILL INSULIN PUMP WITH UP  UNITS PER DAY 48 mL 3    rivaroxaban (XARELTO) 20 MG Oral Tab Take 1 tablet (20 mg total) by mouth nightly. TAKE AT BEDTIME 90 tablet 0    DULoxetine HCl 40 MG Oral Cap DR Particles Take 40 mg by mouth daily. 90 capsule 1    semaglutide 4 MG/3ML Subcutaneous Solution Pen-injector Inject 1 mg into the skin once a week. 9 each 1    albuterol 108 (90 Base) MCG/ACT Inhalation Aero Soln Inhale 2 puffs into the lungs every 4 to 6 hours as needed. inhale 2 puff by inhalation route  every 4 - 6 hours as needed 3 each 3    pregabalin 25 MG Oral Cap Take 2 capsules (50 mg total) by mouth at bedtime. 180 capsule 0    Insulin Disposable Pump (OMNIPOD 5 G6 PODS, GEN 5,) Does not apply Misc 1 each every other day. 45 each 3    MONTELUKAST 10 MG Oral Tab TAKE 1 TABLET NIGHTLY 90 tablet 3    DUPIXENT 300 MG/2ML Subcutaneous Solution Pen-injector       Tiotropium Bromide Monohydrate (SPIRIVA  RESPIMAT) 2.5 MCG/ACT Inhalation Aero Soln Inhale 2 puffs into the lungs in the morning and 2 puffs before bedtime.      metoprolol succinate ER 25 MG Oral Tablet 24 Hr Take 1 tablet (25 mg total) by mouth nightly.      BREO ELLIPTA 200-25 MCG/ACT Inhalation Aerosol Powder, Breath Activated Inhale 1 puff into the lungs daily. 3 each 1    amLODIPine 2.5 MG Oral Tab Take 1 tablet (2.5 mg total) by mouth daily.      Azelastine HCl 0.1 % Nasal Solution 1 spray by Nasal route in the morning and 1 spray before bedtime.      SUMAtriptan Succinate 50 MG Oral Tab TAKE 1 TABLET BY MOUTH EVERY 2 HOURS AS NEEDED FOR  MIGRAINE.  DO  NOT  EXCEED  200MG  IN  24  HOURS 9 tablet 3    Clopidogrel Bisulfate 75 MG Oral Tab Take 1 tablet (75 mg total) by mouth nightly. nightly      losartan 100 MG Oral Tab Take 1 tablet (100 mg total) by mouth daily.      Fluticasone Propionate 50 MCG/ACT Nasal Suspension 1 spray by Each Nare route 2 (two) times daily.      cetirizine (ZYRTEC) 10 MG Oral Tab Take 1 tablet (10 mg total) by mouth nightly.       Modified Medications    No medications on file     There are no discontinued medications.      ?  ?  Allergies:  Allergies   Allergen Reactions    Peanut-Containing Drug Products HIVES and SHORTNESS OF BREATH     Hives, asthma attack    Pcn [Penicillins] UNKNOWN     HAPPENED AS A CHILD     ?  REVIEW OF SYSTEMS   ?  Review of Systems   Constitutional:  Positive for malaise/fatigue. Negative for chills and fever.   HENT:  Positive for congestion, ear pain and sinus pain. Negative for nosebleeds.    Eyes:  Positive for blurred vision (intermittent) and photophobia. Negative for pain and redness.   Respiratory:  Negative for cough, hemoptysis and shortness of breath.    Cardiovascular:  Positive for palpitations (intermittent) and leg swelling (improved). Negative for chest pain.   Gastrointestinal:  Positive for abdominal pain, constipation, diarrhea and heartburn (improved). Negative for blood in  stool and nausea.   Genitourinary:  Positive for frequency and urgency. Negative for dysuria and hematuria.   Musculoskeletal:  Positive for back pain, joint pain, myalgias and neck pain.   Skin:  Positive for itching. Negative for rash.   Neurological:  Positive for dizziness (somewhat related to ozempic dosing), tingling, weakness and headaches (worsened).   Endo/Heme/Allergies:  Positive for environmental allergies. Does not bruise/bleed easily.   Psychiatric/Behavioral:  Negative for depression. The patient is nervous/anxious and has insomnia.      PHYSICAL EXAM   Today's Vitals:  Temperature Blood Pressure Heart Rate Resp Rate SpO2   Temp: 96.8 °F (36 °C) BP: 128/82 Pulse: 103 Resp: 16 SpO2: 96 %   ?  Current Weight Height BMI BSA Pain   Wt Readings from Last 1 Encounters:   04/08/25 273 lb 9.6 oz (124.1 kg)    Height: 5' 8\" (172.7 cm) Body mass index is 41.6 kg/m². Body surface area is 2.34 meters squared.         Physical Exam  Vitals and nursing note reviewed.   Constitutional:       General: She is not in acute distress.     Appearance: She is well-developed. She is obese. She is not diaphoretic.   HENT:      Head: Normocephalic.   Eyes:      General: No scleral icterus.     Extraocular Movements: Extraocular movements intact.      Conjunctiva/sclera: Conjunctivae normal.   Neck:      Vascular: No JVD.      Trachea: No tracheal deviation.   Cardiovascular:      Rate and Rhythm: Normal rate and regular rhythm.      Heart sounds: Normal heart sounds. No murmur heard.  Pulmonary:      Effort: Pulmonary effort is normal. No respiratory distress.      Breath sounds: No wheezing.      Comments: Significant wet cough  Musculoskeletal:         General: Tenderness present. No swelling or deformity.      Cervical back: Neck supple.      Comments: No evidence of heberden or aditya nodes of any of the fingers, no basilar joint tenderness of the 1st CMC bilaterally.  Tenderness diffusely - improved slightly   No  swelling, redness or restriction of motion of the DIPs, PIPs, MCPs, wrists, elbows, ankles, or joints of the feet.  Bilateral shoulders with full ROM, no evidence of impingement with provocative maneuvers.  Scoliotic changes noted  Bilateral knees with medial joint line tenderness, mild crepitus, no effusion.    Pan positive fibro tender points positive - improved:at last visit.    Lymphadenopathy:      Cervical: No cervical adenopathy.   Skin:     General: Skin is warm and dry.      Findings: No erythema or rash.      Comments: No malar rash   No periungal erythema   Neurological:      Mental Status: She is alert and oriented to person, place, and time.      Cranial Nerves: No cranial nerve deficit.      Gait: Gait normal.   Psychiatric:         Mood and Affect: Mood normal.         Behavior: Behavior normal.       ?  Radiology review:      PROCEDURE:  CT SINUS (CPT=70486)     LOCATION:  [facility]       COMPARISON:  [facility] , CT, CT SINUS (CPT=70486), 2/09/2022, 11:48 AM.     INDICATIONS:  J32.8 Other chronic sinusitis     TECHNIQUE:  Axial thin section noncontrast imaging performed through the paranasal sinuses with coronal and sagittal reconstructed imaging. Dose reduction techniques were used. Dose information is transmitted to the ACR (American College of Radiology)  NRDR (National Radiology Data Registry) which includes the Dose Index Registry.  PATIENT STATED HISTORY: (As transcribed by Technologist)  History of chronic sinusitis. This episode has been going on for the past 5 months with pressure to maxillary and frontal sinus area.         FINDINGS:    NASAL SEPTUM:  Slight rightward nasal septal deviation.  TURBINATES:  No becky bullosa or paradoxical curvature.  UNCINATE PROCESSES:  No deviation or bulla formation.  OMU:  The ostia, infundibula, and hiatus semilunaris are widely patent.  SINUSES:  Mild opacification of the right ethmoid bulla (series 5, image 21).  Large mucous retention cyst within the  caudal aspect of the left maxillary sinus in addition to small mucous retention cysts within the caudal aspect of the right maxillary  sinus.  Complete opacification of the right sphenoid sinus with bone remodeling/sclerosis of the right sphenoid sinus walls indicating sequela of chronic inflammation.  Frontal sinuses are clear.  FOVEA ETHMOIDALIS:  The fovea ethmoidalis is intact. The cribriform plate is not low lying.  OTHER:  Visualized intracranial contents are within normal limits.                   Impression   CONCLUSION:       Paranasal sinus disease, similar compared to 02/09/2022.  This includes diffuse opacification of and bone remodeling of the right sphenoid sinus indicating sequela of chronic inflammation.        Dictated by (CST): Jesus Young MD on 3/12/2023 at 9:10 AM      Finalized by (CST): Jesus Young MD on 3/12/2023 at 9:19 AM       PROCEDURE:  CT CHEST PE AORTA (IV ONLY) (CPT=71260)     COMPARISON:  [facility] , CT, CT ANGIOGRAPHY, CHEST (CPT=71275), 5/11/2020, 4:17 PM.     INDICATIONS:  I26.99 Pulmonary embolism (HCC)     TECHNIQUE:  CT images were obtained with non-ionic intravenous contrast material. Dose reduction techniques were used. Dose information is transmitted to the ACR (American College of Radiology) NRDR (National Radiology Data Registry) which includes the  Dose Index Registry.     PATIENT STATED HISTORY:(As transcribed by Technologist)  Patient has shortness of breath and history of PE.      CONTRAST USED:  100cc of Omnipaque 350     FINDINGS:    LUNGS:  Biapical pleural parenchymal scarring.  No focal consolidation or new nodule appreciated.    VASCULATURE:  No visible pulmonary arterial thrombus or attenuation.    FATUMA:  No mass or adenopathy.    MEDIASTINUM:  Stable 1.4 cm left lobe thyroid nodule.  No adenopathy.    CARDIAC:  No enlargement or pericardial thickening.  Coronary artery stent.  PLEURA:  No mass or effusion.    THORACIC AORTA:  No aneurysm.    CHEST WALL:  No mass or  axillary adenopathy.    LIMITED ABDOMEN:  Limited images of the upper abdomen demonstrate a small to moderate hiatal hernia.    BONES:  No bony lesion or fracture.                     Impression   CONCLUSION:    1. No CT evidence for pulmonary embolism.        Dictated by (CST): [place] Trotter MD on 4/08/2022 at 2:23 PM      Finalized by (CST): [place] Trotter MD on 4/08/2022 at 2:30 PM       PROCEDURE:  MRI ABDOMEN+PELVIS (ALL W+WO) (CPT=74183/31446)     COMPARISON:  [facility] , CT, CT ABDOMEN PELVIS IV CONTRAST, NO ORAL (ER), 4/14/2021, 1:19 PM.     INDICATIONS:  R63.4 Unintentional weight loss N95.0 Postmenopausal bleeding N85.00 Endometrial hyperplasia     TECHNIQUE:  A comprehensive examination was performed utilizing a variety of imaging planes and imaging parameters to optimize visualization of suspected pathology.  Images were obtained both before and after intravenous gadolinium infusion.       PATIENT STATED HISTORY:(As transcribed by Technologist)  the patient complains of cramping, vaginal bleeding, and weight loss.      CONTRAST USED:  20 mL of Dotarem     FINDINGS:    LIVER:  Diffuse fatty infiltration of the liver/steatosis of the paddle megaly measuring approximately 23 cm in craniocaudal dimension.  There is a small ovoid focus of increased enhancement along the posterior segment of the right lobe inferomedially  measuring 1.6 x 1.2 cm.  On in/out phase imaging, this focus reveals slightly increased T2 signal on out of phase imaging, suggesting a focus of mild fat sparing versus potential small underlying FNH.  Otherwise no significant hepatic lesions are  suspected.  BILIARY:  No visible dilatation or filling defect.    PANCREAS:  No lesion, fluid collection, ductal dilatation, or atrophy.    SPLEEN:  No enlargement or focal lesion.    KIDNEYS:  No renal stones or hydronephrosis.  There are 2 fat containing renal angiomyolipomas within the midpole of the right kidney.  The larger of the 2 measures 0.9 cm.  No  additional renal lesions are noted.  ADRENALS:  No mass or enlargement.    AORTA/VASCULAR:  No aneurysm or dissection.    RETROPERITONEUM:  No mass or adenopathy.    BOWEL/MESENTERY:  No visible mass, obstruction, or bowel wall thickening.    ABDOMINAL WALL:  No mass or hernia.    PELVIC NODES:  Normal.  PELVIC ORGANS:  Normal size and configuration to the uterus.  No evidence of endometrial thickening or junctional zone thickening/abnormalities.  No free fluid or inflammatory changes within the pelvis.  No adnexal lesions are identified.  BONES:  No bony lesion or fracture.    LUNG BASES:  No visible pleural disease.  Lung bases not well assessed with MRI.                  Impression   CONCLUSION:    1. No acute process noted.  2. Diffuse fatty infiltration of the liver/steatosis with hepatomegaly measuring 23 cm in craniocaudal dimension.  There is a small, ovoid focus of increased enhancement along the posterior segment of the right lobe inferomedially measuring 1.6 x 1.2 cm.    This is either representative of a small FNH or small focus of focal fat sparing.  In either case, this focus has a benign appearance.  3. There are 2 fat containing renal angiomyolipomas within the midpole the right kidney measuring 0.9 cm.        Dictated by (CST): Jessica Liu DO on 4/30/2021 at 3:15 PM         Labs:  Lab Results   Component Value Date    WBC 9.7 09/30/2024    RBC 5.60 (H) 09/30/2024    HGB 13.6 09/30/2024    HCT 42.6 09/30/2024    .0 09/30/2024    MPV 9.3 (L) 02/27/2013    MCV 76.1 (L) 09/30/2024    MCH 24.3 (L) 09/30/2024    MCHC 31.9 09/30/2024    RDW 16.8 09/30/2024    NEPRELIM 4.69 09/30/2024    NEUTABS 8.84 (H) 10/17/2018    LYMPHABS 3.67 10/17/2018    EOSABS 0.82 (H) 10/17/2018    BASABS 0.00 10/17/2018    NEUT 65 10/17/2018    LYMPH 27 10/17/2018    MON 2 10/17/2018    EOS 6 10/17/2018    BASO 0 10/17/2018    NEPERCENT 48.6 09/30/2024    LYPERCENT 37.8 09/30/2024    MOPERCENT 4.3 09/30/2024    EOPERCENT  7.5 09/30/2024    BAPERCENT 1.4 09/30/2024    NE 4.69 09/30/2024    LYMABS 3.66 09/30/2024    MOABSO 0.42 09/30/2024    EOABSO 0.73 (H) 09/30/2024    BAABSO 0.14 09/30/2024     Lab Results   Component Value Date     (H) 09/30/2024    BUN 11 09/30/2024    BUNCREA 10.1 06/21/2021    CREATSERUM 0.65 09/30/2024    ANIONGAP 8 09/30/2024    GFR 68 01/20/2018    GFRNAA 94 03/14/2022    GFRAA 108 03/14/2022    CA 10.0 09/30/2024    OSMOCALC 292 09/30/2024    ALKPHO 108 09/30/2024    AST 22 09/30/2024    ALT 27 09/30/2024    BILT 0.4 09/30/2024    TP 7.1 09/30/2024    ALB 4.4 09/30/2024    GLOBULIN 2.7 09/30/2024    AGRATIO 1.7 11/19/2012     09/30/2024    K 4.2 09/30/2024     09/30/2024    CO2 24.0 09/30/2024       Additional Labs:  09/2024  Vit D 37.4 normal    12/2023  Vit d 5.3 very low     08/2023  Lupus anticoagulant Positive  B2G and ACL pending  C3 179 normal  C4 43 normal  MPO, NE-3, c-ANCA, p-ANCA negative  ANNAMARIE by IFA negative  CRP 1.99 elevated  ESR 46 elevated    06/2023  No eosinophilia on CBC    06/2022  ANCA screen negative  MPO negative  Proteinase 3 negative  CBC with MCV 77 otherwise normal.  No eosinophilia noted  CRP 16.3 (N<10)  Protein to creatinine ratio 0.07  CMP grossly normal with exception of elevated glucose  UA with glucose otherwise negative for blood or protein  ESR 47 elevated  ANNAMARIE screen negative  CCP negative  RF negative    03/2022  CRP 2.04 (N<0.3)  ESR 11 normal  CK 69 normal    02/2022  ANCA by IFA negative  MPO, NE-3 negative    11/2018   CBC with absolute eosinophil count slightly elevated at 0.65    01/2018  IgA, IgE normal  IgG 659 borderline low    11/2017 ANNAMARIE screen negative  ESR 9 normal  09/2012  Lupus anticoagulant negative  Beta-2 glycoprotein IgM, IgA negative  Anticardiolipin IgG IgM negative      Maria E Gonzalez DO  [facility]  04/08/2025

## 2025-04-09 ENCOUNTER — TELEPHONE (OUTPATIENT)
Dept: PAIN CLINIC | Facility: CLINIC | Age: 60
End: 2025-04-09

## 2025-04-09 NOTE — TELEPHONE ENCOUNTER
Jeana billy  Signed 1:33 PM     Copy     Prior authorization request completed for: bilateral L4/5,L5/S1 RFA  Authorization # no auth needed   Pre-D: no  Exclusions/Restrictions: no  Covered Benefit: yes   Authorization dates: n/a  CPT codes approved: 53604,75839  Number of visits/dates of service approved:   Physician: aissatou  Location: [facility]   Call Ref#: [id number]  Representative Name: gera angel  Insurance Carrier: [phone]     Patient can be scheduled. Routed to Navigator.

## 2025-04-09 NOTE — PATIENT INSTRUCTIONS
-- continue lyrica 25mg nightly and see if you can increase slowly to 50mg nightly to see if helps more without being too sedating.   -- continue working with Joyce regarding the anxiety/duloxetine... consider adding amitriptyline in the future, can hold off for now so avoiding too many med changes at once. Talk to her about possible hydroxyzine instead of xanax to help with anxiety as well as itching  -- continue following with pain management for injections- discuss left shoulder complaints  -- updated labs ordered (no fasting required)  -- continue follow up with other providers and keep me posted (particularly ENT regarding nasal polyp and current symptoms)   -- keep follow up in 4 months and if needed, can try to fit in sooner but we will be in communication in the meantime with response to above med changes    Dr. Gonzalez

## 2025-04-09 NOTE — TELEPHONE ENCOUNTER
Prior authorization request completed for: bilateral L4/5,L5/S1 RFA  Authorization # no auth needed   Pre-D: no  Exclusions/Restrictions: no  Covered Benefit: yes   Authorization dates: n/a  CPT codes approved: 71482,87308  Number of visits/dates of service approved:   Physician: aissatou  Location: [facility]   Call Ref#: [id number]  Representative Name: gera ortiz  Insurance Carrier: [phone]     Patient can be scheduled. Routed to Navigator.

## 2025-04-09 NOTE — TELEPHONE ENCOUNTER
Received Medical Clearance from  - patient is cleared to hold Xarelto for 3 days prior to procedure.     Sent to scan.

## 2025-04-24 ENCOUNTER — TELEPHONE (OUTPATIENT)
Facility: CLINIC | Age: 60
End: 2025-04-24

## 2025-04-24 NOTE — TELEPHONE ENCOUNTER
Patient phoned office and states she changed Omnipod pod today, and after pod change her pod and Dexcom G6 are not communicating.    Walked through troubleshooting steps on omnipod website- patient turned off her bluetooth on android phone and turned back on and reconnected apps- started reading.    Frugalo message sent with troubleshooting website for future use.     Closing this encounter.

## 2025-05-02 ENCOUNTER — PATIENT MESSAGE (OUTPATIENT)
Dept: PAIN CLINIC | Facility: CLINIC | Age: 60
End: 2025-05-02

## 2025-05-09 DIAGNOSIS — E11.65 TYPE 2 DIABETES MELLITUS WITH HYPERGLYCEMIA, WITH LONG-TERM CURRENT USE OF INSULIN (HCC): ICD-10-CM

## 2025-05-09 DIAGNOSIS — Z79.4 TYPE 2 DIABETES MELLITUS WITH HYPERGLYCEMIA, WITH LONG-TERM CURRENT USE OF INSULIN (HCC): ICD-10-CM

## 2025-05-09 RX ORDER — INSULIN PMP CART,AUT,G6/7,CNTR
1 EACH SUBCUTANEOUS
Qty: 45 EACH | Refills: 1 | Status: SHIPPED | OUTPATIENT
Start: 2025-05-09

## 2025-05-09 NOTE — TELEPHONE ENCOUNTER
Received fax from [employer] stating patient requesting refill for Omnipod 5 Dexcom Pods.    Pt has upcoming appt on 5/13 with TENZIN. Called pt to confirm if has enough supplies to last until time of visit. States she has 2 pods left and takes a while to receive supplies from [employer]. Pending refill per protocol and routing to RN staff.     Endocrine Refill protocol for Omnipod insulin pumps and supplies    Protocol Criteria:  PASSED  Reason: N/A    If below requirement is met, send a 90-day supply with 1 refill per provider protocol.    Verify appointment with Endocrinology completed in the last 6 months or scheduled in the next 3 months.    Last completed office visit:2/11/2025 Darling Palacios APN   Last completed telemed visit: Visit date not found  Next scheduled Follow up:   Future Appointments   Date Time Provider Department Center   5/13/2025  2:00 PM Darling Palacios APN [facility]   5/15/2025  1:30 PM Ana Luisa Miller MD [facility]   8/6/2025 10:15 AM Maria E Gonzalez DO [facility]   12/2/2025  1:00 PM Maria E Gonzalez DO [facility]   4/7/2026 10:15 AM Maria E Gonzalez DO [facility]

## 2025-05-13 ENCOUNTER — LAB ENCOUNTER (OUTPATIENT)
Dept: LAB | Age: 60
End: 2025-05-13
Payer: COMMERCIAL

## 2025-05-13 ENCOUNTER — OFFICE VISIT (OUTPATIENT)
Facility: CLINIC | Age: 60
End: 2025-05-13
Payer: COMMERCIAL

## 2025-05-13 ENCOUNTER — PATIENT MESSAGE (OUTPATIENT)
Facility: CLINIC | Age: 60
End: 2025-05-13

## 2025-05-13 VITALS
SYSTOLIC BLOOD PRESSURE: 126 MMHG | DIASTOLIC BLOOD PRESSURE: 82 MMHG | BODY MASS INDEX: 40.92 KG/M2 | HEART RATE: 101 BPM | HEIGHT: 68 IN | WEIGHT: 270 LBS | OXYGEN SATURATION: 96 %

## 2025-05-13 DIAGNOSIS — R06.02 SOB (SHORTNESS OF BREATH): ICD-10-CM

## 2025-05-13 DIAGNOSIS — R76.8 ABNORMAL ANCA TEST: ICD-10-CM

## 2025-05-13 DIAGNOSIS — E11.69 HYPERLIPIDEMIA ASSOCIATED WITH TYPE 2 DIABETES MELLITUS (HCC): ICD-10-CM

## 2025-05-13 DIAGNOSIS — R71.8 MICROCYTOSIS: ICD-10-CM

## 2025-05-13 DIAGNOSIS — G72.0 STATIN MYOPATHY: ICD-10-CM

## 2025-05-13 DIAGNOSIS — E11.59 HYPERTENSION ASSOCIATED WITH TYPE 2 DIABETES MELLITUS (HCC): ICD-10-CM

## 2025-05-13 DIAGNOSIS — E11.65 TYPE 2 DIABETES MELLITUS WITH HYPERGLYCEMIA, WITH LONG-TERM CURRENT USE OF INSULIN (HCC): ICD-10-CM

## 2025-05-13 DIAGNOSIS — R53.83 FATIGUE: ICD-10-CM

## 2025-05-13 DIAGNOSIS — R79.82 ELEVATED C-REACTIVE PROTEIN (CRP): ICD-10-CM

## 2025-05-13 DIAGNOSIS — Z79.4 TYPE 2 DIABETES MELLITUS WITH HYPERGLYCEMIA, WITH LONG-TERM CURRENT USE OF INSULIN (HCC): Primary | ICD-10-CM

## 2025-05-13 DIAGNOSIS — E11.65 TYPE 2 DIABETES MELLITUS WITH HYPERGLYCEMIA, WITH LONG-TERM CURRENT USE OF INSULIN (HCC): Primary | ICD-10-CM

## 2025-05-13 DIAGNOSIS — M79.10 MYALGIA: ICD-10-CM

## 2025-05-13 DIAGNOSIS — T46.6X5A STATIN MYOPATHY: ICD-10-CM

## 2025-05-13 DIAGNOSIS — R06.00 DYSPNEA, UNSPECIFIED TYPE: ICD-10-CM

## 2025-05-13 DIAGNOSIS — M25.50 POLYARTHRALGIA: ICD-10-CM

## 2025-05-13 DIAGNOSIS — E66.813 CLASS 3 SEVERE OBESITY DUE TO EXCESS CALORIES WITH SERIOUS COMORBIDITY AND BODY MASS INDEX (BMI) OF 40.0 TO 44.9 IN ADULT: ICD-10-CM

## 2025-05-13 DIAGNOSIS — R70.0 ELEVATED SED RATE: ICD-10-CM

## 2025-05-13 DIAGNOSIS — R00.2 PALPITATIONS: ICD-10-CM

## 2025-05-13 DIAGNOSIS — I15.2 HYPERTENSION ASSOCIATED WITH TYPE 2 DIABETES MELLITUS (HCC): ICD-10-CM

## 2025-05-13 DIAGNOSIS — R42 DIZZINESS: Primary | ICD-10-CM

## 2025-05-13 DIAGNOSIS — E78.5 HYPERLIPIDEMIA ASSOCIATED WITH TYPE 2 DIABETES MELLITUS (HCC): ICD-10-CM

## 2025-05-13 DIAGNOSIS — Z86.2 H/O: IRON DEFICIENCY ANEMIA: ICD-10-CM

## 2025-05-13 DIAGNOSIS — E55.9 VITAMIN D DEFICIENCY: ICD-10-CM

## 2025-05-13 DIAGNOSIS — E78.2 MIXED HYPERLIPIDEMIA: ICD-10-CM

## 2025-05-13 DIAGNOSIS — Z79.4 TYPE 2 DIABETES MELLITUS WITH HYPERGLYCEMIA, WITH LONG-TERM CURRENT USE OF INSULIN (HCC): ICD-10-CM

## 2025-05-13 PROBLEM — J18.9 SEPSIS DUE TO PNEUMONIA (HCC): Status: RESOLVED | Noted: 2024-04-09 | Resolved: 2025-05-13

## 2025-05-13 PROBLEM — A41.9 SEPSIS DUE TO PNEUMONIA (HCC): Status: RESOLVED | Noted: 2024-04-09 | Resolved: 2025-05-13

## 2025-05-13 LAB
ALBUMIN SERPL-MCNC: 5.1 G/DL (ref 3.2–4.8)
ALBUMIN/GLOB SERPL: 2.1 {RATIO} (ref 1–2)
ALP LIVER SERPL-CCNC: 74 U/L (ref 46–118)
ALT SERPL-CCNC: 37 U/L (ref 10–49)
ANION GAP SERPL CALC-SCNC: 10 MMOL/L (ref 0–18)
AST SERPL-CCNC: 49 U/L (ref ?–34)
BASOPHILS # BLD AUTO: 0.11 X10(3) UL (ref 0–0.2)
BASOPHILS NFR BLD AUTO: 1.1 %
BILIRUB SERPL-MCNC: 0.4 MG/DL (ref 0.3–1.2)
BUN BLD-MCNC: 11 MG/DL (ref 9–23)
CALCIUM BLD-MCNC: 10.1 MG/DL (ref 8.7–10.6)
CHLORIDE SERPL-SCNC: 106 MMOL/L (ref 98–112)
CHOLEST SERPL-MCNC: 116 MG/DL (ref ?–200)
CO2 SERPL-SCNC: 26 MMOL/L (ref 21–32)
CREAT BLD-MCNC: 0.8 MG/DL (ref 0.55–1.02)
CREAT UR-SCNC: 189.6 MG/DL
DEPRECATED HBV CORE AB SER IA-ACNC: 54 NG/ML (ref 50–306)
EGFRCR SERPLBLD CKD-EPI 2021: 85 ML/MIN/1.73M2 (ref 60–?)
EOSINOPHIL # BLD AUTO: 0.55 X10(3) UL (ref 0–0.7)
EOSINOPHIL NFR BLD AUTO: 5.3 %
ERYTHROCYTE [DISTWIDTH] IN BLOOD BY AUTOMATED COUNT: 16 %
EST. AVERAGE GLUCOSE BLD GHB EST-MCNC: 171 MG/DL (ref 68–126)
FASTING PATIENT LIPID ANSWER: YES
FASTING STATUS PATIENT QL REPORTED: YES
GLOBULIN PLAS-MCNC: 2.4 G/DL (ref 2–3.5)
GLUCOSE BLD-MCNC: 120 MG/DL (ref 70–99)
HBA1C MFR BLD: 7.6 % (ref ?–5.7)
HCT VFR BLD AUTO: 45.1 % (ref 35–48)
HDLC SERPL-MCNC: 32 MG/DL (ref 40–59)
HEMOGLOBIN A1C: 7.4 % (ref 4.3–5.6)
HGB BLD-MCNC: 14.2 G/DL (ref 12–16)
IMM GRANULOCYTES # BLD AUTO: 0.06 X10(3) UL (ref 0–1)
IMM GRANULOCYTES NFR BLD: 0.6 %
IRON SATN MFR SERPL: 14 % (ref 15–50)
IRON SERPL-MCNC: 54 UG/DL (ref 50–170)
LDLC SERPL CALC-MCNC: 48 MG/DL (ref ?–100)
LYMPHOCYTES # BLD AUTO: 3.82 X10(3) UL (ref 1–4)
LYMPHOCYTES NFR BLD AUTO: 36.8 %
MCH RBC QN AUTO: 25.1 PG (ref 26–34)
MCHC RBC AUTO-ENTMCNC: 31.5 G/DL (ref 31–37)
MCV RBC AUTO: 79.7 FL (ref 80–100)
MICROALBUMIN UR-MCNC: 1.6 MG/DL
MICROALBUMIN/CREAT 24H UR-RTO: 8.4 UG/MG (ref ?–30)
MONOCYTES # BLD AUTO: 0.53 X10(3) UL (ref 0.1–1)
MONOCYTES NFR BLD AUTO: 5.1 %
NEUTROPHILS # BLD AUTO: 5.32 X10 (3) UL (ref 1.5–7.7)
NEUTROPHILS # BLD AUTO: 5.32 X10(3) UL (ref 1.5–7.7)
NEUTROPHILS NFR BLD AUTO: 51.1 %
NONHDLC SERPL-MCNC: 84 MG/DL (ref ?–130)
OSMOLALITY SERPL CALC.SUM OF ELEC: 295 MOSM/KG (ref 275–295)
PLATELET # BLD AUTO: 424 10(3)UL (ref 150–450)
POTASSIUM SERPL-SCNC: 4.2 MMOL/L (ref 3.5–5.1)
PROT SERPL-MCNC: 7.5 G/DL (ref 5.7–8.2)
RBC # BLD AUTO: 5.66 X10(6)UL (ref 3.8–5.3)
SODIUM SERPL-SCNC: 142 MMOL/L (ref 136–145)
T4 FREE SERPL-MCNC: 1.1 NG/DL (ref 0.8–1.7)
TOTAL IRON BINDING CAPACITY: 376 UG/DL (ref 250–425)
TRANSFERRIN SERPL-MCNC: 307 MG/DL (ref 250–380)
TRIGL SERPL-MCNC: 226 MG/DL (ref 30–149)
TSI SER-ACNC: 0.84 UIU/ML (ref 0.55–4.78)
VIT D+METAB SERPL-MCNC: 20.5 NG/ML (ref 30–100)
VLDLC SERPL CALC-MCNC: 32 MG/DL (ref 0–30)
WBC # BLD AUTO: 10.4 X10(3) UL (ref 4–11)

## 2025-05-13 PROCEDURE — 3008F BODY MASS INDEX DOCD: CPT

## 2025-05-13 PROCEDURE — 83516 IMMUNOASSAY NONANTIBODY: CPT | Performed by: INTERNAL MEDICINE

## 2025-05-13 PROCEDURE — 83540 ASSAY OF IRON: CPT | Performed by: INTERNAL MEDICINE

## 2025-05-13 PROCEDURE — 83036 HEMOGLOBIN GLYCOSYLATED A1C: CPT

## 2025-05-13 PROCEDURE — 3051F HG A1C>EQUAL 7.0%<8.0%: CPT

## 2025-05-13 PROCEDURE — 3061F NEG MICROALBUMINURIA REV: CPT

## 2025-05-13 PROCEDURE — 83550 IRON BINDING TEST: CPT | Performed by: INTERNAL MEDICINE

## 2025-05-13 PROCEDURE — 82043 UR ALBUMIN QUANTITATIVE: CPT

## 2025-05-13 PROCEDURE — 3074F SYST BP LT 130 MM HG: CPT

## 2025-05-13 PROCEDURE — 82728 ASSAY OF FERRITIN: CPT | Performed by: INTERNAL MEDICINE

## 2025-05-13 PROCEDURE — 82570 ASSAY OF URINE CREATININE: CPT

## 2025-05-13 PROCEDURE — 82306 VITAMIN D 25 HYDROXY: CPT | Performed by: INTERNAL MEDICINE

## 2025-05-13 PROCEDURE — 86037 ANCA TITER EACH ANTIBODY: CPT | Performed by: INTERNAL MEDICINE

## 2025-05-13 PROCEDURE — 3079F DIAST BP 80-89 MM HG: CPT

## 2025-05-13 PROCEDURE — 99215 OFFICE O/P EST HI 40 MIN: CPT

## 2025-05-13 PROCEDURE — 95251 CONT GLUC MNTR ANALYSIS I&R: CPT

## 2025-05-13 RX ORDER — TIRZEPATIDE 2.5 MG/.5ML
2.5 INJECTION, SOLUTION SUBCUTANEOUS WEEKLY
Qty: 2 ML | Refills: 0 | Status: SHIPPED | OUTPATIENT
Start: 2025-05-13

## 2025-05-13 NOTE — PROGRESS NOTES
The following individual(s) verbally consented to be recorded using ambient AI listening technology and understand that they can each withdraw their consent to this listening technology at any point by asking the clinician to turn off or pause the recording:    Patient name: Danielle Jerome  Additional names:

## 2025-05-13 NOTE — PROGRESS NOTES
[facility] [place] [facility] Note    Name: Danielle Jerome    Date: 5/13/2025    Danielle Jerome is a 60 year old female who presents for evaluation of T2DM management.     Chief complaint: Follow - Up (PT here for routine T2DM f/u, per pt c/o Ozempic giving her bad side affects for 2 days after taking medication./Last A1c value was 6.9% done 2/11/2025.)       Subjective:   History of Present Illness  Alta Jerome is a 60 year old female with diabetes who presents for a follow-up visit.    She is currently on Ozempic, with a recent dosage increase from 0.5 mg to 1 mg weekly. She also uses an Omnipod 5 with Dexcom in auto mode, filled with U200 Humalog insulin. No longer utilizes Humulin NPH as needed for hyperglycemia with prednisone bursts for asthma. Her last A1c was 6.9% in February, but it has increased to 7.4% today. She experiences nausea and headaches, feeling generally unwell for the first few days after taking Ozempic. An 8-pound weight loss has occurred since December.    Her blood sugar has been high, attributed to her Omnipod switching to manual mode without her noticing. She uses about 38.6 units of insulin per day, with 70% from basal and 30% from bolus. She is in manual mode 8% of the time. Her active insulin time is 2 hours, basal rate is 1.1 units per hour, sensitivity is 28, carb ratio is 6 grams per unit, and blood sugar correction threshold is 110.    She is experiencing stress due to her mother's worsening dementia and her brother's recent hospitalization for high blood pressure.     She is not currently on prednisone for asthma, but her allergies and asthma are exacerbated. She reports not sleeping well due to difficulty breathing and uses her inhaler throughout the night. She also experiences constipation, and takes daily fiber but has not tried MiraLAX.    She is awaiting a back procedure, specifically an ablation on her spine, and reports significant pain affecting her mobility. She  describes pain shooting through her back and down her legs, with arthritis in her knees and shoulders exacerbating her discomfort. She feels inactivity affects her metabolism and blood sugar levels.    DM meds at office visit:   Diabetic Medications              Tirzepatide (MOUNJARO) 2.5 MG/0.5ML Subcutaneous Solution Auto-injector (Taking) Inject 2.5 mg into the skin once a week.    HUMALOG KWIKPEN 200 UNIT/ML Subcutaneous Solution Pen-injector (Taking) USE TO FILL INSULIN PUMP WITH UP  UNITS PER DAY    glucagon (GVOKE HYPOPEN 1-PACK) 1 MG/0.2ML Subcutaneous SUBQ injection (Taking As Needed) Inject 0.2 mL (1 mg total) into the skin once as needed for Low blood glucose.            Continuous Glucose Monitoring Interpretation  Danielle Jerome has undergone continuous glucose monitoring.  The blood glucose tracings were evaluated for two weeks prior to office visit. Blood glucose tracings demonstrated areas of hyperglycemia throughout the entire day, no specific pattern. There were no areas of hypoglycemia notedduring the weeks of evaluation.          History/Other:    Allergies, PMH, SocHx and FHx reviewed and updated as appropriate in Epic on Current Medications[1]  Allergies[2]  Current Medications[3]  Past Medical History[4]  Family History[5]  Social history: Reviewed.      ROS/Exam    REVIEW OF SYSTEMS: Ten point review of systems has been performed and is otherwise negative and/or non-contributory, except as described above.     VITALS  Vitals:    05/13/25 1403   BP: 126/82   Pulse: 101   SpO2: 96%   Weight: 270 lb (122.5 kg)   Height: 5' 8\" (1.727 m)       Body mass index is 41.05 kg/m².  Wt Readings from Last 6 Encounters:   05/13/25 270 lb (122.5 kg)   04/08/25 273 lb 9.6 oz (124.1 kg)   03/12/25 279 lb (126.6 kg)   02/11/25 279 lb (126.6 kg)   12/31/24 278 lb (126.1 kg)   12/18/24 278 lb (126.1 kg)       PHYSICAL EXAM  CONSTITUTIONAL:  awake, alert, cooperative, no apparent distress, and appears  stated age   PSYCH: normal affect  LUNGS: breathing comfortably  CARDIOVASCULAR:  regular rate      Labs/Imaging: Pertinent imaging reviewed.    Thyroid labs (if present in chart):  Recent Labs     12/06/23  1452 09/30/24  1211 05/13/25  1501   T4F 0.9  --  1.1   TSH 0.870 0.804 0.845        Thyroid ultrasound results (if present in chart):  No results found.    Overall glucose control:  Lab Results   Component Value Date    A1C 7.6 (H) 05/13/2025    A1C 7.4 (A) 05/13/2025    A1C 6.9 (A) 02/11/2025    A1C 7.9 (A) 11/12/2024    A1C 8.4 (H) 09/30/2024       Supplementary Documentation:   -Surveillance for Diabetes Complications & Risks  Foot exam/neuropathy: No data recorded  Retinopathy screening: Last Dilated Eye Exam: 01/13/25 (at PCP office per notes, using retinal camera)  No data recorded    Lipid screening:   Lab Results   Component Value Date    LDL 48 05/13/2025    TRIG 226 (H) 05/13/2025   Cholesterol Meds: Nexlizet Tabs - 180-10 MG      Nephropathy screening:   Lab Results   Component Value Date    EGFRCR 85 05/13/2025    MICROALBCREA 8.4 05/13/2025   No results found for: \"CREAUR\", \"MICROALBUMIN\", \"MALBCREACALC\"    Blood pressure control:   BP Readings from Last 1 Encounters:   05/13/25 126/82   BP Meds: amLODIPine Tabs - 2.5 MG; losartan Tabs - 100 MG; metoprolol succinate ER Tb24 - 25 MG       Assessment & Plan:   Overview:   1. Type 2 diabetes mellitus with hyperglycemia, with long-term current use of insulin (HCC) (Primary)  Overview:  PMHx of Type 2 diabetes mellitus diagnosed in 2018 after steroid induced hyperglycemia; chronic prednisone use for asthma. She started insulin pump in mid-2023. Ongoing hyperglycemia, so switched to U200 in the pump.  GLP-1 a therapy previously avoided as there was question if the patient has a history of gastroparesis.       Started ozempic in Oct 2024.  Orders:  -     POC Hemoglobin A1C  -     Mounjaro; Inject 2.5 mg into the skin once a week.  Dispense: 2 mL; Refill:  0  -     GLUC MNTR CONT REC FROM NTRSTL TISS FLU PHYS I&R  2. Hyperlipidemia associated with type 2 diabetes mellitus (HCC)  Overview:  History of CAD s/p FELTON (2017), also on Xarelto   Follows with cardiology  3. Hypertension associated with type 2 diabetes mellitus (HCC)  Overview:  Follows with cardiology  4. Class 3 severe obesity due to excess calories with serious comorbidity and body mass index (BMI) of 40.0 to 44.9 in adult  5. Statin myopathy      Danielle Jerome is a pleasant 60 year old female here for evaluation of:    Assessment & Plan  Type 2 diabetes mellitus with HLD, hyperglycemia  Type 2 diabetes mellitus with complications, managed with Ozempic, Omnipod 5, and U200 Humalog insulin. A1c increased from 6.9% to 7.4%. Experiencing nausea and headaches with Ozempic. Discussed switching to Mounjaro, which contains GLP and GIP, potentially offering better tolerance and weight loss.   Constipation noted as a side effect, recommended for MiraLAX to manage it. Adjustments made to insulin pump settings to improve glucose control.  - Switch from Ozempic to Mounjaro, starting at 2.5 mg weekly.  - Adjust insulin pump settings: change carb ratio to 5 and correction factor to 25.  - Recommend MiraLAX for constipation management.  - Send Mounjaro prescription to [facility].  - Follow up on Mounjaro coverage and effectiveness in one month via my chart message   - Complete urine microalbuminuria and creatinine ratio test after appointment.  - stop humulin N for steroid induced hyperglycemia-- will adjust ICR     HLD  Intolerant of statin due to myalgia, continue follow up with PCP    HTN   Blood pressure to goal on current medications, continue follow up with PCP     Obesity  Obesity, with recent weight loss of approximately 8 pounds since December. Discussed potential for further weight loss with Mounjaro, which may offer more significant weight reduction compared to Ozempic.    Recording duration:  21 minutes      Updated DM med list:   Diabetic Medications              Tirzepatide (MOUNJARO) 2.5 MG/0.5ML Subcutaneous Solution Auto-injector (Taking) Inject 2.5 mg into the skin once a week.    HUMALOG KWIKPEN 200 UNIT/ML Subcutaneous Solution Pen-injector (Taking) USE TO FILL INSULIN PUMP WITH UP  UNITS PER DAY    glucagon (GVOKE HYPOPEN 1-PACK) 1 MG/0.2ML Subcutaneous SUBQ injection (Taking As Needed) Inject 0.2 mL (1 mg total) into the skin once as needed for Low blood glucose.          See above header \"Supplementary Documentation\" for surveillance for diabetes complications & risks    Return in about 3 months (around 8/13/2025) for Mounjaro dose follow up.    A total of 45 minutes was spent today on obtaining history, reviewing pump report,  reviewing pertinent labs, evaluating patient, providing multiple treatment options, reinforcing diet/exercise and compliance, and completing documentation.       TENZIN Dailey  Endocrinology, Diabetes & Metabolism   5/13/2025    Note to patient: The 21 Century Cures Act makes medical notes like these available to patients in the interest of transparency. However, be advised this is a medical document. It is intended as peer to peer communication. It is written in medical language and may contain abbreviations or verbiage that are unfamiliar. It may appear blunt or direct. Medical documents are intended to carry relevant information, facts as evident, and the clinical opinion of the practitioner.          [1]    Tirzepatide (MOUNJARO) 2.5 MG/0.5ML Subcutaneous Solution Auto-injector Inject 2.5 mg into the skin once a week. 2 mL 0    Insulin Disposable Pump (OMNIPOD 5 [id number] PODS GEN 5) Does not apply Misc 1 each every other day. 45 each 1    ALPRAZolam 0.5 MG Oral Tab Take 1-2 tablets (0.5-1 mg total) by mouth daily as needed for Sleep or Anxiety. 30 tablet 0    pantoprazole 40 MG Oral Tab EC Take 1 tablet (40 mg total) by mouth every morning before  breakfast. 90 tablet 0    Bempedoic Acid-Ezetimibe (NEXLIZET) 180-10 MG Oral Tab       HUMALOG KWIKPEN 200 UNIT/ML Subcutaneous Solution Pen-injector USE TO FILL INSULIN PUMP WITH UP  UNITS PER DAY 48 mL 3    rivaroxaban (XARELTO) 20 MG Oral Tab Take 1 tablet (20 mg total) by mouth nightly. TAKE AT BEDTIME 90 tablet 0    DULoxetine HCl 40 MG Oral Cap DR Particles Take 40 mg by mouth daily. 90 capsule 1    Continuous Glucose Sensor (DEXCOM G6 SENSOR) Does not apply Misc Apply to skin every 10 days 9 each 3    Continuous Glucose Transmitter (DEXCOM G6 TRANSMITTER) Does not apply Misc 1 each by Other route every 3 (three) months. 1 each 3    albuterol 108 (90 Base) MCG/ACT Inhalation Aero Soln Inhale 2 puffs into the lungs every 4 to 6 hours as needed. inhale 2 puff by inhalation route  every 4 - 6 hours as needed 3 each 3    pregabalin 25 MG Oral Cap Take 2 capsules (50 mg total) by mouth at bedtime. 180 capsule 0    MONTELUKAST 10 MG Oral Tab TAKE 1 TABLET NIGHTLY 90 tablet 3    DUPIXENT 300 MG/2ML Subcutaneous Solution Pen-injector       Tiotropium Bromide Monohydrate (SPIRIVA RESPIMAT) 2.5 MCG/ACT Inhalation Aero Soln Inhale 2 puffs into the lungs in the morning and 2 puffs before bedtime.      metoprolol succinate ER 25 MG Oral Tablet 24 Hr Take 1 tablet (25 mg total) by mouth nightly.      BREO ELLIPTA 200-25 MCG/ACT Inhalation Aerosol Powder, Breath Activated Inhale 1 puff into the lungs daily. 3 each 1    glucagon (GVOKE HYPOPEN 1-PACK) 1 MG/0.2ML Subcutaneous SUBQ injection Inject 0.2 mL (1 mg total) into the skin once as needed for Low blood glucose. 0.2 mL 1    amLODIPine 2.5 MG Oral Tab Take 1 tablet (2.5 mg total) by mouth daily.      Azelastine HCl 0.1 % Nasal Solution 1 spray by Nasal route in the morning and 1 spray before bedtime.      SUMAtriptan Succinate 50 MG Oral Tab TAKE 1 TABLET BY MOUTH EVERY 2 HOURS AS NEEDED FOR  MIGRAINE.  DO  NOT  EXCEED  200MG  IN  24  HOURS 9 tablet 3    Clopidogrel  Bisulfate 75 MG Oral Tab Take 1 tablet (75 mg total) by mouth nightly. nightly      losartan 100 MG Oral Tab Take 1 tablet (100 mg total) by mouth daily.      Fluticasone Propionate 50 MCG/ACT Nasal Suspension 1 spray by Each Nare route 2 (two) times daily.      cetirizine (ZYRTEC) 10 MG Oral Tab Take 1 tablet (10 mg total) by mouth nightly.     [2]   Allergies  Allergen Reactions    Peanut-Containing Drug Products HIVES and SHORTNESS OF BREATH     Hives, asthma attack    Pcn [Penicillins] UNKNOWN     HAPPENED AS A CHILD   [3]   Current Outpatient Medications   Medication Sig Dispense Refill    Tirzepatide (MOUNJARO) 2.5 MG/0.5ML Subcutaneous Solution Auto-injector Inject 2.5 mg into the skin once a week. 2 mL 0    Insulin Disposable Pump (OMNIPOD 5 [id number] PODS GEN 5) Does not apply Misc 1 each every other day. 45 each 1    ALPRAZolam 0.5 MG Oral Tab Take 1-2 tablets (0.5-1 mg total) by mouth daily as needed for Sleep or Anxiety. 30 tablet 0    pantoprazole 40 MG Oral Tab EC Take 1 tablet (40 mg total) by mouth every morning before breakfast. 90 tablet 0    Bempedoic Acid-Ezetimibe (NEXLIZET) 180-10 MG Oral Tab       HUMALOG KWIKPEN 200 UNIT/ML Subcutaneous Solution Pen-injector USE TO FILL INSULIN PUMP WITH UP  UNITS PER DAY 48 mL 3    rivaroxaban (XARELTO) 20 MG Oral Tab Take 1 tablet (20 mg total) by mouth nightly. TAKE AT BEDTIME 90 tablet 0    DULoxetine HCl 40 MG Oral Cap DR Particles Take 40 mg by mouth daily. 90 capsule 1    Continuous Glucose Sensor (DEXCOM G6 SENSOR) Does not apply Misc Apply to skin every 10 days 9 each 3    Continuous Glucose Transmitter (DEXCOM G6 TRANSMITTER) Does not apply Misc 1 each by Other route every 3 (three) months. 1 each 3    albuterol 108 (90 Base) MCG/ACT Inhalation Aero Soln Inhale 2 puffs into the lungs every 4 to 6 hours as needed. inhale 2 puff by inhalation route  every 4 - 6 hours as needed 3 each 3    pregabalin 25 MG Oral Cap Take 2 capsules (50 mg total) by  mouth at bedtime. 180 capsule 0    MONTELUKAST 10 MG Oral Tab TAKE 1 TABLET NIGHTLY 90 tablet 3    DUPIXENT 300 MG/2ML Subcutaneous Solution Pen-injector       Tiotropium Bromide Monohydrate (SPIRIVA RESPIMAT) 2.5 MCG/ACT Inhalation Aero Soln Inhale 2 puffs into the lungs in the morning and 2 puffs before bedtime.      metoprolol succinate ER 25 MG Oral Tablet 24 Hr Take 1 tablet (25 mg total) by mouth nightly.      BREO ELLIPTA 200-25 MCG/ACT Inhalation Aerosol Powder, Breath Activated Inhale 1 puff into the lungs daily. 3 each 1    glucagon (GVOKE HYPOPEN 1-PACK) 1 MG/0.2ML Subcutaneous SUBQ injection Inject 0.2 mL (1 mg total) into the skin once as needed for Low blood glucose. 0.2 mL 1    amLODIPine 2.5 MG Oral Tab Take 1 tablet (2.5 mg total) by mouth daily.      Azelastine HCl 0.1 % Nasal Solution 1 spray by Nasal route in the morning and 1 spray before bedtime.      SUMAtriptan Succinate 50 MG Oral Tab TAKE 1 TABLET BY MOUTH EVERY 2 HOURS AS NEEDED FOR  MIGRAINE.  DO  NOT  EXCEED  200MG  IN  24  HOURS 9 tablet 3    Clopidogrel Bisulfate 75 MG Oral Tab Take 1 tablet (75 mg total) by mouth nightly. nightly      losartan 100 MG Oral Tab Take 1 tablet (100 mg total) by mouth daily.      Fluticasone Propionate 50 MCG/ACT Nasal Suspension 1 spray by Each Nare route 2 (two) times daily.      cetirizine (ZYRTEC) 10 MG Oral Tab Take 1 tablet (10 mg total) by mouth nightly.     [4]   Past Medical History:   Allergic rhinitis    Allergic rhinitis, cause unspecified    Anemia    Anxiety    Anxiety and depression    Asthma (HCC)    Atherosclerosis of coronary artery    Laguerre's esophagus    Churg-Shania syndrome (HCC)    Colloid cyst of brain (HCC)    Community acquired pneumonia of left lower lobe of lung    Congestive heart disease (HCC)    Coronary artery disease of native heart with stable angina pectoris    Depression    Diabetes (HCC)    Diabetes mellitus (HCC)    Diverticulosis of large intestine    Essential  hypertension    Exacerbation of asthma (HCC)    Gastroparesis    GERD    Hematemesis with nausea    2017, No EGD, Hgb stable    High cholesterol    History of blood clots    History of pulmonary embolus (PE)    History of recurrent deep vein thrombosis (DVT)    Hyperlipidemia    Hypertension    IBS    LGSIL (low grade squamous intraepithelial dysplasia)    Pap neg 2014    Migraines    perfumes    Moderate persistent asthma without complication (HCC)    DESOUZA (nonalcoholic steatohepatitis)    NSTEMI (non-ST elevated myocardial infarction) (HCC)    FELTON in LAD    Obesity    Osteoarthritis    Patella-femoral syndrome, bilateral knee    Pneumonia due to organism    Pulmonary embolism (HCC)    Sepsis due to pneumonia (HCC)    Sleep apnea    Thyroid disease   [5]   Family History  Problem Relation Age of Onset    Heart Disorder Father     Hypertension Mother     Lipids Mother     Stroke Mother     Anemia Mother     Dementia Mother     Heart Disorder Mother     Other (Other) Mother     Bleeding Disorders Mother     Hypertension Sister     Obesity Sister     Stroke Sister     Other (Other) Sister         stroke,pe    Other (Other) Brother         kidney stones    Colon Cancer Maternal Grandfather     Colon Cancer Maternal Aunt     Anemia Daughter     Asthma Daughter     Depression Daughter     Asthma Daughter     Bleeding Disorders Sister     Stroke Sister

## 2025-05-13 NOTE — PATIENT INSTRUCTIONS
Return Visit:  With TENZIN Dailey: Return in about 3 months (around 8/13/2025) for Mounjaro dose follow up.      VISIT SUMMARY:  Today, we discussed your diabetes management, including a switch from Ozempic to Mounjaro to help with your blood sugar levels and potential weight loss. We also addressed your asthma, fibromyalgia, polyarthralgia, and other health concerns.    YOUR PLAN:  TYPE 2 DIABETES MELLITUS WITH COMPLICATIONS: Your diabetes management includes Ozempic, Omnipod 5, and U200 Humalog insulin.    -Switch from Ozempic to Mounjaro, starting at 2.5 mg weekly.  -Adjust insulin pump settings: change carb ratio 6--> 5 and correction factor to 28--> 25.  -Recommend MiraLAX for constipation management. Take 1 capful daily  -Send Mounjaro prescription to [facility] in [place].  -Follow up on Mounjaro coverage and effectiveness in one month- I will send you a my chart message.  -Complete urine microalbuminuria and creatinine ratio test after appointment.    Contains text generated by Kuldeep      Updated diabetes medication instructions from today:   Diabetic Medications              Tirzepatide (MOUNJARO) 2.5 MG/0.5ML Subcutaneous Solution Auto-injector (Taking) Inject 2.5 mg into the skin once a week.    HUMALOG KWIKPEN 200 UNIT/ML Subcutaneous Solution Pen-injector (Taking) USE TO FILL INSULIN PUMP WITH UP  UNITS PER DAY    glucagon (GVOKE HYPOPEN 1-PACK) 1 MG/0.2ML Subcutaneous SUBQ injection (Taking As Needed) Inject 0.2 mL (1 mg total) into the skin once as needed for Low blood glucose.            Lab Results   Component Value Date    A1C 7.4 (A) 05/13/2025    A1C 6.9 (A) 02/11/2025    A1C 7.9 (A) 11/12/2024    A1C 8.4 (H) 09/30/2024    A1C 8.3 (A) 08/30/2024        General follow up information:  Please let us know if you require any refills at least 1 week prior to your medication running out. If you do run out of medication, please call our office ASAP to request refills (do not wait until  your follow up).   Please call our office if sugars at home are consistently greater than 250 or less than 70 for medication adjustment (do not wait until your follow up appointment).  Lab results and imaging will typically be reviewed at follow up appointments, or within 3-5 business days of ALL results being in if you do not have an appointment scheduled in the near future. Our office will contact you for any abnormal results requiring more urgent follow up or action.   The on-call pager is for urgent matters only. If you are a type 1 diabetic and run out of insulin after business hours 8AM-4PM, you may call the on-call pager for a refill to a 24 hour pharmacy.  If you have adrenal insufficiency and run out of steroids, you may call the on-call pager for a refill to a 24 hour pharmacy. All other refill requests should be requested during business hours.    Office phone number: [phone]; phones are open Monday-Friday 8:30-4:30.       HOW TO TREAT LOW BLOOD SUGAR (Hypoglycemia)  Low blood sugar= Less than 70, or if you start to have symptoms (below)  Symptoms: Shaking or trembling, fast heart rate, extreme hunger, sweating, confusion/difficulty concentrating, dizziness.    How to treat a low blood sugar if you are able to eat/drink: The Rule of 15/15  If you are using continuous glucose monitor that says you are low, but you do not have any symptoms, verify on fingerstick that your blood sugar is actually low before treating.   Eat 15 grams of carbs (see examples below)  Check your blood sugar after 15 minutes. If it’s still below your target range, have another serving.   Repeat these steps until it’s in your target range. Once it’s in range, if you're nervous about your sugar going low again, have a protein source (ie, a spoonful of peanut butter).   If you have a CGM you want to look for how your arrow has changed. If you arrow is pointed up or sideways after 15 min, give your CGM more time OR check with a  finger stick. Try not to eat more food until at least 15 min after the first BG check - otherwise you risk having a rebound high.  If you are experiencing symptoms and you are unable to check your blood glucose for any reason, treat the hypoglycemia.  If someone has a low blood sugar and is unconscious: Don’t hesitate to call 911. If someone is unconscious and glucagon is not available or someone does not know how to use it, call 911 immediately.     To treat a low, I recommend you carry with you easy, pre-portioned treatment for low blood sugars that are 15G of carbs:   - Children sized squeeze pouch applesauce (high fiber + carbs help prevent too high of a spike)  - Small children's sized juicebox- 15g carb --> 4oz juice box  - Glucose tablets from [employer], you can find them near diabetes supplies --> Note, you will need to eat 3-4 tablets to get to 15g of carbs  - Children sized fruit snack pack- look for one with 15 grams of total carbohydrate  - Choice of how to treat your low is important. Complex carbs, or foods that contain fats along with carbs (like chocolate) can slow the absorption of glucose and should NOT be used to treat an emergency low

## 2025-05-14 DIAGNOSIS — E55.9 VITAMIN D DEFICIENCY: Primary | ICD-10-CM

## 2025-05-14 RX ORDER — ERGOCALCIFEROL 1.25 MG/1
50000 CAPSULE, LIQUID FILLED ORAL WEEKLY
Qty: 12 CAPSULE | Refills: 0 | Status: SHIPPED | OUTPATIENT
Start: 2025-05-14 | End: 2025-05-15

## 2025-05-15 ENCOUNTER — OFFICE VISIT (OUTPATIENT)
Facility: CLINIC | Age: 60
End: 2025-05-15
Payer: COMMERCIAL

## 2025-05-15 VITALS
WEIGHT: 273 LBS | DIASTOLIC BLOOD PRESSURE: 68 MMHG | SYSTOLIC BLOOD PRESSURE: 122 MMHG | OXYGEN SATURATION: 96 % | HEIGHT: 68 IN | BODY MASS INDEX: 41.37 KG/M2 | RESPIRATION RATE: 16 BRPM | HEART RATE: 96 BPM

## 2025-05-15 DIAGNOSIS — J45.50 SEVERE PERSISTENT ASTHMA, UNSPECIFIED WHETHER COMPLICATED (HCC): ICD-10-CM

## 2025-05-15 DIAGNOSIS — G47.33 OSA (OBSTRUCTIVE SLEEP APNEA): Primary | ICD-10-CM

## 2025-05-15 DIAGNOSIS — J45.40 MODERATE PERSISTENT ASTHMA WITHOUT COMPLICATION (HCC): ICD-10-CM

## 2025-05-15 LAB
ANTI-MPO ANTIBODIES: <0.2 UNITS
ANTI-PR3 ANTIBODIES: <0.2 UNITS

## 2025-05-15 PROCEDURE — 3078F DIAST BP <80 MM HG: CPT | Performed by: INTERNAL MEDICINE

## 2025-05-15 PROCEDURE — 3008F BODY MASS INDEX DOCD: CPT | Performed by: INTERNAL MEDICINE

## 2025-05-15 PROCEDURE — 3074F SYST BP LT 130 MM HG: CPT | Performed by: INTERNAL MEDICINE

## 2025-05-15 PROCEDURE — 99214 OFFICE O/P EST MOD 30 MIN: CPT | Performed by: INTERNAL MEDICINE

## 2025-05-15 RX ORDER — FLUTICASONE FUROATE AND VILANTEROL 200; 25 UG/1; UG/1
1 POWDER RESPIRATORY (INHALATION) DAILY
Qty: 1 EACH | Refills: 11 | Status: SHIPPED | OUTPATIENT
Start: 2025-05-15

## 2025-05-15 NOTE — PATIENT INSTRUCTIONS
Plan:  -consider resuming breo for at least one week - then as needed   - consider scheduling CPAP titration please   See me in 4  months      Ana Luisa Miller MD  Pulmonary Medicine  5/15/25

## 2025-05-15 NOTE — PROGRESS NOTES
History of Present Illness:  Danielle Jerome is a 58 year old female presenting to pulmonary clinic dyspnea     - aunt  - - others in [place]-   Got sick after - and 4  weeks before -- had 2 courses of prednisone        Had testing for abnl PET - and had angio - dr tolliver- all OK -   Some palptitsion   Remains with shortness of breath- some days with difficulty with stairs- knees and back-- DJD - limit as well-   To see vascular - for legs and carotid  No coughing   Now on nucula-- sl light headed at outset- - one dose thus far-- thinks breathing is better   Off prednsione -   Losing weight -- new dexcom- with pump- ongoing -   Breo and spiriva - once a day -- - some rescue use -- twice a day -- a lot less-   Overall feels much better     1.24- doing OK- - had covid- early novemeber- - unable to take paxlovid-- so fatigued and dozzy at times- low vit D-   Follows with evan-  Increased sinus drainage very thick and always stuffy- had 3 zpack and doxy   Increased wheeze - winded all the time- sats 94-96% - drops some with walking - can't exercise - stairs to bedroom-   Thinks dupexent was better than nucala - last 2 weeks ago-   [facility]  had recommended nuclala -- rather than do bx for churg marc   Breo and spiriva - daily - rescue - a lot - himidifiers   Donzelli xclear and Xhance (fluticasone)   Aseltine nasal and zyrtec and mucinex ?benadryl at night   Prednsione none at the moment  Heart - no recent visit - sharee next week   Singular remains every night -      3.24- saw dr machuca - thinks long hauler - remains exhausted -   Wants to switch back to dupuxent -- thihks sinus was more helpful-- ongoing sinus drainage - some dizzyness - no allergy testing   No plans for bx-   No side effects - side effects from nuclala- dizzy - once a month   No prednsione - allergy attack - jacket with cat fur-   Breo and spiriva - - rescue - less overall -  Singular   Asteline and zyrtec capsasain -  had nose bleed- -   Donzelli following for possible surgery - xhance and xclear     6/24- whole body hurts aches and pains -- -- to see dr gordon - in August - gabapentin not helping- tylenol as needed -to start PT- left shoulder - to start   Neck and spine and told RA ?   Asthma is good - remains on dupixent -- hard in the heat - - every 2 weeks - no reaction -   Breo 200 daily - uses rescue  3-4 times per day- gets winded- with most activty - vaccums etc- gets winded easily and rescue helps   No exercise-- pain limits --sciatica - can't get up off the floor   Disc issues - ongoing   No change in heart- due to see dr tolliver- -     In hosp April 9th -   Remains on Xhance and xclear   Sinus less congested - - but increased eye findings   Trying for ozempic-- not to take related to gastro[areisis -- U2 insulin - better now controlled   No prednisone - last for her spine -  10/24- dr reyez for RFA lumbar  with back pain onging with spasms - in nov- significant liitation     To see dr Alberto - no bridging -   Did not test but thought had covid -- ear infection prior and needed predisone-   Better breathing now-- used albuterol a lot-   Cough moslty clear--   Startd on ozempic- and threw up with acid reflux- - then coughed color -- very congestion   Breo spiriva     On Dupixent off Nucala-to follow-up with Dr. Hodgson  30-minute video visit    5/25- - mostly OK- some wheeze with weather - no er/uc no hosp -   Can't schedule -titration - related to animals --   [place] and [place] - crohns  Pain shots - first one worked great and lost weight - wore off and second one did not help -- for albation - xeralto and plavix to be held -   Dr tolliver - no changes - so much pain-- changed some meds -   Lyrica and cymbalto - dr gordon - not vasculitis   On dupixent - better than nucala - -   No breo - no spiriva at home- - rescue only - albuterol some at night with change in weather - at least once a day -   No prednsione   Changed to monjaro now  - was on ozempic -                            Social History:   Social Hx on file   Social History            Socioeconomic History    Marital status:     Number of children: 3   Occupational History    Occupation: homemaker   Tobacco Use    Smoking status: Never       Passive exposure: Current    Smokeless tobacco: Never    Tobacco comments:       2nd hand with Father and now    Vaping Use    Vaping Use: Never used   Substance and Sexual Activity    Alcohol use: No    Drug use: No    Sexual activity: Never       Partners: Male       Birth control/protection: I.U.D.            Medications:   Current Medications          Current Outpatient Medications   Medication Sig Dispense Refill    ALPRAZOLAM 0.5 MG Oral Tab TAKE 1 TO 2 TABLETS(0.5 TO 1 MG) BY MOUTH DAILY AS NEEDED FOR SLEEP OR ANXIETY 42 tablet 0    Continuous Blood Gluc Sensor (DEXCOM G6 SENSOR) Does not apply Misc Apply to skin every 10 days 9 each 3    Continuous Blood Gluc Transmit (DEXCOM G6 TRANSMITTER) Does not apply Misc 1 each by Other route every 3 (three) months. 1 each 1    Insulin Disposable Pump (OMNIPOD 5 G6 PODS, GEN 5,) Does not apply Misc 1 each every other day. 45 each 1    insulin lispro (HUMALOG) 100 UNIT/ML Injection Solution Uses up to 100 units daily via insulin pump 90 mL 1    Insulin NPH, Human,, Isophane, (HUMULIN N KWIKPEN) 100 UNIT/ML Subcutaneous Suspension Pen-injector Pair with prednisone only. Up to 30 units a day max. 9 mL 0    pantoprazole 40 MG Oral Tab EC Take 1 tablet (40 mg total) by mouth before breakfast. 90 tablet 3    rivaroxaban (XARELTO) 20 MG Oral Tab Take 1 tablet (20 mg total) by mouth nightly. TAKE AT BEDTIME 90 tablet 3    predniSONE 10 MG Oral Tab 3 po q am for 3 days then 2 po q am for 3 days then one po q am for 5 days 20 tablet 0    fluconazole 150 MG Oral Tab Take 1 tablet (150 mg total) by mouth once.        gabapentin 100 MG Oral Cap Take 100mg in am, 100mg in afternoon and 200mg at night  360 capsule 0    ergocalciferol 1.25 MG (58551 UT) Oral Cap Take 1 capsule (50,000 Units total) by mouth once a week. 12 capsule 0    predniSONE 10 MG Oral Tab Take 3 tabs (30mg) daily for 2 days, then take 2 tabs (20mg) daily for 2 days, then take 1 tab (10mg) daily for 2 days. 12 tablet 0    albuterol 108 (90 Base) MCG/ACT Inhalation Aero Soln Inhale 2 puffs into the lungs every 4 to 6 hours as needed for Wheezing or Shortness of Breath. 3 each 1    montelukast 10 MG Oral Tab Take 1 tablet (10 mg total) by mouth nightly. 90 tablet 1    escitalopram 20 MG Oral Tab Take 1 tablet (20 mg total) by mouth every morning. 90 tablet 1    methylPREDNISolone (MEDROL) 4 MG Oral Tablet Therapy Pack As directed. 1 each 0    amLODIPine 2.5 MG Oral Tab amLODIPine 2.5 mg tablet, [RxNorm: 815269]        Mepolizumab (NUCALA SC) Inject into the skin every 30 (thirty) days.        Tiotropium Bromide Monohydrate (SPIRIVA RESPIMAT) 2.5 MCG/ACT Inhalation Aero Soln Inhale 2 puffs into the lungs daily. 1 each 5    Metoprolol-HCTZ ER 25-12.5 MG Oral Tablet 24 Hr metoprolol succ 25 mg-hydrochlorothiazide 12.5 mg tablet,ext.rel 24 hr   25mg 1/day        Azelastine HCl 0.1 % Nasal Solution          SUMAtriptan Succinate 50 MG Oral Tab TAKE 1 TABLET BY MOUTH EVERY 2 HOURS AS NEEDED FOR  MIGRAINE.  DO  NOT  EXCEED  200MG  IN  24  HOURS 9 tablet 3    BREO ELLIPTA 200-25 MCG/INH Inhalation Aerosol Powder, Breath Activated          Clopidogrel Bisulfate 75 MG Oral Tab Take 1 tablet (75 mg total) by mouth daily. nightly        losartan 100 MG Oral Tab Take 1 tablet (100 mg total) by mouth daily.        ezetimibe 10 MG Oral Tab Take 1 tablet (10 mg total) by mouth nightly.        Fluticasone Propionate 50 MCG/ACT Nasal Suspension 2 sprays by Each Nare route 2 (two) times daily.        cetirizine (ZYRTEC) 10 MG Oral Tab Take 1 tablet (10 mg total) by mouth daily.        glucagon (GVOKE HYPOPEN 1-PACK) 1 MG/0.2ML Subcutaneous SUBQ injection Inject 0.2  mL (1 mg total) into the skin once as needed for Low blood glucose. 0.2 mL 1            Review of Systems:    Weight - up 20# since July - - and down now 263-- was 277 - now 265 - thinks stable   gerd mostly controlled - ppi once a day -- no breakthrough - ppi daily -- breakthrough with ozempic - now to malka   No diarrhea- rarely   Rare hives-no recent issue- 2 days ago then resolved -- needed benedryl -no recent issues  Some swelling- - trace now - comes and goes   Torsemide only as needed - rare use no recent use -   Shoulder pain -- for MRI      Overall thinks sleep is a bit better she is not waking up during the night to take her rescue inhaler though continues to have awakenings- - no issue - nocturia - occ needs inhaler  Tolerating Nucala well without skin lesions  Alleriges worse without singular   Agrees to CPAP titration                 Physical Exam:-- comfortable no coughing and minimal congestion   Constitutional: comfortable . No acute distress.   HEENT: Head NC/AT. PEERL. Throat is clear appears well comfortable  Right nares with polyp questionably medially  Cardio: .  Regular rate and rhythm short murmur  Respiratory: Very rare sense of rhonchi overall seems clear  GI:  Abd soft, non-tender.  Extremities: No clubbing .  Trace edema at most bilaterally  Neurologic: A&Ox3. No gross motor deficits.  Skin: Warm, dry.no rashes or hives noted   Lymphatic: No cervical or supraclavicular lymphadenopathy.  No JVD  Psych: Calm, cooperative. Pleasant affect.   Results: reviewed      Assessment/Plan:  #History of breast lesion  Stable on mammos 2020  Encouraged to get follow-up  Negative mammos 3/23  States up-to-date  5/25 due thinks         #PEDRO  Remote negative study  Recent increase in nocturnal awakenings and fatigue  Did not get follow-up study yet  5/23-agrees to proceed  7/23-sleep study AHI 24.3 REM 40 gonzalo 82.7% 5% of the night less than 88  For titration  1/24  did not go for  titration -states  will  3.24- continues to complain of exhaustion - did not go for titration - - states will -discussed again  6/24 agrees to titration  10/24 - plans for titration after first of the year   5/25 - now needs to wait until  on vacation again discussed states she would like to do the testing              #Diabetes  Ongoing issues some improvement  Some weight loss-now on new monitor and pulm  Blood sugars get significantly out of control on prednisone with endocrine following  Remains with increasing weight and increasing hemoglobin A1c  6/24 notes improvement  5/25 - remains with high A1C-but gradually trending down        #Coronary artery disease  11/2017 with thrombotic lesion to the LAD-FELTON placed  Associated ischemic cardiomyopathy  Repeat cath for elevated troponin 4/2018 with nonobstructive coronary disease  Negative stress 8/2019  Remains on Plavix and lifelong Xarelto  Recurrent chest pain 2020 negative dobutamine stress  7/21 states due for testing   1/23 follows with Gloria Tolliver-repeat stress and carotid Dopplers pending  5/23 due for follow-up after testing including stress and echo  7/23-repeat angiogram last month with Dr. Avila-reports stent widely patent coronaries widely patent-last cath prior was April 2018  1.3.24- due to see dr tolliver   3/24 no significant change no recent testing  5/25 no new issues with recent visit-for blood work in 3 months        #Ischemic cardiomyopathy  EF 35 to 40% improved on echo 2017  Had been followed in heart failure clinic for a time  6/20 states normal EF  Remains on daily torsemide  Last echo 2/4/2022 -EF 50 to 55% diastolic dysfunction unable to measure ----last with PAS 30-35  Stable fluid status  1/24 fluid status seems improved  3/24 echo 5/23 EF 65 to 70% with grade 1 diastolic dysfunction unable to assess pulmonary pressures normal TAPSE  Fluid status stable  6/24 remains with stable fluid status  10/24- heart all good - higher chol-- dr tolliver   5/25 no  changes            #Pulmonary emboli  Long history remote and recurrent  Plans for lifelong anticoagulation--unable to tolerate aspirin so remains on antiplatelet        #Asthma severe  Childhood asthma progressed early adulthood  Longstanding history multiple hospitalizations multiple steroid dosing remains on MDIs  Begun on Dupixent 2020-sinus disease rash and eosinophilia  Rash biopsy negative for vasculitis  Overall improved control on Dupixent-had been on Xolair prior  Recent [facility] visit with negative 6-minute walk; PFTs with normal timed volumes-ratio 67% total lung capacity 103% of predicted % of predicted-17% bronchodilator response in FEV1  1/23 now with ongoing flare suspect related to sinus issues  5/23 remains with significant obstruction on exam with plans to redose prednisone-worse now as Dupixent has been stopped with plans to begin Nucala after breathing test  7/1:23 dose of Nucala has noted significant improvement with plans to continue and follow-wean MDIs as able  1.24- recent covid infection -- since  worse with nucala vs dupixent - to talk to machuca and to see -short prednisone dose  3/24-asthma now very well compensated-believes her sinus disease was significantly better when she was on Dupixent versus the current Nucala  Insurance investigation ongoing-repeat PFTs pending  6/24--significant flare in Kdzbh-lepztd-cc component pneumonia with left lower lobe infiltrate Home on cefdinir and prednisone--reports stable since that time  10.24 on dupixent now -   5/25 - was off ICS/LABA-- now with sl flare related to weather change -plan to resume Breo now and as needed in the future-overall doing very well on Dupixent                #History of chronic sinusitis and nasal polyposis  Remains with ongoing flares  Follows with ENT--minimal improvement in nasal polyps with the Dupixent  Repeat CT planned and Rx with Zithromax  5/23 CT sinuses with opacification and bone remodeling of the  right sphenoid sinus-ENT referred her to [facility]-to consider seeing Lyudmila  7/23 has seen Dr. Mao with plans to follow on Nucala and follow CT  3/24 ongoing and current chief complaint remains on  sinus medication plan to follow if able to get Dupixent  6/24 improved on Dupixent  10/24- remains better though with flares at times - one prednsione need   5/25 plans on seeing Dr. Coreas for ongoing symptomatic right nasal polyp-nonoccluding on exam-remains on nasal sprays           #Recent evaluation rule out EGPA/fibromyalgia polyarthralgias  Questionably remote Churg-Shania----no history of pulmonary infiltrates  Negative skin biopsy in the past for vasculitis  Recent visit with [facility]-unable to confirm-multiple negative rheumatologic testing  Recommendation had been for EGPA dosing of Nucala-following  7/23-EGPA dosing of Nucala as above following with improvement  3/24 anticipating changed back to Dupixent  6/24- -back on Dupixent with improved control of asthma and sinuses--- clinically improving with plans to follow--no plans currently to revisit [facility]--now following with Dr. Gonzalez  10/24- remains on gabapartin ad now on cymbalta for pain issues   5/25 continues to follow with Dr. Clinton- neg ANCA last in 2023     # COVID -   Halloween - since with marked fatigue and cough and sinus congestion   Has been told may have long COVID symptoms  10/24- thinks had covid again in sept     # abd wall hernia after lifting heavy   No acute issues currently-- no issues        -Plan:  -consider resuming breo for at least one week - then as needed   - consider scheduling CPAP titration please   See me in 4  months      Ana Luisa Miller MD  Pulmonary Medicine  5/15/25

## 2025-05-22 ENCOUNTER — PATIENT MESSAGE (OUTPATIENT)
Dept: FAMILY MEDICINE CLINIC | Facility: CLINIC | Age: 60
End: 2025-05-22

## 2025-05-22 DIAGNOSIS — F41.9 ANXIETY: ICD-10-CM

## 2025-05-22 DIAGNOSIS — M54.41 ACUTE BILATERAL LOW BACK PAIN WITH BILATERAL SCIATICA: ICD-10-CM

## 2025-05-22 DIAGNOSIS — K22.719 BARRETT'S ESOPHAGUS WITH DYSPLASIA: ICD-10-CM

## 2025-05-22 DIAGNOSIS — K21.9 GASTROESOPHAGEAL REFLUX DISEASE, UNSPECIFIED WHETHER ESOPHAGITIS PRESENT: ICD-10-CM

## 2025-05-22 DIAGNOSIS — F33.1 MODERATE EPISODE OF RECURRENT MAJOR DEPRESSIVE DISORDER (HCC): ICD-10-CM

## 2025-05-22 DIAGNOSIS — M54.42 ACUTE BILATERAL LOW BACK PAIN WITH BILATERAL SCIATICA: ICD-10-CM

## 2025-05-23 RX ORDER — ALPRAZOLAM 0.5 MG
0.5 TABLET ORAL DAILY PRN
Qty: 30 TABLET | Refills: 0 | Status: SHIPPED | OUTPATIENT
Start: 2025-05-23

## 2025-05-23 RX ORDER — DULOXETINE 40 MG/1
40 CAPSULE, DELAYED RELEASE ORAL DAILY
Qty: 7 CAPSULE | Refills: 0 | Status: SHIPPED | OUTPATIENT
Start: 2025-05-23 | End: 2025-05-24

## 2025-05-23 RX ORDER — PANTOPRAZOLE SODIUM 40 MG/1
40 TABLET, DELAYED RELEASE ORAL
Qty: 90 TABLET | Refills: 0 | Status: SHIPPED | OUTPATIENT
Start: 2025-05-23

## 2025-05-24 RX ORDER — DULOXETINE 40 MG/1
40 CAPSULE, DELAYED RELEASE ORAL DAILY
Qty: 7 CAPSULE | Refills: 0 | Status: SHIPPED | OUTPATIENT
Start: 2025-05-24 | End: 2025-05-31

## 2025-05-27 ENCOUNTER — OFFICE VISIT (OUTPATIENT)
Dept: FAMILY MEDICINE CLINIC | Facility: CLINIC | Age: 60
End: 2025-05-27
Payer: COMMERCIAL

## 2025-05-27 VITALS
RESPIRATION RATE: 16 BRPM | TEMPERATURE: 99 F | WEIGHT: 277 LBS | SYSTOLIC BLOOD PRESSURE: 132 MMHG | DIASTOLIC BLOOD PRESSURE: 64 MMHG | BODY MASS INDEX: 41.98 KG/M2 | OXYGEN SATURATION: 96 % | HEIGHT: 68 IN | HEART RATE: 92 BPM

## 2025-05-27 DIAGNOSIS — Q04.6 COLLOID CYST OF BRAIN (HCC): ICD-10-CM

## 2025-05-27 DIAGNOSIS — Z86.39 HISTORY OF IRON DEFICIENCY: Primary | ICD-10-CM

## 2025-05-27 DIAGNOSIS — H93.8X3 SENSATION OF FULLNESS IN BOTH EARS: ICD-10-CM

## 2025-05-27 DIAGNOSIS — J33.9 NASAL POLYP: ICD-10-CM

## 2025-05-27 PROCEDURE — 3078F DIAST BP <80 MM HG: CPT | Performed by: NURSE PRACTITIONER

## 2025-05-27 PROCEDURE — 99215 OFFICE O/P EST HI 40 MIN: CPT | Performed by: NURSE PRACTITIONER

## 2025-05-27 PROCEDURE — 3008F BODY MASS INDEX DOCD: CPT | Performed by: NURSE PRACTITIONER

## 2025-05-27 PROCEDURE — 3075F SYST BP GE 130 - 139MM HG: CPT | Performed by: NURSE PRACTITIONER

## 2025-05-27 RX ORDER — IPRATROPIUM BROMIDE 21 UG/1
2 SPRAY, METERED NASAL EVERY 12 HOURS
Qty: 30 ML | Refills: 0 | Status: SHIPPED | OUTPATIENT
Start: 2025-05-27

## 2025-05-27 RX ORDER — FERROUS SULFATE 325(65) MG
325 TABLET ORAL
Qty: 90 TABLET | Refills: 1 | Status: SHIPPED | OUTPATIENT
Start: 2025-05-27
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
## 2025-05-30 ENCOUNTER — TELEPHONE (OUTPATIENT)
Facility: CLINIC | Age: 60
End: 2025-05-30

## 2025-06-06 ENCOUNTER — PATIENT MESSAGE (OUTPATIENT)
Facility: CLINIC | Age: 60
End: 2025-06-06

## 2025-06-06 RX ORDER — TIRZEPATIDE 5 MG/.5ML
5 INJECTION, SOLUTION SUBCUTANEOUS WEEKLY
Qty: 6 ML | Refills: 0 | Status: SHIPPED | OUTPATIENT
Start: 2025-06-06

## 2025-06-09 ENCOUNTER — PATIENT MESSAGE (OUTPATIENT)
Dept: FAMILY MEDICINE CLINIC | Facility: CLINIC | Age: 60
End: 2025-06-09

## 2025-06-09 DIAGNOSIS — M54.42 ACUTE BILATERAL LOW BACK PAIN WITH BILATERAL SCIATICA: ICD-10-CM

## 2025-06-09 DIAGNOSIS — F33.1 MODERATE EPISODE OF RECURRENT MAJOR DEPRESSIVE DISORDER (HCC): ICD-10-CM

## 2025-06-09 DIAGNOSIS — M54.41 ACUTE BILATERAL LOW BACK PAIN WITH BILATERAL SCIATICA: ICD-10-CM

## 2025-06-09 DIAGNOSIS — F41.9 ANXIETY: ICD-10-CM

## 2025-06-10 RX ORDER — DULOXETINE 40 MG/1
40 CAPSULE, DELAYED RELEASE ORAL DAILY
Qty: 90 CAPSULE | Refills: 0 | Status: CANCELLED
Start: 2025-06-10

## 2025-06-10 NOTE — TELEPHONE ENCOUNTER
Mychart message sent    Recommending we increase duloxetine from 40mg daily to either 30mg bid or 60mg daily in effort to reduce use of alprazolam    Long term use noted as far back as 2009

## 2025-06-24 ENCOUNTER — HOSPITAL ENCOUNTER (OUTPATIENT)
Facility: HOSPITAL | Age: 60
Setting detail: HOSPITAL OUTPATIENT SURGERY
Discharge: HOME | End: 2025-06-24
Attending: ANESTHESIOLOGY | Admitting: ANESTHESIOLOGY
Payer: COMMERCIAL

## 2025-06-24 ENCOUNTER — APPOINTMENT (OUTPATIENT)
Dept: GENERAL RADIOLOGY | Facility: HOSPITAL | Age: 60
End: 2025-06-24
Attending: ANESTHESIOLOGY
Payer: COMMERCIAL

## 2025-06-24 VITALS
OXYGEN SATURATION: 90 % | SYSTOLIC BLOOD PRESSURE: 140 MMHG | DIASTOLIC BLOOD PRESSURE: 74 MMHG | RESPIRATION RATE: 18 BRPM | TEMPERATURE: 97 F | HEART RATE: 73 BPM

## 2025-06-24 LAB — GLUCOSE BLD-MCNC: 118 MG/DL (ref 70–99)

## 2025-06-24 PROCEDURE — 64636 DESTROY L/S FACET JNT ADDL: CPT | Performed by: ANESTHESIOLOGY

## 2025-06-24 PROCEDURE — 99152 MOD SED SAME PHYS/QHP 5/>YRS: CPT | Performed by: ANESTHESIOLOGY

## 2025-06-24 PROCEDURE — 64635 DESTROY LUMB/SAC FACET JNT: CPT | Performed by: ANESTHESIOLOGY

## 2025-06-24 RX ORDER — METHYLPREDNISOLONE ACETATE 40 MG/ML
INJECTION, SUSPENSION INTRA-ARTICULAR; INTRALESIONAL; INTRAMUSCULAR; SOFT TISSUE
Status: DISCONTINUED | OUTPATIENT
Start: 2025-06-24 | End: 2025-06-24

## 2025-06-24 RX ORDER — SODIUM CHLORIDE, SODIUM LACTATE, POTASSIUM CHLORIDE, CALCIUM CHLORIDE 600; 310; 30; 20 MG/100ML; MG/100ML; MG/100ML; MG/100ML
100 INJECTION, SOLUTION INTRAVENOUS CONTINUOUS
Status: DISCONTINUED | OUTPATIENT
Start: 2025-06-24 | End: 2025-06-24

## 2025-06-24 RX ORDER — ONDANSETRON 2 MG/ML
4 INJECTION INTRAMUSCULAR; INTRAVENOUS ONCE AS NEEDED
Status: DISCONTINUED | OUTPATIENT
Start: 2025-06-24 | End: 2025-06-24

## 2025-06-24 RX ORDER — LIDOCAINE HYDROCHLORIDE 10 MG/ML
INJECTION, SOLUTION EPIDURAL; INFILTRATION; INTRACAUDAL; PERINEURAL
Status: DISCONTINUED | OUTPATIENT
Start: 2025-06-24 | End: 2025-06-24

## 2025-06-24 RX ORDER — MIDAZOLAM HYDROCHLORIDE 1 MG/ML
INJECTION INTRAMUSCULAR; INTRAVENOUS
Status: DISCONTINUED | OUTPATIENT
Start: 2025-06-24 | End: 2025-06-24

## 2025-06-24 RX ORDER — NALOXONE HYDROCHLORIDE 0.4 MG/ML
0.08 INJECTION, SOLUTION INTRAMUSCULAR; INTRAVENOUS; SUBCUTANEOUS AS NEEDED
Status: DISCONTINUED | OUTPATIENT
Start: 2025-06-24 | End: 2025-06-24

## 2025-06-24 RX ORDER — DIPHENHYDRAMINE HYDROCHLORIDE 50 MG/ML
50 INJECTION, SOLUTION INTRAMUSCULAR; INTRAVENOUS ONCE AS NEEDED
Status: DISCONTINUED | OUTPATIENT
Start: 2025-06-24 | End: 2025-06-24

## 2025-06-24 NOTE — OPERATIVE REPORT
[facility]  Operative Report  2025     Danielle Jerome Patient Status:  Hospital Outpatient Surgery    3/25/1965 MRN [id number]   Location [facility] Attending Tej Gama MD   Hosp Day # 0 PCP Luca Rai DO     Indication: Danielle is a 60 year old female with lumbar spondylosis    Preoperative Diagnosis:  Spondylosis of lumbar region without myelopathy or radiculopathy [M47.816]    Postoperative Diagnosis: Same as above.    Procedure performed: Bilateral L4/5, L5/S1 RADIOFREQUENCY ABLATION OF LUMBAR MEDIAL BRANCH with sedation    Anesthesia: Local and IV Sedation.    EBL: Less than 1 ml.    Procedure Description:   After reviewing the patient's history and performing a focused physical examination, the diagnosis was confirmed and contraindications such as infection and coagulopathy were ruled out.  Following review of potential side effects and complications, including but not necessarily limited to infection, allergic reaction, local tissue breakdown, nerve injury, and paresis, the patient indicated they understood and agreed to proceed. After obtaining the informed consent, the patient was brought to the procedure room and monitored. Per my order and under my supervision, the patient was sedated with intermittent intravenous doses of versed and fentanyl. The vital signs were monitored and recorded by an experienced RN. The procedure started after the patient was adequately sedated. The moderate intravenous conscious sedation was provided for 18 minutes.    The patient was placed prone on the table.  The patient's back was prepped and draped in sterile fashion.  Attention was turned towards the patient's right side first.  The skin was anesthetized via 25-gauge 1.5\" needle with approximately 2 mL of 1% lidocaine for local anesthesia.  Under fluoroscopic guidance, a 22-gauge, 100-mm SMK needle was advanced to the junction of the superior aspect of the L3  transverse process and the lateral aspect of the same level superior articular process at right L3 level .  The needle was then walked off the bony tissue and advanced 2 to 3 mm to lie along the path of the L4 medial branch nerve.  AP and lateral radiographs were obtained to document proper needle position.  Sensory and motor stimulation were then performed, which elicited deep local back discomfort but no evidence of motor stimulation in the gluteal muscles or extremities.  At this point, 0.5 mL of 1% lidocaine was injected to the tissues around the tip of the SMK needle.   Subsequently, a medial branch nerve denervation was performed for 90 seconds at 80 degrees centigrade without complication.  Similar procedures were performed at right L4-L5 and L5-S1 level. The radiofrequency probe was removed with the needle left in place and 1% lidocaine and 5 mg methylprednisolone was injected through each needle.  The needles were removed with tips intact.  This was then repeated on the patient's contralateral side.  The patient tolerated the procedure very well.  There was no subjective or objective loss of motor strength.  The patient was observed until discharge criteria met.  Discharge instructions were given and patient was released to a responsible adult.     Complications: None.    Follow up:  The patient will be followed in the pain clinic as needed basis.      Tej Gama MD

## 2025-06-24 NOTE — DISCHARGE INSTRUCTIONS
You have been given medication that makes you sleepy. This may have included both pain medication and sleeping medication. Most of the effects have worn off but you may still have some drowsiness for the next 6 to 8 hours.    Home Care  Follow these guidelines when you get home:    --Don't drink any alcohol for the next 24 hours.    --Don't drive, operate dangerous machinery, make important business or personal    decisions, or sign legal documents during the next 24 hours.    Follow Up Care  Follow up with your healthcare provider if you are not alert and back to your usual level of activity within 12 hours    When to seek medical advice  Call your healthcare provider right away if any of these occur:    --Drowsiness that gets worse  --Weakness or dizziness that gets worse  --Repeated vomiting  --You can not be awakened   Home Care Instructions Following Your Pain Procedure     Danielle,  It has been a pleasure to have you as our patient. To help you at home, you must follow these general discharge instructions. We will review these with you before you are discharged. It is our hope that you have a complete and uneventful recovery from our procedure.     General Instructions:  What to Expect:  Bandages from your procedure today can be removed when you get home.  Please avoid soaking and/or swimming for 24 hours.  Showering is okay  It is normal to have increased pain symptoms and/or pain at injection site for up to 3-5 days after procedure, you can use heat or ice (20 minutes on 20 minutes off) for comfort.  You may experience some temporary side effects which may include restlessness or insomnia, flushing of the face, or heart palpitations.  Please contact the provider if these symptoms do not resolve within 3-4 days.  Lightheadedness or nausea may occur and should resolve within 24 to 48 hours.  If you develop a headache after treatment, rest, drink fluids (with caffeine, if possible) and take mild  over-the-counter pain medication.  If the headache does not improve with the above treatment, contact the physician.  Home Medications:  Resume all previously prescribed medication.  Please avoid taking NSAIDs (Non-Steriodal Anti-Inflammatory Drugs) such as:  Ibuprofen ( Advil, Motrin) Aleve (Naproxen), Diclofenac, Meloxicam for 6 hours after procedure.   If you are on Coumadin (Warfarin) or any other anti-coagulant (or \"blood thinning\") medication such as Plavix (Clopidogrel), Xarelto (Rivaroxaban), Eliquis (Apixaban), Effient (Prasugrel) etc., restart on the following day from the procedure unless otherwise directed by your provider.  If you are a diabetic, please increase the frequency of your glucose monitoring after the procedure as steroids may cause a temporary (2-3 day) increase in your blood sugar.  Contact your primary care physician if your blood sugar remains elevated as you may require some medication adjustment.  Diet:  Resume your regular diet as tolerated.  Activity:  We recommend that you relax and rest during the rest of your procedure day.  If you feel weakness in your arms or legs do not drive.  Follow-up Appointment  Please schedule a follow-up visit within 3 to 4 weeks after your last procedure date.  Question or Concerns:  Feel free to call our office with any questions or concerns at [phone] (option #[phone])    Danielle  Thank you for coming to [facility] for your procedure.  The nurses try very hard to make sure you receive the best care possible.  Your trust in them as well as us is greatly appreciated.    Thanks so much,   Dr. Tej Gama

## 2025-06-24 NOTE — H&P
History & Physical Examination    Patient Name: Danielle Jerome  MRN: [id number]  CSN: [id number]  YOB: 1965    Pre-Operative Diagnosis:  Spondylosis of lumbar region without myelopathy or radiculopathy [M47.816]    Present Illness: Low back pain    ASA: 3  MP class: 1  Sedation:   IV sedation (anxiolysis)    Prescriptions Prior to Admission[1]  Current Hospital Medications[2]    Allergies: Allergies[3]    Past Medical History[4]  Past Surgical History[5]  Family History[6]  Social History     Tobacco Use    Smoking status: Never     Passive exposure: Current    Smokeless tobacco: Never    Tobacco comments:     2nd hand with Father and now    Substance Use Topics    Alcohol use: No       SYSTEM Check if Review is Normal Check if Physical Exam is Normal If not normal, please explain:   HEENT [x ] [x ]    NECK & BACK [x ] [x ]    HEART [x ] [x ]    LUNGS [x ] [x ]    ABDOMEN [x ] [x ]    UROGENITAL [x ] [x ]    EXTREMITIES [x ] [x ]    OTHER        [ x ] I have discussed the risks and benefits and alternatives with the patient/family.  They understand and agree to proceed with plan of care.  [ x ] I have reviewed the History and Physical done within the last 30 days.  Any changes noted above.    Tej Gama MD              [1]   Facility-Administered Medications Prior to Admission   Medication Dose Route Frequency Provider Last Rate Last Admin    [COMPLETED] ketorolac (Toradol) 30 MG/ML injection 30 mg  30 mg Intramuscular Once    30 mg at 04/07/25 1356     Medications Prior to Admission   Medication Sig Dispense Refill Last Dose/Taking    Tirzepatide (MOUNJARO) 5 MG/0.5ML Subcutaneous Solution Auto-injector Inject 5 mg into the skin once a week. 6 mL 0     Ferrous Sulfate 325 (65 Fe) MG Oral Tab Take 1 tablet (325 mg total) by mouth daily with breakfast. 90 tablet 1     ipratropium 0.03 % Nasal Solution 2 sprays by Nasal route every 12 (twelve) hours. Use in place of flonase. 30 mL 0      [] DULoxetine HCl 40 MG Oral Cap DR Particles Take 40 mg by mouth daily for 7 days. 7 capsule 0     ALPRAZolam 0.5 MG Oral Tab Take 1 tablet (0.5 mg total) by mouth daily as needed for Anxiety. 30 tablet 0     pantoprazole 40 MG Oral Tab EC Take 1 tablet (40 mg total) by mouth every morning before breakfast. 90 tablet 0     fluticasone furoate-vilanterol (BREO ELLIPTA) 200-25 MCG/ACT Inhalation Aerosol Powder, Breath Activated Inhale 1 puff into the lungs daily. 1 each 11     Insulin Disposable Pump (OMNIPOD 5 [id number] PODS GEN 5) Does not apply Misc 1 each every other day. 45 each 1     Bempedoic Acid-Ezetimibe (NEXLIZET) 180-10 MG Oral Tab        HUMALOG KWIKPEN 200 UNIT/ML Subcutaneous Solution Pen-injector USE TO FILL INSULIN PUMP WITH UP  UNITS PER DAY 48 mL 3     rivaroxaban (XARELTO) 20 MG Oral Tab Take 1 tablet (20 mg total) by mouth nightly. TAKE AT BEDTIME 90 tablet 0 2025    Continuous Glucose Sensor (DEXCOM G6 SENSOR) Does not apply Misc Apply to skin every 10 days 9 each 3     Continuous Glucose Transmitter (DEXCOM G6 TRANSMITTER) Does not apply Misc 1 each by Other route every 3 (three) months. 1 each 3     albuterol 108 (90 Base) MCG/ACT Inhalation Aero Soln Inhale 2 puffs into the lungs every 4 to 6 hours as needed. inhale 2 puff by inhalation route  every 4 - 6 hours as needed 3 each 3     pregabalin 25 MG Oral Cap Take 2 capsules (50 mg total) by mouth at bedtime. 180 capsule 0     MONTELUKAST 10 MG Oral Tab TAKE 1 TABLET NIGHTLY 90 tablet 3     DUPIXENT 300 MG/2ML Subcutaneous Solution Pen-injector        metoprolol succinate ER 25 MG Oral Tablet 24 Hr Take 1 tablet (25 mg total) by mouth nightly.       glucagon (GVOKE HYPOPEN 1-PACK) 1 MG/0.2ML Subcutaneous SUBQ injection Inject 0.2 mL (1 mg total) into the skin once as needed for Low blood glucose. 0.2 mL 1     amLODIPine 2.5 MG Oral Tab Take 1 tablet (2.5 mg total) by mouth daily.       Azelastine HCl 0.1 % Nasal Solution 1  spray by Nasal route in the morning and 1 spray before bedtime.       Clopidogrel Bisulfate 75 MG Oral Tab Take 1 tablet (75 mg total) by mouth nightly. nightly   6/17/2025    losartan 100 MG Oral Tab Take 1 tablet (100 mg total) by mouth daily.       Fluticasone Propionate 50 MCG/ACT Nasal Suspension 1 spray by Each Nare route 2 (two) times daily.       cetirizine (ZYRTEC) 10 MG Oral Tab Take 1 tablet (10 mg total) by mouth nightly.      [2]   Current Facility-Administered Medications   Medication Dose Route Frequency    lactated ringers infusion  100 mL/hr Intravenous Continuous    ondansetron (Zofran) 4 MG/2ML injection 4 mg  4 mg Intravenous Once PRN   [3]   Allergies  Allergen Reactions    Peanut-Containing Drug Products HIVES and SHORTNESS OF BREATH     Hives, asthma attack    Pcn [Penicillins] UNKNOWN     HAPPENED AS A CHILD   [4]   Past Medical History:   Allergic rhinitis    Allergic rhinitis, cause unspecified    Anemia    Anxiety    Anxiety and depression    Asthma (HCC)    Atherosclerosis of coronary artery    Laguerre's esophagus    Churg-Shania syndrome (HCC)    Colloid cyst of brain (HCC)    Community acquired pneumonia of left lower lobe of lung    Congestive heart disease (HCC)    Coronary artery disease of native heart with stable angina pectoris    Depression    Diabetes (HCC)    Diabetes mellitus (HCC)    Diverticulosis of large intestine    Essential hypertension    Exacerbation of asthma (HCC)    Gastroparesis    GERD    Hematemesis with nausea    2017, No EGD, Hgb stable    High cholesterol    History of blood clots    History of pulmonary embolus (PE)    History of recurrent deep vein thrombosis (DVT)    Hyperlipidemia    Hypertension    IBS    LGSIL (low grade squamous intraepithelial dysplasia)    Pap neg 2014    Migraines    perfumes    Moderate persistent asthma without complication (HCC)    EDSOUZA (nonalcoholic steatohepatitis)    NSTEMI (non-ST elevated myocardial infarction) (HCC)    FELTON  in LAD    Obesity    Osteoarthritis    Patella-femoral syndrome, bilateral knee    Pneumonia due to organism    Pulmonary embolism (HCC)    Sepsis due to pneumonia (HCC)    Sleep apnea    Thyroid disease   [5]   Past Surgical History:  Procedure Laterality Date    Angioplasty (coronary)      Breast surgery procedure unlisted      cyst removed    Cath drug eluting stent      Cholecystectomy  04/10/2011    PERFORMED BY DR KRAFT-LAPAROSCOPIC    Colonoscopy  2012    polyps    Colonoscopy N/A 2016    Procedure: COLONOSCOPY;  Surgeon: Kishor Stout MD;  Location:  ENDOSCOPY    Colposcopy, cervix w/upper adjacent vagina; w/endocervical curettage  2011    WNL    Cyst aspiration left  2000    Hernia surgery            Other surgical history      sinus surgery    Other surgical history      CYST REMOVED ON LEFT WRIST 17 YRS AGO    Sinus surgery        Skin surgery      Upper gi endoscopy,exam     [6]   Family History  Problem Relation Age of Onset    Heart Disorder Father     Hypertension Mother     Lipids Mother     Stroke Mother     Anemia Mother     Dementia Mother     Heart Disorder Mother     Other (Other) Mother     Bleeding Disorders Mother     Hypertension Sister     Obesity Sister     Stroke Sister     Other (Other) Sister         stroke,pe    Other (Other) Brother         kidney stones    Colon Cancer Maternal Grandfather     Colon Cancer Maternal Aunt     Anemia Daughter     Asthma Daughter     Depression Daughter     Asthma Daughter     Bleeding Disorders Sister     Stroke Sister

## 2025-06-25 ENCOUNTER — TELEPHONE (OUTPATIENT)
Dept: PAIN CLINIC | Facility: CLINIC | Age: 60
End: 2025-06-25

## 2025-06-25 NOTE — TELEPHONE ENCOUNTER
Courtesy called placed to patient for post procedure follow up. Patient stated she is feeling very good today. Pt verbalized understanding to call with any questions or concerns.      Procedure: Bilateral L4/5, L5/S1 RADIOFREQUENCY ABLATION OF LUMBAR MEDIAL BRANCH with sedation   Date: 6/24/25  Follow up Visit Scheduled: 7/28/25 at 130pm with Armin

## 2025-06-30 ENCOUNTER — PATIENT MESSAGE (OUTPATIENT)
Dept: FAMILY MEDICINE CLINIC | Facility: CLINIC | Age: 60
End: 2025-06-30

## 2025-06-30 DIAGNOSIS — K21.9 GASTROESOPHAGEAL REFLUX DISEASE, UNSPECIFIED WHETHER ESOPHAGITIS PRESENT: ICD-10-CM

## 2025-06-30 DIAGNOSIS — F41.9 ANXIETY: ICD-10-CM

## 2025-06-30 DIAGNOSIS — F33.1 MODERATE EPISODE OF RECURRENT MAJOR DEPRESSIVE DISORDER (HCC): ICD-10-CM

## 2025-06-30 DIAGNOSIS — K22.719 BARRETT'S ESOPHAGUS WITH DYSPLASIA: ICD-10-CM

## 2025-06-30 RX ORDER — PANTOPRAZOLE SODIUM 40 MG/1
40 TABLET, DELAYED RELEASE ORAL
Qty: 90 TABLET | Refills: 0 | Status: SHIPPED | OUTPATIENT
Start: 2025-06-30

## 2025-06-30 RX ORDER — ALPRAZOLAM 0.5 MG
0.5 TABLET ORAL DAILY PRN
Qty: 30 TABLET | Refills: 0 | Status: SHIPPED | OUTPATIENT
Start: 2025-06-30

## 2025-06-30 NOTE — TELEPHONE ENCOUNTER
Refill request -    ALPRAZolam 0.5 MG Oral Tab # 30 tabs 0 refills    Last fill 5/23/25  Last OV 5/27/25- with Joyce

## 2025-06-30 NOTE — TELEPHONE ENCOUNTER
pantoprazole 40 MG Oral Tab EC         Sig: Take 1 tablet (40 mg total) by mouth every morning before breakfast.    Disp: 90 tablet    Refills: 0    Start: 6/30/2025    Class: Normal    Non-formulary For: Laguerre's esophagus with dysplasia; Gastroesophageal reflux disease, unspecified whether esophagitis present    Last ordered: 1 month ago (5/23/2025) by MADAI Mercado    Gastrointestional Medication Protocol [id number]/30/2025 01:54 PM   Protocol Details In person appointment or virtual visit in the past 12 mos or appointment in next 3 mos    Medication is active on med list

## 2025-06-30 NOTE — TELEPHONE ENCOUNTER
Refill request -    pantoprazole 40 MG Oral Tab EC # 90 tabs 0 refills    Last fill 5/23/25    Last OV 5/27/25  Last labs 5/13/25    WBC 10.4   RBC 5.66 High    HGB 14.2   HCT 45.1   .0   MCV 79.7 Low    MCH 25.1 Low    MCHC 31.5   RDW 16.0   Neutrophil Absolute Prelim 5.32   Neutrophil Absolute 5.32   Lymphocyte Absolute 3.82   Monocyte Absolute 0.53   Eosinophil Absolute 0.55   Basophil Absolute 0.11   Immature Granulocyte Absolute 0.06   Neutrophil % 51.1   Lymphocyte % 36.8   Monocyte % 5.1   Eosinophil % 5.3   Basophil % 1.1   Immature Granulocyte % 0.6

## 2025-07-02 ENCOUNTER — PATIENT OUTREACH (OUTPATIENT)
Dept: FAMILY MEDICINE CLINIC | Facility: CLINIC | Age: 60
End: 2025-07-02

## 2025-07-02 NOTE — PROGRESS NOTES
Health Maintenance  Due for physical in September  Due for mammogram and pap    Letter sent via Raise[employer]  Letter to be mailed as reminder to set up annual physical on or after 09/30/2025

## 2025-07-07 ENCOUNTER — TELEPHONE (OUTPATIENT)
Facility: CLINIC | Age: 60
End: 2025-07-07

## 2025-07-07 NOTE — TELEPHONE ENCOUNTER
Received a call from patient stating that her Dexcom currently reading very low. Per patient while eating lunch - Dexcom blood glucose 44, treated with 1tab of glucose and finished sandwich, had symptoms of headache, sweatiness, feeling spaced out. Rechecked blood sugar by fingerstick - blood sugar 160, dexcom still showing urgent low for the past hour.   Patient applied new sensor today. Walked through the steps how to calibrate the dexcom sensor, reviewed rule of 15.   Patient to call Dexcom if still having issues after calibration.

## 2025-07-08 ENCOUNTER — PATIENT MESSAGE (OUTPATIENT)
Facility: CLINIC | Age: 60
End: 2025-07-08

## 2025-07-08 DIAGNOSIS — Z79.4 TYPE 2 DIABETES MELLITUS WITH HYPERGLYCEMIA, WITH LONG-TERM CURRENT USE OF INSULIN (HCC): Primary | ICD-10-CM

## 2025-07-08 DIAGNOSIS — E11.65 TYPE 2 DIABETES MELLITUS WITH HYPERGLYCEMIA, WITH LONG-TERM CURRENT USE OF INSULIN (HCC): Primary | ICD-10-CM

## 2025-07-09 RX ORDER — ACYCLOVIR 400 MG/1
1 TABLET ORAL
Qty: 9 EACH | Refills: 1 | Status: SHIPPED | OUTPATIENT
Start: 2025-07-09

## 2025-07-09 NOTE — TELEPHONE ENCOUNTER
Pended and routed for review.   Sent [employer] message.     Endocrine Refill protocol for CGM supplies     Protocol Criteria:  PASSED Reason: N/A    If below requirement is met, send a 90-day supply with 1 refill per provider protocol.     Verify appointment with Endocrinology completed in the last 12 months or scheduled in the next 6 months     Last completed office visit:5/13/2025 Darling Palacios APN   Last completed telemed visit: Visit date not found  Next scheduled Follow up:   Future Appointments   Date Time Provider Department Center   7/28/2025  1:30 PM Yaa Watson APRN ENIPain [facility]   8/6/2025 10:15 AM Maria E Gonzalez DO EMGRHEUM[facility]BSN [facility]   8/14/2025  2:00 PM Darling Palacios APN EMGENDO [facility]   9/18/2025  2:30 PM Ana Luisa Miller MD EEMG Pulm [facility] [place]   12/2/2025  1:00 PM Maria E Gonzalez DO [facility]UM[facility]BS[facility]   4/7/2026 10:15 AM Maria E Gonzalez DO [facility]GR[facility]UM[facility]BSN [facility]

## 2025-07-15 DIAGNOSIS — E55.9 VITAMIN D DEFICIENCY: ICD-10-CM

## 2025-07-15 RX ORDER — ERGOCALCIFEROL 1.25 MG/1
50000 CAPSULE, LIQUID FILLED ORAL WEEKLY
Qty: 12 CAPSULE | Refills: 3 | OUTPATIENT
Start: 2025-07-15

## 2025-07-15 NOTE — TELEPHONE ENCOUNTER
Vitamin d 50,000iu      Last office visit: 4/8/2025    Next Rheum Apt:8/6/2025 Maria E Gonzalez DO    Last fill: 4/9/2024 12 cap, 0 refills    Sending pt my chart message that she is due for repeat blood work

## 2025-07-29 ENCOUNTER — PATIENT MESSAGE (OUTPATIENT)
Facility: CLINIC | Age: 60
End: 2025-07-29

## 2025-08-05 ENCOUNTER — PATIENT MESSAGE (OUTPATIENT)
Dept: FAMILY MEDICINE CLINIC | Facility: CLINIC | Age: 60
End: 2025-08-05

## 2025-08-05 ENCOUNTER — PATIENT MESSAGE (OUTPATIENT)
Facility: CLINIC | Age: 60
End: 2025-08-05

## 2025-08-05 DIAGNOSIS — K21.9 GASTROESOPHAGEAL REFLUX DISEASE, UNSPECIFIED WHETHER ESOPHAGITIS PRESENT: ICD-10-CM

## 2025-08-05 DIAGNOSIS — M54.41 ACUTE BILATERAL LOW BACK PAIN WITH BILATERAL SCIATICA: ICD-10-CM

## 2025-08-05 DIAGNOSIS — K22.719 BARRETT'S ESOPHAGUS WITH DYSPLASIA: ICD-10-CM

## 2025-08-05 DIAGNOSIS — M54.42 ACUTE BILATERAL LOW BACK PAIN WITH BILATERAL SCIATICA: ICD-10-CM

## 2025-08-05 DIAGNOSIS — J45.40 MODERATE PERSISTENT ASTHMA WITHOUT COMPLICATION (HCC): ICD-10-CM

## 2025-08-05 DIAGNOSIS — F41.9 ANXIETY: ICD-10-CM

## 2025-08-05 DIAGNOSIS — F33.1 MODERATE EPISODE OF RECURRENT MAJOR DEPRESSIVE DISORDER (HCC): ICD-10-CM

## 2025-08-05 RX ORDER — ALBUTEROL SULFATE 90 UG/1
2 INHALANT RESPIRATORY (INHALATION)
Qty: 3 EACH | Refills: 1 | Status: SHIPPED | OUTPATIENT
Start: 2025-08-05

## 2025-08-05 RX ORDER — DULOXETINE 40 MG/1
40 CAPSULE, DELAYED RELEASE ORAL DAILY
Qty: 90 CAPSULE | Refills: 1 | Status: SHIPPED | OUTPATIENT
Start: 2025-08-05

## 2025-08-05 RX ORDER — ALPRAZOLAM 0.5 MG
0.5 TABLET ORAL DAILY PRN
Qty: 30 TABLET | Refills: 2 | Status: SHIPPED | OUTPATIENT
Start: 2025-08-05

## 2025-08-05 RX ORDER — PANTOPRAZOLE SODIUM 40 MG/1
40 TABLET, DELAYED RELEASE ORAL
Qty: 90 TABLET | Refills: 1 | Status: SHIPPED | OUTPATIENT
Start: 2025-08-05

## 2025-08-06 ENCOUNTER — OFFICE VISIT (OUTPATIENT)
Dept: RHEUMATOLOGY | Facility: CLINIC | Age: 60
End: 2025-08-06

## 2025-08-06 VITALS
HEIGHT: 68 IN | TEMPERATURE: 98 F | DIASTOLIC BLOOD PRESSURE: 60 MMHG | WEIGHT: 270 LBS | HEART RATE: 78 BPM | RESPIRATION RATE: 16 BRPM | SYSTOLIC BLOOD PRESSURE: 118 MMHG | OXYGEN SATURATION: 98 % | BODY MASS INDEX: 40.92 KG/M2

## 2025-08-06 DIAGNOSIS — R79.82 ELEVATED C-REACTIVE PROTEIN (CRP): ICD-10-CM

## 2025-08-06 DIAGNOSIS — R70.0 ELEVATED SED RATE: ICD-10-CM

## 2025-08-06 DIAGNOSIS — M25.50 POLYARTHRALGIA: ICD-10-CM

## 2025-08-06 DIAGNOSIS — M79.2 NEUROPATHIC PAIN: ICD-10-CM

## 2025-08-06 DIAGNOSIS — E55.9 VITAMIN D DEFICIENCY: ICD-10-CM

## 2025-08-06 DIAGNOSIS — M79.7 FIBROMYALGIA: Primary | ICD-10-CM

## 2025-08-06 DIAGNOSIS — R79.89 ABNORMAL LFTS: ICD-10-CM

## 2025-08-06 DIAGNOSIS — M51.362 DEGENERATION OF INTERVERTEBRAL DISC OF LUMBAR REGION WITH DISCOGENIC BACK PAIN AND LOWER EXTREMITY PAIN: ICD-10-CM

## 2025-08-06 PROCEDURE — 3074F SYST BP LT 130 MM HG: CPT | Performed by: INTERNAL MEDICINE

## 2025-08-06 PROCEDURE — 99214 OFFICE O/P EST MOD 30 MIN: CPT | Performed by: INTERNAL MEDICINE

## 2025-08-06 PROCEDURE — 3008F BODY MASS INDEX DOCD: CPT | Performed by: INTERNAL MEDICINE

## 2025-08-06 PROCEDURE — G2211 COMPLEX E/M VISIT ADD ON: HCPCS | Performed by: INTERNAL MEDICINE

## 2025-08-06 PROCEDURE — 3078F DIAST BP <80 MM HG: CPT | Performed by: INTERNAL MEDICINE

## 2025-08-06 RX ORDER — PREGABALIN 25 MG/1
50 CAPSULE ORAL NIGHTLY
Qty: 180 CAPSULE | Refills: 0 | Status: SHIPPED | OUTPATIENT
Start: 2025-08-06

## 2025-08-07 ENCOUNTER — TELEPHONE (OUTPATIENT)
Dept: ALLERGY | Facility: CLINIC | Age: 60
End: 2025-08-07

## 2025-08-07 ENCOUNTER — TELEMEDICINE (OUTPATIENT)
Facility: CLINIC | Age: 60
End: 2025-08-07

## 2025-08-07 DIAGNOSIS — J45.50 SEVERE PERSISTENT ASTHMA, UNSPECIFIED WHETHER COMPLICATED (HCC): Primary | ICD-10-CM

## 2025-08-07 DIAGNOSIS — J33.8 MULTIPLE POLYPS OF ETHMOID SINUS: ICD-10-CM

## 2025-08-07 DIAGNOSIS — E11.65 TYPE 2 DIABETES MELLITUS WITH HYPERGLYCEMIA, WITH LONG-TERM CURRENT USE OF INSULIN (HCC): ICD-10-CM

## 2025-08-07 DIAGNOSIS — J33.9 SINUSITIS WITH NASAL POLYPS: ICD-10-CM

## 2025-08-07 DIAGNOSIS — Z79.4 TYPE 2 DIABETES MELLITUS WITH HYPERGLYCEMIA, WITH LONG-TERM CURRENT USE OF INSULIN (HCC): ICD-10-CM

## 2025-08-07 DIAGNOSIS — D72.18 EOSINOPHILIA IN DISEASES CLASSIFIED ELSEWHERE: ICD-10-CM

## 2025-08-07 DIAGNOSIS — J32.9 SINUSITIS WITH NASAL POLYPS: ICD-10-CM

## 2025-08-07 DIAGNOSIS — J32.4 CHRONIC PANSINUSITIS: ICD-10-CM

## 2025-08-07 PROCEDURE — 98006 SYNCH AUDIO-VIDEO EST MOD 30: CPT | Performed by: ALLERGY & IMMUNOLOGY

## 2025-08-07 RX ORDER — DUPILUMAB 300 MG/2ML
1 INJECTION, SOLUTION SUBCUTANEOUS EVERY 2 WEEKS
Qty: 4 ML | Refills: 5 | Status: SHIPPED | OUTPATIENT
Start: 2025-08-07

## 2025-08-07 RX ORDER — DUPILUMAB 300 MG/2ML
1 INJECTION, SOLUTION SUBCUTANEOUS EVERY 2 WEEKS
COMMUNITY
End: 2025-08-07

## 2025-08-07 RX ORDER — DUPILUMAB 300 MG/2ML
1 INJECTION, SOLUTION SUBCUTANEOUS EVERY 2 WEEKS
Qty: 4 ML | Refills: 5 | Status: SHIPPED | OUTPATIENT
Start: 2025-08-07 | End: 2025-08-07 | Stop reason: CLARIF

## 2025-08-11 ENCOUNTER — OFFICE VISIT (OUTPATIENT)
Dept: PAIN CLINIC | Facility: CLINIC | Age: 60
End: 2025-08-11

## 2025-08-11 VITALS
HEART RATE: 77 BPM | SYSTOLIC BLOOD PRESSURE: 132 MMHG | OXYGEN SATURATION: 98 % | DIASTOLIC BLOOD PRESSURE: 70 MMHG | WEIGHT: 270 LBS | BODY MASS INDEX: 41 KG/M2

## 2025-08-11 DIAGNOSIS — M47.816 SPONDYLOSIS OF LUMBAR REGION WITHOUT MYELOPATHY OR RADICULOPATHY: Primary | ICD-10-CM

## 2025-08-11 DIAGNOSIS — F33.1 MODERATE EPISODE OF RECURRENT MAJOR DEPRESSIVE DISORDER (HCC): ICD-10-CM

## 2025-08-11 DIAGNOSIS — M25.562 LEFT KNEE PAIN, UNSPECIFIED CHRONICITY: ICD-10-CM

## 2025-08-11 DIAGNOSIS — G89.29 OTHER CHRONIC PAIN: ICD-10-CM

## 2025-08-11 PROCEDURE — 3078F DIAST BP <80 MM HG: CPT | Performed by: NURSE PRACTITIONER

## 2025-08-11 PROCEDURE — 99214 OFFICE O/P EST MOD 30 MIN: CPT | Performed by: NURSE PRACTITIONER

## 2025-08-11 PROCEDURE — 3075F SYST BP GE 130 - 139MM HG: CPT | Performed by: NURSE PRACTITIONER

## 2025-08-13 ENCOUNTER — PATIENT MESSAGE (OUTPATIENT)
Facility: CLINIC | Age: 60
End: 2025-08-13

## 2025-08-14 ENCOUNTER — OFFICE VISIT (OUTPATIENT)
Facility: CLINIC | Age: 60
End: 2025-08-14

## 2025-08-14 VITALS
OXYGEN SATURATION: 94 % | SYSTOLIC BLOOD PRESSURE: 120 MMHG | DIASTOLIC BLOOD PRESSURE: 70 MMHG | HEIGHT: 69.6 IN | BODY MASS INDEX: 39.06 KG/M2 | HEART RATE: 90 BPM | WEIGHT: 269.81 LBS

## 2025-08-14 DIAGNOSIS — E66.01 CLASS 2 SEVERE OBESITY DUE TO EXCESS CALORIES WITH SERIOUS COMORBIDITY AND BODY MASS INDEX (BMI) OF 39.0 TO 39.9 IN ADULT (HCC): ICD-10-CM

## 2025-08-14 DIAGNOSIS — E78.5 HYPERLIPIDEMIA ASSOCIATED WITH TYPE 2 DIABETES MELLITUS (HCC): ICD-10-CM

## 2025-08-14 DIAGNOSIS — E66.812 CLASS 2 SEVERE OBESITY DUE TO EXCESS CALORIES WITH SERIOUS COMORBIDITY AND BODY MASS INDEX (BMI) OF 39.0 TO 39.9 IN ADULT (HCC): ICD-10-CM

## 2025-08-14 DIAGNOSIS — E11.65 TYPE 2 DIABETES MELLITUS WITH HYPERGLYCEMIA, WITH LONG-TERM CURRENT USE OF INSULIN (HCC): Primary | ICD-10-CM

## 2025-08-14 DIAGNOSIS — Z79.4 TYPE 2 DIABETES MELLITUS WITH HYPERGLYCEMIA, WITH LONG-TERM CURRENT USE OF INSULIN (HCC): Primary | ICD-10-CM

## 2025-08-14 DIAGNOSIS — I15.2 HYPERTENSION ASSOCIATED WITH TYPE 2 DIABETES MELLITUS (HCC): ICD-10-CM

## 2025-08-14 DIAGNOSIS — E11.69 HYPERLIPIDEMIA ASSOCIATED WITH TYPE 2 DIABETES MELLITUS (HCC): ICD-10-CM

## 2025-08-14 DIAGNOSIS — E11.59 HYPERTENSION ASSOCIATED WITH TYPE 2 DIABETES MELLITUS (HCC): ICD-10-CM

## 2025-08-14 LAB — HEMOGLOBIN A1C: 6.5 % (ref 4.3–5.6)

## 2025-08-14 PROCEDURE — 95251 CONT GLUC MNTR ANALYSIS I&R: CPT

## 2025-08-14 PROCEDURE — 3078F DIAST BP <80 MM HG: CPT

## 2025-08-14 PROCEDURE — 83036 HEMOGLOBIN GLYCOSYLATED A1C: CPT

## 2025-08-14 PROCEDURE — 3044F HG A1C LEVEL LT 7.0%: CPT

## 2025-08-14 PROCEDURE — 99214 OFFICE O/P EST MOD 30 MIN: CPT

## 2025-08-14 PROCEDURE — 3008F BODY MASS INDEX DOCD: CPT

## 2025-08-14 PROCEDURE — 3074F SYST BP LT 130 MM HG: CPT

## 2025-08-25 ENCOUNTER — PATIENT OUTREACH (OUTPATIENT)
Dept: FAMILY MEDICINE CLINIC | Facility: CLINIC | Age: 60
End: 2025-08-25

## 2025-08-26 DIAGNOSIS — E11.65 TYPE 2 DIABETES MELLITUS WITH HYPERGLYCEMIA, WITH LONG-TERM CURRENT USE OF INSULIN (HCC): Primary | ICD-10-CM

## 2025-08-26 DIAGNOSIS — Z79.4 TYPE 2 DIABETES MELLITUS WITH HYPERGLYCEMIA, WITH LONG-TERM CURRENT USE OF INSULIN (HCC): Primary | ICD-10-CM

## 2025-08-26 RX ORDER — TIRZEPATIDE 7.5 MG/.5ML
7.5 INJECTION, SOLUTION SUBCUTANEOUS WEEKLY
Qty: 6 ML | Refills: 0 | Status: CANCELLED | OUTPATIENT
Start: 2025-08-26

## 2025-08-29 RX ORDER — TIRZEPATIDE 7.5 MG/.5ML
7.5 INJECTION, SOLUTION SUBCUTANEOUS WEEKLY
Qty: 6 ML | Refills: 0 | Status: SHIPPED | OUTPATIENT
Start: 2025-08-29

## (undated) DIAGNOSIS — Z11.59 ENCOUNTER FOR SCREENING FOR OTHER VIRAL DISEASES: ICD-10-CM

## (undated) DIAGNOSIS — E11.65 TYPE 2 DIABETES MELLITUS WITH HYPERGLYCEMIA, WITH LONG-TERM CURRENT USE OF INSULIN (HCC): Primary | ICD-10-CM

## (undated) DIAGNOSIS — Z79.4 TYPE 2 DIABETES MELLITUS WITH HYPERGLYCEMIA, WITH LONG-TERM CURRENT USE OF INSULIN (HCC): Primary | ICD-10-CM

## (undated) DIAGNOSIS — Z01.818 PREPROCEDURAL EXAMINATION: ICD-10-CM

## (undated) DIAGNOSIS — I27.20 PULMONARY HTN (HCC): Primary | ICD-10-CM

## (undated) DIAGNOSIS — F41.9 ANXIETY: ICD-10-CM

## (undated) DIAGNOSIS — F33.1 MODERATE EPISODE OF RECURRENT MAJOR DEPRESSIVE DISORDER (HCC): ICD-10-CM

## (undated) DEVICE — BANDAGE ADH 1INX3IN NAT FAB N ADH PD CURAD

## (undated) DEVICE — GLOVE SUR 6.5 SENSICARE PIP WHT PWD F

## (undated) DEVICE — NEEDLE SPNL 22GA L5IN BLK HUB QNCKE BVL DISP

## (undated) DEVICE — BANDAGE,ADHESIVE,FABRIC,1"X3",STRL,LF: Brand: CURAD

## (undated) DEVICE — PAIN TRAY: Brand: MEDLINE INDUSTRIES, INC.

## (undated) DEVICE — REMOVER LOT 4OZ N IRRIG UNSCNT SFT MOIST LIQ

## (undated) DEVICE — SKIN REG/FINE DUAL MARKER, RULER, LABELS: Brand: MEDLINE

## (undated) DEVICE — RF CANNULA, CVD: Brand: VENOM

## (undated) DEVICE — GLOVE,SURG,SENSICARE,ALOE,LF,PF,7: Brand: MEDLINE

## (undated) DEVICE — GLOVE SUR 7.5 SENSICARE PIP WHT PWD F

## (undated) DEVICE — PATIENT RETURN ELECTRODE, SINGLE-USE, CONTACT QUALITY MONITORING, ADULT, WITH 9FT CORD, FOR PATIENTS WEIGING OVER 33LBS. (15KG): Brand: MEGADYNE

## (undated) DEVICE — SHEET, T, LAPAROTOMY, STERILE: Brand: MEDLINE

## (undated) NOTE — ED AVS SNAPSHOT
[facility] Emergency Department in [address] [facility]    Phone:  [phone]    Fax:  [address]   MRN: [id number]    Department:  [facility] Emergency Department in [place]   Date of Vi Commonly known as:  BIAXIN   Take 1 tablet (500 mg total) by mouth 2 (two) times daily.             Where to Get Your Medications      You can get these medications from any pharmacy     Bring a paper prescription for each of these medications    - rustam return to your personal doctor) about any new or lasting problems. The primary care or specialist physician will see patients referred from the [facility] Emergency Department. Follow-up care is at the discretion of that Physician.     IF THERE IS ANY - If you have concerns related to behavioral health issues or thoughts of harming yourself, contact [address] [place] [address] at [phone].     - If you don’t have insurance, Mary Montoya has partnered with Patient [employer] [address] 6 no lytic colloid cyst in the roof of the third ventricle is stable. There is no associated hydrocephalus. There is no evident fracture. Mild mucoperiosteal thickening in the visualized portion of the paranasal sinuses is noted.

## (undated) NOTE — LETTER
ASTHMA ACTION PLAN for Jaye Fregoso     : 3/25/1965     Date: 2022  Provider:  Renata West DO  Phone for doctor or clinic: [address] [facility], [address] [address] [phone]    ACT Score: 6      You can use the colors of a traffic light to help learn about your asthma medicines. 1. Green - Go! % of Personal Best Peak Flow Use controller medicine. Breathing is good  No cough or wheeze  Can work and play Medicine How much to take When to take it    Breo inhaler  Singulair 10 mg daily      2. Yellow - Caution. 50-79% Personal Best Peak  Flow. Use reliever medicine to keep an asthma attack from getting bad. Cough  Wheezing  Tight Chest  Wake up at night Medicine How much to take When to take it    Ventolin, proair as needed 1-2 puffs every 4-6 hrs       Additional instructions         3. Red - Stop! Danger!  <50% Personal Best Peak  Flow. Take these medications until  Get help from a doctor   Medicine not helping  Breathing is hard and fast  Nose opens wide  Can't walk  Ribs show  Can't talk well Medicine How much to take When to take it    Call 911 or go to nearest ER and or call pcp     Additional Instructions If your symptoms do not improve and you cannot contact your doctor, [facility] or call 911 immediately! [x] Asthma Action Plan reviewed with patient (and caregiver if necessary) and a copy of the plan was given to the patient/caregiver. [] Asthma Action Plan reviewed with patient (and caregiver if necessary) on the phone and mailed copy to patient or submitted via [address]      Signatures:  Provider  Renata West DO   Patient Caretaker

## (undated) NOTE — LETTER
November 7, 2017        Frosty Eisenmenger [address]      Dear January Selyb:    [facility] am [facility] Nurse [facility]. Just reaching out to see how you are doing since your discharge home.    When you have a

## (undated) NOTE — LETTER
18      Re: Russell Allison  : [birth year]    To Whom It May Concern,    Russell Allison is my patient under my care for various conditions. She was recently released from the hospital to her home.     She was informed her gas would be s

## (undated) NOTE — Clinical Note
Having some se with Victoza so will titrate slowly, BG are much improved from A1C will keep you posted  Thank you for your referral

## (undated) NOTE — LETTER
[facility], [facility] [address]  [phone]            Date: 6/18/2018     Patient Name: [address]:     This letter has been written at the pa

## (undated) NOTE — LETTER
ASTHMA ACTION PLAN for Alyse Kanner     : 3/25/1965     Date: 2019  Provider:  Fernando Odonnell DO  Phone for doctor or clinic: [address] [facility], [address] [place] [address] , [facility] [address] [address] [facility]  -20

## (undated) NOTE — LETTER
25      RE: Danielle Jerome    : 3/25/1965    Dear Dr. Johnson,     Your patient is being scheduled for a pain management procedure at [facility].    Procedure:  Bilateral L4/5,L5/S1 Radiofrequency Ablation.  Date of Procedure: TBD -pending medical clearance.  Physician: Dr. Gama- Anesthesiologist     Your patient is currently taking Xarelto. Dr. Gama usually recommends this medication to be held for 3 days prior to procedure.     Please verify patient is cleared to proceed with pain management procedures.      Clearance Approved   ____________    Clearance Denied       ____________      Comments: ______________________________________________________    Signature: ________________________________  Date: _________________       If you have any questions please feel free to contact our office at [phone], option # 3.    Please fax this clearance request to our office at fax # [phone] or send electronically.       Thank you,      [facility] Staff

## (undated) NOTE — ED AVS SNAPSHOT
Wilian Mejia   MRN: [id number]    Department:  [facility] Emergency Department in [place]   Date of Visit:  2/13/2020           Disclosure     Insurance plans vary and the physician(s) referred by the ER may not be covered by your plan.  Please co tell this physician (or your personal doctor if your instructions are to return to your personal doctor) about any new or lasting problems. The primary care or specialist physician will see patients referred from the [facility] Emergency Department.  Natalya Stout

## (undated) NOTE — Clinical Note
Thank you for referring Ms. Prosper Tavera for rheumatologic evaluation. Please see the discussion portion of my note and let me know if you have any questions.    Ibrahima Davila, DO [facility] 8/10/2023

## (undated) NOTE — Clinical Note
Do you agree with follow-up plan regarding colloid cyst of brain (HCC)?  Last CT 04/09/2018 showing 5mm colliod cyst, previously in 2011 was 6mm Considering repeating imaging within 10 years from previous study, sometime before 2028 to confirm it remains stable?  Earlier if new migraines/headaches...

## (undated) NOTE — ED AVS SNAPSHOT
Renata Mundo   MRN: [id number]    Department:  [facility] Emergency Department   Date of Visit:  11/4/2018           Disclosure     Insurance plans vary and the physician(s) referred by the ER may not be covered by your plan.  Please contact tell this physician (or your personal doctor if your instructions are to return to your personal doctor) about any new or lasting problems. The primary care or specialist physician will see patients referred from the [facility] Emergency Department.  Nimisha Mayo

## (undated) NOTE — Clinical Note
Danielle was here 9/17/2024 for annual physical, due to pain, we were unable to complete annual physical.  Can we call Danielle to get her scheduled for annual physical for preventive care (she will need 1 hour visit).    We need to address 41 items from problem list in addition to preventive care.

## (undated) NOTE — ED AVS SNAPSHOT
[facility] Emergency Department in [address] [facility]    Phone:  [phone]    Fax:  [address]   MRN: [id number]    Department:  [facility] Emergency Department in [place]   Date of Vi IF THERE IS ANY CHANGE OR WORSENING OF YOUR CONDITION, CALL YOUR PRIMARY CARE PHYSICIAN AT ONCE OR RETURN IMMEDIATELY TO THE EMERGENCY DEPARTMENT.     If you have been prescribed any medication(s), please fill your prescription right away and begin taking t

## (undated) NOTE — LETTER
[facility] [address], [address]  Consent for Procedure/Sedation    Date: **11/2/2017*    Time: 1220      1.  I authorize the performance upon Frosty Eisenmenger the following:cardiac catheterization, left ventricular cineangiograp period, the physician will determine when the applicable recovery period ends for purposes of reinstating the Do Not Resuscitate (DNR) order.     Signature of Patient: ____________________________________________________    Signature of person authorized

## (undated) NOTE — ED AVS SNAPSHOT
[facility] Emergency Department in [address] [place] [address]  Phone:  [phone]  Fax:  [address]   MRN: [id number]    Department:  [facility] Emergency Department in [place]   Date of Visit:  7/9/ IF THERE IS ANY CHANGE OR WORSENING OF YOUR CONDITION, CALL YOUR PRIMARY CARE PHYSICIAN AT ONCE OR RETURN IMMEDIATELY TO THE EMERGENCY DEPARTMENT.     If you have been prescribed any medication(s), please fill your prescription right away and begin taking t

## (undated) NOTE — ED AVS SNAPSHOT
Alanna Zamora   MRN: [id number]    Department:  [address] [facility] Emergency Department in [place]   Date of Visit:  5/9/2018           Disclosure     Insurance plans vary and the physician(s) referred by the ER may not be covered by your plan.  Please con tell this physician (or your personal doctor if your instructions are to return to your personal doctor) about any new or lasting problems. The primary care or specialist physician will see patients referred from the [facility] Emergency Department.  Salvadore Skiff

## (undated) NOTE — Clinical Note
Hi team! I am going to wean off escitalopram and start duloxetine in hopes of getting some of Alta's pain under better control. She's not able to tolerate much further increase in gabapentin due to fatigue. I am strongly recommended weight loss and urge her providers to consider a medication similar to ozempic in hopes of it helping. A lot of her symptoms are worsening due to her morbid obesity.  Thanks for your consideration and coordination, Maria E Gonzalez, DO [facility] 8/7/2024

## (undated) NOTE — LETTER
ASTHMA ACTION PLAN for Pa East     : 3/25/1965     Date: 8/10/2023  Provider:  Milind Centeno DO  Phone for doctor or clinic: [facility], [address] [facility] [place] , [address] [place] [id number] [phone]    ACT Score: 19      You can use the colors of a traffic light to help learn about your asthma medicines. 1. Green - Go! % of Personal Best Peak Flow Use controller medicine. Breathing is good  No cough or wheeze  Can work and play Medicine How much to take When to take it    Take preventive medications as prescribed by doctor       2. Yellow - Caution. 50-79% Personal Best Peak  Flow. Use reliever medicine to keep an asthma attack from getting bad. Cough  Wheezing  Tight Chest  Wake up at night Medicine How much to take When to take it    Call doctor's office, go to local Quick Care / Immediate Care       Additional instructions         3. Red - Stop! Danger!  <50% Personal Best Peak  Flow. Take these medications until  Get help from a doctor   Medicine not helping  Breathing is hard and fast  Nose opens wide  Can't walk  Ribs show  Can't talk well Medicine How much to take When to take it    Go to ER, call 911 and contact doctor. Additional Instructions If your symptoms do not improve and you cannot contact your doctor, [facility] or call 911 immediately! [x] Asthma Action Plan reviewed with patient (and caregiver if necessary) and a copy of the plan was given to the patient/caregiver. [] Asthma Action Plan reviewed with patient (and caregiver if necessary) on the phone and mailed copy to patient or submitted via [address]      Signatures:  Provider  Milind Centeno DO   Patient Caretaker

## (undated) NOTE — ED AVS SNAPSHOT
Christian Garcia   MRN: [id number]    Department:  [facility] Emergency Department   Date of Visit:  1/20/2018           Disclosure     Insurance plans vary and the physician(s) referred by the ER may not be covered by your plan.  Please contact tell this physician (or your personal doctor if your instructions are to return to your personal doctor) about any new or lasting problems. The primary care or specialist physician will see patients referred from the [facility] Emergency Department.  Shelton Lombard

## (undated) NOTE — Clinical Note
YOANNA Yuen, This pt has an upcoming appt with you. 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 Please keep me posted on what your thoughts are after you see her.   Thanks in advance! - Gilbert Walker

## (undated) NOTE — LETTER
[facility]  Lance Bahena 63 [id number] [facility], [address] [place] [address]    Consent for Operation    Date: __________________    Time: _______________    1.  I authorize the performance upon Addy Gay the following operation:    Cardiac catheterization, left betito procedures to be performed, including appropriate portions of my body for medical, scientific, or educational purposes, provided my identity is not revealed by the pictures or by descriptive texts accompanying them.  If the procedure has been videotaped, th ends for purposes of reinstating the Do Not Resuscitate order.     I CERTIFY THAT I HAVE READ AND UNDERSTAND THE ABOVE CONSENT TO OPERATION, THAT MY DOCTOR PROVIDED ME WITH THE ABOVE EXPLANATIONS, THAT ALL BLANKS OR STATEMENTS REQUIRING INSERTION OR COMPLET kasi Malave Harm my anesthesia doctor before my procedure:  · If I am pregnant. · The last time that I ate or drank.   · All of the medicines I take (including prescriptions, herbal supplements, and pills I can buy without a prescription (including street drugs/ill _____________________________________________________________________________   Name (if used)    Language/Organization   Time    _____________________________________________________________________________  Anesthesiologist Signature     Date

## (undated) NOTE — ED AVS SNAPSHOT
Marisol Lau   MRN: [id number]    Department:  [facility] Emergency Department in [place]   Date of Visit:  9/28/2017           Disclosure     Insurance plans vary and the physician(s) referred by the ER may not be covered by your plan.  Please co If you have been prescribed any medication(s), please fill your prescription right away and begin taking the medication(s) as directed    If the emergency physician has read X-rays, these will be re-interpreted by a radiologist.  If there is a significant

## (undated) NOTE — LETTER
Patient Name: Danielle Jerome        : 3/25/1965       Medical Record #: [id number]    CONSENT FOR PROCEDURES/SEDATION    Date: 2025       Time: 1:51 PM        1. I authorize the performance upon Danielle Jerome the following:    _____________________IM TORADOL INJECTION          _____________________    2. I authorize Yaa AJ (and whomever is designated as the doctor’s assistant), to perform the above mentioned procedures.    3. If any unforeseen conditions arise during this procedure calling for additional procedures, operations, or medications (including anesthesia and blood transfusion), I  further request and authorize the doctor to do whatever he/she deems advisable in my interest.    4. I consent to the taking and reproduction of any photographs in the course of this procedure for professional purposes.    5. I consent to the administration of such sedation as may be considered necessary or advisable by the physician responsible for this service, with the exception of  _____________________________.    6. I have been informed by my doctor of the nature and purpose of this procedure/sedation, possible alternative methods of treatment, risk involved and possible complications.      Signature of Patient:  ___________________________    Signature of person authorized to consent for patient: Relationship to patient:  ___________________________    ___________________    Witness: ____________________     Date: ______________    Provider: ____________________     Date: ______________

## (undated) NOTE — LETTER
ASTHMA ACTION PLAN for Wilian Mejia     : 3/25/1965     Date: 10/28/2020  Provider:  Patrick Rose DO  Phone for doctor or clinic: [facility], [address] [facility] [address] [facility] , [address] [facility]  [phone]

## (undated) NOTE — LETTER
07/02/25    Danielle Jerome  [address] [facility] [address]          Hi Alta,    We hope you are doing well and just wanted to check in.  According to our records, you are due for an annual physical with Dr. Rai on 09/30/2025.    Please call us at [phone] to get an appointment scheduled.      Thank you,        Joyce HERNANDEZ, APRN, [facility]  [phone]

## (undated) NOTE — Clinical Note
FYI, TCM call made, see notes. NOHEMI scheduled TCM HFU appointment on 11/10/17. Message sent to MD's office with condition update.

## (undated) NOTE — LETTER
April 13, 2018        [address] [facility]      Dear [facility]:    I am the Nurse Care Manager with Dr. Lashon Whitmore office. Just reaching out to see how you are doing since your discharge home.    When you have

## (undated) NOTE — Clinical Note
FYI, TCM call made, see notes. NCM scheduled TCM HFU on 1/9/18, attempted to schedule sooner, patient did not want to see another practitioner.

## (undated) NOTE — LETTER
18    Patient: Kit Daly  : 3/25/1965 Visit date: 2018    Dear  Dr. Altaf Whitfield, DO,    Thank you for referring Kit Daly to my practice. Please find my assessment and plan below.       Assessment   Irreducible ventral incision only to the findings at the umbilicus. She has incarcerated fat in 2 different lobules. One is at the umbilicus, the others just above the umbilicus. There are no loops of bowel in the umbilical region ventral incisional hernia.   It is at the site of a This patient will have to see Dr. Beverly Cordova for hematology. She will have to see Dr. Janene Meneses for cardiology. She would require clearance from both physicians in order to undergo any operative procedure.   If either 1 of them deems her a moderate to high ri

## (undated) NOTE — LETTER
ASTHMA ACTION PLAN for Daniel Kendrick     : 3/25/1965     Date: 2018  Provider:  Armen Singh DO  Phone for doctor or clinic: [facility] [facility], [address] [place] [address] , [address] [facility] [address] [address] [facility]  [phone]

## (undated) NOTE — Clinical Note
Hope its okay I switched her glipizide to victoza. Not much else I can use for her weight wise. She had not yet picked up glipizide.  We did injection teaching in the office, thanks!!  Dr. Fili Patiño

## (undated) NOTE — LETTER
Date: 5/22/2018    Patient Name: [address]zena [address]: This letter has been written at the patient's request. The above patient was seen at the [facility] for treatment of a medical condition.     This patient has equip

## (undated) NOTE — LETTER
25      RE: Danielle Jerome    : 3/25/1965    Dear Dr. Johnson,    Your patient is being scheduled for a pain management procedure at [facility].    Procedure:  Bilateral L4/5,L5/S1 Medial Branch Block  Date of Procedure: TBD -pending medical clearance  Physician: Dr. Gama- Anesthesiologist     Your patient is currently taking Xarelto. Dr. Gama usually recommends this medication to be held for 3 days prior to injection.     Please verify patient is cleared to proceed with pain management procedures.      Clearance Approved   ____________    Clearance Denied       ____________      Comments: ______________________________________________________    Signature: ________________________________  Date: _________________       If you have any questions please feel free to contact our office at [phone], option # 3.    Please fax this clearance request to our office at fax # [phone] or send electronically.       Thank you,      [facility] Staff

## (undated) NOTE — ED AVS SNAPSHOT
Sharif Martinez   MRN: [id number]    Department:  [facility] in [place]   Date of Visit:  10/17/2018           Disclosure     Insurance plans vary and the physician(s) referred by the ER may not be covered by your plan.  Please c tell this physician (or your personal doctor if your instructions are to return to your personal doctor) about any new or lasting problems. The primary care or specialist physician will see patients referred from the [facility] Emergency Department.  Vanesa Goetz

## (undated) NOTE — LETTER
Danielle Jerome   [address] [facility] [address]           Dear Danielle Jerome     We received a patient reminder from MADAI Mercado asking if you would schedule your yearly physical with her sometime before October 2024.      To provide you with the best possible care, please complete these orders at your earliest convenience. If you have recently completed these orders please disregard this letter.     If you have any questions please call the office at [phone].     Thank you,     [facility]

## (undated) NOTE — Clinical Note
Please request notes from cardiology- I think Dr. Shekhar Adam through [address] And fax a copy of our note to her.    Jane Fitzpatrick, DO [facility] 8/10/2023